# Patient Record
Sex: MALE | Race: WHITE | NOT HISPANIC OR LATINO | Employment: OTHER | URBAN - METROPOLITAN AREA
[De-identification: names, ages, dates, MRNs, and addresses within clinical notes are randomized per-mention and may not be internally consistent; named-entity substitution may affect disease eponyms.]

---

## 2017-01-27 DIAGNOSIS — E11.9 TYPE 2 DIABETES MELLITUS WITHOUT COMPLICATION: ICD-10-CM

## 2017-01-30 ENCOUNTER — TELEPHONE (OUTPATIENT)
Dept: FAMILY MEDICINE | Facility: CLINIC | Age: 72
End: 2017-01-30

## 2017-01-30 NOTE — TELEPHONE ENCOUNTER
----- Message from Vivakash Colunga sent at 1/25/2017 10:27 AM CST -----  Contact: Diabetic and Management Supplies  This is regarding about pre-authorization request for diabetic supply for the pt. Please contact Marisol at [phone] ext [phone]. Thanks!

## 2017-01-30 NOTE — TELEPHONE ENCOUNTER
Spoke with Marisol and this was intended for the patient, they have the information from the clinic. Sent message to the patient,

## 2017-02-08 ENCOUNTER — TELEPHONE (OUTPATIENT)
Dept: FAMILY MEDICINE | Facility: CLINIC | Age: 72
End: 2017-02-08

## 2017-02-08 NOTE — TELEPHONE ENCOUNTER
----- Message from Chen Herbert sent at 2/8/2017  3:19 PM CST -----  Contact: Diabetes Mgm & Supplies- Marisol or [facility]un[facility] , or Bartolo   Calling re diabetic supplies . Prior auth is needed for the insulin pump & supplies . [phone]   LL

## 2017-02-08 NOTE — TELEPHONE ENCOUNTER
----- Message from Berna Curry sent at 2/7/2017  2:41 PM CST -----  Contact: Marisol herrera Diabeties managment and supplies  [phone]  Marisol is requesting to speak to you regarding a authorization for diabetic supplies. Pls call Diabetic management supplies ( ask for [facility] Damon or Veena ) [phone]. Thanks.......Shima

## 2017-02-09 ENCOUNTER — TELEPHONE (OUTPATIENT)
Dept: FAMILY MEDICINE | Facility: CLINIC | Age: 72
End: 2017-02-09

## 2017-02-09 DIAGNOSIS — E11.22 DIABETES MELLITUS WITH STAGE 3 CHRONIC KIDNEY DISEASE: Chronic | ICD-10-CM

## 2017-02-09 DIAGNOSIS — N18.30 DIABETES MELLITUS WITH STAGE 3 CHRONIC KIDNEY DISEASE: Chronic | ICD-10-CM

## 2017-02-09 NOTE — TELEPHONE ENCOUNTER
Received blood work with improvement of hemoglobin A1c.  Excellent job.  Keep working on your diabetes.

## 2017-02-13 ENCOUNTER — OFFICE VISIT (OUTPATIENT)
Dept: FAMILY MEDICINE | Facility: CLINIC | Age: 72
End: 2017-02-13
Payer: MEDICARE

## 2017-02-13 ENCOUNTER — TELEPHONE (OUTPATIENT)
Dept: FAMILY MEDICINE | Facility: CLINIC | Age: 72
End: 2017-02-13

## 2017-02-13 ENCOUNTER — HOSPITAL ENCOUNTER (OUTPATIENT)
Dept: RADIOLOGY | Facility: HOSPITAL | Age: 72
Discharge: HOME | End: 2017-02-13
Attending: NURSE PRACTITIONER
Payer: MEDICARE

## 2017-02-13 VITALS
DIASTOLIC BLOOD PRESSURE: 82 MMHG | BODY MASS INDEX: 37.39 KG/M2 | SYSTOLIC BLOOD PRESSURE: 130 MMHG | HEART RATE: 85 BPM | HEIGHT: 68 IN | WEIGHT: 246.69 LBS | TEMPERATURE: 98 F | OXYGEN SATURATION: 96 %

## 2017-02-13 DIAGNOSIS — M79.672 FOOT ARCH PAIN, LEFT: ICD-10-CM

## 2017-02-13 DIAGNOSIS — M79.672 ACUTE FOOT PAIN, LEFT: ICD-10-CM

## 2017-02-13 DIAGNOSIS — M72.2 PLANTAR FASCIITIS, LEFT: Primary | ICD-10-CM

## 2017-02-13 DIAGNOSIS — G57.92 NEUROPATHY OF FOOT, LEFT: ICD-10-CM

## 2017-02-13 PROCEDURE — 73630 X-RAY EXAM OF FOOT: CPT | Mod: 26,LT,, | Performed by: RADIOLOGY

## 2017-02-13 PROCEDURE — 1160F RVW MEDS BY RX/DR IN RCRD: CPT | Mod: S$GLB,,, | Performed by: NURSE PRACTITIONER

## 2017-02-13 PROCEDURE — 3075F SYST BP GE 130 - 139MM HG: CPT | Mod: S$GLB,,, | Performed by: NURSE PRACTITIONER

## 2017-02-13 PROCEDURE — 99214 OFFICE O/P EST MOD 30 MIN: CPT | Mod: S$GLB,,, | Performed by: NURSE PRACTITIONER

## 2017-02-13 PROCEDURE — 1125F AMNT PAIN NOTED PAIN PRSNT: CPT | Mod: S$GLB,,, | Performed by: NURSE PRACTITIONER

## 2017-02-13 PROCEDURE — 3079F DIAST BP 80-89 MM HG: CPT | Mod: S$GLB,,, | Performed by: NURSE PRACTITIONER

## 2017-02-13 PROCEDURE — 1159F MED LIST DOCD IN RCRD: CPT | Mod: S$GLB,,, | Performed by: NURSE PRACTITIONER

## 2017-02-13 PROCEDURE — 1157F ADVNC CARE PLAN IN RCRD: CPT | Mod: S$GLB,,, | Performed by: NURSE PRACTITIONER

## 2017-02-13 PROCEDURE — 99499 UNLISTED E&M SERVICE: CPT | Mod: S$GLB,,, | Performed by: NURSE PRACTITIONER

## 2017-02-13 PROCEDURE — 73630 X-RAY EXAM OF FOOT: CPT | Mod: TC,PO,LT

## 2017-02-13 PROCEDURE — 99999 PR PBB SHADOW E&M-EST. PATIENT-LVL V: CPT | Mod: PBBFAC,,, | Performed by: NURSE PRACTITIONER

## 2017-02-13 RX ORDER — TRAMADOL HYDROCHLORIDE 50 MG/1
50 TABLET ORAL EVERY 6 HOURS PRN
Qty: 30 TABLET | Refills: 0 | Status: SHIPPED | OUTPATIENT
Start: 2017-02-13 | End: 2017-02-23

## 2017-02-13 RX ORDER — CELECOXIB 100 MG/1
100 CAPSULE ORAL 2 TIMES DAILY
Qty: 30 CAPSULE | Refills: 0 | Status: SHIPPED | OUTPATIENT
Start: 2017-02-13 | End: 2017-04-21 | Stop reason: HOSPADM

## 2017-02-13 RX ORDER — INSULIN LISPRO 100 [IU]/ML
INJECTION, SOLUTION INTRAVENOUS; SUBCUTANEOUS
Status: ON HOLD | COMMUNITY
End: 2017-04-28 | Stop reason: HOSPADM

## 2017-02-13 NOTE — TELEPHONE ENCOUNTER
----- Message from Samra Jones NP sent at 2/13/2017  2:13 PM CST -----  Please Notify patient that  His xray showed    Arthritic changes of the foot.   please continue with current recommendation

## 2017-02-13 NOTE — PATIENT INSTRUCTIONS
Understanding Heel Pain  Your heel is the back part of your foot. A band of tissue called the plantar fascia connects the heel bone to the bones in the ball of your foot. Nerves run from the heel up the inside of your ankle and into your leg. When you feel pain in the bottom of your heel, the plantar fascia may be inflamed. Overuse, Achilles tightness, or excess body weight can cause the tissue to tear or pull away from the bone. Sometimes the inflamed plantar fascia also irritates a nerve, causing more pain.    What causes heel pain?  Wearing shoes with poor cushioning can irritate the tissue in your heel (plantar fascia). Being overweight or standing for long periods can also irritate the tissue. Running, walking, tennis, and other sports that put stress on the heels can cause tiny tears in the tissue. If your lower leg muscles are tight, this is more likely to occur. A tight Achilles tendon will also contribute to heel pain.  Symptoms  You may feel pain on the bottom or on the inside edge of your heel. The pain may be sharp when you get out of bed or when you stand up after sitting for a while. You may feel a dull ache in your heel after youve been standing for a long time on a hard surface. Running can also cause a dull ache.  Preventing future problems  To prevent future heel pain, wear shoes with well-cushioned heels. And do exercises prescribed by your healthcare provider to stretch the plantar fascia and the muscles in the lower leg.   Date Last Reviewed: 9/10/2015  © 3479-6401 [employer]. [address] [place] [address], [place] [address]. All rights reserved. This information is not intended as a substitute for professional medical care. Always follow your healthcare professional's instructions.        Plantar Fasciitis  Plantar fasciitis is a painful swelling of the plantar fascia. The plantar fascia is a thick, fibrous layer of tissue. It covers the bones on the bottom of your foot. And it  supports the foot bones in an arched position.  This can happen gradually or suddenly. It usually affects one foot at a time. Heel pain can be sharp, like a knife sticking into the bottom of your foot. You may feel pain after exercising, long-distance jogging, stair climbing, long periods of standing, or after standing up.  Risk factors include: non-active lifestyle, arthritis, diabetes, obesity or recent weight gain, flat foot, high arch. Wearing high heels, loose shoes, or shoes with poor arch support for long periods of time adds to the risk. This problem is commonly found in runners and dancers. It also found in people who stand on hard surfaces for long periods of time.  Foot pain from this condition is usually worse in the morning. But it improves with walking. By the end of the day there may be a dull aching. Treatment requires short-term rest and controlling swelling. It may take up to 9 months before all symptoms go away. Rarely, a steroid injection into the foot, or surgery, may be needed.  Home care  · If you are overweight, lose weight to help healing.  · Choose supportive shoes with good arch support and shock absorbency. Replace athletic shoes when they become worn out. Dont walk or run barefoot.  · Premade or custom-fitted shoe inserts may be helpful. Inserts made of silicone seem to be the most effective. Custom-made inserts can be provided by a podiatrist or foot specialist, physical therapist, or orthopedist.  · Premade or custom-made night splints keep the heel stretched out while you sleep. They may prevent morning pain.  · Avoid activities that stress the feet: jogging, prolonged standing or walking, contact sports, etc.  · First thing in the morning and before sports, stretch the bottom of your feet. Gently flex your ankle so the toes move toward your knee.  · Icing may help control heel pain. Apply an ice pack to the heel for 10-20 minutes as a preventive. Or ice your heel after a severe  flare-up of symptoms. You may repeat this every 1-2 hours as needed.  · You may use over-the-counter pain medicine to control pain, unless another medicine was prescribed. Anti-inflammatory pain medicines, such as ibuprofen or naproxen, may work better than acetaminophen. If you have chronic liver or kidney disease or ever had a stomach ulcer or GI bleeding, talk with your healthcare provider before using these medicines.  Follow-up care  Follow up with your healthcare provider, physical therapist, or podiatrist or foot specialist as advised.  Call for an appointment if pain worsens or there is no relief after a few weeks of home treatment. Shoe inserts, a night splint, or a special boot may be required.  If X-rays were taken, you will be told of any new findings that may affect your care.  When to seek medical advice  Call your healthcare provider right away if any of these occur:  · Foot swelling  · Redness with increasing pain  Date Last Reviewed: 11/21/2015  © 1308-2020 [employer] [address] [place] [address], [place] [address]. All rights reserved. This information is not intended as a substitute for professional medical care. Always follow your healthcare professional's instructions.

## 2017-02-13 NOTE — PROGRESS NOTES
Subjective:       Patient ID: Jorden Shafer is a 71 y.o. male.    Chief Complaint: Foot Pain (left foot, burning and sharp pain X 1 mth )    Foot Pain   This is a new problem. The current episode started more than 1 month ago (2 months ). The problem occurs constantly. The problem has been gradually worsening (pain started 2 months ago but is gradually worsening over the last couple of days ). Associated symptoms include arthralgias (left foot). Pertinent negatives include no abdominal pain, chest pain, congestion, coughing, headaches, myalgias, nausea, rash, vomiting or weakness.     Review of Systems   HENT: Negative for congestion, postnasal drip, rhinorrhea, sinus pressure and sneezing.    Respiratory: Negative for cough, chest tightness and shortness of breath.    Cardiovascular: Negative for chest pain, palpitations and leg swelling.   Gastrointestinal: Negative for abdominal pain, diarrhea, nausea and vomiting.   Genitourinary: Negative for decreased urine volume and difficulty urinating.   Musculoskeletal: Positive for arthralgias (left foot) and gait problem (hurts to bear weight on left foot ). Negative for back pain and myalgias.   Skin: Negative for color change, rash and wound.   Neurological: Negative for dizziness, weakness, light-headedness and headaches.   Hematological: Negative for adenopathy. Does not bruise/bleed easily.       Objective:      Physical Exam   Constitutional: He is oriented to person, place, and time. Vital signs are normal. He appears well-developed and well-nourished.   Cardiovascular: Normal rate, regular rhythm and normal heart sounds.    Pulmonary/Chest: Effort normal and breath sounds normal.   Abdominal: Soft. Bowel sounds are normal.   Musculoskeletal:        Left foot: There is decreased range of motion, tenderness and bony tenderness. There is no swelling, normal capillary refill, no crepitus, no deformity and no laceration.        Feet:    Neurological: He is alert  and oriented to person, place, and time.   Skin: Skin is warm, dry and intact.   Psychiatric: He has a normal mood and affect.       Assessment:       1. Plantar fasciitis, left    2. Acute foot pain, left    3. Foot arch pain, left    4. Neuropathy of foot, left        Plan:       Jorden was seen today for foot pain.    Diagnoses and all orders for this visit:    Plantar fasciitis, left  -     celecoxib (CELEBREX) 100 MG capsule; Take 1 capsule (100 mg total) by mouth 2 (two) times daily.  -     tramadol (ULTRAM) 50 mg tablet; Take 1 tablet (50 mg total) by mouth every 6 (six) hours as needed.  -     Ambulatory Referral to Podiatry    Acute foot pain, left  -     X-Ray Foot Complete Left; Future  -     celecoxib (CELEBREX) 100 MG capsule; Take 1 capsule (100 mg total) by mouth 2 (two) times daily.  -     tramadol (ULTRAM) 50 mg tablet; Take 1 tablet (50 mg total) by mouth every 6 (six) hours as needed.  -     Ambulatory Referral to Podiatry    Foot arch pain, left  -     X-Ray Foot Complete Left; Future  -     celecoxib (CELEBREX) 100 MG capsule; Take 1 capsule (100 mg total) by mouth 2 (two) times daily.  -     tramadol (ULTRAM) 50 mg tablet; Take 1 tablet (50 mg total) by mouth every 6 (six) hours as needed.  -     Ambulatory Referral to Podiatry    Neuropathy of foot, left  -     celecoxib (CELEBREX) 100 MG capsule; Take 1 capsule (100 mg total) by mouth 2 (two) times daily.  -     tramadol (ULTRAM) 50 mg tablet; Take 1 tablet (50 mg total) by mouth every 6 (six) hours as needed.  -     Ambulatory Referral to Podiatry    Pt has been given instructions populated from [employer] database and has verbalized understanding of the after visit summary and information contained wherein.     Follow up with a primary care provider. May go to ER for acute shortness of breath, lightheadedness, fever, or any other emergent complaints or changes in condition.

## 2017-02-13 NOTE — MR AVS SNAPSHOT
[facility]  [address] [place] [address]  Phone: [phone]  Fax: [phone]                  Jorden Shafer   2017 11:15 AM   Office Visit    Description:  Male : 1945   Provider:  Samra Jones NP   Department:  [facility]           Reason for Visit     Foot Pain           Diagnoses this Visit        Comments    Acute foot pain, left    -  Primary     Foot arch pain, left         Neuropathy of foot, left         Plantar fasciitis, left                To Do List           Future Appointments        Provider Department Dept Phone    2017  1:30 PM [facility] XR1 [address] LIMIT [facility] [phone]    2017 8:00 AM Neha Plasencia MD [facility] [phone]      Goals (5 Years of Data)     None      Follow-Up and Disposition     Return if symptoms worsen or fail to improve.       These Medications        Disp Refills Start End    celecoxib (CELEBREX) 100 MG capsule 30 capsule 0 2017     Take 1 capsule (100 mg total) by mouth 2 (two) times daily. - Oral    Pharmacy: [facility]/pharmacy #[id number] - [place], [place] - [address] Ph #: [phone]       tramadol (ULTRAM) 50 mg tablet 30 tablet 0 2017    Take 1 tablet (50 mg total) by mouth every 6 (six) hours as needed. - Oral    Pharmacy: [facility]/pharmacy #[id number] - [place], [place] - [address] [facility] [place] [place] [employer] Ph #: [phone]         [facility]     [facility] -  Assistance  Registered nurses in the [facility] provide clinical advisement, health education, appointment booking, and other advisory services.  Call for this free service at [phone].             Medications           Message regarding Medications     Verify the changes and/or additions to your medication regime listed below are the same as discussed with your clinician today.  If any of these changes or additions are incorrect, please  notify your healthcare provider.        START taking these NEW medications        Refills    celecoxib (CELEBREX) 100 MG capsule 0    Sig: Take 1 capsule (100 mg total) by mouth 2 (two) times daily.    Class: Normal    Route: Oral    tramadol (ULTRAM) 50 mg tablet 0    Sig: Take 1 tablet (50 mg total) by mouth every 6 (six) hours as needed.    Class: Normal    Route: Oral      STOP taking these medications     insulin aspart (NOVOLOG) 100 unit/mL injection Use this insulin in your pump as directed.           Verify that the below list of medications is an accurate representation of the medications you are currently taking.  If none reported, the list may be blank. If incorrect, please contact your healthcare provider. Carry this list with you in case of emergency.           Current Medications     amlodipine (NORVASC) 10 MG tablet Take 1 tablet (10 mg total) by mouth once daily.    aspirin (ECOTRIN) 81 MG EC tablet Take 81 mg by mouth once daily.    atorvastatin (LIPITOR) 40 MG tablet Take 0.5 tablets (20 mg total) by mouth once daily.    blood sugar diagnostic Strp 1 strip by Misc.(Non-Drug; Combo Route) route 2 (two) times daily with meals. TRUE METRIX    blood-glucose meter kit Use as instructed          TRUE METRIX    carvedilol (COREG) 25 MG tablet Take 1 tablet (25 mg total) by mouth 2 (two) times daily with meals.    citalopram (CELEXA) 10 MG tablet Take 1 tablet (10 mg total) by mouth once daily.    clopidogrel (PLAVIX) 75 mg tablet Take 1 tablet (75 mg total) by mouth once daily.    furosemide (LASIX) 80 MG tablet Take 1 tablet (80 mg total) by mouth once daily.    glucose 4 GM chewable tablet Take 16 g by mouth as needed for Low blood sugar.    insulin lispro (HUMALOG) 100 unit/mL injection Inject into the skin 3 (three) times daily before meals.    isosorbide mononitrate (IMDUR) 60 MG 24 hr tablet Take 1 tablet (60 mg total) by mouth once daily.    lancets Misc 1 lancet by Misc.(Non-Drug; Combo Route)  "route 2 (two) times daily with meals. TRUE METRIX    lancing device Misc 1 Device by Misc.(Non-Drug; Combo Route) route 2 (two) times daily with meals. TRUE METRIX    lisinopril 10 MG tablet Take 1 tablet (10 mg total) by mouth once daily.    nitroGLYCERIN (NITROSTAT) 0.4 MG SL tablet Place 1 tablet (0.4 mg total) under the tongue every 5 (five) minutes as needed. Sublingual  ( under tongue) as needed    omega-3 acid ethyl esters (LOVAZA) 1 gram capsule Take 2 g by mouth 3 (three) times daily.      omeprazole (PRILOSEC) 40 MG capsule Take 1 capsule (40 mg total) by mouth 2 (two) times daily.    TANZEUM 30 mg/0.5 mL PnIj 30 MG SUBCUTANEOUS ONCE/WEEK    amitriptyline (ELAVIL) 50 MG tablet Take 1 tablet (50 mg total) by mouth every evening.    celecoxib (CELEBREX) 100 MG capsule Take 1 capsule (100 mg total) by mouth 2 (two) times daily.    tramadol (ULTRAM) 50 mg tablet Take 1 tablet (50 mg total) by mouth every 6 (six) hours as needed.           Clinical Reference Information           Your Vitals Were     BP Pulse Temp Height Weight SpO2    130/82 (BP Location: Left arm, Patient Position: Sitting, BP Method: Manual) 85 97.8 °F (36.6 °C) (Oral) 5' 8" (1.727 m) 111.9 kg (246 lb 11.1 oz) 96%    BMI                37.51 kg/m2          Blood Pressure          Most Recent Value    BP  130/82      Allergies as of 2/13/2017     No Known Allergies      Immunizations Administered on Date of Encounter - 2/13/2017     None      Orders Placed During Today's Visit      Normal Orders This Visit    Ambulatory Referral to Podiatry     Future Labs/Procedures Expected by Expires    X-Ray Foot Complete Left  2/13/2017 2/13/2018      Instructions      Understanding Heel Pain  Your heel is the back part of your foot. A band of tissue called the plantar fascia connects the heel bone to the bones in the ball of your foot. Nerves run from the heel up the inside of your ankle and into your leg. When you feel pain in the bottom of your heel, " the plantar fascia may be inflamed. Overuse, Achilles tightness, or excess body weight can cause the tissue to tear or pull away from the bone. Sometimes the inflamed plantar fascia also irritates a nerve, causing more pain.    What causes heel pain?  Wearing shoes with poor cushioning can irritate the tissue in your heel (plantar fascia). Being overweight or standing for long periods can also irritate the tissue. Running, walking, tennis, and other sports that put stress on the heels can cause tiny tears in the tissue. If your lower leg muscles are tight, this is more likely to occur. A tight Achilles tendon will also contribute to heel pain.  Symptoms  You may feel pain on the bottom or on the inside edge of your heel. The pain may be sharp when you get out of bed or when you stand up after sitting for a while. You may feel a dull ache in your heel after youve been standing for a long time on a hard surface. Running can also cause a dull ache.  Preventing future problems  To prevent future heel pain, wear shoes with well-cushioned heels. And do exercises prescribed by your healthcare provider to stretch the plantar fascia and the muscles in the lower leg.   Date Last Reviewed: 9/10/2015  © 5000-3898 [employer]. [address] [place] [address], [place] [address]. All rights reserved. This information is not intended as a substitute for professional medical care. Always follow your healthcare professional's instructions.        Plantar Fasciitis  Plantar fasciitis is a painful swelling of the plantar fascia. The plantar fascia is a thick, fibrous layer of tissue. It covers the bones on the bottom of your foot. And it supports the foot bones in an arched position.  This can happen gradually or suddenly. It usually affects one foot at a time. Heel pain can be sharp, like a knife sticking into the bottom of your foot. You may feel pain after exercising, long-distance jogging, stair climbing, long periods of  standing, or after standing up.  Risk factors include: non-active lifestyle, arthritis, diabetes, obesity or recent weight gain, flat foot, high arch. Wearing high heels, loose shoes, or shoes with poor arch support for long periods of time adds to the risk. This problem is commonly found in runners and dancers. It also found in people who stand on hard surfaces for long periods of time.  Foot pain from this condition is usually worse in the morning. But it improves with walking. By the end of the day there may be a dull aching. Treatment requires short-term rest and controlling swelling. It may take up to 9 months before all symptoms go away. Rarely, a steroid injection into the foot, or surgery, may be needed.  Home care  · If you are overweight, lose weight to help healing.  · Choose supportive shoes with good arch support and shock absorbency. Replace athletic shoes when they become worn out. Dont walk or run barefoot.  · Premade or custom-fitted shoe inserts may be helpful. Inserts made of silicone seem to be the most effective. Custom-made inserts can be provided by a podiatrist or foot specialist, physical therapist, or orthopedist.  · Premade or custom-made night splints keep the heel stretched out while you sleep. They may prevent morning pain.  · Avoid activities that stress the feet: jogging, prolonged standing or walking, contact sports, etc.  · First thing in the morning and before sports, stretch the bottom of your feet. Gently flex your ankle so the toes move toward your knee.  · Icing may help control heel pain. Apply an ice pack to the heel for 10-20 minutes as a preventive. Or ice your heel after a severe flare-up of symptoms. You may repeat this every 1-2 hours as needed.  · You may use over-the-counter pain medicine to control pain, unless another medicine was prescribed. Anti-inflammatory pain medicines, such as ibuprofen or naproxen, may work better than acetaminophen. If you have chronic  liver or kidney disease or ever had a stomach ulcer or GI bleeding, talk with your healthcare provider before using these medicines.  Follow-up care  Follow up with your healthcare provider, physical therapist, or podiatrist or foot specialist as advised.  Call for an appointment if pain worsens or there is no relief after a few weeks of home treatment. Shoe inserts, a night splint, or a special boot may be required.  If X-rays were taken, you will be told of any new findings that may affect your care.  When to seek medical advice  Call your healthcare provider right away if any of these occur:  · Foot swelling  · Redness with increasing pain  Date Last Reviewed: 11/21/2015  © 7719-3372 [employer]. [address] [place] [address], [place] [address]. All rights reserved. This information is not intended as a substitute for professional medical care. Always follow your healthcare professional's instructions.             Language Assistance Services     ATTENTION: Language assistance services are available, free of charge. Please call [phone].      ATENCIÓN: Si habla panfilo, tiene a lucio disposición servicios gratuitos de asistencia lingüística. Llame al [phone].     CHÚ Ý: N?u b?n nói Ti?ng Vi?t, có các d?ch v? h? tr? ngôn ng? mi?n phí dành cho b?n. G?i s? [phone].         [facility] complies with applicable Federal civil rights laws and does not discriminate on the basis of race, color, national origin, age, disability, or sex.

## 2017-02-15 ENCOUNTER — TELEPHONE (OUTPATIENT)
Dept: FAMILY MEDICINE | Facility: CLINIC | Age: 72
End: 2017-02-15

## 2017-02-15 NOTE — TELEPHONE ENCOUNTER
----- Message from Linda Balderrama sent at 2/13/2017  4:31 PM CST -----  Contact: [facility]/Diabetes Management/[phone] ext [phone]  [employer] is following up [place] for patient's diabetic supplies. Thank you

## 2017-02-21 ENCOUNTER — TELEPHONE (OUTPATIENT)
Dept: FAMILY MEDICINE | Facility: CLINIC | Age: 72
End: 2017-02-21

## 2017-02-21 ENCOUNTER — OFFICE VISIT (OUTPATIENT)
Dept: PODIATRY | Facility: CLINIC | Age: 72
End: 2017-02-21
Payer: MEDICARE

## 2017-02-21 ENCOUNTER — OFFICE VISIT (OUTPATIENT)
Dept: FAMILY MEDICINE | Facility: CLINIC | Age: 72
End: 2017-02-21
Payer: MEDICARE

## 2017-02-21 VITALS
HEART RATE: 86 BPM | WEIGHT: 250.25 LBS | DIASTOLIC BLOOD PRESSURE: 60 MMHG | SYSTOLIC BLOOD PRESSURE: 122 MMHG | TEMPERATURE: 98 F | HEIGHT: 68 IN | OXYGEN SATURATION: 97 % | BODY MASS INDEX: 37.93 KG/M2

## 2017-02-21 VITALS
SYSTOLIC BLOOD PRESSURE: 112 MMHG | WEIGHT: 250 LBS | HEIGHT: 68 IN | DIASTOLIC BLOOD PRESSURE: 60 MMHG | BODY MASS INDEX: 37.89 KG/M2

## 2017-02-21 DIAGNOSIS — Z79.4 TYPE 2 DIABETES MELLITUS WITH COMPLICATION, WITH LONG-TERM CURRENT USE OF INSULIN: Primary | ICD-10-CM

## 2017-02-21 DIAGNOSIS — G89.29 CHRONIC PAIN OF LEFT KNEE: ICD-10-CM

## 2017-02-21 DIAGNOSIS — G47.9 SLEEP DISTURBANCES: ICD-10-CM

## 2017-02-21 DIAGNOSIS — I11.9 HYPERTENSIVE HEART DISEASE WITHOUT CONGESTIVE HEART FAILURE: ICD-10-CM

## 2017-02-21 DIAGNOSIS — K21.9 GASTROESOPHAGEAL REFLUX DISEASE WITHOUT ESOPHAGITIS: Chronic | ICD-10-CM

## 2017-02-21 DIAGNOSIS — M76.61 ACHILLES TENDINITIS OF BOTH LOWER EXTREMITIES: ICD-10-CM

## 2017-02-21 DIAGNOSIS — F32.0 MAJOR DEPRESSIVE DISORDER, SINGLE EPISODE, MILD: ICD-10-CM

## 2017-02-21 DIAGNOSIS — E66.01 SEVERE OBESITY (BMI 35.0-39.9) WITH COMORBIDITY: ICD-10-CM

## 2017-02-21 DIAGNOSIS — I10 ESSENTIAL HYPERTENSION: Chronic | ICD-10-CM

## 2017-02-21 DIAGNOSIS — E11.22 DIABETES MELLITUS WITH STAGE 3 CHRONIC KIDNEY DISEASE: Primary | ICD-10-CM

## 2017-02-21 DIAGNOSIS — N18.30 DIABETES MELLITUS WITH STAGE 3 CHRONIC KIDNEY DISEASE: Chronic | ICD-10-CM

## 2017-02-21 DIAGNOSIS — M20.41 HAMMER TOES OF BOTH FEET: ICD-10-CM

## 2017-02-21 DIAGNOSIS — M21.6X2 ACQUIRED EQUINUS DEFORMITY OF BOTH FEET: ICD-10-CM

## 2017-02-21 DIAGNOSIS — K63.5 POLYP OF COLON, UNSPECIFIED PART OF COLON, UNSPECIFIED TYPE: ICD-10-CM

## 2017-02-21 DIAGNOSIS — M20.5X9 HALLUX LIMITUS, UNSPECIFIED LATERALITY: ICD-10-CM

## 2017-02-21 DIAGNOSIS — R07.89 OTHER CHEST PAIN: ICD-10-CM

## 2017-02-21 DIAGNOSIS — E11.49 TYPE II DIABETES MELLITUS WITH NEUROLOGICAL MANIFESTATIONS: ICD-10-CM

## 2017-02-21 DIAGNOSIS — Z23 NEED FOR PROPHYLACTIC VACCINATION AND INOCULATION AGAINST INFLUENZA: ICD-10-CM

## 2017-02-21 DIAGNOSIS — E11.22 DIABETES MELLITUS WITH STAGE 3 CHRONIC KIDNEY DISEASE: Chronic | ICD-10-CM

## 2017-02-21 DIAGNOSIS — I25.2 HISTORY OF NON-ST ELEVATION MYOCARDIAL INFARCTION (NSTEMI): ICD-10-CM

## 2017-02-21 DIAGNOSIS — M25.562 CHRONIC PAIN OF LEFT KNEE: ICD-10-CM

## 2017-02-21 DIAGNOSIS — N18.30 DIABETES MELLITUS WITH STAGE 3 CHRONIC KIDNEY DISEASE: Primary | ICD-10-CM

## 2017-02-21 DIAGNOSIS — M21.6X1 ACQUIRED EQUINUS DEFORMITY OF BOTH FEET: ICD-10-CM

## 2017-02-21 DIAGNOSIS — I77.1 TORTUOUS AORTA: ICD-10-CM

## 2017-02-21 DIAGNOSIS — M79.672 FOOT PAIN, BILATERAL: ICD-10-CM

## 2017-02-21 DIAGNOSIS — M72.2 PLANTAR FASCIITIS: ICD-10-CM

## 2017-02-21 DIAGNOSIS — M76.62 ACHILLES TENDINITIS OF BOTH LOWER EXTREMITIES: ICD-10-CM

## 2017-02-21 DIAGNOSIS — C61 PROSTATE CANCER: ICD-10-CM

## 2017-02-21 DIAGNOSIS — E11.8 TYPE 2 DIABETES MELLITUS WITH COMPLICATION, WITH LONG-TERM CURRENT USE OF INSULIN: Primary | ICD-10-CM

## 2017-02-21 DIAGNOSIS — M20.42 HAMMER TOES OF BOTH FEET: ICD-10-CM

## 2017-02-21 DIAGNOSIS — M79.671 FOOT PAIN, BILATERAL: ICD-10-CM

## 2017-02-21 PROCEDURE — 4010F ACE/ARB THERAPY RXD/TAKEN: CPT | Mod: S$GLB,,, | Performed by: FAMILY MEDICINE

## 2017-02-21 PROCEDURE — 99214 OFFICE O/P EST MOD 30 MIN: CPT | Mod: S$GLB,,, | Performed by: FAMILY MEDICINE

## 2017-02-21 PROCEDURE — 3074F SYST BP LT 130 MM HG: CPT | Mod: S$GLB,,, | Performed by: PODIATRIST

## 2017-02-21 PROCEDURE — 1157F ADVNC CARE PLAN IN RCRD: CPT | Mod: S$GLB,,, | Performed by: PODIATRIST

## 2017-02-21 PROCEDURE — 99999 PR PBB SHADOW E&M-EST. PATIENT-LVL IV: CPT | Mod: PBBFAC,,, | Performed by: FAMILY MEDICINE

## 2017-02-21 PROCEDURE — 1157F ADVNC CARE PLAN IN RCRD: CPT | Mod: S$GLB,,, | Performed by: FAMILY MEDICINE

## 2017-02-21 PROCEDURE — 3078F DIAST BP <80 MM HG: CPT | Mod: S$GLB,,, | Performed by: FAMILY MEDICINE

## 2017-02-21 PROCEDURE — 90662 IIV NO PRSV INCREASED AG IM: CPT | Mod: S$GLB,,, | Performed by: FAMILY MEDICINE

## 2017-02-21 PROCEDURE — 99499 UNLISTED E&M SERVICE: CPT | Mod: S$GLB,,, | Performed by: FAMILY MEDICINE

## 2017-02-21 PROCEDURE — 2022F DILAT RTA XM EVC RTNOPTHY: CPT | Mod: S$GLB,,, | Performed by: PODIATRIST

## 2017-02-21 PROCEDURE — 1160F RVW MEDS BY RX/DR IN RCRD: CPT | Mod: S$GLB,,, | Performed by: PODIATRIST

## 2017-02-21 PROCEDURE — 1159F MED LIST DOCD IN RCRD: CPT | Mod: S$GLB,,, | Performed by: PODIATRIST

## 2017-02-21 PROCEDURE — 3045F PR MOST RECENT HEMOGLOBIN A1C LEVEL 7.0-9.0%: CPT | Mod: S$GLB,,, | Performed by: PODIATRIST

## 2017-02-21 PROCEDURE — 1159F MED LIST DOCD IN RCRD: CPT | Mod: S$GLB,,, | Performed by: FAMILY MEDICINE

## 2017-02-21 PROCEDURE — 3045F PR MOST RECENT HEMOGLOBIN A1C LEVEL 7.0-9.0%: CPT | Mod: S$GLB,,, | Performed by: FAMILY MEDICINE

## 2017-02-21 PROCEDURE — 3078F DIAST BP <80 MM HG: CPT | Mod: S$GLB,,, | Performed by: PODIATRIST

## 2017-02-21 PROCEDURE — G0008 ADMIN INFLUENZA VIRUS VAC: HCPCS | Mod: S$GLB,,, | Performed by: FAMILY MEDICINE

## 2017-02-21 PROCEDURE — 99499 UNLISTED E&M SERVICE: CPT | Mod: S$GLB,,, | Performed by: PODIATRIST

## 2017-02-21 PROCEDURE — 3074F SYST BP LT 130 MM HG: CPT | Mod: S$GLB,,, | Performed by: FAMILY MEDICINE

## 2017-02-21 PROCEDURE — 4010F ACE/ARB THERAPY RXD/TAKEN: CPT | Mod: S$GLB,,, | Performed by: PODIATRIST

## 2017-02-21 PROCEDURE — 1160F RVW MEDS BY RX/DR IN RCRD: CPT | Mod: S$GLB,,, | Performed by: FAMILY MEDICINE

## 2017-02-21 PROCEDURE — 99204 OFFICE O/P NEW MOD 45 MIN: CPT | Mod: S$GLB,,, | Performed by: PODIATRIST

## 2017-02-21 PROCEDURE — 99999 PR PBB SHADOW E&M-EST. PATIENT-LVL III: CPT | Mod: PBBFAC,,, | Performed by: PODIATRIST

## 2017-02-21 PROCEDURE — 1125F AMNT PAIN NOTED PAIN PRSNT: CPT | Mod: S$GLB,,, | Performed by: PODIATRIST

## 2017-02-21 PROCEDURE — 2022F DILAT RTA XM EVC RTNOPTHY: CPT | Mod: S$GLB,,, | Performed by: FAMILY MEDICINE

## 2017-02-21 RX ORDER — LISINOPRIL 10 MG/1
10 TABLET ORAL DAILY
Qty: 90 TABLET | Refills: 3 | Status: ON HOLD | OUTPATIENT
Start: 2017-02-21 | End: 2017-07-06 | Stop reason: HOSPADM

## 2017-02-21 RX ORDER — AMLODIPINE BESYLATE 10 MG/1
10 TABLET ORAL DAILY
Qty: 90 TABLET | Refills: 3 | Status: SHIPPED | OUTPATIENT
Start: 2017-02-21 | End: 2017-04-21 | Stop reason: DRUGHIGH

## 2017-02-21 RX ORDER — CLOPIDOGREL BISULFATE 75 MG/1
75 TABLET ORAL DAILY
Qty: 90 TABLET | Refills: 3 | Status: ON HOLD | OUTPATIENT
Start: 2017-02-21 | End: 2017-10-13

## 2017-02-21 RX ORDER — ISOSORBIDE MONONITRATE 60 MG/1
60 TABLET, EXTENDED RELEASE ORAL DAILY
Qty: 90 TABLET | Refills: 3 | Status: ON HOLD | OUTPATIENT
Start: 2017-02-21 | End: 2017-07-06 | Stop reason: HOSPADM

## 2017-02-21 RX ORDER — CARVEDILOL 25 MG/1
25 TABLET ORAL 2 TIMES DAILY WITH MEALS
Qty: 90 TABLET | Refills: 3 | Status: SHIPPED | OUTPATIENT
Start: 2017-02-21 | End: 2017-04-21 | Stop reason: SDUPTHER

## 2017-02-21 RX ORDER — FUROSEMIDE 80 MG/1
80 TABLET ORAL DAILY
Qty: 90 TABLET | Refills: 3 | Status: ON HOLD | OUTPATIENT
Start: 2017-02-21 | End: 2017-04-28

## 2017-02-21 RX ORDER — NITROGLYCERIN 0.4 MG/1
0.4 TABLET SUBLINGUAL EVERY 5 MIN PRN
Qty: 30 TABLET | Status: SHIPPED | OUTPATIENT
Start: 2017-02-21

## 2017-02-21 RX ORDER — CITALOPRAM 10 MG/1
10 TABLET ORAL DAILY
Qty: 90 TABLET | Refills: 1 | Status: SHIPPED | OUTPATIENT
Start: 2017-02-21 | End: 2017-04-21 | Stop reason: SDUPTHER

## 2017-02-21 NOTE — LETTER
February 23, 2017      Samra Jones, MAURILIO  [address] [facility]  [place] [address]           [facility]  [address] [facility]  [place] [address]  Phone: [phone]          Patient: Jorden Shafer   MR Number: [id number]   YOB: 1945   Date of Visit: 2/21/2017       Dear Samra Jones:    Thank you for referring Jorden Shafer to me for evaluation. Attached you will find relevant portions of my assessment and plan of care.    If you have questions, please do not hesitate to call me. I look forward to following Jorden Shafer along with you.    Sincerely,    Brii Donnelly DPM    Enclosure  CC:  No Recipients    If you would like to receive this communication electronically, please contact [email or web address] or [phone] to request more information on [employer] Link access.    For providers and/or their staff who would like to refer a patient to [facility], please contact us through our one-stop-shop provider referral line, [facility] , at [phone].    If you feel you have received this communication in error or would no longer like to receive these types of communications, please e-mail [email or web address]

## 2017-02-21 NOTE — TELEPHONE ENCOUNTER
----- Message from Vivakash Colunga sent at 2/17/2017  4:15 PM CST -----  Contact: Diabetes Management Supplies  Called, request to speak to Nurse Escamilla, regarding about PA for the pt. Please contact [phone] ext [phone] [facility]. Thanks!

## 2017-02-21 NOTE — MR AVS SNAPSHOT
[facility]  [address] [facility]  [place] [address]  Phone: [phone]                  Jorden Shafer   2017 1:30 PM   Office Visit    Description:  Male : 1945   Provider:  Brii Donnelly DPM   Department:  [facility]           Reason for Visit     Diabetes Mellitus     Diabetic Foot Exam     Nail Care     Foot Pain           Diagnoses this Visit        Comments    Diabetes mellitus with stage 3 chronic kidney disease    -  Primary     Type II diabetes mellitus with neurological manifestations         Foot pain, bilateral         Plantar fasciitis         Achilles tendinitis of both lower extremities         Acquired equinus deformity of both feet         Hammer toes of both feet         Hallux limitus, unspecified laterality                To Do List           Future Appointments        Provider Department Dept Phone    2017 1:30 PM Brii Donnelly DPM [facility] [phone]      Goals (5 Years of Data)     None      [facility]     [facility] On Call Nurse Care Line -  Assistance  Registered nurses in the [facility] On Call Center provide clinical advisement, health education, appointment booking, and other advisory services.  Call for this free service at [phone].             Medications           Message regarding Medications     Verify the changes and/or additions to your medication regime listed below are the same as discussed with your clinician today.  If any of these changes or additions are incorrect, please notify your healthcare provider.             Verify that the below list of medications is an accurate representation of the medications you are currently taking.  If none reported, the list may be blank. If incorrect, please contact your healthcare provider. Carry this list with you in case of emergency.           Current Medications     amlodipine (NORVASC) 10 MG tablet Take 1 tablet (10 mg total) by mouth once daily.    aspirin (ECOTRIN) 81  MG EC tablet Take 81 mg by mouth once daily.    atorvastatin (LIPITOR) 40 MG tablet Take 0.5 tablets (20 mg total) by mouth once daily.    blood sugar diagnostic Strp 1 strip by Misc.(Non-Drug; Combo Route) route 2 (two) times daily with meals. TRUE METRIX    blood-glucose meter kit Use as instructed          TRUE METRIX    carvedilol (COREG) 25 MG tablet Take 1 tablet (25 mg total) by mouth 2 (two) times daily with meals.    celecoxib (CELEBREX) 100 MG capsule Take 1 capsule (100 mg total) by mouth 2 (two) times daily.    citalopram (CELEXA) 10 MG tablet Take 1 tablet (10 mg total) by mouth once daily.    clopidogrel (PLAVIX) 75 mg tablet Take 1 tablet (75 mg total) by mouth once daily.    furosemide (LASIX) 80 MG tablet Take 1 tablet (80 mg total) by mouth once daily.    glucose 4 GM chewable tablet Take 16 g by mouth as needed for Low blood sugar.    insulin lispro (HUMALOG) 100 unit/mL injection Inject into the skin 3 (three) times daily before meals.    isosorbide mononitrate (IMDUR) 60 MG 24 hr tablet Take 1 tablet (60 mg total) by mouth once daily.    lancets Misc 1 lancet by Misc.(Non-Drug; Combo Route) route 2 (two) times daily with meals. TRUE METRIX    lancing device Misc 1 Device by Misc.(Non-Drug; Combo Route) route 2 (two) times daily with meals. TRUE METRIX    lisinopril 10 MG tablet Take 1 tablet (10 mg total) by mouth once daily.    nitroGLYCERIN (NITROSTAT) 0.4 MG SL tablet Place 1 tablet (0.4 mg total) under the tongue every 5 (five) minutes as needed. Sublingual  ( under tongue) as needed    omega-3 acid ethyl esters (LOVAZA) 1 gram capsule Take 2 g by mouth 3 (three) times daily.      omeprazole (PRILOSEC) 40 MG capsule Take 1 capsule (40 mg total) by mouth 2 (two) times daily.    TANZEUM 30 mg/0.5 mL PnIj 30 MG SUBCUTANEOUS ONCE/WEEK    tramadol (ULTRAM) 50 mg tablet Take 1 tablet (50 mg total) by mouth every 6 (six) hours as needed.    amitriptyline (ELAVIL) 50 MG tablet Take 1 tablet (50 mg  "total) by mouth every evening.           Clinical Reference Information           Your Vitals Were     BP Height Weight BMI       112/60 5' 8" (1.727 m) 113.4 kg (250 lb) 38.01 kg/m2       Blood Pressure          Most Recent Value    BP  112/60      Allergies as of 2/21/2017     No Known Allergies      Immunizations Administered on Date of Encounter - 2/21/2017     Name Date Dose VIS Date Route    Influenza - High Dose 2/21/2017 0.5 mL 8/7/2015 Intramuscular      Orders Placed During Today's Visit      Normal Orders This Visit    DIABETIC SHOES FOR HOME USE       Instructions    Recommend lotions: eucerin, aquaphor, A&D ointment, gold bond for diabetics, sween    Shoe recommendations: (try 6pm.Mail'Inside, zappos.Mail'Inside , nordstromrack.Mail'Inside, or shoes.Mail'Inside for discounted prices) you can visit [employer] shoes in [place] as well    Asics (GT 1000 or gel foundations), new balance, saucony (stabil c3),  Whelan (transcend), vionic, propet (tennis shoe)    soft brand, clarks, crocs, aerosoles, naturalizers, SAS, ecco, ty, genaro nascimento, rockports (dress shoes)    Vionic, volitiles, burkenstocks, fitflops, propet (sandals)    Nike comfort thong sandals, crocs (house shoes)    Nail Home remedy:  Vicks Vapor rub OR Listerine and apple cider vinegar in a spray bottle to nails    Occasional soaks for 15-20 mins in luke warm water with 1 cup of listerine and 1 cup of apple cider vinegar are ok You may add several drops of oil of oregano or tea tree oil as well      Understanding Plantar Fasciitis    Plantar fasciitis is a condition that causes foot and heel pain. The plantar fascia is a tough band of tissue that runs across the bottom of the foot from the heel to the toes. This tissue pulls on the heel bone. It supports the arch of the foot as it pushes off the ground. If the tissue becomes irritated or red and swollen (inflamed), it is called plantar fasciitis.  How to say it  PLAN-tuhr fa-see-IY-tis   What causes plantar fasciitis?  Plantar " fasciitis most often occurs from overusing the plantar fascia. The tissue may become damaged from activities that put repeated stress on the heel and foot. Or it may wear down over time with age and ankle stiffness. You are more likely to have plantar fasciitis if you:  · Do activities that require a lot of running, jumping, or dancing  · Have a job that requires being on your feet for long periods  · Are overweight or obese  · Have certain foot problems, such as a tight Achilles tendon, flat feet, or high arches  · Often wear poorly fitting shoes  Symptoms of plantar fasciitis  The condition most often causes pain in the heel and the bottom of the foot. The pain may occur when you take your first steps in the morning. It may get better as you walk throughout the day. But as you continue to put weight on the foot, the pain often returns. Pain may also occur after standing or sitting for long periods.  Treating plantar fasciitis  Treatments for plantar fasciitis include:  · Resting the foot. This involves limiting movements that make your foot hurt. You may also need to avoid certain sports and types of work for a time.  · Using cold packs. Put an ice pack on the heel and foot to help reduce pain and swelling.  · Taking pain medicines. Prescription and over-the-counter pain medicines can help relieve pain and swelling.  · Using heel cups or foot inserts (orthotics). These are placed in the shoes to help support the heel or arch and cushion the heel. You may also be told to buy proper-fitting shoes with good arch support and cushioned soles.  · Taping the foot. This supports the arch and limits the movement of the plantar fascia to help relieve symptoms.  · Wearing a night splint. This stretches the plantar fascia and leg muscles while you sleep. This may help relieve pain.  · Doing exercises and physical therapy. These stretch and strengthen the plantar fascia and the muscles in the leg that support the heel and  foot.  · Getting shots of medicine into the foot. These may help relieve symptoms for a time.  · Having surgery. This may be needed if other treatments fail to relieve symptoms. During surgery, the surgeon may partially cut the plantar fascia to release tension.  Possible complications of plantar fasciitis  Without proper care and treatment, healing may take longer than normal. Also, symptoms may continue or get worse. Over time, the plantar fascia may be damaged. This can make it hard to walk or even stand without pain.  When to call your healthcare provider  Call your healthcare provider right away if you have any of these:  · Fever of 100.4°F (38°C) or higher, or as directed  · Symptoms that dont get better with treatment, or get worse  · New symptoms, such as numbness, tingling, or weakness in the foot   © 3611-3027 [employer]. [address] [place] [address]. All rights reserved. This information is not intended as a substitute for professional medical care. Always follow your healthcare professional's instructions.        Treating Plantar Fasciitis  First, your healthcare provider tries to determine the cause of your problem in order to suggest ways to relieve pain. If your pain is due to poor foot mechanics, custom-made shoe inserts (orthoses) may help.    Reduce symptoms  · To relieve mild symptoms, try aspirin, ibuprofen, or other medicines as directed. Rubbing ice on the affected area may also help.  · To reduce severe pain and swelling, your healthcare provider may prescribe pills or injections or a walking cast in some instances. Physical therapy, such as ultrasound or a daily stretching program, may also be recommended. Surgery is rarely required.  · To reduce symptoms caused by poor foot mechanics, your foot may be taped. This supports the arch and temporarily controls movement. Night splints may also help by stretching the fascia.  Control movement  If taping helps, your  healthcare provider may prescribe orthoses. Built from plaster casts of your feet, these inserts control the way your foot moves. As a result, your symptoms should go away.  Reduce overuse  Every time your foot strikes the ground, the plantar fascia is stretched. You can reduce the strain on the plantar fascia and the possibility of overuse by following these suggestions:  · Lose any excess weight.  · Avoid running on hard or uneven ground.  · Use orthoses at all times in your shoes and house slippers.  If surgery is needed  Your healthcare provider may consider surgery if other types of treatment don't control your pain. During surgery, the plantar fascia is partially cut to release tension. As you heal, fibrous tissue fills the space between the heel bone and the plantar fascia.   © 6031-6572 [employer]. [address] [place] [address], [place] [address]. All rights reserved. This information is not intended as a substitute for professional medical care. Always follow your healthcare professional's instructions.        Wearing Proper Shoes                    You walk on your feet every day, forcing them to support the weight of your body. Repeated stress on your feet can cause damage over time. The right shoes can help protect your feet. The wrong shoes can cause more foot problems. Read the information below to help you find a shoe that fits your foot needs.     A good shoe fit will cover your foot outline.       A shoe that doesnt cover the outline is a bad fit.      Whats Your Foot Shape?  To get a good fit, you need to know the shape of your foot. Do this simple test: While standing, place your foot on a piece of paper and trace around it. Is your foot straight or curved? Do you have a foot problem, such as a bunion, that causes your foot outline to show a bulge on the side of your big toe?  Finding Your Fit  Bring your foot outline to the [employer] store to help you find the right shoe. Place a shoe you  like on top of the outline to see if it matches the shape. The shoe should cover the outline. (If you have a bunion, the shoe may not cover the bulge on the outline. Look for soft leather shoes to stretch over the bunion.) Once youve found a pair of proper shoes, put them on. Walk around. Be sure the shoes dont rub or pinch. If the shoes feel good, youve found your fit!  The Right Shoe for You  A good shoe has features that provide comfort and support. It must also be the right size and shape for your feet. Look for a shoe made of breathable fabric and lining, such as leather or canvas. Be sure that shoes have enough tread to prevent slipping. Go to a good shoe store for help finding the right shoe.  Good Shoe Features  An ideal shoe has the following:  · Laces for support. If tying laces is a problem for you, try shoes with Velcro fasteners or heather.  · A front of the shoe (toe box) with ½ inch space in front of your longest toes.  · An arch shape that supports your foot.  · No more than 1½ inches of heel.  · A stiff, snug back of the shoe to keep your foot from sliding around.  · A smooth lining with no rough seams.  Shoe Shopping Tips  Below are some dos and donts for when you go to the shoe store.  Do:  · Select the shoes that feel right. Wear them around the house. Then bring them to your foot doctor to check for fit. If they dont fit well, return them.  · Shop late in the day, when your feet will be slightly bigger.  · Each time you buy shoes, have both your feet measured while you are standing. Foot size changes with time.  · Pick shoes to suit their purpose. High heels are okay for an occasional night on the town. But for everyday wear, choose a more sensible shoe.  · Try on shoes while wearing any inserts specially made for your feet (orthoses).  · Try on both the right and left shoes. If your feet are different sizes, pick a pair that fits the larger foot.  Dont:  · Dont buy shoes based on shoe  size alone. Always try on shoes, as sizes differ from brand to brand and within brands.  · Dont expect shoes to break in. If they dont fit at the store, dont buy them.  · Dont buy a shoe that doesnt match your foot shape.  What About Socks?  Always wear socks with shoes. Socks help absorb sweat and reduce friction and blistering. When shopping for shoes, choose soft, padded socks with seams that dont irritate your feet.  If You Have Foot Problems  Some foot problems cause deformities. This can make it hard to find a good fit. Look for shoes made of soft leather to stretch over the deformity. If you have bunions, buy shoes with a wider toe box. To fit hammertoes, look for shoes with a tall toe box. If you have arch problems, you may need inserts. In some cases, youll need to have custom footwear or orthoses made for your feet.  Suggested Footwear  Ask your healthcare provider what kind of footwear you need. He or she may recommend a certain brand or shoe store.  © 1408-6057 [employer]. [address] [place] [address]. All rights reserved. This information is not intended as a substitute for professional medical care. Always follow your healthcare professional's instructions.        Toe Extension (Flexibility)    These instructions are for your right foot. Switch sides for your left foot.  1. Sit in a chair. Rest your right ankle on your left knee.  2. Hold your toes with your right hand. Gently bend the toes backward. Feel a stretch in the undersides of the toes and ball of the foot. Hold for 30 to 60 seconds.  3. Then gently bend the toes in the other direction. Gently press on them until your foot is pointed. Hold for 30 to 60 seconds.  4. Repeat 5 times, or as instructed.  © 7350-7271 [employer]. [address] [place] [address]. All rights reserved. This information is not intended as a substitute for professional medical care. Always follow your  healthcare professional's instructions.      Diabetes: Inspecting Your Feet  Diabetes increases your chances of developing foot problems. So inspect your feet every day. This helps you find small skin irritations before they become serious infections. If you have trouble seeing the bottoms of your feet, use a mirror or ask a family member or friend to help.     Pressure spots on the bottom of the foot are common areas where problems develop.   How to check your feet  Below are tips to help you look for foot problems. Try to check your feet at the same time each day, such as when you get out of bed in the morning:  · Check the top of each foot. The tops of toes, back of the heel, and outer edge of the foot can get a lot of rubbing from poor-fitting shoes.  · Check the bottom of each foot. Daily wear and tear often leads to problems at pressure spots.  · Check the toes and nails. Fungal infections often occur between toes. Toenail problems can also be a sign of fungal infections or lead to breaks in the skin.  · Check your shoes, too. Loose objects inside a shoe can injure the foot. Use your hand to feel inside your shoes for things like randall, loose stitching, or rough areas that could irritate your skin.  Warning signs  Look for any color changes in the foot. Redness with streaks can signal a severe infection, which needs immediate medical attention. Tell your doctor right away if you have any of these problems:  · Swelling, sometimes with color changes, may be a sign of poor blood flow or infection. Symptoms include tenderness and an increase in the size of your foot.  · Warm or hot areas on your feet may be signs of infection. A foot that is cold may not be getting enough blood.  · Sensations such as burning, tingling, or pins and needles can be signs of a problem. Also check for areas that may be numb.  · Hot spots are caused by friction or pressure. Look for hot spots in areas that get a lot of rubbing. Hot  spots can turn into blisters, calluses, or sores.  · Cracks and sores are caused by dry or irritated skin. They are a sign that the skin is breaking down, which can lead to infection.  · Toenail problems to watch for include nails growing into the skin (ingrown toenail) and causing redness or pain. Thick, yellow, or discolored nails can signal a fungal infection.  · Drainage and odor can develop from untreated sores and ulcers. Call your doctor right away if you notice white or yellow drainage, bleeding, or unpleasant odor.   © 5407-1153 [employer]. [address] [place] [address]. All rights reserved. This information is not intended as a substitute for professional medical care. Always follow your healthcare professional's instructions.               Language Assistance Services     ATTENTION: Language assistance services are available, free of charge. Please call [phone].      ATENCIÓN: Si habla panfilo, tiene a lucio disposición servicios gratuitos de asistencia lingüística. Llame al [phone].     BELINDA Ý: N?u b?n nói Ti?ng Vi?t, có các d?ch v? h? tr? ngôn ng? mi?n phí dành cho b?n. G?i s? [phone].         [facility] complies with applicable Federal civil rights laws and does not discriminate on the basis of race, color, national origin, age, disability, or sex.

## 2017-02-21 NOTE — TELEPHONE ENCOUNTER
----- Message from Vivakash Colunga sent at 1/25/2017 10:27 AM CST -----  Contact: Diabetic [facility] Supplies  This is regarding about pre-authorization request for diabetic supply for the pt. Please contact Marisol at [phone] ext [phone]. Thanks!

## 2017-02-21 NOTE — PROGRESS NOTES
Subjective:      Patient ID: Jorden Shafer is a 71 y.o. male.    Chief Complaint: Diabetes Mellitus (pcp Dr. Plasencia 02/21/2017); Diabetic Foot Exam; Nail Care; and Foot Pain (both feet )    Jorden is a 71 y.o. male who presents to the clinic for evaluation and treatment of high risk feet. Jorden has a past medical history of Back pain; CAD (coronary artery disease) (1989); Chronic kidney disease; Colon polyp; Contact dermatitis (11/12/2013); CVA (cerebral vascular accident); Depression; Diabetes mellitus type II; DVT (deep venous thrombosis) (1980's); GERD (gastroesophageal reflux disease); Heart failure; Hyperlipidemia; Hypertension; Hypertensive heart disease without congestive heart failure; Myocardial infarction; Neuropathy of lower extremity; Obesity, morbid; Prostate cancer; Renal manifestation of secondary diabetes mellitus; Sleep apnea; and Vitamin D deficiency disease. The patient's chief complaint plantar medial and posterior heel pain, especially with the first step in the morning. Pain was initially exclusive to the left foot and now is in the right. The pain is described as Aching and Throbbing. The onset of the pain was gradual and has worsened over the past several months. Jorden Shafer rates the pain as 8/10. he denies a history of trauma. he relates pain began without inciting event. Prior treatments include seen by NP who prescribed celebrex and tramadol  Patient relates a hobby of flying remote airplanes    Shoe gear: worn rx shoes  Hours on Feet: 6  Exercise: moderately active    PCP: Neha Plasencia MD    Date Last Seen by PCP:   Chief Complaint   Patient presents with    Diabetes Mellitus     pcp Dr. Plasencia 02/21/2017    Diabetic Foot Exam    Nail Care    Foot Pain     both feet        Current shoe gear:  Worn rx shoes    Hemoglobin A1C   Date Value Ref Range Status   06/29/2016 8.5 (H) 4.5 - 6.2 % Final   04/15/2016 8.1 (H) 4.5 - 6.2 % Final   09/15/2015 7.4 (H) 4.5 - 6.2 %  Final   09/15/2015 7.4 (H) 4.5 - 6.2 % Final         Patient Active Problem List   Diagnosis    CAD (coronary artery disease), native coronary artery    Diabetes mellitus with stage 3 chronic kidney disease    Type 2 diabetes mellitus with complication, with long-term current use of insulin    Hyperlipidemia    Hypertensive heart disease without congestive heart failure    Vitamin D deficiency disease    Colon polyp    Prostate cancer    ED (erectile dysfunction)    GERD (gastroesophageal reflux disease)    Severe obesity (BMI 35.0-39.9) with comorbidity    Essential hypertension    Diastolic heart failure    KELVIN (obstructive sleep apnea)    Tortuous aorta    History of non-ST elevation myocardial infarction (NSTEMI)    Major depressive disorder, single episode, mild       Current Outpatient Prescriptions on File Prior to Visit   Medication Sig Dispense Refill    amlodipine (NORVASC) 10 MG tablet Take 1 tablet (10 mg total) by mouth once daily. 90 tablet 3    aspirin (ECOTRIN) 81 MG EC tablet Take 81 mg by mouth once daily.      atorvastatin (LIPITOR) 40 MG tablet Take 0.5 tablets (20 mg total) by mouth once daily. 45 tablet 5    blood sugar diagnostic Strp 1 strip by Misc.(Non-Drug; Combo Route) route 2 (two) times daily with meals. TRUE METRIX 100 strip 11    blood-glucose meter kit Use as instructed          TRUE METRIX 1 each 0    carvedilol (COREG) 25 MG tablet Take 1 tablet (25 mg total) by mouth 2 (two) times daily with meals. 90 tablet 3    celecoxib (CELEBREX) 100 MG capsule Take 1 capsule (100 mg total) by mouth 2 (two) times daily. 30 capsule 0    citalopram (CELEXA) 10 MG tablet Take 1 tablet (10 mg total) by mouth once daily. 90 tablet 1    clopidogrel (PLAVIX) 75 mg tablet Take 1 tablet (75 mg total) by mouth once daily. 90 tablet 3    furosemide (LASIX) 80 MG tablet Take 1 tablet (80 mg total) by mouth once daily. 90 tablet 3    glucose 4 GM chewable tablet Take 16 g by  mouth as needed for Low blood sugar.      insulin lispro (HUMALOG) 100 unit/mL injection Inject into the skin 3 (three) times daily before meals.      isosorbide mononitrate (IMDUR) 60 MG 24 hr tablet Take 1 tablet (60 mg total) by mouth once daily. 90 tablet 3    lancets Misc 1 lancet by Misc.(Non-Drug; Combo Route) route 2 (two) times daily with meals. TRUE METRIX 100 each 11    lancing device Misc 1 Device by Misc.(Non-Drug; Combo Route) route 2 (two) times daily with meals. TRUE METRIX 1 each 0    lisinopril 10 MG tablet Take 1 tablet (10 mg total) by mouth once daily. 90 tablet 3    nitroGLYCERIN (NITROSTAT) 0.4 MG SL tablet Place 1 tablet (0.4 mg total) under the tongue every 5 (five) minutes as needed. Sublingual  ( under tongue) as needed 30 tablet prn    omega-3 acid ethyl esters (LOVAZA) 1 gram capsule Take 2 g by mouth 3 (three) times daily.        omeprazole (PRILOSEC) 40 MG capsule Take 1 capsule (40 mg total) by mouth 2 (two) times daily. 180 capsule 3    TANZEUM 30 mg/0.5 mL PnIj 30 MG SUBCUTANEOUS ONCE/WEEK  4    tramadol (ULTRAM) 50 mg tablet Take 1 tablet (50 mg total) by mouth every 6 (six) hours as needed. 30 tablet 0    amitriptyline (ELAVIL) 50 MG tablet Take 1 tablet (50 mg total) by mouth every evening. 30 tablet 2     No current facility-administered medications on file prior to visit.        Review of patient's allergies indicates:  No Known Allergies    Past Surgical History   Procedure Laterality Date    Cholecystectomy      Coronary artery bypass graft       bypass times  5    Hernia repair       groin x2  and umbilical    Vasectomy      Defibulater  06/16/2016    Joint replacement       knee left    Cardiac defibrillator placement  06/16/2016    Eye surgery Bilateral     Thrombus Left      >2005       Family History   Problem Relation Age of Onset    Alzheimer's disease Mother      Living    Heart disease Mother     Dementia Mother     Asbestos Father       "    Cancer Father     Diabetes type II Maternal Aunt     Stroke Neg Hx        Social History     Social History    Marital status:      Spouse name: N/A    Number of children: 3    Years of education: GED     Occupational History    Retired [employer]      Social History Main Topics    Smoking status: Former Smoker     Packs/day: 4.00     Years: 25.00     Types: Cigarettes    Smokeless tobacco: Never Used    Alcohol use 0.0 oz/week     0 Standard drinks or equivalent per week      Comment: occasional    Drug use: No    Sexual activity: Yes     Partners: Female     Other Topics Concern    Not on file     Social History Narrative    SCREENING/HEALTH MAINTENANCE. Updated  14    COLONOSCOPY .  [facility].    TETANUS 2005.    PNEUMOVAX  13    NO PRIOR HISTORY OF ZOSTAVAX    PSA 14    FLU 10/8/13           Review of Systems   Constitution: Negative for chills, fever and weakness.   Cardiovascular: Positive for leg swelling (hx of bypass, LLE). Negative for claudication.   Respiratory: Negative for cough and shortness of breath.    Skin: Positive for dry skin and nail changes. Negative for itching and rash.   Musculoskeletal: Positive for arthritis, back pain, joint pain and myalgias. Negative for falls, joint swelling and muscle weakness.   Gastrointestinal: Negative for diarrhea, nausea and vomiting.   Neurological: Positive for numbness and paresthesias. Negative for tremors.   Psychiatric/Behavioral: Negative for altered mental status and hallucinations.           Objective:       Vitals:    17 1344   BP: 112/60   Weight: 113.4 kg (250 lb)   Height: 5' 8" (1.727 m)   PainSc:   8   PainLoc: Foot       Physical Exam   Constitutional:  Non-toxic appearance. He does not have a sickly appearance. No distress.   Pt. is well-developed, well-nourished, appears stated age, in no acute distress, alert and oriented x 3. No evidence of depression, anxiety, or " agitation. Calm, cooperative, and communicative. Appropriate interactions and affect.   Cardiovascular:   Pulses:       Dorsalis pedis pulses are 1+ on the right side, and 1+ on the left side.        Posterior tibial pulses are 1+ on the right side, and 1+ on the left side.   here is decreased digital hair.    Pulmonary/Chest: No respiratory distress.   Musculoskeletal:        Right ankle: He exhibits swelling. No tenderness. No lateral malleolus, no medial malleolus, no AITFL, no CF ligament and no posterior TFL tenderness found. Achilles tendon exhibits pain. Achilles tendon exhibits no defect and normal Huggins's test results.        Left ankle: He exhibits swelling. No tenderness. No lateral malleolus, no medial malleolus, no AITFL, no CF ligament and no posterior TFL tenderness found. Achilles tendon exhibits pain. Achilles tendon exhibits no defect and normal Huggins's test results.        Right foot: There is tenderness. There is no bony tenderness.        Left foot: There is tenderness. There is no bony tenderness.   Decreased stride, station of gait.  apropulsive toe off.  Increased angle and base of gait.     Patient has hammertoes of digits 2-5 bilateral partially reducible without symptom today.    Decreased first MPJ range of motion both weightbearing and nonweightbearing, no crepitus observed the first MP joint, + dorsal flag sign.Mild  bunion deformity is observed .    Biomechanical exam: Pain on palpation bilateral medial calcaneal tubercle at origin of plantar fascia. Pain with palpation of posterior 1/3 calcaneus at region of Achilles tendon insertion bilaterally. + pain with ankle joint ROM. There is equinus deformity bilateral with decreased dorsiflexion at the ankle joint bilateral. No tenderness with compression of heel. Negative tinels sign. Gait analysis reveals excessive pronation through midstance and propulsion with early heel off. Shoes reveals lateral heel counter wear bilateral       Lymphadenopathy:   No lymphatic streaking    Negative lymphadenopathy bilateral popliteal fossa and tarsal tunnel.     Neurological:    Newton-Christie 5.07 monofilamant testing is diminished Alexx feet. Decreased/absent vibratory sensation bilateral feet to 128Hz tuning fork.      Paresthesias, and hyperesthesia bilateral feet with no clearly identified trigger or source.           Skin: Skin is warm, dry and intact. No abrasion, no bruising, no ecchymosis, no lesion, no petechiae and no rash noted. He is not diaphoretic. No cyanosis or erythema. No pallor. Nails show no clubbing.   Psychiatric: His mood appears not anxious. His affect is not inappropriate. His speech is not slurred. He is not combative. He is communicative. He is attentive.   Nursing note reviewed.            Assessment:       Encounter Diagnoses   Name Primary?    Diabetes mellitus with stage 3 chronic kidney disease Yes    Type II diabetes mellitus with neurological manifestations     Foot pain, bilateral     Plantar fasciitis     Achilles tendinitis of both lower extremities     Acquired equinus deformity of both feet     Hammer toes of both feet     Hallux limitus, unspecified laterality          Plan:       Jorden was seen today for diabetes mellitus, diabetic foot exam, nail care and foot pain.    Diagnoses and all orders for this visit:    Diabetes mellitus with stage 3 chronic kidney disease  -     DIABETIC SHOES FOR HOME USE    Type II diabetes mellitus with neurological manifestations  -     DIABETIC SHOES FOR HOME USE    Foot pain, bilateral    Plantar fasciitis    Achilles tendinitis of both lower extremities  -     DIABETIC SHOES FOR HOME USE    Acquired equinus deformity of both feet  -     DIABETIC SHOES FOR HOME USE    Hammer toes of both feet  -     DIABETIC SHOES FOR HOME USE    Hallux limitus, unspecified laterality  -     DIABETIC SHOES FOR HOME USE      I counseled the patient on his conditions, their implications  and medical management.    Greater than 50% of this visit spent on counseling and coordination of care.    Greater than 20 minutes spent on education about the diabetic foot, neuropathy, and prevention of limb loss.    Shoe inspection. Diabetic Foot Education. Patient reminded of the importance of good nutrition and blood sugar control to help prevent podiatric complications of diabetes. Patient instructed on proper foot hygeine. We discussed wearing proper shoe gear, daily foot inspections, never walking without protective shoe gear, never putting sharp instruments to feet.      Rx diabetic shoes for protection and support    I did  the patient in detail regarding surgical and conservative treatment measures for hallux limitus. I informed the  patient that the majority of pain is secondary to an arthritic joint with decreased joint spaces. Informed patient that outside of surgical intervention the main goal of therapy is to decreased the  range of motion at the first MPJ joint. This can be done so by utilizing either and extremely hard soled nonflexible shoe or a rocker bottom shoe such as a sketchers shape up     Educated patient on products such as heel cups, arch supports, and orthotics. Encouraged patient to rest. Patient instructed on adequate icing techniques. Patient should ice the affected area at least once per day x 10 minutes for 10 days . I advised the  patient that extra icing would also be beneficial to ensure adequate anti inflammatory effect.     Instructions on elevation to reduce pain and swelling. When sleeping, place a pillow under the injured leg. When sitting, support the injured leg so it is level with your waist.     Stretching handout dispensed to patient. Instructions on adequate stretching reviewed in clinic     RTC in 6-9 weeks, sooner PRN

## 2017-02-21 NOTE — PROGRESS NOTES
Office Visit    Patient Name: Jorden Shafer    : 1945  MRN: [id number]    Subjective:  Jorden is a 71 y.o. male who presents today for:    Foot Pain (left foot) and Other Misc (referral to  (eye doctor),  referral to GI )      This patient has multiple medical diagnoses as noted below.  This patient is known to me and to this clinic. He reports left foot pain that is sharp in nature.  He is scheduled to see Dr. Donnelly today.  Blood glucose is well controlled.  Currently seeing endocrinology         Active Problem List  Patient Active Problem List   Diagnosis    CAD (coronary artery disease), native coronary artery    Diabetes mellitus with stage 3 chronic kidney disease    Type 2 diabetes mellitus with complication, with long-term current use of insulin    Hyperlipidemia    Hypertensive heart disease without congestive heart failure    Vitamin D deficiency disease    Colon polyp    Prostate cancer    ED (erectile dysfunction)    GERD (gastroesophageal reflux disease)    Severe obesity (BMI 35.0-39.9) with comorbidity    Essential hypertension    Diastolic heart failure    KELVIN (obstructive sleep apnea)    Tortuous aorta    History of non-ST elevation myocardial infarction (NSTEMI)    Major depressive disorder, single episode, mild       Past Surgical History  Past Surgical History:   Procedure Laterality Date    CARDIAC DEFIBRILLATOR PLACEMENT  2016    CHOLECYSTECTOMY      CORONARY ARTERY BYPASS GRAFT      bypass times  5    defibulater  2016    EYE SURGERY Bilateral     HERNIA REPAIR      groin x2  and umbilical    JOINT REPLACEMENT      knee left    thrombus Left     >2005    VASECTOMY         Family History  Family History   Problem Relation Age of Onset    Alzheimer's disease Mother      Living    Heart disease Mother     Dementia Mother     Asbestos Father          Cancer Father     Diabetes type II Maternal Aunt     Stroke Neg Hx         Social History  Social History     Social History    Marital status:      Spouse name: N/A    Number of children: 3    Years of education: GED     Occupational History    Retired [employer]      Social History Main Topics    Smoking status: Former Smoker     Packs/day: 4.00     Years: 25.00     Types: Cigarettes    Smokeless tobacco: Never Used    Alcohol use 0.0 oz/week     0 Standard drinks or equivalent per week      Comment: occasional    Drug use: No    Sexual activity: Yes     Partners: Female     Other Topics Concern    Not on file     Social History Narrative    SCREENING/HEALTH MAINTENANCE. Updated  6/4/14    COLONOSCOPY 2012.  [facility].    TETANUS 2005.    PNEUMOVAX  11/13/13    NO PRIOR HISTORY OF ZOSTAVAX    PSA 2/14/14    FLU 10/8/13       Current Medications  Medications reviewed and updated.     Allergies   Review of patient's allergies indicates:  No Known Allergies    Review of Systems (Pertinent positives)  Review of Systems   Constitutional: Negative for activity change, appetite change, fatigue, fever and unexpected weight change.   HENT: Negative for facial swelling.    Eyes: Negative for visual disturbance.   Respiratory: Negative for chest tightness, shortness of breath, wheezing and stridor.    Cardiovascular: Negative for chest pain, palpitations and leg swelling.   Gastrointestinal: Negative for abdominal distention, abdominal pain, blood in stool, constipation, diarrhea, nausea and vomiting.   Endocrine: Negative for cold intolerance, heat intolerance, polydipsia and polyuria.   Genitourinary: Negative.  Negative for testicular pain and urgency.   Musculoskeletal:        Foot pain.  To address with Dr. Donnelly    Skin: Negative.    Allergic/Immunologic: Negative.    Neurological: Negative for dizziness, weakness, light-headedness, numbness and headaches.   Psychiatric/Behavioral: Negative for agitation and decreased concentration.       /60  "(BP Location: Left arm, Patient Position: Sitting, BP Method: Manual)  Pulse 86  Temp 98.1 °F (36.7 °C) (Oral)   Ht 5' 8" (1.727 m)  Wt 113.5 kg (250 lb 3.6 oz)  SpO2 97%  BMI 38.05 kg/m2    Physical Exam    Health Maintenance  Health Maintenance Topics with due status: Not Due       Topic Last Completion Date    Colonoscopy 10/08/2012    Eye Exam 04/19/2016    PROSTATE-SPECIFIC ANTIGEN 10/24/2016    Hemoglobin A1c 01/06/2017    Foot Exam 02/21/2017       Assessment/Plan:  Jorden Shafer is a 71 y.o. male who presents today for :    Jorden was seen today for foot pain and other misc.    Diagnoses and all orders for this visit:    Type 2 diabetes mellitus with complication, with long-term current use of insulin  -     Ambulatory referral to Optometry    Diabetes mellitus with stage 3 chronic kidney disease  -     Ambulatory referral to Optometry    History of non-ST elevation myocardial infarction (NSTEMI)    Tortuous aorta    Gastroesophageal reflux disease without esophagitis  -     Ambulatory consult to Gastroenterology    Severe obesity (BMI 35.0-39.9) with comorbidity  -  The patient is asked to make an attempt to improve diet and exercise patterns to aid in medical management of this problem.    Hypertensive heart disease without congestive heart failure  -  All modifiable risk factors have been addressed in this office visit.  Patient was instructed to continue with current medication therapy in order to prevent complications related to this condition.    Polyp of colon, unspecified part of colon, unspecified type  -  Patient is stable.  Assess and addressed all modifiable risk factors.  Continue with appropriate management to prevent complications.    Need for prophylactic vaccination and inoculation against influenza  -     Influenza - High Dose (65+) (PF) (IM)    Prostate cancer  -  Patient is stable.  Assess and addressed all modifiable risk factors.  Continue with appropriate management to " prevent complications.    Chronic pain of left knee  -     Ambulatory Referral to Sports Medicine    Major depressive disorder, single episode, mild  -  Patient is stable.  Assess and addressed all modifiable risk factors.  Continue with appropriate management to prevent complications.  -  Continue with celexa     Sleep disturbances  -     citalopram (CELEXA) 10 MG tablet; Take 1 tablet (10 mg total) by mouth once daily.    Essential hypertension  -     isosorbide mononitrate (IMDUR) 60 MG 24 hr tablet; Take 1 tablet (60 mg total) by mouth once daily.  -     lisinopril 10 MG tablet; Take 1 tablet (10 mg total) by mouth once daily.  -     furosemide (LASIX) 80 MG tablet; Take 1 tablet (80 mg total) by mouth once daily.  -     carvedilol (COREG) 25 MG tablet; Take 1 tablet (25 mg total) by mouth 2 (two) times daily with meals.  -     amlodipine (NORVASC) 10 MG tablet; Take 1 tablet (10 mg total) by mouth once daily.    Other chest pain  -     nitroGLYCERIN (NITROSTAT) 0.4 MG SL tablet; Place 1 tablet (0.4 mg total) under the tongue every 5 (five) minutes as needed. Sublingual  ( under tongue) as needed    Other orders  -     clopidogrel (PLAVIX) 75 mg tablet; Take 1 tablet (75 mg total) by mouth once daily.      Problem List Items Addressed This Visit        Unprioritized    Colon polyp    Overview     h/o on colonoscopy         Diabetes mellitus with stage 3 chronic kidney disease (Chronic)    Overview     Currently sees Dr. Britton          Relevant Orders    Ambulatory referral to Optometry    Essential hypertension (Chronic)    Relevant Medications    isosorbide mononitrate (IMDUR) 60 MG 24 hr tablet    lisinopril 10 MG tablet    furosemide (LASIX) 80 MG tablet    carvedilol (COREG) 25 MG tablet    amlodipine (NORVASC) 10 MG tablet    GERD (gastroesophageal reflux disease) (Chronic)    Relevant Orders    Ambulatory consult to Gastroenterology    History of non-ST elevation myocardial infarction (NSTEMI)    Overview      [facility] with VENKATA to the ostium (85% stenosis) of SVG-diagonal/LAD.   on 6/30/16          Hypertensive heart disease without congestive heart failure    Overview     Currently sees Dr. Fitch for cardiology          Major depressive disorder, single episode, mild    Prostate cancer    Severe obesity (BMI 35.0-39.9) with comorbidity    Tortuous aorta    Overview     As seen on CXR from 11/9/2013         Type 2 diabetes mellitus with complication, with long-term current use of insulin - Primary    Relevant Orders    Ambulatory referral to Optometry      Other Visit Diagnoses     Need for prophylactic vaccination and inoculation against influenza        Relevant Orders    Influenza - High Dose (65+) (PF) (IM) (Completed)    Chronic pain of left knee        Relevant Orders    Ambulatory Referral to Sports Medicine    Sleep disturbances        Relevant Medications    citalopram (CELEXA) 10 MG tablet    Other chest pain        Relevant Medications    nitroGLYCERIN (NITROSTAT) 0.4 MG SL tablet          No Follow-up on file.

## 2017-02-21 NOTE — TELEPHONE ENCOUNTER
Approval granted for insulin pump.  Authorization number is [id number].  Diabetes management and supplies.  Authorization is good for 1 year, 3/8/17-3/7/18.  Pump  x 12 months.   and  do not require authorization.

## 2017-03-02 ENCOUNTER — HOSPITAL ENCOUNTER (OUTPATIENT)
Dept: RADIOLOGY | Facility: HOSPITAL | Age: 72
Discharge: HOME | End: 2017-03-02
Attending: ORTHOPAEDIC SURGERY
Payer: MEDICARE

## 2017-03-02 ENCOUNTER — OFFICE VISIT (OUTPATIENT)
Dept: ORTHOPEDICS | Facility: CLINIC | Age: 72
End: 2017-03-02
Payer: MEDICARE

## 2017-03-02 VITALS
BODY MASS INDEX: 37.99 KG/M2 | HEIGHT: 68 IN | DIASTOLIC BLOOD PRESSURE: 69 MMHG | SYSTOLIC BLOOD PRESSURE: 121 MMHG | HEART RATE: 90 BPM | RESPIRATION RATE: 20 BRPM | WEIGHT: 250.69 LBS

## 2017-03-02 DIAGNOSIS — Z96.652 STATUS POST TOTAL LEFT KNEE REPLACEMENT: Primary | ICD-10-CM

## 2017-03-02 DIAGNOSIS — Z96.652 STATUS POST TOTAL LEFT KNEE REPLACEMENT: ICD-10-CM

## 2017-03-02 PROCEDURE — 3074F SYST BP LT 130 MM HG: CPT | Mod: S$GLB,,, | Performed by: ORTHOPAEDIC SURGERY

## 2017-03-02 PROCEDURE — 73562 X-RAY EXAM OF KNEE 3: CPT | Mod: TC,50,LT

## 2017-03-02 PROCEDURE — 99999 PR PBB SHADOW E&M-EST. PATIENT-LVL III: CPT | Mod: PBBFAC,,, | Performed by: ORTHOPAEDIC SURGERY

## 2017-03-02 PROCEDURE — 3078F DIAST BP <80 MM HG: CPT | Mod: S$GLB,,, | Performed by: ORTHOPAEDIC SURGERY

## 2017-03-02 PROCEDURE — 1157F ADVNC CARE PLAN IN RCRD: CPT | Mod: S$GLB,,, | Performed by: ORTHOPAEDIC SURGERY

## 2017-03-02 PROCEDURE — 73562 X-RAY EXAM OF KNEE 3: CPT | Mod: 26,50,, | Performed by: RADIOLOGY

## 2017-03-02 PROCEDURE — 1159F MED LIST DOCD IN RCRD: CPT | Mod: S$GLB,,, | Performed by: ORTHOPAEDIC SURGERY

## 2017-03-02 PROCEDURE — 99213 OFFICE O/P EST LOW 20 MIN: CPT | Mod: S$GLB,,, | Performed by: ORTHOPAEDIC SURGERY

## 2017-03-02 PROCEDURE — 1125F AMNT PAIN NOTED PAIN PRSNT: CPT | Mod: S$GLB,,, | Performed by: ORTHOPAEDIC SURGERY

## 2017-03-02 PROCEDURE — 1160F RVW MEDS BY RX/DR IN RCRD: CPT | Mod: S$GLB,,, | Performed by: ORTHOPAEDIC SURGERY

## 2017-03-02 NOTE — PROGRESS NOTES
CHIEF COMPLAINT: Left  Knee pain.                                                          HISTORY OF PRESENT ILLNESS:  The patient is a 71 y.o. male  who presents  for evaluation of his left knee pain.     Pain Duration: 6 years  Pain Quality: sharp and burning  Pain Context:worsening  Pain Timing: constant  Pain Location:diffuse  Pain Severity: moderate to severe  Modifying Factors: worse with weight bearing activity and kneeling  Associated Signs and Symptoms:none    He had left TKA 6 years ago by Dr Maharaj and has had pain since.  He has never had complete resolution of his knee pain.  It is diffuse around the knee.  He endorses occasional swelling.  Denies warmth/erythema.    He  presents for further treatment recommendations.    He denies radicular pain or low back pain.  He denies distal paresthesias, lower extremity edema, or calf pain concerning for vascular claudication.  He has no history of discreet prior trauma.                                                                                                                       PAST MEDICAL HISTORY:    Past Medical History:   Diagnosis Date    Back pain     CAD (coronary artery disease) 1989    h/o cabg x 5    Chronic kidney disease     Colon polyp     h/o on colonoscopy    Contact dermatitis 11/12/2013    CVA (cerebral vascular accident)     March 2010    Depression     Diabetes mellitus type II     on  insulin pump    DVT (deep venous thrombosis) 1980's    left leg    GERD (gastroesophageal reflux disease)     Heart failure     [employer]     Hyperlipidemia     Hypertension     Hypertensive heart disease without congestive heart failure     Myocardial infarction     Neuropathy of lower extremity     Obesity, morbid     Prostate cancer     Renal manifestation of secondary diabetes mellitus     Sleep apnea     Vitamin D deficiency disease                                                                                                              PAST SURGICAL HISTORY:    Past Surgical History:   Procedure Laterality Date    CARDIAC DEFIBRILLATOR PLACEMENT  06/16/2016    CHOLECYSTECTOMY      CORONARY ARTERY BYPASS GRAFT      bypass times  5    defibulater  06/16/2016    EYE SURGERY Bilateral     HERNIA REPAIR      groin x2  and umbilical    JOINT REPLACEMENT      knee left    thrombus Left     >2005    VASECTOMY                                                                                                                 SOCIAL HISTORY:  Reviewed per EPIC history for tobacco or alcohol use and he   is an active  71 y.o.  male                                                                             FAMILY MEDICAL HISTORY:  family history includes Alzheimer's disease in his mother; Asbestos in his father; Cancer in his father; Dementia in his mother; Diabetes type II in his maternal aunt; Heart disease in his mother. There is no history of Stroke.                                                                                                                                                             PHYSICAL EXAMINATION:                                                        GENERAL:  A well-developed, well-nourished 71 y.o. male who is alert and       oriented in no acute distress.      Gait: He  walks with a normal gait.                   EXTREMITIES:  Examination of lower extremities reveals there is no visible mass or deformity.    Left knee:  ROM -5 to 120    Ligamentously stable to varus/valgus stress.    Anterior and posterior drawers negative.    No pain over pes bursa.    No warmth    No erythema    Effusion No    Right knee:  ROM -5 - 130    Ligamentously stable to varus/valgus stress.    Anterior and posterior drawers negative.    No pain over pes bursa.    No warmth    No erythema    Effusion No    The skin over both lower extremities is normal and unremarkable.  He has a  painless range of motion of the hips and ankles  bilaterally.   Sensation is intact in both lower extremities.    There are no motor deficits in the lower extremities bilaterally.   Pedal pulses are palpable distally bilaterally.    He has no calf tenderness to palpation nor edema.                                      He has imaging which was ordered and reviewed with the patient and shows left TKA in good alignment with no evidence of loosening or osteolysis                                                                               IMPRESSION: chronic left knee pain after TKA    Infection is unlikely as there would typically be evidence of loosening.  Will obtain ESR/CRP.  If ESR/CRP are negative, he will be schedule for geniculate nerve block.    I have personally interviewed and evaluated the patient.  I have reviewed the resident physician's note and I concur with the findings.

## 2017-03-02 NOTE — LETTER
March 2, 2017      Neha Plasencia MD  [address]  [place] [address]           [facility]  [address]  [place] [address]  Phone: [phone]          Patient: Jorden Shafer   MR Number: [id number]   YOB: 1945   Date of Visit: 3/2/2017       Dear Dr. Neha Plasencia:    Thank you for referring Jorden Shafer to me for evaluation. Attached you will find relevant portions of my assessment and plan of care.    If you have questions, please do not hesitate to call me. I look forward to following Jorden Shafer along with you.    Sincerely,    Marino Koch MD    Enclosure  CC:  No Recipients    If you would like to receive this communication electronically, please contact [email or web address] or [phone] to request more information on [employer] Link access.    For providers and/or their staff who would like to refer a patient to [facility], please contact us through our one-stop-shop provider referral line, [facility], at [phone].    If you feel you have received this communication in error or would no longer like to receive these types of communications, please e-mail [email or web address]

## 2017-03-03 ENCOUNTER — TELEPHONE (OUTPATIENT)
Dept: FAMILY MEDICINE | Facility: CLINIC | Age: 72
End: 2017-03-03

## 2017-03-03 NOTE — TELEPHONE ENCOUNTER
----- Message from Nathaly Mcneal sent at 3/3/2017  9:58 AM CST -----  Requesting clearance of Plavix for 7 days prior so patient may be scheduled for a Left Knee Geniculate Nerve Block.    Please advise.

## 2017-03-13 ENCOUNTER — HOSPITAL ENCOUNTER (OUTPATIENT)
Facility: OTHER | Age: 72
Discharge: HOME | End: 2017-03-13
Attending: ANESTHESIOLOGY | Admitting: ANESTHESIOLOGY
Payer: MEDICARE

## 2017-03-13 ENCOUNTER — SURGERY (OUTPATIENT)
Age: 72
End: 2017-03-13

## 2017-03-13 VITALS
HEART RATE: 88 BPM | DIASTOLIC BLOOD PRESSURE: 70 MMHG | OXYGEN SATURATION: 98 % | TEMPERATURE: 99 F | SYSTOLIC BLOOD PRESSURE: 140 MMHG | RESPIRATION RATE: 16 BRPM

## 2017-03-13 DIAGNOSIS — R52 PAIN: Primary | ICD-10-CM

## 2017-03-13 PROCEDURE — 25000003 PHARM REV CODE 250: Performed by: ANESTHESIOLOGY

## 2017-03-13 PROCEDURE — 64450 NJX AA&/STRD OTHER PN/BRANCH: CPT | Performed by: ANESTHESIOLOGY

## 2017-03-13 PROCEDURE — 77002 NEEDLE LOCALIZATION BY XRAY: CPT | Performed by: ANESTHESIOLOGY

## 2017-03-13 PROCEDURE — 64450 NJX AA&/STRD OTHER PN/BRANCH: CPT | Mod: LT,,, | Performed by: ANESTHESIOLOGY

## 2017-03-13 RX ORDER — LIDOCAINE HYDROCHLORIDE 10 MG/ML
INJECTION INFILTRATION; PERINEURAL
Status: DISCONTINUED | OUTPATIENT
Start: 2017-03-13 | End: 2017-03-13 | Stop reason: HOSPADM

## 2017-03-13 RX ORDER — BUPIVACAINE HYDROCHLORIDE 2.5 MG/ML
INJECTION, SOLUTION EPIDURAL; INFILTRATION; INTRACAUDAL
Status: DISCONTINUED | OUTPATIENT
Start: 2017-03-13 | End: 2017-03-13 | Stop reason: HOSPADM

## 2017-03-13 RX ADMIN — BUPIVACAINE HYDROCHLORIDE 10 ML: 2.5 INJECTION, SOLUTION EPIDURAL; INFILTRATION; INTRACAUDAL; PERINEURAL at 02:03

## 2017-03-13 RX ADMIN — LIDOCAINE HYDROCHLORIDE 10 ML: 10 INJECTION, SOLUTION INFILTRATION; PERINEURAL at 02:03

## 2017-03-13 RX ADMIN — SODIUM BICARBONATE 1 ML: 0.2 INJECTION, SOLUTION INTRAVENOUS at 02:03

## 2017-03-13 NOTE — OP NOTE
Knee GENICULAR BLOCK  Time-out taken to identify patient and procedure side prior to starting the procedure.      Date of Service: 03/13/2017    PCP: Neha Plasencia MD                 PROCEDURE:  left superior lateral and medial genicular nerve and inferior medial genicular block under fluoroscopy    REASON FOR PROCEDURE:  left Knee Pain/OA    PHYSICIAN: Faraz Boyd MD  ASSISTANTS: none      MEDICATIONS INJECTED:  Bupivacaine 0.25% 1.0 ml injected per level.    LOCAL ANESTHETIC USED: Xylocaine 1% 9ml with Sodium Bicarbonate 1ml.  3ml each site.  SEDATION MEDICATIONS: None    ESTIMATED BLOOD LOSS:  None.    COMPLICATIONS:  None.    TECHNIQUE:   Laying in a prone position, the patient was prepped and draped in the usual sterile fashion using ChloraPrep and fenestrated drape.  Three target sites including the superior lateral genicular nerve where the lateral femoral shaft meets the epicondyle, the superior medial  genicular nerve  Where the medial femoral shaft meets the epicondyle, and the inferior medial genicular nerve where the medial tibial shaft meets the epicondyle, were determined under fluoroscopic guidance via true AP view.  Local anesthetic was given by going down to the hub of the 27-gauge 1.25in needle and raising a wheel.  The 26-gauge, 3.5 cm needle was introduced to the anatomic local of the above target sites utilizing live fluoroscopy.  At each target, the needle was advanced using the tunnel technique until bony contact is made.    At each target, fine adjustments to confirm needle tip is 50% of the diaphysis on the lateral fluoroscopic view.  Medication was injected. The patient tolerated the procedure well.     PAIN BEFORE THE PROCEDURE:  7/10.    PAIN AFTER THE PROCEDURE: 0/10.    The patient was monitored after the procedure.  Patient was given post procedure and discharge instructions to follow at home.  We will see the patient back in two weeks or the patient may call to inform of  status. The patient was discharged in a stable condition

## 2017-03-13 NOTE — PLAN OF CARE
Pt tolerated procedure well. Pt reports0 /10 pain after procedure. Assisted pt up for first time. Steady on feet. All discharge instructions given.

## 2017-03-13 NOTE — DISCHARGE INSTRUCTIONS

## 2017-03-13 NOTE — IP AVS SNAPSHOT
[facility] Location ([place])  [address] [place], [place] [id number]  Phone: [phone]           Patient Discharge Instructions     Our goal is to set you up for success. This packet includes information on your condition, medications, and your home care. It will help you to care for yourself so you don't get sicker and need to go back to the hospital.     Please ask your nurse if you have any questions.        There are many details to remember when preparing to leave the hospital. Here is what you will need to do:    1. Take your medicine. If you are prescribed medications, review your Medication List in the following pages. You may have new medications to  at the pharmacy and others that you'll need to stop taking. Review the instructions for how and when to take your medications. Talk with your doctor or nurses if you are unsure of what to do.     2. Go to your follow-up appointments. Specific follow-up information is listed in the following pages. Your may be contacted by a transition nurse or clinical provider about future appointments. Be sure we have all of the phone numbers to reach you, if needed. Please contact your provider's office if you are unable to make an appointment.     3. Watch for warning signs. Your doctor or nurse will give you detailed warning signs to watch for and when to call for assistance. These instructions may also include educational information about your condition. If you experience any of warning signs to your health, call your doctor.               [facility]  Unless otherwise directed by your provider, please contact [facility], our nurse care line that is available for 24/7 assistance.     [phone] (toll-free)    Registered nurses in the [facility] provide clinical advisement, health education, appointment booking, and other advisory services.                    ** Verify the list of medication(s) below is accurate and up to  date. Carry this with you in case of emergency. If your medications have changed, please notify your healthcare provider.             Medication List      ASK your doctor about these medications        Additional Info                      amitriptyline 50 MG tablet   Commonly known as:  ELAVIL   Quantity:  30 tablet   Refills:  2   Dose:  50 mg    Instructions:  Take 1 tablet (50 mg total) by mouth every evening.     Begin Date    AM    Noon    PM    Bedtime       amlodipine 10 MG tablet   Commonly known as:  NORVASC   Quantity:  90 tablet   Refills:  3   Dose:  10 mg    Instructions:  Take 1 tablet (10 mg total) by mouth once daily.     Begin Date    AM    Noon    PM    Bedtime       aspirin 81 MG EC tablet   Commonly known as:  ECOTRIN   Refills:  0   Dose:  81 mg    Instructions:  Take 81 mg by mouth once daily.     Begin Date    AM    Noon    PM    Bedtime       atorvastatin 40 MG tablet   Commonly known as:  LIPITOR   Quantity:  45 tablet   Refills:  5   Dose:  20 mg   Comments:  **Patient requests 90 day supply**    Instructions:  Take 0.5 tablets (20 mg total) by mouth once daily.     Begin Date    AM    Noon    PM    Bedtime       blood sugar diagnostic Strp   Quantity:  100 strip   Refills:  11   Dose:  1 strip    Instructions:  1 strip by Misc.(Non-Drug; Combo Route) route 2 (two) times daily with meals. TRUE METRIX     Begin Date    AM    Noon    PM    Bedtime       blood-glucose meter kit   Quantity:  1 each   Refills:  0    Instructions:  Use as instructed          TRUE METRIX     Begin Date    AM    Noon    PM    Bedtime       carvedilol 25 MG tablet   Commonly known as:  COREG   Quantity:  90 tablet   Refills:  3   Dose:  25 mg    Instructions:  Take 1 tablet (25 mg total) by mouth 2 (two) times daily with meals.     Begin Date    AM    Noon    PM    Bedtime       celecoxib 100 MG capsule   Commonly known as:  CeleBREX   Quantity:  30 capsule   Refills:  0   Dose:  100 mg    Instructions:  Take 1  capsule (100 mg total) by mouth 2 (two) times daily.     Begin Date    AM    Noon    PM    Bedtime       citalopram 10 MG tablet   Commonly known as:  CELEXA   Quantity:  90 tablet   Refills:  1   Dose:  10 mg   Comments:  **Patient requests 90 days supply**    Instructions:  Take 1 tablet (10 mg total) by mouth once daily.     Begin Date    AM    Noon    PM    Bedtime       clopidogrel 75 mg tablet   Commonly known as:  PLAVIX   Quantity:  90 tablet   Refills:  3   Dose:  75 mg    Instructions:  Take 1 tablet (75 mg total) by mouth once daily.     Begin Date    AM    Noon    PM    Bedtime       furosemide 80 MG tablet   Commonly known as:  LASIX   Quantity:  90 tablet   Refills:  3   Dose:  80 mg   Comments:  **Patient requests 90 days supply**    Instructions:  Take 1 tablet (80 mg total) by mouth once daily.     Begin Date    AM    Noon    PM    Bedtime       glucose 4 GM chewable tablet   Refills:  0   Dose:  16 g    Instructions:  Take 16 g by mouth as needed for Low blood sugar.     Begin Date    AM    Noon    PM    Bedtime       HUMALOG 100 unit/mL injection   Refills:  0   Generic drug:  insulin lispro    Instructions:  Inject into the skin 3 (three) times daily before meals.     Begin Date    AM    Noon    PM    Bedtime       isosorbide mononitrate 60 MG 24 hr tablet   Commonly known as:  IMDUR   Quantity:  90 tablet   Refills:  3   Dose:  60 mg    Instructions:  Take 1 tablet (60 mg total) by mouth once daily.     Begin Date    AM    Noon    PM    Bedtime       lancets Misc   Quantity:  100 each   Refills:  11   Dose:  1 lancet    Instructions:  1 lancet by Misc.(Non-Drug; Combo Route) route 2 (two) times daily with meals. TRUE METRIX     Begin Date    AM    Noon    PM    Bedtime       lancing device Misc   Quantity:  1 each   Refills:  0   Dose:  1 Device    Instructions:  1 Device by Misc.(Non-Drug; Combo Route) route 2 (two) times daily with meals. TRUE METRIX     Begin Date    AM    Noon    PM     Bedtime       lisinopril 10 MG tablet   Quantity:  90 tablet   Refills:  3   Dose:  10 mg   Comments:  **Patient requests 90 day supply**    Instructions:  Take 1 tablet (10 mg total) by mouth once daily.     Begin Date    AM    Noon    PM    Bedtime       nitroGLYCERIN 0.4 MG SL tablet   Commonly known as:  NITROSTAT   Quantity:  30 tablet   Refills:  prn   Dose:  0.4 mg    Instructions:  Place 1 tablet (0.4 mg total) under the tongue every 5 (five) minutes as needed. Sublingual  ( under tongue) as needed     Begin Date    AM    Noon    PM    Bedtime       omega-3 acid ethyl esters 1 gram capsule   Commonly known as:  LOVAZA   Refills:  0   Dose:  2 g    Instructions:  Take 2 g by mouth 3 (three) times daily.     Begin Date    AM    Noon    PM    Bedtime       omeprazole 40 MG capsule   Commonly known as:  PRILOSEC   Quantity:  180 capsule   Refills:  3   Dose:  40 mg   Comments:  **Patient requests 90 day supply**    Instructions:  Take 1 capsule (40 mg total) by mouth 2 (two) times daily.     Begin Date    AM    Noon    PM    Bedtime       TANZEUM 30 mg/0.5 mL Pnij   Refills:  4   Generic drug:  albiglutide    Instructions:  30 MG SUBCUTANEOUS ONCE/WEEK     Begin Date    AM    Noon    PM    Bedtime                  Please bring to all follow up appointments:    1. A copy of your discharge instructions.  2. All medicines you are currently taking in their original bottles.  3. Identification and insurance card.    Please arrive 15 minutes ahead of scheduled appointment time.    Please call 24 hours in advance if you must reschedule your appointment and/or time.        Your Scheduled Appointments     Apr 18, 2017  1:30 PM CDT   Established Patient Visit with Brii Donnelly DPM   [facility] ([place])    [address] [place] [address]  [place] ARANGO [id number] [phone]                  Discharge Instructions       Home Care Instructions Pain Management:    1. DIET:   You may resume your normal diet today.   2.  BATHING:   You may shower with luke warm water.  3. DRESSING:   You may remove your bandage today.   4. ACTIVITY LEVEL:   You may resume your normal activities 24 hrs after your procedure.  5. MEDICATIONS:   You may resume your normal medications today.   6. SPECIAL INSTRUCTIONS:   No heat to the injection site for 24 hrs including, bath or shower, heating pad, moist heat, or hot tubs.    Use ice pack to injection site for any pain or discomfort.  Apply ice packs for 20 minute intervals as needed.   If you have received any sedatives by mouth today you may not drive for 12 hours.    If you have received any sedation through your IV, you may not drive for 24 hrs.     PLEASE CALL YOUR DOCTOR IF:  1. Redness or swelling around the injection site.  2. Fever of 101 degrees  3. Drainage (pus) from the injection site.  4. For any continuous bleeding (some dried blood over the incision is normal.)    FOR EMERGENCIES:   If any unusual problems or difficulties occur during clinic hours, call [phone] or [phone].         Admission Information     Date & Time Provider Department CSN    3/13/2017  2:05 PM Faraz Boyd MD [facility] [id number]      Care Providers     Provider Role Specialty Primary office phone    Faraz Boyd MD Attending Provider Pain Medicine [phone]    Faraz Boyd MD Surgeon  Pain Medicine [phone]      Your Vitals Were     BP Pulse Temp Resp SpO2       145/79 85 98.8 °F (37.1 °C) (Oral) 18 95%       Recent Lab Values        11/11/2013 9/15/2015 4/15/2016 6/29/2016                  5:49 AM  8:38 AM 11:04 AM  4:00 PM        A1C 7.3 (H) 7.4 (H) 8.1 (H) 8.5 (H)          7.4 (H)                   Allergies as of 3/13/2017     No Known Allergies      Advance Directives     An advance directive is a document which, in the event you are no longer able to make decisions for yourself, tells your healthcare team what kind of treatment you do or do not want to receive, or who you would like  to make those decisions for you.  If you do not currently have an advance directive, [facility] encourages you to create one.  For more information call:  [phone]), [phone] ([phone]),  or log on to [email or web address].        Language Assistance Services     ATTENTION: Language assistance services are available, free of charge. Please call [phone].      ATENCIÓN: Si kajalla panfilo, tiene a lucio disposición servicios gratuitos de asistencia lingüística. Llame al [phone].     [facility] Ý: N?u b?n nói Ti?ng Vi?t, có các d?ch v? h? tr? ngôn ng? mi?n phí dành cho b?n. G?i s? [phone].        Heart Failure Education       Heart Failure: Being Active  You have a condition called heart failure. Being active doesnt mean that you have to wear yourself out. Even a little movement each day helps to strengthen your heart. If you cant get out to exercise, you can do simple stretching and strengthening exercises at home. These are good ways to keep you well-conditioned and prevent you and your heart from becoming excessively weak.    Ideas to get you started  · Add a little movement to things you do now. Walk to mail letters. Park your car at the far end of the parking lot and walk to the store. Walk up a flight of stairs instead of taking the elevator.  · Choose activities you enjoy. You might walk, swim, or ride an exercise bike. Things like gardening and washing the car count, too. Other possibilities include: washing dishes, walking the dog, walking around the mall, and doing aerobic activities with friends.  · Join a group exercise program at a [facility], a senior center, or a community center. Or look into a hospital cardiac rehabilitation program. Ask your doctor if you qualify.  Tips to keep you going  · Get up and get dressed each day. Go to a coffee shop and read a newspaper or go somewhere that you'll be in the presence of other active people. Youll feel more like being  active.  · Make a plan. Choose one or more activities that you enjoy and that you can easily do. Then plan to do at least one each day. You might write your plan on a calendar.  · Go with a friend or a group if you like company. This can help you feel supported and stay motivated, too.  · Plan social events that you enjoy. This will keep you mentally engaged as well as physically motivated to do things you find pleasure in.  For your safety  · Talk with your healthcare provider before starting an exercise program.  · Exercise indoors when its too hot or too cold outside, or when the air quality is poor. Try walking at a shopping mall.  · Wear socks and sturdy shoes to maintain your balance and prevent falls.  · Start slowly. Do a few minutes several times a day at first. Increase your time and speed little by little.  · Stop and rest whenever you feel tired or get short of breath.  · Dont push yourself on days when you dont feel well.  Date Last Reviewed: 3/20/2016  © 7053-5033 [employer]. [address] [place] [address]. All rights reserved. This information is not intended as a substitute for professional medical care. Always follow your healthcare professional's instructions.              Heart Failure: Evaluating Your Heart  You have a condition called heart failure. To evaluate your condition, your doctor will examine you, ask questions, and do some tests. Along with looking for signs of heart failure, the doctor looks for any other health problems that may have led to heart failure. The results of your evaluation will help your doctor form a treatment plan.  Health history and physical exam  Your visit will start with a health history. Tell the doctor about any symptoms youve noticed and about all medicines you take. Then youll have a physical exam. This includes listening to your heartbeat and breathing. Youll also be checked for swelling (edema) in your legs and neck. When you  have fluid buildup or fluid in the lungs, it may be called congestive heart failure.  Diagnosing heart failure     During an echocardiogram, sound waves bounce off the heart. These are converted into a picture on the screen.   The following may be done to help your doctor form a diagnosis:  · X-rays show the size and shape of your heart. These pictures can also show fluid in your lungs.  · An electrocardiogram (ECG or EKG) shows the pattern of your heartbeat. Small pads (electrodes) are placed on your chest, arms, and legs. Wires connect the pads to the ECG machine, which records your hearts electrical signals. This can give the doctor information about heart function.  · An echocardiogram uses ultrasound waves to show the structure and movement of your heart muscle. This shows how well the heart pumps. It also shows the thickness of the heart walls, and if the heart is enlarged. It is one of the most useful, non-invasive tests as it provides information about the heart's general function. This helps your doctor make treatment decisions.  · Lab tests evaluate small amounts of blood or urine for signs of problems. A BNP lab test can help diagnose and evaluate heart failure. BNP stands for B-type natriuretic peptide. The ventricles secrete more BNP when heart failure worsens. Lab tests can also provide information about metabolic dysfunction or heart dysfunction.  Your treatment plan  Based on the results of your evaluation and tests, your doctor will develop a treatment plan. This plan is designed to relieve some of your heart failure symptoms and help make you more comfortable. Your treatment plan may include:  · Medicine to help your heart work better and improve your quality of life  · Changes in what you eat and drink to help prevent fluid from backing up in your body  · Daily monitoring of your weight and heart failure symptoms to see how well your treatment plan is working  · Exercise to help you stay  healthy  · Help with quitting smoking  · Emotional and psychological support to help adjust to the changes  · Referrals to other specialists to make sure you are being treated comprehensively  Date Last Reviewed: 3/21/2016  © 3275-9632 [employer]. [address] [place] [address], [place] [address]. All rights reserved. This information is not intended as a substitute for professional medical care. Always follow your healthcare professional's instructions.              Heart Failure: Making Changes to Your Diet  You have a condition called heart failure. When you have heart failure, excess fluid is more likely to build up in your body because your heart isn't working well. This makes the heart work harder to pump blood. Fluid buildup causes symptoms such as shortness of breath and swelling (edema). This is often referred to as congestive heart failure or CHF. Controlling the amount of salt (sodium) you eat may help stop fluid from building up. Your doctor may also tell you to reduce the amount of fluid you drink.  Reading food labels    Your healthcare provider will tell you how much sodium you can eat each day. Read food labels to keep track. Keep in mind that certain foods are high in salt. These include canned, frozen, and processed foods. Check the amount of sodium in each serving. Watch out for high-sodium ingredients. These include MSG (monosodium glutamate), baking soda, and sodium phosphate.   Eating less salt  Give yourself time to get used to eating less salt. It may take a little while. Here are some tips to help:  · Take the saltshaker off the table. Replace it with salt-free herb mixes and spices.  · Eat fresh or plain frozen vegetables. These have much less salt than canned vegetables.  · Choose low-sodium snacks like sodium-free pretzels, crackers, or air-popped popcorn.  · Dont add salt to your food when youre cooking. Instead, season your foods with pepper, lemon, garlic, or onion.  · When  you eat out, ask that your food be cooked without added salt.  · Avoid eating fried foods as these often have a great deal of salt.  If youre told to limit fluids  You may need to limit how much fluid you have to help prevent swelling. This includes anything that is liquid at room temperature, such as ice cream and soup. If your doctor tells you to limit fluid, try these tips:  · Measure drinks in a measuring cup before you drink them. This will help you meet daily goals.  · Chill drinks to make them more refreshing.  · Suck on frozen lemon wedges to quench thirst.  · Only drink when youre thirsty.  · Chew sugarless gum or suck on hard candy to keep your mouth moist.  · Weigh yourself daily to know if your body's fluid content is rising.  My sodium goal  Your healthcare provider may give you a sodium goal to meet each day. This includes sodium found in food as well as salt that you add. My goal is to eat no more than ___________ mg of sodium per day.     When to call your doctor  Call your doctor right away if you have any symptoms of worsening heart failure. These can include:  · Sudden weight gain  · Increased swelling of your legs or ankles  · Trouble breathing when youre resting or at night  · Increase in the number of pillows you have to sleep on  · Chest pain, pressure, discomfort, or pain in the jaw, neck, or back   Date Last Reviewed: 3/21/2016  © 3422-3262 [employer]. [address] [place] [address], [place] [address]. All rights reserved. This information is not intended as a substitute for professional medical care. Always follow your healthcare professional's instructions.              Heart Failure: Medicines to Help Your Heart    You have a condition called heart failure (also known as congestive heart failure, or CHF). Your doctor will likely prescribe medicines for heart failure and any underlying health problems you have. Most heart failure patients take one or more types of medicinen. Your  healthcare provider will work to find the combination of medicines that works best for you.  Heart failure medicines  Here are the most common heart failure medicines:  · ACE inhibitors lower blood pressure and decrease strain on the heart. This makes it easier for the heart to pump. Angiotensin receptor blockers have similar effects. These are prescribed for some patients instead of ACE inhibitors.  · Beta-blockers relieve stress on the heart. They also improve symptoms. They may also improve the heart's pumping action over time.  · Diuretics (also called water pills) help rid your body of excess water. This can help rid your body of swelling (edema). Having less fluid to pump means your heart doesnt have to work as hard. Some diuretics make your body lose a mineral called potassium. Your doctor will tell you if you need to take supplements or eat more foods high in potassium.  · Digoxin helps your heart pump with more strength. This helps your heart pump more blood with each beat. So, more oxygen-rich blood travels to the rest of the body.  · Aldosterone antagonists help alter hormones and decrease strain on the heart.  · Hydralazine and nitrates are two separate medicines used together to treat heart failure. They may come in one combination pill. They lower blood pressure and decrease how hard the heart has to pump.  Medicines for related conditions  Controlling other heart problems helps keep heart failure under control, too. Depending on other heart problems you have, medicines may be prescribed to:  · Lower blood pressure (antihypertensives).  · Lower cholesterol levels (statins).  · Prevent blood clots (anticoagulants or aspirin).  · Keep the heartbeat steady (antiarrhythmics).  Date Last Reviewed: 3/5/2016  © 8543-9956 [employer]. [address] [place] [address], [place] [address]. All rights reserved. This information is not intended as a substitute for professional medical care. Always follow  your healthcare professional's instructions.              Heart Failure: Procedures That May Help    The heart is a muscle that pumps oxygen-rich blood to all parts of the body. When you have heart failure, the heart is not able to pump as well as it should. Blood and fluid may back up into the lungs (congestive heart failure), and some parts of the body dont get enough oxygen-rich blood to work normally. These problems lead to the symptoms of heart failure.     Certain procedures may help the heart pump better in some cases of heart failure. Some procedures are done to treat health problems that may have caused the heart failure such as coronary artery disease or heart rhythm problems. For more serious heart failure, other options are available.  Treating artery and valve problems  If you have coronary artery disease or valve disease, procedures may be done to improve blood flow. This helps the heart pump better, which can improve heart failure symptoms. First, your doctor may do a cardiac catheterization to help detect clogged blood vessels or valve damage. During this procedure, a  thin tube (catheter) in inserted into a blood vessel and guided to the heart. There a dye is injected and a special type of X-ray (angiogram) is taken of the blood vessels. Procedures to open a blocked artery or fix damaged valves can also be done using catheterization.  · Angioplasty uses a balloon-tipped instrument at the end of the catheter. The balloon is inflated to widen the narrowed artery. In many cases, a stent is expanded to further support the narrowed artery. A stent is a metal mesh tube.  · Valve surgery repairs or replacement of faulty valves can also be done during catheterization so blood can flow properly through the chambers of the heart.  Bypass surgery is another option to help treat blocked arteries. It uses a healthy blood vessel from elsewhere in the body. The healthy blood vessel is attached above and below the  blocked area so that blood can flow around the blocked artery.  Treating heart rhythm problems  A device may be placed in the chest to help a weak heart maintain a healthy, heartbeat so the heart can pump more effectively:  · Pacemaker. A pacemaker is an implanted device that regulates your heartbeat electronically. It monitors your heart's rhythm and generates a painless electric impulse that helps the heart beat in a regular rhythm. A pacemaker is programmed to meet your specific heart rhythm needs.  · Biventricular pacing/cardiac resynchronization therapy. A type of pacemaker that paces both pumping chambers of the heart at the same time to coordinate contractions and to improve the heart's function. Some people with heart failure are candidates for this therapy.  · Implantable cardioverter defibrillator. A device similar to a pacemaker that senses when the heart is beating too fast and delivers an electrical shock to convert the fast rhythm to a normal rhythm. This can be a life saving device.  In severe cases  In more serious cases of heart failure when other treatments no longer work, other options may include:  · Ventricular assist devices (VADs). These are mechanical devices used to take over the pumping function for one or both of the heart's ventricles, or pumping chambers. A VAD may be necessary when heart failure progresses to the point that medicines and other treatments no longer help. In some cases, a VAD may be used as a bridge to transplant.  · Heart transplant. This is replacing the diseased heart with a healthy one from a donor. This is an option for a few people who are very sick. A heart transplant is very serious and not an option for all patients. Your doctor can tell you more.  Date Last Reviewed: 3/20/2016  © 8326-1541 [employer]. [address] [place] [address], [place] [address]. All rights reserved. This information is not intended as a substitute for professional medical care.  Always follow your healthcare professional's instructions.              Heart Failure: Tracking Your Weight  You have a condition called heart failure. When you have heart failure, a sudden weight gain or a steady rise in weight is a warning sign that your body is retaining too much water and salt. This could mean your heart failure is getting worse. If left untreated, it can cause problems for your lungs and result in shortness of breath. Weighing yourself each day is the best way to know if youre retaining water. If your weight goes up quickly, call your doctor. You will be given instructions on how to get rid of the excess water. You will likely need medicines and to avoid salt. This will help your heart work better.  Call your doctor if you gain more than 2 pounds in 1 day, more than 5 pounds in 1 week, or whatever weight gain you were told to report by your doctor. This is often a sign of worsening heart failure and needs to be evaluated and treated. Your doctor will tell you what to do next.   Tips for weighing yourself    · Weigh yourself at the same time each morning, wearing the same clothes. Weigh yourself after urinating and before eating.  · Use the same scale each day. Make sure the numbers are easy to read. Put the scale on a flat, hard surface -- not on a rug or carpet.  · Do not stop weighing yourself. If you forget one day, weigh again the next morning.  How to use your weight chart  · Keep your weight chart near the scale. Write your weight on the chart as soon as you get off the scale.  · Fill in the month and the start date on the chart. Then write down your weight each day. Your chart will look like this:    · If you miss a day, leave the space blank. Weigh yourself the next day and write your weight in the next space.  · Take your weight chart with you when you go to see your doctor.  Date Last Reviewed: 3/20/2016  © 7926-3454 [employer]. [address] [place] [address], [place]  [address]. All rights reserved. This information is not intended as a substitute for professional medical care. Always follow your healthcare professional's instructions.              Heart Failure: Warning Signs of a Flare-Up  You have a condition called heart failure. Once you have heart failure, flare-ups can happen. Below are signs that can mean your heart failure is getting worse. If you notice any of these warning signs, call your healthcare provider.  Swelling    · Your feet, ankles, or lower legs get puffier.  · You notice skin changes on your lower legs.  · Your shoes feel too tight.  · Your clothes are tighter in the waist.  · You have trouble getting rings on or off your fingers.  Shortness of breath  · You have to breathe harder even when youre doing your normal activities or when youre resting.  · You are short of breath walking up stairs or even short distances.  · You wake up at night short of breath or coughing.  · You need to use more pillows or sit up to sleep.  · You wake up tired or restless.  Other warning signs  · You feel weaker, dizzy, or more tired.  · You have chest pain or changes in your heartbeat.  · You have a cough that wont go away.  · You cant remember things or dont feel like eating.  Tracking your weight  Gaining weight is often the first warning sign that heart failure is getting worse. Gaining even a few pounds can be a sign that your body is retaining excess water and salt. Weighing yourself each day in the morning after you urinate and before you eat, is the best way to know if you're retaining water. Get a scale that is easy to read and make sure you wear the same clothes and use the same scale every time you weigh. Your healthcare provider will show you how to track your weight. Call your doctor if you gain more than 2 pounds in 1 day, 5 pounds in 1 week, or whatever weight gain you were told to report by your doctor. This is often a sign of worsening heart failure and needs  to be evaluated and treated before it compromises your breathing. Your doctor will tell you what to do next.    Date Last Reviewed: 3/15/2016  © 6592-7684 [employer]. [address] [place] [address], [place] [address]. All rights reserved. This information is not intended as a substitute for professional medical care. Always follow your healthcare professional's instructions.              Chronic Kindey Disease Education             Diabetes Discharge Instructions                                    [facility], [place], operated by [facility] complies with applicable Federal civil rights laws and does not discriminate on the basis of race, color, national origin, age, disability, or sex.

## 2017-03-14 LAB — POCT GLUCOSE: 150 MG/DL (ref 70–110)

## 2017-04-05 ENCOUNTER — OFFICE VISIT (OUTPATIENT)
Dept: FAMILY MEDICINE | Facility: CLINIC | Age: 72
End: 2017-04-05
Payer: MEDICARE

## 2017-04-05 VITALS
BODY MASS INDEX: 38.09 KG/M2 | HEIGHT: 68 IN | SYSTOLIC BLOOD PRESSURE: 136 MMHG | TEMPERATURE: 98 F | WEIGHT: 251.31 LBS | HEART RATE: 88 BPM | DIASTOLIC BLOOD PRESSURE: 70 MMHG | OXYGEN SATURATION: 96 %

## 2017-04-05 DIAGNOSIS — I25.119 CORONARY ARTERY DISEASE INVOLVING NATIVE CORONARY ARTERY OF NATIVE HEART WITH ANGINA PECTORIS: Chronic | ICD-10-CM

## 2017-04-05 DIAGNOSIS — C61 PROSTATE CANCER: ICD-10-CM

## 2017-04-05 DIAGNOSIS — N18.30 DIABETES MELLITUS WITH STAGE 3 CHRONIC KIDNEY DISEASE: Chronic | ICD-10-CM

## 2017-04-05 DIAGNOSIS — E55.9 VITAMIN D DEFICIENCY DISEASE: ICD-10-CM

## 2017-04-05 DIAGNOSIS — E11.8 TYPE 2 DIABETES MELLITUS WITH COMPLICATION, WITH LONG-TERM CURRENT USE OF INSULIN: ICD-10-CM

## 2017-04-05 DIAGNOSIS — I11.9 HYPERTENSIVE HEART DISEASE WITHOUT CONGESTIVE HEART FAILURE: ICD-10-CM

## 2017-04-05 DIAGNOSIS — E66.01 SEVERE OBESITY (BMI 35.0-39.9) WITH COMORBIDITY: ICD-10-CM

## 2017-04-05 DIAGNOSIS — K21.9 GASTROESOPHAGEAL REFLUX DISEASE WITHOUT ESOPHAGITIS: Chronic | ICD-10-CM

## 2017-04-05 DIAGNOSIS — I77.1 TORTUOUS AORTA: ICD-10-CM

## 2017-04-05 DIAGNOSIS — Z79.4 TYPE 2 DIABETES MELLITUS WITH COMPLICATION, WITH LONG-TERM CURRENT USE OF INSULIN: ICD-10-CM

## 2017-04-05 DIAGNOSIS — Z00.00 ENCOUNTER FOR PREVENTIVE HEALTH EXAMINATION: Primary | ICD-10-CM

## 2017-04-05 DIAGNOSIS — E11.22 DIABETES MELLITUS WITH STAGE 3 CHRONIC KIDNEY DISEASE: Chronic | ICD-10-CM

## 2017-04-05 DIAGNOSIS — E78.5 HYPERLIPIDEMIA, UNSPECIFIED HYPERLIPIDEMIA TYPE: Chronic | ICD-10-CM

## 2017-04-05 DIAGNOSIS — F32.0 MAJOR DEPRESSIVE DISORDER, SINGLE EPISODE, MILD: ICD-10-CM

## 2017-04-05 DIAGNOSIS — I50.30 DIASTOLIC HEART FAILURE, UNSPECIFIED HEART FAILURE CHRONICITY: Chronic | ICD-10-CM

## 2017-04-05 DIAGNOSIS — I10 ESSENTIAL HYPERTENSION: Chronic | ICD-10-CM

## 2017-04-05 PROCEDURE — G0439 PPPS, SUBSEQ VISIT: HCPCS | Mod: S$GLB,,, | Performed by: NURSE PRACTITIONER

## 2017-04-05 PROCEDURE — 3075F SYST BP GE 130 - 139MM HG: CPT | Mod: S$GLB,,, | Performed by: NURSE PRACTITIONER

## 2017-04-05 PROCEDURE — 3078F DIAST BP <80 MM HG: CPT | Mod: S$GLB,,, | Performed by: NURSE PRACTITIONER

## 2017-04-05 PROCEDURE — 99499 UNLISTED E&M SERVICE: CPT | Mod: S$GLB,,, | Performed by: NURSE PRACTITIONER

## 2017-04-05 PROCEDURE — 99999 PR PBB SHADOW E&M-EST. PATIENT-LVL V: CPT | Mod: PBBFAC,,, | Performed by: NURSE PRACTITIONER

## 2017-04-05 NOTE — PROGRESS NOTES
"Jorden Shafer presented for a  Medicare AWV and comprehensive Health Risk Assessment today. The following components were reviewed and updated:    · Medical history  · Family History  · Social history  · Allergies and Current Medications  · Health Risk Assessment  · Health Maintenance  · Care Team     ** See Completed Assessments for Annual Wellness Visit within the encounter summary.**       The following assessments were completed:  · Living Situation  · CAGE  · Depression Screening  · Timed Get Up and Go  · Whisper Test  · Cognitive Function Screening  · Nutrition Screening  · ADL Screening  · PAQ Screening    Vitals:    04/05/17 1309   BP: 136/70   BP Location: Left arm   Patient Position: Sitting   BP Method: Manual   Pulse: 88   Temp: 98.1 °F (36.7 °C)   TempSrc: Oral   SpO2: 96%   Weight: 114 kg (251 lb 5.2 oz)   Height: 5' 8" (1.727 m)     Body mass index is 38.21 kg/(m^2).  Physical Exam   Constitutional: He is oriented to person, place, and time.   Cardiovascular: Normal rate, regular rhythm and normal heart sounds.    Pulmonary/Chest: Effort normal and breath sounds normal.   Abdominal:   Insulin pump    Musculoskeletal: Normal range of motion. Edema: bilateral pretibial +1 edema    Neurological: He is alert and oriented to person, place, and time.   Skin: Skin is warm.   Psychiatric: He has a normal mood and affect. His behavior is normal. Thought content normal.   Vitals reviewed.        Diagnoses and health risks identified today and associated recommendations/orders:    1. Encounter for preventive health examination  Education provided about preventive health examinations and procedures; addressed and discussed patient's health concerns. Additionally, reviewed medical record for risk factors and documented the results during this encounter.    2. Diabetes mellitus with stage 3 chronic kidney disease  Education provided about diabetes, management of blood glucose with diet and activities, monitoring " for worsening effects of diabetes.  Reviewed most recent Ha1c (8.5 - January 2017 patient followed by endocrinology: Dr. Britton), complications associated with uncontrolled diabetes.     3. Type 2 diabetes mellitus with complication, with long-term current use of insulin  Education provided about diabetes, management of blood glucose with diet and activities, monitoring for worsening effects of diabetes.  Reviewed most recent Ha1c (8.5 - January 2017 patient followed by endocrinology: Dr. Britton), complications associated with uncontrolled diabetes.     4. Major depressive disorder, single episode, mild  Symptoms controlled. Continuing current management.    5. Severe obesity (BMI 35.0-39.9) with comorbidity  Discussed diet, activities.     6. Prostate cancer  Stable, followed by [facility] urology, denies worsening symptoms; continue as advised.     7. Tortuous aorta  Stable, asymptomatic on ASA, cholesterol managing STATIN, and antihypertensive medication; monitor    8. Hypertensive heart disease without congestive heart failure  Stable, followed by [facility], denies worsening symptoms; continue as advised.     9. Diastolic heart failure, unspecified heart failure chronicity  Stable, followed by [facility], denies worsening symptoms; continue as advised.     10. Coronary artery disease involving native coronary artery of native heart with angina pectoris  Stable, followed by [facility], denies worsening symptoms; continue as advised.     11. Hyperlipidemia, unspecified hyperlipidemia type  Lipids monitored by [facility] cardiologist.    12. Gastroesophageal reflux disease without esophagitis  Symptoms controlled. Continuing current management.    13. Essential hypertension  The current medical regimen is effective;  continue present plan and medications.    14. Vitamin D deficiency disease  Stable. Monitor.         Return in about 2 weeks (around 4/19/2017).    Jordi Pritchett Jr, NP

## 2017-04-05 NOTE — PATIENT INSTRUCTIONS
Counseling and Referral of Other Preventative  (Italic type indicates deductible and co-insurance are waived)    Patient Name: Jorden Shafer  Today's Date: 4/5/2017      SERVICE LIMITATIONS RECOMMENDATION    Vaccines    · Pneumococcal (once after 65)    · Influenza (annually)    · Hepatitis B (if medium/high risk)    · Prevnar 13      Hepatitis B medium/high risk factors:       - End-stage renal disease       - Hemophiliacs who received Factor VII or         IX concentrates       - Clients of institutions for the mentally             retarded       - Persons who live in the same house as          a HepB carrier       - Homosexual men       - Illicit injectable drug abusers     Pneumococcal: Done, no repeat necessary     Influenza: Done, repeat in one year     Hepatitis B: N/A     Prevnar 13: Done, no repeat necessary    Prostate cancer screening (annually to age 75)     Prostate specific antigen (PSA) Shared decision making with Provider. Sometimes a co-pay may be required if the patient decides to have this test. The USPSTF no longer recommends prostate cancer screening routinely in medicine: PSA 10/2016, repeat 1 year     Colorectal cancer screening (to age 75)  · Fecal occult blood test (annual)  · Flexible sigmoidoscopy (5y)  · Screening colonoscopy (10y)  · Barium enema   colonoscopy October 2012, every 10 years    Diabetes self-management training (no USPSTF recommendations)  Requires referral by treating physician for patient with diabetes or renal disease. 10 hours of initial DSMT sessions of no less than 30 minutes each in a continuous 12-month period. 2 hours of follow-up DSMT in subsequent years.  followed by endocrinology (Dr. Britton)     Glaucoma screening (no USPSTF recommendation)  Diabetes mellitus, family history   , age 50 or over    American, age 65 or over  followed by optometry ()     Medical nutrition therapy for diabetes or renal disease (no recommended  schedule)  Requires referral by treating physician for patient with diabetes or renal disease or kidney transplant within the past 3 years.  Can be provided in same year as diabetes self-management training (DSMT), and CMS recommends medical nutrition therapy take place after DSMT. Up to 3 hours for initial year and 2 hours in subsequent years.  followed by endocrinology (Dr. Britton)     Cardiovascular screening blood tests (every 5 years)  · Fasting lipid panel  Order as a panel if possible  followed by [facility] (Dr. Steve Fitch)     Diabetes screening tests (at least every 3 years, Medicare covers annually or at 6-month intervals for prediabetic patients)  · Fasting blood sugar (FBS) or glucose tolerance test (GTT)  Patient must be diagnosed with one of the following:       - Hypertension       - Dyslipidemia       - Obesity (BMI 30kg/m2)       - Previous elevated impaired FBS or GTT       ... or any two of the following:       - Overweight (BMI 25 but <30)       - Family history of diabetes       - Age 65 or older       - History of gestational diabetes or birth of baby weighing more than 9 pounds  followed by endocrinology (Dr. Britton)     Abdominal aortic aneurysm screening (once)  · Sonogram   Limited to patients who meet one of the following criteria:       - Men who are 65-75 years old and have smoked more than 100 cigarette in their lifetime       - Anyone with a family history of abdominal aortic aneurysm       - Anyone recommended for screening by the USPSTF  followed by cardiology (Dr. Steve Fitch)     HIV screening (annually for increased risk patients)  · HIV-1 and HIV-2 by EIA, or MARIPOSA, rapid antibody test or oral mucosa transudate  Patients must be at increased risk for HIV infection per USPSTF guidelines or pregnant. Tests covered annually for patient at increased risk or as requested by the patient. Pregnant patients may receive up to 3 tests during pregnancy. N/a no clinical risk factors      Smoking cessation counseling (up to 8 sessions per year)  Patients must be asymptomatic of tobacco-related conditions to receive as a preventative service.  Non-smoker    Subsequent annual wellness visit  At least 12 months since last AWV  Return in one year     The following information is provided to all patients.  This information is to help you find resources for any of the problems found today that may be affecting your health:                Living healthy guide: www.[facility].[place].[facility]      Understanding Diabetes: www.diabetes.org      Eating healthy: www.cdc.gov/healthyweight      [facility] home safety checklist: www.cdc.gov/steadi/patient.html      Agency on Aging: www.goea.[place].[facility]      Alcoholics anonymous (AA): www.aa.org      Physical Activity: www.eleno.nih.gov/cm8bpbi      Tobacco use: www.quitwithusla.org

## 2017-04-12 ENCOUNTER — OFFICE VISIT (OUTPATIENT)
Dept: FAMILY MEDICINE | Facility: CLINIC | Age: 72
DRG: 065 | End: 2017-04-12
Payer: MEDICARE

## 2017-04-12 ENCOUNTER — HOSPITAL ENCOUNTER (INPATIENT)
Facility: HOSPITAL | Age: 72
LOS: 7 days | Discharge: HOME HEALTH | DRG: 065 | End: 2017-04-19
Attending: EMERGENCY MEDICINE | Admitting: HOSPITALIST
Payer: MEDICARE

## 2017-04-12 VITALS
HEIGHT: 68 IN | HEART RATE: 74 BPM | SYSTOLIC BLOOD PRESSURE: 140 MMHG | DIASTOLIC BLOOD PRESSURE: 76 MMHG | BODY MASS INDEX: 38.09 KG/M2 | OXYGEN SATURATION: 96 % | RESPIRATION RATE: 16 BRPM | WEIGHT: 251.31 LBS | TEMPERATURE: 98 F

## 2017-04-12 DIAGNOSIS — I63.89 RIGHT TEMPORAL LOBE INFARCTION: Primary | ICD-10-CM

## 2017-04-12 DIAGNOSIS — E55.9 VITAMIN D DEFICIENCY DISEASE: ICD-10-CM

## 2017-04-12 DIAGNOSIS — R47.81 SLURRED SPEECH: Primary | ICD-10-CM

## 2017-04-12 DIAGNOSIS — F32.0 MAJOR DEPRESSIVE DISORDER, SINGLE EPISODE, MILD: ICD-10-CM

## 2017-04-12 DIAGNOSIS — Z96.41 INSULIN PUMP IN PLACE: Chronic | ICD-10-CM

## 2017-04-12 DIAGNOSIS — I10 ESSENTIAL HYPERTENSION: Chronic | ICD-10-CM

## 2017-04-12 DIAGNOSIS — I77.1 TORTUOUS AORTA: ICD-10-CM

## 2017-04-12 DIAGNOSIS — I25.119 CORONARY ARTERY DISEASE INVOLVING NATIVE CORONARY ARTERY OF NATIVE HEART WITH ANGINA PECTORIS: Chronic | ICD-10-CM

## 2017-04-12 DIAGNOSIS — R29.810 FACIAL DROOP: ICD-10-CM

## 2017-04-12 DIAGNOSIS — Z86.73 HISTORY OF CVA (CEREBROVASCULAR ACCIDENT): Chronic | ICD-10-CM

## 2017-04-12 DIAGNOSIS — I70.8 ATHEROSCLEROSIS OF OTHER SPECIFIED ARTERIES: ICD-10-CM

## 2017-04-12 DIAGNOSIS — I63.9 ACUTE CVA (CEREBROVASCULAR ACCIDENT): ICD-10-CM

## 2017-04-12 DIAGNOSIS — C61 PROSTATE CANCER: ICD-10-CM

## 2017-04-12 DIAGNOSIS — E44.1 MILD PROTEIN MALNUTRITION: Chronic | ICD-10-CM

## 2017-04-12 DIAGNOSIS — R47.81 SLURRED SPEECH: ICD-10-CM

## 2017-04-12 DIAGNOSIS — R53.1 WEAKNESS: ICD-10-CM

## 2017-04-12 DIAGNOSIS — I50.42 CHRONIC COMBINED SYSTOLIC AND DIASTOLIC HEART FAILURE: Chronic | ICD-10-CM

## 2017-04-12 DIAGNOSIS — G47.33 OSA ON CPAP: Chronic | ICD-10-CM

## 2017-04-12 DIAGNOSIS — E87.6 HYPOKALEMIA: ICD-10-CM

## 2017-04-12 LAB
ALBUMIN SERPL BCP-MCNC: 3.2 G/DL
ALP SERPL-CCNC: 69 U/L
ALT SERPL W/O P-5'-P-CCNC: 23 U/L
ANION GAP SERPL CALC-SCNC: 7 MMOL/L
APTT BLDCRRT: 28.9 SEC
AST SERPL-CCNC: 17 U/L
BACTERIA #/AREA URNS HPF: NORMAL /HPF
BASOPHILS # BLD AUTO: 0.04 K/UL
BASOPHILS NFR BLD: 0.4 %
BILIRUB SERPL-MCNC: 0.9 MG/DL
BILIRUB UR QL STRIP: NEGATIVE
BUN SERPL-MCNC: 8 MG/DL
CALCIUM SERPL-MCNC: 7.5 MG/DL
CHLORIDE SERPL-SCNC: 108 MMOL/L
CLARITY UR: CLEAR
CO2 SERPL-SCNC: 22 MMOL/L
COLOR UR: YELLOW
CREAT SERPL-MCNC: 0.9 MG/DL
DIFFERENTIAL METHOD: NORMAL
EOSINOPHIL # BLD AUTO: 0.1 K/UL
EOSINOPHIL NFR BLD: 0.6 %
ERYTHROCYTE [DISTWIDTH] IN BLOOD BY AUTOMATED COUNT: 14.3 %
EST. GFR  (AFRICAN AMERICAN): >60 ML/MIN/1.73 M^2
EST. GFR  (NON AFRICAN AMERICAN): >60 ML/MIN/1.73 M^2
GLUCOSE SERPL-MCNC: 172 MG/DL
GLUCOSE UR QL STRIP: ABNORMAL
HCT VFR BLD AUTO: 45.7 %
HGB BLD-MCNC: 15.8 G/DL
HGB UR QL STRIP: NEGATIVE
HYALINE CASTS #/AREA URNS LPF: 0 /LPF
INR PPP: 1.1
KETONES UR QL STRIP: NEGATIVE
LEUKOCYTE ESTERASE UR QL STRIP: NEGATIVE
LYMPHOCYTES # BLD AUTO: 2.6 K/UL
LYMPHOCYTES NFR BLD: 28.5 %
MAGNESIUM SERPL-MCNC: 1.4 MG/DL
MCH RBC QN AUTO: 29 PG
MCHC RBC AUTO-ENTMCNC: 34.6 %
MCV RBC AUTO: 84 FL
MICROSCOPIC COMMENT: NORMAL
MONOCYTES # BLD AUTO: 0.6 K/UL
MONOCYTES NFR BLD: 6.4 %
NEUTROPHILS # BLD AUTO: 5.8 K/UL
NEUTROPHILS NFR BLD: 64.1 %
NITRITE UR QL STRIP: NEGATIVE
PH UR STRIP: 5 [PH] (ref 5–8)
PLATELET # BLD AUTO: 155 K/UL
PMV BLD AUTO: 10.7 FL
POCT GLUCOSE: 211 MG/DL (ref 70–110)
POTASSIUM SERPL-SCNC: 3.3 MMOL/L
PROT SERPL-MCNC: 6.1 G/DL
PROT UR QL STRIP: ABNORMAL
PROTHROMBIN TIME: 11.3 SEC
RBC # BLD AUTO: 5.45 M/UL
RBC #/AREA URNS HPF: 1 /HPF (ref 0–4)
SODIUM SERPL-SCNC: 137 MMOL/L
SP GR UR STRIP: 1.01 (ref 1–1.03)
SQUAMOUS #/AREA URNS HPF: 1 /HPF
URN SPEC COLLECT METH UR: ABNORMAL
UROBILINOGEN UR STRIP-ACNC: ABNORMAL EU/DL
WBC # BLD AUTO: 8.96 K/UL
WBC #/AREA URNS HPF: 0 /HPF (ref 0–5)
YEAST URNS QL MICRO: NORMAL

## 2017-04-12 PROCEDURE — 80053 COMPREHEN METABOLIC PANEL: CPT

## 2017-04-12 PROCEDURE — 3078F DIAST BP <80 MM HG: CPT | Mod: S$GLB,,, | Performed by: NURSE PRACTITIONER

## 2017-04-12 PROCEDURE — 25000003 PHARM REV CODE 250: Performed by: EMERGENCY MEDICINE

## 2017-04-12 PROCEDURE — 85025 COMPLETE CBC W/AUTO DIFF WBC: CPT

## 2017-04-12 PROCEDURE — 3077F SYST BP >= 140 MM HG: CPT | Mod: S$GLB,,, | Performed by: NURSE PRACTITIONER

## 2017-04-12 PROCEDURE — 99499 UNLISTED E&M SERVICE: CPT | Mod: S$GLB,,, | Performed by: NURSE PRACTITIONER

## 2017-04-12 PROCEDURE — 1126F AMNT PAIN NOTED NONE PRSNT: CPT | Mod: S$GLB,,, | Performed by: NURSE PRACTITIONER

## 2017-04-12 PROCEDURE — 12000002 HC ACUTE/MED SURGE SEMI-PRIVATE ROOM

## 2017-04-12 PROCEDURE — 93005 ELECTROCARDIOGRAM TRACING: CPT

## 2017-04-12 PROCEDURE — 85730 THROMBOPLASTIN TIME PARTIAL: CPT

## 2017-04-12 PROCEDURE — 96374 THER/PROPH/DIAG INJ IV PUSH: CPT

## 2017-04-12 PROCEDURE — 99285 EMERGENCY DEPT VISIT HI MDM: CPT | Mod: 25

## 2017-04-12 PROCEDURE — 99999 PR PBB SHADOW E&M-EST. PATIENT-LVL III: CPT | Mod: PBBFAC,,, | Performed by: NURSE PRACTITIONER

## 2017-04-12 PROCEDURE — 1157F ADVNC CARE PLAN IN RCRD: CPT | Mod: 8P,S$GLB,, | Performed by: NURSE PRACTITIONER

## 2017-04-12 PROCEDURE — 63600175 PHARM REV CODE 636 W HCPCS: Performed by: EMERGENCY MEDICINE

## 2017-04-12 PROCEDURE — 99215 OFFICE O/P EST HI 40 MIN: CPT | Mod: S$GLB,,, | Performed by: NURSE PRACTITIONER

## 2017-04-12 PROCEDURE — 85610 PROTHROMBIN TIME: CPT

## 2017-04-12 PROCEDURE — 83735 ASSAY OF MAGNESIUM: CPT

## 2017-04-12 PROCEDURE — 81000 URINALYSIS NONAUTO W/SCOPE: CPT

## 2017-04-12 PROCEDURE — 1159F MED LIST DOCD IN RCRD: CPT | Mod: S$GLB,,, | Performed by: NURSE PRACTITIONER

## 2017-04-12 RX ORDER — ATORVASTATIN CALCIUM 10 MG/1
20 TABLET, FILM COATED ORAL DAILY
Status: DISCONTINUED | OUTPATIENT
Start: 2017-04-13 | End: 2017-04-19 | Stop reason: HOSPADM

## 2017-04-12 RX ORDER — AMLODIPINE BESYLATE 5 MG/1
10 TABLET ORAL DAILY
Status: DISCONTINUED | OUTPATIENT
Start: 2017-04-13 | End: 2017-04-13

## 2017-04-12 RX ORDER — HYDRALAZINE HYDROCHLORIDE 20 MG/ML
20 INJECTION INTRAMUSCULAR; INTRAVENOUS
Status: COMPLETED | OUTPATIENT
Start: 2017-04-12 | End: 2017-04-12

## 2017-04-12 RX ORDER — NITROGLYCERIN 0.4 MG/1
0.4 TABLET SUBLINGUAL EVERY 5 MIN PRN
Status: DISCONTINUED | OUTPATIENT
Start: 2017-04-12 | End: 2017-04-13

## 2017-04-12 RX ORDER — FUROSEMIDE 40 MG/1
80 TABLET ORAL DAILY
Status: DISCONTINUED | OUTPATIENT
Start: 2017-04-13 | End: 2017-04-13

## 2017-04-12 RX ORDER — PANTOPRAZOLE SODIUM 40 MG/1
40 TABLET, DELAYED RELEASE ORAL DAILY
Status: DISCONTINUED | OUTPATIENT
Start: 2017-04-13 | End: 2017-04-19 | Stop reason: HOSPADM

## 2017-04-12 RX ORDER — CARVEDILOL 6.25 MG/1
25 TABLET ORAL 2 TIMES DAILY WITH MEALS
Status: DISCONTINUED | OUTPATIENT
Start: 2017-04-13 | End: 2017-04-13

## 2017-04-12 RX ORDER — CELECOXIB 100 MG/1
100 CAPSULE ORAL 2 TIMES DAILY
Status: DISCONTINUED | OUTPATIENT
Start: 2017-04-12 | End: 2017-04-12

## 2017-04-12 RX ORDER — ASPIRIN 325 MG
325 TABLET ORAL DAILY
Status: COMPLETED | OUTPATIENT
Start: 2017-04-13 | End: 2017-04-19

## 2017-04-12 RX ORDER — ASPIRIN 325 MG
325 TABLET ORAL
Status: COMPLETED | OUTPATIENT
Start: 2017-04-12 | End: 2017-04-12

## 2017-04-12 RX ORDER — IBUPROFEN 200 MG
24 TABLET ORAL
Status: DISCONTINUED | OUTPATIENT
Start: 2017-04-12 | End: 2017-04-19 | Stop reason: HOSPADM

## 2017-04-12 RX ORDER — CLOPIDOGREL BISULFATE 75 MG/1
75 TABLET ORAL DAILY
Status: DISCONTINUED | OUTPATIENT
Start: 2017-04-13 | End: 2017-04-19 | Stop reason: HOSPADM

## 2017-04-12 RX ORDER — CITALOPRAM 10 MG/1
10 TABLET ORAL DAILY
Status: DISCONTINUED | OUTPATIENT
Start: 2017-04-13 | End: 2017-04-19 | Stop reason: HOSPADM

## 2017-04-12 RX ORDER — IBUPROFEN 200 MG
16 TABLET ORAL
Status: DISCONTINUED | OUTPATIENT
Start: 2017-04-12 | End: 2017-04-19 | Stop reason: HOSPADM

## 2017-04-12 RX ORDER — LISINOPRIL 5 MG/1
10 TABLET ORAL DAILY
Status: DISCONTINUED | OUTPATIENT
Start: 2017-04-13 | End: 2017-04-13

## 2017-04-12 RX ORDER — CLONIDINE HYDROCHLORIDE 0.1 MG/1
0.1 TABLET ORAL
Status: COMPLETED | OUTPATIENT
Start: 2017-04-12 | End: 2017-04-12

## 2017-04-12 RX ORDER — INSULIN ASPART 100 [IU]/ML
1-10 INJECTION, SOLUTION INTRAVENOUS; SUBCUTANEOUS
Status: DISCONTINUED | OUTPATIENT
Start: 2017-04-12 | End: 2017-04-13

## 2017-04-12 RX ORDER — ASPIRIN 325 MG
325 TABLET ORAL
Status: DISCONTINUED | OUTPATIENT
Start: 2017-04-12 | End: 2017-04-12 | Stop reason: HOSPADM

## 2017-04-12 RX ORDER — ENOXAPARIN SODIUM 100 MG/ML
40 INJECTION SUBCUTANEOUS EVERY 24 HOURS
Status: DISCONTINUED | OUTPATIENT
Start: 2017-04-12 | End: 2017-04-19 | Stop reason: HOSPADM

## 2017-04-12 RX ORDER — LABETALOL HYDROCHLORIDE 5 MG/ML
20 INJECTION, SOLUTION INTRAVENOUS
Status: DISCONTINUED | OUTPATIENT
Start: 2017-04-12 | End: 2017-04-19 | Stop reason: HOSPADM

## 2017-04-12 RX ORDER — ASPIRIN 81 MG/1
81 TABLET ORAL DAILY
Status: DISCONTINUED | OUTPATIENT
Start: 2017-04-13 | End: 2017-04-12

## 2017-04-12 RX ORDER — GLUCAGON 1 MG
1 KIT INJECTION
Status: DISCONTINUED | OUTPATIENT
Start: 2017-04-12 | End: 2017-04-19 | Stop reason: HOSPADM

## 2017-04-12 RX ORDER — ISOSORBIDE MONONITRATE 30 MG/1
60 TABLET, EXTENDED RELEASE ORAL DAILY
Status: DISCONTINUED | OUTPATIENT
Start: 2017-04-13 | End: 2017-04-13

## 2017-04-12 RX ORDER — AMITRIPTYLINE HYDROCHLORIDE 25 MG/1
50 TABLET, FILM COATED ORAL NIGHTLY
Status: DISCONTINUED | OUTPATIENT
Start: 2017-04-12 | End: 2017-04-19 | Stop reason: HOSPADM

## 2017-04-12 RX ORDER — HYDRALAZINE HYDROCHLORIDE 20 MG/ML
20 INJECTION INTRAMUSCULAR; INTRAVENOUS
Status: DISCONTINUED | OUTPATIENT
Start: 2017-04-12 | End: 2017-04-12

## 2017-04-12 RX ADMIN — HYDRALAZINE HYDROCHLORIDE 20 MG: 20 INJECTION INTRAMUSCULAR; INTRAVENOUS at 10:04

## 2017-04-12 RX ADMIN — AMITRIPTYLINE HYDROCHLORIDE 50 MG: 25 TABLET, FILM COATED ORAL at 10:04

## 2017-04-12 RX ADMIN — CLONIDINE HYDROCHLORIDE 0.1 MG: 0.1 TABLET ORAL at 04:04

## 2017-04-12 RX ADMIN — ENOXAPARIN SODIUM 40 MG: 100 INJECTION SUBCUTANEOUS at 10:04

## 2017-04-12 RX ADMIN — HYDRALAZINE HYDROCHLORIDE 20 MG: 20 INJECTION INTRAMUSCULAR; INTRAVENOUS at 04:04

## 2017-04-12 RX ADMIN — Medication 325 MG: at 02:04

## 2017-04-12 RX ADMIN — ASPIRIN 325 MG ORAL TABLET 325 MG: 325 PILL ORAL at 10:04

## 2017-04-12 RX ADMIN — INSULIN ASPART 2 UNITS: 100 INJECTION, SOLUTION INTRAVENOUS; SUBCUTANEOUS at 10:04

## 2017-04-12 RX ADMIN — CELECOXIB 100 MG: 100 CAPSULE ORAL at 10:04

## 2017-04-12 NOTE — ED PROVIDER NOTES
Encounter Date: 4/12/2017    SCRIBE #1 NOTE: I, Benjamin Avendaño, am scribing for, and in the presence of,  Yosef Cummings MD. I have scribed the following portions of the note - Other sections scribed: HPI and ROS.       History     Chief Complaint   Patient presents with    Extremity Weakness     family reports since last night slurred speech, left sided weakness and facial droop which has resolved     Review of patient's allergies indicates:  No Known Allergies  HPI Comments: CC: Extremity Weakness          HPI: This 71 y.o male pt with a medical hx of diabetes mellitus type 2, chronic kidney disease, hyperlipidemia, DVT, stroke, CAD, heart failure, back pain, and neuropathy of lower extremity presents to the ED with c/o sudden onset of left sided body weakness with associated slurred speech, facial droop, and headache that has since resolved. Per wife, pt began exhibiting slurred speech on yesterday that progressively worsened by lunch time on today. Pt also complains of a severe productive cough with sputum green in characteristics, that exacerbates his SOB. Additionally, he reports being out of his medications for x2 months due to financial issues. Pt's wife reports a myocardial infraction in the past, with preceding symptoms of facial droop and weakness localized to the bilateral upper extremities only. Wife attributes current symptoms to a possible MI. Pt  Further denies any symptoms. There are no alleviating factors. Symptoms are acute in onset, severe(10/10), and intermittent.    The history is provided by the patient. No  was used.     Past Medical History:   Diagnosis Date    Back pain     CAD (coronary artery disease) 1989    h/o cabg x 5    Chronic kidney disease     Colon polyp     h/o on colonoscopy    Contact dermatitis 11/12/2013    CVA (cerebral vascular accident)     March 2010    Depression     Diabetes mellitus type II     on  insulin pump    DVT (deep venous  thrombosis)     left leg    GERD (gastroesophageal reflux disease)     Heart failure     [employer]     Hyperlipidemia     Hypertension     Hypertensive heart disease without congestive heart failure     Myocardial infarction     Neuropathy of lower extremity     Obesity, morbid     Prostate cancer     Renal manifestation of secondary diabetes mellitus     Sleep apnea     Vitamin D deficiency disease      Past Surgical History:   Procedure Laterality Date    CARDIAC DEFIBRILLATOR PLACEMENT  2016    CHOLECYSTECTOMY      CORONARY ARTERY BYPASS GRAFT      bypass times  5    defibulater  2016    EYE SURGERY Bilateral     HERNIA REPAIR      groin x2  and umbilical    JOINT REPLACEMENT      knee left    thrombus Left     >2005    VASECTOMY       Family History   Problem Relation Age of Onset    Alzheimer's disease Mother      Living    Heart disease Mother     Dementia Mother     Asbestos Father          Cancer Father     Diabetes type II Maternal Aunt     Stroke Neg Hx      Social History   Substance Use Topics    Smoking status: Former Smoker     Packs/day: 4.00     Years: 25.00     Types: Cigarettes    Smokeless tobacco: Never Used    Alcohol use 0.0 oz/week     0 Standard drinks or equivalent per week      Comment: occasional     Review of Systems   Constitutional: Negative for fever.   HENT: Negative for sore throat.    Respiratory: Positive for cough and shortness of breath.    Cardiovascular: Negative for chest pain.   Gastrointestinal: Positive for nausea and vomiting. Negative for abdominal pain and diarrhea.   Genitourinary: Negative for dysuria.   Musculoskeletal: Negative for back pain.   Skin: Negative for rash.   Neurological: Positive for weakness (left sided) and headaches.   Hematological: Does not bruise/bleed easily.       Physical Exam   Initial Vitals   BP Pulse Resp Temp SpO2   17 1527 17 1527 17 1527 17 1527 17 1527    179/90 72 20 99 °F (37.2 °C) 95 %     Physical Exam  The patient was examined specifically for the following:   General:No significant distress, Good color, Warm and dry. Head and neck:Scalp atraumatic, Neck supple. Neurological:Appropriate conversation, Gross motor deficits. Eyes:Conjugate gaze, Clear corneas. ENT: No epistaxis. Cardiac: Regular rate and rhythm, Grossly normal heart tones. Pulmonary: Wheezing, Rales. Gastrointestinal: Abdominal tenderness, Abdominal distention. Musculoskeletal: Extremity deformity, Apparent pain with range of motion of the joints. Skin: Rash.   The findings on examination were normal except for the following: The patient has some slight slurred speech.  I see no facial asymmetry.  Cranial nerves motor and sensory examination are grossly unremarkable.  The lungs are clear and free wheezing rales of the rhonchi.  Heart tones are normal.  The patient is regular rate and rhythm.  The neck is supple.  ED Course   Critical Care  Date/Time: 4/12/2017 6:29 PM  Performed by: WALTER OVIEDO  Authorized by: WALTER OVIEDO   Direct patient critical care time: 25 minutes  Additional history critical care time: 11 minutes  Ordering / reviewing critical care time: 11 minutes  Documentation critical care time: 17 minutes  Consulting other physicians critical care time: 6 minutes  Consult with family critical care time: 3 minutes  Total critical care time (exclusive of procedural time) : 73 minutes  Critical care time was exclusive of separately billable procedures and treating other patients and teaching time.  Critical care was necessary to treat or prevent imminent or life-threatening deterioration of the following conditions: CNS failure or compromise.  Critical care was time spent personally by me on the following activities: development of treatment plan with patient or surrogate, examination of patient, ordering and review of laboratory studies, re-evaluation of patient's condition,  pulse oximetry, ordering and performing treatments and interventions, evaluation of patient's response to treatment, review of old charts, ordering and review of radiographic studies and obtaining history from patient or surrogate.        Labs Reviewed   COMPREHENSIVE METABOLIC PANEL - Abnormal; Notable for the following:        Result Value    Potassium 3.3 (*)     CO2 22 (*)     Glucose 172 (*)     Calcium 7.5 (*)     Albumin 3.2 (*)     Anion Gap 7 (*)     All other components within normal limits   URINALYSIS - Abnormal; Notable for the following:     Protein, UA 2+ (*)     Glucose, UA 3+ (*)     Urobilinogen, UA 4.0-6.0 (*)     All other components within normal limits   MAGNESIUM - Abnormal; Notable for the following:     Magnesium 1.4 (*)     All other components within normal limits   APTT   PROTIME-INR   CBC W/ AUTO DIFFERENTIAL   URINALYSIS MICROSCOPIC     EKG Readings: (Independently Interpreted)   This patient is in a sinus rhythm with a heart rate is 76.  The patient has an incomplete right bundle branch block.  There is poor R-wave progression across precordium.  There is no evidence of acute myocardial infarction or malignant arrhythmia.       X-Rays:   Independently Interpreted Readings:   Other Readings:  CT scan reveals a large right temporal lobe infarct.    Medical decision making: Given the above this patient has had waxing and waning left-sided face arm and leg weakness.  He has H deficit that waxes and wanes.  He is without other injuries or problems.  CT today reveals a large right temporal infarct.  I will admit him for observation and evaluation by neurology.  We will do a carotid Doppler ultrasound of the neck, echocardiogram.  We will treat the patient with aspirin.  I cannot do an MRI or an MRA because the patient has a defibrillator.             Scribe Attestation:   Scribe #1: I performed the above scribed service and the documentation accurately describes the services I performed. I  attest to the accuracy of the note.    Attending Attestation:           Physician Attestation for Scribe:  Physician Attestation Statement for Scribe #1: I, Yosef Cummings MD, reviewed documentation, as scribed by Benjamin Avendaño in my presence, and it is both accurate and complete.                 ED Course     Clinical Impression:   The primary encounter diagnosis was Right temporal lobe infarction. Diagnoses of Weakness, Slurred speech, Facial droop, Coronary artery disease involving native coronary artery of native heart with angina pectoris, Essential hypertension, Chronic combined systolic and diastolic heart failure, Acute CVA (cerebrovascular accident), Hypokalemia, Insulin pump in place, Mild protein malnutrition, KELVIN on CPAP, History of CVA (cerebrovascular accident), Vitamin D deficiency disease, Prostate cancer, Tortuous aorta, Major depressive disorder, single episode, mild, and Atherosclerosis of other specified arteries were also pertinent to this visit.          Yosef Cummings MD  04/13/17 4805

## 2017-04-12 NOTE — PROGRESS NOTES
"Subjective:       Patient ID: Jorden Shafer is a 71 y.o. male.    Chief Complaint: Cough (started yesterday); Headache (started yesterday/ no headache now); and "talking funny" (started yesterday)    HPI Comments:  Mr. Jorden Shafer is a 71 y.o. male with essential hypertension, hyperlipidemia, type 2 diabetes mellitus, CAD s/p CABG & PCI, chronic combined systolic and diastolic heart failure, insulin pump, obesity (BMI 37.7), mild protein malnutrition, KELVIN on CPAP, and history of CVA in place who presents to the clinic today with complaints of dysarthria last night. His family noted him to have some slurring of his speech yesterday that progressed to left facial drooping and a headache today.  He has been out of his medications for the past two months due to financial constraints.  He was also noted to have a productive cough of green sputum.     Headache    This is a new problem. The current episode started yesterday. The problem occurs constantly. The problem has been gradually worsening. The pain is located in the bilateral region. The pain does not radiate. The pain quality is similar to prior headaches. The quality of the pain is described as aching and sharp. Associated symptoms include coughing, a loss of balance and weakness. Pertinent negatives include no anorexia, back pain, blurred vision, dizziness, hearing loss, insomnia, neck pain, scalp tenderness, seizures, sinus pressure, sore throat, swollen glands or tinnitus.     Review of Systems   HENT: Positive for trouble swallowing. Negative for hearing loss, sinus pressure, sore throat and tinnitus.    Eyes: Negative for blurred vision.   Respiratory: Positive for cough. Negative for chest tightness.    Cardiovascular: Negative for chest pain, palpitations and leg swelling.   Gastrointestinal: Negative for anorexia.   Musculoskeletal: Negative for back pain, neck pain and neck stiffness.   Skin: Negative for color change and wound.   Neurological: " "Positive for facial asymmetry, speech difficulty (slurred speech), weakness, headaches and loss of balance. Negative for dizziness, tremors, seizures and syncope.   Hematological: Negative for adenopathy. Does not bruise/bleed easily.   Psychiatric/Behavioral: The patient does not have insomnia.        Objective:      Physical Exam   Constitutional: He is oriented to person, place, and time. Vital signs are normal. He appears well-developed and well-nourished.   HENT:   Head: Normocephalic and atraumatic.   Right Ear: External ear normal.   Left Ear: External ear normal.   Nose: Nose normal.   Mouth/Throat: Oropharynx is clear and moist. No oropharyngeal exudate.   Cardiovascular: Normal rate, regular rhythm and normal heart sounds.    Pulmonary/Chest: Effort normal and breath sounds normal.   Abdominal: Soft. Bowel sounds are normal.   Neurological: He is alert and oriented to person, place, and time. Coordination and gait abnormal.    He exhibits left facial drooping and some neglect of his left side.  He spontaneously moves his right sided extremities but minimally moves his left side.    Skin: Skin is warm, dry and intact.   Psychiatric: He has a normal mood and affect.       Assessment:       1. Slurred speech    2. Facial droop    3. History of CVA (cerebrovascular accident)        Plan:       Jorden was seen today for cough, headache and "talking funny".    Diagnoses and all orders for this visit:    Slurred speech  -     Refer to Emergency Dept.  -     aspirin tablet 325 mg; Take 1 tablet (325 mg total) by mouth one time.    Facial droop  -     Refer to Emergency Dept.  -     aspirin tablet 325 mg; Take 1 tablet (325 mg total) by mouth one time.    History of CVA (cerebrovascular accident)    Patient sent to ED for further evaluation to R/O  Acute cerebrovascular accident.   "

## 2017-04-12 NOTE — ED TRIAGE NOTES
"Pt arrived via [facility] from [facility] to be seen by PCP. Pt has a cough for a few days. Pt states " I had a coughing spell then got a throbbing stabbing headache after that wouldn't stop." This morning, wife states "he started talking funny, I knew something was wrong because he's had a stroke before." Pt reports 4/10 Headache pain. Denies slurred speech, AAOx4. Good Bilateral strength to all extremities.        "

## 2017-04-12 NOTE — IP AVS SNAPSHOT
[place] [address] [place] [place] [address]  Phone: [phone]           Patient Discharge Instructions   Our goal is to set you up for success. This packet includes information on your condition, medications, and your home care.  It will help you care for yourself to prevent having to return to the hospital.     Please ask your nurse if you have any questions.      There are many details to remember when preparing to leave the hospital. Here is what you will need to do:    1. Take your medicine. If you are prescribed medications, review your Medication List on the following pages. You may have new medications to  at the pharmacy and others that you'll need to stop taking. Review the instructions for how and when to take your medications. Talk with your doctor or nurses if you are unsure of what to do.     2. Go to your follow-up appointments. Specific follow-up information is listed in the following pages. Your may be contacted by a nurse or clinical provider about future appointments. Be sure we have all of the phone numbers to reach you. Please contact your provider's office if you are unable to make an appointment.     3. Watch for warning signs. Your doctor or nurse will give you detailed warning signs to watch for and when to call for assistance. These instructions may also include educational information about your condition. If you experience any of warning signs to your health, call your doctor.           [facility]  Unless otherwise directed by your provider, please   contact [facility], our nurse care line   that is available for 24/7 assistance.     [phone] (toll-free)     Registered nurses in the [facility]   provide: appointment scheduling, clinical advisement, health education, and other advisory services.                  ** Verify the list of medication(s) below is accurate and up to date. Carry this with you in case of emergency.  If your medications have changed, please notify your healthcare provider.             Medication List      CONTINUE taking these medications        Additional Info                      amitriptyline 50 MG tablet   Commonly known as:  ELAVIL   Quantity:  30 tablet   Refills:  2   Dose:  50 mg    Last time this was given:  50 mg on 4/18/2017  9:18 PM   Instructions:  Take 1 tablet (50 mg total) by mouth every evening.     Begin Date    AM    Noon    PM    Bedtime       amlodipine 10 MG tablet   Commonly known as:  NORVASC   Quantity:  90 tablet   Refills:  3   Dose:  10 mg    Instructions:  Take 1 tablet (10 mg total) by mouth once daily.     Begin Date    AM    Noon    PM    Bedtime       aspirin 81 MG EC tablet   Commonly known as:  ECOTRIN   Refills:  0   Dose:  81 mg    Instructions:  Take 81 mg by mouth once daily.     Begin Date    AM    Noon    PM    Bedtime       atorvastatin 40 MG tablet   Commonly known as:  LIPITOR   Quantity:  45 tablet   Refills:  5   Dose:  20 mg   Comments:  **Patient requests 90 day supply**    Last time this was given:  20 mg on 4/19/2017  8:59 AM   Instructions:  Take 0.5 tablets (20 mg total) by mouth once daily.     Begin Date    AM    Noon    PM    Bedtime       blood sugar diagnostic Strp   Quantity:  100 strip   Refills:  11   Dose:  1 strip    Instructions:  1 strip by Misc.(Non-Drug; Combo Route) route 2 (two) times daily with meals. TRUE METRIX     Begin Date    AM    Noon    PM    Bedtime       blood-glucose meter kit   Quantity:  1 each   Refills:  0    Instructions:  Use as instructed          TRUE METRIX     Begin Date    AM    Noon    PM    Bedtime       carvedilol 25 MG tablet   Commonly known as:  COREG   Quantity:  90 tablet   Refills:  3   Dose:  25 mg    Instructions:  Take 1 tablet (25 mg total) by mouth 2 (two) times daily with meals.     Begin Date    AM    Noon    PM    Bedtime       celecoxib 100 MG capsule   Commonly known as:  CeleBREX   Quantity:  30 capsule    Refills:  0   Dose:  100 mg    Last time this was given:  100 mg on 4/12/2017 10:20 PM   Instructions:  Take 1 capsule (100 mg total) by mouth 2 (two) times daily.     Begin Date    AM    Noon    PM    Bedtime       citalopram 10 MG tablet   Commonly known as:  CELEXA   Quantity:  90 tablet   Refills:  1   Dose:  10 mg   Comments:  **Patient requests 90 days supply**    Last time this was given:  10 mg on 4/19/2017  8:59 AM   Instructions:  Take 1 tablet (10 mg total) by mouth once daily.     Begin Date    AM    Noon    PM    Bedtime       clopidogrel 75 mg tablet   Commonly known as:  PLAVIX   Quantity:  90 tablet   Refills:  3   Dose:  75 mg    Last time this was given:  75 mg on 4/19/2017  8:59 AM   Instructions:  Take 1 tablet (75 mg total) by mouth once daily.     Begin Date    AM    Noon    PM    Bedtime       furosemide 80 MG tablet   Commonly known as:  LASIX   Quantity:  90 tablet   Refills:  3   Dose:  80 mg   Comments:  **Patient requests 90 days supply**    Instructions:  Take 1 tablet (80 mg total) by mouth once daily.     Begin Date    AM    Noon    PM    Bedtime       glucose 4 GM chewable tablet   Refills:  0   Dose:  16 g    Instructions:  Take 16 g by mouth as needed for Low blood sugar.     Begin Date    AM    Noon    PM    Bedtime       HUMALOG 100 unit/mL injection   Refills:  0   Generic drug:  insulin lispro    Instructions:  Inject into the skin 3 (three) times daily before meals.     Begin Date    AM    Noon    PM    Bedtime       isosorbide mononitrate 60 MG 24 hr tablet   Commonly known as:  IMDUR   Quantity:  90 tablet   Refills:  3   Dose:  60 mg    Instructions:  Take 1 tablet (60 mg total) by mouth once daily.     Begin Date    AM    Noon    PM    Bedtime       lancets Misc   Quantity:  100 each   Refills:  11   Dose:  1 lancet    Instructions:  1 lancet by Misc.(Non-Drug; Combo Route) route 2 (two) times daily with meals. TRUE METRIX     Begin Date    AM    Noon    PM    Bedtime        lisinopril 10 MG tablet   Quantity:  90 tablet   Refills:  3   Dose:  10 mg   Comments:  **Patient requests 90 day supply**    Instructions:  Take 1 tablet (10 mg total) by mouth once daily.     Begin Date    AM    Noon    PM    Bedtime       MINIMED 530G INSULIN PUMP MISC   Refills:  0    Instructions:  by Misc.(Non-Drug; Combo Route) route.     Begin Date    AM    Noon    PM    Bedtime       nitroGLYCERIN 0.4 MG SL tablet   Commonly known as:  NITROSTAT   Quantity:  30 tablet   Refills:  prn   Dose:  0.4 mg    Instructions:  Place 1 tablet (0.4 mg total) under the tongue every 5 (five) minutes as needed. Sublingual  ( under tongue) as needed     Begin Date    AM    Noon    PM    Bedtime       omega-3 acid ethyl esters 1 gram capsule   Commonly known as:  LOVAZA   Refills:  0   Dose:  2 g    Instructions:  Take 2 g by mouth 3 (three) times daily.     Begin Date    AM    Noon    PM    Bedtime       omeprazole 40 MG capsule   Commonly known as:  PRILOSEC   Quantity:  180 capsule   Refills:  3   Dose:  40 mg   Comments:  **Patient requests 90 day supply**    Instructions:  Take 1 capsule (40 mg total) by mouth 2 (two) times daily.     Begin Date    AM    Noon    PM    Bedtime                  Please bring to all follow up appointments:    1. A copy of your discharge instructions.  2. All medicines you are currently taking in their original bottles.  3. Identification and insurance card.    Please arrive 15 minutes ahead of scheduled appointment time.    Please call 24 hours in advance if you must reschedule your appointment and/or time.        Your Scheduled Appointments     Apr 21, 2017 10:00 AM T   Hospital Follow Up with [facility] ([facility]) [address] [facility] [address]  [facility] [address]   [phone]            Apr 27, 2017 10:00 AM CDT   Established Patient with Marino Koehler MD   [facility]  [facility] [address]   [phone]            May 03, 2017 10:30 AM CDT   Established Patient Visit with Brii Donnelly DPM   [facility] ([facility])    [address] [facility] [place] [address]   [phone]            May 18, 2017  9:20 AM CDT   New Patient with Lito Johnson MD   [facility] ([facility])    [address] [facility] [address]  [facility] [address]   [phone]              Follow-up Information     Follow up with Marino Koehler MD On 4/27/2017.    Specialty:  General Surgery    Why:  Outpatient Services, follow-up appointment. Patient should arrive by 10:00AM. Patient will see Dr. Koehler at [facility], [address],    Contact information:    [address] [facility]  [address]  [facility] [address]  [phone]          Follow up with Lito Johnson MD On 5/18/2017.    Specialty:  Neurology    Why:  Outpatient Services, PCP follow-up appointment. Patient should arrive by 9:20AM.     Contact information:    [address]  [address]  [place] [address]  [phone]          Follow up with Neha Plasencia MD On 4/21/2017.    Specialty:  Family Medicine    Why:  Outpatient Services, PCP follow-up appointment. Patient should arrive by 10:00AM.     Contact information:    [address] [facility] [place] [address]  [phone]          Follow up with [facility].    Specialty:  Home Health Services    Why:  Home Health    Contact information:    [address] [facility]  [address]  [facility] [address]  [phone]        Referrals     Future Orders    Ambulatory referral to Outpatient Case Management     Questions:    Does the patient have a chronic or uncontrolled disease process?:  Yes    Does the patient have a new diagnosis of a catastrophic or life altering illness/treatment?:  Yes    Does the patient have any psycho-social issues that may affect their ability to adhere to treatment plan?:      Does patient have any behaviors or circumstances  that may impede ability to adhere to treatment plan?:      Is patient at risk for admission/readmission?:          Discharge Instructions     Future Orders    Activity as tolerated     Diet general     Questions:    Total calories:  1800 Calorie    Fat restriction, if any:      Protein restriction, if any:      Na restriction, if any:  2gNa    Fluid restriction:      Additional restrictions:        Discharge References/Attachments     CAROTID ARTERY PROBLEMS: STROKE (ENGLISH)    CAROTID ENDARTERECTOMY, HAVING (ENGLISH)    CAROTID ENDARTERECTOMY, PREPARING FOR (ENGLISH)    CAROTID ANGIOPLASTY AND STENTING, HAVING  (ENGLISH)    CAROTID ANGIOPLASTY AND STENTING, UNDERSTANDING (ENGLISH)    CAROTID ARTERY PROBLEMS: BLOCKAGE (ENGLISH)    STROKE AND HEART DISEASE (ENGLISH)    STROKE AND HIGH BLOOD PRESSURE, UNDERSTANDING THE LINK BETWEEN (ENGLISH)    STROKE, DISCHARGE INSTRUCTIONS FOR (ENGLISH)    STROKE, EFFECTS ON THE BRAIN AND BODY (ENGLISH)    STROKE, INTIMACY AFTER (ENGLISH)    STROKE, MOOD SWINGS AND DEPRESSION AFTER (ENGLISH)    STROKE, PREPARING FOR HOME CARE AFTER: FOR CAREGIVERS (ENGLISH)    STROKE, PREVENTING RECURRENT: GETTING ACTIVE (ENGLISH)    STROKE, PREVENTING RECURRENT: EATING HEALTHY (ENGLISH)    STROKE, PREVENTING RECURRENT WITH A HEALTHIER LIFESTYLE (ENGLISH)    STROKE, PREVENTING ANOTHER: FOR CAREGIVERS (ENGLISH)    STROKE, REGAINING MOVEMENT AFTER: FOR CAREGIVERS (ENGLISH)    STROKE, RISK FACTORS FOR (ENGLISH)    STROKE, SELF-CARE AFTER: FOR CAREGIVERS (ENGLISH)    STROKE, SWALLOWING PROBLEMS AFTER: FOR CAREGIVERS (ENGLISH)    STROKE, SYMPTOMS (ENGLISH)    STROKE, THE FIRST FEW HOURS AFTER A (ENGLISH)    STROKE, WHAT IS HEMORRHAGIC  (ENGLISH)    STROKE, WHAT IS ISCHEMIC (ENGLISH)    STROKE: RESOURCES AND SUPPORT (ENGLISH)    STROKE: SELF-CARE (ENGLISH)        Primary Diagnosis     Your primary diagnosis was:  Stroke      Admission Information     Date & Time Provider Department [facility]    4/12/2017  3:56  "PM Soren Schulz MD [facility] [id number]      Care Providers     Provider Role Specialty Primary office phone    Soren Schulz MD Attending Provider Hospitalist [phone]    Josue Sylvester MD Consulting Physician  Neurology [phone]      Important Medicare Message          Most Recent Value    Important Message from Medicare Regarding Discharge Appeal Rights  Given to patient/caregiver, Explained to patient/caregiver, Signed/date by patient/caregiver yes 04/17/2017 1033      Your Vitals Were     BP Pulse Temp Resp Height Weight    136/74 (BP Location: Right arm, Patient Position: Lying, BP Method: Automatic) 74 97.6 °F (36.4 °C) (Oral) 20 5' 8" (1.727 m) 108 kg (238 lb 1.6 oz)    SpO2 BMI             92% 36.2 kg/m2         Recent Lab Values        11/11/2013 9/15/2015 4/15/2016 6/29/2016 4/13/2017               5:49 AM  8:38 AM 11:04 AM  4:00 PM  5:46 AM       A1C 7.3 (H) 7.4 (H) 8.1 (H) 8.5 (H) 9.3 (H)         7.4 (H)          Comment for A1C at  5:46 AM on 4/13/2017:  According to ADA guidelines, hemoglobin A1C <7.0% represents  optimal control in non-pregnant diabetic patients.  Different  metrics may apply to specific populations.   Standards of Medical Care in Diabetes - 2016.  For the purpose of screening for the presence of diabetes:  <5.7%     Consistent with the absence of diabetes  5.7-6.4%  Consistent with increasing risk for diabetes   (prediabetes)  >or=6.5%  Consistent with diabetes  Currently no consensus exists for use of hemoglobin A1C  for diagnosis of diabetes for children.        Allergies as of 4/19/2017     No Known Allergies      Advance Directives     An advance directive is a document which, in the event you are no longer able to make decisions for yourself, tells your healthcare team what kind of treatment you do or do not want to receive, or who you would like to make those decisions for you.  If you do not currently have an advance directive, [facility] " encourages you to create one.  For more information call:  [phone]), [phone] ([phone]),  or log on to [email or web address].        Language Assistance Services     ATTENTION: Language assistance services are available, free of charge. Please call [phone].      ATENCIÓN: Si habla español, tiene a lucio disposición servicios gratuitos de asistencia lingüística. Llame al [phone].     [facility] Ý: N?u b?n nói Ti?ng Vi?t, có các d?ch v? h? tr? ngôn ng? mi?n phí dành cho b?n. G?i s? [phone].        Stroke Education              Heart Failure Education       Heart Failure: Being Active  You have a condition called heart failure. Being active doesnt mean that you have to wear yourself out. Even a little movement each day helps to strengthen your heart. If you cant get out to exercise, you can do simple stretching and strengthening exercises at home. These are good ways to keep you well-conditioned and prevent you and your heart from becoming excessively weak.    Ideas to get you started  · Add a little movement to things you do now. Walk to mail letters. Park your car at the far end of the parking lot and walk to the store. Walk up a flight of stairs instead of taking the elevator.  · Choose activities you enjoy. You might walk, swim, or ride an exercise bike. Things like gardening and washing the car count, too. Other possibilities include: washing dishes, walking the dog, walking around the mall, and doing aerobic activities with friends.  · Join a group exercise program at a [facility], a senior center, or a community center. Or look into a hospital cardiac rehabilitation program. Ask your doctor if you qualify.  Tips to keep you going  · Get up and get dressed each day. Go to a coffee shop and read a newspaper or go somewhere that you'll be in the presence of other active people. Youll feel more like being active.  · Make a plan. Choose one or more activities that you  enjoy and that you can easily do. Then plan to do at least one each day. You might write your plan on a calendar.  · Go with a friend or a group if you like company. This can help you feel supported and stay motivated, too.  · Plan social events that you enjoy. This will keep you mentally engaged as well as physically motivated to do things you find pleasure in.  For your safety  · Talk with your healthcare provider before starting an exercise program.  · Exercise indoors when its too hot or too cold outside, or when the air quality is poor. Try walking at a shopping mall.  · Wear socks and sturdy shoes to maintain your balance and prevent falls.  · Start slowly. Do a few minutes several times a day at first. Increase your time and speed little by little.  · Stop and rest whenever you feel tired or get short of breath.  · Dont push yourself on days when you dont feel well.  Date Last Reviewed: 3/20/2016  © 3642-6681 [employer]. [address] [place] [address]. All rights reserved. This information is not intended as a substitute for professional medical care. Always follow your healthcare professional's instructions.              Heart Failure: Evaluating Your Heart  You have a condition called heart failure. To evaluate your condition, your doctor will examine you, ask questions, and do some tests. Along with looking for signs of heart failure, the doctor looks for any other health problems that may have led to heart failure. The results of your evaluation will help your doctor form a treatment plan.  Health history and physical exam  Your visit will start with a health history. Tell the doctor about any symptoms youve noticed and about all medicines you take. Then youll have a physical exam. This includes listening to your heartbeat and breathing. Youll also be checked for swelling (edema) in your legs and neck. When you have fluid buildup or fluid in the lungs, it may be called  congestive heart failure.  Diagnosing heart failure     During an echocardiogram, sound waves bounce off the heart. These are converted into a picture on the screen.   The following may be done to help your doctor form a diagnosis:  · X-rays show the size and shape of your heart. These pictures can also show fluid in your lungs.  · An electrocardiogram (ECG or EKG) shows the pattern of your heartbeat. Small pads (electrodes) are placed on your chest, arms, and legs. Wires connect the pads to the ECG machine, which records your hearts electrical signals. This can give the doctor information about heart function.  · An echocardiogram uses ultrasound waves to show the structure and movement of your heart muscle. This shows how well the heart pumps. It also shows the thickness of the heart walls, and if the heart is enlarged. It is one of the most useful, non-invasive tests as it provides information about the heart's general function. This helps your doctor make treatment decisions.  · Lab tests evaluate small amounts of blood or urine for signs of problems. A BNP lab test can help diagnose and evaluate heart failure. BNP stands for B-type natriuretic peptide. The ventricles secrete more BNP when heart failure worsens. Lab tests can also provide information about metabolic dysfunction or heart dysfunction.  Your treatment plan  Based on the results of your evaluation and tests, your doctor will develop a treatment plan. This plan is designed to relieve some of your heart failure symptoms and help make you more comfortable. Your treatment plan may include:  · Medicine to help your heart work better and improve your quality of life  · Changes in what you eat and drink to help prevent fluid from backing up in your body  · Daily monitoring of your weight and heart failure symptoms to see how well your treatment plan is working  · Exercise to help you stay healthy  · Help with quitting smoking  · Emotional and  psychological support to help adjust to the changes  · Referrals to other specialists to make sure you are being treated comprehensively  Date Last Reviewed: 3/21/2016  © 4299-0897 [employer]. [address] [place] [address], [place] [address]. All rights reserved. This information is not intended as a substitute for professional medical care. Always follow your healthcare professional's instructions.              Heart Failure: Making Changes to Your Diet  You have a condition called heart failure. When you have heart failure, excess fluid is more likely to build up in your body because your heart isn't working well. This makes the heart work harder to pump blood. Fluid buildup causes symptoms such as shortness of breath and swelling (edema). This is often referred to as congestive heart failure or CHF. Controlling the amount of salt (sodium) you eat may help stop fluid from building up. Your doctor may also tell you to reduce the amount of fluid you drink.  Reading food labels    Your healthcare provider will tell you how much sodium you can eat each day. Read food labels to keep track. Keep in mind that certain foods are high in salt. These include canned, frozen, and processed foods. Check the amount of sodium in each serving. Watch out for high-sodium ingredients. These include MSG (monosodium glutamate), baking soda, and sodium phosphate.   Eating less salt  Give yourself time to get used to eating less salt. It may take a little while. Here are some tips to help:  · Take the saltshaker off the table. Replace it with salt-free herb mixes and spices.  · Eat fresh or plain frozen vegetables. These have much less salt than canned vegetables.  · Choose low-sodium snacks like sodium-free pretzels, crackers, or air-popped popcorn.  · Dont add salt to your food when youre cooking. Instead, season your foods with pepper, lemon, garlic, or onion.  · When you eat out, ask that your food be cooked without added  salt.  · Avoid eating fried foods as these often have a great deal of salt.  If youre told to limit fluids  You may need to limit how much fluid you have to help prevent swelling. This includes anything that is liquid at room temperature, such as ice cream and soup. If your doctor tells you to limit fluid, try these tips:  · Measure drinks in a measuring cup before you drink them. This will help you meet daily goals.  · Chill drinks to make them more refreshing.  · Suck on frozen lemon wedges to quench thirst.  · Only drink when youre thirsty.  · Chew sugarless gum or suck on hard candy to keep your mouth moist.  · Weigh yourself daily to know if your body's fluid content is rising.  My sodium goal  Your healthcare provider may give you a sodium goal to meet each day. This includes sodium found in food as well as salt that you add. My goal is to eat no more than ___________ mg of sodium per day.     When to call your doctor  Call your doctor right away if you have any symptoms of worsening heart failure. These can include:  · Sudden weight gain  · Increased swelling of your legs or ankles  · Trouble breathing when youre resting or at night  · Increase in the number of pillows you have to sleep on  · Chest pain, pressure, discomfort, or pain in the jaw, neck, or back   Date Last Reviewed: 3/21/2016  © 1281-3967 [employer]. [address] [place] [address], [place] [address]. All rights reserved. This information is not intended as a substitute for professional medical care. Always follow your healthcare professional's instructions.              Heart Failure: Medicines to Help Your Heart    You have a condition called heart failure (also known as congestive heart failure, or CHF). Your doctor will likely prescribe medicines for heart failure and any underlying health problems you have. Most heart failure patients take one or more types of medicinen. Your healthcare provider will work to find the combination  of medicines that works best for you.  Heart failure medicines  Here are the most common heart failure medicines:  · ACE inhibitors lower blood pressure and decrease strain on the heart. This makes it easier for the heart to pump. Angiotensin receptor blockers have similar effects. These are prescribed for some patients instead of ACE inhibitors.  · Beta-blockers relieve stress on the heart. They also improve symptoms. They may also improve the heart's pumping action over time.  · Diuretics (also called water pills) help rid your body of excess water. This can help rid your body of swelling (edema). Having less fluid to pump means your heart doesnt have to work as hard. Some diuretics make your body lose a mineral called potassium. Your doctor will tell you if you need to take supplements or eat more foods high in potassium.  · Digoxin helps your heart pump with more strength. This helps your heart pump more blood with each beat. So, more oxygen-rich blood travels to the rest of the body.  · Aldosterone antagonists help alter hormones and decrease strain on the heart.  · Hydralazine and nitrates are two separate medicines used together to treat heart failure. They may come in one combination pill. They lower blood pressure and decrease how hard the heart has to pump.  Medicines for related conditions  Controlling other heart problems helps keep heart failure under control, too. Depending on other heart problems you have, medicines may be prescribed to:  · Lower blood pressure (antihypertensives).  · Lower cholesterol levels (statins).  · Prevent blood clots (anticoagulants or aspirin).  · Keep the heartbeat steady (antiarrhythmics).  Date Last Reviewed: 3/5/2016  © 3010-2970 [employer]. [address] [place] [address], [place] [address]. All rights reserved. This information is not intended as a substitute for professional medical care. Always follow your healthcare professional's instructions.               Heart Failure: Procedures That May Help    The heart is a muscle that pumps oxygen-rich blood to all parts of the body. When you have heart failure, the heart is not able to pump as well as it should. Blood and fluid may back up into the lungs (congestive heart failure), and some parts of the body dont get enough oxygen-rich blood to work normally. These problems lead to the symptoms of heart failure.     Certain procedures may help the heart pump better in some cases of heart failure. Some procedures are done to treat health problems that may have caused the heart failure such as coronary artery disease or heart rhythm problems. For more serious heart failure, other options are available.  Treating artery and valve problems  If you have coronary artery disease or valve disease, procedures may be done to improve blood flow. This helps the heart pump better, which can improve heart failure symptoms. First, your doctor may do a cardiac catheterization to help detect clogged blood vessels or valve damage. During this procedure, a  thin tube (catheter) in inserted into a blood vessel and guided to the heart. There a dye is injected and a special type of X-ray (angiogram) is taken of the blood vessels. Procedures to open a blocked artery or fix damaged valves can also be done using catheterization.  · Angioplasty uses a balloon-tipped instrument at the end of the catheter. The balloon is inflated to widen the narrowed artery. In many cases, a stent is expanded to further support the narrowed artery. A stent is a metal mesh tube.  · Valve surgery repairs or replacement of faulty valves can also be done during catheterization so blood can flow properly through the chambers of the heart.  Bypass surgery is another option to help treat blocked arteries. It uses a healthy blood vessel from elsewhere in the body. The healthy blood vessel is attached above and below the blocked area so that blood can flow around the blocked  artery.  Treating heart rhythm problems  A device may be placed in the chest to help a weak heart maintain a healthy, heartbeat so the heart can pump more effectively:  · Pacemaker. A pacemaker is an implanted device that regulates your heartbeat electronically. It monitors your heart's rhythm and generates a painless electric impulse that helps the heart beat in a regular rhythm. A pacemaker is programmed to meet your specific heart rhythm needs.  · Biventricular pacing/cardiac resynchronization therapy. A type of pacemaker that paces both pumping chambers of the heart at the same time to coordinate contractions and to improve the heart's function. Some people with heart failure are candidates for this therapy.  · Implantable cardioverter defibrillator. A device similar to a pacemaker that senses when the heart is beating too fast and delivers an electrical shock to convert the fast rhythm to a normal rhythm. This can be a life saving device.  In severe cases  In more serious cases of heart failure when other treatments no longer work, other options may include:  · Ventricular assist devices (VADs). These are mechanical devices used to take over the pumping function for one or both of the heart's ventricles, or pumping chambers. A VAD may be necessary when heart failure progresses to the point that medicines and other treatments no longer help. In some cases, a VAD may be used as a bridge to transplant.  · Heart transplant. This is replacing the diseased heart with a healthy one from a donor. This is an option for a few people who are very sick. A heart transplant is very serious and not an option for all patients. Your doctor can tell you more.  Date Last Reviewed: 3/20/2016  © 9731-7095 [employer]. [address] [place] [address], [place] [address]. All rights reserved. This information is not intended as a substitute for professional medical care. Always follow your healthcare professional's  instructions.              Heart Failure: Tracking Your Weight  You have a condition called heart failure. When you have heart failure, a sudden weight gain or a steady rise in weight is a warning sign that your body is retaining too much water and salt. This could mean your heart failure is getting worse. If left untreated, it can cause problems for your lungs and result in shortness of breath. Weighing yourself each day is the best way to know if youre retaining water. If your weight goes up quickly, call your doctor. You will be given instructions on how to get rid of the excess water. You will likely need medicines and to avoid salt. This will help your heart work better.  Call your doctor if you gain more than 2 pounds in 1 day, more than 5 pounds in 1 week, or whatever weight gain you were told to report by your doctor. This is often a sign of worsening heart failure and needs to be evaluated and treated. Your doctor will tell you what to do next.   Tips for weighing yourself    · Weigh yourself at the same time each morning, wearing the same clothes. Weigh yourself after urinating and before eating.  · Use the same scale each day. Make sure the numbers are easy to read. Put the scale on a flat, hard surface -- not on a rug or carpet.  · Do not stop weighing yourself. If you forget one day, weigh again the next morning.  How to use your weight chart  · Keep your weight chart near the scale. Write your weight on the chart as soon as you get off the scale.  · Fill in the month and the start date on the chart. Then write down your weight each day. Your chart will look like this:    · If you miss a day, leave the space blank. Weigh yourself the next day and write your weight in the next space.  · Take your weight chart with you when you go to see your doctor.  Date Last Reviewed: 3/20/2016  © 3840-9442 [employer]. [address] [place] [address], [place] [address]. All rights reserved. This information is  not intended as a substitute for professional medical care. Always follow your healthcare professional's instructions.              Heart Failure: Warning Signs of a Flare-Up  You have a condition called heart failure. Once you have heart failure, flare-ups can happen. Below are signs that can mean your heart failure is getting worse. If you notice any of these warning signs, call your healthcare provider.  Swelling    · Your feet, ankles, or lower legs get puffier.  · You notice skin changes on your lower legs.  · Your shoes feel too tight.  · Your clothes are tighter in the waist.  · You have trouble getting rings on or off your fingers.  Shortness of breath  · You have to breathe harder even when youre doing your normal activities or when youre resting.  · You are short of breath walking up stairs or even short distances.  · You wake up at night short of breath or coughing.  · You need to use more pillows or sit up to sleep.  · You wake up tired or restless.  Other warning signs  · You feel weaker, dizzy, or more tired.  · You have chest pain or changes in your heartbeat.  · You have a cough that wont go away.  · You cant remember things or dont feel like eating.  Tracking your weight  Gaining weight is often the first warning sign that heart failure is getting worse. Gaining even a few pounds can be a sign that your body is retaining excess water and salt. Weighing yourself each day in the morning after you urinate and before you eat, is the best way to know if you're retaining water. Get a scale that is easy to read and make sure you wear the same clothes and use the same scale every time you weigh. Your healthcare provider will show you how to track your weight. Call your doctor if you gain more than 2 pounds in 1 day, 5 pounds in 1 week, or whatever weight gain you were told to report by your doctor. This is often a sign of worsening heart failure and needs to be evaluated and treated before it  compromises your breathing. Your doctor will tell you what to do next.    Date Last Reviewed: 3/15/2016  © 4457-2951 [employer]. [address] [place] [address], [place] [address]. All rights reserved. This information is not intended as a substitute for professional medical care. Always follow your healthcare professional's instructions.              Diabetes Discharge Instructions                                    [facility] complies with applicable Federal civil rights laws and does not discriminate on the basis of race, color, national origin, age, disability, or sex.

## 2017-04-13 DIAGNOSIS — E11.9 TYPE 2 DIABETES MELLITUS WITHOUT COMPLICATION: ICD-10-CM

## 2017-04-13 DIAGNOSIS — E11.9 DIABETES MELLITUS WITHOUT COMPLICATION: ICD-10-CM

## 2017-04-13 PROBLEM — Z96.41 INSULIN PUMP IN PLACE: Chronic | Status: ACTIVE | Noted: 2017-04-13

## 2017-04-13 PROBLEM — Z86.73 HISTORY OF CVA (CEREBROVASCULAR ACCIDENT): Chronic | Status: ACTIVE | Noted: 2017-04-13

## 2017-04-13 PROBLEM — I63.9 ACUTE CVA (CEREBROVASCULAR ACCIDENT): Status: ACTIVE | Noted: 2017-04-12

## 2017-04-13 PROBLEM — E44.1 MILD PROTEIN MALNUTRITION: Chronic | Status: ACTIVE | Noted: 2017-04-13

## 2017-04-13 PROBLEM — E87.6 HYPOKALEMIA: Status: ACTIVE | Noted: 2017-04-13

## 2017-04-13 PROBLEM — G47.33 OSA ON CPAP: Chronic | Status: ACTIVE | Noted: 2017-04-13

## 2017-04-13 PROBLEM — R52 PAIN: Status: RESOLVED | Noted: 2017-03-13 | Resolved: 2017-04-13

## 2017-04-13 PROBLEM — K21.9 GERD (GASTROESOPHAGEAL REFLUX DISEASE): Chronic | Status: ACTIVE | Noted: 2017-04-13

## 2017-04-13 LAB
ALBUMIN SERPL BCP-MCNC: 3.8 G/DL
ALP SERPL-CCNC: 79 U/L
ALT SERPL W/O P-5'-P-CCNC: 27 U/L
ANION GAP SERPL CALC-SCNC: 9 MMOL/L
AST SERPL-CCNC: 20 U/L
BASOPHILS # BLD AUTO: 0.04 K/UL
BASOPHILS NFR BLD: 0.5 %
BILIRUB SERPL-MCNC: 1.4 MG/DL
BUN SERPL-MCNC: 12 MG/DL
CALCIUM SERPL-MCNC: 8.9 MG/DL
CHLORIDE SERPL-SCNC: 104 MMOL/L
CHOLEST/HDLC SERPL: 6.7 {RATIO}
CO2 SERPL-SCNC: 23 MMOL/L
CREAT SERPL-MCNC: 1.1 MG/DL
DIASTOLIC DYSFUNCTION: YES
DIFFERENTIAL METHOD: ABNORMAL
EOSINOPHIL # BLD AUTO: 0.1 K/UL
EOSINOPHIL NFR BLD: 1.1 %
ERYTHROCYTE [DISTWIDTH] IN BLOOD BY AUTOMATED COUNT: 15.2 %
EST. GFR  (AFRICAN AMERICAN): >60 ML/MIN/1.73 M^2
EST. GFR  (NON AFRICAN AMERICAN): >60 ML/MIN/1.73 M^2
ESTIMATED AVG GLUCOSE: 220 MG/DL
ESTIMATED PA SYSTOLIC PRESSURE: 30.09
GLOBAL PERICARDIAL EFFUSION: ABNORMAL
GLUCOSE SERPL-MCNC: 262 MG/DL
HBA1C MFR BLD HPLC: 9.3 %
HCT VFR BLD AUTO: 47.8 %
HDL/CHOLESTEROL RATIO: 15 %
HDLC SERPL-MCNC: 167 MG/DL
HDLC SERPL-MCNC: 25 MG/DL
HGB BLD-MCNC: 16.6 G/DL
LDLC SERPL CALC-MCNC: ABNORMAL MG/DL
LYMPHOCYTES # BLD AUTO: 2.4 K/UL
LYMPHOCYTES NFR BLD: 29.2 %
MAGNESIUM SERPL-MCNC: 1.8 MG/DL
MCH RBC QN AUTO: 28 PG
MCHC RBC AUTO-ENTMCNC: 34.7 %
MCV RBC AUTO: 81 FL
MITRAL VALVE MOBILITY: NORMAL
MONOCYTES # BLD AUTO: 0.6 K/UL
MONOCYTES NFR BLD: 7.7 %
NEUTROPHILS # BLD AUTO: 5.1 K/UL
NEUTROPHILS NFR BLD: 61.1 %
NONHDLC SERPL-MCNC: 142 MG/DL
PHOSPHATE SERPL-MCNC: 3.7 MG/DL
PLATELET # BLD AUTO: 188 K/UL
PMV BLD AUTO: 11.1 FL
POCT GLUCOSE: 250 MG/DL (ref 70–110)
POCT GLUCOSE: 251 MG/DL (ref 70–110)
POCT GLUCOSE: 259 MG/DL (ref 70–110)
POCT GLUCOSE: 283 MG/DL (ref 70–110)
POTASSIUM SERPL-SCNC: 3.7 MMOL/L
PROT SERPL-MCNC: 7.2 G/DL
RBC # BLD AUTO: 5.93 M/UL
RETIRED EF AND QEF - SEE NOTES: 45 (ref 55–65)
SODIUM SERPL-SCNC: 136 MMOL/L
T4 FREE SERPL-MCNC: 1.05 NG/DL
TRICUSPID VALVE REGURGITATION: ABNORMAL
TRIGL SERPL-MCNC: 409 MG/DL
TSH SERPL DL<=0.005 MIU/L-ACNC: 6.39 UIU/ML
WBC # BLD AUTO: 8.28 K/UL

## 2017-04-13 PROCEDURE — G8988 SELF CARE GOAL STATUS: HCPCS | Mod: CI

## 2017-04-13 PROCEDURE — 97535 SELF CARE MNGMENT TRAINING: CPT

## 2017-04-13 PROCEDURE — 83036 HEMOGLOBIN GLYCOSYLATED A1C: CPT

## 2017-04-13 PROCEDURE — 84100 ASSAY OF PHOSPHORUS: CPT

## 2017-04-13 PROCEDURE — 97165 OT EVAL LOW COMPLEX 30 MIN: CPT

## 2017-04-13 PROCEDURE — 80053 COMPREHEN METABOLIC PANEL: CPT

## 2017-04-13 PROCEDURE — 12000002 HC ACUTE/MED SURGE SEMI-PRIVATE ROOM

## 2017-04-13 PROCEDURE — 99222 1ST HOSP IP/OBS MODERATE 55: CPT | Mod: ,,, | Performed by: PSYCHIATRY & NEUROLOGY

## 2017-04-13 PROCEDURE — G8987 SELF CARE CURRENT STATUS: HCPCS | Mod: CJ

## 2017-04-13 PROCEDURE — 93306 TTE W/DOPPLER COMPLETE: CPT | Mod: 26,,, | Performed by: INTERNAL MEDICINE

## 2017-04-13 PROCEDURE — 25000003 PHARM REV CODE 250: Performed by: EMERGENCY MEDICINE

## 2017-04-13 PROCEDURE — G8996 SWALLOW CURRENT STATUS: HCPCS | Mod: CK

## 2017-04-13 PROCEDURE — 92523 SPEECH SOUND LANG COMPREHEN: CPT

## 2017-04-13 PROCEDURE — 80061 LIPID PANEL: CPT

## 2017-04-13 PROCEDURE — 25000003 PHARM REV CODE 250: Performed by: INTERNAL MEDICINE

## 2017-04-13 PROCEDURE — 85025 COMPLETE CBC W/AUTO DIFF WBC: CPT

## 2017-04-13 PROCEDURE — G8999 MOTOR SPEECH CURRENT STATUS: HCPCS | Mod: CK

## 2017-04-13 PROCEDURE — 93306 TTE W/DOPPLER COMPLETE: CPT

## 2017-04-13 PROCEDURE — G8979 MOBILITY GOAL STATUS: HCPCS | Mod: CH

## 2017-04-13 PROCEDURE — 25500020 PHARM REV CODE 255: Performed by: INTERNAL MEDICINE

## 2017-04-13 PROCEDURE — G8997 SWALLOW GOAL STATUS: HCPCS | Mod: CI

## 2017-04-13 PROCEDURE — 92610 EVALUATE SWALLOWING FUNCTION: CPT

## 2017-04-13 PROCEDURE — 63600175 PHARM REV CODE 636 W HCPCS: Performed by: EMERGENCY MEDICINE

## 2017-04-13 PROCEDURE — 84443 ASSAY THYROID STIM HORMONE: CPT

## 2017-04-13 PROCEDURE — 83735 ASSAY OF MAGNESIUM: CPT

## 2017-04-13 PROCEDURE — G9186 MOTOR SPEECH GOAL STATUS: HCPCS | Mod: CI

## 2017-04-13 PROCEDURE — G9162 LANG EXPRESS CURRENT STATUS: HCPCS | Mod: CJ

## 2017-04-13 PROCEDURE — G9163 LANG EXPRESS GOAL STATUS: HCPCS | Mod: CH

## 2017-04-13 PROCEDURE — 36415 COLL VENOUS BLD VENIPUNCTURE: CPT

## 2017-04-13 PROCEDURE — 84439 ASSAY OF FREE THYROXINE: CPT

## 2017-04-13 PROCEDURE — G8978 MOBILITY CURRENT STATUS: HCPCS | Mod: CJ

## 2017-04-13 PROCEDURE — 97162 PT EVAL MOD COMPLEX 30 MIN: CPT

## 2017-04-13 RX ORDER — ACETAMINOPHEN 500 MG
500 TABLET ORAL EVERY 6 HOURS PRN
Status: DISCONTINUED | OUTPATIENT
Start: 2017-04-13 | End: 2017-04-19 | Stop reason: HOSPADM

## 2017-04-13 RX ORDER — INSULIN ASPART 100 [IU]/ML
1-10 INJECTION, SOLUTION INTRAVENOUS; SUBCUTANEOUS
Status: DISCONTINUED | OUTPATIENT
Start: 2017-04-13 | End: 2017-04-19 | Stop reason: HOSPADM

## 2017-04-13 RX ORDER — POTASSIUM CHLORIDE 20 MEQ/1
40 TABLET, EXTENDED RELEASE ORAL ONCE
Status: COMPLETED | OUTPATIENT
Start: 2017-04-13 | End: 2017-04-13

## 2017-04-13 RX ADMIN — ENOXAPARIN SODIUM 40 MG: 100 INJECTION SUBCUTANEOUS at 06:04

## 2017-04-13 RX ADMIN — ASPIRIN 325 MG ORAL TABLET 325 MG: 325 PILL ORAL at 09:04

## 2017-04-13 RX ADMIN — INSULIN ASPART 4 UNITS: 100 INJECTION, SOLUTION INTRAVENOUS; SUBCUTANEOUS at 01:04

## 2017-04-13 RX ADMIN — ATORVASTATIN CALCIUM 10 MG: 10 TABLET, FILM COATED ORAL at 09:04

## 2017-04-13 RX ADMIN — INSULIN ASPART 6 UNITS: 100 INJECTION, SOLUTION INTRAVENOUS; SUBCUTANEOUS at 09:04

## 2017-04-13 RX ADMIN — AMITRIPTYLINE HYDROCHLORIDE 50 MG: 25 TABLET, FILM COATED ORAL at 08:04

## 2017-04-13 RX ADMIN — CLOPIDOGREL BISULFATE 75 MG: 75 TABLET ORAL at 09:04

## 2017-04-13 RX ADMIN — IOHEXOL 100 ML: 350 INJECTION, SOLUTION INTRAVENOUS at 10:04

## 2017-04-13 RX ADMIN — POTASSIUM CHLORIDE 40 MEQ: 1500 TABLET, EXTENDED RELEASE ORAL at 03:04

## 2017-04-13 RX ADMIN — INSULIN ASPART 3 UNITS: 100 INJECTION, SOLUTION INTRAVENOUS; SUBCUTANEOUS at 08:04

## 2017-04-13 RX ADMIN — CITALOPRAM HYDROBROMIDE 10 MG: 10 TABLET ORAL at 09:04

## 2017-04-13 RX ADMIN — PANTOPRAZOLE SODIUM 40 MG: 40 TABLET, DELAYED RELEASE ORAL at 10:04

## 2017-04-13 RX ADMIN — INSULIN ASPART 6 UNITS: 100 INJECTION, SOLUTION INTRAVENOUS; SUBCUTANEOUS at 06:04

## 2017-04-13 NOTE — ASSESSMENT & PLAN NOTE
Stable; will continue his home regimen of aspirin, atorvastatin, and clopidogrel.  Will hold his home regimen of carvedilol and lisinopril to allow for permissive hypertension.

## 2017-04-13 NOTE — PT/OT/SLP PROGRESS
Physical Therapy      Jorden Shafer  MRN: [id number]    Patient not seen at this time for PT eval secondary to Other (pt eating breakfast). Will follow-up as able.    Dafne Colunga, PT

## 2017-04-13 NOTE — ED NOTES
Report received from DIANA Zavala. Pt lying in bed. Family at bedside. Waiting for ultrasound. Ultrasound will request transport. Side rails up. Bed lowered. Will continue to monitor.

## 2017-04-13 NOTE — SUBJECTIVE & OBJECTIVE
Past Medical History:   Diagnosis Date    Back pain     CAD (coronary artery disease) 1989    h/o cabg x 5    Chronic kidney disease     Colon polyp     h/o on colonoscopy    Contact dermatitis 11/12/2013    CVA (cerebral vascular accident)     March 2010    Depression     Diabetes mellitus type II     on  insulin pump    DVT (deep venous thrombosis) 1980's    left leg    GERD (gastroesophageal reflux disease)     Heart failure     [facility]     Hyperlipidemia     Hypertension     Hypertensive heart disease without congestive heart failure     Myocardial infarction     Neuropathy of lower extremity     Obesity, morbid     Prostate cancer     Renal manifestation of secondary diabetes mellitus     Sleep apnea     Vitamin D deficiency disease        Past Surgical History:   Procedure Laterality Date    CARDIAC DEFIBRILLATOR PLACEMENT  06/16/2016    CHOLECYSTECTOMY      CORONARY ARTERY BYPASS GRAFT      bypass times  5    defibulater  06/16/2016    EYE SURGERY Bilateral     HERNIA REPAIR      groin x2  and umbilical    JOINT REPLACEMENT      knee left    thrombus Left     >2005    VASECTOMY         Review of patient's allergies indicates:  No Known Allergies    No current facility-administered medications on file prior to encounter.      Current Outpatient Prescriptions on File Prior to Encounter   Medication Sig    amitriptyline (ELAVIL) 50 MG tablet Take 1 tablet (50 mg total) by mouth every evening.    amlodipine (NORVASC) 10 MG tablet Take 1 tablet (10 mg total) by mouth once daily.    aspirin (ECOTRIN) 81 MG EC tablet Take 81 mg by mouth once daily.    atorvastatin (LIPITOR) 40 MG tablet Take 0.5 tablets (20 mg total) by mouth once daily.    blood sugar diagnostic Strp 1 strip by Misc.(Non-Drug; Combo Route) route 2 (two) times daily with meals. TRUE METRIX    blood-glucose meter kit Use as instructed          TRUE METRIX    carvedilol (COREG) 25 MG tablet Take 1 tablet (25 mg total)  by mouth 2 (two) times daily with meals.    celecoxib (CELEBREX) 100 MG capsule Take 1 capsule (100 mg total) by mouth 2 (two) times daily.    citalopram (CELEXA) 10 MG tablet Take 1 tablet (10 mg total) by mouth once daily.    clopidogrel (PLAVIX) 75 mg tablet Take 1 tablet (75 mg total) by mouth once daily.    furosemide (LASIX) 80 MG tablet Take 1 tablet (80 mg total) by mouth once daily.    glucose 4 GM chewable tablet Take 16 g by mouth as needed for Low blood sugar.    insulin lispro (HUMALOG) 100 unit/mL injection Inject into the skin 3 (three) times daily before meals.    isosorbide mononitrate (IMDUR) 60 MG 24 hr tablet Take 1 tablet (60 mg total) by mouth once daily.    lancets Misc 1 lancet by Misc.(Non-Drug; Combo Route) route 2 (two) times daily with meals. TRUE METRIX    lisinopril 10 MG tablet Take 1 tablet (10 mg total) by mouth once daily.    nitroGLYCERIN (NITROSTAT) 0.4 MG SL tablet Place 1 tablet (0.4 mg total) under the tongue every 5 (five) minutes as needed. Sublingual  ( under tongue) as needed    omega-3 acid ethyl esters (LOVAZA) 1 gram capsule Take 2 g by mouth 3 (three) times daily.      omeprazole (PRILOSEC) 40 MG capsule Take 1 capsule (40 mg total) by mouth 2 (two) times daily.    SUBCUTANEOUS INSULIN PUMP (MINIMED 530G INSULIN PUMP MISC) by Misc.(Non-Drug; Combo Route) route.     Family History     Problem Relation (Age of Onset)    Alzheimer's disease Mother    Asbestos Father    Cancer Father    Dementia Mother    Diabetes type II Maternal Aunt    Heart disease Mother        Social History Main Topics    Smoking status: Former Smoker     Packs/day: 4.00     Years: 25.00     Types: Cigarettes    Smokeless tobacco: Never Used    Alcohol use 0.0 oz/week     0 Standard drinks or equivalent per week      Comment: occasional    Drug use: No    Sexual activity: Yes     Partners: Female     Review of Systems   Unable to perform ROS: Other     Objective:     Vital Signs  (Most Recent):  Temp: 98.3 °F (36.8 °C) (04/12/17 2335)  Pulse: 82 (04/12/17 2335)  Resp: 20 (04/12/17 2335)  BP: (!) 153/71 (04/12/17 2335)  SpO2: 95 % (04/12/17 2335) Vital Signs (24h Range):  Temp:  [98.3 °F (36.8 °C)-99 °F (37.2 °C)] 98.3 °F (36.8 °C)  Pulse:  [72-94] 82  Resp:  [15-20] 20  SpO2:  [92 %-96 %] 95 %  BP: (140-224)/() 153/71     Weight: 110.9 kg (244 lb 7.8 oz)  Body mass index is 37.17 kg/(m^2).    Physical Exam   Constitutional: He appears well-developed and well-nourished. No distress.   obese   HENT:   Head: Normocephalic and atraumatic.   Right Ear: External ear normal.   Left Ear: External ear normal.   Eyes: Left eye exhibits no discharge.   Neck: Normal range of motion.   Cardiovascular: Normal rate, regular rhythm, normal heart sounds and intact distal pulses.  Exam reveals no gallop and no friction rub.    Pulmonary/Chest: Effort normal and breath sounds normal. No respiratory distress. He has no wheezes. He has no rales. He exhibits no tenderness.   Abdominal: Soft. Bowel sounds are normal. He exhibits no distension. There is no tenderness. There is no rebound and no guarding.   Musculoskeletal: Normal range of motion. He exhibits no edema.   Neurological:   Neurological exam was limited as he was very somnolent.  He exhibits left facial drooping and some neglect of his left side.  He spontaneously moves his right sided extremities but minimally moves his left side.   Skin: Skin is warm and dry. He is not diaphoretic.   Nursing note and vitals reviewed.       Significant Labs: All pertinent labs within the past 24 hours have been reviewed.    Significant Imaging: I have reviewed and interpreted all pertinent imaging results/findings within the past 24 hours.

## 2017-04-13 NOTE — PT/OT/SLP EVAL
Physical Therapy  Evaluation    Jorden Shafer   MRN: [id number]   Admitting Diagnosis: Acute CVA (cerebrovascular accident)    PT Received On: 04/13/17  PT Start Time: 1441     PT Stop Time: 1515    PT Total Time (min): 34 min       Billable Minutes:  Evaluation 20 min co-eval with OT    Diagnosis: Acute CVA (cerebrovascular accident)    Past Medical History:   Diagnosis Date    Back pain     CAD (coronary artery disease) 1989    h/o cabg x 5    Chronic kidney disease     Colon polyp     h/o on colonoscopy    Contact dermatitis 11/12/2013    CVA (cerebral vascular accident)     March 2010    Depression     Diabetes mellitus type II     on  insulin pump    DVT (deep venous thrombosis) 1980's    left leg    GERD (gastroesophageal reflux disease)     Heart failure     [facility]     Hyperlipidemia     Hypertension     Hypertensive heart disease without congestive heart failure     Myocardial infarction     Neuropathy of lower extremity     Obesity, morbid     Prostate cancer     Renal manifestation of secondary diabetes mellitus     Sleep apnea     Vitamin D deficiency disease       Past Surgical History:   Procedure Laterality Date    CARDIAC DEFIBRILLATOR PLACEMENT  06/16/2016    CHOLECYSTECTOMY      CORONARY ARTERY BYPASS GRAFT      bypass times  5    defibulater  06/16/2016    EYE SURGERY Bilateral     HERNIA REPAIR      groin x2  and umbilical    JOINT REPLACEMENT      knee left    thrombus Left     >2005    VASECTOMY         General Precautions: Standard, fall, diabetic, vision impaired (Pt wears eyeglasses.)    Patient History:  Lives With: spouse  Living Arrangements: house (3 steps R handrail at entry)  Transportation Available: car (Pt was able to drive PTA.)  Equipment Currently Used at Home: bedside commode, cane, straight, shower chair, walker, rolling, CPAP at night      Previous Level of Function:  Ambulation Skills: needs device (str cane PRN)  Work/Leisure Activity: independent  (Pt likes to fly remote airplanes.)    Subjective:  Communicated with nurse Ruth prior to session.    Pt stated that he needs to use the bathroom.  Chief Complaint: Pt complaining to spouse that hospital staffs were not allowing him to sit up EOB by himself.  Patient goals: Pt would like to take a shower at this time.    Pain Ratin/10                    Objective:   Patient found with: oxygen 2L, peripheral IV, telemetry (R lower abdomen insulin pump)      Follows Commands/attention: Follows two-step commands  Communication: dysarthria  Safety awareness/insight to disability: impaired    Physical Exam:  Postural examination/scapula alignment: Rounded shoulder and Head forward    Skin integrity: Visible skin intact  Edema: None noted BLE    Sensation:   Intact  light/touch BLE    Lower Extremity Range of Motion:  Right Lower Extremity: WNL  Left Lower Extremity: WFL except decreased hip flex    Lower Extremity Strength:  Right Lower Extremity: 5/5  Left Lower Extremity: 4-/5 hip flex, 5/5 knee ext/flex, 5/5 ankle DF/PF     Fine motor coordination:  Impaired L hand    Gross motor coordination: impaired    Functional Mobility:  Bed Mobility:  Scooting/Bridging: Contact Guard Assistance (to scoot EOB)  Supine to Sit: Contact Guard Assistance (HOB elevated; Pt required extra time to complete task.)    Transfers:  Sit <> Stand Assistance: Contact Guard Assistance  Sit <> Stand Assistive Device: Rolling Walker (Pt required assistance with L hand placement on RW.  Pt able to maintain L hand placement on RW with thumb .)    Gait:   Gait Distance: ~10 ft x 2 trials; Pt with decreased safety awareness.  Assistance 1: Contact Guard Assistance  Gait Assistive Device: Rolling walker  Gait Pattern: swing-through gait  Gait Deviation(s): decreased step length, decreased velocity of limb motion, decreased nayeli    Balance:   Static Sit: FAIR+: Able to take MINIMAL challenges from all directions  Dynamic Sit: FAIR+:  Maintains balance through MINIMAL excursions of active trunk motion  Static Stand: FAIR+: Takes MINIMAL challenges from all directions  Dynamic stand: FAIR: Needs CONTACT GUARD during gait      AM-PAC 6 CLICK MOBILITY  How much help from another person does this patient currently need?   1 = Unable, Total/Dependent Assistance  2 = A lot, Maximum/Moderate Assistance  3 = A little, Minimum/Contact Guard/Supervision  4 = None, Modified [place]/Independent    Turning over in bed (including adjusting bedclothes, sheets and blankets)?: 4  Sitting down on and standing up from a chair with arms (e.g., wheelchair, bedside commode, etc.): 4  Moving from lying on back to sitting on the side of the bed?: 4  Moving to and from a bed to a chair (including a wheelchair)?: 3  Need to walk in hospital room?: 3  Climbing 3-5 steps with a railing?: 2  Total Score: 20     AM-PAC Raw Score CMS G-Code Modifier Level of Impairment Assistance   6 % Total / Unable   7 - 9 CM 80 - 100% Maximal Assist   10 - 14 CL 60 - 80% Moderate Assist   15 - 19 CK 40 - 60% Moderate Assist   20 - 22 CJ 20 - 40% Minimal Assist   23 CI 1-20% SBA / CGA   24 CH 0% Independent/ Mod I     Patient left seated EOB with all lines intact, call button in reach and spouse present.    Assessment:   Pt admitted with new CVA.  Pt with L facial droop and L sided weakness.  Pt also with dysarthria.  Pt with decreased attention to the L side, however with VC's pt able to turn and attend to tasks on the L side.  Pt with decreased safety awareness.  Pt will benefit from skilled PT/OT/ST services and will be a good candidate for inpatient rehab.      Rehab identified problem list/impairments: Rehab identified problem list/impairments: weakness, impaired endurance, impaired self care skills, impaired functional mobilty, gait instability, impaired balance, visual deficits, decreased coordination, decreased upper extremity function, decreased lower extremity  function, decreased safety awareness, impaired cardiopulmonary response to activity    Rehab potential is good.    Activity tolerance: Fair    Discharge recommendations: Discharge Facility/Level Of Care Needs: rehabilitation facility     Barriers to discharge: Barriers to Discharge: Other (Comment) (decreased mobility and safety awareness)    Equipment recommendations: Equipment Needed After Discharge: none     GOALS:   Physical Therapy Goals        Problem: Physical Therapy Goal    Goal Priority Disciplines Outcome Goal Variances Interventions   Physical Therapy Goal     PT/OT, PT      Description:  Goals to be met by: 17     Patient will increase functional independence with mobility by performin. Supine to sit with Modified [place]  2. Rolling to Left and Right with Modified [place]  3. Sit to stand transfer with Modified [place] using Single-point Cane  4. Bed to chair transfer with Modified [place] using Single-point Cane   5. Gait  x 250 feet with Modified [place] using Single-point Cane   6. Ascend/descend 3 steps with right Handrail Modified [place]  7. Lower extremity exercise program x 30 reps per handout, with independence                PLAN:    Patient to be seen daily to address the above listed problems via gait training, therapeutic activities, therapeutic exercises, neuromuscular re-education  Plan of Care expires: 17  Plan of Care reviewed with: patient    Functional Assessment Tool Used: AM-PAC  Score: 20  Functional Limitation: Mobility: Walking and moving around  Mobility: Walking and Moving Around Current Status (): CJ  Mobility: Walking and Moving Around Goal Status (): KELLY Colunga, PT  2017

## 2017-04-13 NOTE — ED NOTES
Spoke to ultrasound; they will take patient for imaging and then request for transport to take to pt floor; room number was given.

## 2017-04-13 NOTE — NURSING
1231- eating meal passed dysphagia study he doesn't have top teeth only bottom dentures so he has to chew his good doesn't cough each time he swallows liquid just at times but he has not difficulty chewing or swallowing. Wife brought insulin pump refill but didn't know how to reload it . So i informed her patient is getting low dose correction scale of insulin based on his blood sugar levels. No changes on telemetry.

## 2017-04-13 NOTE — PLAN OF CARE
SW met with pt at bedside. SW explained her role in  and discharge planning. SW inquired about how pt manages his health at home. Pt stated that he takes medications at schedule times and goes to all his doctor appointments. Pt stated that he would like to change cardiologist to someone in the [facility] and leaves [facility] system. SW inquired if pt would like to go to [facility] or stay on the [facility]. Pt stated that he would like to have Dr. Guevara or Lori. SW voiced understanding. Pt stated that his spouse helps him at home but he knows takes care of himself. SW voiced understanding.       [id number] - [place], [place] - [address]  [address] [address]  Phone: [phone] Fax: [phone]       04/13/17 1354   Discharge Assessment   Assessment Type Discharge Planning Assessment   Confirmed/corrected address and phone number on facesheet? Yes   Assessment information obtained from? Patient   Prior to hospitilization cognitive status: Alert/Oriented   Prior to hospitalization functional status: Independent   Current cognitive status: Alert/Oriented   Current Functional Status: Independent   Arrived From home or self-care   Lives With spouse   Able to Return to Prior Arrangements yes   Is patient able to care for self after discharge? Yes   How many people do you have in your home that can help with your care after discharge? 1   Who are your caregiver(s) and their phone number(s)? Spouse Beth and Daughter if needed   Patient's perception of discharge disposition home or selfcare   Readmission Within The Last 30 Days no previous admission in last 30 days   Equipment Currently Used at Home CPAP   Do you have any problems affording any of your prescribed medications? No   Is the patient taking medications as prescribed? yes   Do you have any financial concerns preventing you from receiving the healthcare you need? No   Does the patient have transportation  to healthcare appointments? Yes   Transportation Available car   On Dialysis? No   Discharge Plan A Home with family   Patient/Family In Agreement With Plan yes

## 2017-04-13 NOTE — NURSING
1315- patient sitting  Up side of bed eating lunch he feeds himself  Without any problems with coordination no choking on food or liquid.nochange on telemetry.

## 2017-04-13 NOTE — PLAN OF CARE
Problem: Physical Therapy Goal  Goal: Physical Therapy Goal  Goals to be met by: 17     Patient will increase functional independence with mobility by performin. Supine to sit with Modified [place]  2. Rolling to Left and Right with Modified [place]  3. Sit to stand transfer with Modified [place] using Single-point Cane  4. Bed to chair transfer with Modified [place] using Single-point Cane   5. Gait x 250 feet with Modified [place] using Single-point Cane   6. Ascend/descend 3 steps with right Handrail Modified [place]  7. Lower extremity exercise program x 30 reps per handout, with independence  Please assist pt OOB>chair and with ambulation to the bathroom.

## 2017-04-13 NOTE — NURSING
Pt arrived on unit; radiology reported an occluded right CA; Dr Haynes notified will come to unit to assess the pt; will cont to monitor

## 2017-04-13 NOTE — NURSING
1055- patient transported back via bed and transport personnel to his assigned room . No acute changes or events in test area. No changes on telemetry. No decline in neurological status.

## 2017-04-13 NOTE — NURSING
0715-rounding report received from daniel mcclure rn on patients progress  Bedside report completed hand off report received. Patient awake  Denies pain . Continue to monitor.

## 2017-04-13 NOTE — ASSESSMENT & PLAN NOTE
As addressed above.  Will withhold antihypertensive medications at this time to allow for permissive hypertension.

## 2017-04-13 NOTE — PLAN OF CARE
Problem: Occupational Therapy Goal  Goal: Occupational Therapy Goal  Goals to be met by: 4/27/17    Patient will increase functional independence with ADLs by performing:    Feeding with Supervision.  UE Dressing with Contact Guard Assistance.  LE Dressing with Stand-by Assistance.  Grooming while standing at sink with Stand-by Assistance.  Toileting from toilet with Stand-by Assistance for hygiene and clothing management.   Supine to sit with Stand-by Assistance.  Stand pivot transfers with Stand-by Assistance.  Toilet transfer to toilet with Stand-by Assistance.  Upper extremity exercise program x10 reps per handout, with assistance as needed.  Outcome: Ongoing (interventions implemented as appropriate)  Patient will benefit from OT to address functional deficits.

## 2017-04-13 NOTE — CONSULTS
[facility]  Neurology  Consult Note    Patient Name: Jorden Shafer  MRN: [id number]  Admission Date: 4/12/2017  Hospital Length of Stay: 1 days  Code Status: Full Code   Attending Provider: Soren Schulz MD    Consulting Provider: Josue Rendon MD  Primary Care Physician: Neha Plasencia MD  Principal Problem:Acute CVA (cerebrovascular accident)    Inpatient consult to Neurology  Consult performed by: JOSUE RENDON  Consult ordered by: STUART JARAMILLO        Subjective:     Chief Complaint: left sided weakness    HPI: 72 y/o male with medical Hx as listed below brought to ED after being found to have slurred speech and left sided weakness. Pt has Hx of stroke but uncertain at this point what are the residual deficits (attempted several times to communicate with family to no avail). He is not able to provide information.    Past Medical History:   Diagnosis Date    Back pain     CAD (coronary artery disease) 1989    h/o cabg x 5    Chronic kidney disease     Colon polyp     h/o on colonoscopy    Contact dermatitis 11/12/2013    CVA (cerebral vascular accident)     March 2010    Depression     Diabetes mellitus type II     on  insulin pump    DVT (deep venous thrombosis) 1980's    left leg    GERD (gastroesophageal reflux disease)     Heart failure     [employer]     Hyperlipidemia     Hypertension     Hypertensive heart disease without congestive heart failure     Myocardial infarction     Neuropathy of lower extremity     Obesity, morbid     Prostate cancer     Renal manifestation of secondary diabetes mellitus     Sleep apnea     Vitamin D deficiency disease        Past Surgical History:   Procedure Laterality Date    CARDIAC DEFIBRILLATOR PLACEMENT  06/16/2016    CHOLECYSTECTOMY      CORONARY ARTERY BYPASS GRAFT      bypass times  5    defibulater  06/16/2016    EYE SURGERY Bilateral     HERNIA REPAIR      groin x2  and umbilical    JOINT REPLACEMENT      knee  left    thrombus Left     >2005    VASECTOMY         Review of patient's allergies indicates:  No Known Allergies    Current Neurological Medications:     No current facility-administered medications on file prior to encounter.      Current Outpatient Prescriptions on File Prior to Encounter   Medication Sig    amitriptyline (ELAVIL) 50 MG tablet Take 1 tablet (50 mg total) by mouth every evening.    amlodipine (NORVASC) 10 MG tablet Take 1 tablet (10 mg total) by mouth once daily.    aspirin (ECOTRIN) 81 MG EC tablet Take 81 mg by mouth once daily.    atorvastatin (LIPITOR) 40 MG tablet Take 0.5 tablets (20 mg total) by mouth once daily.    blood sugar diagnostic Strp 1 strip by Misc.(Non-Drug; Combo Route) route 2 (two) times daily with meals. TRUE METRIX    blood-glucose meter kit Use as instructed          TRUE METRIX    carvedilol (COREG) 25 MG tablet Take 1 tablet (25 mg total) by mouth 2 (two) times daily with meals.    celecoxib (CELEBREX) 100 MG capsule Take 1 capsule (100 mg total) by mouth 2 (two) times daily.    citalopram (CELEXA) 10 MG tablet Take 1 tablet (10 mg total) by mouth once daily.    clopidogrel (PLAVIX) 75 mg tablet Take 1 tablet (75 mg total) by mouth once daily.    furosemide (LASIX) 80 MG tablet Take 1 tablet (80 mg total) by mouth once daily.    glucose 4 GM chewable tablet Take 16 g by mouth as needed for Low blood sugar.    insulin lispro (HUMALOG) 100 unit/mL injection Inject into the skin 3 (three) times daily before meals.    isosorbide mononitrate (IMDUR) 60 MG 24 hr tablet Take 1 tablet (60 mg total) by mouth once daily.    lancets Misc 1 lancet by Misc.(Non-Drug; Combo Route) route 2 (two) times daily with meals. TRUE METRIX    lisinopril 10 MG tablet Take 1 tablet (10 mg total) by mouth once daily.    nitroGLYCERIN (NITROSTAT) 0.4 MG SL tablet Place 1 tablet (0.4 mg total) under the tongue every 5 (five) minutes as needed. Sublingual  ( under tongue) as needed     omega-3 acid ethyl esters (LOVAZA) 1 gram capsule Take 2 g by mouth 3 (three) times daily.      omeprazole (PRILOSEC) 40 MG capsule Take 1 capsule (40 mg total) by mouth 2 (two) times daily.    SUBCUTANEOUS INSULIN PUMP (MINIMED 530G INSULIN PUMP MISC) by Misc.(Non-Drug; Combo Route) route.      Family History     Problem Relation (Age of Onset)    Alzheimer's disease Mother    Asbestos Father    Cancer Father    Dementia Mother    Diabetes type II Maternal Aunt    Heart disease Mother        Social History Main Topics    Smoking status: Former Smoker     Packs/day: 4.00     Years: 25.00     Types: Cigarettes    Smokeless tobacco: Never Used    Alcohol use 0.0 oz/week     0 Standard drinks or equivalent per week      Comment: occasional    Drug use: No    Sexual activity: Yes     Partners: Female     Review of Systems   Constitutional: Negative for fever.   HENT: Negative for ear pain.    Eyes: Negative for photophobia and visual disturbance.   Respiratory: Negative for shortness of breath.    Cardiovascular: Negative for chest pain.   Gastrointestinal: Negative for abdominal pain.   Genitourinary: Negative for dysuria.   Neurological: Positive for facial asymmetry, speech difficulty and weakness.   Psychiatric/Behavioral: Negative for confusion.          Objective:     Vital Signs (Most Recent):  Temp: 98.2 °F (36.8 °C) (04/13/17 1159)  Pulse: 80 (04/13/17 1159)  Resp: 20 (04/13/17 1159)  BP: (!) 154/70 (04/13/17 1159)  SpO2: 96 % (04/13/17 1159) Vital Signs (24h Range):  Temp:  [98.2 °F (36.8 °C)-99 °F (37.2 °C)] 98.2 °F (36.8 °C)  Pulse:  [72-94] 80  Resp:  [15-20] 20  SpO2:  [92 %-96 %] 96 %  BP: (111-224)/() 154/70     Weight: 109.9 kg (242 lb 3.2 oz)  Body mass index is 36.83 kg/(m^2).    Physical Exam  Constitutional: no distress  HENT: no icteric sclerae  Head: Normocephalic.   Eyes: Conjunctivae and EOM are normal. Pupils are equal, round, and reactive to light.   Neck: Neck supple.    Cardiovascular: Normal rate.   Pulmonary/Chest: symmetrical expansion of chest.   Neurological: awake; oriented to self, place, time; follows commands  Pupil are round at 3 mm and reactive to light; no visual field deficits; no nystagmus; EOMI: decreased light touch on right mandibular area; right lower facial wekaness; tongue protrudes midline; palate elevates symmetrically  Strenght: 5/5 on left; 4/5 on right  Decreased light touch on right extremities  No neglect       Significant Labs:   CBC:   Recent Labs  Lab 04/12/17  1625 04/13/17  0546   WBC 8.96 8.28   HGB 15.8 16.6   HCT 45.7 47.8    188     CMP:   Recent Labs  Lab 04/12/17  1625 04/13/17  0546   * 262*    136   K 3.3* 3.7    104   CO2 22* 23   BUN 8 12   CREATININE 0.9 1.1   CALCIUM 7.5* 8.9   MG 1.4* 1.8   PROT 6.1 7.2   ALBUMIN 3.2* 3.8   BILITOT 0.9 1.4*   ALKPHOS 69 79   AST 17 20   ALT 23 27   ANIONGAP 7* 9   EGFRNONAA >60 >60       Significant Imaging:   CTA   CT Brain:    Moderate size area of recent infarction within the right MCA distribution, primarily involving a opercular right frontal lobe. Infarct territory slightly more confluent on today's study with increased involvement of the inferior frontal gyrus. Slight increase in mass effect with localized sulcal effacement. No hemorrhagic conversion. Mild chronic microvascular ischemic changes. Small remote right cerebellar infarct. No new territorial infarct.    No extra-axial blood or fluid collections. Ventricles normal in size without evidence of hydrocephalus.    Cranium is intact. Mastoid air cells and paranasal sinuses remain clear.    Mild emphysematous changes.    Prior median sternotomy and cardiac revascularization procedure.    CTA:    The aortic arch maintains a normal branching pattern with mild atherosclerotic calcification. Left common carotid artery exhibits mild calcification. Mild plaque with less than 50% narrowing at the left carotid bifurcation  and within the proximal internal carotid artery.    There is a severe stenosis at the right carotid bifurcation with calcified and noncalcified plaque. Stenosis greater than 90%. There is associated reactive narrowing of the distal ICA with diminished contrast enhancement. The vessel remains patent. More normal-appearing caliber and enhancement in the cavernous and supraclinoid segments, likely be [place] collaterals.  Proximal right vertebral artery is occluded. There is reconstitution of enhancement in the mid cervical segment with a normal caliber V3 segment. Moderate narrowing of the V4 segment    Proximal k arteries occluded. There is some reconstitution of enhancement in the distal cervical segment with intermittent enhancement of the distal V2 and V3 segments. There is apparent occlusion of the V4 segment. The vertebrobasilar system demonstrates multiple areas of moderate to severe narrowing.    Prominent left posterior communicating artery supplies distal aspect of the vertebrobasilar system as well as both posterior communicating arteries. Mild bilateral PCA narrowing without apparent occlusion.    Hypoplastic A1 segment of the right anterior cerebral artery, a normal anatomical variant, although this does limit the effectiveness of the [place] collaterals in patient with right ICA stenosis. Mild irregularity in narrowing of the distal M1 segment left MCA. Left MCA otherwise unremarkable. ACAs are otherwise within normal limits. M1 segment right MCA MCA remains patent to exhibits multifocal areas of irregularity and narrowing. Apparent occlusion of the sylvian branch just distal to the MCA bifurcation. Right middle cerebral artery with moderate delay narrowing.    No intracranial aneurysms identified.   Impression       Involving right MCA distribution infarct as above, without evidence of hemorrhagic conversion or significant mass effect at this time.    Mild chronic microvascular  ischemic changes and small remote right cerebellar infarct.    No evidence of new hemorrhage or major vascular distribution infarct.    CTA demonstrates extensive atherosclerotic disease present throughout the anterior and posterior circulation.     There is a critical stenosis (greater than 90%) of the right carotid bifurcation with associated adaptive narrowing and diminished enhancement of the distal right ICA.    Intermittent occlusion of both vertebral arteries with extensive multifocal narrowing throughout the vertebrobasilar system. Distal vertebrobasilar system thought likely primarily supplied via a prominent left posterior communicating artery.    Multifocal areas of narrowing in the intracranial vasculature, with apparent occlusion of a sylvian branch of the right MCA.    Epic notification system activated.       Electronically signed by: LESLEY LI MD  Date: 04/13/17  Time: 10:49        Assessment and Plan:     72 y/o male with stroke:    1. [place]oke: right MCA territory with left side deficits. Pt on maximum medical management for stroke prevention but not adherent to Rx.   CTA shows severe stenosis of right carotid   -Vascular surgery consultation. Pt has a symptomatic carotid.   -Will put back on ASA, clopidogrel, statin   -Permissive HTN. Do no treat unless > 220/110. Pt is probably dependent on collateral flow.   -PT/OT/ST.    Active Diagnoses:    Diagnosis Date Noted POA    PRINCIPAL PROBLEM:  Acute CVA (cerebrovascular accident) [I63.9] 04/12/2017 Yes    Hypokalemia [E87.6] 04/13/2017 Yes    Insulin pump in place [Z96.41] 04/13/2017 Not Applicable     Chronic    Mild protein malnutrition [E44.1] 04/13/2017 Yes     Chronic    KELVIN on CPAP [G47.33, Z99.89] 04/13/2017 Not Applicable     Chronic    History of CVA (cerebrovascular accident) [Z86.73] 04/13/2017 Not Applicable     Chronic    GERD (gastroesophageal reflux disease) [K21.9] 04/13/2017 Yes     Chronic    Chronic combined systolic and  diastolic heart failure [I50.42] 11/12/2013 Yes     Chronic    Essential hypertension [I10] 11/10/2013 Yes     Chronic    Hyperlipidemia [E78.5]  Yes     Chronic    Type 2 diabetes mellitus, uncontrolled [E11.65] 09/26/2012 Yes     Chronic    CAD s/p CABG & PCI [I25.10] 09/26/2012 Yes     Chronic      Problems Resolved During this Admission:    Diagnosis Date Noted Date Resolved POA       VTE Risk Mitigation         Ordered     enoxaparin injection 40 mg  Daily     Route:  Subcutaneous        04/12/17 2139     Medium Risk of VTE  Once      04/12/17 2139          Thank you for your consult. I will follow-up with patient. Please contact us if you have any additional questions.    Josue Sylvester MD  Neurology  [facility]

## 2017-04-13 NOTE — H&P
[facility] Medicine  History & Physical    Patient Name: Jorden Shafer  MRN: [id number]  Admission Date: 4/12/2017  Attending Physician: Rosamaria Rodriguez MD   Primary Care Provider: Neha Plasencia MD         Patient information was obtained from patient.     Subjective:     Principal Problem:Acute CVA (cerebrovascular accident)    Chief Complaint: Left-sided weakness since last night.    HPI: Mr. Jorden Shafer is a 71 y.o. male with essential hypertension, hyperlipidemia (LDL 77.8 Sept 2015), type 2 diabetes mellitus (HbA1c 8.5% Jun 2016), CAD s/p CABG & PCI, chronic combined systolic and diastolic heart failure (LVEF 30-35% Jun 2016), insulin pump, obesity (BMI 37.7), mild protein malnutrition, KELVIN on CPAP, and history of CVA in place who presents to [facility] ED with complaints of dysarthria last night. His family noted him to have some slurring of his speech yesterday that progressed to left facial drooping and a headache today.  He has been out of his medications for the past two months due to financial constraints.  He was also noted to have a productive cough of green sputum.  Further history is limited at this time.    Chart Review:  Previous Hospitalizations  Date Hospital Diagnosis   Jun 2016 [facility] NSTEMI - VENKATA PCI to ostium of SVG-diagonal/LAD   Nov 2013 [facility] Acute heart failure    Feb 2011 [facility] Left total knee replacement      Outpatient Follow-Up  Date of Visit Physician Service   Apr 2017 Samra Jones NP Urgent Care   Mar 2017 Marino Koch MD Orthopedic Surgery    Feb 2017 Brii Donnelly DPM Podiatry    Dec 2016 Anthony Apodaca MD Sleep Medicine   Oct 2016 Dianna Christensen NP Urology      Past Medical History:   Diagnosis Date    Back pain     CAD (coronary artery disease) 1989    h/o cabg x 5    Chronic kidney disease     Colon polyp     h/o on colonoscopy    Contact dermatitis 11/12/2013    CVA (cerebral vascular accident)     March 2010    Depression      Diabetes mellitus type II     on  insulin pump    DVT (deep venous thrombosis) 1980's    left leg    GERD (gastroesophageal reflux disease)     Heart failure     [facility]     Hyperlipidemia     Hypertension     Hypertensive heart disease without congestive heart failure     Myocardial infarction     Neuropathy of lower extremity     Obesity, morbid     Prostate cancer     Renal manifestation of secondary diabetes mellitus     Sleep apnea     Vitamin D deficiency disease        Past Surgical History:   Procedure Laterality Date    CARDIAC DEFIBRILLATOR PLACEMENT  06/16/2016    CHOLECYSTECTOMY      CORONARY ARTERY BYPASS GRAFT      bypass times  5    defibulater  06/16/2016    EYE SURGERY Bilateral     HERNIA REPAIR      groin x2  and umbilical    JOINT REPLACEMENT      knee left    thrombus Left     >2005    VASECTOMY         Review of patient's allergies indicates:  No Known Allergies    No current facility-administered medications on file prior to encounter.      Current Outpatient Prescriptions on File Prior to Encounter   Medication Sig    amitriptyline (ELAVIL) 50 MG tablet Take 1 tablet (50 mg total) by mouth every evening.    amlodipine (NORVASC) 10 MG tablet Take 1 tablet (10 mg total) by mouth once daily.    aspirin (ECOTRIN) 81 MG EC tablet Take 81 mg by mouth once daily.    atorvastatin (LIPITOR) 40 MG tablet Take 0.5 tablets (20 mg total) by mouth once daily.    blood sugar diagnostic Strp 1 strip by Misc.(Non-Drug; Combo Route) route 2 (two) times daily with meals. TRUE METRIX    blood-glucose meter kit Use as instructed          TRUE METRIX    carvedilol (COREG) 25 MG tablet Take 1 tablet (25 mg total) by mouth 2 (two) times daily with meals.    celecoxib (CELEBREX) 100 MG capsule Take 1 capsule (100 mg total) by mouth 2 (two) times daily.    citalopram (CELEXA) 10 MG tablet Take 1 tablet (10 mg total) by mouth once daily.    clopidogrel (PLAVIX) 75 mg tablet Take 1 tablet (75  mg total) by mouth once daily.    furosemide (LASIX) 80 MG tablet Take 1 tablet (80 mg total) by mouth once daily.    glucose 4 GM chewable tablet Take 16 g by mouth as needed for Low blood sugar.    insulin lispro (HUMALOG) 100 unit/mL injection Inject into the skin 3 (three) times daily before meals.    isosorbide mononitrate (IMDUR) 60 MG 24 hr tablet Take 1 tablet (60 mg total) by mouth once daily.    lancets Misc 1 lancet by Misc.(Non-Drug; Combo Route) route 2 (two) times daily with meals. TRUE METRIX    lisinopril 10 MG tablet Take 1 tablet (10 mg total) by mouth once daily.    nitroGLYCERIN (NITROSTAT) 0.4 MG SL tablet Place 1 tablet (0.4 mg total) under the tongue every 5 (five) minutes as needed. Sublingual  ( under tongue) as needed    omega-3 acid ethyl esters (LOVAZA) 1 gram capsule Take 2 g by mouth 3 (three) times daily.      omeprazole (PRILOSEC) 40 MG capsule Take 1 capsule (40 mg total) by mouth 2 (two) times daily.    SUBCUTANEOUS INSULIN PUMP (MINIMED 530G INSULIN PUMP MISC) by Misc.(Non-Drug; Combo Route) route.     Family History     Problem Relation (Age of Onset)    Alzheimer's disease Mother    Asbestos Father    Cancer Father    Dementia Mother    Diabetes type II Maternal Aunt    Heart disease Mother        Social History Main Topics    Smoking status: Former Smoker     Packs/day: 4.00     Years: 25.00     Types: Cigarettes    Smokeless tobacco: Never Used    Alcohol use 0.0 oz/week     0 Standard drinks or equivalent per week      Comment: occasional    Drug use: No    Sexual activity: Yes     Partners: Female     Review of Systems   Unable to perform ROS: Other     Objective:     Vital Signs (Most Recent):  Temp: 98.3 °F (36.8 °C) (04/12/17 2335)  Pulse: 82 (04/12/17 2335)  Resp: 20 (04/12/17 2335)  BP: (!) 153/71 (04/12/17 2335)  SpO2: 95 % (04/12/17 2335) Vital Signs (24h Range):  Temp:  [98.3 °F (36.8 °C)-99 °F (37.2 °C)] 98.3 °F (36.8 °C)  Pulse:  [72-94] 82  Resp:   "[15-20] 20  SpO2:  [92 %-96 %] 95 %  BP: (140-224)/() 153/71     Weight: 110.9 kg (244 lb 7.8 oz)  Body mass index is 37.17 kg/(m^2).    Physical Exam   Constitutional: He appears well-developed and well-nourished. No distress.   obese   HENT:   Head: Normocephalic and atraumatic.   Right Ear: External ear normal.   Left Ear: External ear normal.   Eyes: Left eye exhibits no discharge.   Neck: Normal range of motion.   Cardiovascular: Normal rate, regular rhythm, normal heart sounds and intact distal pulses.  Exam reveals no gallop and no friction rub.    Pulmonary/Chest: Effort normal and breath sounds normal. No respiratory distress. He has no wheezes. He has no rales. He exhibits no tenderness.   Abdominal: Soft. Bowel sounds are normal. He exhibits no distension. There is no tenderness. There is no rebound and no guarding.   Musculoskeletal: Normal range of motion. He exhibits no edema.   Neurological:   Neurological exam was limited as he was very somnolent.  He exhibits left facial drooping and some neglect of his left side.  He spontaneously moves his right sided extremities but minimally moves his left side.   Skin: Skin is warm and dry. He is not diaphoretic.   Nursing note and vitals reviewed.       Significant Labs: All pertinent labs within the past 24 hours have been reviewed.    Significant Imaging: I have reviewed and interpreted all pertinent imaging results/findings within the past 24 hours.    Assessment/Plan:     * Acute CVA (cerebrovascular accident)  Patient has sustained an acute CVA, and unfortunately he is far outside the therapeutic window for tPA.  CT-head was negative for acute intracranial hemorrhage but was significant for "...development of a large right temporal infarct. There is no evidence acute hemorrhage. There is evidence of a prior right cerebellar infarct."  I have reviewed the EKG and it reveals normal sinus rhythm with nonspecific T-wave changes.  A bilateral carotid " "ultrasound was significant for "[o]cclusion of the mid and proximal segments of the right ICA.  Retrograde flow within the right vertebral artery."  2D-echo is pending. Patient is not within the therapeutic window for tPA and he is already on aspirin.  Will obtain a lipid panel; allow for permissive hypertension in order not to extend the penumbra; consult PT/OT and Speech Therapy for evaluation; consult  for discharge planning; and consult Neurology for further recommendations and with regards to whether this patient can undergo an MRI-brain with his AICD.     CAD s/p CABG & PCI  Stable; will continue his home regimen of aspirin, atorvastatin, and clopidogrel.  Will hold his home regimen of carvedilol and lisinopril to allow for permissive hypertension.    Type 2 diabetes mellitus, uncontrolled  Poorly-controlled on a home regimen of an insulin pump and prandial insulin therapy; will provide the insulin pump, prandial insulin, and insulin along with insulin sliding scale.    Hyperlipidemia  Well-controlled; will continue patient's home regimen of atorvastatin.    Essential hypertension  As addressed above.  Will withhold antihypertensive medications at this time to allow for permissive hypertension.    Chronic combined systolic and diastolic heart failure  Stable without any evidence of acute heart failure; will hold his home regimen of carvedilol, furosemide, and lisinopril to allow for permissive hypertension.    Hypokalemia  Will replete orally and recheck his potassium level in the morning.    Insulin pump in place  Stable; there are no acute issues.    Mild protein malnutrition  Will provide protein supplementation with Boost Glucose one he is cleared for a diet.    KELVIN on CPAP  Will provide CPAP nightly.    History of CVA (cerebrovascular accident)  As addressed above.    GERD (gastroesophageal reflux disease)  Will substitute his home regimen of omeprazole for pantoprazole while he is " inpatient.    VTE Risk Mitigation         Ordered     enoxaparin injection 40 mg  Daily     Route:  Subcutaneous        04/12/17 2139     Medium Risk of VTE  Once      04/12/17 2139            Total time spent on case: 45 minutes.        Mallory Haynes M.D.  Staff Nocturnist  Department of Hospital Medicine  [facility]  Pager: [phone]

## 2017-04-13 NOTE — PT/OT/SLP EVAL
Speech Language Pathology Evaluation    Jorden Shafer   MRN: [id number]   Admitting Diagnosis: Acute CVA (cerebrovascular accident)    Diet recommendations: Solid Diet Level: Dental Soft  Liquid Diet Level: Honey Thick No straws, HOB to 90 degrees, Alternating bites/sips and Remain upright 30 minutes post meal    SLP Treatment Date: 04/13/17  Speech Start Time: 1710     Speech Stop Time: 1755     Speech Total (min): 45 min       TREATMENT BILLABLE MINUTES:  Eval 25  and Eval Swallow and Oral Function 20    Diagnosis: Acute CVA (cerebrovascular accident)  Left side weakness, Left facial droop.    Past Medical History:   Diagnosis Date    Back pain     CAD (coronary artery disease) 1989    h/o cabg x 5    Chronic kidney disease     Colon polyp     h/o on colonoscopy    Contact dermatitis 11/12/2013    CVA (cerebral vascular accident)     March 2010    Depression     Diabetes mellitus type II     on  insulin pump    DVT (deep venous thrombosis) 1980's    left leg    GERD (gastroesophageal reflux disease)     Heart failure     [facility]     Hyperlipidemia     Hypertension     Hypertensive heart disease without congestive heart failure     Myocardial infarction     Neuropathy of lower extremity     Obesity, morbid     Prostate cancer     Renal manifestation of secondary diabetes mellitus     Sleep apnea     Vitamin D deficiency disease      Past Surgical History:   Procedure Laterality Date    CARDIAC DEFIBRILLATOR PLACEMENT  06/16/2016    CHOLECYSTECTOMY      CORONARY ARTERY BYPASS GRAFT      bypass times  5    defibulater  06/16/2016    EYE SURGERY Bilateral     HERNIA REPAIR      groin x2  and umbilical    JOINT REPLACEMENT      knee left    thrombus Left     >2005    VASECTOMY         Has the patient been evaluated by SLP for swallowing? : Yes  Keep patient NPO?: No   General Precautions: Standard,            Social Hx: Patient lives with wife at home, was independent     Prior diet:  regular    Subjective:  Patient was seen sitting upright in bed with family present; patient was seen during dinner on regular diet/thin liquids.  Patient goals: to go home and to be able to eat.     Pain Ratin/10         Objective:    Patient was seen at bedside sitting upright at 90 degrees in the middle of eating dinner. Daughter and wife present. Upon initial evaluation of oral musculature patient exhibited moderate left facial droop with anterior loss of secretions. Lingual protrusion deviated due to left weakness/paresis. Labial seal impaired on left. Patient observed eating regular/thin with max s/s of aspiration observed on liquids. Patient coughed and choked for each sip of thin via teaspoon, cup sip, and straw. ST thickened liquids to nectar with improved tolerance, however after trials of regular administered with subsequent liquid wash, patient choked x 1. Pt able to clear pharynx after coughing for 30 seconds, with a final hard throat clear re-swallow. Pt tolerated honey thickened liquids no s/s of aspiration x 5. Due to significant left facial droop ST recommending dental soft. ST RECS: Dental soft/HONEY for diet consistencies.     Patient finished meal and then completed cognitive/linguistic/voice assessment. Patient comprehension WFL. Ox4. Recall WNL. Expressive language deficits observed. Patient named 8 items in a given category in 60 seconds with moderate cuing required. Patient answered complex y/n questions WNL. Patient exhibited anomia and expressive aphasia deficits at bedside. Verbal paraphasias observed in conversational speech tasks. Pt presents with moderate/severe dysarthria due to left side facial paresis. Patient was labile throughout majority of evaluation, having to stop to cry multiple times, stated he felt like he could not control his emotions. ST recommending therapy services at this time 4xweek.     Oral Musculature Evaluation  Oral Musculature: left weakness, facial  asymmetry present  Dentition: uses dentures to eat  Secretion Management: problems swallowing secretions, left corner drooling  Mucosal Quality: good  Mandibular Strength and Mobility: impaired, flaccid  Oral Labial Strength and Mobility: impaired retraction, impaired pursing, impaired seal, impaired coordination  Lingual Strength and Mobility: impaired strength, impaired left lateral movement, impaired protrusion  Velar Elevation: impaired  Buccal Strength and Mobility: impaired  Voice Prior to PO Intake: WNL     Cognitive Status:  Behavioral Observations: alert-  Memory and Orientation: WFL  Attention: WFL  Problem Solving: WFL    Executive Function: mental flexibility    Language: yes expressive language impaired.    Auditory Comprehension: able to identify objects, answers yes/no questions and able to follow commands WNL    Verbal Expression: naming, repetition, fluency and conversational speech impaired. Min/Moderate deficits.     Motor Speech: dyarthria    Voice: observations WNL      Visual-Spatial: Impaired    Bedside Swallow Eval:  Consistencies Assessed: Thin liquids aspiration x 5, Nectar thick liquids aspiration x 1 and Honey thick liquids WFL no s/s   Oral Phase: anterior loss, decreased closure around utensil, drooling, excess chewing, slow oral transit time and spitting out of food/liquid  Pharyngeal Phase: coughing/choking, decreased hyolaryngeal excursion, multiple spontaneous swallows and throat clearing    Additional Treatment:      FIM:              Comprehension: 5 Standby Prompting--The patient understands directions and conversation about basic daily needs more than 90% of the time.  The patient requires prompting (slowed speech rate, use of repetition, stressing particular words or phrases, pauses, visual or   Expression: 3 Moderate Prompting--The patient expresses basic daily needs and ideas 50 to 74% of the time.   Memory: 5 Supervision-The patient requires prompting (e.g., cueing,  repetition, reminders) only under stressful or unfamiliar conditions, but no more than 10% of the time.     Assessment:  Jorden Shafer is a 71 y.o. male with a SLP diagnosis of Aphasia, Dysphagia and Dysarthria. He present with moderate/severe dysarthria and moderate oropharyngeal dysphagia. ST RECS: dental soft/HONEY and speech therapy services to improve communication ability x 4 weekly.    Do you have any cultural, spiritual, Congregation conflicts, given your current situation?: unknown at this time     Discharge recommendations: Discharge Facility/Level Of Care Needs: rehabilitation facility     Goals:   SLP Goals        Problem: SLP Goal  SLP GOALS:      1. Pt will tolerate honey thick liquids/dental soft solids no s/s of aspiration.  2. Pt will complete oral motor exes x 20.  3. Pt will complete laryngeal elevation exes x 20.  4. Pt will complete convergent and divergent naming tasks 90% acc.  5. Pt will complete open ended phrases to elicit expressive language 9/10 opportunities.   6. Pt will repeat phrases/sentences min cuing 8/10 opportunities correctly.   7. Pt will complete articulation exercises/demonstrate artic compensatory strategies to improve speech intelligibility x 5.   8. Pt will recall safe swallow strategies x 5.       Emily MARIO/[facility]-SLP       Goal Priority Disciplines Outcome              Plan:   Patient to be seen Therapy Frequency: 4 x/week   Plan of Care expires: 04/22/17  Plan of Care reviewed with: patient, spouse, caregiver, daughter  SLP Follow-up?: Yes  SLP - Next Visit Date: 04/14/17           Emily Herbert CCC-SLP  04/13/2017

## 2017-04-13 NOTE — PLAN OF CARE
Problem: SLP Goal  Goal: SLP Goal  Outcome: Ongoing (interventions implemented as appropriate)  SLP GOALS:     1. Pt will tolerate honey thick liquids/dental soft solids no s/s of aspiration.  2. Pt will complete oral motor exes x 20.  3. Pt will complete laryngeal elevation exes x 20.  4. Pt will complete convergent and divergent naming tasks 90% acc.  5. Pt will complete open ended phrases to elicit expressive language 9/10 opportunities.   6. Pt will repeat phrases/sentences min cuing 8/10 opportunities correctly.   7. Pt will complete articulation exercises/demonstrate artic compensatory strategies to improve speech intelligibility x 5.   8. Pt will recall safe swallow strategies x 5.      Emily MARIO/CCC-SLP

## 2017-04-13 NOTE — ASSESSMENT & PLAN NOTE
Poorly-controlled on a home regimen of an insulin pump and prandial insulin therapy; will provide the insulin pump, prandial insulin, and insulin along with insulin sliding scale.

## 2017-04-13 NOTE — PROGRESS NOTES
Assisted pt to bathroom;one person assist required; no distress noted; left side weakness present; pt participated; tolerated well; no distress noted; will cont to monitor.

## 2017-04-13 NOTE — PT/OT/SLP EVAL
Occupational Therapy  Evaluation/Treatment    Jorden Shafer   MRN: [id number]   Admitting Diagnosis: Acute CVA (cerebrovascular accident)    OT Date of Treatment: 04/13/17   OT Start Time: 1528  OT Stop Time: 1559  OT Total Time (min): 31 min    Billable Minutes:  Evaluation 20   Self Care/Home Management 11  Total Time 31 (co-tx with PT)    Diagnosis: Acute CVA (cerebrovascular accident)     Past Medical History:   Diagnosis Date    Back pain     CAD (coronary artery disease) 1989    h/o cabg x 5    Chronic kidney disease     Colon polyp     h/o on colonoscopy    Contact dermatitis 11/12/2013    CVA (cerebral vascular accident)     March 2010    Depression     Diabetes mellitus type II     on  insulin pump    DVT (deep venous thrombosis) 1980's    left leg    GERD (gastroesophageal reflux disease)     Heart failure     [facility]     Hyperlipidemia     Hypertension     Hypertensive heart disease without congestive heart failure     Myocardial infarction     Neuropathy of lower extremity     Obesity, morbid     Prostate cancer     Renal manifestation of secondary diabetes mellitus     Sleep apnea     Vitamin D deficiency disease       Past Surgical History:   Procedure Laterality Date    CARDIAC DEFIBRILLATOR PLACEMENT  06/16/2016    CHOLECYSTECTOMY      CORONARY ARTERY BYPASS GRAFT      bypass times  5    defibulater  06/16/2016    EYE SURGERY Bilateral     HERNIA REPAIR      groin x2  and umbilical    JOINT REPLACEMENT      knee left    thrombus Left     >2005    VASECTOMY         Referring physician: [place]  Date referred to OT: 4/13/17    General Precautions: Standard, fall, diabetic, vision impaired (wears glasses)  Orthopedic Precautions: N/A  Braces: N/A    Do you have any cultural, spiritual, Oriental orthodox conflicts, given your current situation?: no     Patient History:  Living Environment  Lives With: spouse  Home Accessibility: stairs to enter home  Number of Stairs to Enter Home:  3  Stair Railings at Home: outside, present on right side    Prior level of function:   Bed Mobility/Transfers: needs device  Grooming: independent  Bathing: independent  Upper Body Dressing: independent  Lower Body Dressing: independent  Toileting: independent  Home Management Skills: independent  Driving License: Yes (Patient drives)  IADL Comments: Patient states he uses a cane to amb around the house. 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     Dominant hand: right    Subjective:  Communicated with nurseRuth prior to session.    Chief Complaint: needs to go to the bathroom  Patient/Family stated goals: wants to take a shower    Pain Ratin/10                   Objective:  Patient found with: oxygen, peripheral IV, telemetry (right lower abdomin insulin pump)    Cognitive Exam:  Oriented to: Person, Place and Situation  Follows Commands/attention: Follows two-step commands  Communication: dysarthria  Memory:  No Deficits noted  Safety awareness/insight to disability: impaired  Coping skills/emotional control: Appropriate to situation    Visual/perceptual:  Patient requires VC to visually scan to the left side      Physical Exam:  Postural examination/scapula alignment: No postural abnormalities identified  Skin integrity: Visible skin intact  Edema: None noted     Sensation:   Patient reports LUE is numb and heavy    Upper Extremity Range of Motion:  Right Upper Extremity: WFL  Left Upper Extremity: WFL    Upper Extremity Strength:  Right Upper Extremity: 5/5  Left Upper Extremity: 4/5   Strength: right hand: WNL, left hand: fair    Fine motor coordination:   RUE:WFL, left hand impaired    Gross motor coordination: impaired in the LUE    Functional Mobility:  Bed Mobility:  Scooting/Bridging: Contact Guard Assistance  Supine to Sit: Stand by Assistance (with HOB elevated)    Transfers:  Sit <> Stand Assistance: Contact Guard Assistance  Sit <> Stand Assistive Device: Rolling Walker  Toilet Transfer  "Assistance: Contact Guard Assistance (and VC for direction)  Toilet Transfer Assistive Device: Rolling Walker    Functional Ambulation: Patient amb using  A RW with CGA from bed to toilet to bed    Activities of Daily Living:  UE Dressing Level of Assistance: Minimum assistance  LE Dressing Level of Assistance: Activity did not occur  Grooming Position: EOB  Grooming Level of Assistance: Stand by assistance (to wipe his hands)  Toileting Where Assessed: Toilet  Toileting Level of Assistance: Contact guard (Patient had difficulty managing the toilet paper and dropped it.)       Balance:   Static Sit: FAIR+: Able to take MINIMAL challenges from all directions  Dynamic Sit: FAIR+: Maintains balance through MINIMAL excursions of active trunk motion  Static Stand: FAIR+: Takes MINIMAL challenges from all directions  Dynamic stand: FAIR: Needs CONTACT GUARD during gait    Therapeutic Activities and Exercises:  Patient and spouse were educated re: OT role and need for assist to amb or transfer to the chair.    AM-PAC 6 CLICK ADL  How much help from another person does this patient currently need?  1 = Unable, Total/Dependent Assistance  2 = A lot, Maximum/Moderate Assistance  3 = A little, Minimum/Contact Guard/Supervision  4 = None, Modified [place]/Independent    Putting on and taking off regular lower body clothing? : 3  Bathing (including washing, rinsing, drying)?: 3  Toileting, which includes using toilet, bedpan, or urinal? : 3  Putting on and taking off regular upper body clothing?: 3  Taking care of personal grooming such as brushing teeth?: 4  Eating meals?: 4  Total Score: 20    AM-PAC Raw Score CMS "G-Code Modifier Level of Impairment Assistance   6 % Total / Unable   7 - 9 CM 80 - 100% Maximal Assist   10 - 14 CL 60 - 80% Moderate Assist   15 - 19 CK 40 - 60% Moderate Assist   20 - 22 CJ 20 - 40% Minimal Assist   23 CI 1-20% SBA / CGA   24 CH 0% Independent/ Mod I       Patient left seated EOB with " all lines intact, call button in reach, nurse, Ruth notified and spouse present    Assessment:  Jorden Shafer is a 71 y.o. male with a medical diagnosis of Acute CVA (cerebrovascular accident) and presents with self care and functional mobility deficits.    Rehab identified problem list/impairments: Rehab identified problem list/impairments: weakness, impaired endurance, impaired sensation, impaired self care skills, visual deficits, impaired balance, gait instability, impaired functional mobilty, decreased upper extremity function, decreased lower extremity function, decreased safety awareness, impaired cardiopulmonary response to activity    Rehab potential is good.    Activity tolerance: Good    Discharge recommendations: Discharge Facility/Level Of Care Needs: rehabilitation facility     Barriers to discharge: Barriers to Discharge: Other (Comment) (patient requires assist for self care and functional mobility for safety)    Equipment recommendations: none     GOALS:   Occupational Therapy Goals        Problem: Occupational Therapy Goal    Goal Priority Disciplines Outcome Interventions   Occupational Therapy Goal     OT, PT/OT Ongoing (interventions implemented as appropriate)    Description:  Goals to be met by: 4/27/17    Patient will increase functional independence with ADLs by performing:    Feeding with Supervision.  UE Dressing with Contact Guard Assistance.  LE Dressing with Stand-by Assistance.  Grooming while standing at sink with Stand-by Assistance.  Toileting from toilet with Stand-by Assistance for hygiene and clothing management.   Supine to sit with Stand-by Assistance.  Stand pivot transfers with Stand-by Assistance.  Toilet transfer to toilet with Stand-by Assistance.  Upper extremity exercise program x10 reps per handout, with assistance as needed.                PLAN:  Patient to be seen 6 x/week to address the above listed problems via therapeutic exercises, community/work re-entry,  self-care/home management  Plan of Care expires: 04/27/17  Plan of Care reviewed with: patient    OT G-codes  Functional Assessment Tool Used: AM PAC  Score: 20  Functional Limitation: Self care  Self Care Current Status (): CJ  Self Care Goal Status (): ELLIE Mary OT  04/13/2017

## 2017-04-13 NOTE — CONSULTS
70 yo male with completed rt hemispheric stroke. Carotid u/s and ct reviewed. No further diagnostic testing necessary at this time. He will need carotid angios in near future and has been instructed to f/u with me in two weeks

## 2017-04-13 NOTE — ASSESSMENT & PLAN NOTE
Stable without any evidence of acute heart failure; will hold his home regimen of carvedilol, furosemide, and lisinopril to allow for permissive hypertension.

## 2017-04-13 NOTE — NURSING
0739- assessment completed.no acute changes  In neurological status. Reviewed plan  Of care with patient oriented x 4 . No acute changes on telemetry.informed of neuroloigcal consult with dr haskins.

## 2017-04-13 NOTE — ASSESSMENT & PLAN NOTE
"Patient has sustained an acute CVA, and unfortunately he is far outside the therapeutic window for tPA.  CT-head was negative for acute intracranial hemorrhage but was significant for "...development of a large right temporal infarct. There is no evidence acute hemorrhage. There is evidence of a prior right cerebellar infarct."  I have reviewed the EKG and it reveals normal sinus rhythm with nonspecific T-wave changes.  A bilateral carotid ultrasound was significant for "[o]cclusion of the mid and proximal segments of the right ICA.  Retrograde flow within the right vertebral artery."  2D-echo is pending. Patient is not within the therapeutic window for tPA and he is already on aspirin.  Will obtain a lipid panel; allow for permissive hypertension in order not to extend the penumbra; consult PT/OT and Speech Therapy for evaluation; consult  for discharge planning; and consult Neurology for further recommendations and with regards to whether this patient can undergo an MRI-brain with his AICD.   "

## 2017-04-13 NOTE — PROGRESS NOTES
Reviewed H and P by my colleague and agree with A and P. Case further discussed with Neuro. Patient presenting with acute CVA out of window for thrombolytics.  Found to have R temporal lobe CVA with R carotid blockage on CTA. Surgery consulted. PT/OT/speech consulted. Will follow surgery/neuro recs from here.

## 2017-04-14 LAB
POCT GLUCOSE: 259 MG/DL (ref 70–110)
POCT GLUCOSE: 263 MG/DL (ref 70–110)
POCT GLUCOSE: 297 MG/DL (ref 70–110)
POCT GLUCOSE: 323 MG/DL (ref 70–110)

## 2017-04-14 PROCEDURE — 92507 TX SP LANG VOICE COMM INDIV: CPT

## 2017-04-14 PROCEDURE — 63600175 PHARM REV CODE 636 W HCPCS: Performed by: EMERGENCY MEDICINE

## 2017-04-14 PROCEDURE — 25000003 PHARM REV CODE 250: Performed by: EMERGENCY MEDICINE

## 2017-04-14 PROCEDURE — 97110 THERAPEUTIC EXERCISES: CPT

## 2017-04-14 PROCEDURE — 97116 GAIT TRAINING THERAPY: CPT

## 2017-04-14 PROCEDURE — 99232 SBSQ HOSP IP/OBS MODERATE 35: CPT | Mod: ,,, | Performed by: PSYCHIATRY & NEUROLOGY

## 2017-04-14 PROCEDURE — 12000002 HC ACUTE/MED SURGE SEMI-PRIVATE ROOM

## 2017-04-14 RX ADMIN — CLOPIDOGREL BISULFATE 75 MG: 75 TABLET ORAL at 09:04

## 2017-04-14 RX ADMIN — AMITRIPTYLINE HYDROCHLORIDE 50 MG: 25 TABLET, FILM COATED ORAL at 10:04

## 2017-04-14 RX ADMIN — ENOXAPARIN SODIUM 40 MG: 100 INJECTION SUBCUTANEOUS at 05:04

## 2017-04-14 RX ADMIN — INSULIN ASPART 6 UNITS: 100 INJECTION, SOLUTION INTRAVENOUS; SUBCUTANEOUS at 12:04

## 2017-04-14 RX ADMIN — ATORVASTATIN CALCIUM 10 MG: 10 TABLET, FILM COATED ORAL at 09:04

## 2017-04-14 RX ADMIN — INSULIN ASPART 3 UNITS: 100 INJECTION, SOLUTION INTRAVENOUS; SUBCUTANEOUS at 10:04

## 2017-04-14 RX ADMIN — PANTOPRAZOLE SODIUM 40 MG: 40 TABLET, DELAYED RELEASE ORAL at 09:04

## 2017-04-14 RX ADMIN — CITALOPRAM HYDROBROMIDE 10 MG: 10 TABLET ORAL at 09:04

## 2017-04-14 RX ADMIN — ASPIRIN 325 MG ORAL TABLET 325 MG: 325 PILL ORAL at 09:04

## 2017-04-14 RX ADMIN — INSULIN ASPART 8 UNITS: 100 INJECTION, SOLUTION INTRAVENOUS; SUBCUTANEOUS at 05:04

## 2017-04-14 RX ADMIN — INSULIN ASPART 6 UNITS: 100 INJECTION, SOLUTION INTRAVENOUS; SUBCUTANEOUS at 09:04

## 2017-04-14 NOTE — NURSING
1530- patient sitting up in chair in room assisted to bathroom one person assistance tolerated well which he had a bowel movement. Encouraged po liquids urine light tea color. No acute distress or changes. Family in and out visiting tolerating visits well. Continue to monitor.        1755- covered with moderate scale coverage for bg . Patient has good appetite . He is still weak on the left side. No changes in orientation. Sitting up at bedside eating his meal daughter and wife at bedside. Instructed to call staff they need to leave patient unattended . Both agreed. No changes on telemetry. Continue to monitor.

## 2017-04-14 NOTE — NURSING
1920- rounding report given to gage brooks rn on patients progress and updated hand off report sheet given too. No acute events this shift. No changes on telemetry. Patients family brought his own cpap from home and patient is using at this time. Bed alarm is on.

## 2017-04-14 NOTE — ASSESSMENT & PLAN NOTE
"Patient has sustained an acute CVA, and unfortunately he is far outside the therapeutic window for tPA.  CT-head was negative for acute intracranial hemorrhage but was significant for "...development of a large right temporal infarct. There is no evidence acute hemorrhage. There is evidence of a prior right cerebellar infarct."    A bilateral carotid ultrasound was significant for "[o]cclusion of the mid and proximal segments of the right ICA.  Retrograde flow within the right vertebral artery."  . Patient is not within the therapeutic window for tPA and he is already on aspirin.  Will obtain a lipid panel; allow for permissive hypertension in order not to extend the penumbra; consult PT/OT and Speech Therapy for evaluation; consult  for discharge planning; and consult Neurology for further recommendations.  Recs from PT/OT are rehab. Consulted surgery for R carotid stenosis-follow up as out patient in 2 weeks as this was a completed stroke in the R MCA territory.   "

## 2017-04-14 NOTE — NURSING
1245- girlfriend came to see patient and assisted with meal tray patient has not wanted to get back into bed yet. So he ate his meal in the chair.his girl friend informed me she was leaving so patient was assisted to bed with bed alarm activated.covered with sliding scale insulin per orders prn .  CT department called asked for status report i told thaddeus patient could go and was ready but allow time for him to finish eating his meal. Continue to monitor.         1410- patient down in CT department at this time. Awaiting for his return. No change on telemetry continue to monitor.

## 2017-04-14 NOTE — NURSING
0710- bedside rounding report received from gage brooks on patients progress last night . No acute events overnight.

## 2017-04-14 NOTE — PLAN OF CARE
Problem: Physical Therapy Goal  Goal: Physical Therapy Goal  Goals to be met by: 17     Patient will increase functional independence with mobility by performin. Supine to sit with Modified [place]  2. Rolling to Left and Right with Modified [place]  3. Sit to stand transfer with Modified [place] using Single-point Cane  4. Bed to chair transfer with Modified [place] using Single-point Cane   5. Gait x 250 feet with Modified [place] using Single-point Cane   6. Ascend/descend 3 steps with right Handrail Modified [place]  7. Lower extremity exercise program x 30 reps per handout, with independence   Outcome: Ongoing (interventions implemented as appropriate)  Continue with POC.

## 2017-04-14 NOTE — SUBJECTIVE & OBJECTIVE
Interval History: No new issues. Some L sided neglect.     Review of Systems   Constitutional: Negative for activity change.   Respiratory: Negative for chest tightness and shortness of breath.    Cardiovascular: Negative for chest pain.   Gastrointestinal: Negative for abdominal pain.     Objective:     Vital Signs (Most Recent):  Temp: 98.3 °F (36.8 °C) (04/14/17 0755)  Pulse: 78 (04/14/17 0000)  Resp: 20 (04/14/17 0755)  BP: 132/63 (04/14/17 0755)  SpO2: (!) 90 % (04/14/17 0755) Vital Signs (24h Range):  Temp:  [97.8 °F (36.6 °C)-98.7 °F (37.1 °C)] 98.3 °F (36.8 °C)  Pulse:  [74-88] 78  Resp:  [20-22] 20  SpO2:  [90 %-96 %] 90 %  BP: (132-168)/(63-77) 132/63     Weight: 109.9 kg (242 lb 3.2 oz)  Body mass index is 36.83 kg/(m^2).    Intake/Output Summary (Last 24 hours) at 04/14/17 1043  Last data filed at 04/13/17 1800   Gross per 24 hour   Intake              960 ml   Output                0 ml   Net              960 ml      Physical Exam   Constitutional: He is oriented to person, place, and time. He appears well-developed and well-nourished.   Cardiovascular: Normal rate.    Pulmonary/Chest: Effort normal.   Abdominal: Soft.   Neurological: He is alert and oriented to person, place, and time.   3/5 weakness L upper ext.    Skin: Skin is warm and dry.   Vitals reviewed.      Significant Labs:   BMP:   Recent Labs  Lab 04/13/17  0546   *      K 3.7      CO2 23   BUN 12   CREATININE 1.1   CALCIUM 8.9   MG 1.8     CBC:   Recent Labs  Lab 04/12/17  1625 04/13/17  0546   WBC 8.96 8.28   HGB 15.8 16.6   HCT 45.7 47.8    188       Significant Imaging- All imaging reviewed.

## 2017-04-14 NOTE — PROGRESS NOTES
[facility]  Neurology  Progress Note    Patient Name: Jorden Shafer  MRN: [id number]  Admission Date: 4/12/2017  Hospital Length of Stay: 2 days  Code Status: Full Code   Attending Provider: Soren Schulz MD  Primary Care Physician: Neha Plasencia MD   Principal Problem:Acute CVA (cerebrovascular accident)    Subjective:     Interval History: 70 y/o male with medical Hx as listed below brought to ED after being found to have slurred speech and left sided weakness. Pt has Hx of stroke but uncertain at this point what are the residual deficits (attempted several times to communicate with family to no avail). He is not able to provide information.    -4/14/17: Pt complaining of headache today. He is verbal and states that he is moving his left arm better today.    Current Neurological Medications:     Current Facility-Administered Medications   Medication Dose Route Frequency Provider Last Rate Last Dose    acetaminophen tablet 500 mg  500 mg Oral Q6H PRN Mallory Haynes MD        amitriptyline tablet 50 mg  50 mg Oral QHS Yosef Cummings MD   50 mg at 04/13/17 2024    aspirin tablet 325 mg  325 mg Oral Daily Yosef Cummings MD   325 mg at 04/14/17 0902    atorvastatin tablet 20 mg  20 mg Oral Daily Yosef Cummings MD   10 mg at 04/14/17 0912    citalopram tablet 10 mg  10 mg Oral Daily Yosef Cummings MD   10 mg at 04/14/17 0902    clopidogrel tablet 75 mg  75 mg Oral Daily Yosef Cummings MD   75 mg at 04/14/17 0901    dextrose 50% injection 12.5 g  12.5 g Intravenous PRN Yosef Cummings MD        dextrose 50% injection 25 g  25 g Intravenous PRN Yosef Cummings MD        enoxaparin injection 40 mg  40 mg Subcutaneous Daily Yosef Cummings MD   40 mg at 04/13/17 1811    glucagon (human recombinant) injection 1 mg  1 mg Intramuscular PRN Yosef Cummings MD        glucose chewable tablet 16 g  16 g Oral PRN Yosef Cummings MD        glucose chewable tablet 24 g  24 g  Oral PRN Yosef Cummings MD        insulin aspart pen 1-10 Units  1-10 Units Subcutaneous PRN Mallory Haynes MD   6 Units at 04/14/17 1246    labetalol injection 20 mg  20 mg Intravenous Q15 Min PRN Yosef Cummings MD        pantoprazole EC tablet 40 mg  40 mg Oral Daily Yosef Cummings MD   40 mg at 04/14/17 0902    promethazine (PHENERGAN) 12.5 mg in dextrose 5 % 50 mL IVPB  12.5 mg Intravenous Q6H PRN Mallory Haynes MD           Review of Systems       Objective:     Vital Signs (Most Recent):  Temp: 99.2 °F (37.3 °C) (04/14/17 1133)  Pulse: 79 (04/14/17 1133)  Resp: 20 (04/14/17 1133)  BP: (!) 143/67 (04/14/17 1133)  SpO2: (!) 93 % (04/14/17 1133) Vital Signs (24h Range):  Temp:  [97.8 °F (36.6 °C)-99.2 °F (37.3 °C)] 99.2 °F (37.3 °C)  Pulse:  [74-88] 79  Resp:  [20-22] 20  SpO2:  [90 %-95 %] 93 %  BP: (132-168)/(63-77) 143/67     Weight: 109.9 kg (242 lb 3.2 oz)  Body mass index is 36.83 kg/(m^2).    Physical Exam  Constitutional: no distress  HENT: no icteric sclerae  Head: Normocephalic.   Eyes: Conjunctivae and EOM are normal. Pupils are equal, round, and reactive to light.   Neck: Neck supple.   Cardiovascular: Normal rate.   Pulmonary/Chest: symmetrical expansion of chest.   Neurological: awake; oriented to self, place, time; follows commands; partial expressive aphasia  Pupil are round at 3 mm and reactive to light; no visual field deficits; no nystagmus; limited left lateral gaze: decreased light touch on right mandibular area; right lower facial weakness; tongue protrudes midline; palate elevates symmetrically  Strenght: 5/5 on right; 4/5 on left  Decreased light touch on right extremities  No neglect       Significant Labs:   CBC:   Recent Labs  Lab 04/12/17  1625 04/13/17  0546   WBC 8.96 8.28   HGB 15.8 16.6   HCT 45.7 47.8    188     CMP:   Recent Labs  Lab 04/12/17  1625 04/13/17  0546   * 262*    136   K 3.3* 3.7    104   CO2 22* 23   BUN 8 12   CREATININE 0.9 1.1    CALCIUM 7.5* 8.9   MG 1.4* 1.8   PROT 6.1 7.2   ALBUMIN 3.2* 3.8   BILITOT 0.9 1.4*   ALKPHOS 69 79   AST 17 20   ALT 23 27   ANIONGAP 7* 9   EGFRNONAA >60 >60       Significant Imaging:   There is decreased attenuation in the expected distribution of the right MCA.  Findings are most consistent with infarction.  No evidence for hemorrhage.  No midline shift.  No significant mass effect upon the ventricle.  There appears be an old lacunar type infarct involving the right cerebellar hemisphere.  There is some decreased attenuation within the deep and periventricular white matter bilaterally most likely on the basis of small vessel ischemic changes.  Mild generalized cortical atrophy is noted.There is calcification associated with the bilateral carotid siphons.    The visualized paranasal sinuses and mastoid air cells are clear.     The calvarium is intact.   Impression        1. Subacute right MCA distribution infarct.  No hemorrhage.  No significant mass effect.    2.  Remaining findings as discussed above.      Electronically signed by: VIRGILIO COLUNAG D.O.  Date: 04/14/17  Time: 13:47          Assessment and Plan:     72 y/o male with stroke:     1. [place]oke: right MCA territory with left side deficits. Pt on maximum medical management for stroke prevention but not adherent to Rx.  CTA shows severe stenosis of right carotid  -Vascular surgery consultation: pt to be seen on two weeks.  -ASA, clopidogrel, statin.  -Permissive HTN. Do no treat unless > 220/110. Pt is probably dependent on collateral flow and sudden drops in BP may cause more symptoms.  -PT/OT/ST.    Active Diagnoses:    Diagnosis Date Noted POA    PRINCIPAL PROBLEM:  Acute CVA (cerebrovascular accident) [I63.9] 04/12/2017 Yes    Hypokalemia [E87.6] 04/13/2017 Yes    Insulin pump in place [Z96.41] 04/13/2017 Not Applicable     Chronic    Mild protein malnutrition [E44.1] 04/13/2017 Yes     Chronic    KELVIN on CPAP [G47.33, Z99.89] 04/13/2017 Not  Applicable     Chronic    History of CVA (cerebrovascular accident) [Z86.73] 04/13/2017 Not Applicable     Chronic    GERD (gastroesophageal reflux disease) [K21.9] 04/13/2017 Yes     Chronic    Chronic combined systolic and diastolic heart failure [I50.42] 11/12/2013 Yes     Chronic    Essential hypertension [I10] 11/10/2013 Yes     Chronic    Hyperlipidemia [E78.5]  Yes     Chronic    Type 2 diabetes mellitus, uncontrolled [E11.65] 09/26/2012 Yes     Chronic    CAD s/p CABG & PCI [I25.10] 09/26/2012 Yes     Chronic      Problems Resolved During this Admission:    Diagnosis Date Noted Date Resolved POA       VTE Risk Mitigation         Ordered     enoxaparin injection 40 mg  Daily     Route:  Subcutaneous        04/12/17 2139     Medium Risk of VTE  Once      04/12/17 2139          Josue Sylvester MD  Neurology  [facility]

## 2017-04-14 NOTE — PT/OT/SLP PROGRESS
Physical Therapy  Treatment    Jorden Shafer   MRN: [id number]   Admitting Diagnosis: Acute CVA (cerebrovascular accident)    PT Received On: 17  PT Start Time: 1436     PT Stop Time: 1500    PT Total Time (min): 24 min       Billable Minutes:  Gait [id number] and Therapeutic Exercise 10    Treatment Type: Treatment  PT/PTA: PTA     PTA Visit Number: 1       General Precautions: Standard, fall ,diabetic, vision impaired (Pt wears eyeglasses.)  Orthopedic Precautions: N/A   Braces: N/A         Subjective:  Communicated with nurse Miranda prior to session.  Pt agreeable to therapy.     Pain Ratin/10              Pain Rating Post-Intervention: 0/10    Objective:   Patient found with: telemetry    Functional Mobility:  Bed Mobility:    pt found seated on toilet seat.     Transfers: VC's for safety technique and SC management.   Sit <> Stand Assistance:  Minimum Assistance from toilet seat.  Sit <> Stand Assistive Device: Straight Cane      Gait:   Gait Distance: ~ 56 ft with SC and L HHA , pt with decreased safety awareness. Pt  occationaly will  the cane to carry it or transfer to L hand , pt reported he used the cane at home  and using  his R hand). Noted 1 LOB 2* L knee buckled , pt required min A to recover balance . Pt required V/T cues for safety technique and SC management.   Assistance 1: Contact Guard Assistance, Minimum assistance  Gait Assistive Device: Single point cane  Gait Pattern: reciprocal  Gait Deviation(s): decreased nayeli, decreased velocity of limb motion, decreased step length, decreased toe-to-floor clearance, decreased weight-shifting ability, decreased swing-to-stance ratio      Balance:   Static Sit: GOOD-: Takes MODERATE challenges from all directions but inconsistently  Dynamic Sit: FAIR+: Maintains balance through MINIMAL excursions of active trunk motion  Static Stand: FAIR: Maintains without assist but unable to take challenges  Dynamic stand: POOR: N/A     Therapeutic  Activities and Exercises:  Pt tolerated standing balance Using SC CGA to get clean up.   Pt performed seated BLE x 15 reps : AP, LAQ, HS, hip flexion and pillow squeezes. Pt required V/T cues for proper  and sequence.     AM-PAC 6 CLICK MOBILITY  How much help from another person does this patient currently need?   1 = Unable, Total/Dependent Assistance  2 = A lot, Maximum/Moderate Assistance  3 = A little, Minimum/Contact Guard/Supervision  4 = None, Modified [place]/Independent    Turning over in bed (including adjusting bedclothes, sheets and blankets)?: 4  Sitting down on and standing up from a chair with arms (e.g., wheelchair, bedside commode, etc.): 3  Moving from lying on back to sitting on the side of the bed?: 3  Moving to and from a bed to a chair (including a wheelchair)?: 3  Need to walk in hospital room?: 3  Climbing 3-5 steps with a railing?: 3  Total Score: 19    AM-PAC Raw Score CMS G-Code Modifier Level of Impairment Assistance   6 % Total / Unable   7 - 9 CM 80 - 100% Maximal Assist   10 - 14 CL 60 - 80% Moderate Assist   15 - 19 CK 40 - 60% Moderate Assist   20 - 22 CJ 20 - 40% Minimal Assist   23 CI 1-20% SBA / CGA   24 CH 0% Independent/ Mod I     Patient left up in chair with all lines intact, call button in reach and nurse notified.    Assessment:  Jorden Shafer is a 71 y.o. male with a medical diagnosis of Acute CVA (cerebrovascular accident) and presents with weakness and functional mobility. Pt tolerated treatment well today. Pt will benefit from further skilled therapy in order to get back to [facility] .  Rehab identified problem list/impairments: Rehab identified problem list/impairments: weakness, impaired endurance, gait instability, impaired balance, decreased lower extremity function, decreased upper extremity function, decreased safety awareness, decreased coordination    Rehab potential is good.    Activity tolerance: Good    Discharge recommendations: Discharge  Facility/Level Of Care Needs: rehabilitation facility     Barriers to discharge:   Other (Comment) (decreased mobility and safety awareness)    Equipment recommendations:   none     GOALS:   Physical Therapy Goals        Problem: Physical Therapy Goal    Goal Priority Disciplines Outcome Goal Variances Interventions   Physical Therapy Goal     PT/OT, PT      Description:  Goals to be met by: 17     Patient will increase functional independence with mobility by performin. Supine to sit with Modified [place]  2. Rolling to Left and Right with Modified [place]  3. Sit to stand transfer with Modified [place] using Single-point Cane  4. Bed to chair transfer with Modified [place] using Single-point Cane   5. Gait  x 250 feet with Modified [place] using Single-point Cane   6. Ascend/descend 3 steps with right Handrail Modified [place]  7. Lower extremity exercise program x 30 reps per handout, with independence                PLAN:    Patient to be seen daily  to address the above listed problems via gait training, therapeutic activities, therapeutic exercises, neuromuscular re-education  Plan of Care expires: 17  Plan of Care reviewed with: patient         Waleska George, PTA  2017

## 2017-04-14 NOTE — PLAN OF CARE
Problem: Stroke (Ischemic) (Adult)  Goal: Signs and Symptoms of Listed Potential Problems Will be Absent, Minimized or Managed (Stroke)  Signs and symptoms of listed potential problems will be absent, minimized or managed by discharge/transition of care (reference Stroke (Ischemic) (Adult) CPG).     04/14/17 0639   Stroke (Ischemic)   Problems Assessed (Stroke (Ischemic)/TIA) all   Problems Present (Stroke (Ischemic)/TIA) acute neurologic deterioration;cognitive impairment;communication impairment;eating/swallowing impairment;motor/sensory impairment;situational response         Comments:   Pt alert and orientated x4. Speech is slightly slurred and he has difficulty with thin liquids.

## 2017-04-14 NOTE — PT/OT/SLP PROGRESS
Occupational Therapy  Treatment    Jorden Shafer   MRN: [id number]   Admitting Diagnosis: Acute CVA (cerebrovascular accident)    OT Date of Treatment: 17   OT Start Time: 151  OT Stop Time: 152  OT Total Time (min): 17 min    Billable Minutes:  Therapeutic Exercise 17    General Precautions: Standard, fall, nectar thick, vision impaired  Orthopedic Precautions: N/A  Braces: N/A    Do you have any cultural, spiritual, Buddhist conflicts, given your current situation?: no    Subjective:  Communicated with patient prior to session.      Pain Ratin/10                   Objective:  Patient found with: telemetry     Functional Mobility:  Bed Mobility:  Scooting/Bridging: Stand by Assistance (to scoot/reposition in the chair)  Supine to Sit:  (Patient was found seated in the chair)          Therapeutic Activities and Exercises:  The patient was able to perform AROM to BUE shldr flex/ext x10 reps. The patient required CGA to achieve full ROM with the LUE. The patient was able to perform Dyamic reaching from seated in all planes with BUE. No LOB was noted.    AM-PAC 6 CLICK ADL   How much help from another person does this patient currently need?   1 = Unable, Total/Dependent Assistance  2 = A lot, Maximum/Moderate Assistance  3 = A little, Minimum/Contact Guard/Supervision  4 = None, Modified [place]/Independent    Putting on and taking off regular lower body clothing? : 3  Bathing (including washing, rinsing, drying)?: 3  Toileting, which includes using toilet, bedpan, or urinal? : 3  Putting on and taking off regular upper body clothing?: 3  Taking care of personal grooming such as brushing teeth?: 4  Eating meals?: 4  Total Score: 20     AM-PAC Raw Score CMS G-Code Modifier Level of Impairment Assistance   6 % Total / Unable   7 - 9 CM 80 - 100% Maximal Assist   10 - 14 CL 60 - 80% Moderate Assist   15 - 19 CK 40 - 60% Moderate Assist   20 - 22 CJ 20 - 40% Minimal Assist   23 CI 1-20% SBA / CGA    24 CH 0% Independent/ Mod I     Patient left up in chair with all lines intact, call button in reach and spouse present    ASSESSMENT:  The patient demo delayed response with use of the LUE for reaching activities. The patient has fair  strength but was noted to drop items with his LUE.    Rehab identified problem list/impairments: Rehab identified problem list/impairments: weakness, impaired endurance, impaired sensation, impaired self care skills, visual deficits, impaired balance, gait instability, impaired functional mobilty, decreased safety awareness, decreased lower extremity function, decreased upper extremity function, impaired fine motor, decreased coordination    Rehab potential is good.    Activity tolerance: Good    Discharge recommendations: Discharge Facility/Level Of Care Needs: rehabilitation facility     Barriers to discharge: Barriers to Discharge: Other (Comment) (requires assist for self care for safety)    Equipment recommendations:  (TBD)     GOALS:   Occupational Therapy Goals        Problem: Occupational Therapy Goal    Goal Priority Disciplines Outcome Interventions   Occupational Therapy Goal     OT, PT/OT Ongoing (interventions implemented as appropriate)    Description:  Goals to be met by: 4/27/17    Patient will increase functional independence with ADLs by performing:    Feeding with Supervision.  UE Dressing with Contact Guard Assistance.  LE Dressing with Stand-by Assistance.  Grooming while standing at sink with Stand-by Assistance.  Toileting from toilet with Stand-by Assistance for hygiene and clothing management.   Supine to sit with Stand-by Assistance.  Stand pivot transfers with Stand-by Assistance.  Toilet transfer to toilet with Stand-by Assistance.  Upper extremity exercise program x10 reps per handout, with assistance as needed.                Plan:  Patient to be seen 6 x/week to address the above listed problems via therapeutic exercises, therapeutic activities,  self-care/home management  Plan of Care expires: 04/27/17  Plan of Care reviewed with: patient, spouse         Karon Mary OT  04/14/2017

## 2017-04-14 NOTE — PLAN OF CARE
Problem: SLP Goal  Goal: SLP Goal  Outcome: Ongoing (interventions implemented as appropriate)  Patient seen in room, wife and daughter present.  Patient sitting in reclining chair.  Noted left neglect required verbal and visual cues to attend to left and utilize left hand.  Completed 7 omes at 15-20 reps each with verbal cues to decrease rate and compelte correctly.  Able to train wife and daughter.  Discussed thickener.  Patient to continue with ST until discharge  Recommended inpatient rehab.

## 2017-04-14 NOTE — PROGRESS NOTES
Ritefaxed patient information to [facility] and other in-patient rehab providers in area: [facility]; [facility]; [facility]; awaiting responses from facilities    1:25pm  Susan with [facility] called and indicated that she had received information on patient for in-patient rehab; Susan added to treatment team; she is reviewing patient for acceptance and services; will follow-up with care management on Monday

## 2017-04-14 NOTE — PT/OT/SLP PROGRESS
Speech Language Pathology  Treatment    Jorden Shafer   MRN: [id number]   Admitting Diagnosis: Acute CVA (cerebrovascular accident)    Diet recommendations: Solid Diet Level: Dental Soft  Liquid Diet Level: [place] Thick    SLP Treatment Date: 04/14/17  Speech Start Time: 1030     Speech Stop Time: 1056     Speech Total (min): 26 min       TREATMENT BILLABLE MINUTES:  Has the patient been evaluated by SLP for swallowing? : Yes  Keep patient NPO?: No   General Precautions: Standard, fall, nectar thick, vision impaired          Subjective:    Patient seen in room, wife and daughter present.  Patient sitting in reclining chair.  Noted left neglect required verbal and visual cues to attend to left and utilize left hand.  Completed 7 omes at 15-20 reps each with verbal cues to decrease rate and compelte correctly.  Able to train wife and daughter.  Discussed thickener.  Patient to continue with ST until discharge  Recommended inpatient rehab.        Objective:   Patient found with: oxygen, telemetry    FIM:          Assessment:  Jorden Shafer is a 71 y.o. male with a medical diagnosis of Acute CVA (cerebrovascular accident) and presents with left neglect and dysarthria    Discharge recommendations: Discharge Facility/Level Of Care Needs: rehabilitation facility     Goals:   SLP Goals        Problem: SLP Goal    Goal Priority Disciplines Outcome   SLP Goal     SLP Ongoing (interventions implemented as appropriate)              Plan:   Patient to be seen Therapy Frequency: 4 x/week   Plan of Care expires: 04/22/17  Plan of Care reviewed with: patient, spouse, daughter  SLP Follow-up?: Yes  SLP - Next Visit Date: 04/17/17           Nikole Godinez CCC-SLP  04/14/2017

## 2017-04-14 NOTE — PROGRESS NOTES
[facility] Hospital Medicine  Progress Note    Patient Name: Jorden Shafer  MRN: [id number]  Patient Class: IP- Inpatient   Admission Date: 4/12/2017  Length of Stay: 2 days  Attending Physician: Soren Schulz MD  Primary Care Provider: Neha Plasencia MD        Subjective:     Principal Problem:Acute CVA (cerebrovascular accident)    HPI:  Mr. Jorden Shafer is a 71 y.o. male with essential hypertension, hyperlipidemia (LDL 77.8 Sept 2015), type 2 diabetes mellitus (HbA1c 8.5% Jun 2016), CAD s/p CABG & PCI, chronic combined systolic and diastolic heart failure (LVEF 30-35% Jun 2016), insulin pump, obesity (BMI 37.7), mild protein malnutrition, KELVIN on CPAP, and history of CVA in place who presents to [facility] ED with complaints of dysarthria last night. His family noted him to have some slurring of his speech yesterday that progressed to left facial drooping and a headache today.  He has been out of his medications for the past two months due to financial constraints.  He was also noted to have a productive cough of green sputum.  Further history is limited at this time.    Hospital Course:  The patient was admitted to the hospital for evaluation/treatment of acute stroke.  Patient was found by imaging to have a R MCA distribution stroke (R frontal lobe) with carotid bifurcation stenosis.  Speech/PT/OT were consulted and recommended Rehab.  Surgery was consulted and recommended follow up in clinic in 2 weeks as this was a completed stroke.     Interval History: No new issues. Some L sided neglect.     Review of Systems   Constitutional: Negative for activity change.   Respiratory: Negative for chest tightness and shortness of breath.    Cardiovascular: Negative for chest pain.   Gastrointestinal: Negative for abdominal pain.     Objective:     Vital Signs (Most Recent):  Temp: 98.3 °F (36.8 °C) (04/14/17 0755)  Pulse: 78 (04/14/17 0000)  Resp: 20 (04/14/17 0755)  BP: 132/63 (04/14/17  "0755)  SpO2: (!) 90 % (04/14/17 0755) Vital Signs (24h Range):  Temp:  [97.8 °F (36.6 °C)-98.7 °F (37.1 °C)] 98.3 °F (36.8 °C)  Pulse:  [74-88] 78  Resp:  [20-22] 20  SpO2:  [90 %-96 %] 90 %  BP: (132-168)/(63-77) 132/63     Weight: 109.9 kg (242 lb 3.2 oz)  Body mass index is 36.83 kg/(m^2).    Intake/Output Summary (Last 24 hours) at 04/14/17 1043  Last data filed at 04/13/17 1800   Gross per 24 hour   Intake              960 ml   Output                0 ml   Net              960 ml      Physical Exam   Constitutional: He is oriented to person, place, and time. He appears well-developed and well-nourished.   Cardiovascular: Normal rate.    Pulmonary/Chest: Effort normal.   Abdominal: Soft.   Neurological: He is alert and oriented to person, place, and time.   3/5 weakness L upper ext.    Skin: Skin is warm and dry.   Vitals reviewed.      Significant Labs:   BMP:   Recent Labs  Lab 04/13/17  0546   *      K 3.7      CO2 23   BUN 12   CREATININE 1.1   CALCIUM 8.9   MG 1.8     CBC:   Recent Labs  Lab 04/12/17  1625 04/13/17  0546   WBC 8.96 8.28   HGB 15.8 16.6   HCT 45.7 47.8    188       Significant Imaging- All imaging reviewed.       Assessment/Plan:      * Acute CVA (cerebrovascular accident)  Patient has sustained an acute CVA, and unfortunately he is far outside the therapeutic window for tPA.  CT-head was negative for acute intracranial hemorrhage but was significant for "...development of a large right temporal infarct. There is no evidence acute hemorrhage. There is evidence of a prior right cerebellar infarct."    A bilateral carotid ultrasound was significant for "[o]cclusion of the mid and proximal segments of the right ICA.  Retrograde flow within the right vertebral artery."  . Patient is not within the therapeutic window for tPA and he is already on aspirin.  Will obtain a lipid panel; allow for permissive hypertension in order not to extend the penumbra; consult PT/OT and " Speech Therapy for evaluation; consult  for discharge planning; and consult Neurology for further recommendations.  Recs from PT/OT are rehab. Consulted surgery for R carotid stenosis-follow up as out patient in 2 weeks as this was a completed stroke in the R MCA territory.     CAD s/p CABG & PCI  Stable; will continue his home regimen of aspirin, atorvastatin, and clopidogrel.  Will hold his home regimen of carvedilol and lisinopril to allow for permissive hypertension.    Type 2 diabetes mellitus, uncontrolled  Poorly-controlled on a home regimen of an insulin pump and prandial insulin therapy; will provide the insulin pump, prandial insulin, and insulin along with insulin sliding scale.    Hyperlipidemia  Well-controlled; will continue patient's home regimen of atorvastatin.    Essential hypertension  As addressed above.  Will withhold antihypertensive medications at this time to allow for permissive hypertension.    Chronic combined systolic and diastolic heart failure  Stable without any evidence of acute heart failure; will hold his home regimen of carvedilol, furosemide, and lisinopril to allow for permissive hypertension.    Hypokalemia  Will replete orally and recheck his potassium level in the morning.    Insulin pump in place  Stable; there are no acute issues.    Mild protein malnutrition  Will provide protein supplementation with Boost Glucose one he is cleared for a diet.    KELVIN on CPAP  Will provide CPAP nightly.    History of CVA (cerebrovascular accident)  As addressed above.    GERD (gastroesophageal reflux disease)  Will substitute his home regimen of omeprazole for pantoprazole while he is inpatient.    VTE Risk Mitigation         Ordered     enoxaparin injection 40 mg  Daily     Route:  Subcutaneous        04/12/17 2139     Medium Risk of VTE  Once      04/12/17 2139        Continue speech/PT/OT.  To rehab next week. Will consult .     Soren Hylton,  MD  Department of Hospital Medicine   [facility]

## 2017-04-14 NOTE — NURSING
1427-patient returned from CT department tolerated well. Physical therapy working with patient at this time.

## 2017-04-15 LAB
POCT GLUCOSE: 236 MG/DL (ref 70–110)
POCT GLUCOSE: 253 MG/DL (ref 70–110)
POCT GLUCOSE: 269 MG/DL (ref 70–110)
POCT GLUCOSE: 293 MG/DL (ref 70–110)

## 2017-04-15 PROCEDURE — 63600175 PHARM REV CODE 636 W HCPCS: Performed by: EMERGENCY MEDICINE

## 2017-04-15 PROCEDURE — 12000002 HC ACUTE/MED SURGE SEMI-PRIVATE ROOM

## 2017-04-15 PROCEDURE — 97116 GAIT TRAINING THERAPY: CPT

## 2017-04-15 PROCEDURE — 97110 THERAPEUTIC EXERCISES: CPT

## 2017-04-15 PROCEDURE — 99232 SBSQ HOSP IP/OBS MODERATE 35: CPT | Mod: ,,, | Performed by: PSYCHIATRY & NEUROLOGY

## 2017-04-15 PROCEDURE — 25000003 PHARM REV CODE 250: Performed by: EMERGENCY MEDICINE

## 2017-04-15 RX ADMIN — CLOPIDOGREL BISULFATE 75 MG: 75 TABLET ORAL at 10:04

## 2017-04-15 RX ADMIN — CITALOPRAM HYDROBROMIDE 10 MG: 10 TABLET ORAL at 10:04

## 2017-04-15 RX ADMIN — ENOXAPARIN SODIUM 40 MG: 100 INJECTION SUBCUTANEOUS at 05:04

## 2017-04-15 RX ADMIN — PANTOPRAZOLE SODIUM 40 MG: 40 TABLET, DELAYED RELEASE ORAL at 10:04

## 2017-04-15 RX ADMIN — ASPIRIN 325 MG ORAL TABLET 325 MG: 325 PILL ORAL at 10:04

## 2017-04-15 RX ADMIN — INSULIN ASPART 4 UNITS: 100 INJECTION, SOLUTION INTRAVENOUS; SUBCUTANEOUS at 10:04

## 2017-04-15 RX ADMIN — INSULIN ASPART 6 UNITS: 100 INJECTION, SOLUTION INTRAVENOUS; SUBCUTANEOUS at 05:04

## 2017-04-15 RX ADMIN — AMITRIPTYLINE HYDROCHLORIDE 50 MG: 25 TABLET, FILM COATED ORAL at 08:04

## 2017-04-15 RX ADMIN — INSULIN ASPART 3 UNITS: 100 INJECTION, SOLUTION INTRAVENOUS; SUBCUTANEOUS at 09:04

## 2017-04-15 RX ADMIN — ATORVASTATIN CALCIUM 20 MG: 10 TABLET, FILM COATED ORAL at 10:04

## 2017-04-15 NOTE — PLAN OF CARE
Problem: Stroke (Ischemic) (Adult)  Goal: Signs and Symptoms of Listed Potential Problems Will be Absent, Minimized or Managed (Stroke)  Signs and symptoms of listed potential problems will be absent, minimized or managed by discharge/transition of care (reference Stroke (Ischemic) (Adult) CPG).   Outcome: Ongoing (interventions implemented as appropriate)    04/15/17 0537   Stroke (Ischemic)   Problems Assessed (Stroke (Ischemic)/TIA) all   Problems Present (Stroke (Ischemic)/TIA) communication impairment;eating/swallowing impairment;situational response         Comments:   Pt still with slightly slurred speech and generalized weakness to the left side of his body.

## 2017-04-15 NOTE — NURSING
1825- patient ate all of his supper this evening , ex wife at bedside patient swabbing his mouth out himself i provided swabs and mouth wash for him he uses his right side more so than the left its hypoactive. His speech is slurred but understandable covered with sliding scale for evening coverage. No acute distress or changes.       1910- bedside rounding report given to gage brooks rn on patients progress and updated hand off report sheet given to her. Patient is at this time laying in his bed he has his eyes closed but is aware we are in his room . No decline in neurological status . No change on telemetry.

## 2017-04-15 NOTE — PROGRESS NOTES
[facility]  Neurology  Progress Note    Patient Name: Jorden Shafer  MRN: [id number]  Admission Date: 4/12/2017  Hospital Length of Stay: 3 days  Code Status: Full Code   Attending Provider: Soren Schulz MD  Primary Care Physician: Neha Plasencia MD   Principal Problem:Acute CVA (cerebrovascular accident)    Subjective:     Interval History: 70 y/o male with medical Hx as listed below brought to ED after being found to have slurred speech and left sided weakness. Pt has Hx of stroke but uncertain at this point what are the residual deficits (attempted several times to communicate with family to no avail). He is not able to provide information.     -4/14/17: Pt complaining of headache today. He is verbal and states that he is moving his left arm better today.    -4/15/17: No new complaints today.    Current Neurological Medications:     Current Facility-Administered Medications   Medication Dose Route Frequency Provider Last Rate Last Dose    acetaminophen tablet 500 mg  500 mg Oral Q6H PRN Mallory Haynes MD        amitriptyline tablet 50 mg  50 mg Oral QHS Yosef Cummings MD   50 mg at 04/14/17 2200    aspirin tablet 325 mg  325 mg Oral Daily Yosef Cummings MD   325 mg at 04/15/17 1004    atorvastatin tablet 20 mg  20 mg Oral Daily Yosef Cummings MD   20 mg at 04/15/17 1004    citalopram tablet 10 mg  10 mg Oral Daily Yosef Cummings MD   10 mg at 04/15/17 1004    clopidogrel tablet 75 mg  75 mg Oral Daily Yosef Cummings MD   75 mg at 04/15/17 1005    dextrose 50% injection 12.5 g  12.5 g Intravenous PRN Yosef Cummings MD        dextrose 50% injection 25 g  25 g Intravenous PRN Yosef Cummings MD        enoxaparin injection 40 mg  40 mg Subcutaneous Daily Yosef Cummings MD   40 mg at 04/14/17 1738    glucagon (human recombinant) injection 1 mg  1 mg Intramuscular PRN Yosef Cummings MD        glucose chewable tablet 16 g  16 g Oral PRN Yosef Cummings MD         glucose chewable tablet 24 g  24 g Oral PRN Yosef Cummings MD        insulin aspart pen 1-10 Units  1-10 Units Subcutaneous PRN Mallory Haynes MD   4 Units at 04/15/17 1005    labetalol injection 20 mg  20 mg Intravenous Q15 Min PRN Yosef Cummings MD        pantoprazole EC tablet 40 mg  40 mg Oral Daily Yosef Cummings MD   40 mg at 04/15/17 1005    promethazine (PHENERGAN) 12.5 mg in dextrose 5 % 50 mL IVPB  12.5 mg Intravenous Q6H PRN Mallory Haynes MD           Review of Systems   Constitutional: Negative for fever.   HENT: Negative for ear pain.   Eyes: Negative for photophobia and visual disturbance.   Respiratory: Negative for shortness of breath.   Cardiovascular: Negative for chest pain.   Gastrointestinal: Negative for abdominal pain.   Genitourinary: Negative for dysuria.   Neurological: Positive for facial asymmetry, speech difficulty and weakness on left side arm>leg.  Psychiatric/Behavioral: Negative for confusion.     Objective:     Vital Signs (Most Recent):  Temp: 98.1 °F (36.7 °C) (04/15/17 1133)  Pulse: 76 (04/15/17 1133)  Resp: 20 (04/15/17 1133)  BP: (!) 153/75 (04/15/17 1133)  SpO2: (!) 93 % (04/15/17 1133) Vital Signs (24h Range):  Temp:  [97.6 °F (36.4 °C)-99.5 °F (37.5 °C)] 98.1 °F (36.7 °C)  Pulse:  [76-89] 76  Resp:  [17-20] 20  SpO2:  [93 %-98 %] 93 %  BP: (135-175)/(64-83) 153/75     Weight: 107.9 kg (237 lb 12.8 oz)  Body mass index is 36.16 kg/(m^2).    Physical Exam  Constitutional: no distress  HENT: no icteric sclerae  Head: Normocephalic.   Eyes: Conjunctivae and EOM are normal. Pupils are equal, round, and reactive to light.   Neck: Neck supple.   Cardiovascular: Normal rate.   Pulmonary/Chest: symmetrical expansion of chest.   Neurological: awake; oriented to self, place, time; follows commands; dysarthric  Pupil are round at 3 mm and reactive to light; no visual field deficits; no nystagmus; EOMI: decreased light touch on right mandibular area; right lower facial  wekaness; tongue protrudes midline; palate elevates symmetrically  Strenght: 5/5 on right; 4-/5 on left UE; 4/5 on LLE  Decreased light touch on right extremities  No neglect       Significant Labs: CBC: No results for input(s): WBC, HGB, HCT, PLT in the last 48 hours.  CMP: No results for input(s): GLU, NA, K, CL, CO2, BUN, CREATININE, CALCIUM, MG, PROT, ALBUMIN, BILITOT, ALKPHOS, AST, ALT, ANIONGAP, EGFRNONAA in the last 48 hours.    Significant Imaging:   Head CT  There is decreased attenuation in the expected distribution of the right MCA.  Findings are most consistent with infarction.  No evidence for hemorrhage.  No midline shift.  No significant mass effect upon the ventricle.  There appears be an old lacunar type infarct involving the right cerebellar hemisphere.  There is some decreased attenuation within the deep and periventricular white matter bilaterally most likely on the basis of small vessel ischemic changes.  Mild generalized cortical atrophy is noted.There is calcification associated with the bilateral carotid siphons.    The visualized paranasal sinuses and mastoid air cells are clear.     The calvarium is intact.   Impression        1. Subacute right MCA distribution infarct.  No hemorrhage.  No significant mass effect.    2.  Remaining findings as discussed above.      Electronically signed by: VIRGILIO COLUNGA D.O.  Date: 04/14/17  Time: 13:47          Assessment and Plan:     72 y/o male with stroke:      1. Stoke: right MCA territory with left side deficits. Pt on maximum medical management for stroke prevention but not adherent to Rx.  CTA shows severe stenosis of right carotid  -Vascular surgery consultation: pt to be seen in two weeks.  -ASA, clopidogrel, statin.  -SBP ok for 150-180's.   -PT/OT/ST: inpatient rehab recommended.    Active Diagnoses:    Diagnosis Date Noted POA    PRINCIPAL PROBLEM:  Acute CVA (cerebrovascular accident) [I63.9] 04/12/2017 Yes    Hypokalemia [E87.6]  04/13/2017 Yes    Insulin pump in place [Z96.41] 04/13/2017 Not Applicable     Chronic    Mild protein malnutrition [E44.1] 04/13/2017 Yes     Chronic    KELVIN on CPAP [G47.33, Z99.89] 04/13/2017 Not Applicable     Chronic    History of CVA (cerebrovascular accident) [Z86.73] 04/13/2017 Not Applicable     Chronic    GERD (gastroesophageal reflux disease) [K21.9] 04/13/2017 Yes     Chronic    Chronic combined systolic and diastolic heart failure [I50.42] 11/12/2013 Yes     Chronic    Essential hypertension [I10] 11/10/2013 Yes     Chronic    Hyperlipidemia [E78.5]  Yes     Chronic    Type 2 diabetes mellitus, uncontrolled [E11.65] 09/26/2012 Yes     Chronic    CAD s/p CABG & PCI [I25.10] 09/26/2012 Yes     Chronic      Problems Resolved During this Admission:    Diagnosis Date Noted Date Resolved POA       VTE Risk Mitigation         Ordered     enoxaparin injection 40 mg  Daily     Route:  Subcutaneous        04/12/17 2139     Medium Risk of VTE  Once      04/12/17 2139          Josue Sylvester MD  Neurology  [facility]

## 2017-04-15 NOTE — PLAN OF CARE
Problem: Occupational Therapy Goal  Goal: Occupational Therapy Goal  Goals to be met by: 4/27/17    Patient will increase functional independence with ADLs by performing:    Feeding with Supervision.  UE Dressing with Contact Guard Assistance.  LE Dressing with Stand-by Assistance.  Grooming while standing at sink with Stand-by Assistance.  Toileting from toilet with Stand-by Assistance for hygiene and clothing management.   Supine to sit with Stand-by Assistance.  Stand pivot transfers with Stand-by Assistance.  Toilet transfer to toilet with Stand-by Assistance.  Upper extremity exercise program x10 reps per handout, with assistance as needed.   Outcome: Ongoing (interventions implemented as appropriate)  Pt with good L UE AROM, but continues to have difficulty with functional use of hand for self care tasks.  Will continue to benefit from IP rehab.

## 2017-04-15 NOTE — SUBJECTIVE & OBJECTIVE
Interval History: No new issues.     Review of Systems   Constitutional: Negative for activity change.   Respiratory: Negative for chest tightness and shortness of breath.    Cardiovascular: Negative for chest pain.   Gastrointestinal: Negative for abdominal pain.     Objective:     Vital Signs (Most Recent):  Temp: 97.6 °F (36.4 °C) (04/15/17 0705)  Pulse: 86 (04/15/17 0400)  Resp: 20 (04/15/17 0705)  BP: (!) 149/83 (04/15/17 0705)  SpO2: 98 % (04/15/17 0705) Vital Signs (24h Range):  Temp:  [97.6 °F (36.4 °C)-99.5 °F (37.5 °C)] 97.6 °F (36.4 °C)  Pulse:  [79-89] 86  Resp:  [17-20] 20  SpO2:  [2 %-98 %] 98 %  BP: (135-175)/(64-83) 149/83     Weight: 107.9 kg (237 lb 12.8 oz)  Body mass index is 36.16 kg/(m^2).    Intake/Output Summary (Last 24 hours) at 04/15/17 0826  Last data filed at 04/14/17 1800   Gross per 24 hour   Intake              960 ml   Output                0 ml   Net              960 ml      Physical Exam  Alert and oriented x 3  Vitals reviewed.  3/5 L upper ext. Weakness  CV rrr  Lungs Clear   Abdomen + bs      Significant Labs: BMP: No results for input(s): GLU, NA, K, CL, CO2, BUN, CREATININE, CALCIUM, MG in the last 48 hours.  CBC: No results for input(s): WBC, HGB, HCT, PLT in the last 48 hours.    Significant Imaging:

## 2017-04-15 NOTE — PROGRESS NOTES
[facility] Hospital Medicine  Progress Note    Patient Name: Jorden Shafer  MRN: [id number]  Patient Class: IP- Inpatient   Admission Date: 4/12/2017  Length of Stay: 3 days  Attending Physician: Soren Schulz MD  Primary Care Provider: Neha Plasencia MD        Subjective:     Principal Problem:Acute CVA (cerebrovascular accident)    HPI:  Mr. Jorden Shafer is a 71 y.o. male with essential hypertension, hyperlipidemia (LDL 77.8 Sept 2015), type 2 diabetes mellitus (HbA1c 8.5% Jun 2016), CAD s/p CABG & PCI, chronic combined systolic and diastolic heart failure (LVEF 30-35% Jun 2016), insulin pump, obesity (BMI 37.7), mild protein malnutrition, KELVIN on CPAP, and history of CVA in place who presents to [facility] ED with complaints of dysarthria last night. His family noted him to have some slurring of his speech yesterday that progressed to left facial drooping and a headache today.  He has been out of his medications for the past two months due to financial constraints.  He was also noted to have a productive cough of green sputum.  Further history is limited at this time.    Hospital Course:  The patient was admitted to the hospital for evaluation/treatment of acute stroke.  Patient was found by imaging to have a R MCA distribution stroke (R frontal lobe) with carotid bifurcation stenosis.  Speech/PT/OT were consulted and recommended Rehab.  Surgery was consulted and recommended follow up in clinic in 2 weeks as this was a completed stroke.     Interval History: No new issues.     Review of Systems   Constitutional: Negative for activity change.   Respiratory: Negative for chest tightness and shortness of breath.    Cardiovascular: Negative for chest pain.   Gastrointestinal: Negative for abdominal pain.     Objective:     Vital Signs (Most Recent):  Temp: 97.6 °F (36.4 °C) (04/15/17 0705)  Pulse: 86 (04/15/17 0400)  Resp: 20 (04/15/17 0705)  BP: (!) 149/83 (04/15/17 0705)  SpO2: 98 %  "(04/15/17 0705) Vital Signs (24h Range):  Temp:  [97.6 °F (36.4 °C)-99.5 °F (37.5 °C)] 97.6 °F (36.4 °C)  Pulse:  [79-89] 86  Resp:  [17-20] 20  SpO2:  [2 %-98 %] 98 %  BP: (135-175)/(64-83) 149/83     Weight: 107.9 kg (237 lb 12.8 oz)  Body mass index is 36.16 kg/(m^2).    Intake/Output Summary (Last 24 hours) at 04/15/17 0826  Last data filed at 04/14/17 1800   Gross per 24 hour   Intake              960 ml   Output                0 ml   Net              960 ml      Physical Exam  Alert and oriented x 3  Vitals reviewed.  3/5 L upper ext. Weakness  CV rrr  Lungs Clear   Abdomen + bs      Significant Labs: BMP: No results for input(s): GLU, NA, K, CL, CO2, BUN, CREATININE, CALCIUM, MG in the last 48 hours.  CBC: No results for input(s): WBC, HGB, HCT, PLT in the last 48 hours.    Significant Imaging:     Assessment/Plan:      * Acute CVA (cerebrovascular accident)  Patient has sustained an acute CVA, and unfortunately he is far outside the therapeutic window for tPA.  CT-head was negative for acute intracranial hemorrhage but was significant for "...development of a large right temporal infarct. There is no evidence acute hemorrhage. There is evidence of a prior right cerebellar infarct."    A bilateral carotid ultrasound was significant for "[o]cclusion of the mid and proximal segments of the right ICA.  Retrograde flow within the right vertebral artery."  . Patient is not within the therapeutic window for tPA and he is already on aspirin.  Will obtain a lipid panel; allow for permissive hypertension in order not to extend the penumbra; consult PT/OT and Speech Therapy for evaluation; consult  for discharge planning; and consult Neurology for further recommendations.  Recs from PT/OT are rehab. Consulted surgery for R carotid stenosis-follow up as out patient in 2 weeks as this was a completed stroke in the R MCA territory.     CAD s/p CABG & PCI  Stable; will continue his home regimen of aspirin, " atorvastatin, and clopidogrel.  Will hold his home regimen of carvedilol and lisinopril to allow for permissive hypertension.    Type 2 diabetes mellitus, uncontrolled  Poorly-controlled on a home regimen of an insulin pump and prandial insulin therapy; will provide the insulin pump, prandial insulin, and insulin along with insulin sliding scale.    Hyperlipidemia  Well-controlled; will continue patient's home regimen of atorvastatin.    Essential hypertension  As addressed above.  Will withhold antihypertensive medications at this time to allow for permissive hypertension.    Chronic combined systolic and diastolic heart failure  Stable without any evidence of acute heart failure; will hold his home regimen of carvedilol, furosemide, and lisinopril to allow for permissive hypertension.    Hypokalemia  Will replete orally and recheck his potassium level in the morning.    Insulin pump in place  Stable; there are no acute issues.    Mild protein malnutrition  Will provide protein supplementation with Boost Glucose one he is cleared for a diet.    KELVIN on CPAP  Will provide CPAP nightly.    History of CVA (cerebrovascular accident)  As addressed above.    GERD (gastroesophageal reflux disease)  Will substitute his home regimen of omeprazole for pantoprazole while he is inpatient.    VTE Risk Mitigation         Ordered     enoxaparin injection 40 mg  Daily     Route:  Subcutaneous        04/12/17 2139     Medium Risk of VTE  Once      04/12/17 2139        To Rehab likely this week.     Soren Hylton MD  Department of Hospital Medicine   [facility]

## 2017-04-15 NOTE — PT/OT/SLP PROGRESS
"Occupational Therapy  Treatment    Jorden Shafer   MRN: [id number]   Admitting Diagnosis: Acute CVA (cerebrovascular accident)    OT Date of Treatment: 04/15/17   OT Start Time: 1347  OT Stop Time: 1356  OT Total Time (min): 9 min    Billable Minutes:  Therapeutic Activity 9    General Precautions: Standard, fall  Orthopedic Precautions: N/A  Braces: N/A    Do you have any cultural, spiritual, Zoroastrianism conflicts, given your current situation?: no    Subjective:  Communicated with RN (Lisette) prior to session.      "I can't hold anything in this left hand"    Pain Ratin/10                   Objective:  Patient found with: telemetry seated in bedside chair.       Balance:   Static Sit: GOOD-: Takes MODERATE challenges from all directions but inconsistently  Dynamic Sit: GOOD-: Maintains balance through MODERATE excursions of active trunk movement,         Therapeutic Activities and Exercises:  Pt performed L shoulder, elbow, wrist, and finger f/e, forearm sup/pro and finger/thumb opposition 10 reps x 1 set with increased time to perform opposition and difficulty with thumb to 5th digit opposition.  Pt participated in FMC task by attempting to unscrew and re-screw lid of small shampoo bottle with max difficulty perform functional opposition and shift of L digits to perform task. Pt given rolled washcloth to perform finger squeezing throughout day to increase finger strength for functional self care fine motor tasks.     AM-PAC 6 CLICK ADL   How much help from another person does this patient currently need?   1 = Unable, Total/Dependent Assistance  2 = A lot, Maximum/Moderate Assistance  3 = A little, Minimum/Contact Guard/Supervision  4 = None, Modified Evangeline/Independent    Putting on and taking off regular lower body clothing? : 3  Bathing (including washing, rinsing, drying)?: 3  Toileting, which includes using toilet, bedpan, or urinal? : 3  Putting on and taking off regular upper body clothing?: " 3  Taking care of personal grooming such as brushing teeth?: 3  Eating meals?: 3  Total Score: 18     AM-PAC Raw Score CMS G-Code Modifier Level of Impairment Assistance   6 % Total / Unable   7 - 9 CM 80 - 100% Maximal Assist   10 - 14 CL 60 - 80% Moderate Assist   15 - 19 CK 40 - 60% Moderate Assist   20 - 22 CJ 20 - 40% Minimal Assist   23 CI 1-20% SBA / CGA   24 CH 0% Independent/ Mod I     Patient left up in chair with all lines intact, call button in reach and RN (Lisette) notified    ASSESSMENT:  Jorden Shafer is a 71 y.o. male with a medical diagnosis of Acute CVA (cerebrovascular accident) and tolerated OT session well. While pt presents with good L UE AROM, but limited with functional use of L hand, especially in regards to FMC. Pt will benefit from IP rehab to increase I with ADLs.     Rehab identified problem list/impairments: Rehab identified problem list/impairments: weakness, impaired endurance, impaired self care skills, impaired functional mobilty, impaired balance, decreased coordination, decreased upper extremity function, decreased safety awareness, impaired coordination, impaired fine motor    Rehab potential is good.    Activity tolerance: Good    Discharge recommendations: Discharge Facility/Level Of Care Needs: rehabilitation facility     Barriers to discharge: Barriers to Discharge: Other (Comment) (requires assist with self care for safety)    Equipment recommendations:  (TBD)     GOALS:   Occupational Therapy Goals        Problem: Occupational Therapy Goal    Goal Priority Disciplines Outcome Interventions   Occupational Therapy Goal     OT, PT/OT Ongoing (interventions implemented as appropriate)    Description:  Goals to be met by: 4/27/17    Patient will increase functional independence with ADLs by performing:    Feeding with Supervision.  UE Dressing with Contact Guard Assistance.  LE Dressing with Stand-by Assistance.  Grooming while standing at sink with Stand-by  Assistance.  Toileting from toilet with Stand-by Assistance for hygiene and clothing management.   Supine to sit with Stand-by Assistance.  Stand pivot transfers with Stand-by Assistance.  Toilet transfer to toilet with Stand-by Assistance.  Upper extremity exercise program x10 reps per handout, with assistance as needed.                Plan:  Patient to be seen 6 x/week to address the above listed problems via self-care/home management, therapeutic activities, therapeutic exercises, neuromuscular re-education  Plan of Care expires: 04/27/17  Plan of Care reviewed with: patient         Janna Cabral, OT  04/15/2017

## 2017-04-15 NOTE — NURSING
1638- ex wife at bedside patient tolerating well. Bed alarm in use . Tolerating ex wifes support. She is very attentive to patient. He's been awake most of the afternoon . He is using the toilet without any problems with elimination no incontinence of bladder or bowels. No acute decline in neurological change or acute change on telemetry.

## 2017-04-15 NOTE — PROGRESS NOTES
Patient awake and alert without complaints. Skin clear gait unsteady  patient educated on the importance of safely.Verbalized understanding.

## 2017-04-16 LAB
POCT GLUCOSE: 272 MG/DL (ref 70–110)
POCT GLUCOSE: 292 MG/DL (ref 70–110)
POCT GLUCOSE: 332 MG/DL (ref 70–110)
POCT GLUCOSE: 356 MG/DL (ref 70–110)

## 2017-04-16 PROCEDURE — 97116 GAIT TRAINING THERAPY: CPT

## 2017-04-16 PROCEDURE — 63600175 PHARM REV CODE 636 W HCPCS: Performed by: EMERGENCY MEDICINE

## 2017-04-16 PROCEDURE — 94761 N-INVAS EAR/PLS OXIMETRY MLT: CPT

## 2017-04-16 PROCEDURE — 97110 THERAPEUTIC EXERCISES: CPT

## 2017-04-16 PROCEDURE — 99232 SBSQ HOSP IP/OBS MODERATE 35: CPT | Mod: ,,, | Performed by: PSYCHIATRY & NEUROLOGY

## 2017-04-16 PROCEDURE — 25000003 PHARM REV CODE 250: Performed by: EMERGENCY MEDICINE

## 2017-04-16 PROCEDURE — 12000002 HC ACUTE/MED SURGE SEMI-PRIVATE ROOM

## 2017-04-16 RX ADMIN — CLOPIDOGREL BISULFATE 75 MG: 75 TABLET ORAL at 09:04

## 2017-04-16 RX ADMIN — ENOXAPARIN SODIUM 40 MG: 100 INJECTION SUBCUTANEOUS at 05:04

## 2017-04-16 RX ADMIN — PANTOPRAZOLE SODIUM 40 MG: 40 TABLET, DELAYED RELEASE ORAL at 09:04

## 2017-04-16 RX ADMIN — INSULIN ASPART 10 UNITS: 100 INJECTION, SOLUTION INTRAVENOUS; SUBCUTANEOUS at 05:04

## 2017-04-16 RX ADMIN — INSULIN ASPART 6 UNITS: 100 INJECTION, SOLUTION INTRAVENOUS; SUBCUTANEOUS at 09:04

## 2017-04-16 RX ADMIN — CITALOPRAM HYDROBROMIDE 10 MG: 10 TABLET ORAL at 09:04

## 2017-04-16 RX ADMIN — AMITRIPTYLINE HYDROCHLORIDE 50 MG: 25 TABLET, FILM COATED ORAL at 08:04

## 2017-04-16 RX ADMIN — INSULIN ASPART 4 UNITS: 100 INJECTION, SOLUTION INTRAVENOUS; SUBCUTANEOUS at 08:04

## 2017-04-16 RX ADMIN — ATORVASTATIN CALCIUM 20 MG: 10 TABLET, FILM COATED ORAL at 09:04

## 2017-04-16 RX ADMIN — ASPIRIN 325 MG ORAL TABLET 325 MG: 325 PILL ORAL at 09:04

## 2017-04-16 NOTE — PLAN OF CARE
Problem: Physical Therapy Goal  Goal: Physical Therapy Goal  Goals to be met by: 17     Patient will increase functional independence with mobility by performin. Supine to sit with Modified [place]  2. Rolling to Left and Right with Modified [place]  3. Sit to stand transfer with Modified [place] using Single-point Cane  4. Bed to chair transfer with Modified [place] using Single-point Cane   5. Gait x 250 feet with Modified [place] using Single-point Cane   6. Ascend/descend 3 steps with right Handrail Modified [place]  7. Lower extremity exercise program x 30 reps per handout, with independence   Outcome: Ongoing (interventions implemented as appropriate)  Continue with POC.

## 2017-04-16 NOTE — PROGRESS NOTES
[facility]  Neurology  Progress Note    Patient Name: Jorden Shafer  MRN: [id number]  Admission Date: 4/12/2017  Hospital Length of Stay: 4 days  Code Status: Full Code   Attending Provider: Soren Schulz MD  Primary Care Physician: Neha Plasencia MD   Principal Problem:Acute CVA (cerebrovascular accident)    Subjective:     Interval History: 72 y/o male with medical Hx as listed below brought to ED after being found to have slurred speech and left sided weakness. Pt has Hx of stroke but uncertain at this point what are the residual deficits (attempted several times to communicate with family to no avail). He is not able to provide information.      -4/14/17: Pt complaining of headache today. He is verbal and states that he is moving his left arm better today.     -4/15/17: No new complaints today.    -4/16/17: Walking with PT this morning. Hemiparetic gait.    Current Neurological Medications:     Current Facility-Administered Medications   Medication Dose Route Frequency Provider Last Rate Last Dose    acetaminophen tablet 500 mg  500 mg Oral Q6H PRN Mallory Haynes MD        amitriptyline tablet 50 mg  50 mg Oral QHS Yosef Cummings MD   50 mg at 04/15/17 2031    aspirin tablet 325 mg  325 mg Oral Daily Yosef Cummings MD   325 mg at 04/16/17 0934    atorvastatin tablet 20 mg  20 mg Oral Daily Yosef Cummings MD   20 mg at 04/16/17 0934    citalopram tablet 10 mg  10 mg Oral Daily Yosef Cummings MD   10 mg at 04/16/17 0934    clopidogrel tablet 75 mg  75 mg Oral Daily Yosef Cummings MD   75 mg at 04/16/17 0934    dextrose 50% injection 12.5 g  12.5 g Intravenous PRN Yosef Cummings MD        dextrose 50% injection 25 g  25 g Intravenous PRN Yosef Cummings MD        enoxaparin injection 40 mg  40 mg Subcutaneous Daily Yosef Cummings MD   40 mg at 04/15/17 1715    glucagon (human recombinant) injection 1 mg  1 mg Intramuscular PRN Yosef Cummings MD         glucose chewable tablet 16 g  16 g Oral PRN Yosef Cummings MD        glucose chewable tablet 24 g  24 g Oral PRN Yosef Cummings MD        insulin aspart pen 1-10 Units  1-10 Units Subcutaneous PRN Mallory Haynes MD   6 Units at 04/16/17 0935    labetalol injection 20 mg  20 mg Intravenous Q15 Min PRN Yosef Cummings MD        pantoprazole EC tablet 40 mg  40 mg Oral Daily Yosef Cummings MD   40 mg at 04/16/17 0934    promethazine (PHENERGAN) 12.5 mg in dextrose 5 % 50 mL IVPB  12.5 mg Intravenous Q6H PRN Mallroy Haynes MD           Review of Systems   Constitutional: Negative for fever.   HENT: Negative for ear pain.   Eyes: Negative for photophobia and visual disturbance.   Respiratory: Negative for shortness of breath.   Cardiovascular: Negative for chest pain.   Gastrointestinal: Negative for abdominal pain.   Genitourinary: Negative for dysuria.   Neurological: Positive for facial asymmetry, speech difficulty and weakness on left side arm>leg.  Psychiatric/Behavioral: Negative for confusion.     Objective:     Vital Signs (Most Recent):  Temp: 97.6 °F (36.4 °C) (04/16/17 0752)  Pulse: 79 (04/16/17 0752)  Resp: 20 (04/16/17 0752)  BP: 134/79 (04/16/17 0752)  SpO2: (!) 92 % (04/16/17 1000) Vital Signs (24h Range):  Temp:  [97.6 °F (36.4 °C)-98.3 °F (36.8 °C)] 97.6 °F (36.4 °C)  Pulse:  [75-85] 79  Resp:  [20-21] 20  SpO2:  [92 %-95 %] 92 %  BP: (132-172)/(74-81) 134/79     Weight: 105.1 kg (231 lb 11.2 oz)  Body mass index is 35.23 kg/(m^2).    Physical Exam  Constitutional: no distress  HENT: no icteric sclerae  Head: Normocephalic.   Eyes: Conjunctivae and EOM are normal. Pupils are equal, round, and reactive to light.   Neck: Neck supple.   Cardiovascular: Normal rate.   Pulmonary/Chest: symmetrical expansion of chest.   Neurological: awake; oriented to self, place, time; follows commands; dysarthric.  Pupil are round at 3 mm and reactive to light; no visual field deficits; no nystagmus; EOMI:  decreased light touch on right mandibular area; right lower facial wekaness; tongue protrudes midline; palate elevates symmetrically  Strenght: 5/5 on right; 4-/5 on left UE; 4/5 on LLE  Decreased light touch on right extremities  No neglect       Significant Labs: CBC: No results for input(s): WBC, HGB, HCT, PLT in the last 48 hours.  CMP: No results for input(s): GLU, NA, K, CL, CO2, BUN, CREATININE, CALCIUM, MG, PROT, ALBUMIN, BILITOT, ALKPHOS, AST, ALT, ANIONGAP, EGFRNONAA in the last 48 hours.        Assessment and Plan:     72 y/o male with stroke:      1. Stoke: right MCA territory with left side deficits. Pt on maximum medical management for stroke prevention but not adherent to Rx.  CTA shows severe stenosis of right carotid  -Vascular surgery consultation: pt to be seen in two weeks.  -ASA, clopidogrel, statin.  -SBP ok for 150-180's for now.  -PT/OT/ST: inpatient rehab recommended.    Active Diagnoses:    Diagnosis Date Noted POA    PRINCIPAL PROBLEM:  Acute CVA (cerebrovascular accident) [I63.9] 04/12/2017 Yes    Hypokalemia [E87.6] 04/13/2017 Yes    Insulin pump in place [Z96.41] 04/13/2017 Not Applicable     Chronic    Mild protein malnutrition [E44.1] 04/13/2017 Yes     Chronic    KELVIN on CPAP [G47.33, Z99.89] 04/13/2017 Not Applicable     Chronic    History of CVA (cerebrovascular accident) [Z86.73] 04/13/2017 Not Applicable     Chronic    GERD (gastroesophageal reflux disease) [K21.9] 04/13/2017 Yes     Chronic    Chronic combined systolic and diastolic heart failure [I50.42] 11/12/2013 Yes     Chronic    Essential hypertension [I10] 11/10/2013 Yes     Chronic    Hyperlipidemia [E78.5]  Yes     Chronic    Type 2 diabetes mellitus, uncontrolled [E11.65] 09/26/2012 Yes     Chronic    CAD s/p CABG & PCI [I25.10] 09/26/2012 Yes     Chronic      Problems Resolved During this Admission:    Diagnosis Date Noted Date Resolved POA       VTE Risk Mitigation         Ordered     enoxaparin injection  40 mg  Daily     Route:  Subcutaneous        04/12/17 2139     Medium Risk of VTE  Once      04/12/17 2139          Josue Sylvester MD  Neurology  [facility]

## 2017-04-16 NOTE — PROGRESS NOTES
04/15/17 1313   General   PT Received On 04/15/17   PT Start Time 1300   PT Stop Time 1325   PT Total Time (min) 25 min   Patient found with telemetry   Precautions   General Precautions fall   Orthopedic Precautions  N/A   Braces N/A   Pain/Comfort   Pain Rating no pain   Bed Mobility   Rolling/Turning Right Contact guard assistance   Supine to Sit Contact Guard Assistance   Sit to Supine Contact Guard Assistance   Transfers   Sit <> Stand Assistance Contact Guard Assistance;Minimum Assistance  (Cues for technique and extra time for task )   Sit <> Stand Assistive Device Straight Cane   Toilet Transfer Technique Stand Pivot   Toilet Transfer Assistance Contact Guard Assistance   Toilet Transfer Assistive Device Straight Cane   Gait   Gait Distance 50 feet using single point cane and left HHA. Patient required continuous verbal cues for safety and sequencing with SPC. Required training on use of cane during ambulation    Assistance 1 Contact Guard Assistance;Minimum assistance  (Pt with two episodes of slight LOB but was able to recover with Anahi from therapist )   Gait Assistive Device Single point cane   Gait Pattern reciprocal   Gait Deviation(s) decreased nayeli;increased time in double stance;decreased velocity of limb motion;decreased step length   AM-PAC: How much help from another person does the patient currently need?   Turning over in bed (including adjusting bedclothes, sheets and blankets)? 4   Sitting down on and standing up from a chair with arms (e.g., wheelchair, bedside commode, etc.) 3   Moving from lying on back to sitting on the side of the bed? 3   Moving to and from a bed to a chair (including a wheelchair)? 3   Need to walk in hospital room? 3   Climbing 3-5 steps with a railing? 3   Total Score 19

## 2017-04-16 NOTE — PT/OT/SLP PROGRESS
Physical Therapy  Treatment    Jorden Shafer   MRN: [id number]   Admitting Diagnosis: Acute CVA (cerebrovascular accident)    PT Received On: 17  PT Start Time: 1037     PT Stop Time: 1101    PT Total Time (min): 24 min       Billable Minutes:  Gait [id number] and Therapeutic Exercise 12    Treatment Type: Treatment  PT/PTA: PTA     PTA Visit Number: 3       General Precautions: Standard, fall, diabetic, vision impaired (Pt wears eyeglasses.)  Orthopedic Precautions: N/A   Braces: N/A         Subjective:  Communicated with nurse  prior to session.  Pt agreeable to therapy     Pain Ratin/10              Pain Rating Post-Intervention: 0/10    Objective:   Patient found with: telemetry    Functional Mobility:  Bed Mobility:   Rolling/Turning Right: Contact guard assistance  Scooting/Bridging: Stand by Assistance  Supine to Sit: Stand by Assistance    Transfers: x 2 trials from bed. VC's for safety technique.   Sit <> Stand Assistance: Contact Guard Assistance  Sit <> Stand Assistive Device: Straight Cane    Gait:   Gait Distance: ~ 60 ft with 1 LOB 2* L knee buckled ,  pt required Min A to recorver balance. VC's for safety technique using SC . VC's for safety awareness during therapy activity.   Assistance 1: Minimum assistance  Gait Assistive Device: Single point cane, L HHA.   Gait Pattern: reciprocal  Gait Deviation(s): decreased nayeli, decreased velocity of limb motion, decreased step length, decreased toe-to-floor clearance, decreased weight-shifting ability, decreased swing-to-stance ratio      Balance:   Static Sit: GOOD-: Takes MODERATE challenges from all directions but inconsistently  Dynamic Sit: FAIR+: Maintains balance through MINIMAL excursions of active trunk motion  Static Stand: FAIR: Maintains without assist but unable to take challenges  Dynamic stand: POOR: N/A     Therapeutic Activities and Exercises:  Pt performed bed mobility, transfer and gait training as above.  Pt performed seated  BLE x 15 reps : heel/toes raises, LAQ, HS, pillow squeezes, hip flexion. V/T cues for proper and sequence. Pt tolerated well.      AM-PAC 6 CLICK MOBILITY  How much help from another person does this patient currently need?   1 = Unable, Total/Dependent Assistance  2 = A lot, Maximum/Moderate Assistance  3 = A little, Minimum/Contact Guard/Supervision  4 = None, Modified [place]/Independent    Turning over in bed (including adjusting bedclothes, sheets and blankets)?: 4  Sitting down on and standing up from a chair with arms (e.g., wheelchair, bedside commode, etc.): 3  Moving from lying on back to sitting on the side of the bed?: 4  Moving to and from a bed to a chair (including a wheelchair)?: 3  Need to walk in hospital room?: 3  Climbing 3-5 steps with a railing?: 3  Total Score: 20    AM-PAC Raw Score CMS G-Code Modifier Level of Impairment Assistance   6 % Total / Unable   7 - 9 CM 80 - 100% Maximal Assist   10 - 14 CL 60 - 80% Moderate Assist   15 - 19 CK 40 - 60% Moderate Assist   20 - 22 CJ 20 - 40% Minimal Assist   23 CI 1-20% SBA / CGA   24 CH 0% Independent/ Mod I     Patient left up in reclined chair with all lines intact, call button in reach and nurse notified.    Assessment:  Jorden Shafer is a 71 y.o. male with a medical diagnosis of Acute CVA (cerebrovascular accident) and presents with weakness, decreased endurance and functional mobility. Pt will benefit from further skilled therapy in order to get back to [facility].    Rehab identified problem list/impairments: Rehab identified problem list/impairments: weakness, impaired endurance, impaired sensation, gait instability, impaired balance, impaired functional mobilty, decreased lower extremity function, decreased upper extremity function, decreased safety awareness, decreased coordination    Rehab potential is good.    Activity tolerance: Good    Discharge recommendations: Discharge Facility/Level Of Care Needs: rehabilitation facility      Barriers to discharge:   Other (Comment) (decreased mobility and safety awareness)    Equipment recommendations:   none   GOALS:   Physical Therapy Goals        Problem: Physical Therapy Goal    Goal Priority Disciplines Outcome Goal Variances Interventions   Physical Therapy Goal     PT/OT, PT Ongoing (interventions implemented as appropriate)     Description:  Goals to be met by: 17     Patient will increase functional independence with mobility by performin. Supine to sit with Modified [place]  2. Rolling to Left and Right with Modified [place]  3. Sit to stand transfer with Modified [place] using Single-point Cane  4. Bed to chair transfer with Modified [place] using Single-point Cane   5. Gait  x 250 feet with Modified [place] using Single-point Cane   6. Ascend/descend 3 steps with right Handrail Modified [place]  7. Lower extremity exercise program x 30 reps per handout, with independence                PLAN:    Patient to be seen daily  to address the above listed problems via gait training, therapeutic activities, therapeutic exercises, neuromuscular re-education  Plan of Care expires: 17  Plan of Care reviewed with: patient         Waleska George, PTA  2017

## 2017-04-16 NOTE — PT/OT/SLP PROGRESS
Physical Therapy  Treatment    Jorden Shafer   MRN: [id number]   Admitting Diagnosis: Acute CVA (cerebrovascular accident)    PT Received On: 04/15/17  PT Start Time: 1300     PT Stop Time: 1325    PT Total Time (min): 25 min       Billable Minutes:  Gait [id number] and Therapeutic Exercise 10    Treatment Type: Treatment  PT/PTA: PTA     PTA Visit Number: 2       General Precautions: Standard, fall  Orthopedic Precautions: N/A   Braces:           Subjective:  Communicated with patient, wife and nurse Lisette  prior to session.       Objective:        Functional Mobility:  Bed Mobility:   Supine to sut CGA       Transfers:   CGA    Gait:    Patient gait trained ~50 feet using single point cane and left HHA. Patient required continuous verbal and tactile cues for safety and sequencing and use of cane.   Patient with tow episodes of slight loss of balance but was able to recover with Anahi from therapist       Balance:   Static Sit: GOOD: Takes MODERATE challenges from all directions  Dynamic Sit: GOOD: Maintains balance through MODERATE excursions of active trunk movement  Static Stand: FAIR+: Takes MINIMAL challenges from all directions  Dynamic stand: FAIR: Needs CONTACT GUARD during gait     Therapeutic Activities and Exercises:  Patient     AM-PAC 6 CLICK MOBILITY  How much help from another person does this patient currently need?   1 = Unable, Total/Dependent Assistance  2 = A lot, Maximum/Moderate Assistance  3 = A little, Minimum/Contact Guard/Supervision  4 = None, Modified [place]/Independent         AM-PAC Raw Score CMS G-Code Modifier Level of Impairment Assistance   6 % Total / Unable   7 - 9 CM 80 - 100% Maximal Assist   10 - 14 CL 60 - 80% Moderate Assist   15 - 19 CK 40 - 60% Moderate Assist   20 - 22 CJ 20 - 40% Minimal Assist   23 CI 1-20% SBA / CGA   24 CH 0% Independent/ Mod I     Patient left up in chair with all lines intact, call button in reach and wife  present.    Assessment:  Jorden Shafer is a 71 y.o. male with a medical diagnosis of Acute CVA (cerebrovascular accident) and presents with strength, balance and overall functional deficits and will benefit from continued skilled physical therapy to address and to aid in returning to prior level of function.    Rehab identified problem list/impairments: Rehab identified problem list/impairments: visual deficits    Rehab potential is good.    Activity tolerance: Good    Discharge recommendations: Discharge Facility/Level Of Care Needs: rehabilitation facility     Barriers to discharge: Barriers to Discharge: Inaccessible home environment, Decreased caregiver support    Equipment recommendations: Equipment Needed After Discharge:  (TBD)     GOALS:   Physical Therapy Goals        Problem: Physical Therapy Goal    Goal Priority Disciplines Outcome Goal Variances Interventions   Physical Therapy Goal     PT/OT, PT Ongoing (interventions implemented as appropriate)     Description:  Goals to be met by: 17     Patient will increase functional independence with mobility by performin. Supine to sit with Modified [place]  2. Rolling to Left and Right with Modified [place]  3. Sit to stand transfer with Modified [place] using Single-point Cane  4. Bed to chair transfer with Modified [place] using Single-point Cane   5. Gait  x 250 feet with Modified [place] using Single-point Cane   6. Ascend/descend 3 steps with right Handrail Modified [place]  7. Lower extremity exercise program x 30 reps per handout, with independence                PLAN:    Patient to be seen daily  to address the above listed problems via gait training, therapeutic activities, therapeutic exercises, neuromuscular re-education  Plan of Care expires: 17  Plan of Care reviewed with: patient         Maryam Zapata, PTA  2017

## 2017-04-16 NOTE — PLAN OF CARE
Problem: Physical Therapy Goal  Goal: Physical Therapy Goal  Goals to be met by: 17     Patient will increase functional independence with mobility by performin. Supine to sit with Modified [place]  2. Rolling to Left and Right with Modified [place]  3. Sit to stand transfer with Modified [place] using Single-point Cane  4. Bed to chair transfer with Modified [place] using Single-point Cane   5. Gait x 250 feet with Modified [place] using Single-point Cane   6. Ascend/descend 3 steps with right Handrail Modified [place]  7. Lower extremity exercise program x 30 reps per handout, with independence   Outcome: Ongoing (interventions implemented as appropriate)  Patient is progressing well towards goals as indicated by increase in gait distance

## 2017-04-16 NOTE — PROGRESS NOTES
[facility] Hospital Medicine  Progress Note    Patient Name: Jorden Shafer  MRN: [id number]  Patient Class: IP- Inpatient   Admission Date: 4/12/2017  Length of Stay: 4 days  Attending Physician: Soren Schulz MD  Primary Care Provider: Neha Plasencia MD        Subjective:     Principal Problem:Acute CVA (cerebrovascular accident)    HPI:  Mr. Jorden Shafer is a 71 y.o. male with essential hypertension, hyperlipidemia (LDL 77.8 Sept 2015), type 2 diabetes mellitus (HbA1c 8.5% Jun 2016), CAD s/p CABG & PCI, chronic combined systolic and diastolic heart failure (LVEF 30-35% Jun 2016), insulin pump, obesity (BMI 37.7), mild protein malnutrition, KELVIN on CPAP, and history of CVA in place who presents to [facility] ED with complaints of dysarthria last night. His family noted him to have some slurring of his speech yesterday that progressed to left facial drooping and a headache today.  He has been out of his medications for the past two months due to financial constraints.  He was also noted to have a productive cough of green sputum.  Further history is limited at this time.    Hospital Course:  The patient was admitted to the hospital for evaluation/treatment of acute stroke.  Patient was found by imaging to have a R MCA distribution stroke (R frontal lobe) with carotid bifurcation stenosis.  Speech/PT/OT were consulted and recommended Rehab.  Surgery was consulted and recommended follow up in clinic in 2 weeks as this was a completed stroke.     No new subjective & objective note has been filed under this hospital service since the last note was generated.      ROS  No CP  No SOB  No N/V/D  No back pain  No Headache    Physical  3/5 strength on L. Some L neglect  A and 0  CV RRR  Lungs- clear  Abdomen NT/ND    Assessment/Plan:      * Acute CVA (cerebrovascular accident)  Patient has sustained an acute CVA, and unfortunately he is far outside the therapeutic window for tPA.  CT-head was  "negative for acute intracranial hemorrhage but was significant for "...development of a large right temporal infarct. There is no evidence acute hemorrhage. There is evidence of a prior right cerebellar infarct."    A bilateral carotid ultrasound was significant for "[o]cclusion of the mid and proximal segments of the right ICA.  Retrograde flow within the right vertebral artery."  . Patient is not within the therapeutic window for tPA and he is already on aspirin.  Will obtain a lipid panel; allow for permissive hypertension in order not to extend the penumbra; consult PT/OT and Speech Therapy for evaluation; consult  for discharge planning; and consult Neurology for further recommendations.  Recs from PT/OT are rehab. Consulted surgery for R carotid stenosis-follow up as out patient in 2 weeks as this was a completed stroke in the R MCA territory.     CAD s/p CABG & PCI  Stable; will continue his home regimen of aspirin, atorvastatin, and clopidogrel.  Will hold his home regimen of carvedilol and lisinopril to allow for permissive hypertension.    Type 2 diabetes mellitus, uncontrolled  Poorly-controlled on a home regimen of an insulin pump and prandial insulin therapy; will provide the insulin pump, prandial insulin, and insulin along with insulin sliding scale.    Hyperlipidemia  Well-controlled; will continue patient's home regimen of atorvastatin.    Essential hypertension  As addressed above.  Will withhold antihypertensive medications at this time to allow for permissive hypertension.    Chronic combined systolic and diastolic heart failure  Stable without any evidence of acute heart failure; will hold his home regimen of carvedilol, furosemide, and lisinopril to allow for permissive hypertension.    Hypokalemia  Will replete orally and recheck his potassium level in the morning.    Insulin pump in place  Stable; there are no acute issues.    Mild protein malnutrition  Will provide protein " supplementation with Boost Glucose one he is cleared for a diet.    KELVIN on CPAP  Will provide CPAP nightly.    History of CVA (cerebrovascular accident)  As addressed above.    GERD (gastroesophageal reflux disease)  Will substitute his home regimen of omeprazole for pantoprazole while he is inpatient.    VTE Risk Mitigation         Ordered     enoxaparin injection 40 mg  Daily     Route:  Subcutaneous        04/12/17 2139     Medium Risk of VTE  Once      04/12/17 2139      No new issues.  To Rehab when arranged. Continue PT/OT/speech therapy for now.       Soren Hylton MD  Department of Hospital Medicine   [facility]

## 2017-04-17 ENCOUNTER — TELEPHONE (OUTPATIENT)
Dept: FAMILY MEDICINE | Facility: CLINIC | Age: 72
End: 2017-04-17

## 2017-04-17 LAB
POCT GLUCOSE: 226 MG/DL (ref 70–110)
POCT GLUCOSE: 279 MG/DL (ref 70–110)
POCT GLUCOSE: 361 MG/DL (ref 70–110)
POCT GLUCOSE: 368 MG/DL (ref 70–110)

## 2017-04-17 PROCEDURE — 92526 ORAL FUNCTION THERAPY: CPT

## 2017-04-17 PROCEDURE — 97535 SELF CARE MNGMENT TRAINING: CPT

## 2017-04-17 PROCEDURE — 25000003 PHARM REV CODE 250: Performed by: EMERGENCY MEDICINE

## 2017-04-17 PROCEDURE — 92507 TX SP LANG VOICE COMM INDIV: CPT

## 2017-04-17 PROCEDURE — 63600175 PHARM REV CODE 636 W HCPCS: Performed by: INTERNAL MEDICINE

## 2017-04-17 PROCEDURE — 97116 GAIT TRAINING THERAPY: CPT

## 2017-04-17 PROCEDURE — 12000002 HC ACUTE/MED SURGE SEMI-PRIVATE ROOM

## 2017-04-17 PROCEDURE — 63600175 PHARM REV CODE 636 W HCPCS: Performed by: EMERGENCY MEDICINE

## 2017-04-17 PROCEDURE — 97530 THERAPEUTIC ACTIVITIES: CPT

## 2017-04-17 RX ADMIN — INSULIN ASPART 10 UNITS: 100 INJECTION, SOLUTION INTRAVENOUS; SUBCUTANEOUS at 01:04

## 2017-04-17 RX ADMIN — INSULIN ASPART 6 UNITS: 100 INJECTION, SOLUTION INTRAVENOUS; SUBCUTANEOUS at 05:04

## 2017-04-17 RX ADMIN — PANTOPRAZOLE SODIUM 40 MG: 40 TABLET, DELAYED RELEASE ORAL at 09:04

## 2017-04-17 RX ADMIN — CITALOPRAM HYDROBROMIDE 10 MG: 10 TABLET ORAL at 09:04

## 2017-04-17 RX ADMIN — AMITRIPTYLINE HYDROCHLORIDE 50 MG: 25 TABLET, FILM COATED ORAL at 09:04

## 2017-04-17 RX ADMIN — ATORVASTATIN CALCIUM 20 MG: 10 TABLET, FILM COATED ORAL at 09:04

## 2017-04-17 RX ADMIN — INSULIN ASPART 4 UNITS: 100 INJECTION, SOLUTION INTRAVENOUS; SUBCUTANEOUS at 09:04

## 2017-04-17 RX ADMIN — CLOPIDOGREL BISULFATE 75 MG: 75 TABLET ORAL at 09:04

## 2017-04-17 RX ADMIN — INSULIN ASPART 5 UNITS: 100 INJECTION, SOLUTION INTRAVENOUS; SUBCUTANEOUS at 09:04

## 2017-04-17 RX ADMIN — ASPIRIN 325 MG ORAL TABLET 325 MG: 325 PILL ORAL at 09:04

## 2017-04-17 RX ADMIN — INSULIN DETEMIR 15 UNITS: 100 INJECTION, SOLUTION SUBCUTANEOUS at 02:04

## 2017-04-17 RX ADMIN — ENOXAPARIN SODIUM 40 MG: 100 INJECTION SUBCUTANEOUS at 05:04

## 2017-04-17 NOTE — PT/OT/SLP PROGRESS
Physical Therapy  Treatment    Jorden Shafer   MRN: [id number]   Admitting Diagnosis: Acute CVA (cerebrovascular accident)    PT Received On: 17  PT Start Time: 1448     PT Stop Time: 1504    PT Total Time (min): 16 min       Billable Minutes:  Gait Training 16 min    Treatment Type: Treatment  PT/PTA: PT     PTA Visit Number: 0       General Precautions: Standard, fall  Orthopedic Precautions: N/A     Subjective:    Pt reported no dizziness or SOB during ambulation.    Pain Ratin/10                   Objective:   Patient found with: telemetry    Functional Mobility:  Bed Mobility:   Scooting/Bridging: Stand by Assistance  Supine to Sit: Stand by Assistance (HOB elevated)  Sit to Supine: Stand by Assistance (HOB elevated)    Transfers:  Sit <> Stand Assistance: Contact Guard Assistance  Sit <> Stand Assistive Device: Straight Cane    Gait:   Gait Distance: 250 ft; Pt with decreased L foot clearance.  Pt with 1 LOB, min VC's to recover.  Pt with decreased nayeli.  Pt with decreased attention to L side.  Assistance 1: Contact Guard Assistance  Gait Assistive Device: Single point cane  Gait Pattern: 3-point gait  Gait Deviation(s): decreased toe-to-floor clearance, decreased velocity of limb motion, decreased nayeli, toe drag      Balance:   Static Sit: FAIR+: Able to take MINIMAL challenges from all directions  Dynamic Sit: FAIR+: Maintains balance through MINIMAL excursions of active trunk motion  Static Stand: FAIR+: Takes MINIMAL challenges from all directions  Dynamic stand: FAIR: Needs CONTACT GUARD during gait     Therapeutic Activities and Exercises:  BLE standing therex 10 reps with B HHA: heel raises, calf raises (Pt had difficulty with LLE.), marches, mini squats    Balance Training  Static Sitting/Standing:  Patient performed static standing on SLS (3 trials) on level surface  using B HHA with Minimal Assistance and minimal verbal cues.  At best, pt able to maintain SLS for ~10 sec.          AM-PAC 6 CLICK MOBILITY  How much help from another person does this patient currently need?   1 = Unable, Total/Dependent Assistance  2 = A lot, Maximum/Moderate Assistance  3 = A little, Minimum/Contact Guard/Supervision  4 = None, Modified [place]/Independent    Turning over in bed (including adjusting bedclothes, sheets and blankets)?: 4  Sitting down on and standing up from a chair with arms (e.g., wheelchair, bedside commode, etc.): 4  Moving from lying on back to sitting on the side of the bed?: 4  Moving to and from a bed to a chair (including a wheelchair)?: 3  Need to walk in hospital room?: 3  Climbing 3-5 steps with a railing?: 3  Total Score: 21    AM-PAC Raw Score CMS G-Code Modifier Level of Impairment Assistance   6 % Total / Unable   7 - 9 CM 80 - 100% Maximal Assist   10 - 14 CL 60 - 80% Moderate Assist   15 - 19 CK 40 - 60% Moderate Assist   20 - 22 CJ 20 - 40% Minimal Assist   23 CI 1-20% SBA / CGA   24 CH 0% Independent/ Mod I     Patient left HOB elevated with all lines intact, call button in reach, bed alarm on and nurse Fang notified.    Assessment:  Pt with decreased safety awareness, per nursing pt had a fall yesterday.  Pt also with decreased attention to the L side.  Pt with LOB during ambulation.  Pt will benefit from skilled PT services.      Rehab identified problem list/impairments: Rehab identified problem list/impairments: weakness, impaired endurance, impaired self care skills, impaired functional mobilty, gait instability, impaired balance, visual deficits, decreased coordination, decreased upper extremity function, decreased lower extremity function, decreased safety awareness, impaired cardiopulmonary response to activity    Rehab potential is good.    Activity tolerance: Fair    Discharge recommendations: Discharge Facility/Level Of Care Needs: rehabilitation facility     Barriers to discharge: Barriers to Discharge: Other (Comment) (decreased mobility and  safety awareness)    Equipment recommendations: Equipment Needed After Discharge: none     GOALS:   Physical Therapy Goals        Problem: Physical Therapy Goal    Goal Priority Disciplines Outcome Goal Variances Interventions   Physical Therapy Goal     PT/OT, PT Ongoing (interventions implemented as appropriate)     Description:  Goals to be met by: 17     Patient will increase functional independence with mobility by performin. Supine to sit with Modified [place]  2. Rolling to Left and Right with Modified [place]  3. Sit to stand transfer with Modified [place] using Single-point Cane  4. Bed to chair transfer with Modified [place] using Single-point Cane   5. Gait  x 250 feet with Modified [place] using Single-point Cane   6. Ascend/descend 3 steps with right Handrail Modified [place]  7. Lower extremity exercise program x 30 reps per handout, with independence                PLAN:    Patient to be seen 5 x/week (Mon-Fri)  to address the above listed problems via gait training, therapeutic activities, therapeutic exercises, neuromuscular re-education  Plan of Care expires: 17  Plan of Care reviewed with: patient         Dafne Colunga, PT  2017

## 2017-04-17 NOTE — PLAN OF CARE
04/17/17 1026   Discharge Reassessment   Assessment Type Discharge Planning Reassessment   Can the patient answer the patient profile reliably? Yes, cognitively intact   Discharge plan remains the same: Yes   Provided patient/caregiver education on the expected discharge date and the discharge plan Yes   Discharge Plan A Rehab   Discharge Plan B Rehab   Change in patient condition or support system Yes   Patient choice form signed by patient/caregiver Yes   Explained to the the patient/caregiver why the discharge planned changed: Yes   Involved the patient/caregiver in establishing a new discharge plan: Yes     IONA spoke with pt and pt daughter Jessika concerning pt discharge plans. According to pt his first choice for rehab is [facility]. IONA contacted Sonal to determine if pt information has been reviewed. According to Sonal pt has been medical accepted, and she will submit to insurance today. According to Sonal once she gets authorization from [facility] Medicare she will notify IONA. IONA informed pt and Jessika pt has been accepted by [facility] and they are awaiting insurance authorization.

## 2017-04-17 NOTE — PLAN OF CARE
04/17/17 1033   Medicare Message   Important Message from Medicare regarding Discharge Appeal Rights Given to patient/caregiver;Explained to patient/caregiver;Signed/date by patient/caregiver   Date IMM was signed 04/17/17   Time IMM was signed 1022

## 2017-04-17 NOTE — PT/OT/SLP PROGRESS
Speech Language Pathology  Treatment    Jorden Shafer   MRN: [id number]   Admitting Diagnosis: Acute CVA (cerebrovascular accident)    Diet recommendations: Solid Diet Level: Dental Soft  Liquid Diet Level: Nectar Thick No straws, HOB to 90 degrees, Small bites/sips and Alternating bites/sips    SLP Treatment Date: 17  Speech Start Time: 1740     Speech Stop Time: 1818     Speech Total (min): 38 min       TREATMENT BILLABLE MINUTES:  Speech Therapy Individual 18 and Treatment Swallowing Dysfunction 20    Has the patient been evaluated by SLP for swallowing? : Yes  Keep patient NPO?: No   General Precautions: Standard, fall, nectar thick, vision impaired          Subjective:  Patient seen in bed, alert, and awake. Family present.     Pain Ratin/10              Pain Rating Post-Intervention: 0/10    Objective:    Patient completed lingual lateralization, protrusion, and retraction exercises x 20 min cuing; Patient completed labial closure and puckering exercises x 30 min cuing; Patient educated on safe swallow strategies during meals with good verbal understanding and recall; Patient reported notable improvement in strength on left side, raised arm above head. Patient completed convergent and divergent naming tasks given mod cuing with 75% acc. ST educated family members on thickened liquids and demonstrated how to thicken drinks with good response and understanding. Continue with ST services as indicated.     FIM:                    Expression: 3 Moderate Prompting--The patient expresses basic daily needs and ideas 50 to 74% of the time.          Assessment:  Jorden Shafer is a 71 y.o. male with a medical diagnosis of Acute CVA (cerebrovascular accident) and presents with dysarthria, aphasia, and dysphagia deficits; see objective. Continue skilled therapy services.   Discharge recommendations: Discharge Facility/Level Of Care Needs: rehabilitation facility     Goals:   SLP Goals        Problem: SLP Goal     Goal Priority Disciplines Outcome   SLP Goal    SLP GOALS:      1. Patient will tolerate honey thick liquids/dental soft solids no s/s of aspiration.  2. Patient will complete hyolaryngeal exercises to improve swallow function x 20.   3. Patient will complete OME's x 20 each to improve speech intelligibility and swallow function.  4. Patient will complete convergent and divergent naming tasks with 100% independent of cuing.  5. Patient will complete speech intelligibility/articulation exercises to improve communication in ADL's min cuing.       Emily Herbert [facility]/CCC-SLP     SLP Ongoing (interventions implemented as appropriate)              Plan:   Patient to be seen Therapy Frequency: 3 x/week   Plan of Care expires: 04/21/17  Plan of Care reviewed with: patient, spouse, caregiver  SLP Follow-up?: Yes  SLP - Next Visit Date: 04/18/17           Emily Herbert CCC-SLP  04/17/2017

## 2017-04-17 NOTE — PT/OT/SLP PROGRESS
"Occupational Therapy  Treatment    Jorden Shafer   MRN: [id number]   Admitting Diagnosis: Acute CVA (cerebrovascular accident)    OT Date of Treatment: 17   OT Start Time: 1035  OT Stop Time: 1109  OT Total Time (min): 34 min    Billable Minutes:  Self Care/Home Management 20 and Therapeutic Activity 14    General Precautions: Standard, fall  Orthopedic Precautions: N/A  Braces: N/A    Do you have any cultural, spiritual, Quaker conflicts, given your current situation?: no    Subjective:  Communicated with nurse prior to session.  "I want to walk in the diana"    Pain Ratin/10      Pain Rating Post-Intervention: 0/10    Objective:  Patient found with: telemetry     Functional Mobility:  Bed Mobility:  Supine to Sit: Stand by Assistance    Transfers:   Sit <> Stand Assistance: Contact Guard Assistance  Sit <> Stand Assistive Device: Rolling Walker  Toilet Transfer Assistance: Contact Guard Assistance  Toilet Transfer Assistive Device: Rolling Walker, grab bar    Functional Ambulation: CGA with RW, bed>sink>toilet>sink into diana>min A for balance and safety with RW>BS chair.    Activities of Daily Living:     LE Dressing Level of Assistance: Maximum assistance  Grooming Position: Standing  Grooming Level of Assistance: Contact guard assistance  Toileting Where Assessed: Toilet  Toileting Level of Assistance: Minimum assistance      Balance:   Static Sit: GOOD: Takes MODERATE challenges from all directions  Dynamic Sit: GOOD-: Maintains balance through MODERATE excursions of active trunk movement,     Static Stand: FAIR: Maintains without assist but unable to take challenges  Dynamic stand: FAIR: Needs CONTACT GUARD during gait    Therapeutic Activities and Exercises:  Patient completed bed mobility to EOB, sit to stand with RW, ambulation to sink for stance grooming, ambulation to toilet for bowel management, ambulation>sink>diana>BS chair.      AM-PAC 6 CLICK ADL   How much help from another person does " this patient currently need?   1 = Unable, Total/Dependent Assistance  2 = A lot, Maximum/Moderate Assistance  3 = A little, Minimum/Contact Guard/Supervision  4 = None, Modified [place]/Independent    Putting on and taking off regular lower body clothing? : 2  Bathing (including washing, rinsing, drying)?: 2  Toileting, which includes using toilet, bedpan, or urinal? : 3  Putting on and taking off regular upper body clothing?: 3  Taking care of personal grooming such as brushing teeth?: 3  Eating meals?: 3  Total Score: 16     AM-PAC Raw Score CMS G-Code Modifier Level of Impairment Assistance   6 % Total / Unable   7 - 9 CM 80 - 100% Maximal Assist   10 - 14 CL 60 - 80% Moderate Assist   15 - 19 CK 40 - 60% Moderate Assist   20 - 22 CJ 20 - 40% Minimal Assist   23 CI 1-20% SBA / CGA   24 CH 0% Independent/ Mod I     Patient left up in chair with all lines intact and family present; nursing notified    ASSESSMENT:  Jorden Shafer is a 71 y.o. male with a medical diagnosis of Acute CVA (cerebrovascular accident) and presents with neglect, impaired LUE function impaired safety and sequencing with ADLs.  Patient left OOB with family present for supervision.  Patient is a high fall risk with new fall overnight and limited insight into impairments.    Rehab identified problem list/impairments: Rehab identified problem list/impairments: weakness, impaired endurance, impaired self care skills, impaired functional mobilty, decreased coordination, impaired balance, gait instability, decreased upper extremity function, decreased lower extremity function, decreased safety awareness, abnormal tone, decreased ROM, impaired coordination, impaired fine motor    Rehab potential is good.    Activity tolerance: Good    Discharge recommendations: Discharge Facility/Level Of Care Needs: rehabilitation facility     Barriers to discharge: Barriers to Discharge: Decreased caregiver support    Equipment recommendations:   (TBD at needed)     GOALS:   Occupational Therapy Goals        Problem: Occupational Therapy Goal    Goal Priority Disciplines Outcome Interventions   Occupational Therapy Goal     OT, PT/OT Ongoing (interventions implemented as appropriate)    Description:  Goals to be met by: 4/27/17    Patient will increase functional independence with ADLs by performing:    Feeding with Supervision.  UE Dressing with Contact Guard Assistance.  LE Dressing with Stand-by Assistance.  Grooming while standing at sink with Stand-by Assistance.  Toileting from toilet with Stand-by Assistance for hygiene and clothing management.   Supine to sit with Stand-by Assistance.  Stand pivot transfers with Stand-by Assistance.  Toilet transfer to toilet with Stand-by Assistance.  Upper extremity exercise program x10 reps per handout, with assistance as needed.                Plan:  Patient to be seen 6 x/week to address the above listed problems via self-care/home management, therapeutic activities, therapeutic exercises, neuromuscular re-education  Plan of Care expires: 04/27/17  Plan of Care reviewed with: patient         Jane Molina, OT  04/17/2017

## 2017-04-17 NOTE — TELEPHONE ENCOUNTER
----- Message from Krys Nieves sent at 4/13/2017 10:58 AM CDT -----  Contact: Tika  [facility] @ [phone].

## 2017-04-17 NOTE — NURSING
Patient  with lunch. MD notified. New orders received for long-acting insulin to start this afternoon. Insulin administered. Will continue to monitor.

## 2017-04-17 NOTE — PLAN OF CARE
Problem: Occupational Therapy Goal  Goal: Occupational Therapy Goal  Goals to be met by: 4/27/17    Patient will increase functional independence with ADLs by performing:    Feeding with Supervision.  UE Dressing with Contact Guard Assistance.  LE Dressing with Stand-by Assistance.  Grooming while standing at sink with Stand-by Assistance.  Toileting from toilet with Stand-by Assistance for hygiene and clothing management.   Supine to sit with Stand-by Assistance.  Stand pivot transfers with Stand-by Assistance.  Toilet transfer to toilet with Stand-by Assistance.  Upper extremity exercise program x10 reps per handout, with assistance as needed.   Outcome: Ongoing (interventions implemented as appropriate)  Patient making progress towards goals; limited L hand FMC/GMC and strength affects BUE manipulation of objects and ADLs independence this date.  Patient also displays L neglect, impulsivity and inattention to tasks affecting safety with axs this date.

## 2017-04-17 NOTE — PROGRESS NOTES
[facility] Hospital Medicine  Progress Note    Patient Name: Jorden Shafer  MRN: [id number]  Patient Class: IP- Inpatient   Admission Date: 4/12/2017  Length of Stay: 5 days  Attending Physician: Soren Schulz MD  Primary Care Provider: Neha Plasencia MD        Subjective:     Principal Problem:Acute CVA (cerebrovascular accident)    HPI:  Mr. Jorden Shafer is a 71 y.o. male with essential hypertension, hyperlipidemia (LDL 77.8 Sept 2015), type 2 diabetes mellitus (HbA1c 8.5% Jun 2016), CAD s/p CABG & PCI, chronic combined systolic and diastolic heart failure (LVEF 30-35% Jun 2016), insulin pump, obesity (BMI 37.7), mild protein malnutrition, KELVIN on CPAP, and history of CVA in place who presents to [facility] ED with complaints of dysarthria last night. His family noted him to have some slurring of his speech yesterday that progressed to left facial drooping and a headache today.  He has been out of his medications for the past two months due to financial constraints.  He was also noted to have a productive cough of green sputum.  Further history is limited at this time.    Hospital Course:  The patient was admitted to the hospital for evaluation/treatment of acute stroke.  Patient was found by imaging to have a R MCA distribution stroke (R frontal lobe) with carotid bifurcation stenosis.  Speech/PT/OT were consulted and recommended Rehab.  Surgery was consulted and recommended follow up in clinic in 2 weeks as this was a completed stroke.  was consulted for Rehab placement.     Interval History:  No acute issues    Review of Systems   Constitutional: Negative for activity change.   Respiratory: Negative for chest tightness and shortness of breath.    Cardiovascular: Negative for chest pain.   Gastrointestinal: Negative for abdominal pain.     Objective:     Vital Signs (Most Recent):  Temp: 98 °F (36.7 °C) (04/17/17 0920)  Pulse: 80 (04/17/17 0920)  Resp: 16 (04/17/17  "0920)  BP: (!) 177/81 (04/17/17 0920)  SpO2: (!) 94 % (04/17/17 0920) Vital Signs (24h Range):  Temp:  [97.7 °F (36.5 °C)-98.9 °F (37.2 °C)] 98 °F (36.7 °C)  Pulse:  [73-94] 80  Resp:  [16-20] 16  SpO2:  [92 %-96 %] 94 %  BP: (118-177)/(67-81) 177/81     Weight: 105.1 kg (231 lb 11.2 oz)  Body mass index is 35.23 kg/(m^2).  No intake or output data in the 24 hours ending 04/17/17 1043   Physical Exam  Alert and oriented.   Vitals reviewed.  2-3/5 L upper ext weakness  CV RRR  Clear lungs  + bs, nt, nd.   Significant Labs: BMP: No results for input(s): GLU, NA, K, CL, CO2, BUN, CREATININE, CALCIUM, MG in the last 48 hours.  CBC: No results for input(s): WBC, HGB, HCT, PLT in the last 48 hours.    Significant Imaging:    Assessment/Plan:      * Acute CVA (cerebrovascular accident)  Patient has sustained an acute CVA, and unfortunately he is far outside the therapeutic window for tPA.  CT-head was negative for acute intracranial hemorrhage but was significant for "...development of a large right temporal infarct. There is no evidence acute hemorrhage. There is evidence of a prior right cerebellar infarct."    A bilateral carotid ultrasound was significant for "[o]cclusion of the mid and proximal segments of the right ICA.  Retrograde flow within the right vertebral artery."  . Patient is not within the therapeutic window for tPA and he is already on aspirin.  Will obtain a lipid panel; allow for permissive hypertension in order not to extend the penumbra; consult PT/OT and Speech Therapy for evaluation; consult  for discharge planning; and consult Neurology for further recommendations.  Recs from PT/OT are rehab. Consulted surgery for R carotid stenosis-follow up as out patient in 2 weeks as this was a completed stroke in the R MCA territory.     CAD s/p CABG & PCI  Stable; will continue his home regimen of aspirin, atorvastatin, and clopidogrel.  Will hold his home regimen of carvedilol and lisinopril " to allow for permissive hypertension.    Type 2 diabetes mellitus, uncontrolled  Poorly-controlled on a home regimen of an insulin pump and prandial insulin therapy; will provide the insulin pump, prandial insulin, and insulin along with insulin sliding scale.    Hyperlipidemia  Well-controlled; will continue patient's home regimen of atorvastatin.    Essential hypertension  As addressed above.  Will withhold antihypertensive medications at this time to allow for permissive hypertension.    Chronic combined systolic and diastolic heart failure  Stable without any evidence of acute heart failure; will hold his home regimen of carvedilol, furosemide, and lisinopril to allow for permissive hypertension.    Hypokalemia  Will replete orally and recheck his potassium level in the morning.    Insulin pump in place  Stable; there are no acute issues.    Mild protein malnutrition  Will provide protein supplementation with Boost Glucose one he is cleared for a diet.    KELVIN on CPAP  Will provide CPAP nightly.    History of CVA (cerebrovascular accident)  As addressed above.    GERD (gastroesophageal reflux disease)  Will substitute his home regimen of omeprazole for pantoprazole while he is inpatient.    VTE Risk Mitigation         Ordered     enoxaparin injection 40 mg  Daily     Route:  Subcutaneous        04/12/17 2139     Medium Risk of VTE  Once      04/12/17 2139        To Rehab when arranged. Medically stable.     Soren Hylton MD  Department of Hospital Medicine   [facility]

## 2017-04-17 NOTE — PLAN OF CARE
Problem: Fall Risk (Adult)  Goal: Identify Related Risk Factors and Signs and Symptoms  Related risk factors and signs and symptoms are identified upon initiation of Human Response Clinical Practice Guideline (CPG)   Outcome: Ongoing (interventions implemented as appropriate)    04/16/17 0701 04/17/17 0301   Fall Risk   Related Risk Factors (Fall Risk) inadequate lighting;gait/mobility problems;environment unfamiliar --    Signs and Symptoms (Fall Risk) --  presence of risk factors       Goal: Absence of Falls  Patient will demonstrate the desired outcomes by discharge/transition of care.   Outcome: Ongoing (interventions implemented as appropriate)    04/17/17 0301   Fall Risk (Adult)   Absence of Falls making progress toward outcome         Problem: Patient Care Overview  Goal: Plan of Care Review  Outcome: Ongoing (interventions implemented as appropriate)    04/17/17 0301   Coping/Psychosocial   Plan Of Care Reviewed With patient         Problem: Stroke (Ischemic) (Adult)  Goal: Signs and Symptoms of Listed Potential Problems Will be Absent, Minimized or Managed (Stroke)  Signs and symptoms of listed potential problems will be absent, minimized or managed by discharge/transition of care (reference Stroke (Ischemic) (Adult) CPG).   Outcome: Ongoing (interventions implemented as appropriate)    04/15/17 0537 04/17/17 0301   Stroke (Ischemic)   Problems Assessed (Stroke (Ischemic)/TIA) --  all   Problems Present (Stroke (Ischemic)/TIA) communication impairment;eating/swallowing impairment;situational response --

## 2017-04-17 NOTE — PROGRESS NOTES
Patient found on floor by nursing staff. Patient states he was trying to reach for his bipap mask that was on the floor and he fell on his left knee. Patient assisted back into bed by nursing staff. Patient states he did not hit his head and fell on his left knee only. Patient with no complaints of pain to his knee; He rates his pain a 0 out of 10.V/S stable: 155/76, 94, 98.9, 20, oxygen saturation 94% on room air.  notified of the patient's fall. No new orders at this time. Bed alarm placed on patient's bed and instructed patient not to get out of bed without assistance. Patient verbalized understanding.

## 2017-04-17 NOTE — PLAN OF CARE
Problem: Physical Therapy Goal  Goal: Physical Therapy Goal  Goals to be met by: 17     Patient will increase functional independence with mobility by performin. Supine to sit with Modified Gary  2. Rolling to Left and Right with Modified Gary  3. Sit to stand transfer with Modified Gary using Single-point Cane  4. Bed to chair transfer with Modified Gary using Single-point Cane   5. Gait x 250 feet with Modified Gary using Single-point Cane   6. Ascend/descend 3 steps with right Handrail Modified Gary  7. Lower extremity exercise program x 30 reps per handout, with independence   Outcome: Ongoing (interventions implemented as appropriate)  Pt ambulated with a str cane with 1 LOB in the hallway today.

## 2017-04-17 NOTE — PLAN OF CARE
Problem: SLP Goal  Goal: SLP Goal  Outcome: Ongoing (interventions implemented as appropriate)  SLP GOALS:     1. Patient will tolerate honey thick liquids/dental soft solids no s/s of aspiration.  2. Patient will complete hyolaryngeal exercises to improve swallow function x 20.   3. Patient will complete OME's x 20 each to improve speech intelligibility and swallow function.  4. Patient will complete convergent and divergent naming tasks with 100% independent of cuing.  5. Patient will complete speech intelligibility/articulation exercises to improve communication in ADL's min cuing.      Emily MARIO/CCC-SLP

## 2017-04-17 NOTE — SUBJECTIVE & OBJECTIVE
Interval History:  No acute issues    Review of Systems   Constitutional: Negative for activity change.   Respiratory: Negative for chest tightness and shortness of breath.    Cardiovascular: Negative for chest pain.   Gastrointestinal: Negative for abdominal pain.     Objective:     Vital Signs (Most Recent):  Temp: 98 °F (36.7 °C) (04/17/17 0920)  Pulse: 80 (04/17/17 0920)  Resp: 16 (04/17/17 0920)  BP: (!) 177/81 (04/17/17 0920)  SpO2: (!) 94 % (04/17/17 0920) Vital Signs (24h Range):  Temp:  [97.7 °F (36.5 °C)-98.9 °F (37.2 °C)] 98 °F (36.7 °C)  Pulse:  [73-94] 80  Resp:  [16-20] 16  SpO2:  [92 %-96 %] 94 %  BP: (118-177)/(67-81) 177/81     Weight: 105.1 kg (231 lb 11.2 oz)  Body mass index is 35.23 kg/(m^2).  No intake or output data in the 24 hours ending 04/17/17 1043   Physical Exam  Alert and oriented.   Vitals reviewed.  2-3/5 L upper ext weakness  CV RRR  Clear lungs  + bs, nt, nd.   Significant Labs: BMP: No results for input(s): GLU, NA, K, CL, CO2, BUN, CREATININE, CALCIUM, MG in the last 48 hours.  CBC: No results for input(s): WBC, HGB, HCT, PLT in the last 48 hours.    Significant Imaging:

## 2017-04-18 LAB
POCT GLUCOSE: 306 MG/DL (ref 70–110)
POCT GLUCOSE: 343 MG/DL (ref 70–110)
POCT GLUCOSE: 350 MG/DL (ref 70–110)
POCT GLUCOSE: 353 MG/DL (ref 70–110)

## 2017-04-18 PROCEDURE — 97535 SELF CARE MNGMENT TRAINING: CPT

## 2017-04-18 PROCEDURE — 25000003 PHARM REV CODE 250: Performed by: EMERGENCY MEDICINE

## 2017-04-18 PROCEDURE — 12000002 HC ACUTE/MED SURGE SEMI-PRIVATE ROOM

## 2017-04-18 PROCEDURE — 99232 SBSQ HOSP IP/OBS MODERATE 35: CPT | Mod: ,,, | Performed by: PSYCHIATRY & NEUROLOGY

## 2017-04-18 PROCEDURE — 97530 THERAPEUTIC ACTIVITIES: CPT

## 2017-04-18 PROCEDURE — 63600175 PHARM REV CODE 636 W HCPCS: Performed by: EMERGENCY MEDICINE

## 2017-04-18 PROCEDURE — 97110 THERAPEUTIC EXERCISES: CPT

## 2017-04-18 PROCEDURE — 97116 GAIT TRAINING THERAPY: CPT

## 2017-04-18 RX ADMIN — CLOPIDOGREL BISULFATE 75 MG: 75 TABLET ORAL at 08:04

## 2017-04-18 RX ADMIN — INSULIN ASPART 8 UNITS: 100 INJECTION, SOLUTION INTRAVENOUS; SUBCUTANEOUS at 12:04

## 2017-04-18 RX ADMIN — INSULIN ASPART 8 UNITS: 100 INJECTION, SOLUTION INTRAVENOUS; SUBCUTANEOUS at 08:04

## 2017-04-18 RX ADMIN — INSULIN DETEMIR 15 UNITS: 100 INJECTION, SOLUTION SUBCUTANEOUS at 09:04

## 2017-04-18 RX ADMIN — ATORVASTATIN CALCIUM 20 MG: 10 TABLET, FILM COATED ORAL at 08:04

## 2017-04-18 RX ADMIN — INSULIN ASPART 8 UNITS: 100 INJECTION, SOLUTION INTRAVENOUS; SUBCUTANEOUS at 05:04

## 2017-04-18 RX ADMIN — AMITRIPTYLINE HYDROCHLORIDE 50 MG: 25 TABLET, FILM COATED ORAL at 09:04

## 2017-04-18 RX ADMIN — PANTOPRAZOLE SODIUM 40 MG: 40 TABLET, DELAYED RELEASE ORAL at 08:04

## 2017-04-18 RX ADMIN — INSULIN ASPART 4 UNITS: 100 INJECTION, SOLUTION INTRAVENOUS; SUBCUTANEOUS at 09:04

## 2017-04-18 RX ADMIN — ASPIRIN 325 MG ORAL TABLET 325 MG: 325 PILL ORAL at 08:04

## 2017-04-18 RX ADMIN — CITALOPRAM HYDROBROMIDE 10 MG: 10 TABLET ORAL at 08:04

## 2017-04-18 RX ADMIN — ENOXAPARIN SODIUM 40 MG: 100 INJECTION SUBCUTANEOUS at 06:04

## 2017-04-18 NOTE — PROGRESS NOTES
[facility] Hospital Medicine  Progress Note    Patient Name: Jorden Shafer  MRN: [id number]  Patient Class: IP- Inpatient   Admission Date: 4/12/2017  Length of Stay: 6 days  Attending Physician: Soren Schulz MD  Primary Care Provider: Neha Plasencia MD        Subjective:     Principal Problem:Acute CVA (cerebrovascular accident)    HPI:  Mr. Jorden Shafer is a 71 y.o. male with essential hypertension, hyperlipidemia (LDL 77.8 Sept 2015), type 2 diabetes mellitus (HbA1c 8.5% Jun 2016), CAD s/p CABG & PCI, chronic combined systolic and diastolic heart failure (LVEF 30-35% Jun 2016), insulin pump, obesity (BMI 37.7), mild protein malnutrition, KELVIN on CPAP, and history of CVA in place who presents to [facility] ED with complaints of dysarthria last night. His family noted him to have some slurring of his speech yesterday that progressed to left facial drooping and a headache today.  He has been out of his medications for the past two months due to financial constraints.  He was also noted to have a productive cough of green sputum.  Further history is limited at this time.    Hospital Course:  The patient was admitted to the hospital for evaluation/treatment of acute stroke.  Patient was found by imaging to have a R MCA distribution stroke (R frontal lobe) with carotid bifurcation stenosis.  Speech/PT/OT were consulted and recommended Rehab.  Surgery was consulted and recommended follow up in clinic in 2 weeks as this was a completed stroke.  was consulted for Rehab placement.     Interval History: No new issues.     Review of Systems   Constitutional: Negative for activity change.   Respiratory: Negative for chest tightness and shortness of breath.    Cardiovascular: Negative for chest pain.   Gastrointestinal: Negative for abdominal pain.     Objective:     Vital Signs (Most Recent):  Temp: 97.6 °F (36.4 °C) (04/18/17 0400)  Pulse: 72 (04/18/17 0400)  Resp: 20 (04/18/17  "0400)  BP: 123/77 (04/18/17 0400)  SpO2: 95 % (04/18/17 0400) Vital Signs (24h Range):  Temp:  [96.3 °F (35.7 °C)-98.6 °F (37 °C)] 97.6 °F (36.4 °C)  Pulse:  [] 72  Resp:  [16-22] 20  SpO2:  [93 %-98 %] 95 %  BP: (113-177)/(66-81) 123/77     Weight: 105.1 kg (231 lb 11.2 oz)  Body mass index is 35.23 kg/(m^2).    Intake/Output Summary (Last 24 hours) at 04/18/17 0900  Last data filed at 04/18/17 0600   Gross per 24 hour   Intake              960 ml   Output             1300 ml   Net             -340 ml      Physical Exam  Vitals reviewed  CV RRR  Lungs clear  Abdomen  NT ND  Neuro- 3/5 strength on L upper     Significant Labs: BMP: No results for input(s): GLU, NA, K, CL, CO2, BUN, CREATININE, CALCIUM, MG in the last 48 hours.  CBC: No results for input(s): WBC, HGB, HCT, PLT in the last 48 hours.    Significant Imaging:     Assessment/Plan:      * Acute CVA (cerebrovascular accident)  Patient has sustained an acute CVA, and unfortunately he is far outside the therapeutic window for tPA.  CT-head was negative for acute intracranial hemorrhage but was significant for "...development of a large right temporal infarct. There is no evidence acute hemorrhage. There is evidence of a prior right cerebellar infarct."    A bilateral carotid ultrasound was significant for "[o]cclusion of the mid and proximal segments of the right ICA.  Retrograde flow within the right vertebral artery."  . Patient is not within the therapeutic window for tPA and he is already on aspirin.  Will obtain a lipid panel; allow for permissive hypertension in order not to extend the penumbra; consult PT/OT and Speech Therapy for evaluation; consult  for discharge planning; and consult Neurology for further recommendations.  Recs from PT/OT are rehab. Consulted surgery for R carotid stenosis-follow up as out patient in 2 weeks as this was a completed stroke in the R MCA territory.     CAD s/p CABG & PCI  Stable; will continue his " home regimen of aspirin, atorvastatin, and clopidogrel.  Will hold his home regimen of carvedilol and lisinopril to allow for permissive hypertension.    Type 2 diabetes mellitus, uncontrolled  Poorly-controlled on a home regimen of an insulin pump and prandial insulin therapy; will provide the insulin pump, prandial insulin, and insulin along with insulin sliding scale.    Hyperlipidemia  Well-controlled; will continue patient's home regimen of atorvastatin.    Essential hypertension  As addressed above.  Will withhold antihypertensive medications at this time to allow for permissive hypertension.    Chronic combined systolic and diastolic heart failure  Stable without any evidence of acute heart failure; will hold his home regimen of carvedilol, furosemide, and lisinopril to allow for permissive hypertension.    Hypokalemia  Will replete orally and recheck his potassium level in the morning.    Insulin pump in place  Stable; there are no acute issues.    Mild protein malnutrition  Will provide protein supplementation with Boost Glucose one he is cleared for a diet.    KELVIN on CPAP  Will provide CPAP nightly.    History of CVA (cerebrovascular accident)  As addressed above.    GERD (gastroesophageal reflux disease)  Will substitute his home regimen of omeprazole for pantoprazole while he is inpatient.    VTE Risk Mitigation         Ordered     enoxaparin injection 40 mg  Daily     Route:  Subcutaneous        04/12/17 2139     Medium Risk of VTE  Once      04/12/17 2139      To Rehab once arranged. Will discuss with  this am.       Soren Hylton MD  Department of Hospital Medicine   [facility]

## 2017-04-18 NOTE — PLAN OF CARE
Problem: Physical Therapy Goal  Goal: Physical Therapy Goal  Goals to be met by: 17     Patient will increase functional independence with mobility by performin. Supine to sit with Modified Jamison  2. Rolling to Left and Right with Modified [place]  3. Sit to stand transfer with Modified [place] using Single-point Cane  4. Bed to chair transfer with Modified Jam[place] using Single-point Cane   5. Gait x 250 feet with Modified Jamison using Single-point Cane   6. Ascend/descend 3 steps with right Handrail Modified Jamison  7. Lower extremity exercise program x 30 reps per handout, with independence   Outcome: Ongoing (interventions implemented as appropriate)  Pt with poor balance during higher level dynamic standing balance activities such as braiding and tandem walking.

## 2017-04-18 NOTE — SUBJECTIVE & OBJECTIVE
Interval History: No new issues.     Review of Systems   Constitutional: Negative for activity change.   Respiratory: Negative for chest tightness and shortness of breath.    Cardiovascular: Negative for chest pain.   Gastrointestinal: Negative for abdominal pain.     Objective:     Vital Signs (Most Recent):  Temp: 97.6 °F (36.4 °C) (04/18/17 0400)  Pulse: 72 (04/18/17 0400)  Resp: 20 (04/18/17 0400)  BP: 123/77 (04/18/17 0400)  SpO2: 95 % (04/18/17 0400) Vital Signs (24h Range):  Temp:  [96.3 °F (35.7 °C)-98.6 °F (37 °C)] 97.6 °F (36.4 °C)  Pulse:  [] 72  Resp:  [16-22] 20  SpO2:  [93 %-98 %] 95 %  BP: (113-177)/(66-81) 123/77     Weight: 105.1 kg (231 lb 11.2 oz)  Body mass index is 35.23 kg/(m^2).    Intake/Output Summary (Last 24 hours) at 04/18/17 0900  Last data filed at 04/18/17 0600   Gross per 24 hour   Intake              960 ml   Output             1300 ml   Net             -340 ml      Physical Exam  Vitals reviewed  CV RRR  Lungs clear  Abdomen  NT ND  Neuro- 3/5 strength on L upper     Significant Labs: BMP: No results for input(s): GLU, NA, K, CL, CO2, BUN, CREATININE, CALCIUM, MG in the last 48 hours.  CBC: No results for input(s): WBC, HGB, HCT, PLT in the last 48 hours.    Significant Imaging:

## 2017-04-18 NOTE — PROGRESS NOTES
[facility]  Neurology  Progress Note    Patient Name: Jorden Shafer  MRN: [id number]  Admission Date: 4/12/2017  Hospital Length of Stay: 6 days  Code Status: Full Code   Attending Provider: Soren Schulz MD  Primary Care Physician: Neha Plasencia MD   Principal Problem:Acute CVA (cerebrovascular accident)    Subjective:     Interval History: 72 y/o male with medical Hx as listed below brought to ED after being found to have slurred speech and left sided weakness. Pt has Hx of stroke but uncertain at this point what are the residual deficits (attempted several times to communicate with family to no avail). He is not able to provide information.      -4/14/17: Pt complaining of headache today. He is verbal and states that he is moving his left arm better today.      -4/15/17: No new complaints today.     -4/16/17: Walking with PT this morning. Hemiparetic gait.    -4/18/17: Pt with no new complaints today. Dysarthria and left sided weakness remains.        Current Neurological Medications:     Current Facility-Administered Medications   Medication Dose Route Frequency Provider Last Rate Last Dose    acetaminophen tablet 500 mg  500 mg Oral Q6H PRN Mallory Haynes MD        amitriptyline tablet 50 mg  50 mg Oral QHS Yosef Cummings MD   50 mg at 04/17/17 2106    aspirin tablet 325 mg  325 mg Oral Daily Yosef Cummings MD   325 mg at 04/18/17 0858    atorvastatin tablet 20 mg  20 mg Oral Daily Yosef Cummings MD   20 mg at 04/18/17 0858    citalopram tablet 10 mg  10 mg Oral Daily Yosef Cummings MD   10 mg at 04/18/17 0858    clopidogrel tablet 75 mg  75 mg Oral Daily Yosef Cummings MD   75 mg at 04/18/17 0858    dextrose 50% injection 12.5 g  12.5 g Intravenous PRN Yosef Cummings MD        dextrose 50% injection 25 g  25 g Intravenous PRN Yosef Cummings MD        enoxaparin injection 40 mg  40 mg Subcutaneous Daily Yosef Cummings MD   40 mg at 04/17/17 3226     glucagon (human recombinant) injection 1 mg  1 mg Intramuscular PRN Yosef Cummings MD        glucose chewable tablet 16 g  16 g Oral PRN Yosef Cummings MD        glucose chewable tablet 24 g  24 g Oral PRN Yosef Cummings MD        insulin aspart pen 1-10 Units  1-10 Units Subcutaneous PRN Mallory Haynes MD   8 Units at 04/18/17 1225    insulin detemir pen 15 Units  15 Units Subcutaneous QHS Soren Schulz MD   15 Units at 04/17/17 1435    labetalol injection 20 mg  20 mg Intravenous Q15 Min PRN Yosef Cummings MD        pantoprazole EC tablet 40 mg  40 mg Oral Daily Yosef Cummings MD   40 mg at 04/18/17 0858    promethazine (PHENERGAN) 12.5 mg in dextrose 5 % 50 mL IVPB  12.5 mg Intravenous Q6H PRN Mallory Haynes MD           Review of Systems   Constitutional: Negative for fever.   HENT: Negative for ear pain.   Eyes: Negative for photophobia and visual disturbance.   Respiratory: Negative for shortness of breath.   Cardiovascular: Negative for chest pain.   Gastrointestinal: Negative for abdominal pain.   Genitourinary: Negative for dysuria.   Neurological: Positive for facial asymmetry, speech difficulty and weakness on left side arm>leg.  Psychiatric/Behavioral: Negative for confusion.     Objective:     Vital Signs (Most Recent):  Temp: 98.4 °F (36.9 °C) (04/18/17 1200)  Pulse: 84 (04/18/17 1200)  Resp: 20 (04/18/17 1200)  BP: (!) 148/75 (04/18/17 1200)  SpO2: 95 % (04/18/17 1200) Vital Signs (24h Range):  Temp:  [96.3 °F (35.7 °C)-98.6 °F (37 °C)] 98.4 °F (36.9 °C)  Pulse:  [72-84] 84  Resp:  [19-20] 20  SpO2:  [93 %-98 %] 95 %  BP: (123-149)/(66-77) 148/75     Weight: 105.1 kg (231 lb 11.2 oz)  Body mass index is 35.23 kg/(m^2).    Physical Exam  Constitutional: no distress  HENT: no icteric sclerae  Head: Normocephalic.   Eyes: Conjunctivae and EOM are normal. Pupils are equal, round, and reactive to light.   Neck: Neck supple.   Cardiovascular: Normal rate.   Pulmonary/Chest: symmetrical  expansion of chest.   Neurological: awake; oriented to self, place, time; follows commands; dysarthric.  Pupil are round at 3 mm and reactive to light; no visual field deficits; no nystagmus; EOMI; right lower facial wekaness; tongue protrudes midline; palate elevates symmetrically  Strenght: 5/5 on right; 4-/5 on left UE; 4/5 on LLE  Decreased light touch on right extremities  No neglect       Significant Labs: CBC: No results for input(s): WBC, HGB, HCT, PLT in the last 48 hours.  CMP: No results for input(s): GLU, NA, K, CL, CO2, BUN, CREATININE, CALCIUM, MG, PROT, ALBUMIN, BILITOT, ALKPHOS, AST, ALT, ANIONGAP, EGFRNONAA in the last 48 hours.      Assessment and Plan:     70 y/o male with stroke:      1. Stoke: right MCA territory with left side deficits. Pt on maximum medical management for stroke prevention but not adherent to Rx.   CTA shows severe stenosis of right carotid    -Vascular surgery consultation: pt to be seen in two weeks.  -ASA, clopidogrel, statin.  -OK for -150's   -PT/OT/ST: inpatient rehab recommended. Pending.    Active Diagnoses:    Diagnosis Date Noted POA    PRINCIPAL PROBLEM:  Acute CVA (cerebrovascular accident) [I63.9] 04/12/2017 Yes    Hypokalemia [E87.6] 04/13/2017 Yes    Insulin pump in place [Z96.41] 04/13/2017 Not Applicable     Chronic    Mild protein malnutrition [E44.1] 04/13/2017 Yes     Chronic    KELVIN on CPAP [G47.33, Z99.89] 04/13/2017 Not Applicable     Chronic    History of CVA (cerebrovascular accident) [Z86.73] 04/13/2017 Not Applicable     Chronic    GERD (gastroesophageal reflux disease) [K21.9] 04/13/2017 Yes     Chronic    Chronic combined systolic and diastolic heart failure [I50.42] 11/12/2013 Yes     Chronic    Essential hypertension [I10] 11/10/2013 Yes     Chronic    Hyperlipidemia [E78.5]  Yes     Chronic    Type 2 diabetes mellitus, uncontrolled [E11.65] 09/26/2012 Yes     Chronic    CAD s/p CABG & PCI [I25.10] 09/26/2012 Yes     Chronic       Problems Resolved During this Admission:    Diagnosis Date Noted Date Resolved POA       VTE Risk Mitigation         Ordered     enoxaparin injection 40 mg  Daily     Route:  Subcutaneous        04/12/17 2139     Medium Risk of VTE  Once      04/12/17 2139          Josue Sylvester MD  Neurology  [facility]

## 2017-04-18 NOTE — PLAN OF CARE
Problem: Occupational Therapy Goal  Goal: Occupational Therapy Goal  Goals to be met by: 4/27/17    Patient will increase functional independence with ADLs by performing:    Feeding with Supervision.  UE Dressing with Contact Guard Assistance.  LE Dressing with Stand-by Assistance.  Grooming while standing at sink with Stand-by Assistance.  Toileting from toilet with Stand-by Assistance for hygiene and clothing management.   Supine to sit with Stand-by Assistance. Goal marga 4/18/17  Stand pivot transfers with Stand-by Assistance.  Toilet transfer to toilet with Stand-by Assistance.  Upper extremity exercise program x10 reps per handout, with assistance as needed.   Outcome: Ongoing (interventions implemented as appropriate)  Patient will benefit from OT to address functional deficits.

## 2017-04-18 NOTE — PLAN OF CARE
Problem: Fall Risk (Adult)  Intervention: Reduce Risk/Promote Restraint Free Environment    17   Safety Interventions   Safety Precautions emergency equipment at bedside   Safety Interventions   Environmental Safety Modification assistive device/personal items within reach;clutter free environment maintained;lighting adjusted;room near unit station;room organization consistent   Prevent  Drop/Fall   Safety/Security Measures bed alarm set       Intervention: Review Medications/Identify Contributors to Fall Risk    17   Safety Interventions   Medication Review/Management medications reviewed       Intervention: Patient Rounds    17   Safety Interventions   Patient Rounds bed in low position;bed wheels locked;call light in reach;ID band on;clutter free environment maintained;placement of personal items at bedside;toileting offered;visualized patient       Intervention: Safety Promotion/Fall Prevention    17 183   Safety Interventions   Safety Promotion/Fall Prevention bed alarm set;commode/urinal/bedpan at bedside;Fall Risk reviewed with patient/family;lighting adjusted;medications reviewed;nonskid shoes/socks when out of bed;room near unit station       Intervention: Safety Precautions    17   Safety Interventions   Safety Precautions emergency equipment at bedside         Goal: Identify Related Risk Factors and Signs and Symptoms  Related risk factors and signs and symptoms are identified upon initiation of Human Response Clinical Practice Guideline (CPG)   Outcome: Ongoing (interventions implemented as appropriate)    17   Fall Risk   Related Risk Factors (Fall Risk) age-related changes;fatigue/slow reaction;history of falls;gait/mobility problems;neuro disease/injury;environment unfamiliar       Goal: Absence of Falls  Patient will demonstrate the desired outcomes by discharge/transition of care.   Outcome: Ongoing (interventions implemented as  appropriate)    04/17/17 1958   Fall Risk (Adult)   Absence of Falls making progress toward outcome

## 2017-04-18 NOTE — PROGRESS NOTES
Lying in bed with HOB up 30 NAD noted Denies any discomfort. Pt instructed to call for assistance. Call light and personal items within reach of pt on unaffected side. Bed in low position with side rails up x2. Bed alarm maintained.

## 2017-04-18 NOTE — NURSING
Pt lying in bed sleeping with home cpap on.  Pt awakens easily and is AOx3.  Delayed response noted with speech.  Pt noted ot have left side weakness and is utilizing a squeeze ball.  Pt encouraged to reposition self with assistance.  Advised pt to call for assistance.  Call light in reach, bed alarm on, pt close to the nursing station.

## 2017-04-18 NOTE — PT/OT/SLP PROGRESS
Occupational Therapy  Treatment    Jorden Shafer   MRN: [id number]   Admitting Diagnosis: Acute CVA (cerebrovascular accident)    OT Date of Treatment: 17   OT Start Time: 1152  OT Stop Time: 1222  OT Total Time (min): 30 min    Billable Minutes:  Self Care/Home Management 10, Therapeutic Exercise 20 and Total Time 30    General Precautions: Standard, fall, diabetic, aspiration ([employer] Safety Monitor present)  Orthopedic Precautions: N/A  Braces: N/A    Do you have any cultural, spiritual, Scientology conflicts, given your current situation?: none    Subjective:  Communicated with nurseMelanie prior to session.      Pain Ratin/10                   Objective:  Patient found with: telemetry, bed alarm ([employer] Safety Monitor present)     Functional Mobility:  Bed Mobility:  Scooting/Bridging: Stand by Assistance  Supine to Sit: Stand by Assistance    Transfers:   Sit <> Stand Assistance: Contact Guard Assistance  Sit <> Stand Assistive Device: No Assistive Device  Bed <> Chair Transfer Assistance: Contact Guard Assistance (to step from the bed to the chair)  Bed <> Chair Assistive Device: No Assistive Device    Activities of Daily Living:  Feeding Level of Assistance: Set-up Assistance (assist needed to thicken liquids and cut food)  Grooming Position: Seated  Grooming Level of Assistance: Stand by assistance (and VC to wipe hands)     Balance:   Static Sit: FAIR+: Able to take MINIMAL challenges from all directions  Dynamic Sit: FAIR+: Maintains balance through MINIMAL excursions of active trunk motion    Therapeutic Activities and Exercises:  Patient was able to perform AROM to RUE x10 reps all planes and AAROM to the LUE with CGA and VC to achieve full ROM. The patient was able to rub lotion on his right arm using his left hand with VC. The patient also performed ROM with RUE on the tabletop for left shldr horiz abs and flexion.    AM-PAC 6 CLICK ADL   How much help from another person does this patient  currently need?   1 = Unable, Total/Dependent Assistance  2 = A lot, Maximum/Moderate Assistance  3 = A little, Minimum/Contact Guard/Supervision  4 = None, Modified [place]/Independent    Putting on and taking off regular lower body clothing? : 2  Bathing (including washing, rinsing, drying)?: 2  Toileting, which includes using toilet, bedpan, or urinal? : 3  Putting on and taking off regular upper body clothing?: 3  Taking care of personal grooming such as brushing teeth?: 3  Eating meals?: 4  Total Score: 17     AM-PAC Raw Score CMS G-Code Modifier Level of Impairment Assistance   6 % Total / Unable   7 - 9 CM 80 - 100% Maximal Assist   10 - 14 CL 60 - 80% Moderate Assist   15 - 19 CK 40 - 60% Moderate Assist   20 - 22 CJ 20 - 40% Minimal Assist   23 CI 1-20% SBA / CGA   24 CH 0% Independent/ Mod I     Patient left up in chair with all lines intact, call button in reach and nurseMelanie notified    ASSESSMENT:  The patient tolerates UE therex well and is motivated to improve function in the LUE. Patient is a good rehab candidate.    Rehab identified problem list/impairments: Rehab identified problem list/impairments: weakness, impaired self care skills, impaired balance, impaired sensation, gait instability, impaired functional mobilty, decreased safety awareness, decreased upper extremity function, decreased lower extremity function, visual deficits    Rehab potential is good.    Activity tolerance: Good    Discharge recommendations: Discharge Facility/Level Of Care Needs: rehabilitation facility     Barriers to discharge: Barriers to Discharge: Decreased caregiver support    Equipment recommendations: none     GOALS:   Occupational Therapy Goals        Problem: Occupational Therapy Goal    Goal Priority Disciplines Outcome Interventions   Occupational Therapy Goal     OT, PT/OT Ongoing (interventions implemented as appropriate)    Description:  Goals to be met by: 4/27/17    Patient will increase  functional independence with ADLs by performing:    Feeding with Supervision.  UE Dressing with Contact Guard Assistance.  LE Dressing with Stand-by Assistance.  Grooming while standing at sink with Stand-by Assistance.  Toileting from toilet with Stand-by Assistance for hygiene and clothing management.   Supine to sit with Stand-by Assistance.  Goal marga 4/18/17  Stand pivot transfers with Stand-by Assistance.  Toilet transfer to toilet with Stand-by Assistance.  Upper extremity exercise program x10 reps per handout, with assistance as needed.                 Plan:  Patient to be seen 6 x/week to address the above listed problems via therapeutic activities, therapeutic exercises, self-care/home management  Plan of Care expires: 04/27/17  Plan of Care reviewed with: patient         Karon Mary OT  04/18/2017

## 2017-04-18 NOTE — PLAN OF CARE
Problem: Fall Risk (Adult)  Goal: Absence of Falls  Patient will demonstrate the desired outcomes by discharge/transition of care.   Outcome: Ongoing (interventions implemented as appropriate)    04/17/17 2250   Fall Risk (Adult)   Absence of Falls making progress toward outcome         Problem: Patient Care Overview  Goal: Plan of Care Review  Outcome: Ongoing (interventions implemented as appropriate)    04/17/17 2250   Coping/Psychosocial   Plan Of Care Reviewed With patient         Problem: Stroke (Ischemic) (Adult)  Goal: Signs and Symptoms of Listed Potential Problems Will be Absent, Minimized or Managed (Stroke)  Signs and symptoms of listed potential problems will be absent, minimized or managed by discharge/transition of care (reference Stroke (Ischemic) (Adult) CPG).   Outcome: Ongoing (interventions implemented as appropriate)    04/17/17 2250   Stroke (Ischemic)   Problems Assessed (Stroke (Ischemic)/TIA) all   Problems Present (Stroke (Ischemic)/TIA) none

## 2017-04-18 NOTE — PROGRESS NOTES
IONA informed pt has been denied rehab by insurance company. IONA contacted Jeyson @ 2:12PM @ [phone]. Jeyson did not answer the phone and IONA left a message.     2:32PM- Jeyson contacted IONA and obtained Dr. Schulz information to schedule a peer to peer. IONA asked Jeyson if she has a time frame of when [facility] doctor may reach out to Dr. Schulz and she did not.     Peer to peer has been completed by Dr. Schulz. IONA awaiting on decision from [facility]. IONA will speak to family and start SNF workup while awaiting rehab decision.

## 2017-04-18 NOTE — PT/OT/SLP PROGRESS
Physical Therapy  Treatment    Jorden Shafer   MRN: [id number]   Admitting Diagnosis: Acute CVA (cerebrovascular accident)    PT Received On: 17  PT Start Time: 1511     PT Stop Time: 1537    PT Total Time (min): 26 min       Billable Minutes:  Gait Training 13 min and Therapeutic Activity 13 min    Treatment Type: Treatment  PT/PTA: PT     PTA Visit Number: 0       General Precautions: Standard, fall  Orthopedic Precautions: N/A     Subjective:    Pt reported feeling fine.    Pain Ratin/10                   Objective:   Patient found with: peripheral IV, telemetry, bed alarm ([employer] monitor)    Functional Mobility:  Bed Mobility:   Scooting/Bridging: Stand by Assistance  Supine <> Sit: Stand by Assistance (HOB elevated; Pt required extra time to complete task.)    Transfers:  Sit <> Stand Assistance: Contact Guard Assistance; Pt required min VC's for proper L hand placement.  Sit <> Stand Assistive Device: No Assistive Device, Straight Cane    Gait:   Gait Distance: 500 ft; Pt with decreased L foot clearance.  Pt with decreased nayeli.  Pt with decreased attention to the L side.  Pt with unsteadiness when turning L around corners.  Pt had difficulty reading signs on the L side of the wall while ambulating.    Assistance 1: Contact Guard Assistance  Gait Assistive Device: Single point cane  Gait Pattern: 3-point gait  Gait Deviation(s): decreased toe-to-floor clearance, decreased velocity of limb motion, increased time in double stance, decreased nayeli, toe drag      Balance:   Static Sit: FAIR+: Able to take MINIMAL challenges from all directions  Dynamic Sit: FAIR+: Maintains balance through MINIMAL excursions of active trunk motion  Static Stand: FAIR+: Takes MINIMAL challenges from all directions  Dynamic stand: FAIR: Needs CONTACT GUARD during gait     Therapeutic Activities and Exercises: Pt easily fatigued during dynamic activities.   - Tandem walking 8 ft x 2 trials with line on level surfaces  as visual cue; Pt had difficulty completing task, required mod/min A.  - Braiding 8 ft x 2 trials on level surfaces; Pt had difficulty completing task, required mod/min A.  - SLS x 3 trials on each leg; Pt required LUE support on stable surface.      AM-PAC 6 CLICK MOBILITY  How much help from another person does this patient currently need?   1 = Unable, Total/Dependent Assistance  2 = A lot, Maximum/Moderate Assistance  3 = A little, Minimum/Contact Guard/Supervision  4 = None, Modified [place]/Independent    Turning over in bed (including adjusting bedclothes, sheets and blankets)?: 4  Sitting down on and standing up from a chair with arms (e.g., wheelchair, bedside commode, etc.): 4  Moving from lying on back to sitting on the side of the bed?: 4  Moving to and from a bed to a chair (including a wheelchair)?: 3  Need to walk in hospital room?: 3  Climbing 3-5 steps with a railing?: 3  Total Score: 21    AM-PAC Raw Score CMS G-Code Modifier Level of Impairment Assistance   6 % Total / Unable   7 - 9 CM 80 - 100% Maximal Assist   10 - 14 CL 60 - 80% Moderate Assist   15 - 19 CK 40 - 60% Moderate Assist   20 - 22 CJ 20 - 40% Minimal Assist   23 CI 1-20% SBA / CGA   24 CH 0% Independent/ Mod I     Patient left HOB elevated with all lines intact, call button in reach, bed alarm on and [employer] monitor present.    Assessment:  Rehab identified problem list/impairments: Rehab identified problem list/impairments: weakness, impaired endurance, impaired self care skills, impaired functional mobilty, gait instability, impaired balance, visual deficits, decreased coordination, decreased upper extremity function, decreased lower extremity function, decreased safety awareness, impaired cardiopulmonary response to activity    Rehab potential is good.    Activity tolerance: Fair    Discharge recommendations: Discharge Facility/Level Of Care Needs: rehabilitation facility     Barriers to discharge: Barriers to  Discharge: Other (Comment) (decreased mobility and safety awareness)    Equipment recommendations: Equipment Needed After Discharge: none     GOALS:   Physical Therapy Goals        Problem: Physical Therapy Goal    Goal Priority Disciplines Outcome Goal Variances Interventions   Physical Therapy Goal     PT/OT, PT Ongoing (interventions implemented as appropriate)     Description:  Goals to be met by: 17     Patient will increase functional independence with mobility by performin. Supine to sit with Modified [place]  2. Rolling to Left and Right with Modified [place]  3. Sit to stand transfer with Modified [place] using Single-point Cane  4. Bed to chair transfer with Modified [place] using Single-point Cane   5. Gait  x 250 feet with Modified [place] using Single-point Cane   6. Ascend/descend 3 steps with right Handrail Modified [place]  7. Lower extremity exercise program x 30 reps per handout, with independence                PLAN:    Patient to be seen 5 x/week (Mon-Fri)  to address the above listed problems via gait training, therapeutic activities, therapeutic exercises, neuromuscular re-education  Plan of Care expires: 17  Plan of Care reviewed with: patient         Dafne Colunga, PT  2017

## 2017-04-19 VITALS
TEMPERATURE: 98 F | WEIGHT: 238.13 LBS | HEART RATE: 99 BPM | BODY MASS INDEX: 36.09 KG/M2 | OXYGEN SATURATION: 94 % | DIASTOLIC BLOOD PRESSURE: 81 MMHG | HEIGHT: 68 IN | SYSTOLIC BLOOD PRESSURE: 164 MMHG | RESPIRATION RATE: 20 BRPM

## 2017-04-19 PROBLEM — I63.9 ACUTE CVA (CEREBROVASCULAR ACCIDENT): Status: RESOLVED | Noted: 2017-04-12 | Resolved: 2017-04-19

## 2017-04-19 LAB
POCT GLUCOSE: 263 MG/DL (ref 70–110)
POCT GLUCOSE: 281 MG/DL (ref 70–110)
POCT GLUCOSE: 297 MG/DL (ref 70–110)

## 2017-04-19 PROCEDURE — 63600175 PHARM REV CODE 636 W HCPCS: Performed by: EMERGENCY MEDICINE

## 2017-04-19 PROCEDURE — 25000003 PHARM REV CODE 250: Performed by: EMERGENCY MEDICINE

## 2017-04-19 PROCEDURE — G8989 SELF CARE D/C STATUS: HCPCS | Mod: CJ

## 2017-04-19 PROCEDURE — 97530 THERAPEUTIC ACTIVITIES: CPT

## 2017-04-19 PROCEDURE — G8988 SELF CARE GOAL STATUS: HCPCS | Mod: CI

## 2017-04-19 PROCEDURE — G8987 SELF CARE CURRENT STATUS: HCPCS | Mod: CJ

## 2017-04-19 PROCEDURE — 99232 SBSQ HOSP IP/OBS MODERATE 35: CPT | Mod: ,,, | Performed by: PSYCHIATRY & NEUROLOGY

## 2017-04-19 PROCEDURE — 97535 SELF CARE MNGMENT TRAINING: CPT

## 2017-04-19 PROCEDURE — 97110 THERAPEUTIC EXERCISES: CPT

## 2017-04-19 PROCEDURE — 97116 GAIT TRAINING THERAPY: CPT

## 2017-04-19 RX ADMIN — CLOPIDOGREL BISULFATE 75 MG: 75 TABLET ORAL at 08:04

## 2017-04-19 RX ADMIN — ATORVASTATIN CALCIUM 20 MG: 10 TABLET, FILM COATED ORAL at 08:04

## 2017-04-19 RX ADMIN — INSULIN ASPART 6 UNITS: 100 INJECTION, SOLUTION INTRAVENOUS; SUBCUTANEOUS at 01:04

## 2017-04-19 RX ADMIN — PANTOPRAZOLE SODIUM 40 MG: 40 TABLET, DELAYED RELEASE ORAL at 08:04

## 2017-04-19 RX ADMIN — CITALOPRAM HYDROBROMIDE 10 MG: 10 TABLET ORAL at 08:04

## 2017-04-19 RX ADMIN — INSULIN ASPART 6 UNITS: 100 INJECTION, SOLUTION INTRAVENOUS; SUBCUTANEOUS at 09:04

## 2017-04-19 RX ADMIN — ENOXAPARIN SODIUM 40 MG: 100 INJECTION SUBCUTANEOUS at 04:04

## 2017-04-19 RX ADMIN — ASPIRIN 325 MG ORAL TABLET 325 MG: 325 PILL ORAL at 09:04

## 2017-04-19 NOTE — PLAN OF CARE
Problem: Fall Risk (Adult)  Goal: Absence of Falls  Patient will demonstrate the desired outcomes by discharge/transition of care.   Outcome: Ongoing (interventions implemented as appropriate)    04/19/17 0006   Fall Risk (Adult)   Absence of Falls making progress toward outcome         Problem: Patient Care Overview  Goal: Plan of Care Review  Outcome: Ongoing (interventions implemented as appropriate)    04/19/17 0006   Coping/Psychosocial   Plan Of Care Reviewed With patient         Problem: Stroke (Ischemic) (Adult)  Goal: Signs and Symptoms of Listed Potential Problems Will be Absent, Minimized or Managed (Stroke)  Signs and symptoms of listed potential problems will be absent, minimized or managed by discharge/transition of care (reference Stroke (Ischemic) (Adult) CPG).   Outcome: Ongoing (interventions implemented as appropriate)    04/19/17 0006   Stroke (Ischemic)   Problems Assessed (Stroke (Ischemic)/TIA) all   Problems Present (Stroke (Ischemic)/TIA) muscle tone abnormal         Problem: Pressure Ulcer Risk (Anthony Scale) (Adult,Obstetrics,Pediatric)  Goal: Skin Integrity  Patient will demonstrate the desired outcomes by discharge/transition of care.   Outcome: Ongoing (interventions implemented as appropriate)    04/19/17 0006   Pressure Ulcer Risk (Anthony Scale) (Adult,Obstetrics,Pediatric)   Skin Integrity making progress toward outcome

## 2017-04-19 NOTE — NURSING
Pt sitting up in bed AAOx3 watching TV.  Assisted pt with ambulation to the bathroom.  Pt continues to have left arm weakness/heaviness.  Pt also noted to have delayed responses with slurred speech.  Pt denies pain/sob. Administered PM medications and applied CPAP.  Advised pt to call for assistance.  Call light in reach, bed alarm on [employer] system in place and pt close to the nursing station.

## 2017-04-19 NOTE — PLAN OF CARE
Problem: Occupational Therapy Goal  Goal: Occupational Therapy Goal  Goals to be met by: 4/27/17    Patient will increase functional independence with ADLs by performing:    Feeding with Supervision.  UE Dressing with Contact Guard Assistance.  LE Dressing with Stand-by Assistance.  Grooming while standing at sink with Stand-by Assistance.  Toileting from toilet with Stand-by Assistance for hygiene and clothing management.   Supine to sit with Stand-by Assistance. Goal marga 4/18/17  Stand pivot transfers with Stand-by Assistance.  Toilet transfer to toilet with Stand-by Assistance.  Upper extremity exercise program x10 reps per handout, with assistance as needed. Goal met 4/19/17   Outcome: Ongoing (interventions implemented as appropriate)  Patient is progressing in OT.  Patient will benefit from continued OT to address functional deficits.

## 2017-04-19 NOTE — PLAN OF CARE
"   04/19/17 1629   Final Note   Assessment Type Final Discharge Note   Discharge Disposition Home-Health   Discharge planning education complete? Yes   Right Care Referral Info   Post Acute Recommendation Home-care   Written Dc instruction presented to patient and wife.  Patient responsibilities to Help Manage His Care At Home discussed.  Teach back method used.  After reinforcement and 2 attempts pt able to repeat.      Stroke Self Management Plan discussed with patient, wife and daughter    Pt wife presented with education information on Stroke Self Management Plan.  Information placed in "My Health Packet".     Red zone:  Emergent signs and symptoms:  Patient has:  Weakness in right "good side", confusion, loss of consciousness, difficulty speaking, swallowing or understanding:    Time to call 911.   Teach back method used.  All questions answered.  Wife able to teach back but will put list on her refrigerator to help her remember.    "

## 2017-04-19 NOTE — PT/OT/SLP PROGRESS
Occupational Therapy  Treatment    Jorden Shafer   MRN: [id number]   Admitting Diagnosis: Acute CVA (cerebrovascular accident)    OT Date of Treatment: 17   OT Start Time: 152  OT Stop Time: 1559  OT Total Time (min): 33 min    Billable Minutes:  Self Care/Home Management 15, Therapeutic Exercise 18 and Total Time 33    General Precautions: Standard, fall  Orthopedic Precautions: N/A  Braces: N/A    Do you have any cultural, spiritual, Yarsani conflicts, given your current situation?: none    Subjective:  Communicated with nurseMelanie prior to session.      Pain Ratin/10                   Objective:  Patient found with: telemetry ([employer] Safety Monitor)     Functional Mobility:  Bed Mobility:  Supine to Sit: Stand by Assistance    Transfers:   Sit <> Stand Assistance: Contact Guard Assistance  Sit <> Stand Assistive Device: No Assistive Device    Functional Ambulation: The patient amb w/out AD from the bed to sink to chair ~8' x2.    Activities of Daily Living:   LE Dressing Level of Assistance: Moderate assistance, Stand by assistance (SBA to doff B socks while seated in the chair, mod assist to hermelindo B socks)  Grooming Position: Standing at sink  Grooming Level of Assistance: Contact guard assistance (CGA to to hold tyhe toothpaste in the left hand to squeeze paste on the brush. The patient was able to brush his teeth while weight bearing thru his left hand.)      Balance:   Static Sit: GOOD: Takes MODERATE challenges from all directions  Dynamic Sit: GOOD: Maintains balance through MODERATE excursions of active trunk movement  Static Stand: FAIR+: Takes MINIMAL challenges from all directions  Dynamic stand: FAIR: Needs CONTACT GUARD during gait    Therapeutic Activities and Exercises:  Patient was educated in use of yellow theraband for HEP.  Patient was able to perform elbow ext and shldr horiz abd x10 reps with CGA. The patient was able to perform AROM to BUE x10 reps in all planes with  VC.    AM-PAC 6 CLICK ADL   How much help from another person does this patient currently need?   1 = Unable, Total/Dependent Assistance  2 = A lot, Maximum/Moderate Assistance  3 = A little, Minimum/Contact Guard/Supervision  4 = None, Modified [place]/Independent    Putting on and taking off regular lower body clothing? : 3  Bathing (including washing, rinsing, drying)?: 3  Toileting, which includes using toilet, bedpan, or urinal? : 3  Putting on and taking off regular upper body clothing?: 3  Taking care of personal grooming such as brushing teeth?: 3  Eating meals?: 3  Total Score: 18     AM-PAC Raw Score CMS G-Code Modifier Level of Impairment Assistance   6 % Total / Unable   7 - 9 CM 80 - 100% Maximal Assist   10 - 14 CL 60 - 80% Moderate Assist   15 - 19 CK 40 - 60% Moderate Assist   20 - 22 CJ 20 - 40% Minimal Assist   23 CI 1-20% SBA / CGA   24 CH 0% Independent/ Mod I     Patient left up in chair with all lines intact, call button in reach, nurse, Melanie notified and [employer] Safety Monitor  present    ASSESSMENT:  The patient is progressing in OT and will benefit from continued OT. If D/C to home, the patient will benefit from 24* (S) for safety 2* fall risk.    Rehab identified problem list/impairments: Rehab identified problem list/impairments: weakness, impaired endurance, impaired self care skills, impaired functional mobilty, gait instability, impaired balance, visual deficits, decreased lower extremity function, decreased upper extremity function, decreased coordination, impaired fine motor, decreased safety awareness    Rehab potential is good.    Activity tolerance: Good    Discharge recommendations: Discharge Facility/Level Of Care Needs: home health OT (Patient reports he won't go to SNF and wants to go home. Pt will require 24* (S) at home.)     Barriers to discharge: Barriers to Discharge: Decreased caregiver support    Equipment recommendations: bath bench     GOALS:    Occupational Therapy Goals        Problem: Occupational Therapy Goal    Goal Priority Disciplines Outcome Interventions   Occupational Therapy Goal     OT, PT/OT Ongoing (interventions implemented as appropriate)    Description:  Goals to be met by: 4/27/17    Patient will increase functional independence with ADLs by performing:    Feeding with Supervision.  UE Dressing with Contact Guard Assistance.  LE Dressing with Stand-by Assistance.  Grooming while standing at sink with Stand-by Assistance.  Toileting from toilet with Stand-by Assistance for hygiene and clothing management.   Supine to sit with Stand-by Assistance.  Goal marga 4/18/17  Stand pivot transfers with Stand-by Assistance.  Toilet transfer to toilet with Stand-by Assistance.  Upper extremity exercise program x10 reps per handout, with assistance as needed.  Goal met 4/19/17                  Plan:  Patient to be seen 6 x/week to address the above listed problems via therapeutic activities, therapeutic exercises, self-care/home management  Plan of Care expires: 04/27/17  Plan of Care reviewed with: patient         Karon Usman, OT  04/19/2017

## 2017-04-19 NOTE — PROGRESS NOTES
"IONA contacted pt wife Beth @ [phone] to inform her pt has been denied rehab through the insurance company. IONA informed Beth IONA can start the process of applying for SNF, but Beth is not interested in SNF. Beth aske if therapy can be done in the home and IONA explained "yes" through HH. Per Beth she would rather pt discharge home with HH vs go to a NH with skilled nursing.   "

## 2017-04-19 NOTE — ASSESSMENT & PLAN NOTE
"Patient has sustained an acute CVA, and unfortunately he is far outside the therapeutic window for tPA.  CT-head was negative for acute intracranial hemorrhage but was significant for "...development of a large right temporal infarct. There is no evidence acute hemorrhage. There is evidence of a prior right cerebellar infarct."    A bilateral carotid ultrasound was significant for "[o]cclusion of the mid and proximal segments of the right ICA.  Retrograde flow within the right vertebral artery."  . Patient is not within the therapeutic window for tPA and he is already on aspirin.  Will obtain a lipid panel; allow for permissive hypertension in order not to extend the penumbra; consult PT/OT and Speech Therapy for evaluation; consult  for discharge planning; and consult Neurology for further recommendations.  Recs from PT/OT are rehab. Consulted surgery for R carotid stenosis-follow up as out patient in 2 weeks as this was a completed stroke in the R MCA territory. Not rehab candidate. May not even be SNF. May end up going home with H/H as he is improving daily. Will discuss with .     "

## 2017-04-19 NOTE — PLAN OF CARE
Problem: Physical Therapy Goal  Goal: Physical Therapy Goal  Goals to be met by: 17     Patient will increase functional independence with mobility by performin. Supine to sit with Modified [place]  2. Rolling to Left and Right with Modified [place]  3. Sit to stand transfer with Modified [place] using Single-point Cane  4. Bed to chair transfer with Modified [place] using Single-point Cane   5. Gait x 250 feet with Modified [place] using Single-point Cane   6. Ascend/descend 3 steps with right Handrail Modified [place]  7. Lower extremity exercise program x 30 reps per handout, with independence   Outcome: Ongoing (interventions implemented as appropriate)  Continue  with POC.

## 2017-04-19 NOTE — PLAN OF CARE
[facility]    HOME HEALTH ORDERS  FACE TO FACE ENCOUNTER    Patient Name: Jorden Shafer  YOB: 1945    PCP: Neha Plasencia MD   PCP Address: [address] [place] / [place] [address]  PCP Phone Number: [phone]  PCP Fax: [phone]    Encounter Date: 04/19/2017    Admit to Home Health    Diagnoses: stroke   Active Hospital Problems    Diagnosis  POA    Hypokalemia [E87.6]  Yes    Insulin pump in place [Z96.41]  Not Applicable     Chronic    Mild protein malnutrition [E44.1]  Yes     Chronic    KELVIN on CPAP [G47.33, Z99.89]  Not Applicable     Chronic    History of CVA (cerebrovascular accident) [Z86.73]  Not Applicable     Chronic    GERD (gastroesophageal reflux disease) [K21.9]  Yes     Chronic    Chronic combined systolic and diastolic heart failure [I50.42]  Yes     Chronic    Essential hypertension [I10]  Yes     Chronic    Hyperlipidemia [E78.5]  Yes     Chronic    Type 2 diabetes mellitus, uncontrolled [E11.65]  Yes     Chronic     Currently sees Dr. Britton       CAD s/p CABG & PCI [I25.10]  Yes     Chronic      Resolved Hospital Problems    Diagnosis Date Resolved POA    *Acute CVA (cerebrovascular accident) [I63.9] 04/19/2017 Yes     Priority: 1 - High       Future Appointments  Date Time Provider Department Center   5/3/2017 10:30 AM Brii Donnelly DPM [facility] [place]     Follow-up Information     Follow up with Marino Koehler MD In 2 weeks.    Specialty:  General Surgery    Contact information:    [address]  [address]  [place] [id number] [phone]          Follow up with Lito Johnson MD In 1 week.    Specialty:  Neurology    Contact information:    [address]  [address]  [place] [id number] [phone]          Follow up with Neha Plasencia MD In 1 week.    Specialty:  Family Medicine    Contact information:    [address] [place] [address] [phone]              I have seen and examined this patient face to face  today. My clinical findings that support the need for the home health skilled services and home bound status are the following:  Weakness/numbness causing balance and gait disturbance due to Stroke making it taxing to leave home.    Allergies:Review of patient's allergies indicates:  No Known Allergies    Diet: diabetic diet: 1800 calorie and 2 gram sodium diet    Activities: activity as tolerated    Nursing:   SN to complete comprehensive assessment including routine vital signs. Instruct on disease process and s/s of complications to report to MD. Review/verify medication list sent home with the patient at time of discharge  and instruct patient/caregiver as needed. Frequency may be adjusted depending on start of care date.    Notify MD if SBP > 160 or < 90; DBP > 90 or < 50; HR > 120 or < 50; Temp > 101; Other:        CONSULTS:    Physical Therapy to evaluate and treat. Evaluate for home safety and equipment needs; Establish/upgrade home exercise program. Perform / instruct on therapeutic exercises, gait training, transfer training, and Range of Motion.  Occupational Therapy to evaluate and treat. Evaluate home environment for safety and equipment needs. Perform/Instruct on transfers, ADL training, ROM, and therapeutic exercises.  Speech Therapy  to evaluate and treat for  Language and Swallowing.  Aide to provide assistance with personal care, ADLs, and vital signs.      Medications: Review discharge medications with patient and family and provide education.      Current Discharge Medication List      CONTINUE these medications which have NOT CHANGED    Details   amitriptyline (ELAVIL) 50 MG tablet Take 1 tablet (50 mg total) by mouth every evening.  Qty: 30 tablet, Refills: 2    Associated Diagnoses: Right-sided low back pain with right-sided sciatica      amlodipine (NORVASC) 10 MG tablet Take 1 tablet (10 mg total) by mouth once daily.  Qty: 90 tablet, Refills: 3    Associated Diagnoses: Essential  hypertension      aspirin (ECOTRIN) 81 MG EC tablet Take 81 mg by mouth once daily.      atorvastatin (LIPITOR) 40 MG tablet Take 0.5 tablets (20 mg total) by mouth once daily.  Qty: 45 tablet, Refills: 5    Comments: **Patient requests 90 day supply**  Associated Diagnoses: CAD (coronary artery disease), native coronary artery      blood sugar diagnostic Strp 1 strip by Misc.(Non-Drug; Combo Route) route 2 (two) times daily with meals. TRUE METRIX  Qty: 100 strip, Refills: 11    Associated Diagnoses: Diabetes mellitus without complication      blood-glucose meter kit Use as instructed          TRUE METRIX  Qty: 1 each, Refills: 0    Associated Diagnoses: Diabetes mellitus without complication      carvedilol (COREG) 25 MG tablet Take 1 tablet (25 mg total) by mouth 2 (two) times daily with meals.  Qty: 90 tablet, Refills: 3    Associated Diagnoses: Essential hypertension      celecoxib (CELEBREX) 100 MG capsule Take 1 capsule (100 mg total) by mouth 2 (two) times daily.  Qty: 30 capsule, Refills: 0    Associated Diagnoses: Acute foot pain, left; Foot arch pain, left; Neuropathy of foot, left; Plantar fasciitis, left      citalopram (CELEXA) 10 MG tablet Take 1 tablet (10 mg total) by mouth once daily.  Qty: 90 tablet, Refills: 1    Comments: **Patient requests 90 days supply**  Associated Diagnoses: Sleep disturbances      clopidogrel (PLAVIX) 75 mg tablet Take 1 tablet (75 mg total) by mouth once daily.  Qty: 90 tablet, Refills: 3      furosemide (LASIX) 80 MG tablet Take 1 tablet (80 mg total) by mouth once daily.  Qty: 90 tablet, Refills: 3    Comments: **Patient requests 90 days supply**  Associated Diagnoses: Essential hypertension      glucose 4 GM chewable tablet Take 16 g by mouth as needed for Low blood sugar.      insulin lispro (HUMALOG) 100 unit/mL injection Inject into the skin 3 (three) times daily before meals.      isosorbide mononitrate (IMDUR) 60 MG 24 hr tablet Take 1 tablet (60 mg total) by  mouth once daily.  Qty: 90 tablet, Refills: 3    Associated Diagnoses: Essential hypertension      lancets Misc 1 lancet by Misc.(Non-Drug; Combo Route) route 2 (two) times daily with meals. TRUE METRIX  Qty: 100 each, Refills: 11    Associated Diagnoses: Diabetes mellitus without complication      lisinopril 10 MG tablet Take 1 tablet (10 mg total) by mouth once daily.  Qty: 90 tablet, Refills: 3    Comments: **Patient requests 90 day supply**  Associated Diagnoses: Essential hypertension      nitroGLYCERIN (NITROSTAT) 0.4 MG SL tablet Place 1 tablet (0.4 mg total) under the tongue every 5 (five) minutes as needed. Sublingual  ( under tongue) as needed  Qty: 30 tablet, Refills: prn    Associated Diagnoses: Other chest pain      omega-3 acid ethyl esters (LOVAZA) 1 gram capsule Take 2 g by mouth 3 (three) times daily.        omeprazole (PRILOSEC) 40 MG capsule Take 1 capsule (40 mg total) by mouth 2 (two) times daily.  Qty: 180 capsule, Refills: 3    Comments: **Patient requests 90 day supply**  Associated Diagnoses: GERD (gastroesophageal reflux disease)      SUBCUTANEOUS INSULIN PUMP (MINIMED 530G INSULIN PUMP MISC) by Misc.(Non-Drug; Combo Route) route.             I certify that this patient is confined to his home and needs intermittent skilled nursing care, physical therapy, speech therapy and occupational therapy.

## 2017-04-19 NOTE — PROGRESS NOTES
[facility]  Neurology  Progress Note    Patient Name: Jorden Shafer  MRN: [id number]  Admission Date: 4/12/2017  Hospital Length of Stay: 7 days  Code Status: Full Code   Attending Provider: Soren Schulz MD  Primary Care Physician: Neha Plasencia MD   Principal Problem:Acute CVA (cerebrovascular accident)    Subjective:     Interval History: 70 y/o male with medical Hx as listed below brought to ED after being found to have slurred speech and left sided weakness. Pt has Hx of stroke but uncertain at this point what are the residual deficits (attempted several times to communicate with family to no avail). He is not able to provide information.      -4/14/17: Pt complaining of headache today. He is verbal and states that he is moving his left arm better today.      -4/15/17: No new complaints today.      -4/16/17: Walking with PT this morning. Hemiparetic gait.     -4/18/17: Pt with no new complaints today. Dysarthria and left sided weakness remains.     -4/19/17: Pt not accepted to rehab.    Current Neurological Medications:     Current Facility-Administered Medications   Medication Dose Route Frequency Provider Last Rate Last Dose    acetaminophen tablet 500 mg  500 mg Oral Q6H PRN Mallory Haynes MD        amitriptyline tablet 50 mg  50 mg Oral QHS Yosef Cummings MD   50 mg at 04/18/17 2118    atorvastatin tablet 20 mg  20 mg Oral Daily Yosef Cummings MD   20 mg at 04/19/17 0859    citalopram tablet 10 mg  10 mg Oral Daily Yosef Cummings MD   10 mg at 04/19/17 0859    clopidogrel tablet 75 mg  75 mg Oral Daily Yosef Cummings MD   75 mg at 04/19/17 0859    dextrose 50% injection 12.5 g  12.5 g Intravenous PRN Yosef Cummings MD        dextrose 50% injection 25 g  25 g Intravenous PRN Yosef Cummings MD        enoxaparin injection 40 mg  40 mg Subcutaneous Daily Yosef Cummings MD   40 mg at 04/18/17 1817    glucagon (human recombinant) injection 1 mg  1 mg  Intramuscular PRN Yosef Cummings MD        glucose chewable tablet 16 g  16 g Oral PRN Yosef Cummings MD        glucose chewable tablet 24 g  24 g Oral PRN Yosef Cummings MD        insulin aspart pen 1-10 Units  1-10 Units Subcutaneous PRN Mallory Haynes MD   6 Units at 04/19/17 1320    insulin detemir pen 15 Units  15 Units Subcutaneous QHS Soren Schulz MD   15 Units at 04/18/17 2118    labetalol injection 20 mg  20 mg Intravenous Q15 Min PRN Yosef Cummings MD        pantoprazole EC tablet 40 mg  40 mg Oral Daily Yosef Cummings MD   40 mg at 04/19/17 0859    promethazine (PHENERGAN) 12.5 mg in dextrose 5 % 50 mL IVPB  12.5 mg Intravenous Q6H PRN Mallory Haynes MD           Review of Systems   Constitutional: Negative for fever.   HENT: Negative for ear pain.   Eyes: Negative for photophobia and visual disturbance.   Respiratory: Negative for shortness of breath.   Cardiovascular: Negative for chest pain.   Gastrointestinal: Negative for abdominal pain.   Genitourinary: Negative for dysuria.   Neurological: Positive for facial asymmetry, speech difficulty and weakness on left side arm>leg.  Psychiatric/Behavioral: Negative for confusion.     Objective:     Vital Signs (Most Recent):  Temp: 97.6 °F (36.4 °C) (04/19/17 1218)  Pulse: 74 (04/19/17 1218)  Resp: 20 (04/19/17 1218)  BP: 136/74 (04/19/17 1218)  SpO2: (!) 92 % (04/19/17 1218) Vital Signs (24h Range):  Temp:  [97.6 °F (36.4 °C)-98.7 °F (37.1 °C)] 97.6 °F (36.4 °C)  Pulse:  [71-94] 74  Resp:  [18-20] 20  SpO2:  [92 %-94 %] 92 %  BP: (131-156)/(70-82) 136/74     Weight: 108 kg (238 lb 1.6 oz)  Body mass index is 36.2 kg/(m^2).    Physical Exam  Constitutional: no distress  HENT: no icteric sclerae  Head: Normocephalic.   Eyes: Conjunctivae and EOM are normal. Pupils are equal, round, and reactive to light.   Neck: Neck supple.   Cardiovascular: Normal rate.   Pulmonary/Chest: symmetrical expansion of chest.   Neurological: awake; oriented  to self, place, time; follows commands; dysarthric.  Pupil are round at 3 mm and reactive to light; no visual field deficits; no nystagmus; EOMI; right lower facial wekaness; tongue protrudes midline; palate elevates symmetrically  Strenght: 5/5 on right; 4/5 on left  Decreased light touch on right extremities  No neglect       Significant Labs: CBC: No results for input(s): WBC, HGB, HCT, PLT in the last 48 hours.  CMP: No results for input(s): GLU, NA, K, CL, CO2, BUN, CREATININE, CALCIUM, MG, PROT, ALBUMIN, BILITOT, ALKPHOS, AST, ALT, ANIONGAP, EGFRNONAA in the last 48 hours.        Assessment and Plan:     70 y/o male with stroke:      1. Stoke: right MCA territory with left side deficits. Pt on maximum medical management for stroke prevention but not adherent to Rx.  CTA shows severe stenosis of right carotid  -Vascular surgery consultation: pt to be seen in two weeks.  -ASA, clopidogrel, statin.  -OK for BP control.  -PT/OT/ST; denies by insurance. Outpatient PT    Active Diagnoses:    Diagnosis Date Noted POA    Hypokalemia [E87.6] 04/13/2017 Yes    Insulin pump in place [Z96.41] 04/13/2017 Not Applicable     Chronic    Mild protein malnutrition [E44.1] 04/13/2017 Yes     Chronic    KELVIN on CPAP [G47.33, Z99.89] 04/13/2017 Not Applicable     Chronic    History of CVA (cerebrovascular accident) [Z86.73] 04/13/2017 Not Applicable     Chronic    GERD (gastroesophageal reflux disease) [K21.9] 04/13/2017 Yes     Chronic    Chronic combined systolic and diastolic heart failure [I50.42] 11/12/2013 Yes     Chronic    Essential hypertension [I10] 11/10/2013 Yes     Chronic    Hyperlipidemia [E78.5]  Yes     Chronic    Type 2 diabetes mellitus, uncontrolled [E11.65] 09/26/2012 Yes     Chronic    CAD s/p CABG & PCI [I25.10] 09/26/2012 Yes     Chronic      Problems Resolved During this Admission:    Diagnosis Date Noted Date Resolved POA    PRINCIPAL PROBLEM:  Acute CVA (cerebrovascular accident) [I63.9]  04/12/2017 04/19/2017 Yes       VTE Risk Mitigation         Ordered     enoxaparin injection 40 mg  Daily     Route:  Subcutaneous        04/12/17 2139     Medium Risk of VTE  Once      04/12/17 2139          Josue Sylvester MD  Neurology  [facility]

## 2017-04-19 NOTE — PT/OT/SLP PROGRESS
Physical Therapy  Treatment    Jorden Shafer   MRN: [id number]   Admitting Diagnosis: Acute CVA (cerebrovascular accident)    PT Received On: 17  PT Start Time: 1430     PT Stop Time: 1454    PT Total Time (min): 24 min       Billable Minutes:  Gait [id number] and Therapeutic Activity 8    Treatment Type: Treatment  PT/PTA: PTA     PTA Visit Number: 1       General Precautions: Standard, fall  Orthopedic Precautions: N/A   Braces: N/A         Subjective:  Communicated with nurse Navarro prior to session.  Pt agreeable to therapy.     Pain Ratin/10              Pain Rating Post-Intervention: 0/10    Objective:   Patient found with: telemetry, bed alarm ,  [employer] monitor present.     Functional Mobility:  Bed Mobility:   Rolling/Turning Right: Stand by assistance  Scooting/Bridging: Stand by Assistance  Supine to Sit: Stand by Assistance  Sit to Supine: Stand by Assistance    Transfers: x 2 trials from bed . VC's for safety technique.   Sit <> Stand Assistance: Stand By Assistance  Sit <> Stand Assistive Device: Straight Cane    Gait:   Gait Distance: ~ 400 ft with L HHA and SC. Pt required VC's for safety technique and SC management. VC's to increase L side attention.    Assistance 1: Contact Guard Assistance  Gait Assistive Device: Single point cane  Gait Pattern: reciprocal  Gait Deviation(s): decreased nayeli, decreased velocity of limb motion, decreased toe-to-floor clearance, decreased swing-to-stance ratio, decreased weight-shifting ability      Balance:   Static Sit: GOOD-: Takes MODERATE challenges from all directions but inconsistently  Dynamic Sit: FAIR+: Maintains balance through MINIMAL excursions of active trunk motion  Static Stand: FAIR: Maintains without assist but unable to take challenges  Dynamic stand: FAIR: Needs CONTACT GUARD during gait     Therapeutic Activities and Exercises:  Pt performed bed mobility, transfer and gait training as above.   Pt performed SLS with BUE support x 3  trials/each LE . Performed side steps with BUE support.    Pt performed standing BLE heel/toes raises with BUE support x 10 reps x 2 .   Educated pt on safety awareness with all OOB mobility, transfer and gait training. Pt verbalize understand.  AM-PAC 6 CLICK MOBILITY  How much help from another person does this patient currently need?   1 = Unable, Total/Dependent Assistance  2 = A lot, Maximum/Moderate Assistance  3 = A little, Minimum/Contact Guard/Supervision  4 = None, Modified [place]/Independent    Turning over in bed (including adjusting bedclothes, sheets and blankets)?: 4  Sitting down on and standing up from a chair with arms (e.g., wheelchair, bedside commode, etc.): 3  Moving from lying on back to sitting on the side of the bed?: 4  Moving to and from a bed to a chair (including a wheelchair)?: 3  Need to walk in hospital room?: 3  Climbing 3-5 steps with a railing?: 3  Total Score: 20    AM-PAC Raw Score CMS G-Code Modifier Level of Impairment Assistance   6 % Total / Unable   7 - 9 CM 80 - 100% Maximal Assist   10 - 14 CL 60 - 80% Moderate Assist   15 - 19 CK 40 - 60% Moderate Assist   20 - 22 CJ 20 - 40% Minimal Assist   23 CI 1-20% SBA / CGA   24 CH 0% Independent/ Mod I     Patient left HOB elevated with all lines intact, call button in reach, bed alarm on and nurse notified.    Assessment:  Jorden Shafer is a 71 y.o. male with a medical diagnosis of Acute CVA (cerebrovascular accident) and presents with weakness, decreased endurance and functional mobility. Pt tolerated treatment well today. Pt will benefit from further skilled therapy in order to get back to PLOF.    Rehab identified problem list/impairments: Rehab identified problem list/impairments: weakness, impaired endurance, gait instability, decreased lower extremity function, decreased upper extremity function, decreased safety awareness, decreased coordination    Rehab potential is good.    Activity tolerance:  Good    Discharge recommendations: Discharge Facility/Level Of Care Needs: rehabilitation facility     Barriers to discharge:   Other (Comment) (decreased mobility and safety awareness)    Equipment recommendations:   none     GOALS:   Physical Therapy Goals        Problem: Physical Therapy Goal    Goal Priority Disciplines Outcome Goal Variances Interventions   Physical Therapy Goal     PT/OT, PT Ongoing (interventions implemented as appropriate)     Description:  Goals to be met by: 17     Patient will increase functional independence with mobility by performin. Supine to sit with Modified [place]  2. Rolling to Left and Right with Modified [place]  3. Sit to stand transfer with Modified [place] using Single-point Cane  4. Bed to chair transfer with Modified [place] using Single-point Cane   5. Gait  x 250 feet with Modified [place] using Single-point Cane   6. Ascend/descend 3 steps with right Handrail Modified [place]  7. Lower extremity exercise program x 30 reps per handout, with independence                PLAN:    Patient to be seen 5 x/week (Mon-Fri)  to address the above listed problems via gait training, therapeutic activities, therapeutic exercises, neuromuscular re-education  Plan of Care expires: 17  Plan of Care reviewed with: patient         Waleska George, PTA  2017

## 2017-04-19 NOTE — DISCHARGE SUMMARY
[facility] Hospital Medicine  Discharge Summary      Patient Name: Jorden Shafer  MRN: [id number]  Admission Date: 4/12/2017  Hospital Length of Stay: 7 days  Discharge Date and Time:  04/19/2017 12:43 PM  Attending Physician: Soren Schulz MD   Discharging Provider: Soren Schulz MD  Primary Care Provider: Neha Plasencia MD      HPI:   Mr. Jorden Shafer is a 71 y.o. male with essential hypertension, hyperlipidemia (LDL 77.8 Sept 2015), type 2 diabetes mellitus (HbA1c 8.5% Jun 2016), CAD s/p CABG & PCI, chronic combined systolic and diastolic heart failure (LVEF 30-35% Jun 2016), insulin pump, obesity (BMI 37.7), mild protein malnutrition, KELVIN on CPAP, and history of CVA in place who presents to [facility] ED with complaints of dysarthria last night. His family noted him to have some slurring of his speech yesterday that progressed to left facial drooping and a headache today.  He has been out of his medications for the past two months due to financial constraints.  He was also noted to have a productive cough of green sputum.  Further history is limited at this time.    * No surgery found *      Indwelling Lines/Drains at time of discharge:   Lines/Drains/Airways          No matching active lines, drains, or airways        Hospital Course:   The patient was admitted to the hospital for evaluation/treatment of acute stroke.  Patient was found by imaging to have a R MCA distribution stroke (R frontal lobe) with carotid bifurcation stenosis.  Speech/PT/OT were consulted and recommended Rehab.  Surgery was consulted and recommended follow up in clinic in 2 weeks as this was a completed stroke.  was consulted for Rehab placement. The patient clinically improved over the course of several days and was too high function for rehab.  The patient continued to improve to the point that H/H PT/OT were appropriate. The patient will be discharged to home today. Activity as  tolerated. Diet- low NA, ADA 1800 apryl diet. H/H will be arranged. Follow up with PCP in one week, Dr. Johnson, neuro, in one week. Dr. Shea in 2 weeks.        Consults:   Consults         Status Ordering Provider     Inpatient consult to General Surgery  Once     Provider:  Marino Koehler MD    Acknowledged MILADYS GILLIAM     Inpatient consult to Neurology  Once     Provider:  Josue Sylvester MD    Completed STUART JARAMILLO     Inpatient consult to Social Work  Once     Provider:  (Not yet assigned)    Completed MILADYS GILLIAM     IP consult to case management/social wor  Once     Provider:  (Not yet assigned)    Completed STUART JARAMILLO          Significant Diagnostic Studies: Labs:   CTA head:   The aortic arch maintains a normal branching pattern with mild atherosclerotic calcification. Left common carotid artery exhibits mild calcification. Mild plaque with less than 50% narrowing at the left carotid bifurcation and within the proximal internal carotid artery.    There is a severe stenosis at the right carotid bifurcation with calcified and noncalcified plaque. Stenosis greater than 90%. There is associated reactive narrowing of the distal ICA with diminished contrast enhancement. The vessel remains patent. More normal-appearing caliber and enhancement in the cavernous and supraclinoid segments, likely be [place] collaterals.  Proximal right vertebral artery is occluded. There is reconstitution of enhancement in the mid cervical segment with a normal caliber V3 segment. Moderate narrowing of the V4 segment    Proximal k arteries occluded. There is some reconstitution of enhancement in the distal cervical segment with intermittent enhancement of the distal V2 and V3 segments. There is apparent occlusion of the V4 segment. The vertebrobasilar system demonstrates multiple areas of moderate to severe narrowing.    Prominent left posterior communicating artery supplies distal aspect of the  vertebrobasilar system as well as both posterior communicating arteries. Mild bilateral PCA narrowing without apparent occlusion.    Hypoplastic A1 segment of the right anterior cerebral artery, a normal anatomical variant, although this does limit the effectiveness of the [place] collaterals in patient with right ICA stenosis. Mild irregularity in narrowing of the distal M1 segment left MCA. Left MCA otherwise unremarkable. ACAs are otherwise within normal limits. M1 segment right MCA MCA remains patent to exhibits multifocal areas of irregularity and narrowing. Apparent occlusion of the sylvian branch just distal to the MCA bifurcation. Right middle cerebral artery with moderate delay narrowing.    Pending Diagnostic Studies:     None        Final Active Diagnoses:    Diagnosis Date Noted POA    Hypokalemia [E87.6] 04/13/2017 Yes    Insulin pump in place [Z96.41] 04/13/2017 Not Applicable     Chronic    Mild protein malnutrition [E44.1] 04/13/2017 Yes     Chronic    KELVIN on CPAP [G47.33, Z99.89] 04/13/2017 Not Applicable     Chronic    History of CVA (cerebrovascular accident) [Z86.73] 04/13/2017 Not Applicable     Chronic    GERD (gastroesophageal reflux disease) [K21.9] 04/13/2017 Yes     Chronic    Chronic combined systolic and diastolic heart failure [I50.42] 11/12/2013 Yes     Chronic    Essential hypertension [I10] 11/10/2013 Yes     Chronic    Hyperlipidemia [E78.5]  Yes     Chronic    Type 2 diabetes mellitus, uncontrolled [E11.65] 09/26/2012 Yes     Chronic    CAD s/p CABG & PCI [I25.10] 09/26/2012 Yes     Chronic      Problems Resolved During this Admission:    Diagnosis Date Noted Date Resolved POA    PRINCIPAL PROBLEM:  Acute CVA (cerebrovascular accident) [I63.9] 04/12/2017 04/19/2017 Yes      No new Assessment & Plan notes have been filed under this hospital service since the last note was generated.  Service: Hospital Medicine      Discharged Condition: good    Disposition: Home  or Self Care with H/H     Follow Up:  Follow-up Information     Follow up with Marino Koehler MD In 2 weeks.    Specialty:  General Surgery    Contact information:    [address]  [address]  [facility] [id number] [phone]          Follow up with Lito Johnson MD In 1 week.    Specialty:  Neurology    Contact information:    [address]  [address]  [place] [id number] [phone]          Follow up with Neha Plasencia MD In 1 week.    Specialty:  Family Medicine    Contact information:    [address]  [place] [address]  [phone]          Patient Instructions:     Ambulatory referral to Outpatient Case Management   Referral Priority: Routine Referral Type: Consultation   Referral Reason: Specialty Services Required    Number of Visits Requested: 1      Diet general   Order Specific Question Answer Comments   Total calories: 1800 Calorie    Na restriction, if any: 2gNa      Activity as tolerated       Medications:  Reconciled Home Medications:   Current Discharge Medication List      CONTINUE these medications which have NOT CHANGED    Details   amitriptyline (ELAVIL) 50 MG tablet Take 1 tablet (50 mg total) by mouth every evening.  Qty: 30 tablet, Refills: 2    Associated Diagnoses: Right-sided low back pain with right-sided sciatica      amlodipine (NORVASC) 10 MG tablet Take 1 tablet (10 mg total) by mouth once daily.  Qty: 90 tablet, Refills: 3    Associated Diagnoses: Essential hypertension      aspirin (ECOTRIN) 81 MG EC tablet Take 81 mg by mouth once daily.      atorvastatin (LIPITOR) 40 MG tablet Take 0.5 tablets (20 mg total) by mouth once daily.  Qty: 45 tablet, Refills: 5    Comments: **Patient requests 90 day supply**  Associated Diagnoses: CAD (coronary artery disease), native coronary artery      blood sugar diagnostic Strp 1 strip by Misc.(Non-Drug; Combo Route) route 2 (two) times daily with meals. TRUE METRIX  Qty: 100 strip, Refills: 11    Associated Diagnoses: Diabetes  mellitus without complication      blood-glucose meter kit Use as instructed          TRUE METRIX  Qty: 1 each, Refills: 0    Associated Diagnoses: Diabetes mellitus without complication      carvedilol (COREG) 25 MG tablet Take 1 tablet (25 mg total) by mouth 2 (two) times daily with meals.  Qty: 90 tablet, Refills: 3    Associated Diagnoses: Essential hypertension      celecoxib (CELEBREX) 100 MG capsule Take 1 capsule (100 mg total) by mouth 2 (two) times daily.  Qty: 30 capsule, Refills: 0    Associated Diagnoses: Acute foot pain, left; Foot arch pain, left; Neuropathy of foot, left; Plantar fasciitis, left      citalopram (CELEXA) 10 MG tablet Take 1 tablet (10 mg total) by mouth once daily.  Qty: 90 tablet, Refills: 1    Comments: **Patient requests 90 days supply**  Associated Diagnoses: Sleep disturbances      clopidogrel (PLAVIX) 75 mg tablet Take 1 tablet (75 mg total) by mouth once daily.  Qty: 90 tablet, Refills: 3      furosemide (LASIX) 80 MG tablet Take 1 tablet (80 mg total) by mouth once daily.  Qty: 90 tablet, Refills: 3    Comments: **Patient requests 90 days supply**  Associated Diagnoses: Essential hypertension      glucose 4 GM chewable tablet Take 16 g by mouth as needed for Low blood sugar.      insulin lispro (HUMALOG) 100 unit/mL injection Inject into the skin 3 (three) times daily before meals.      isosorbide mononitrate (IMDUR) 60 MG 24 hr tablet Take 1 tablet (60 mg total) by mouth once daily.  Qty: 90 tablet, Refills: 3    Associated Diagnoses: Essential hypertension      lancets Misc 1 lancet by Misc.(Non-Drug; Combo Route) route 2 (two) times daily with meals. TRUE METRIX  Qty: 100 each, Refills: 11    Associated Diagnoses: Diabetes mellitus without complication      lisinopril 10 MG tablet Take 1 tablet (10 mg total) by mouth once daily.  Qty: 90 tablet, Refills: 3    Comments: **Patient requests 90 day supply**  Associated Diagnoses: Essential hypertension      nitroGLYCERIN  (NITROSTAT) 0.4 MG SL tablet Place 1 tablet (0.4 mg total) under the tongue every 5 (five) minutes as needed. Sublingual  ( under tongue) as needed  Qty: 30 tablet, Refills: prn    Associated Diagnoses: Other chest pain      omega-3 acid ethyl esters (LOVAZA) 1 gram capsule Take 2 g by mouth 3 (three) times daily.        omeprazole (PRILOSEC) 40 MG capsule Take 1 capsule (40 mg total) by mouth 2 (two) times daily.  Qty: 180 capsule, Refills: 3    Comments: **Patient requests 90 day supply**  Associated Diagnoses: GERD (gastroesophageal reflux disease)      SUBCUTANEOUS INSULIN PUMP (MINIMED 530G INSULIN PUMP MISC) by Misc.(Non-Drug; Combo Route) route.           Time spent on the discharge of patient: > 30 minutes    Soren Hylton MD  Department of Hospital Medicine  [facility]

## 2017-04-19 NOTE — PROGRESS NOTES
[facility] Hospital Medicine  Progress Note    Patient Name: Jorden Shafer  MRN: [id number]  Patient Class: IP- Inpatient   Admission Date: 4/12/2017  Length of Stay: 7 days  Attending Physician: Soren Schulz MD  Primary Care Provider: Neha Plasencia MD        Subjective:     Principal Problem:Acute CVA (cerebrovascular accident)    HPI:  Mr. Jorden Shafer is a 71 y.o. male with essential hypertension, hyperlipidemia (LDL 77.8 Sept 2015), type 2 diabetes mellitus (HbA1c 8.5% Jun 2016), CAD s/p CABG & PCI, chronic combined systolic and diastolic heart failure (LVEF 30-35% Jun 2016), insulin pump, obesity (BMI 37.7), mild protein malnutrition, KELVIN on CPAP, and history of CVA in place who presents to [facility] ED with complaints of dysarthria last night. His family noted him to have some slurring of his speech yesterday that progressed to left facial drooping and a headache today.  He has been out of his medications for the past two months due to financial constraints.  He was also noted to have a productive cough of green sputum.  Further history is limited at this time.    Hospital Course:  The patient was admitted to the hospital for evaluation/treatment of acute stroke.  Patient was found by imaging to have a R MCA distribution stroke (R frontal lobe) with carotid bifurcation stenosis.  Speech/PT/OT were consulted and recommended Rehab.  Surgery was consulted and recommended follow up in clinic in 2 weeks as this was a completed stroke.  was consulted for Rehab placement. The patient clinically improved over the course of several days and was too high function for rehab.      Interval History: No new issues.     Review of Systems   Constitutional: Negative for activity change.   Respiratory: Negative for chest tightness and shortness of breath.    Cardiovascular: Negative for chest pain.   Gastrointestinal: Negative for abdominal pain.     Objective:     Vital Signs  "(Most Recent):  Temp: 98 °F (36.7 °C) (04/19/17 0818)  Pulse: 71 (04/19/17 0818)  Resp: 20 (04/19/17 0818)  BP: (!) 156/78 (04/19/17 0818)  SpO2: (!) 93 % (04/19/17 0818) Vital Signs (24h Range):  Temp:  [97.8 °F (36.6 °C)-98.7 °F (37.1 °C)] 98 °F (36.7 °C)  Pulse:  [71-94] 71  Resp:  [18-20] 20  SpO2:  [93 %-95 %] 93 %  BP: (131-156)/(70-82) 156/78     Weight: 108 kg (238 lb 1.6 oz)  Body mass index is 36.2 kg/(m^2).    Intake/Output Summary (Last 24 hours) at 04/19/17 1055  Last data filed at 04/19/17 0600   Gross per 24 hour   Intake              300 ml   Output              300 ml   Net                0 ml      Physical Exam   Constitutional: He is oriented to person, place, and time. He appears well-developed and well-nourished.   HENT:   Head: Normocephalic and atraumatic.   Neurological: He is alert and oriented to person, place, and time.   4/5 upper and lower strength    Skin: Skin is warm and dry.   Vitals reviewed.      Significant Labs: BMP: No results for input(s): GLU, NA, K, CL, CO2, BUN, CREATININE, CALCIUM, MG in the last 48 hours.  CBC: No results for input(s): WBC, HGB, HCT, PLT in the last 48 hours.    Significant Imaging:     Assessment/Plan:      * Acute CVA (cerebrovascular accident)  Patient has sustained an acute CVA, and unfortunately he is far outside the therapeutic window for tPA.  CT-head was negative for acute intracranial hemorrhage but was significant for "...development of a large right temporal infarct. There is no evidence acute hemorrhage. There is evidence of a prior right cerebellar infarct."    A bilateral carotid ultrasound was significant for "[o]cclusion of the mid and proximal segments of the right ICA.  Retrograde flow within the right vertebral artery."  . Patient is not within the therapeutic window for tPA and he is already on aspirin.  Will obtain a lipid panel; allow for permissive hypertension in order not to extend the penumbra; consult PT/OT and Speech Therapy for " evaluation; consult  for discharge planning; and consult Neurology for further recommendations.  Recs from PT/OT are rehab. Consulted surgery for R carotid stenosis-follow up as out patient in 2 weeks as this was a completed stroke in the R MCA territory. Not rehab candidate. May not even be SNF. May end up going home with H/H as he is improving daily. Will discuss with .       CAD s/p CABG & PCI  Stable; will continue his home regimen of aspirin, atorvastatin, and clopidogrel.  Will hold his home regimen of carvedilol and lisinopril to allow for permissive hypertension.    Type 2 diabetes mellitus, uncontrolled  Poorly-controlled on a home regimen of an insulin pump and prandial insulin therapy; will provide the insulin pump, prandial insulin, and insulin along with insulin sliding scale.    Hyperlipidemia  Well-controlled; will continue patient's home regimen of atorvastatin.    Essential hypertension  As addressed above.  Will withhold antihypertensive medications at this time to allow for permissive hypertension.    Chronic combined systolic and diastolic heart failure  Stable without any evidence of acute heart failure; will hold his home regimen of carvedilol, furosemide, and lisinopril to allow for permissive hypertension.    Hypokalemia  Will replete orally and recheck his potassium level in the morning.    Insulin pump in place  Stable; there are no acute issues.    Mild protein malnutrition  Will provide protein supplementation with Boost Glucose one he is cleared for a diet.    KELVIN on CPAP  Will provide CPAP nightly.    History of CVA (cerebrovascular accident)  As addressed above.    GERD (gastroesophageal reflux disease)  Will substitute his home regimen of omeprazole for pantoprazole while he is inpatient.    VTE Risk Mitigation         Ordered     enoxaparin injection 40 mg  Daily     Route:  Subcutaneous        04/12/17 4686     Medium Risk of VTE  Once      04/12/17  2139      To SNF or home with H/H in next 1-2 days. Will discuss with .     Addendum 12:39pm  Family would like to take patient home with H/H. Will discharge.     Soren Hylton MD  Department of Hospital Medicine   [facility]

## 2017-04-19 NOTE — PLAN OF CARE
04/19/17 1540   Final Note   Assessment Type Final Discharge Note   Discharge Disposition Home-Health   Discharge planning education complete? Yes   What phone number can be called within the next 1-3 days to see how you are doing after discharge? [phone]   Hospital Follow Up  Appt(s) scheduled? Yes   Discharge plans and expectations educations in teach back method with documentation complete? Yes   Offered [facility] -- Bedside Delivery? n/a   Discharge/Hospital Encounter Summary to ([facility]) PCP n/a   Referral to Outpatient Case Management complete? n/a   Referral to / orders for Home Health Complete? Yes   30 day supply of medicines given at discharge, if documented non-compliance / non-adherence? n/a   Any social issues identified prior to discharge? n/a   Did you assess the readiness or willingness of the family or caregiver to support self management of care? Yes     According to pt wife Beth she has requested all pt medical follow-up appointments be scheduled on Thursdays and Fridays in the mornings. IONA scheduled pt PCP follow-up with the [facility] @ [phone]; IONA spoke with Darrell, provided lace score of an 81, and received an appointment for 4/21. IONA scheduled follow-up with Dr. Jeffers (neurologist); IONA contacted office @ [phone]; IONA spoke with Sri Novoa scheduled appointment for 5/18. IONA contacted Dr. Shea's office @ [phone] to arrange appointment. IONA spoke Chrissy Lowe scheduled appointment for 4/26 at the [facility].    Orders received for . IONA spoke with pt ana m Campos via phone who chose [facility]. IONA faxed pt face sheet, home health orders, and discharge summary to [facility] via [facility].     CHF signs and symptoms completed by JULIEN Atkins.

## 2017-04-19 NOTE — SUBJECTIVE & OBJECTIVE
Interval History: No new issues.     Review of Systems   Constitutional: Negative for activity change.   Respiratory: Negative for chest tightness and shortness of breath.    Cardiovascular: Negative for chest pain.   Gastrointestinal: Negative for abdominal pain.     Objective:     Vital Signs (Most Recent):  Temp: 98 °F (36.7 °C) (04/19/17 0818)  Pulse: 71 (04/19/17 0818)  Resp: 20 (04/19/17 0818)  BP: (!) 156/78 (04/19/17 0818)  SpO2: (!) 93 % (04/19/17 0818) Vital Signs (24h Range):  Temp:  [97.8 °F (36.6 °C)-98.7 °F (37.1 °C)] 98 °F (36.7 °C)  Pulse:  [71-94] 71  Resp:  [18-20] 20  SpO2:  [93 %-95 %] 93 %  BP: (131-156)/(70-82) 156/78     Weight: 108 kg (238 lb 1.6 oz)  Body mass index is 36.2 kg/(m^2).    Intake/Output Summary (Last 24 hours) at 04/19/17 1055  Last data filed at 04/19/17 0600   Gross per 24 hour   Intake              300 ml   Output              300 ml   Net                0 ml      Physical Exam   Constitutional: He is oriented to person, place, and time. He appears well-developed and well-nourished.   HENT:   Head: Normocephalic and atraumatic.   Neurological: He is alert and oriented to person, place, and time.   4/5 upper and lower strength    Skin: Skin is warm and dry.   Vitals reviewed.      Significant Labs: BMP: No results for input(s): GLU, NA, K, CL, CO2, BUN, CREATININE, CALCIUM, MG in the last 48 hours.  CBC: No results for input(s): WBC, HGB, HCT, PLT in the last 48 hours.    Significant Imaging:

## 2017-04-20 ENCOUNTER — PATIENT OUTREACH (OUTPATIENT)
Dept: ADMINISTRATIVE | Facility: CLINIC | Age: 72
End: 2017-04-20
Payer: MEDICARE

## 2017-04-20 ENCOUNTER — TELEPHONE (OUTPATIENT)
Dept: FAMILY MEDICINE | Facility: CLINIC | Age: 72
End: 2017-04-20

## 2017-04-20 ENCOUNTER — OUTPATIENT CASE MANAGEMENT (OUTPATIENT)
Dept: ADMINISTRATIVE | Facility: OTHER | Age: 72
End: 2017-04-20

## 2017-04-20 ENCOUNTER — TELEPHONE (OUTPATIENT)
Dept: PRIMARY CARE CLINIC | Facility: CLINIC | Age: 72
End: 2017-04-20

## 2017-04-20 DIAGNOSIS — I63.9 CEREBROVASCULAR ACCIDENT (CVA), UNSPECIFIED MECHANISM: Primary | ICD-10-CM

## 2017-04-20 NOTE — TELEPHONE ENCOUNTER
Patient was discharged and his daughter wanted to know what were the options for the patient. I advised that she go to [facility] and speak with Margo.

## 2017-04-20 NOTE — PROGRESS NOTES
For your Information:    Please note the following patient has been assigned to Elvia Monteiro RN with Outpatient Complex Care Mgmt for screening.    Reason: High Risk  Weakness  Slurred speech  Right temporal lobe infarction  Facial droop  Coronary artery disease involving native coronary artery of native heart with angina pectoris  Essential hypertension  Chronic combined systolic and diastolic heart failure  Acute CVA (cerebrovascular accident)  Hypokalemia  Insulin pump in place  Mild protein malnutrition  KELVIN on CPAP  History of CVA (cerebrovascular accident)  Vitamin D deficiency disease  Prostate cancer  Tortuous aorta  Major depressive disorder, single episode, mild    Please contact [facility] at ext [phone] with questions.    Thank you,  Esme Guillen, SSC

## 2017-04-20 NOTE — PROGRESS NOTES
Chart reviewed. Assessment completed with Beth, pt's wife. Also, spoke with pt's daughter, Jessika. She states she called [facility] to have pt placed for therapy. [facility]. Case reviewed with DIANA Allen. Beth reports she works 3 days/week and pt requires assistance at all times. Pt is ambulating with a walker. Will send request for wheelchair order. Discussed fall and aspiration precautions. Beth is thickening all liquids. She states pt does have difficulty swallowing and coughs at times. Instructed her to keep pt upright when eating or drinking. Pt is a retired . Pt was in the [employer]. Beth states pt is having difficulty paying medication copays. Jessika applied for [employer].Will refer pt to [facility] assistance program.

## 2017-04-20 NOTE — Clinical Note
Pt unable to afford Lovaza and Celebrex as only picked up important medications. Please reach out to patient.  Thanking you in advance.  Leandra Billings RN Care Coordination C3

## 2017-04-20 NOTE — TELEPHONE ENCOUNTER
----- Message from Elvia Monteiro RN sent at 4/20/2017  3:32 PM CDT -----  Dr. Plasencia,     Pt only has a standard walker at him. He would benefit from a wheelchair to get to appointments. Please send order to [facility] if you agree.     Thanks,     Elvia Monteiro RN, [facility]  Outpatient Case Management  [facility]

## 2017-04-20 NOTE — TELEPHONE ENCOUNTER
Erika call with [facility] to report that she was at the patients home and reports that  patients blood pressure is 80/50..patient was just discharge from the hospital due to having a stroke. Spoke with Dr Plasencia and she advise the patient  to go to the ER.

## 2017-04-20 NOTE — TELEPHONE ENCOUNTER
----- Message from Berna Curry sent at 4/19/2017  1:18 PM CDT -----  Contact: daughter  Jessika  [phone]  Pt had a stoke and will be discharged from , daughter needs to know what rehab places are available, Insurance would not cover rehab by . Would like to know if pt can go to another rehab, get Home Health and a . Pls call daughter Jessika [phone]. Thanks......raman

## 2017-04-20 NOTE — TELEPHONE ENCOUNTER
I received a call from Elvia Monteiro, outpatient  stating the patient's wife is very overwhelmed with the patient since he has been home. She will looking into [facility] for intensive rehab.   Patient needs a TB test. I called [facility] and made appt for TB test to be performed tomorrow at 9:30 AM. [facility].   I called the wife back and informed her of above.

## 2017-04-20 NOTE — PATIENT INSTRUCTIONS
Stroke (Completed)  You have had a mild stroke, also called CVA, or cerebrovascular accident. This is caused by a loss of blood flow to part of your brain. This can occur when a blood clot forms inside the carotid artery (main artery from the heart to the brain) or inside the heart (as with atrial fibrillation, an irregular heart rhythm). When the clot travels to the brain, it can lodge in a blood vessel and block blood flow. The other common cause of stroke is a gradual narrowing of the arteries in the brain due to atherosclerosis (hardening of the arteries).  Once you have had a stroke, you are at risk of having another. Therefore, be sure to follow up with your doctor for further evaluation and treatment. This may include an ultrasound of the arteries in your neck and an evaluation of your heart. If problems are found, your doctor will recommend treatment with medications and/or procedures.  Medications to reduce your chance of having another stroke include those that prevent blood clots, such as antiplatelet medicines (aspirin and Plavix) and anticoagulant medicines (warfarin).  Home Care:  Rest at home and avoid exertion for the next few days.  If your doctor has prescribed antiplatelet medication, take it as directed.  Follow Up:  Call your doctor for an appointment in the next few days for a repeat exam. Additional tests may be needed. If you had an x-ray, CT scan, MRI scan, or ECG (electrocardiogram), it will be reviewed by a specialist. You will be notified of any new findings that will affect your care.  Call 911 Or Emergency Services  if any of the following occur:  Any of your stroke symptoms worsen  New problems with speech, vision, walking, or weakness or numbness on one side of your body  Severe headache, fainting spell, dizziness or seizure  Chest pain or shortness of breath  © 8533-4611 [facility], [address] [place] [address], [place] [address]. All rights reserved. This information is not intended  as a substitute for professional medical care. Always follow your healthcare professional's instructions.

## 2017-04-20 NOTE — TELEPHONE ENCOUNTER
Spoke with daughter and gave information about rehab at [facility] and also put in a referral to outpatient case management. Also the daughter stated that the EMT's came out and the patient's bp is rising to 98/60's and that he feels fine. He did not want to go to the hospital.

## 2017-04-20 NOTE — Clinical Note
Please forward this important TCC information to your provider in order to maximize the post discharge care delivery of this patient.  C3 nurse spoke with Jorden Shafer   for a TCC post hospital discharge follow up call. The patient has a scheduled HOSFU appointment with Neha Plasencia MD on 4/21 @ 6430.   Pt will go to [facility] after pcp appt.  Please reach out to patient to confirm Dr. Plasencia's appt as I do not see in Epic.  Thanking you in advance  Respectfully, Leandra Billings RN  [facility]   [email or web address]     Please do not reply to this message, as this inbox is not routinely monitored.

## 2017-04-21 ENCOUNTER — OUTPATIENT CASE MANAGEMENT (OUTPATIENT)
Dept: ADMINISTRATIVE | Facility: OTHER | Age: 72
End: 2017-04-21

## 2017-04-21 ENCOUNTER — OFFICE VISIT (OUTPATIENT)
Dept: PRIMARY CARE CLINIC | Facility: CLINIC | Age: 72
End: 2017-04-21
Payer: MEDICARE

## 2017-04-21 ENCOUNTER — CLINICAL SUPPORT (OUTPATIENT)
Dept: FAMILY MEDICINE | Facility: CLINIC | Age: 72
End: 2017-04-21
Payer: MEDICARE

## 2017-04-21 ENCOUNTER — TELEPHONE (OUTPATIENT)
Dept: PHARMACY | Facility: CLINIC | Age: 72
End: 2017-04-21

## 2017-04-21 VITALS
HEART RATE: 51 BPM | SYSTOLIC BLOOD PRESSURE: 92 MMHG | WEIGHT: 241.19 LBS | OXYGEN SATURATION: 97 % | BODY MASS INDEX: 36.67 KG/M2 | TEMPERATURE: 98 F | DIASTOLIC BLOOD PRESSURE: 50 MMHG

## 2017-04-21 DIAGNOSIS — I25.119 CORONARY ARTERY DISEASE INVOLVING NATIVE CORONARY ARTERY OF NATIVE HEART WITH ANGINA PECTORIS: Primary | Chronic | ICD-10-CM

## 2017-04-21 DIAGNOSIS — Z79.4 UNCONTROLLED TYPE 2 DIABETES MELLITUS WITH HYPERGLYCEMIA, WITH LONG-TERM CURRENT USE OF INSULIN: Chronic | ICD-10-CM

## 2017-04-21 DIAGNOSIS — I63.9 CEREBROVASCULAR ACCIDENT (CVA), UNSPECIFIED MECHANISM: ICD-10-CM

## 2017-04-21 DIAGNOSIS — Z23 NEED FOR VACCINATION: Primary | ICD-10-CM

## 2017-04-21 DIAGNOSIS — E11.65 UNCONTROLLED TYPE 2 DIABETES MELLITUS WITH HYPERGLYCEMIA, WITH LONG-TERM CURRENT USE OF INSULIN: Chronic | ICD-10-CM

## 2017-04-21 DIAGNOSIS — G47.9 SLEEP DISTURBANCES: ICD-10-CM

## 2017-04-21 DIAGNOSIS — I50.42 CHRONIC COMBINED SYSTOLIC AND DIASTOLIC HEART FAILURE: Chronic | ICD-10-CM

## 2017-04-21 DIAGNOSIS — I65.21 STENOSIS OF RIGHT CAROTID ARTERY: Chronic | ICD-10-CM

## 2017-04-21 DIAGNOSIS — I10 ESSENTIAL HYPERTENSION: Chronic | ICD-10-CM

## 2017-04-21 PROCEDURE — 3078F DIAST BP <80 MM HG: CPT | Mod: S$GLB,,, | Performed by: NURSE PRACTITIONER

## 2017-04-21 PROCEDURE — 4010F ACE/ARB THERAPY RXD/TAKEN: CPT | Mod: S$GLB,,, | Performed by: NURSE PRACTITIONER

## 2017-04-21 PROCEDURE — 1160F RVW MEDS BY RX/DR IN RCRD: CPT | Mod: S$GLB,,, | Performed by: NURSE PRACTITIONER

## 2017-04-21 PROCEDURE — 1157F ADVNC CARE PLAN IN RCRD: CPT | Mod: 8P,S$GLB,, | Performed by: NURSE PRACTITIONER

## 2017-04-21 PROCEDURE — 99214 OFFICE O/P EST MOD 30 MIN: CPT | Mod: S$GLB,,, | Performed by: NURSE PRACTITIONER

## 2017-04-21 PROCEDURE — 1159F MED LIST DOCD IN RCRD: CPT | Mod: S$GLB,,, | Performed by: NURSE PRACTITIONER

## 2017-04-21 PROCEDURE — 3046F HEMOGLOBIN A1C LEVEL >9.0%: CPT | Mod: S$GLB,,, | Performed by: NURSE PRACTITIONER

## 2017-04-21 PROCEDURE — 99499 UNLISTED E&M SERVICE: CPT | Mod: S$GLB,,, | Performed by: NURSE PRACTITIONER

## 2017-04-21 PROCEDURE — 1126F AMNT PAIN NOTED NONE PRSNT: CPT | Mod: S$GLB,,, | Performed by: NURSE PRACTITIONER

## 2017-04-21 PROCEDURE — 99999 PR PBB SHADOW E&M-EST. PATIENT-LVL IV: CPT | Mod: PBBFAC,,,

## 2017-04-21 PROCEDURE — 3074F SYST BP LT 130 MM HG: CPT | Mod: S$GLB,,, | Performed by: NURSE PRACTITIONER

## 2017-04-21 RX ORDER — CARVEDILOL 25 MG/1
25 TABLET ORAL 2 TIMES DAILY WITH MEALS
Qty: 90 TABLET | Refills: 3 | Status: ON HOLD | OUTPATIENT
Start: 2017-04-21 | End: 2017-04-28

## 2017-04-21 RX ORDER — CITALOPRAM 10 MG/1
10 TABLET ORAL DAILY
Qty: 90 TABLET | Refills: 1 | Status: SHIPPED | OUTPATIENT
Start: 2017-04-21 | End: 2018-03-15 | Stop reason: SDUPTHER

## 2017-04-21 RX ORDER — TRAMADOL HYDROCHLORIDE 50 MG/1
50 TABLET ORAL EVERY 6 HOURS PRN
COMMUNITY
End: 2017-04-21

## 2017-04-21 NOTE — PROGRESS NOTES
Reviewed stroke information with the patient and the patient's family. They state they are very overwhelmed with the patient at home and will be going to [facility] to check for placement.   I made appt for patient to be seen by Endocrinologist Dr. Tomi Britton today at 2:30 PM. Spoke with Monica.  Instructed patient and family member on importance of keeping appointment with Endocrinologist as this will aid in his recuperation and attainment of optimal functional status. Patient and family verbalized understanding.   Reviewed location, date, and time of appointment and wife and daughter state understanding.

## 2017-04-21 NOTE — PROGRESS NOTES
Please note that a welcome packet was printed and mailed to the patient on 4/21/2017.    Please contact [facility] at [address] [phone] with questions.    Thank you,   Patria Sung, SSC

## 2017-04-21 NOTE — PROGRESS NOTES
[facility]  New Visit Progress Note   Recent Hospital Discharge     PRESENTING HISTORY     Chief Complaint/Reason for Visit:  Follow up Hospital Discharge   No chief complaint on file.    PCP: Neha Plasencia MD    Recent Hospitalization   Admission Date: 4/12/2017  Hospital Length of Stay: 7 days  Discharge Date and Time:  04/19/2017 12:43 PM  History of Present Illness:  Mr. Jorden Shafer is a 71 y.o. male who was recently admitted to the hospital.Mr. Jorden Shafer is a 71 y.o. male with essential hypertension, hyperlipidemia (LDL 77.8 Sept 2015), type 2 diabetes mellitus (HbA1c 8.5% Jun 2016), CAD s/p CABG & PCI, chronic combined systolic and diastolic heart failure (LVEF 30-35% Jun 2016), insulin pump, obesity (BMI 37.7), mild protein malnutrition, KELVIN on CPAP, and history of CVA in place who presents to [facility] ED with complaints of dysarthria last night. His family noted him to have some slurring of his speech yesterday that progressed to left facial drooping and a headache today. He has been out of his medications for the past two months due to financial constraints. He was also noted to have a productive cough of green sputum. Further history is limited at this time.  Hospital Course:   The patient was admitted to the hospital for evaluation/treatment of acute stroke. Patient was found by imaging to have a R MCA distribution stroke (R frontal lobe) with carotid bifurcation stenosis. Speech/PT/OT were consulted and recommended Rehab. Surgery was consulted and recommended follow up in clinic in 2 weeks as this was a completed stroke.  was consulted for Rehab placement. The patient clinically improved over the course of several days and was too high function for rehab. The patient continued to improve to the point that H/H PT/OT were appropriate. The patient will be discharged to home today. Activity as tolerated. Diet- low NA, ADA 1800 apryl diet. H/H will be arranged. Follow up  with PCP in one week, Dr. Johnson, neuro, in one week. Dr. Shea in 2 weeks.      ___________________________________________________________________    Today: Patient arrived to [facility] with Daughter and wife.Wife unable to care for patient at home. Dysarthria much improved. Same left facial droop. Left extremities weakness -Upper>Lower - improving with home exercise. HH PT/OT have not yet evaluated. Family requesting SNF [facility]. Family will go visit facility today. SBP 90's at home. Here at [facility] 92/50. Denies chest pain, dizziness or shortness of breath. -300's . Patient missed appt with Endocrinologist Dr. Montiel on Monday as was hospitalized. Patient is not sure if insulin pump dosage and is not giving self correction scale insulin. [facility] nurse arrange for patient to see Dr. Montiel today at 2 pm.       Review of Systems:  Review of Systems   Constitutional: Positive for malaise/fatigue.   HENT: Negative.    Eyes: Negative.    Respiratory: Negative.    Cardiovascular: Positive for leg swelling (chronic).   Gastrointestinal: Negative.    Genitourinary: Negative.    Musculoskeletal: Negative.    Skin: Negative.    Neurological: Positive for speech change, focal weakness and weakness.   Psychiatric/Behavioral: Positive for memory loss.   *    PAST HISTORY:     Past Medical History:   Diagnosis Date    Back pain     CAD (coronary artery disease) 1989    h/o cabg x 5    Chronic kidney disease     Colon polyp     h/o on colonoscopy    Contact dermatitis 11/12/2013    CVA (cerebral vascular accident)     March 2010    Depression     Diabetes mellitus type II     on  insulin pump    DVT (deep venous thrombosis) 1980's    left leg    GERD (gastroesophageal reflux disease)     Heart failure     [employer]     Hyperlipidemia     Hypertension     Hypertensive heart disease without congestive heart failure     Myocardial infarction     Neuropathy of lower extremity     Obesity, morbid     Prostate cancer      Renal manifestation of secondary diabetes mellitus     Sleep apnea     Vitamin D deficiency disease        Past Surgical History:   Procedure Laterality Date    CARDIAC DEFIBRILLATOR PLACEMENT  2016    CHOLECYSTECTOMY      CORONARY ARTERY BYPASS GRAFT      bypass times  5    defibulater  2016    EYE SURGERY Bilateral     HERNIA REPAIR      groin x2  and umbilical    JOINT REPLACEMENT      knee left    thrombus Left     >2005    VASECTOMY         Family History   Problem Relation Age of Onset    Alzheimer's disease Mother      Living    Heart disease Mother     Dementia Mother     Asbestos Father          Cancer Father     Diabetes type II Maternal Aunt     Stroke Neg Hx        Social History     Social History    Marital status:      Spouse name: N/A    Number of children: 3    Years of education: GED     Occupational History    Retired [employer]      Social History Main Topics    Smoking status: Former Smoker     Packs/day: 4.00     Years: 25.00     Types: Cigarettes    Smokeless tobacco: Never Used    Alcohol use 0.0 oz/week     0 Standard drinks or equivalent per week      Comment: occasional    Drug use: No    Sexual activity: Yes     Partners: Female     Other Topics Concern    Not on file     Social History Narrative    SCREENING/HEALTH MAINTENANCE. Updated  13    COLONOSCOPY .  [facility].    TETANUS 2005.    PNEUMOVAX  12    NO PRIOR HISTORY OF ZOSTAVAX    PSA 13    FLU 10/8/13       MEDICATIONS & ALLERGIES:     Current Outpatient Prescriptions on File Prior to Visit   Medication Sig Dispense Refill    amitriptyline (ELAVIL) 50 MG tablet Take 1 tablet (50 mg total) by mouth every evening. 30 tablet 2    aspirin (ECOTRIN) 81 MG EC tablet Take 81 mg by mouth once daily.      atorvastatin (LIPITOR) 40 MG tablet Take 0.5 tablets (20 mg total) by mouth once daily. 45 tablet 5    blood sugar diagnostic Strp 1 strip by  Misc.(Non-Drug; Combo Route) route 2 (two) times daily with meals. TRUE METRIX 100 strip 11    blood-glucose meter kit Use as instructed          TRUE METRIX 1 each 0    clopidogrel (PLAVIX) 75 mg tablet Take 1 tablet (75 mg total) by mouth once daily. 90 tablet 3    furosemide (LASIX) 80 MG tablet Take 1 tablet (80 mg total) by mouth once daily. 90 tablet 3    glucose 4 GM chewable tablet Take 16 g by mouth as needed for Low blood sugar.      insulin lispro (HUMALOG) 100 unit/mL injection Inject into the skin 3 (three) times daily before meals.      isosorbide mononitrate (IMDUR) 60 MG 24 hr tablet Take 1 tablet (60 mg total) by mouth once daily. 90 tablet 3    lancets Misc 1 lancet by Misc.(Non-Drug; Combo Route) route 2 (two) times daily with meals. TRUE METRIX 100 each 11    lisinopril 10 MG tablet Take 1 tablet (10 mg total) by mouth once daily. 90 tablet 3    nitroGLYCERIN (NITROSTAT) 0.4 MG SL tablet Place 1 tablet (0.4 mg total) under the tongue every 5 (five) minutes as needed. Sublingual  ( under tongue) as needed 30 tablet prn    omega-3 acid ethyl esters (LOVAZA) 1 gram capsule Take 2 g by mouth 3 (three) times daily.        omeprazole (PRILOSEC) 40 MG capsule Take 1 capsule (40 mg total) by mouth 2 (two) times daily. (Patient taking differently: Take 40 mg by mouth 2 (two) times daily as needed. ) 180 capsule 3    SUBCUTANEOUS INSULIN PUMP (MINIMED 530G INSULIN PUMP MISC) by Misc.(Non-Drug; Combo Route) route.      [DISCONTINUED] amlodipine (NORVASC) 10 MG tablet Take 1 tablet (10 mg total) by mouth once daily. 90 tablet 3    [DISCONTINUED] carvedilol (COREG) 25 MG tablet Take 1 tablet (25 mg total) by mouth 2 (two) times daily with meals. 90 tablet 3    [DISCONTINUED] celecoxib (CELEBREX) 100 MG capsule Take 1 capsule (100 mg total) by mouth 2 (two) times daily. 30 capsule 0    [DISCONTINUED] citalopram (CELEXA) 10 MG tablet Take 1 tablet (10 mg total) by mouth once daily. 90 tablet 1      No current facility-administered medications on file prior to visit.         Review of patient's allergies indicates:  No Known Allergies    OBJECTIVE:     Vital Signs:  Vitals:    04/21/17 1018   BP: (!) 92/50   Pulse: (!) 51   Temp: 98 °F (36.7 °C)     Wt Readings from Last 1 Encounters:   04/21/17 1018 109.4 kg (241 lb 2.9 oz)     Body mass index is 36.67 kg/(m^2).     Physical Exam:  Physical Exam   Constitutional: He is well-developed, well-nourished, and in no distress. No distress.   HENT:   Head: Atraumatic.   Eyes: Conjunctivae and EOM are normal.   Neck: Normal range of motion. Neck supple.   Cardiovascular: Normal rate and regular rhythm.    Pulmonary/Chest: Effort normal and breath sounds normal.   Abdominal: Soft. Bowel sounds are normal.   Musculoskeletal: Normal range of motion. He exhibits no deformity. Edema: trace BLE edema L>R.   Neurological: He is alert.   Left sided hemiparesis   Skin: Skin is warm and dry.   Psychiatric: Affect normal.       Laboratory  Lab Results   Component Value Date    WBC 8.28 04/13/2017    HGB 16.6 04/13/2017    HCT 47.8 04/13/2017    MCV 81 (L) 04/13/2017     04/13/2017     BMP  Lab Results   Component Value Date     04/13/2017    K 3.7 04/13/2017     04/13/2017    CO2 23 04/13/2017    BUN 12 04/13/2017    CREATININE 1.1 04/13/2017    CALCIUM 8.9 04/13/2017    ANIONGAP 9 04/13/2017    ESTGFRAFRICA >60 04/13/2017    EGFRNONAA >60 04/13/2017     Lab Results   Component Value Date    ALT 27 04/13/2017    AST 20 04/13/2017    ALKPHOS 79 04/13/2017    BILITOT 1.4 (H) 04/13/2017     Lab Results   Component Value Date    INR 1.1 04/12/2017    INR 1.0 06/29/2016    INR 1.1 11/12/2013     Lab Results   Component Value Date    HGBA1C 9.3 (H) 04/13/2017     Recent Labs      04/18/17   1627  04/18/17   2105  04/19/17   0814  04/19/17   1220  04/19/17   1714   POCTGLUCOSE  306*  343*  281*  297*  263*       Diagnostic Results:      TRANSITION OF CARE:      [facility] On Call Contact Note: 4/19/17    Family and/or Caretaker present at visit?  Yes.  Diagnostic tests reviewed/disposition: No diagnosic tests pending after this hospitalization.  Disease/illness education: acute CVA  Home health/community services discussion/referrals: Patient has home health established at [facility].   Establishment or re-establishment of referral orders for community resources: No other necessary community resources.   Discussion with other health care providers: No discussion with other health care providers necessary.     Medications Reconciliation:   I have reconciled the patient's home medications and discharge medications with the patient/family. I have updated all changes.  Refer to After-Visit Medication List.    ASSESSMENT & PLAN:   Cerebrovascular accident (CVA)  Stenosis of right carotid artery  Memory lost   -Dysarthria and left sided weakness improving. Still with same left facial droop. Family interested in [facility] and will go visit facility today.  PT/OT for evaluation/treatment soon until acceptance to [facility].    -Continue ASA/Plavix/statin.   -Follow up Vascular Surgery 4/27 and Neurology 5/18/17    Essential hypertension  -Now with hypotension. SBP 90's -asymptomatic.   -Hold amlodipine. Continue lisinopril and coreg with parameters. Keep logs and bring to next PCC visit on Monday.   -     carvedilol (COREG) 25 MG tablet; Take 1 tablet (25 mg total) by mouth 2 (two) times daily with meals. Hold is SBP <120.  Dispense: 90 tablet; Refill: 3    Coronary artery disease involving native coronary artery of native heart with angina pectoris  SP CABG/MI/stents  -Continue ASA/Plavix/statin.     Chronic combined systolic and diastolic heart failure  -Low salt diet. Continue home lasix, BB, ACEI.     Uncontrolled type 2 diabetes mellitus with hyperglycemia, with long-term current use of insulin  -HgbA1c 9.3 4/13/17. -300. Patient unclear setting of insulin pump and not  giving self additional when high blood sugars. Patient have Endocrinology Dr. Dinesh rhoades at 2 pm today.     Instructions for the patient:      Scheduled Follow-up :  Future Appointments  Date Time Provider Department Center   4/24/2017 10:00 AM Garima Elizalde NP [facility] TANIA CAR [facility]   4/27/2017 10:00 AM Marino Koehler MD [facility]   5/3/2017 10:30 AM Brii Donnelly DPM [facility] POD [place]   5/18/2017 9:20 AM Lito Johnson MD [facility] NEURO [facility]       After Visit Medication List :     Medication List          This list is accurate as of: 4/21/17  1:36 PM.  Always use your most recent med list.                     amitriptyline 50 MG tablet   Commonly known as:  ELAVIL   Take 1 tablet (50 mg total) by mouth every evening.       aspirin 81 MG EC tablet   Commonly known as:  ECOTRIN       atorvastatin 40 MG tablet   Commonly known as:  LIPITOR   Take 0.5 tablets (20 mg total) by mouth once daily.       blood sugar diagnostic Strp   1 strip by Misc.(Non-Drug; Combo Route) route 2 (two) times daily with meals. TRUE METRIX       blood-glucose meter kit   Use as instructed          TRUE METRIX       carvedilol 25 MG tablet   Commonly known as:  COREG   Take 1 tablet (25 mg total) by mouth 2 (two) times daily with meals. Hold is SBP <120.       citalopram 10 MG tablet   Commonly known as:  CELEXA   Take 1 tablet (10 mg total) by mouth once daily.       clopidogrel 75 mg tablet   Commonly known as:  PLAVIX   Take 1 tablet (75 mg total) by mouth once daily.       furosemide 80 MG tablet   Commonly known as:  LASIX   Take 1 tablet (80 mg total) by mouth once daily.       glucose 4 GM chewable tablet       HUMALOG 100 unit/mL injection   Generic drug:  insulin lispro       isosorbide mononitrate 60 MG 24 hr tablet   Commonly known as:  IMDUR   Take 1 tablet (60 mg total) by mouth once daily.       lancets Misc   1 lancet by Misc.(Non-Drug; Combo Route) route 2 (two) times daily with meals. TRUE METRIX        lisinopril 10 MG tablet   Take 1 tablet (10 mg total) by mouth once daily.       MINIMED 530G INSULIN PUMP MISC       nitroGLYCERIN 0.4 MG SL tablet   Commonly known as:  NITROSTAT   Place 1 tablet (0.4 mg total) under the tongue every 5 (five) minutes as needed. Sublingual  ( under tongue) as needed       omega-3 acid ethyl esters 1 gram capsule   Commonly known as:  LOVAZA       omeprazole 40 MG capsule   Commonly known as:  PRILOSEC   Take 1 capsule (40 mg total) by mouth 2 (two) times daily.            Where to Get Your Medications      You can get these medications from any pharmacy     Bring a paper prescription for each of these medications     carvedilol 25 MG tablet    citalopram 10 MG tablet               Signing Physician:  Garima Elizalde NP

## 2017-04-21 NOTE — LETTER
April 21, 2017    Jorden Shafer  [facility] [address]             Outpatient Case Management  [facility] [address] Dear Jordne Shafer:    We understand that receiving many services from different doctors and healthcare providers is overwhelming. There are appointments to make, transportation to arrange, dietary instructions to understand, and new medications to obtain.    This is where [facility] can help.     You are eligible to receive Outpatient Case Management services when you have healthcare needs that require the coordination of many providers, treatments, and services. You also qualify if you need assistance with a new treatment plan.     There is no charge for this support. You may have been referred to this program from your doctor(s), hospital staff member(s), or insurance company but you always have a choice to participate or not participate. To participate, you must give us your permission to be enrolled.     When you are enrolled in the [facility] program, the  who is assigned to you is    Elvia Monteiro RN  [phone]    Depending on your needs and wishes, your  may speak with you by phone, visit you at your place of living (for example your home, skilled nursing facility, or rehabilitation facility), or meet you at your doctors office.     Your  will tell you why you have been selected to participate in the program and will complete an assessment of your needs. Then a personalized plan of care will be developed with you and or your caregiver.             Here are examples of the services your [facility]  provides.     Coordinate communication among multiple providers.   Arrange for transportation, doctors visits, durable medical equipment, home care services, and special clinics.    Provide coaching on how to manage your health condition.    Answer  questions about your health condition.   Help you understand your doctors treatment plan.    Provide additional instruction about your health condition, treatments, and medications.    Help you obtain information about your insurance coverage.    Advocate for your individual needs.    Request a Licensed Clinical  (LCSW) to visit you if you need their services. LCSWs help with long term planning (discussing placement options, advanced planning directives), financial planning, and assistance (for example rent, utilities, medication funding).     Your  will coordinate their activities with other outpatient services you are receiving. All services provided by [facility]  are coordinated with and communicated to your primary care physician.    Our goal is to help you manage your health condition(s) safely within your living environment, whether that is your home or a medical facility. We want to help you function at the healthiest and highest level possible.     Sincerely,      Trace Deras MD  Medical Director    Enclosures:    Frequently Asked Questions  Patient Rights and Responsibilities   Reporting a Grievance or Complaint  Consent/Release of Information  Stamped Addressed Envelope                  Frequently Asked Questions about [facility]    What is [facility]?  Outpatient Case management is not Home healthcare services. [facility]  do not provide hands-on care. [facility]  will work with your doctor to arrange for home health services, if needed. Home health services have a limited duration and there are some restrictions as to who can get these services. There is no prescribed limit to the amount of time you receive [facility] services. [facility]  are not agents of your insurance company. However, [facility] Case  Managers can help you obtain information from your insurance company.     Who are the [facility] ?  [facility]  are Registered Nurses and Social Workers. It is important to remember that you and your  are a team that works together with your primary care physician to create your individualized plan of care. The ultimate goal of your care plan is to help you implement your doctors treatment plan and to help you function at the highest level of health possible.     What are my rights as a patient?  It is important for you to know and understand your rights and responsibilities while receiving services from the [facility] program. We have enclosed a complete description of your rights and responsibilities. You can help to make your care more effective when you understand your right and responsibilities.     What is needed to be enrolled in the program?  You are only enrolled in the [facility] Program when you give us your consent to participate. You will find enclosed a consent form. You are receiving this letter because you or your caregivers have given us a verbal consent to enroll you in [facility] Management Program. We ask that you sign and return the enclosed written consent in the stamped self-addressed envelope.                           Patient Rights and Responsibilities    We consider you a partner in your care. When you are well informed, participate in treatment decisions and communicate openly with your doctor and other healthcare professionals, you help make your care more effective.     While you are in the Outpatient Case Management Program, your rights include the following:     You have a right to be provided services in a non-discriminatory manner in accordance with the provisions of Title VI of the Civil Rights Act of 1964, Section 504 of the Rehabilitation Act of 1973, the Age  Discrimination Act of 1975, the Americans with Disabilities Act as well as any other applicable Federal and State laws and regulations.     You have the right to a reasonable, timely response to your request or need for care, as well as the right to considerate and respectful care including an environment that preserves dignity and contributes to a positive self-image. You are responsible for being considerate and respectful of our staff.     You have a right to information regarding patient rights, advocacy services and complaint mechanisms, and the right to prompt resolution of any complaint. You or a designee has the right to participate in the resolution of ethical issues surrounding your care. You have a right to file a complaint if you feel that your rights have been infringed, without fear or penalty from [facility] or the federal government. You may file a complaint with the Director of Outpatient Case Management by calling [phone]. At any time, you may lodge a grievance with the [facility] and [facility] by calling [phone], or the Joint Commission on Accreditation of Healthcare Organizations at [phone].     You, or someone acting on your behalf, have the right to understandable information on your health status, treatment and progress in order to make decisions. You have the right to know the nature, risks and alternatives to treatment. You have the right to be informed, when appropriate, regarding the outcome of the care that has been provided. You have the right to refuse treatment to the extent permitted by law, and the right to be informed of the alternatives and consequences of refusing treatment.     You, in collaboration with your physician, have the right to make decisions regarding care and the right to participate in the development and implementation of the plan of care and effective pain management. You have the right to know the name and professional status of  those responsible for the delivery of your care and treatment.       You have a right, within legal guidelines, to have a guardian, next-of-kin or legal designee exercises your patient rights when you are unable to do so. You have the right for your wishes regarding end-of-life decisions to be addressed by the healthcare team through advance directives. You have the right to personal privacy and confidentiality and to expect confidentiality of all records and communications pertaining to your care.      You have the right to receive communications about your health information confidentially. You have the right to request restrictions on the uses and disclosures of your health information. You have the right to inspect, copy, request amendments and receive an accounting of to whom we have disclosed your health information.     You have the right to be provided with interpretation services if you do not speak English; to alternative communication techniques if you are hearing or vision impaired; and to have any other resources needed on your behalf to ensure effective communication. These services are provided free of charge.     You have a right to personal safety (free from mental, physical, sexual and verbal abuse, neglect and exploitation). You have the right to access protective and advocacy services.     Advance Directives  A Patient Advocate is available to meet with patients to answer questions regarding advance directives.    Living Will  A document that outlines what medical treatment the patient does or does not want in the event the patient becomes unable to make those decisions at the appropriate time.    Durable Medical Power of   A document by which the patient designates an individual to be responsible for making medical decisions in the event the patient becomes unable to do so.    HIPAA Notice of Privacy Practices  Your medical information is governed by federal privacy laws. HIPAA  protects private medical information and how that information is disclosed. If you have a question regarding the HIPAA Notice of Privacy Practices, or if you believe your privacy rights have been violated, you may call our designated hotline at [phone].            Quality Improvement  Because we consistently strive to improve the care and service provided to our patients, we welcome your feedback. Your comments are an important part of our quality improvement process, as we like to know what we are doing right and which areas are in need of improvement. Our policy is to listen, be responsive and provide you with an appropriate and timely follow-up to your questions or concerns. Our goal is active patient and family involvement in all aspects of the care process.                                                                                  Reporting a Grievance or Complaint    During your time with the [facility] team you may have a grievance or complaint with our services. Your Patients Bill of Rights gives patients, families, and caregivers the right to express concerns and grievances and the right to expect a reasonable and timely response.     Your presentation of your concerns is not viewed negatively. It is an opportunity for us to improve the quality of our care and services we provide to you.     You may report your concerns directly to your , or you can phone in a complaint to:     Director of Outpatient Case Management  [phone]    You may also send a complaint letter to:    Director of Outpatient Case Management Services  [address] [place] [id number]    Tell us the details of your complaint and provide us with a contact phone number so we can contact you to obtain additional information. We will return a call to you within two business days of our receipt of your complaint, and to request additional information as needed. If you choose to  mail a letter, your complaint may take a few days longer to reach us.     All grievances will be addressed as quickly as possible. A grievance or complaint that involves situations or practices which place patients in immediate danger will be addressed as an urgent matter. We will work to resolve all other complaints within seven days of receipt. By that time, you will receive a phone call with either the resolution of your complaint, or a plan for corrective action. A formal written response will be sent to you within 30 days of receipt of your grievance.     If a resolution cannot be completed within 30 days, a letter will be sent to you or your family member with an estimated time for the final response.    Additionally, all patients have the right to file complaints with external agencies, without exception. Complaints/grievances can be addressed to the following agencies:            Patient Safety or Quality of Care Concerns  Office of Quality Monitoring   The [facility] [place] [address]  [phone] Toll Free    HIPPA Privacy/Security Concerns  Office for Civil Rights Region IV  [place] Department of Health & Human Services  [address] [place] [address], [address]  [place] [address] [phone] Phone  [phone] TDD  [phone] Toll Free    Medicare/Medicaid Billing Concerns  [place] for Medicare & Medicaid Services  Region [address]  [address] [place] [address], [address]  [place] [address] [phone] Phone  [phone] Toll Free    General Concerns  [place] Department [facility] and [facility] ([facility])  ([phone] Toll Free Complaint Hotline                                                              Consent Form/Release of Information    By signing--     (1) I agree I have read the Outpatient Case Management information provided to me;     (2) I agree to voluntarily participate in the Outpatient Case Management program;     (3) I understand I must consent to  participation in the Outpatient Case Management program during my first interview with my ;    (4) I consent to the discussion and release of my personal health information to my healthcare team (including my personal physician, my medical home care team, any specialty physician(s), and my [facility] team);     (5) I agree my consent is valid for the length of time I am receiving Outpatient Case Management;    (6) I agree to referrals to community resources which my Case Management team recommends for me. I agree to the release of my personal information and personal health information as necessary to referral sources.    ___________________________________________________________________  Patients Printed Name     ___________________________________________________________________  Patients Signature       Date    If patient is in being cared for, please complete this section:     ___________________________________________________________________  Printed Name of Person Caring For Patient   Relationship To Patient    ___________________________________________________________________   Signature of Person Caring For Patient     Date    PLEASE SIGN AND RETURN IN THE ENCLOSED PRE-ADDRESSED ENVELOPE.

## 2017-04-21 NOTE — MR AVS SNAPSHOT
[facility]  [address]  [place] [address]  Phone: [phone]  Fax: [phone]                  Jorden Shafer   2017 10:00 AM   Office Visit    Description:  Male : 1945   Provider:  [facility]   Department:  [facility]           Diagnoses this Visit        Comments    Coronary artery disease involving native coronary artery of native heart with angina pectoris    -  Primary     Essential hypertension         Chronic combined systolic and diastolic heart failure         Cerebrovascular accident (CVA), unspecified mechanism         Stenosis of right carotid artery         Sleep disturbances                To Do List           Future Appointments        Provider Department Dept Phone    2017 10:00 AM Garima Elizalde NP [facility] [phone]    5/3/2017 10:30 AM Brii Donnelly DPM [place] [facility] Podiatry [phone]    2017 9:20 AM Lito Johnson MD [facility] Neurology [phone]      Goals (5 Years of Data)     Patient/caregiver will have an action plan in place to manage and prevent complications of  CVA prior to discharge from [facility].     Notes - Note edited  2017  3:28 PM by Elvia Monteiro RN    Overall Time to Completion  2 months from 2017    Short Term Goals  Patient/caregiver will discuss health care needs with Physician and care team during visits or using Patient Portal.  Interventions   · Empower patient/caregiver to discuss treatment plan with Physician/care team.   Status  · Met    Patient/caregiver will maintain follow-up appointment with Physician within 24 hrs  Interventions   · Encourage compliance with Physician follow-ups.   Status  · Partially met    Patient/caregiver will obtain wheelchair to support disease process within 2 weeks.  Interventions   · Facilitate to needed DME.   Status  · Partially met    Patient/caregiver will put in place 3 keeping room free of clutter, making sure  rooms are well lit and removing throw rugs measures to decrease the risk of patient falls within 2 weeks.  Interventions   · Recognize and provide educational material (ESTEPHANIE).   Status  · Partially met    Patient/caregiver will verbalize 3 red flags of Cerebral Vascular Accident and know when to contact Physician within 1 week.  Interventions   · Recognize and provide educational material (ESTEPHANIE).   · Educate on fall and aspiration precautions.    Status  · Partially met             These Medications        Disp Refills Start End    carvedilol (COREG) 25 MG tablet 90 tablet 3 4/21/2017     Take 1 tablet (25 mg total) by mouth 2 (two) times daily with meals. Hold is SBP <120. - Oral    Pharmacy: [facility]/pharmacy #[address] [place] [address] [facility] #: [phone]       citalopram (CELEXA) 10 MG tablet 90 tablet 1 4/21/2017     Take 1 tablet (10 mg total) by mouth once daily. - Oral    Pharmacy: [facility]/pharmacy #[address] [place] [address] [facility] Ph #: [phone]       Notes to Pharmacy: **Patient requests 90 days supply**      [facility]     [facility] - 24/7 Assistance  Unless otherwise directed by your provider, please contact [facility], our nurse care line that is available for 24/7 assistance.     Registered nurses in the [facility] provide: appointment scheduling, clinical advisement, health education, and other advisory services.  Call: [phone] (toll free)               Medications           Message regarding Medications     Verify the changes and/or additions to your medication regime listed below are the same as discussed with your clinician today.  If any of these changes or additions are incorrect, please notify your healthcare provider.        CHANGE how you are taking these medications     Start Taking Instead of    carvedilol (COREG) 25 MG tablet carvedilol (COREG) 25 MG tablet    Dosage:  Take 1 tablet (25 mg total) by  mouth 2 (two) times daily with meals. Hold is SBP <120. Dosage:  Take 1 tablet (25 mg total) by mouth 2 (two) times daily with meals.    Reason for Change:  Reorder       STOP taking these medications     amlodipine (NORVASC) 10 MG tablet Take 1 tablet (10 mg total) by mouth once daily.    celecoxib (CELEBREX) 100 MG capsule Take 1 capsule (100 mg total) by mouth 2 (two) times daily.    tramadol (ULTRAM) 50 mg tablet Take 50 mg by mouth every 6 (six) hours as needed for Pain.           Verify that the below list of medications is an accurate representation of the medications you are currently taking.  If none reported, the list may be blank. If incorrect, please contact your healthcare provider. Carry this list with you in case of emergency.           Current Medications     amitriptyline (ELAVIL) 50 MG tablet Take 1 tablet (50 mg total) by mouth every evening.    aspirin (ECOTRIN) 81 MG EC tablet Take 81 mg by mouth once daily.    atorvastatin (LIPITOR) 40 MG tablet Take 0.5 tablets (20 mg total) by mouth once daily.    blood sugar diagnostic Strp 1 strip by Misc.(Non-Drug; Combo Route) route 2 (two) times daily with meals. TRUE METRIX    blood-glucose meter kit Use as instructed          TRUE METRIX    carvedilol (COREG) 25 MG tablet Take 1 tablet (25 mg total) by mouth 2 (two) times daily with meals. Hold is SBP <120.    citalopram (CELEXA) 10 MG tablet Take 1 tablet (10 mg total) by mouth once daily.    clopidogrel (PLAVIX) 75 mg tablet Take 1 tablet (75 mg total) by mouth once daily.    furosemide (LASIX) 80 MG tablet Take 1 tablet (80 mg total) by mouth once daily.    glucose 4 GM chewable tablet Take 16 g by mouth as needed for Low blood sugar.    insulin lispro (HUMALOG) 100 unit/mL injection Inject into the skin 3 (three) times daily before meals.    isosorbide mononitrate (IMDUR) 60 MG 24 hr tablet Take 1 tablet (60 mg total) by mouth once daily.    lancets Misc 1 lancet by Misc.(Non-Drug; Combo Route)  route 2 (two) times daily with meals. TRUE METRIX    lisinopril 10 MG tablet Take 1 tablet (10 mg total) by mouth once daily.    nitroGLYCERIN (NITROSTAT) 0.4 MG SL tablet Place 1 tablet (0.4 mg total) under the tongue every 5 (five) minutes as needed. Sublingual  ( under tongue) as needed    omega-3 acid ethyl esters (LOVAZA) 1 gram capsule Take 2 g by mouth 3 (three) times daily.      omeprazole (PRILOSEC) 40 MG capsule Take 1 capsule (40 mg total) by mouth 2 (two) times daily.    SUBCUTANEOUS INSULIN PUMP (MINIMED 530G INSULIN PUMP MISC) by Misc.(Non-Drug; Combo Route) route.           Clinical Reference Information           Your Vitals Were     BP Pulse Temp Weight SpO2 BMI    92/50 (BP Location: Right arm, Patient Position: Sitting, BP Method: Manual) 51 98 °F (36.7 °C) (Oral) 109.4 kg (241 lb 2.9 oz) 97% 36.67 kg/m2      Blood Pressure          Most Recent Value    BP  (!)  92/50      Allergies as of 4/21/2017     No Known Allergies      Immunizations Administered on Date of Encounter - 4/21/2017     Name Date Dose VIS Date Route    PPD Test 4/21/2017 5 Units N/A Intradermal      Instructions    You have an appointment with Dr. Tomi Montiel on  Friday April 21 at 2:30 PM  Address: [address] [place] [address]. [address]. [address]  Phone #: [phone]         Language Assistance Services     ATTENTION: Language assistance services are available, free of charge. Please call [phone].      ATENCIÓN: Si habla español, tiene a lucio disposición servicios gratuitos de asistencia lingüística. Llame al [phone].     CHÚ Ý: N?u b?n nói Ti?ng Vi?t, có các d?ch v? h? tr? ngôn ng? mi?n phí dành cho b?n. G?i s? [phone].         [facility] complies with applicable Federal civil rights laws and does not discriminate on the basis of race, color, national origin, age, disability, or sex.

## 2017-04-21 NOTE — PATIENT INSTRUCTIONS
You have an appointment with Dr. Tomi Montiel on  Friday April 21 at 2:30 PM  Address: [address] [place] [address]. [address]  [place] [address]  Phone #: [phone]

## 2017-04-24 ENCOUNTER — TELEPHONE (OUTPATIENT)
Dept: PRIMARY CARE CLINIC | Facility: CLINIC | Age: 72
End: 2017-04-24

## 2017-04-24 ENCOUNTER — HOSPITAL ENCOUNTER (INPATIENT)
Facility: HOSPITAL | Age: 72
LOS: 5 days | Discharge: NURSING, REHAB OR LONG-TERM CARE | DRG: 281 | End: 2017-05-01
Attending: EMERGENCY MEDICINE | Admitting: INTERNAL MEDICINE
Payer: MEDICARE

## 2017-04-24 DIAGNOSIS — Z86.73 HISTORY OF CVA (CEREBROVASCULAR ACCIDENT): Chronic | ICD-10-CM

## 2017-04-24 DIAGNOSIS — I10 ESSENTIAL HYPERTENSION: Chronic | ICD-10-CM

## 2017-04-24 DIAGNOSIS — E11.42 TYPE 2 DIABETES MELLITUS WITH DIABETIC POLYNEUROPATHY, WITH LONG-TERM CURRENT USE OF INSULIN: Chronic | ICD-10-CM

## 2017-04-24 DIAGNOSIS — E66.01 SEVERE OBESITY WITH BODY MASS INDEX (BMI) OF 35.0 TO 39.9 WITH COMORBIDITY: ICD-10-CM

## 2017-04-24 DIAGNOSIS — I65.21 STENOSIS OF RIGHT CAROTID ARTERY: Chronic | ICD-10-CM

## 2017-04-24 DIAGNOSIS — R07.9 CHEST PAIN, UNSPECIFIED TYPE: ICD-10-CM

## 2017-04-24 DIAGNOSIS — I25.110 CORONARY ARTERY DISEASE INVOLVING NATIVE CORONARY ARTERY OF NATIVE HEART WITH UNSTABLE ANGINA PECTORIS: Chronic | ICD-10-CM

## 2017-04-24 DIAGNOSIS — I21.4 NSTEMI (NON-ST ELEVATED MYOCARDIAL INFARCTION): ICD-10-CM

## 2017-04-24 DIAGNOSIS — E78.2 MIXED HYPERLIPIDEMIA: Chronic | ICD-10-CM

## 2017-04-24 DIAGNOSIS — I50.42 CHRONIC COMBINED SYSTOLIC AND DIASTOLIC HEART FAILURE: Chronic | ICD-10-CM

## 2017-04-24 DIAGNOSIS — I20.0 UNSTABLE ANGINA: Primary | ICD-10-CM

## 2017-04-24 DIAGNOSIS — Z79.4 TYPE 2 DIABETES MELLITUS WITH DIABETIC POLYNEUROPATHY, WITH LONG-TERM CURRENT USE OF INSULIN: Chronic | ICD-10-CM

## 2017-04-24 DIAGNOSIS — Z79.4 TYPE 2 DIABETES MELLITUS WITH HYPERGLYCEMIA, WITH LONG-TERM CURRENT USE OF INSULIN: Chronic | ICD-10-CM

## 2017-04-24 DIAGNOSIS — E11.65 TYPE 2 DIABETES MELLITUS WITH HYPERGLYCEMIA, WITH LONG-TERM CURRENT USE OF INSULIN: Chronic | ICD-10-CM

## 2017-04-24 DIAGNOSIS — Z96.41 INSULIN PUMP STATUS: Chronic | ICD-10-CM

## 2017-04-24 DIAGNOSIS — E66.9 OBESITY (BMI 30-39.9): Chronic | ICD-10-CM

## 2017-04-24 PROCEDURE — 80053 COMPREHEN METABOLIC PANEL: CPT

## 2017-04-24 PROCEDURE — 83880 ASSAY OF NATRIURETIC PEPTIDE: CPT

## 2017-04-24 PROCEDURE — 96372 THER/PROPH/DIAG INJ SC/IM: CPT

## 2017-04-24 PROCEDURE — 99285 EMERGENCY DEPT VISIT HI MDM: CPT | Mod: 25

## 2017-04-24 PROCEDURE — 85025 COMPLETE CBC W/AUTO DIFF WBC: CPT

## 2017-04-24 PROCEDURE — 84484 ASSAY OF TROPONIN QUANT: CPT

## 2017-04-24 PROCEDURE — 93010 ELECTROCARDIOGRAM REPORT: CPT | Mod: ,,, | Performed by: INTERNAL MEDICINE

## 2017-04-24 PROCEDURE — 94761 N-INVAS EAR/PLS OXIMETRY MLT: CPT

## 2017-04-24 PROCEDURE — 85610 PROTHROMBIN TIME: CPT

## 2017-04-24 PROCEDURE — 99285 EMERGENCY DEPT VISIT HI MDM: CPT | Mod: ,,, | Performed by: EMERGENCY MEDICINE

## 2017-04-24 RX ORDER — ASPIRIN 325 MG
325 TABLET ORAL
Status: DISPENSED | OUTPATIENT
Start: 2017-04-24 | End: 2017-04-25

## 2017-04-24 NOTE — TELEPHONE ENCOUNTER
"I spoke to the patient's wife and asked her why they had cancelled today's appointment. She stated they had gone to [facility] on Friday and that the patient had "loved the place, including the gym; we were told at that time to contact my 's PCP and she would then fill out paperwork for the nursing home."  She also stated her daughter, Jessika, had contacted Francine, at Dr. Plasencia's office, and had asked her to start all the paperwork.   Please note that I received an IM from Lise Lemus MA asking ( on behalf of nurse Francine) if I was going to do the 90 I form. I replied to her stating I was NOT; however I did have a signed PASSR form, and to please let me know if she needed me to fax it to her. At time of this note I have not heard back from her; signed PASSR note placed in the fax folder of the [facility] clinic.   "

## 2017-04-24 NOTE — IP AVS SNAPSHOT
[facility]  [address]  [facility] [address]  Phone: [phone]           Patient Discharge Instructions   Our goal is to set you up for success. This packet includes information on your condition, medications, and your home care.  It will help you care for yourself to prevent having to return to the hospital.     Please ask your nurse if you have any questions.      There are many details to remember when preparing to leave the hospital. Here is what you will need to do:    1. Take your medicine. If you are prescribed medications, review your Medication List on the following pages. You may have new medications to  at the pharmacy and others that you'll need to stop taking. Review the instructions for how and when to take your medications. Talk with your doctor or nurses if you are unsure of what to do.     2. Go to your follow-up appointments. Specific follow-up information is listed in the following pages. Your may be contacted by a nurse or clinical provider about future appointments. Be sure we have all of the phone numbers to reach you. Please contact your provider's office if you are unable to make an appointment.     3. Watch for warning signs. Your doctor or nurse will give you detailed warning signs to watch for and when to call for assistance. These instructions may also include educational information about your condition. If you experience any of warning signs to your health, call your doctor.           [facility]  Unless otherwise directed by your provider, please   contact [facility], our nurse care line   that is available for 24/7 assistance.     [phone] (toll-free)     Registered nurses in the [facility]   provide: appointment scheduling, clinical advisement, health education, and other advisory services.                  ** Verify the list of medication(s) below is accurate and up to date. Carry this with you in case of  emergency. If your medications have changed, please notify your healthcare provider.             Medication List      START taking these medications        Additional Info                      insulin aspart 100 unit/mL Inpn pen   Commonly known as:  NovoLOG   Refills:  0   Dose:  16 Units    Last time this was given:  20 Units on 5/1/2017 12:22 PM   Instructions:  Inject 16 Units into the skin 3 (three) times daily.     Begin Date    AM    Noon    PM    Bedtime       insulin detemir 100 unit/mL (3 mL) Inpn pen   Commonly known as:  LEVEMIR FLEXTOUCH   Refills:  0   Dose:  42 Units    Last time this was given:  42 Units on 5/1/2017  8:55 AM   Instructions:  Inject 42 Units into the skin 2 (two) times daily.     Begin Date    AM    Noon    PM    Bedtime       rosuvastatin 40 MG Tab   Commonly known as:  CRESTOR   Refills:  0   Dose:  40 mg    Last time this was given:  40 mg on 5/1/2017  8:54 AM   Instructions:  Take 1 tablet (40 mg total) by mouth once daily.     Begin Date    AM    Noon    PM    Bedtime         CHANGE how you take these medications        Additional Info                      carvedilol 25 MG tablet   Commonly known as:  COREG   Quantity:  90 tablet   Refills:  3   Dose:  12.5 mg   What changed:  how much to take    Last time this was given:  25 mg on 5/1/2017  8:54 AM   Instructions:  Take 0.5 tablets (12.5 mg total) by mouth 2 (two) times daily with meals. Hold is SBP <120.     Begin Date    AM    Noon    PM    Bedtime       furosemide 20 MG tablet   Commonly known as:  LASIX   Refills:  0   Dose:  20 mg   What changed:    - medication strength  - how much to take   Comments:  **Patient requests 90 days supply**    Last time this was given:  20 mg on 5/1/2017  8:54 AM   Instructions:  Take 1 tablet (20 mg total) by mouth once daily.     Begin Date    AM    Noon    PM    Bedtime       omeprazole 40 MG capsule   Commonly known as:  PRILOSEC   Quantity:  180 capsule   Refills:  3   Dose:  40 mg    What changed:    - when to take this  - reasons to take this   Comments:  **Patient requests 90 day supply**    Instructions:  Take 1 capsule (40 mg total) by mouth 2 (two) times daily.     Begin Date    AM    Noon    PM    Bedtime         CONTINUE taking these medications        Additional Info                      amitriptyline 50 MG tablet   Commonly known as:  ELAVIL   Quantity:  30 tablet   Refills:  2   Dose:  50 mg    Instructions:  Take 1 tablet (50 mg total) by mouth every evening.     Begin Date    AM    Noon    PM    Bedtime       aspirin 81 MG EC tablet   Commonly known as:  ECOTRIN   Refills:  0   Dose:  81 mg    Instructions:  Take 81 mg by mouth once daily.     Begin Date    AM    Noon    PM    Bedtime       blood sugar diagnostic Strp   Quantity:  100 strip   Refills:  11   Dose:  1 strip    Instructions:  1 strip by Misc.(Non-Drug; Combo Route) route 2 (two) times daily with meals. TRUE METRIX     Begin Date    AM    Noon    PM    Bedtime       blood-glucose meter kit   Quantity:  1 each   Refills:  0    Instructions:  Use as instructed          TRUE METRIX     Begin Date    AM    Noon    PM    Bedtime       citalopram 10 MG tablet   Commonly known as:  CELEXA   Quantity:  90 tablet   Refills:  1   Dose:  10 mg   Comments:  **Patient requests 90 days supply**    Last time this was given:  10 mg on 5/1/2017  8:54 AM   Instructions:  Take 1 tablet (10 mg total) by mouth once daily.     Begin Date    AM    Noon    PM    Bedtime       clopidogrel 75 mg tablet   Commonly known as:  PLAVIX   Quantity:  90 tablet   Refills:  3   Dose:  75 mg    Last time this was given:  75 mg on 5/1/2017  8:54 AM   Instructions:  Take 1 tablet (75 mg total) by mouth once daily.     Begin Date    AM    Noon    PM    Bedtime       glucose 4 GM chewable tablet   Refills:  0   Dose:  16 g    Instructions:  Take 16 g by mouth as needed for Low blood sugar.     Begin Date    AM    Noon    PM    Bedtime       isosorbide  mononitrate 60 MG 24 hr tablet   Commonly known as:  IMDUR   Quantity:  90 tablet   Refills:  3   Dose:  60 mg    Last time this was given:  60 mg on 5/1/2017  8:54 AM   Instructions:  Take 1 tablet (60 mg total) by mouth once daily.     Begin Date    AM    Noon    PM    Bedtime       lancets Misc   Quantity:  100 each   Refills:  11   Dose:  1 lancet    Instructions:  1 lancet by Misc.(Non-Drug; Combo Route) route 2 (two) times daily with meals. TRUE METRIX     Begin Date    AM    Noon    PM    Bedtime       lisinopril 10 MG tablet   Quantity:  90 tablet   Refills:  3   Dose:  10 mg   Comments:  **Patient requests 90 day supply**    Last time this was given:  10 mg on 5/1/2017  8:54 AM   Instructions:  Take 1 tablet (10 mg total) by mouth once daily.     Begin Date    AM    Noon    PM    Bedtime       nitroGLYCERIN 0.4 MG SL tablet   Commonly known as:  NITROSTAT   Quantity:  30 tablet   Refills:  prn   Dose:  0.4 mg    Instructions:  Place 1 tablet (0.4 mg total) under the tongue every 5 (five) minutes as needed. Sublingual  ( under tongue) as needed     Begin Date    AM    Noon    PM    Bedtime       omega-3 acid ethyl esters 1 gram capsule   Commonly known as:  LOVAZA   Refills:  0   Dose:  2 g    Last time this was given:  2 g on 5/1/2017  6:34 AM   Instructions:  Take 2 g by mouth 3 (three) times daily.     Begin Date    AM    Noon    PM    Bedtime         STOP taking these medications     atorvastatin 40 MG tablet   Commonly known as:  LIPITOR       HUMALOG 100 unit/mL injection   Generic drug:  insulin lispro       MINIMED 530G INSULIN PUMP MISC            Where to Get Your Medications      You can get these medications from any pharmacy     Bring a paper prescription for each of these medications     carvedilol 25 MG tablet         Information about where to get these medications is not yet available     ! Ask your nurse or doctor about these medications     furosemide 20 MG tablet    insulin aspart 100  "unit/mL Inpn pen    insulin detemir 100 unit/mL (3 mL) Inpn pen    rosuvastatin 40 MG Tab                  Please bring to all follow up appointments:    1. A copy of your discharge instructions.  2. All medicines you are currently taking in their original bottles.  3. Identification and insurance card.    Please arrive 15 minutes ahead of scheduled appointment time.    Please call 24 hours in advance if you must reschedule your appointment and/or time.        Your Scheduled Appointments     May 03, 2017 10:30 AM CDT   Established Patient Visit with Brii Donnelly DPM   [facility] ([facility])    [address] [facility] [address] [phone]            May 18, 2017  9:20 AM CDT   New Patient with Lito Johnson MD   [facility] ([facility])    [address] [facility] [address]  [facility] [address] [phone]                  Primary Diagnosis     Your primary diagnosis was:  Chest Pain      Admission Information     Date & Time Provider Department CSN    4/24/2017 11:15 PM Gera Rios MD [facility] [id number]      Care Providers     Provider Role Specialty Primary office phone    Gera Rios MD Attending Provider Hospitalist [phone]    Gera Rios MD Team Attending  Hospitalist [phone]    Steff Orlando MD Team Attending  Hospitalist [phone]    Marcel Bentley II, MD Consulting Physician  Endocrinology [phone]    Susie Corbett MD Consulting Physician  Endocrinology [phone]    Ness Cintron MD Consulting Physician  Endocrinology [phone]    Milagro Momin MD Consulting Physician  Endocrinology [phone]    Nitin De La Cruz MD Consulting Physician  Endocrinology [phone]      Your Vitals Were     BP Pulse Temp Resp Height Weight    100/58 (BP Location: Left arm, Patient Position: Sitting, BP Method: Automatic) 59 96.2 °F (35.7 °C) (Oral) 18 5' 8" (1.727 m) 113.9 kg (251 lb)    SpO2 BMI    "          93% 38.16 kg/m2         Recent Lab Values        11/11/2013 9/15/2015 4/15/2016 6/29/2016 4/13/2017               5:49 AM  8:38 AM 11:04 AM  4:00 PM  5:46 AM       A1C 7.3 (H) 7.4 (H) 8.1 (H) 8.5 (H) 9.3 (H)         7.4 (H)          Comment for A1C at  5:46 AM on 4/13/2017:  According to ADA guidelines, hemoglobin A1C <7.0% represents  optimal control in non-pregnant diabetic patients.  Different  metrics may apply to specific populations.   Standards of Medical Care in Diabetes - 2016.  For the purpose of screening for the presence of diabetes:  <5.7%     Consistent with the absence of diabetes  5.7-6.4%  Consistent with increasing risk for diabetes   (prediabetes)  >or=6.5%  Consistent with diabetes  Currently no consensus exists for use of hemoglobin A1C  for diagnosis of diabetes for children.        Allergies as of 5/1/2017     No Known Allergies      Advance Directives     An advance directive is a document which, in the event you are no longer able to make decisions for yourself, tells your healthcare team what kind of treatment you do or do not want to receive, or who you would like to make those decisions for you.  If you do not currently have an advance directive, [facility] encourages you to create one.  For more information call:  [phone]), [phone] ([phone]),  or log on to [email or web address].        Smoking Cessation     If you would like to quit smoking:   You may be eligible for free services if you are a [place] resident and started smoking cigarettes before September 1, 1988.  Call the Smoking Cessation Trust (SCT) toll free at (611) 424-7728 or (175) 375-9391.   Call 1-800-QUIT-NOW if you do not meet the above criteria.   Contact us via email: [email or web address]   View our website for more information: [email or web address]        Language Assistance Services     ATTENTION: Language assistance services are available, free of charge. Please  call [phone].      ATENCIÓN: Si habla panfilo, tiene a lucio disposición servicios gratuitos de asistencia lingüística. Lljoão al [phone].     [facility] Ý: N?u b?n nói Ti?ng Vi?t, có các d?ch v? h? tr? ngôn ng? mi?n phí dành cho b?n. G?i s? [phone].        Stroke Education              Heart Failure Education       Heart Failure: Being Active  You have a condition called heart failure. Being active doesnt mean that you have to wear yourself out. Even a little movement each day helps to strengthen your heart. If you cant get out to exercise, you can do simple stretching and strengthening exercises at home. These are good ways to keep you well-conditioned and prevent you and your heart from becoming excessively weak.    Ideas to get you started  · Add a little movement to things you do now. Walk to mail letters. Park your car at the far end of the parking lot and walk to the store. Walk up a flight of stairs instead of taking the elevator.  · Choose activities you enjoy. You might walk, swim, or ride an exercise bike. Things like gardening and washing the car count, too. Other possibilities include: washing dishes, walking the dog, walking around the mall, and doing aerobic activities with friends.  · Join a group exercise program at a [facility], a senior center, or a community center. Or look into a hospital cardiac rehabilitation program. Ask your doctor if you qualify.  Tips to keep you going  · Get up and get dressed each day. Go to a coffee shop and read a newspaper or go somewhere that you'll be in the presence of other active people. Youll feel more like being active.  · Make a plan. Choose one or more activities that you enjoy and that you can easily do. Then plan to do at least one each day. You might write your plan on a calendar.  · Go with a friend or a group if you like company. This can help you feel supported and stay motivated, too.  · Plan social events that you enjoy. This will keep  you mentally engaged as well as physically motivated to do things you find pleasure in.  For your safety  · Talk with your healthcare provider before starting an exercise program.  · Exercise indoors when its too hot or too cold outside, or when the air quality is poor. Try walking at a shopping mall.  · Wear socks and sturdy shoes to maintain your balance and prevent falls.  · Start slowly. Do a few minutes several times a day at first. Increase your time and speed little by little.  · Stop and rest whenever you feel tired or get short of breath.  · Dont push yourself on days when you dont feel well.  Date Last Reviewed: 3/20/2016  © 4781-2370 [employer]. [address] [place] [address], [place] [address]. All rights reserved. This information is not intended as a substitute for professional medical care. Always follow your healthcare professional's instructions.              Heart Failure: Evaluating Your Heart  You have a condition called heart failure. To evaluate your condition, your doctor will examine you, ask questions, and do some tests. Along with looking for signs of heart failure, the doctor looks for any other health problems that may have led to heart failure. The results of your evaluation will help your doctor form a treatment plan.  Health history and physical exam  Your visit will start with a health history. Tell the doctor about any symptoms youve noticed and about all medicines you take. Then youll have a physical exam. This includes listening to your heartbeat and breathing. Youll also be checked for swelling (edema) in your legs and neck. When you have fluid buildup or fluid in the lungs, it may be called congestive heart failure.  Diagnosing heart failure     During an echocardiogram, sound waves bounce off the heart. These are converted into a picture on the screen.   The following may be done to help your doctor form a diagnosis:  · X-rays show the size and shape of your heart.  These pictures can also show fluid in your lungs.  · An electrocardiogram (ECG or EKG) shows the pattern of your heartbeat. Small pads (electrodes) are placed on your chest, arms, and legs. Wires connect the pads to the ECG machine, which records your hearts electrical signals. This can give the doctor information about heart function.  · An echocardiogram uses ultrasound waves to show the structure and movement of your heart muscle. This shows how well the heart pumps. It also shows the thickness of the heart walls, and if the heart is enlarged. It is one of the most useful, non-invasive tests as it provides information about the heart's general function. This helps your doctor make treatment decisions.  · Lab tests evaluate small amounts of blood or urine for signs of problems. A BNP lab test can help diagnose and evaluate heart failure. BNP stands for B-type natriuretic peptide. The ventricles secrete more BNP when heart failure worsens. Lab tests can also provide information about metabolic dysfunction or heart dysfunction.  Your treatment plan  Based on the results of your evaluation and tests, your doctor will develop a treatment plan. This plan is designed to relieve some of your heart failure symptoms and help make you more comfortable. Your treatment plan may include:  · Medicine to help your heart work better and improve your quality of life  · Changes in what you eat and drink to help prevent fluid from backing up in your body  · Daily monitoring of your weight and heart failure symptoms to see how well your treatment plan is working  · Exercise to help you stay healthy  · Help with quitting smoking  · Emotional and psychological support to help adjust to the changes  · Referrals to other specialists to make sure you are being treated comprehensively  Date Last Reviewed: 3/21/2016  © 1088-1210 [employer]. [address] [place] [address], [place] [address]. All rights reserved. This information is  not intended as a substitute for professional medical care. Always follow your healthcare professional's instructions.              Heart Failure: Making Changes to Your Diet  You have a condition called heart failure. When you have heart failure, excess fluid is more likely to build up in your body because your heart isn't working well. This makes the heart work harder to pump blood. Fluid buildup causes symptoms such as shortness of breath and swelling (edema). This is often referred to as congestive heart failure or CHF. Controlling the amount of salt (sodium) you eat may help stop fluid from building up. Your doctor may also tell you to reduce the amount of fluid you drink.  Reading food labels    Your healthcare provider will tell you how much sodium you can eat each day. Read food labels to keep track. Keep in mind that certain foods are high in salt. These include canned, frozen, and processed foods. Check the amount of sodium in each serving. Watch out for high-sodium ingredients. These include MSG (monosodium glutamate), baking soda, and sodium phosphate.   Eating less salt  Give yourself time to get used to eating less salt. It may take a little while. Here are some tips to help:  · Take the saltshaker off the table. Replace it with salt-free herb mixes and spices.  · Eat fresh or plain frozen vegetables. These have much less salt than canned vegetables.  · Choose low-sodium snacks like sodium-free pretzels, crackers, or air-popped popcorn.  · Dont add salt to your food when youre cooking. Instead, season your foods with pepper, lemon, garlic, or onion.  · When you eat out, ask that your food be cooked without added salt.  · Avoid eating fried foods as these often have a great deal of salt.  If youre told to limit fluids  You may need to limit how much fluid you have to help prevent swelling. This includes anything that is liquid at room temperature, such as ice cream and soup. If your doctor tells you  to limit fluid, try these tips:  · Measure drinks in a measuring cup before you drink them. This will help you meet daily goals.  · Chill drinks to make them more refreshing.  · Suck on frozen lemon wedges to quench thirst.  · Only drink when youre thirsty.  · Chew sugarless gum or suck on hard candy to keep your mouth moist.  · Weigh yourself daily to know if your body's fluid content is rising.  My sodium goal  Your healthcare provider may give you a sodium goal to meet each day. This includes sodium found in food as well as salt that you add. My goal is to eat no more than ___________ mg of sodium per day.     When to call your doctor  Call your doctor right away if you have any symptoms of worsening heart failure. These can include:  · Sudden weight gain  · Increased swelling of your legs or ankles  · Trouble breathing when youre resting or at night  · Increase in the number of pillows you have to sleep on  · Chest pain, pressure, discomfort, or pain in the jaw, neck, or back   Date Last Reviewed: 3/21/2016  © 5268-7551 [employer]. [address] [place] [address]. All rights reserved. This information is not intended as a substitute for professional medical care. Always follow your healthcare professional's instructions.              Heart Failure: Medicines to Help Your Heart    You have a condition called heart failure (also known as congestive heart failure, or CHF). Your doctor will likely prescribe medicines for heart failure and any underlying health problems you have. Most heart failure patients take one or more types of medicinen. Your healthcare provider will work to find the combination of medicines that works best for you.  Heart failure medicines  Here are the most common heart failure medicines:  · ACE inhibitors lower blood pressure and decrease strain on the heart. This makes it easier for the heart to pump. Angiotensin receptor blockers have similar effects. These are  prescribed for some patients instead of ACE inhibitors.  · Beta-blockers relieve stress on the heart. They also improve symptoms. They may also improve the heart's pumping action over time.  · Diuretics (also called water pills) help rid your body of excess water. This can help rid your body of swelling (edema). Having less fluid to pump means your heart doesnt have to work as hard. Some diuretics make your body lose a mineral called potassium. Your doctor will tell you if you need to take supplements or eat more foods high in potassium.  · Digoxin helps your heart pump with more strength. This helps your heart pump more blood with each beat. So, more oxygen-rich blood travels to the rest of the body.  · Aldosterone antagonists help alter hormones and decrease strain on the heart.  · Hydralazine and nitrates are two separate medicines used together to treat heart failure. They may come in one combination pill. They lower blood pressure and decrease how hard the heart has to pump.  Medicines for related conditions  Controlling other heart problems helps keep heart failure under control, too. Depending on other heart problems you have, medicines may be prescribed to:  · Lower blood pressure (antihypertensives).  · Lower cholesterol levels (statins).  · Prevent blood clots (anticoagulants or aspirin).  · Keep the heartbeat steady (antiarrhythmics).  Date Last Reviewed: 3/5/2016  © 8875-0881 [employer]. [address] [place] [address], [place] [address]. All rights reserved. This information is not intended as a substitute for professional medical care. Always follow your healthcare professional's instructions.              Heart Failure: Procedures That May Help    The heart is a muscle that pumps oxygen-rich blood to all parts of the body. When you have heart failure, the heart is not able to pump as well as it should. Blood and fluid may back up into the lungs (congestive heart failure), and some parts of  the body dont get enough oxygen-rich blood to work normally. These problems lead to the symptoms of heart failure.     Certain procedures may help the heart pump better in some cases of heart failure. Some procedures are done to treat health problems that may have caused the heart failure such as coronary artery disease or heart rhythm problems. For more serious heart failure, other options are available.  Treating artery and valve problems  If you have coronary artery disease or valve disease, procedures may be done to improve blood flow. This helps the heart pump better, which can improve heart failure symptoms. First, your doctor may do a cardiac catheterization to help detect clogged blood vessels or valve damage. During this procedure, a  thin tube (catheter) in inserted into a blood vessel and guided to the heart. There a dye is injected and a special type of X-ray (angiogram) is taken of the blood vessels. Procedures to open a blocked artery or fix damaged valves can also be done using catheterization.  · Angioplasty uses a balloon-tipped instrument at the end of the catheter. The balloon is inflated to widen the narrowed artery. In many cases, a stent is expanded to further support the narrowed artery. A stent is a metal mesh tube.  · Valve surgery repairs or replacement of faulty valves can also be done during catheterization so blood can flow properly through the chambers of the heart.  Bypass surgery is another option to help treat blocked arteries. It uses a healthy blood vessel from elsewhere in the body. The healthy blood vessel is attached above and below the blocked area so that blood can flow around the blocked artery.  Treating heart rhythm problems  A device may be placed in the chest to help a weak heart maintain a healthy, heartbeat so the heart can pump more effectively:  · Pacemaker. A pacemaker is an implanted device that regulates your heartbeat electronically. It monitors your heart's  rhythm and generates a painless electric impulse that helps the heart beat in a regular rhythm. A pacemaker is programmed to meet your specific heart rhythm needs.  · Biventricular pacing/cardiac resynchronization therapy. A type of pacemaker that paces both pumping chambers of the heart at the same time to coordinate contractions and to improve the heart's function. Some people with heart failure are candidates for this therapy.  · Implantable cardioverter defibrillator. A device similar to a pacemaker that senses when the heart is beating too fast and delivers an electrical shock to convert the fast rhythm to a normal rhythm. This can be a life saving device.  In severe cases  In more serious cases of heart failure when other treatments no longer work, other options may include:  · Ventricular assist devices (VADs). These are mechanical devices used to take over the pumping function for one or both of the heart's ventricles, or pumping chambers. A VAD may be necessary when heart failure progresses to the point that medicines and other treatments no longer help. In some cases, a VAD may be used as a bridge to transplant.  · Heart transplant. This is replacing the diseased heart with a healthy one from a donor. This is an option for a few people who are very sick. A heart transplant is very serious and not an option for all patients. Your doctor can tell you more.  Date Last Reviewed: 3/20/2016  © 2374-4509 [employer]. [address] [place] [address], [place] [address]. All rights reserved. This information is not intended as a substitute for professional medical care. Always follow your healthcare professional's instructions.              Heart Failure: Tracking Your Weight  You have a condition called heart failure. When you have heart failure, a sudden weight gain or a steady rise in weight is a warning sign that your body is retaining too much water and salt. This could mean your heart failure is getting  worse. If left untreated, it can cause problems for your lungs and result in shortness of breath. Weighing yourself each day is the best way to know if youre retaining water. If your weight goes up quickly, call your doctor. You will be given instructions on how to get rid of the excess water. You will likely need medicines and to avoid salt. This will help your heart work better.  Call your doctor if you gain more than 2 pounds in 1 day, more than 5 pounds in 1 week, or whatever weight gain you were told to report by your doctor. This is often a sign of worsening heart failure and needs to be evaluated and treated. Your doctor will tell you what to do next.   Tips for weighing yourself    · Weigh yourself at the same time each morning, wearing the same clothes. Weigh yourself after urinating and before eating.  · Use the same scale each day. Make sure the numbers are easy to read. Put the scale on a flat, hard surface -- not on a rug or carpet.  · Do not stop weighing yourself. If you forget one day, weigh again the next morning.  How to use your weight chart  · Keep your weight chart near the scale. Write your weight on the chart as soon as you get off the scale.  · Fill in the month and the start date on the chart. Then write down your weight each day. Your chart will look like this:    · If you miss a day, leave the space blank. Weigh yourself the next day and write your weight in the next space.  · Take your weight chart with you when you go to see your doctor.  Date Last Reviewed: 3/20/2016  © 4272-7068 [employer]. [address] [place] [address]. All rights reserved. This information is not intended as a substitute for professional medical care. Always follow your healthcare professional's instructions.              Heart Failure: Warning Signs of a Flare-Up  You have a condition called heart failure. Once you have heart failure, flare-ups can happen. Below are signs that can mean  your heart failure is getting worse. If you notice any of these warning signs, call your healthcare provider.  Swelling    · Your feet, ankles, or lower legs get puffier.  · You notice skin changes on your lower legs.  · Your shoes feel too tight.  · Your clothes are tighter in the waist.  · You have trouble getting rings on or off your fingers.  Shortness of breath  · You have to breathe harder even when youre doing your normal activities or when youre resting.  · You are short of breath walking up stairs or even short distances.  · You wake up at night short of breath or coughing.  · You need to use more pillows or sit up to sleep.  · You wake up tired or restless.  Other warning signs  · You feel weaker, dizzy, or more tired.  · You have chest pain or changes in your heartbeat.  · You have a cough that wont go away.  · You cant remember things or dont feel like eating.  Tracking your weight  Gaining weight is often the first warning sign that heart failure is getting worse. Gaining even a few pounds can be a sign that your body is retaining excess water and salt. Weighing yourself each day in the morning after you urinate and before you eat, is the best way to know if you're retaining water. Get a scale that is easy to read and make sure you wear the same clothes and use the same scale every time you weigh. Your healthcare provider will show you how to track your weight. Call your doctor if you gain more than 2 pounds in 1 day, 5 pounds in 1 week, or whatever weight gain you were told to report by your doctor. This is often a sign of worsening heart failure and needs to be evaluated and treated before it compromises your breathing. Your doctor will tell you what to do next.    Date Last Reviewed: 3/15/2016  © 0489-6931 [employer]. [address] [place] [address], [place] [address]. All rights reserved. This information is not intended as a substitute for professional medical care. Always follow your  healthcare professional's instructions.              Diabetes Discharge Instructions                                    [facility] complies with applicable Federal civil rights laws and does not discriminate on the basis of race, color, national origin, age, disability, or sex.

## 2017-04-25 ENCOUNTER — TELEPHONE (OUTPATIENT)
Dept: FAMILY MEDICINE | Facility: CLINIC | Age: 72
End: 2017-04-25

## 2017-04-25 PROBLEM — E66.9 OBESITY (BMI 30-39.9): Chronic | Status: ACTIVE | Noted: 2017-04-25

## 2017-04-25 PROBLEM — Z96.41 INSULIN PUMP STATUS: Chronic | Status: ACTIVE | Noted: 2017-04-25

## 2017-04-25 PROBLEM — E11.42 TYPE 2 DIABETES MELLITUS WITH DIABETIC POLYNEUROPATHY, WITH LONG-TERM CURRENT USE OF INSULIN: Chronic | Status: ACTIVE | Noted: 2017-04-25

## 2017-04-25 PROBLEM — R07.9 CHEST PAIN: Status: ACTIVE | Noted: 2017-04-25

## 2017-04-25 PROBLEM — Z79.4 TYPE 2 DIABETES MELLITUS WITH DIABETIC POLYNEUROPATHY, WITH LONG-TERM CURRENT USE OF INSULIN: Chronic | Status: ACTIVE | Noted: 2017-04-25

## 2017-04-25 PROBLEM — I63.9 STROKE: Status: RESOLVED | Noted: 2017-04-21 | Resolved: 2017-04-25

## 2017-04-25 LAB
ALBUMIN SERPL BCP-MCNC: 4 G/DL
ALP SERPL-CCNC: 98 U/L
ALT SERPL W/O P-5'-P-CCNC: 27 U/L
ANION GAP SERPL CALC-SCNC: 15 MMOL/L
AST SERPL-CCNC: 22 U/L
BASOPHILS # BLD AUTO: 0.05 K/UL
BASOPHILS NFR BLD: 0.5 %
BILIRUB SERPL-MCNC: 1.3 MG/DL
BNP SERPL-MCNC: 78 PG/ML
BUN SERPL-MCNC: 26 MG/DL
CALCIUM SERPL-MCNC: 8.8 MG/DL
CHLORIDE SERPL-SCNC: 94 MMOL/L
CO2 SERPL-SCNC: 22 MMOL/L
CREAT SERPL-MCNC: 1.4 MG/DL
DIFFERENTIAL METHOD: NORMAL
EOSINOPHIL # BLD AUTO: 0.2 K/UL
EOSINOPHIL NFR BLD: 1.5 %
ERYTHROCYTE [DISTWIDTH] IN BLOOD BY AUTOMATED COUNT: 13.9 %
EST. GFR  (AFRICAN AMERICAN): 57.6 ML/MIN/1.73 M^2
EST. GFR  (NON AFRICAN AMERICAN): 49.8 ML/MIN/1.73 M^2
GLUCOSE SERPL-MCNC: 399 MG/DL
HCT VFR BLD AUTO: 45 %
HGB BLD-MCNC: 16 G/DL
INR PPP: 1.1
LYMPHOCYTES # BLD AUTO: 2.3 K/UL
LYMPHOCYTES NFR BLD: 22.4 %
MAGNESIUM SERPL-MCNC: 1.6 MG/DL
MCH RBC QN AUTO: 29.1 PG
MCHC RBC AUTO-ENTMCNC: 35.6 %
MCV RBC AUTO: 82 FL
MONOCYTES # BLD AUTO: 0.6 K/UL
MONOCYTES NFR BLD: 5.4 %
NEUTROPHILS # BLD AUTO: 7.2 K/UL
NEUTROPHILS NFR BLD: 69.8 %
PLATELET # BLD AUTO: 197 K/UL
PLATELET RESPONSE PLAVIX: 88 PRU
PMV BLD AUTO: 11.1 FL
POCT GLUCOSE: 226 MG/DL (ref 70–110)
POCT GLUCOSE: 260 MG/DL (ref 70–110)
POCT GLUCOSE: 301 MG/DL (ref 70–110)
POCT GLUCOSE: 317 MG/DL (ref 70–110)
POCT GLUCOSE: 380 MG/DL (ref 70–110)
POTASSIUM SERPL-SCNC: 3.7 MMOL/L
PROT SERPL-MCNC: 7.4 G/DL
PROTHROMBIN TIME: 11.3 SEC
RBC # BLD AUTO: 5.5 M/UL
SODIUM SERPL-SCNC: 131 MMOL/L
TROPONIN I SERPL DL<=0.01 NG/ML-MCNC: 0.03 NG/ML
WBC # BLD AUTO: 10.24 K/UL

## 2017-04-25 PROCEDURE — G0378 HOSPITAL OBSERVATION PER HR: HCPCS

## 2017-04-25 PROCEDURE — 36415 COLL VENOUS BLD VENIPUNCTURE: CPT

## 2017-04-25 PROCEDURE — 84484 ASSAY OF TROPONIN QUANT: CPT

## 2017-04-25 PROCEDURE — 25000003 PHARM REV CODE 250: Performed by: NURSE PRACTITIONER

## 2017-04-25 PROCEDURE — 25000003 PHARM REV CODE 250: Performed by: INTERNAL MEDICINE

## 2017-04-25 PROCEDURE — 63600175 PHARM REV CODE 636 W HCPCS: Performed by: EMERGENCY MEDICINE

## 2017-04-25 PROCEDURE — 25000003 PHARM REV CODE 250: Performed by: ANESTHESIOLOGY

## 2017-04-25 PROCEDURE — 63600175 PHARM REV CODE 636 W HCPCS: Performed by: NURSE PRACTITIONER

## 2017-04-25 PROCEDURE — 85576 BLOOD PLATELET AGGREGATION: CPT

## 2017-04-25 PROCEDURE — 83735 ASSAY OF MAGNESIUM: CPT

## 2017-04-25 PROCEDURE — 86580 TB INTRADERMAL TEST: CPT | Performed by: INTERNAL MEDICINE

## 2017-04-25 PROCEDURE — 99223 1ST HOSP IP/OBS HIGH 75: CPT | Mod: ,,, | Performed by: NURSE PRACTITIONER

## 2017-04-25 PROCEDURE — 84484 ASSAY OF TROPONIN QUANT: CPT | Mod: 91

## 2017-04-25 PROCEDURE — 99222 1ST HOSP IP/OBS MODERATE 55: CPT | Mod: GC,,, | Performed by: INTERNAL MEDICINE

## 2017-04-25 PROCEDURE — 63600175 PHARM REV CODE 636 W HCPCS: Performed by: INTERNAL MEDICINE

## 2017-04-25 RX ORDER — ISOSORBIDE MONONITRATE 60 MG/1
60 TABLET, EXTENDED RELEASE ORAL DAILY
Status: CANCELLED | OUTPATIENT
Start: 2017-04-25

## 2017-04-25 RX ORDER — PANTOPRAZOLE SODIUM 40 MG/1
40 TABLET, DELAYED RELEASE ORAL DAILY
Status: CANCELLED | OUTPATIENT
Start: 2017-04-25

## 2017-04-25 RX ORDER — INSULIN ASPART 100 [IU]/ML
0-10 INJECTION, SOLUTION INTRAVENOUS; SUBCUTANEOUS
Status: DISCONTINUED | OUTPATIENT
Start: 2017-04-25 | End: 2017-04-28

## 2017-04-25 RX ORDER — HYDROCODONE BITARTRATE AND ACETAMINOPHEN 10; 325 MG/1; MG/1
1 TABLET ORAL EVERY 4 HOURS PRN
Status: CANCELLED | OUTPATIENT
Start: 2017-04-25

## 2017-04-25 RX ORDER — SODIUM CHLORIDE 0.9 % (FLUSH) 0.9 %
3 SYRINGE (ML) INJECTION EVERY 8 HOURS
Status: DISCONTINUED | OUTPATIENT
Start: 2017-04-25 | End: 2017-05-01 | Stop reason: HOSPADM

## 2017-04-25 RX ORDER — IBUPROFEN 200 MG
16 TABLET ORAL
Status: CANCELLED | OUTPATIENT
Start: 2017-04-25

## 2017-04-25 RX ORDER — IBUPROFEN 200 MG
16 TABLET ORAL
Status: DISCONTINUED | OUTPATIENT
Start: 2017-04-25 | End: 2017-04-28

## 2017-04-25 RX ORDER — ISOSORBIDE MONONITRATE 30 MG/1
60 TABLET, EXTENDED RELEASE ORAL DAILY
Status: DISCONTINUED | OUTPATIENT
Start: 2017-04-25 | End: 2017-05-01 | Stop reason: HOSPADM

## 2017-04-25 RX ORDER — ACETAMINOPHEN 325 MG/1
650 TABLET ORAL EVERY 6 HOURS PRN
Status: CANCELLED | OUTPATIENT
Start: 2017-04-25

## 2017-04-25 RX ORDER — ROSUVASTATIN CALCIUM 20 MG/1
40 TABLET, COATED ORAL DAILY
Status: DISCONTINUED | OUTPATIENT
Start: 2017-04-25 | End: 2017-05-01 | Stop reason: HOSPADM

## 2017-04-25 RX ORDER — GLUCAGON 1 MG
1 KIT INJECTION
Status: DISCONTINUED | OUTPATIENT
Start: 2017-04-25 | End: 2017-04-28

## 2017-04-25 RX ORDER — INSULIN ASPART 100 [IU]/ML
4 INJECTION, SOLUTION INTRAVENOUS; SUBCUTANEOUS
Status: DISCONTINUED | OUTPATIENT
Start: 2017-04-25 | End: 2017-04-27

## 2017-04-25 RX ORDER — HYDROCODONE BITARTRATE AND ACETAMINOPHEN 5; 325 MG/1; MG/1
1 TABLET ORAL EVERY 4 HOURS PRN
Status: CANCELLED | OUTPATIENT
Start: 2017-04-25

## 2017-04-25 RX ORDER — CITALOPRAM 10 MG/1
10 TABLET ORAL DAILY
Status: DISCONTINUED | OUTPATIENT
Start: 2017-04-25 | End: 2017-05-01 | Stop reason: HOSPADM

## 2017-04-25 RX ORDER — GLUCAGON 1 MG
1 KIT INJECTION
Status: CANCELLED | OUTPATIENT
Start: 2017-04-25

## 2017-04-25 RX ORDER — NITROGLYCERIN 0.4 MG/1
0.4 TABLET SUBLINGUAL EVERY 5 MIN PRN
Status: CANCELLED | OUTPATIENT
Start: 2017-04-25

## 2017-04-25 RX ORDER — FUROSEMIDE 40 MG/1
80 TABLET ORAL DAILY
Status: CANCELLED | OUTPATIENT
Start: 2017-04-25

## 2017-04-25 RX ORDER — INSULIN ASPART 100 [IU]/ML
1-10 INJECTION, SOLUTION INTRAVENOUS; SUBCUTANEOUS EVERY 6 HOURS PRN
Status: DISCONTINUED | OUTPATIENT
Start: 2017-04-25 | End: 2017-04-25

## 2017-04-25 RX ORDER — AMOXICILLIN 250 MG
1 CAPSULE ORAL 2 TIMES DAILY
Status: CANCELLED | OUTPATIENT
Start: 2017-04-25

## 2017-04-25 RX ORDER — ACETAMINOPHEN 325 MG/1
650 TABLET ORAL EVERY 4 HOURS PRN
Status: DISCONTINUED | OUTPATIENT
Start: 2017-04-25 | End: 2017-05-01 | Stop reason: HOSPADM

## 2017-04-25 RX ORDER — IBUPROFEN 200 MG
24 TABLET ORAL
Status: CANCELLED | OUTPATIENT
Start: 2017-04-25

## 2017-04-25 RX ORDER — CLOPIDOGREL BISULFATE 75 MG/1
75 TABLET ORAL DAILY
Status: DISCONTINUED | OUTPATIENT
Start: 2017-04-25 | End: 2017-05-01 | Stop reason: HOSPADM

## 2017-04-25 RX ORDER — CARVEDILOL 12.5 MG/1
12.5 TABLET ORAL 2 TIMES DAILY
Status: DISCONTINUED | OUTPATIENT
Start: 2017-04-25 | End: 2017-04-29

## 2017-04-25 RX ORDER — OMEGA-3-ACID ETHYL ESTERS 1 G/1
2 CAPSULE, LIQUID FILLED ORAL 3 TIMES DAILY
Status: DISCONTINUED | OUTPATIENT
Start: 2017-04-25 | End: 2017-05-01 | Stop reason: HOSPADM

## 2017-04-25 RX ORDER — INSULIN ASPART 100 [IU]/ML
5 INJECTION, SOLUTION INTRAVENOUS; SUBCUTANEOUS
Status: DISCONTINUED | OUTPATIENT
Start: 2017-04-25 | End: 2017-04-25

## 2017-04-25 RX ORDER — IBUPROFEN 200 MG
24 TABLET ORAL
Status: DISCONTINUED | OUTPATIENT
Start: 2017-04-25 | End: 2017-04-28

## 2017-04-25 RX ORDER — FUROSEMIDE 20 MG/1
20 TABLET ORAL DAILY
Status: DISCONTINUED | OUTPATIENT
Start: 2017-04-25 | End: 2017-05-01 | Stop reason: HOSPADM

## 2017-04-25 RX ORDER — CLOPIDOGREL BISULFATE 75 MG/1
75 TABLET ORAL DAILY
Status: CANCELLED | OUTPATIENT
Start: 2017-04-25

## 2017-04-25 RX ORDER — IBUPROFEN 200 MG
16 TABLET ORAL
Status: DISCONTINUED | OUTPATIENT
Start: 2017-04-25 | End: 2017-04-25

## 2017-04-25 RX ORDER — PANTOPRAZOLE SODIUM 40 MG/1
40 TABLET, DELAYED RELEASE ORAL DAILY
Status: DISCONTINUED | OUTPATIENT
Start: 2017-04-25 | End: 2017-05-01 | Stop reason: HOSPADM

## 2017-04-25 RX ORDER — AMITRIPTYLINE HYDROCHLORIDE 50 MG/1
50 TABLET, FILM COATED ORAL NIGHTLY
Status: DISCONTINUED | OUTPATIENT
Start: 2017-04-25 | End: 2017-04-25

## 2017-04-25 RX ORDER — ATORVASTATIN CALCIUM 20 MG/1
20 TABLET, FILM COATED ORAL DAILY
Status: CANCELLED | OUTPATIENT
Start: 2017-04-25

## 2017-04-25 RX ORDER — LISINOPRIL 10 MG/1
10 TABLET ORAL DAILY
Status: CANCELLED | OUTPATIENT
Start: 2017-04-25

## 2017-04-25 RX ORDER — ASPIRIN 81 MG/1
81 TABLET ORAL DAILY
Status: CANCELLED | OUTPATIENT
Start: 2017-04-25

## 2017-04-25 RX ORDER — ONDANSETRON 4 MG/1
4 TABLET, ORALLY DISINTEGRATING ORAL EVERY 8 HOURS PRN
Status: CANCELLED | OUTPATIENT
Start: 2017-04-25

## 2017-04-25 RX ORDER — INSULIN ASPART 100 [IU]/ML
0-5 INJECTION, SOLUTION INTRAVENOUS; SUBCUTANEOUS
Status: DISCONTINUED | OUTPATIENT
Start: 2017-04-25 | End: 2017-04-25

## 2017-04-25 RX ORDER — CARVEDILOL 12.5 MG/1
25 TABLET ORAL 2 TIMES DAILY WITH MEALS
Status: CANCELLED | OUTPATIENT
Start: 2017-04-25

## 2017-04-25 RX ORDER — CITALOPRAM 10 MG/1
10 TABLET ORAL DAILY
Status: CANCELLED | OUTPATIENT
Start: 2017-04-25

## 2017-04-25 RX ADMIN — OMEGA-3-ACID ETHYL ESTERS 2 G: 1 CAPSULE, LIQUID FILLED ORAL at 01:04

## 2017-04-25 RX ADMIN — FUROSEMIDE 20 MG: 20 TABLET ORAL at 09:04

## 2017-04-25 RX ADMIN — CARVEDILOL 12.5 MG: 12.5 TABLET, FILM COATED ORAL at 11:04

## 2017-04-25 RX ADMIN — INSULIN ASPART 3 UNITS: 100 INJECTION, SOLUTION INTRAVENOUS; SUBCUTANEOUS at 03:04

## 2017-04-25 RX ADMIN — CLOPIDOGREL 75 MG: 75 TABLET, FILM COATED ORAL at 09:04

## 2017-04-25 RX ADMIN — CARVEDILOL 12.5 MG: 12.5 TABLET, FILM COATED ORAL at 06:04

## 2017-04-25 RX ADMIN — CITALOPRAM HYDROBROMIDE 10 MG: 10 TABLET ORAL at 09:04

## 2017-04-25 RX ADMIN — OMEGA-3-ACID ETHYL ESTERS 2 G: 1 CAPSULE, LIQUID FILLED ORAL at 11:04

## 2017-04-25 RX ADMIN — ISOSORBIDE MONONITRATE 60 MG: 30 TABLET, EXTENDED RELEASE ORAL at 09:04

## 2017-04-25 RX ADMIN — INSULIN ASPART 2 UNITS: 100 INJECTION, SOLUTION INTRAVENOUS; SUBCUTANEOUS at 11:04

## 2017-04-25 RX ADMIN — SODIUM CHLORIDE 2 UNITS/HR: 9 INJECTION, SOLUTION INTRAVENOUS at 12:04

## 2017-04-25 RX ADMIN — INSULIN ASPART 4 UNITS: 100 INJECTION, SOLUTION INTRAVENOUS; SUBCUTANEOUS at 04:04

## 2017-04-25 RX ADMIN — ACETAMINOPHEN 650 MG: 325 TABLET ORAL at 09:04

## 2017-04-25 RX ADMIN — PANTOPRAZOLE SODIUM 40 MG: 40 TABLET, DELAYED RELEASE ORAL at 09:04

## 2017-04-25 RX ADMIN — SODIUM CHLORIDE 500 ML: 0.9 INJECTION, SOLUTION INTRAVENOUS at 03:04

## 2017-04-25 RX ADMIN — INSULIN ASPART 4 UNITS: 100 INJECTION, SOLUTION INTRAVENOUS; SUBCUTANEOUS at 03:04

## 2017-04-25 RX ADMIN — INSULIN ASPART 8 UNITS: 100 INJECTION, SOLUTION INTRAVENOUS; SUBCUTANEOUS at 09:04

## 2017-04-25 RX ADMIN — Medication 3 ML: at 01:04

## 2017-04-25 RX ADMIN — ROSUVASTATIN CALCIUM 40 MG: 20 TABLET, FILM COATED ORAL at 09:04

## 2017-04-25 RX ADMIN — OMEGA-3-ACID ETHYL ESTERS 2 G: 1 CAPSULE, LIQUID FILLED ORAL at 06:04

## 2017-04-25 RX ADMIN — Medication 5 UNITS: at 11:04

## 2017-04-25 RX ADMIN — Medication 3 ML: at 06:04

## 2017-04-25 NOTE — ASSESSMENT & PLAN NOTE
- No more chest pain  - Will do a follow up on Troponin if positive ACS protocol if negative order a stress teST.   - Continue PRN FAITH ENCARNACIONL, BB, for now

## 2017-04-25 NOTE — NURSING
Received pt in room as a transfer from the ED. Report received from [facility]. Pt AA&O x4. Appears tired; spouse at bedside. Pt denies pain, SOB or numbness and tingling in extremities.VSS. Pt oriented to room.

## 2017-04-25 NOTE — SUBJECTIVE & OBJECTIVE
Past Medical History:   Diagnosis Date    Back pain     CAD (coronary artery disease) 1989    h/o cabg x 5    Chronic kidney disease     Colon polyp     h/o on colonoscopy    Contact dermatitis 11/12/2013    CVA (cerebral vascular accident)     March 2010    Depression     Diabetes mellitus type II     on  insulin pump    DVT (deep venous thrombosis) 1980's    left leg    GERD (gastroesophageal reflux disease)     Heart failure     [facility]     Hyperlipidemia     Hypertension     Hypertensive heart disease without congestive heart failure     Myocardial infarction     Neuropathy of lower extremity     Obesity, morbid     Prostate cancer     Renal manifestation of secondary diabetes mellitus     Sleep apnea     Vitamin D deficiency disease        Past Surgical History:   Procedure Laterality Date    CARDIAC DEFIBRILLATOR PLACEMENT  06/16/2016    CHOLECYSTECTOMY      CORONARY ARTERY BYPASS GRAFT      bypass times  5    defibulater  06/16/2016    EYE SURGERY Bilateral     HERNIA REPAIR      groin x2  and umbilical    JOINT REPLACEMENT      knee left    thrombus Left     >2005    VASECTOMY         Review of patient's allergies indicates:  No Known Allergies    No current facility-administered medications on file prior to encounter.      Current Outpatient Prescriptions on File Prior to Encounter   Medication Sig    blood sugar diagnostic Strp 1 strip by Misc.(Non-Drug; Combo Route) route 2 (two) times daily with meals. TRUE METRIX    carvedilol (COREG) 25 MG tablet Take 1 tablet (25 mg total) by mouth 2 (two) times daily with meals. Hold is SBP <120.    citalopram (CELEXA) 10 MG tablet Take 1 tablet (10 mg total) by mouth once daily.    clopidogrel (PLAVIX) 75 mg tablet Take 1 tablet (75 mg total) by mouth once daily.    furosemide (LASIX) 80 MG tablet Take 1 tablet (80 mg total) by mouth once daily.    isosorbide mononitrate (IMDUR) 60 MG 24 hr tablet Take 1 tablet (60 mg total) by mouth  once daily.    lisinopril 10 MG tablet Take 1 tablet (10 mg total) by mouth once daily.    omeprazole (PRILOSEC) 40 MG capsule Take 1 capsule (40 mg total) by mouth 2 (two) times daily. (Patient taking differently: Take 40 mg by mouth 2 (two) times daily as needed. )    amitriptyline (ELAVIL) 50 MG tablet Take 1 tablet (50 mg total) by mouth every evening.    aspirin (ECOTRIN) 81 MG EC tablet Take 81 mg by mouth once daily.    atorvastatin (LIPITOR) 40 MG tablet Take 0.5 tablets (20 mg total) by mouth once daily.    blood-glucose meter kit Use as instructed          TRUE METRIX    glucose 4 GM chewable tablet Take 16 g by mouth as needed for Low blood sugar.    insulin lispro (HUMALOG) 100 unit/mL injection Inject into the skin 3 (three) times daily before meals.    lancets Misc 1 lancet by Misc.(Non-Drug; Combo Route) route 2 (two) times daily with meals. TRUE METRIX    nitroGLYCERIN (NITROSTAT) 0.4 MG SL tablet Place 1 tablet (0.4 mg total) under the tongue every 5 (five) minutes as needed. Sublingual  ( under tongue) as needed    omega-3 acid ethyl esters (LOVAZA) 1 gram capsule Take 2 g by mouth 3 (three) times daily.      SUBCUTANEOUS INSULIN PUMP (MINIMED 530G INSULIN PUMP MISC) by Misc.(Non-Drug; Combo Route) route.     Family History     Problem Relation (Age of Onset)    Alzheimer's disease Mother    Asbestos Father    Cancer Father    Dementia Mother    Diabetes type II Maternal Aunt    Heart disease Mother        Social History Main Topics    Smoking status: Former Smoker     Packs/day: 4.00     Years: 25.00     Types: Cigarettes    Smokeless tobacco: Never Used    Alcohol use 0.0 oz/week     0 Standard drinks or equivalent per week      Comment: occasional    Drug use: No    Sexual activity: Yes     Partners: Female     Review of Systems   Constitution: Negative.   HENT: Negative.    Cardiovascular: Positive for chest pain. Negative for claudication, cyanosis, dyspnea on exertion,  irregular heartbeat, leg swelling, near-syncope, orthopnea, palpitations, paroxysmal nocturnal dyspnea and syncope.   Respiratory: Negative.    Endocrine: Negative.    Hematologic/Lymphatic: Negative.      Objective:     Vital Signs (Most Recent):  Temp: 96.6 °F (35.9 °C) (04/25/17 0353)  Pulse: 67 (04/25/17 0353)  Resp: 20 (04/25/17 0353)  BP: (!) 151/70 (04/25/17 0353)  SpO2: (!) 91 % (04/25/17 0353) Vital Signs (24h Range):  Temp:  [96.6 °F (35.9 °C)-98.2 °F (36.8 °C)] 96.6 °F (35.9 °C)  Pulse:  [63-74] 67  Resp:  [18-26] 20  SpO2:  [91 %-98 %] 91 %  BP: (103-151)/(51-74) 151/70        There is no height or weight on file to calculate BMI.    SpO2: (!) 91 %  O2 Device (Oxygen Therapy): room air    No intake or output data in the 24 hours ending 04/25/17 0356    Lines/Drains/Airways     Peripheral Intravenous Line                 Peripheral IV - Single Lumen 04/24/17 Right Antecubital 1 day                Physical Exam   Constitutional: He is oriented to person, place, and time. He appears well-developed and well-nourished.   HENT:   Head: Normocephalic and atraumatic.   Left facial doope - subtle   Eyes: Conjunctivae and EOM are normal. Pupils are equal, round, and reactive to light.   Neck: Normal range of motion. Neck supple.   Cardiovascular: Normal rate, regular rhythm, normal heart sounds and intact distal pulses.    Pulmonary/Chest: Effort normal and breath sounds normal.   Abdominal: Soft.   Musculoskeletal: Normal range of motion. He exhibits edema (lower extremity 1_+ ). He exhibits no tenderness or deformity.   Neurological: He is alert and oriented to person, place, and time. He has normal reflexes.   Psychiatric: He has a normal mood and affect.   Nursing note and vitals reviewed.      Significant Labs:   CMP   Recent Labs  Lab 04/24/17  2340   *   K 3.7   CL 94*   CO2 22*   *   BUN 26*   CREATININE 1.4   CALCIUM 8.8   PROT 7.4   ALBUMIN 4.0   BILITOT 1.3*   ALKPHOS 98   AST 22   ALT 27    ANIONGAP 15   ESTGFRAFRICA 57.6*   EGFRNONAA 49.8*   , CBC   Recent Labs  Lab 04/24/17  2340   WBC 10.24   HGB 16.0   HCT 45.0       and INR   Recent Labs  Lab 04/24/17  2340   INR 1.1       Significant Imaging: X-Ray: CXR: X-Ray Chest 1 View (CXR): No results found for this visit on 04/24/17.

## 2017-04-25 NOTE — PROGRESS NOTES
Pt's spouse stated that she would be taking home the insulin pump and would prefer nursing to manage insulin.

## 2017-04-25 NOTE — ASSESSMENT & PLAN NOTE
BG goal 140-180.     Increase transition infusion to 2.4 u/hr  BG monitoring ac/hs/0200  Moderate dose correction scale  NPO this am.     DISCHARGE PLANNING:  Tentative plan for d/c to SNF when medically stable   1. Recommend to discharge on TBD (will need to assess pts ability to use pump before discharge with pump post stroke) Needs rx? No, reports he has all pump supplies, insulin at home - just needs to bring to the hospital   2. Needs glucometer and supplies? No  3. Follow up with PCP or endocrine?  Keely Montiel      4. Needs insulin pen training or BG meter training?  TBD, may need MDI refresher if cannot use pump at home

## 2017-04-25 NOTE — SUBJECTIVE & OBJECTIVE
Interval HPI:   Overnight events: BG markedly elevated. Home insulin pump disconnected upon admission, only on correction scale. Tentatively scheduled for stress test today.   Eating:   NPO  Nausea: No  Hypoglycemia and intervention: No  Fever: No  TPN and/or TF: No  If yes, type of TF/TPN and rate:     PMH, PSH, FH, SH updated and reviewed       Review of Systems     REVIEW OF SYSTEMS  Constitutional: Negative for weight changes.  Eyes: Negative for visual disturbance.  Respiratory: Negative for cough.   Cardiovascular: Negative for chest pain.  Gastrointestinal: Negative for nausea.  Endocrine: Negative for polyuria, polydipsia.  Musculoskeletal: Negative for back pain.  Skin: Negative for rash.  Neurological: Negative for syncope.  Psychiatric/Behavioral: Negative for depression.    Current Medications and/or Treatments Impacting Glycemic Control  Immunotherapy:    Immunosuppressants     None        Steroids:   Hormones     None        Pressors:    Autonomic Drugs     None        Hyperglycemia/Diabetes Medications:   Antihyperglycemics     Start     Stop Route Frequency Ordered    04/25/17 1118  insulin regular (Humulin R) 100 Units in sodium chloride 0.9% 100 mL infusion      -- IV Continuous 04/25/17 1121    04/25/17 1220  insulin aspart pen 0-10 Units      -- SubQ As needed (PRN) 04/25/17 1121             PHYSICAL EXAMINATION:  Vitals:    04/25/17 0803   BP: (!) 145/72   Pulse: (!) 59   Resp: 20   Temp: 97.7 °F (36.5 °C)     Body mass index is 38.16 kg/(m^2).    Physical Exam   PHYSICAL EXAMINATION  Constitutional:  Well developed, well nourished, NAD.  ENT: External ears no masses with nose patent; normal hearing.   Neck:  Supple; trachea midline; no thyromegaly.   Cardiovascular: Normal heart sounds, no LE edema.     Lungs:  Normal effort; lungs anterior bilaterally clear to auscultation.  Abdomen:  Soft, no masses,  no hernias.  MS: No clubbing or cyanosis of nails noted; unable to assess gait.  Skin: No  rashes, lesions, or ulcers; no nodules.  Psychiatric: Good judgement and insight; normal mood and affect.  Neurological: Cranial nerves are grossly intact. Decreased  vibration sense in the bilateral lower extremities.

## 2017-04-25 NOTE — ASSESSMENT & PLAN NOTE
BG goal 140-180. Start transition insulin infusion @ 2 units/hr. BG q4h with moderate dose correction while NPO    4/25  Discussed plan of care with daughter, instructed her to bring insulin pump to bedside today or tomorrow to review settings obtained via phone. Also discussed will need to assess pts ability to use pump as outpt after stroke. Pt is only person in family who is familiar with pump     DISCHARGE PLANNING:  Tentative plan for d/c to SNF when medically stable   1. Recommend to discharge on TBD (will need to assess pts ability to use pump before discharge with pump post stroke) Needs rx? No, reports he has all pump supplies, insulin at home - just needs to bring to the hospital   2. Needs glucometer and supplies? No  3. Follow up with PCP or endocrine?  Endo Dr. Montiel      4. Needs insulin pen training or BG meter training?  TBD, may need MDI refresher if cannot use pump at home

## 2017-04-25 NOTE — CONSULTS
CONSULT NOTE  Cardiology     Primary Team: [facility]OSP MED G    PRESENTING HISTORY     Chief Complaint/Reason for Consult:  Chest pain     History of Present Illness:  Mr. Jorden Shafer is a 71 y.o. male with PMHx significant for CAD (CABG x 5 1989; last cath 06/2016; diffusely occluded native arteries, bypass vessels partially occluded, VENKATA to SVG diagonal/LAD), diastolic HF with preserved EF (last echo 04/17/2017; EF45%, DD), MI with defibrillator placement 06/2016, DM II, HLD, HTN and R MCA stroke with admission to [facility] 04/12-4/19 who presented to the [facility] ED 4/25 complaining of mid sternal 6/10 chest pain described as tightness that occurred at rest, lasted 5 minutes, and  relieved by 1 NTG, ASA and plavix. He denies dizziness, SOB and light headedness at that time. He denies pain migration, nausea and vomiting. Denies diaphoresis.     On admission, trop noted to be 0.027-->0.026-->0.027, though these labs were spaced 1-3 hrs apart.     EKG unremarkable.     Of note, patient had similar presentation in 06/2016 prompting above mentioned cath lab procedure. Last stress echo 04/2016     Impression: ABNORMAL MYOCARDIAL PERFUSION  1. The perfusion scan is free of evidence for myocardial ischemia.   2. There is a moderate fixed defect of severe intensity in the  anteroapical wall of the left ventricle and moderate fixed defect of severe intensity in the  inferoapical wall of the left ventricle  3. There is abnormal wall motion at rest showing akinesis of the anteroapical wall of the left ventricle.   4. There is resting LV dysfunction with a reduced ejection fraction of 36 %.   5. The ventricular volumes are normal at rest and stress.   6. The extracardiac distribution of radioactivity is normal.   7. When compared to the previous study from 10/07/2014, similar perfusion findings noted.  LV function further reduced.    Review of Systems:  Constitutional: Denies weight loss, chills; + weakness post stroke   Eyes:  denies vision changes, loss or blurry vision.   ENT: Denies dysphagia, nasal discharge, ear pain or discharge.  Cardiovascular: Per HPI  Respiratory: Denies cough, hemoptysis and wheezing.  GI: Denies nausea/vomiting, hematochezia, melena, abd pain, or changes in appetite.  : Denies dysuria, incontinence, or hematuria.  Musculoskeletal: Denies joint pain or myalgias.  Skin/breast: Denies rashes, lumps, lesions, or discharge.  Neurologic: Denies headache, dizziness, vertigo, or paresthesias.  Psychiatric: denies suicidal ideation and changes in mood. Denies depression   Endocrine: Denies polyuria, polydipsia, heat/cold intolerance.  Hematologic/Lymph: Denies lymphadenopathy, easy bruising or easy bleeding.  Extremities: denies claudication; denies edema   Allergic/Immunologic: Denies rash, rhinitis.      PAST HISTORY:     Past Medical History:   Diagnosis Date    Back pain     CAD (coronary artery disease) 1989    h/o cabg x 5    Chronic kidney disease     Colon polyp     h/o on colonoscopy    Contact dermatitis 11/12/2013    CVA (cerebral vascular accident)     March 2010    Depression     Diabetes mellitus type II     on  insulin pump    DVT (deep venous thrombosis) 1980's    left leg    GERD (gastroesophageal reflux disease)     Heart failure     [employer]     Hyperlipidemia     Hypertension     Hypertensive heart disease without congestive heart failure     Insulin pump status 4/25/2017    Myocardial infarction     Neuropathy of lower extremity     Obesity (BMI 30-39.9) 4/25/2017    Prostate cancer     Renal manifestation of secondary diabetes mellitus     Sleep apnea     Stroke 4/21/2017    Type 2 diabetes mellitus with diabetic polyneuropathy, with long-term current use of insulin 4/25/2017    Type 2 diabetes mellitus with hyperglycemia, with long-term current use of insulin 9/26/2012    Currently sees Dr. Britton     Vitamin D deficiency disease        Past Surgical History:   Procedure  Laterality Date    CARDIAC DEFIBRILLATOR PLACEMENT  2016    CHOLECYSTECTOMY      CORONARY ARTERY BYPASS GRAFT      bypass times  5    defibulater  2016    EYE SURGERY Bilateral     HERNIA REPAIR      groin x2  and umbilical    JOINT REPLACEMENT      knee left    thrombus Left     >2005    VASECTOMY         Family History   Problem Relation Age of Onset    Alzheimer's disease Mother      Living    Heart disease Mother     Dementia Mother     Asbestos Father          Cancer Father     Diabetes type II Maternal Aunt     Stroke Neg Hx        Social History     Social History    Marital status:      Spouse name: N/A    Number of children: 3    Years of education: GED     Occupational History    Retired [employer]      Social History Main Topics    Smoking status: Former Smoker     Packs/day: 4.00     Years: 25.00     Types: Cigarettes    Smokeless tobacco: Never Used    Alcohol use 0.0 oz/week     0 Standard drinks or equivalent per week      Comment: occasional    Drug use: No    Sexual activity: Yes     Partners: Female     Other Topics Concern    None     Social History Narrative    SCREENING/HEALTH MAINTENANCE. Updated  14    COLONOSCOPY .  [facility].    TETANUS .    PNEUMOVAX  13    NO PRIOR HISTORY OF ZOSTAVAX    PSA 14    FLU 10/8/13       MEDICATIONS & ALLERGIES:     No current facility-administered medications on file prior to encounter.      Current Outpatient Prescriptions on File Prior to Encounter   Medication Sig Dispense Refill    aspirin (ECOTRIN) 81 MG EC tablet Take 81 mg by mouth once daily.      atorvastatin (LIPITOR) 40 MG tablet Take 0.5 tablets (20 mg total) by mouth once daily. 45 tablet 5    blood sugar diagnostic Strp 1 strip by Misc.(Non-Drug; Combo Route) route 2 (two) times daily with meals. TRUE METRIX 100 strip 11    carvedilol (COREG) 25 MG tablet Take 1 tablet (25 mg total) by mouth 2 (two)  times daily with meals. Hold is SBP <120. 90 tablet 3    citalopram (CELEXA) 10 MG tablet Take 1 tablet (10 mg total) by mouth once daily. 90 tablet 1    clopidogrel (PLAVIX) 75 mg tablet Take 1 tablet (75 mg total) by mouth once daily. 90 tablet 3    furosemide (LASIX) 80 MG tablet Take 1 tablet (80 mg total) by mouth once daily. 90 tablet 3    insulin lispro (HUMALOG) 100 unit/mL injection Inject into the skin 3 (three) times daily before meals.      isosorbide mononitrate (IMDUR) 60 MG 24 hr tablet Take 1 tablet (60 mg total) by mouth once daily. 90 tablet 3    lisinopril 10 MG tablet Take 1 tablet (10 mg total) by mouth once daily. 90 tablet 3    nitroGLYCERIN (NITROSTAT) 0.4 MG SL tablet Place 1 tablet (0.4 mg total) under the tongue every 5 (five) minutes as needed. Sublingual  ( under tongue) as needed 30 tablet prn    omega-3 acid ethyl esters (LOVAZA) 1 gram capsule Take 2 g by mouth 3 (three) times daily.        omeprazole (PRILOSEC) 40 MG capsule Take 1 capsule (40 mg total) by mouth 2 (two) times daily. (Patient taking differently: Take 40 mg by mouth 2 (two) times daily as needed. ) 180 capsule 3    amitriptyline (ELAVIL) 50 MG tablet Take 1 tablet (50 mg total) by mouth every evening. 30 tablet 2    blood-glucose meter kit Use as instructed          TRUE METRIX 1 each 0    glucose 4 GM chewable tablet Take 16 g by mouth as needed for Low blood sugar.      lancets Misc 1 lancet by Misc.(Non-Drug; Combo Route) route 2 (two) times daily with meals. TRUE METRIX 100 each 11    SUBCUTANEOUS INSULIN PUMP (MINIMED 530G INSULIN PUMP MISC) by Misc.(Non-Drug; Combo Route) route.          Review of patient's allergies indicates:  No Known Allergies    OBJECTIVE:     Vital Signs:  Temp:  [96.6 °F (35.9 °C)-98.5 °F (36.9 °C)] 98.5 °F (36.9 °C)  Pulse:  [58-74] 61  Resp:  [18-26] 18  SpO2:  [91 %-98 %] 96 %  BP: (103-151)/(51-74) 109/64  Body mass index is 38.16 kg/(m^2).       Physical Exam:  General:  alert, oriented and appears stated age  HENT: NC/AT.Conjunctivae/corneas clear. PERRL, EOMI. Nares normal. Septum midline. Mucosa normal. No drainage or sinus tenderness.  Neck: no adenopathy, no carotid bruit, no JVD, supple, symmetrical, trachea midline and thyroid not enlarged, symmetric, no tenderness/mass/nodules  Back: symmetric, no curvature. ROM normal. No CVA tenderness.  Lungs: clear to auscultation bilaterally, respiratory effort normal  CV: RRR, S1 and S2 Normal. No chest wall tenderness  Abdomen: S, NT, ND. Bowel sounds normal   Extremities: WWP, intact distal pulses. no cyanosis or edema  Skin: Skin color, texture, turgor normal. No rashes or lesions  Lymph nodes: Cervical, supraclavicular, and axillary nodes normal.  Neurologic: Grossly normal      Laboratory  Lab Results   Component Value Date    WBC 10.24 04/24/2017    HGB 16.0 04/24/2017    HCT 45.0 04/24/2017    MCV 82 04/24/2017     04/24/2017       Recent Labs  Lab 04/24/17  2340   *   *   K 3.7   CL 94*   CO2 22*   BUN 26*   CREATININE 1.4   CALCIUM 8.8     Lab Results   Component Value Date    INR 1.1 04/24/2017    INR 1.1 04/12/2017    INR 1.0 06/29/2016     Lab Results   Component Value Date    HGBA1C 9.3 (H) 04/13/2017     Recent Labs      04/25/17   0247  04/25/17   0800  04/25/17   1136   POCTGLUCOSE  380*  317*  301*       Diagnostic Results:  Cardiac Cath 06/2016  1.   Totally occluded native arteries, except for the proximal portion of the LAD/diagonals and septals.   2.   -SVG-to -RCA is occluded.   3.   -SVG-to-OM is patent.   4.   -LIMA-to-LAD is patent but is attached to the proximal LAD before the occlusion, and only feeds this proximal portion.   5.   -Jump graft to diagonal 3+LAD has heavy athrerosclerosis, ostial 85% stenosis, and occlusion of the jump portion that supplies the distal LAD.   6.   We treated the ostium of this SVG-diagonal/LAD using one drug-eluting stent.    Stress Echo  04/2016    Impression: ABNORMAL MYOCARDIAL PERFUSION  1. The perfusion scan is free of evidence for myocardial ischemia.   2. There is a moderate fixed defect of severe intensity in the  anteroapical wall of the left ventricle and moderate fixed defect of severe intensity in the  inferoapical wall of the left ventricle  3. There is abnormal wall motion at rest showing akinesis of the anteroapical wall of the left ventricle.   4. There is resting LV dysfunction with a reduced ejection fraction of 36 %.   5. The ventricular volumes are normal at rest and stress.   6. The extracardiac distribution of radioactivity is normal.   7. When compared to the previous study from 10/07/2014, similar perfusion findings noted.  LV function further reduced.  ASSESSMENT & PLAN:     Current Problems List:  Active Hospital Problems    Diagnosis  POA    *Chest pain [R07.9]  Yes    Insulin pump status [Z96.41]  Not Applicable     Chronic    Obesity (BMI 30-39.9) [E66.9]  Yes     Chronic    Type 2 diabetes mellitus with diabetic polyneuropathy, with long-term current use of insulin [E11.42, Z79.4]  Not Applicable     Chronic    Stenosis of right carotid artery [I65.21]  Yes     Chronic    History of CVA (cerebrovascular accident) [Z86.73]  Not Applicable     Chronic    Chronic combined systolic and diastolic heart failure [I50.42]  Yes     Chronic    Essential hypertension [I10]  Yes     Chronic    Hyperlipidemia [E78.5]  Yes     Chronic    CAD s/p CABG & PCI [I25.10]  Yes     Chronic    Type 2 diabetes mellitus with hyperglycemia, with long-term current use of insulin [E11.65, Z79.4]  Not Applicable     Chronic     Currently sees Dr. Britton         Resolved Hospital Problems    Diagnosis Date Resolved POA    Stroke [I63.9] 04/25/2017 Yes    Hypertensive heart disease without congestive heart failure [I11.9] 04/13/2017 Yes     Currently sees Dr. Fitch for cardiology        Unstable Angina  - chest pain at rest relieved with  1 nitro with extensive cardiac hx  - already on medical therapy, including asa + plavix, coreg, ACEi and statin  - discussed with stroke team re: pt's ability to receive heparin products; imaging reviewed with Dr. Marin who feels pt safe for heparin    - cardiac cath tomorrow after fluid resuscitation given Cr 1.4     - recheck bmp tomorrow am   - npo after mn   - interventional cards consult placed       Will continue to follow with you    Leatha Jarrett MD  PGY1, Cards Consults

## 2017-04-25 NOTE — CONSULTS
"[facility]  Endocrinology  Diabetes Consult Note    Consult Requested by: Gera Rios MD   Reason for admit: Chest pain    HISTORY OF PRESENT ILLNESS:  Reason for Consult: Management of T2DM, Hyperglycemia , insulin pump management     Surgical Procedure and Date:     Diabetes diagnosis year: ~2002    Home Diabetes Medications:  Medtronic minimed  (settings read via phone per patient's daughter)  Basal: 5 units/hr  ISF 17  I:C 1:4.5  Active insulin time : 4  Target 100-120  Bolus Wizard OFF      How often checking glucose at home? >4 x day   BG readings on regimen: 300-400's for "years"  Hypoglycemia on the regimen?  No  Missed doses on regimen?  Yes - reports does not always count all carbs with meals, does NOT bolus with snacks. Reviewed insulin pump settings on pump via phone and pts daughter reports bolus WIZARD OFF on pump, so concerns if patient is bolusing at all with meals.       Diabetes Complications include:     Hyperglycemia and Diabetic peripheral neuropathy     Complicating diabetes co morbidities:   History of CVA and Residual deficits from CVA       HPI:   Patient is a 71 y.o. male with a diagnosis of T2DM since ~2002. A1c 9.3% upon admission. Followed by Dr. Montiel. On insulin pump therapy for past 5 years, recently changed to Medtronic pump. Per patient report, he does not always bolus with meals, does NOT bolus with snacks. However, post stroke, the patient does have some concerns about managing insulin pump at home.   Chronic medical conditions include CAD, DVT, T2DM with neuropathy, MI, defibrillator placement, CABGx5 in 1989 . He presented to the ED with a complaint of midsternal CP x 5 minutes (tight, squeezing, 6/10), relieved with NTG x1 PTA.     Of note ,pt was discharged from  5 days ago after admission for stroke. + residual left facial droop and left sided weakness.   Endocrine consulted for BG management.       Interval HPI:   Overnight events: BG markedly " elevated. Home insulin pump disconnected upon admission, only on correction scale. Tentatively scheduled for stress test today.   Eating:   NPO  Nausea: No  Hypoglycemia and intervention: No  Fever: No  TPN and/or TF: No  If yes, type of TF/TPN and rate:     PMH, PSH, FH, SH updated and reviewed       Review of Systems     REVIEW OF SYSTEMS  Constitutional: Negative for weight changes.  Eyes: Negative for visual disturbance.  Respiratory: Negative for cough.   Cardiovascular: Negative for chest pain.  Gastrointestinal: Negative for nausea.  Endocrine: Negative for polyuria, polydipsia.  Musculoskeletal: Negative for back pain.  Skin: Negative for rash.  Neurological: Negative for syncope.  Psychiatric/Behavioral: Negative for depression.    Current Medications and/or Treatments Impacting Glycemic Control  Immunotherapy:    Immunosuppressants     None        Steroids:   Hormones     None        Pressors:    Autonomic Drugs     None        Hyperglycemia/Diabetes Medications:   Antihyperglycemics     Start     Stop Route Frequency Ordered    04/25/17 1118  insulin regular (Humulin R) 100 Units in sodium chloride 0.9% 100 mL infusion      -- IV Continuous 04/25/17 1121    04/25/17 1220  insulin aspart pen 0-10 Units      -- SubQ As needed (PRN) 04/25/17 1121             PHYSICAL EXAMINATION:  Vitals:    04/25/17 0803   BP: (!) 145/72   Pulse: (!) 59   Resp: 20   Temp: 97.7 °F (36.5 °C)     Body mass index is 38.16 kg/(m^2).    Physical Exam   PHYSICAL EXAMINATION  Constitutional:  Well developed, well nourished, NAD.  ENT: External ears no masses with nose patent; normal hearing.   Neck:  Supple; trachea midline; no thyromegaly.   Cardiovascular: Normal heart sounds, no LE edema.     Lungs:  Normal effort; lungs anterior bilaterally clear to auscultation.  Abdomen:  Soft, no masses,  no hernias.  MS: No clubbing or cyanosis of nails noted; unable to assess gait.  Skin: No rashes, lesions, or ulcers; no  nodules.  Psychiatric: Good judgement and insight; normal mood and affect.  Neurological: Cranial nerves are grossly intact. Decreased  vibration sense in the bilateral lower extremities.      Labs Reviewed and Include     Recent Labs  Lab 04/24/17  2340   *   CALCIUM 8.8   ALBUMIN 4.0   PROT 7.4   *   K 3.7   CO2 22*   CL 94*   BUN 26*   CREATININE 1.4   ALKPHOS 98   ALT 27   AST 22   BILITOT 1.3*     Lab Results   Component Value Date    WBC 10.24 04/24/2017    HGB 16.0 04/24/2017    HCT 45.0 04/24/2017    MCV 82 04/24/2017     04/24/2017     No results for input(s): TSH, FREET4 in the last 168 hours.  Lab Results   Component Value Date    HGBA1C 9.3 (H) 04/13/2017       Nutritional status:   Body mass index is 38.16 kg/(m^2).  Lab Results   Component Value Date    ALBUMIN 4.0 04/24/2017    ALBUMIN 3.8 04/13/2017    ALBUMIN 3.2 (L) 04/12/2017     No results found for: PREALBUMIN    Estimated Creatinine Clearance: 58.4 mL/min (based on Cr of 1.4).    Accu-Checks  Recent Labs      04/25/17   0247  04/25/17   0800   POCTGLUCOSE  380*  317*        ASSESSMENT and PLAN    Type 2 diabetes mellitus with hyperglycemia, with long-term current use of insulin  BG goal 140-180. Start transition insulin infusion @ 2 units/hr. BG q4h with moderate dose correction while NPO    4/25  Discussed plan of care with daughter, instructed her to bring insulin pump to bedside today or tomorrow to review settings obtained via phone. Also discussed will need to assess pts ability to use pump as outpt after stroke. Pt is only person in family who is familiar with pump     DISCHARGE PLANNING:  Tentative plan for d/c to SNF when medically stable   1. Recommend to discharge on TBD (will need to assess pts ability to use pump before discharge with pump post stroke) Needs rx? No, reports he has all pump supplies, insulin at home - just needs to bring to the hospital   2. Needs glucometer and supplies? No  3. Follow up with PCP  or endocrine?  Endo Dr. Montiel      4. Needs insulin pen training or BG meter training?  TBD, may need MDI refresher if cannot use pump at home         Type 2 diabetes mellitus with diabetic polyneuropathy, with long-term current use of insulin  See above      * Chest pain  Per primary      History of CVA (cerebrovascular accident)  CVA, discharged [facility] 4/2017  Residual deficit may limit full insulin pump usage, will need to assess when pump at bedside      Insulin pump status  Home insulin pump currently off, family instructed to bring to bedside, will need to assess pts ability to use pump before discharge    Obesity (BMI 30-39.9)  Body mass index is 38.16 kg/(m^2). may increase insulin resistance        Plan discussed with patient, family, and RN at bedside.     Anne Gonzalez NP  Endocrinology  [facility]      ADDENDUM 1600: Diet resumed, start Novolog 4 units AC, continue transition insulin infusion as above. Change BG to ac/hs/200 with moderate dose correction scale.

## 2017-04-25 NOTE — ED PROVIDER NOTES
Encounter Date: 4/24/2017    SCRIBE #1 NOTE: I, Alcira Payton, am scribing for, and in the presence of, Dr. Sauceda.       History     Chief Complaint   Patient presents with    Chest Pain     Chest pain relieved with 1 nitro     Review of patient's allergies indicates:  No Known Allergies  HPI Comments: Time patient was seen by the provider: 11:29 PM      The patient is a 71 y.o. male with hx of: CAD, DVT, DM II, MI, defibrillator placementthat presents to the ED with a complaint of midsternal CP x 5 minutes (tight, squeezing, 6/10), relieved with NTG x1 PTA. He had some SOB, dizziness, lightheadedness at that time. He denies N,V, palpitations, diaphoresis. He reports distant hx of similar and CABGx5 in 1989. No cardiac evaluations in the past 10 years. Pt was discharged from  5 days ago after admission for stroke. Wife reports residual left facial droop and left sided weakness. Previous MIs presented with neck pain but he denies neck pain. Pt took aspirin and plavix doses tonight.    Past Medical History:   Diagnosis Date    Back pain     CAD (coronary artery disease) 1989    h/o cabg x 5    Chronic kidney disease     Colon polyp     h/o on colonoscopy    Contact dermatitis 11/12/2013    CVA (cerebral vascular accident)     March 2010    Depression     Diabetes mellitus type II     on  insulin pump    DVT (deep venous thrombosis) 1980's    left leg    GERD (gastroesophageal reflux disease)     Heart failure     [facility]     Hyperlipidemia     Hypertension     Hypertensive heart disease without congestive heart failure     Insulin pump status 4/25/2017    Myocardial infarction     Neuropathy of lower extremity     Obesity (BMI 30-39.9) 4/25/2017    Prostate cancer     Renal manifestation of secondary diabetes mellitus     Sleep apnea     Stroke 4/21/2017    Type 2 diabetes mellitus with diabetic polyneuropathy, with long-term current use of insulin 4/25/2017    Type 2 diabetes mellitus with  hyperglycemia, with long-term current use of insulin 2012    Currently sees Dr. Britton     Unstable angina     Vitamin D deficiency disease      Past Surgical History:   Procedure Laterality Date    CARDIAC DEFIBRILLATOR PLACEMENT  2016    CHOLECYSTECTOMY      CORONARY ARTERY BYPASS GRAFT      bypass times  5    defibulater  2016    EYE SURGERY Bilateral     HERNIA REPAIR      groin x2  and umbilical    JOINT REPLACEMENT      knee left    thrombus Left     >2005    VASECTOMY       Family History   Problem Relation Age of Onset    Alzheimer's disease Mother      Living    Heart disease Mother     Dementia Mother     Asbestos Father          Cancer Father     Diabetes type II Maternal Aunt     Stroke Neg Hx      Social History   Substance Use Topics    Smoking status: Former Smoker     Packs/day: 4.00     Years: 25.00     Types: Cigarettes    Smokeless tobacco: Never Used    Alcohol use 0.0 oz/week     0 Standard drinks or equivalent per week      Comment: occasional     Review of Systems   Constitutional: Negative for diaphoresis and fever.   HENT: Negative for nosebleeds.    Eyes: Negative for visual disturbance.   Respiratory: Positive for shortness of breath.    Cardiovascular: Positive for chest pain. Negative for palpitations.   Gastrointestinal: Negative for nausea and vomiting.   Genitourinary: Negative for hematuria.   Musculoskeletal: Negative for neck stiffness.   Skin: Negative for rash.   Neurological: Positive for dizziness, facial asymmetry (residual), weakness (residual) and light-headedness.       Physical Exam   Initial Vitals   BP Pulse Resp Temp SpO2   17 2311 17 2311 17 2311 17 2311 17 2311   136/74 74 18 98.2 °F (36.8 °C) 98 %     Physical Exam    Nursing note and vitals reviewed.  Constitutional: He appears well-developed and well-nourished. He is not diaphoretic. No distress.   HENT:   Head: Normocephalic and  atraumatic.   Eyes: EOM are normal. Pupils are equal, round, and reactive to light.   Neck: Neck supple.   Cardiovascular: Normal rate, regular rhythm, normal heart sounds and intact distal pulses.   Saphenous vein scar LLE   Pulmonary/Chest: Breath sounds normal. No respiratory distress. He has no wheezes. He has no rales.   Abdominal: Soft. There is no tenderness.   Musculoskeletal: He exhibits no edema.   Neurological: He is alert.   3/5 strength LUE and 5/5 strength RUE. 4/5 strength LLE 5/5 strength RLE. Dysarthria, left facial droop.   Skin: Skin is warm and dry.         ED Course   Procedures  Labs Reviewed   COMPREHENSIVE METABOLIC PANEL - Abnormal; Notable for the following:        Result Value    Sodium 131 (*)     Chloride 94 (*)     CO2 22 (*)     Glucose 399 (*)     BUN, Bld 26 (*)     Total Bilirubin 1.3 (*)     eGFR if  57.6 (*)     eGFR if non  49.8 (*)     All other components within normal limits    Narrative:     PLEASE REVIEW ORDER START TIME BEFORE MARKING SPECIMEN  COLLECTED.   TROPONIN I - Abnormal; Notable for the following:     Troponin I 0.027 (*)     All other components within normal limits    Narrative:     PLEASE REVIEW ORDER START TIME BEFORE MARKING SPECIMEN  COLLECTED.   CBC W/ AUTO DIFFERENTIAL    Narrative:     PLEASE REVIEW ORDER START TIME BEFORE MARKING SPECIMEN  COLLECTED.   PROTIME-INR    Narrative:     PLEASE REVIEW ORDER START TIME BEFORE MARKING SPECIMEN  COLLECTED.   B-TYPE NATRIURETIC PEPTIDE    Narrative:     PLEASE REVIEW ORDER START TIME BEFORE MARKING SPECIMEN  COLLECTED.     EKG Readings: (Independently Interpreted)   EKG: NSR at 69 bpm, nl axis, nl intervals, no hypertrophy, no ST-T changes as read by me.           Medical Decision Making:   History:   Old Medical Records: I decided to obtain old medical records.  Initial Assessment:   This is an emergent evaluation of a 71 y.o. male patient with presentation of chest pain. After my  evaluation and workup, I believe this patient has significant chest pain, based upon history, physical exam and workup with risk stratification. Patient's initial troponin was 0.027, EKG shows NSR at 69 bpm, nl axis, nl intervals, no hypertrophy, no ST-T changes as read by me.. I do not believe the patient has a STEMI at this time.  Aspirin 325mg PO given in the ED.  At this time I believe the patient should be placed in observation for further evaluation and management.  Patient understands and agrees with plan.    Case discussed with Dr. Tong, hospitalist, who agrees and will place the patient under Dr. Armando Roberts's service.    Clinical Tests:   Lab Tests: Reviewed and Ordered  Radiological Study: Reviewed and Ordered  Medical Tests: Reviewed and Ordered  Other:   I have discussed this case with another health care provider.            Scribe Attestation:   Scribe #1: I performed the above scribed service and the documentation accurately describes the services I performed. I attest to the accuracy of the note.    Attending Attestation:           Physician Attestation for Scribe:  Physician Attestation Statement for Scribe #1: I, Dr. Sauceda, reviewed documentation, as scribed by Alcira Payton in my presence, and it is both accurate and complete.                 ED Course     Clinical Impression:   The primary encounter diagnosis was Unstable angina. Diagnoses of Chest pain, unspecified type, Type 2 diabetes mellitus with diabetic polyneuropathy, with long-term current use of insulin, Type 2 diabetes mellitus with hyperglycemia, with long-term current use of insulin, History of CVA (cerebrovascular accident), Insulin pump status, Obesity (BMI 30-39.9), Coronary artery disease involving native coronary artery of native heart with unstable angina pectoris, Essential hypertension, Mixed hyperlipidemia, Stenosis of right carotid artery, and NSTEMI (non-ST elevated myocardial infarction) were also pertinent to this  visit.    Disposition:   Disposition: Placed in Observation       Pérez Sauceda MD  04/27/17 1016

## 2017-04-25 NOTE — ED TRIAGE NOTES
71 year old male pt presents to the ed with complaints of chest pain x 2 hours ago 7/10 with relief with 1 nitro. Pt denies any sob nausea or sweating with chest pain. Pt is awake alert and oriented x3. Pt states he has a heart hx. Pt presents to the ed with family.

## 2017-04-25 NOTE — PLAN OF CARE
Spoke to the pt, his wife, and their dgt at the bedside. Per the family the pt was d/c'd home from [facility] on 4/19 after 7 days there for a CVA. They were sent home wit [facility], after [employer] denied IP rehab admit, and they refused SNF placement. Pt's wife stated she can not care for the pt at home and would like to place him in SNF for therapies. Advised would bring her a [employer] list to get choices and start the referral process.     0915- Pt discussed in IDTR, per Dr. Rios pt will get a stress test today, and he will be consulting Endocrine for recs on insulin pump. Advised Dr. Rios pt's wife would like SNF-please place consults to PT/OT/ST for recs.     0945- Left a [employer] SNF list in pt's room for his wife. Per pt she has gone home to check on their dog and she will be back for his stress test. CM/SW will f/u with wife later today to get her top 3 choices for referral.     1445- Recv'd a call from pt's dgt she stated they would like the pt placed at [facility], if they don;t accept then they would like referrals to [facility] in [place], and [facility]. E-mail sent to [facility] Martha to send referrals through [employer].      04/25/17 0740   Discharge Assessment   Assessment Type Discharge Planning Assessment   Confirmed/corrected address and phone number on facesheet? Yes   Assessment information obtained from? Patient;Caregiver  (Beth (wife) [phone])   Prior to hospitilization cognitive status: Alert/Oriented   Prior to hospitalization functional status: Assistive Equipment;Needs Assistance;Partially Dependent   Current cognitive status: Alert/Oriented   Current Functional Status: Assistive Equipment;Needs Assistance;Partially Dependent   Arrived From home or self-care   Lives With spouse   Able to Return to Prior Arrangements no   Is patient able to care for self after discharge? No   Patient's perception of discharge disposition skilled nursing facility   Readmission Within The Last 30 Days other (see  comments)  (Ad[facility] to [facility] from 4/12-4/19 for CVA)   Patient currently being followed by outpatient case management? No   Patient currently receives home health services? Yes   Patient previously received home health services and would like to resume services if necessary? Yes   If yes, name of home health provider: ([facility])   Does the patient currently use HME? Yes   Patient currently receives private duty nursing? No   Patient currently receives any other outside agency services? No   Equipment Currently Used at Home walker, rolling;commode;cane, straight   Do you have any problems affording any of your prescribed medications? No   Is the patient taking medications as prescribed? yes   Do you have any financial concerns preventing you from receiving the healthcare you need? No   Does the patient have transportation to healthcare appointments? Yes   Transportation Available family or friend will provide   On Dialysis? No   Does the patient receive services at the [facility]? No   Are there any open cases? No   Discharge Plan A Skilled Nursing Facility   Discharge Plan B Skilled Nursing Facility   Patient/Family In Agreement With Plan yes

## 2017-04-25 NOTE — TELEPHONE ENCOUNTER
Patient is back in hospital at [facility], chest pain, and will also notify Sandra that he was admitted over night.

## 2017-04-25 NOTE — ASSESSMENT & PLAN NOTE
S/P ICD in 2016 since then there is improved EF from 30 to 43.       No e/o volume overload.     Continue Lasix 40 IV BID

## 2017-04-25 NOTE — ASSESSMENT & PLAN NOTE
Home insulin pump currently off, family instructed to bring to bedside, will need to assess pts ability to use pump before discharge

## 2017-04-25 NOTE — ED NOTES
Pain: Pt currently denies any chest pain.    Psychosocial: Patient is calm and cooperative. Patients insight and judgement are appropriate to situation. Appears clean, well maintained, with clothing appropriate to environment. No evidence of delusions, hallucinations, or psychosis.    Neuro: Eyes open spontaneously. Awake, alert, oriented x 4. Speech clear and appropriate. Tolerating saliva secretions well. Able to follow commands, demonstrating ability to actively and appropriately communicate within context of current conversation. Symmetrical facial muscles. Moving all extremities well with no noted weakness. Adequate muscle tone present. Movement is purposeful. No evidence of impaired sensation. Responds to external stimuli with appropriate reflexes.     Airway: Bilateral chest rise and fall. RR regular and non-labored. Air entry patent and clear x 5 lobes of the lungs. No crepitus or subcutaneous emphysema noted on palpation.     Circulatory: Skin warm, dry, and pink. Apical and radial pulses strong and regular. Capillary refill/skin blanching less than 3 seconds to distal of 4 extremities. Placed on CM in NSR without ectopy.    Abdomen: Abdomen obese, soft and non-distended. Positive normo-active bowel sounds x 4 quadrants.     Urinary: Patient reports routine urination without pain, frequency, or urgency. Voids independently. Reports urine appears kerwin/yellow in color.    Extremities: No redness, heat, swelling, deformity, or pain.     Skin: Intact with no bruising/discolorations noted.

## 2017-04-25 NOTE — ASSESSMENT & PLAN NOTE
- s/p Left sided weakness from the stroke he delveloped last week.   - PT/OT In the AM   - Continue ASA plvix for seconday prevention

## 2017-04-25 NOTE — PROGRESS NOTES
Pain free this AM. Per cards will give 500 cc IVF and plan for cath tomorrow.  Endocrine following for insulin pump issues and hyperglycemia.  NPO at midnight.     Gera Rios

## 2017-04-25 NOTE — ASSESSMENT & PLAN NOTE
CVA, discharged from [facility] 4/2017  Residual deficit may limit full insulin pump usage, will need to assess when pump at bedside

## 2017-04-25 NOTE — H&P
[facility]  Cardiology  History and Physical     Patient Name: Jorden Shafer  MRN: [id number]  Admission Date: 4/24/2017  Code Status: Prior   Attending Provider: Elisabeth Roberts MD   Primary Care Physician: Neha Plasencia MD  Principal Problem:Chest pain    Patient information was obtained from patient and ER records.     Subjective:     Chief Complaint:  CHest pain        HPI:  Mr. Shafer is a 71 YOCM with PMH CAD, DVT, DM II, MI, defibrillator placementthat presents to the ED with a complaint of midsternal CP x 5 minutes (tight, squeezing, 6/10), relieved with NTG x1 PTA. He had some SOB, dizziness, lightheadedness at that time. He denies N,V, palpitations, diaphoresis. He reports distant hx of similar and CABGx5 in 1989. No cardiac evaluations in the past 10 years. Pt was discharged from  5 days ago after admission for stroke. Wife reports residual left facial droop and left sided weakness. Previous MIs presented with neck pain but he denies neck pain. Pt took aspirin and plavix doses tonight.    Pt has not have any recurrnet anginal type pain since admission to ER.     VItals stable, EKG without new injury and trop NEGATIVE,       Past Medical History:   Diagnosis Date    Back pain     CAD (coronary artery disease) 1989    h/o cabg x 5    Chronic kidney disease     Colon polyp     h/o on colonoscopy    Contact dermatitis 11/12/2013    CVA (cerebral vascular accident)     March 2010    Depression     Diabetes mellitus type II     on  insulin pump    DVT (deep venous thrombosis) 1980's    left leg    GERD (gastroesophageal reflux disease)     Heart failure     [facility]     Hyperlipidemia     Hypertension     Hypertensive heart disease without congestive heart failure     Myocardial infarction     Neuropathy of lower extremity     Obesity, morbid     Prostate cancer     Renal manifestation of secondary diabetes mellitus     Sleep apnea     Vitamin D deficiency  disease        Past Surgical History:   Procedure Laterality Date    CARDIAC DEFIBRILLATOR PLACEMENT  06/16/2016    CHOLECYSTECTOMY      CORONARY ARTERY BYPASS GRAFT      bypass times  5    defibulater  06/16/2016    EYE SURGERY Bilateral     HERNIA REPAIR      groin x2  and umbilical    JOINT REPLACEMENT      knee left    thrombus Left     >2005    VASECTOMY         Review of patient's allergies indicates:  No Known Allergies    No current facility-administered medications on file prior to encounter.      Current Outpatient Prescriptions on File Prior to Encounter   Medication Sig    blood sugar diagnostic Strp 1 strip by Misc.(Non-Drug; Combo Route) route 2 (two) times daily with meals. TRUE METRIX    carvedilol (COREG) 25 MG tablet Take 1 tablet (25 mg total) by mouth 2 (two) times daily with meals. Hold is SBP <120.    citalopram (CELEXA) 10 MG tablet Take 1 tablet (10 mg total) by mouth once daily.    clopidogrel (PLAVIX) 75 mg tablet Take 1 tablet (75 mg total) by mouth once daily.    furosemide (LASIX) 80 MG tablet Take 1 tablet (80 mg total) by mouth once daily.    isosorbide mononitrate (IMDUR) 60 MG 24 hr tablet Take 1 tablet (60 mg total) by mouth once daily.    lisinopril 10 MG tablet Take 1 tablet (10 mg total) by mouth once daily.    omeprazole (PRILOSEC) 40 MG capsule Take 1 capsule (40 mg total) by mouth 2 (two) times daily. (Patient taking differently: Take 40 mg by mouth 2 (two) times daily as needed. )    amitriptyline (ELAVIL) 50 MG tablet Take 1 tablet (50 mg total) by mouth every evening.    aspirin (ECOTRIN) 81 MG EC tablet Take 81 mg by mouth once daily.    atorvastatin (LIPITOR) 40 MG tablet Take 0.5 tablets (20 mg total) by mouth once daily.    blood-glucose meter kit Use as instructed          TRUE METRIX    glucose 4 GM chewable tablet Take 16 g by mouth as needed for Low blood sugar.    insulin lispro (HUMALOG) 100 unit/mL injection Inject into the skin 3 (three)  times daily before meals.    lancets Misc 1 lancet by Misc.(Non-Drug; Combo Route) route 2 (two) times daily with meals. TRUE METRIX    nitroGLYCERIN (NITROSTAT) 0.4 MG SL tablet Place 1 tablet (0.4 mg total) under the tongue every 5 (five) minutes as needed. Sublingual  ( under tongue) as needed    omega-3 acid ethyl esters (LOVAZA) 1 gram capsule Take 2 g by mouth 3 (three) times daily.      SUBCUTANEOUS INSULIN PUMP (MINIMED 530G INSULIN PUMP MISC) by Misc.(Non-Drug; Combo Route) route.     Family History     Problem Relation (Age of Onset)    Alzheimer's disease Mother    Asbestos Father    Cancer Father    Dementia Mother    Diabetes type II Maternal Aunt    Heart disease Mother        Social History Main Topics    Smoking status: Former Smoker     Packs/day: 4.00     Years: 25.00     Types: Cigarettes    Smokeless tobacco: Never Used    Alcohol use 0.0 oz/week     0 Standard drinks or equivalent per week      Comment: occasional    Drug use: No    Sexual activity: Yes     Partners: Female     Review of Systems   Constitution: Negative.   HENT: Negative.    Cardiovascular: Positive for chest pain. Negative for claudication, cyanosis, dyspnea on exertion, irregular heartbeat, leg swelling, near-syncope, orthopnea, palpitations, paroxysmal nocturnal dyspnea and syncope.   Respiratory: Negative.    Endocrine: Negative.    Hematologic/Lymphatic: Negative.      Objective:     Vital Signs (Most Recent):  Temp: 96.6 °F (35.9 °C) (04/25/17 0353)  Pulse: 67 (04/25/17 0353)  Resp: 20 (04/25/17 0353)  BP: (!) 151/70 (04/25/17 0353)  SpO2: (!) 91 % (04/25/17 0353) Vital Signs (24h Range):  Temp:  [96.6 °F (35.9 °C)-98.2 °F (36.8 °C)] 96.6 °F (35.9 °C)  Pulse:  [63-74] 67  Resp:  [18-26] 20  SpO2:  [91 %-98 %] 91 %  BP: (103-151)/(51-74) 151/70        There is no height or weight on file to calculate BMI.    SpO2: (!) 91 %  O2 Device (Oxygen Therapy): room air    No intake or output data in the 24 hours ending  04/25/17 0356    Lines/Drains/Airways     Peripheral Intravenous Line                 Peripheral IV - Single Lumen 04/24/17 Right Antecubital 1 day                Physical Exam   Constitutional: He is oriented to person, place, and time. He appears well-developed and well-nourished.   HENT:   Head: Normocephalic and atraumatic.   Left facial doope - subtle   Eyes: Conjunctivae and EOM are normal. Pupils are equal, round, and reactive to light.   Neck: Normal range of motion. Neck supple.   Cardiovascular: Normal rate, regular rhythm, normal heart sounds and intact distal pulses.    Pulmonary/Chest: Effort normal and breath sounds normal.   Abdominal: Soft.   Musculoskeletal: Normal range of motion. He exhibits edema (lower extremity 1_+ ). He exhibits no tenderness or deformity.   Neurological: He is alert and oriented to person, place, and time. He has normal reflexes.   Psychiatric: He has a normal mood and affect.   Nursing note and vitals reviewed.      Significant Labs:   CMP   Recent Labs  Lab 04/24/17  2340   *   K 3.7   CL 94*   CO2 22*   *   BUN 26*   CREATININE 1.4   CALCIUM 8.8   PROT 7.4   ALBUMIN 4.0   BILITOT 1.3*   ALKPHOS 98   AST 22   ALT 27   ANIONGAP 15   ESTGFRAFRICA 57.6*   EGFRNONAA 49.8*   , CBC   Recent Labs  Lab 04/24/17  2340   WBC 10.24   HGB 16.0   HCT 45.0       and INR   Recent Labs  Lab 04/24/17  2340   INR 1.1       Significant Imaging: X-Ray: CXR: X-Ray Chest 1 View (CXR): No results found for this visit on 04/24/17.    Assessment and Plan:     * Chest pain  - No more chest pain  - Will do a follow up on Troponin if positive ACS protocol if negative order a stress teST.   - Continue PRN SL NGL, BB, for now      CAD s/p CABG & PCI  - No active issue  - Darian ASA, Plavix, BB, and GFDMT.       Type 2 diabetes mellitus, uncontrolled  - Continue Sliding insulin and premeal once dose adjesterd    Hyperlipidemia  - Continue Statin,.     Essential hypertension  Stable  now,   Continue COREG, IMDUR for now      Chronic combined systolic and diastolic heart failure  S/P ICD in 2016 since then there is improved EF from 30 to 43.       No e/o volume overload.     Continue Lasix 40 IV BID    History of CVA (cerebrovascular accident)  - s/p Left sided weakness from the stroke he delveloped last week.   - PT/OT In the AM   - Continue ASA plvix for seconday prevention       VTE Risk Mitigation         Ordered     Medium Risk of VTE  Once      04/25/17 0352     Place sequential compression device  Until discontinued      04/25/17 0352          Ayaan Eaton MD  Cardiology   [facility]

## 2017-04-26 DIAGNOSIS — I25.110 CORONARY ARTERY DISEASE INVOLVING NATIVE CORONARY ARTERY OF NATIVE HEART WITH UNSTABLE ANGINA PECTORIS: Primary | ICD-10-CM

## 2017-04-26 PROBLEM — Z78.9 PLAVIX RESISTANCE: Status: ACTIVE | Noted: 2017-04-26

## 2017-04-26 PROBLEM — I20.0 UNSTABLE ANGINA: Status: ACTIVE | Noted: 2017-04-26

## 2017-04-26 LAB
ANION GAP SERPL CALC-SCNC: 12 MMOL/L
BUN SERPL-MCNC: 27 MG/DL
CALCIUM SERPL-MCNC: 8.9 MG/DL
CHLORIDE SERPL-SCNC: 103 MMOL/L
CO2 SERPL-SCNC: 22 MMOL/L
CREAT SERPL-MCNC: 1.1 MG/DL
EST. GFR  (AFRICAN AMERICAN): >60 ML/MIN/1.73 M^2
EST. GFR  (NON AFRICAN AMERICAN): >60 ML/MIN/1.73 M^2
GLUCOSE SERPL-MCNC: 266 MG/DL
POCT GLUCOSE: 228 MG/DL (ref 70–110)
POCT GLUCOSE: 238 MG/DL (ref 70–110)
POCT GLUCOSE: 239 MG/DL (ref 70–110)
POCT GLUCOSE: 261 MG/DL (ref 70–110)
POTASSIUM SERPL-SCNC: 3.6 MMOL/L
SODIUM SERPL-SCNC: 137 MMOL/L
TROPONIN I SERPL DL<=0.01 NG/ML-MCNC: 0.01 NG/ML

## 2017-04-26 PROCEDURE — 84484 ASSAY OF TROPONIN QUANT: CPT

## 2017-04-26 PROCEDURE — 11000001 HC ACUTE MED/SURG PRIVATE ROOM

## 2017-04-26 PROCEDURE — 99233 SBSQ HOSP IP/OBS HIGH 50: CPT | Mod: ,,, | Performed by: INTERNAL MEDICINE

## 2017-04-26 PROCEDURE — 25000003 PHARM REV CODE 250: Performed by: INTERNAL MEDICINE

## 2017-04-26 PROCEDURE — 80048 BASIC METABOLIC PNL TOTAL CA: CPT

## 2017-04-26 PROCEDURE — 99232 SBSQ HOSP IP/OBS MODERATE 35: CPT | Mod: ,,, | Performed by: INTERNAL MEDICINE

## 2017-04-26 PROCEDURE — G8978 MOBILITY CURRENT STATUS: HCPCS | Mod: CK

## 2017-04-26 PROCEDURE — G8979 MOBILITY GOAL STATUS: HCPCS | Mod: CI

## 2017-04-26 PROCEDURE — 92610 EVALUATE SWALLOWING FUNCTION: CPT

## 2017-04-26 PROCEDURE — 36415 COLL VENOUS BLD VENIPUNCTURE: CPT

## 2017-04-26 PROCEDURE — 97161 PT EVAL LOW COMPLEX 20 MIN: CPT

## 2017-04-26 PROCEDURE — 97165 OT EVAL LOW COMPLEX 30 MIN: CPT

## 2017-04-26 PROCEDURE — 99232 SBSQ HOSP IP/OBS MODERATE 35: CPT | Mod: ,,, | Performed by: NURSE PRACTITIONER

## 2017-04-26 PROCEDURE — 99232 SBSQ HOSP IP/OBS MODERATE 35: CPT | Mod: GC,,, | Performed by: INTERNAL MEDICINE

## 2017-04-26 RX ORDER — SODIUM CHLORIDE 9 MG/ML
3 INJECTION, SOLUTION INTRAVENOUS CONTINUOUS
Status: CANCELLED | OUTPATIENT
Start: 2017-04-26 | End: 2017-04-26

## 2017-04-26 RX ORDER — DIPHENHYDRAMINE HCL 50 MG
50 CAPSULE ORAL ONCE
Status: CANCELLED | OUTPATIENT
Start: 2017-04-26 | End: 2017-04-26

## 2017-04-26 RX ORDER — HEPARIN SODIUM 5000 [USP'U]/ML
5000 INJECTION, SOLUTION INTRAVENOUS; SUBCUTANEOUS EVERY 8 HOURS
Status: DISCONTINUED | OUTPATIENT
Start: 2017-04-26 | End: 2017-05-01 | Stop reason: HOSPADM

## 2017-04-26 RX ADMIN — OMEGA-3-ACID ETHYL ESTERS 2 G: 1 CAPSULE, LIQUID FILLED ORAL at 02:04

## 2017-04-26 RX ADMIN — CARVEDILOL 12.5 MG: 12.5 TABLET, FILM COATED ORAL at 09:04

## 2017-04-26 RX ADMIN — INSULIN ASPART 2 UNITS: 100 INJECTION, SOLUTION INTRAVENOUS; SUBCUTANEOUS at 10:04

## 2017-04-26 RX ADMIN — PANTOPRAZOLE SODIUM 40 MG: 40 TABLET, DELAYED RELEASE ORAL at 09:04

## 2017-04-26 RX ADMIN — FUROSEMIDE 20 MG: 20 TABLET ORAL at 09:04

## 2017-04-26 RX ADMIN — HEPARIN SODIUM 5000 UNITS: 5000 INJECTION, SOLUTION INTRAVENOUS; SUBCUTANEOUS at 10:04

## 2017-04-26 RX ADMIN — CARVEDILOL 12.5 MG: 12.5 TABLET, FILM COATED ORAL at 10:04

## 2017-04-26 RX ADMIN — ROSUVASTATIN CALCIUM 40 MG: 20 TABLET, FILM COATED ORAL at 09:04

## 2017-04-26 RX ADMIN — INSULIN ASPART 4 UNITS: 100 INJECTION, SOLUTION INTRAVENOUS; SUBCUTANEOUS at 08:04

## 2017-04-26 RX ADMIN — INSULIN ASPART 4 UNITS: 100 INJECTION, SOLUTION INTRAVENOUS; SUBCUTANEOUS at 05:04

## 2017-04-26 RX ADMIN — INSULIN ASPART 2 UNITS: 100 INJECTION, SOLUTION INTRAVENOUS; SUBCUTANEOUS at 05:04

## 2017-04-26 RX ADMIN — Medication 3 ML: at 02:04

## 2017-04-26 RX ADMIN — INSULIN ASPART 4 UNITS: 100 INJECTION, SOLUTION INTRAVENOUS; SUBCUTANEOUS at 12:04

## 2017-04-26 RX ADMIN — CLOPIDOGREL 75 MG: 75 TABLET, FILM COATED ORAL at 09:04

## 2017-04-26 RX ADMIN — ISOSORBIDE MONONITRATE 60 MG: 30 TABLET, EXTENDED RELEASE ORAL at 09:04

## 2017-04-26 RX ADMIN — OMEGA-3-ACID ETHYL ESTERS 2 G: 1 CAPSULE, LIQUID FILLED ORAL at 10:04

## 2017-04-26 RX ADMIN — CITALOPRAM HYDROBROMIDE 10 MG: 10 TABLET ORAL at 09:04

## 2017-04-26 NOTE — PT/OT/SLP EVAL
Occupational Therapy  Evaluation    Jorden Shafer   MRN: [id number]   Admitting Diagnosis: Chest pain    OT Date of Treatment: 04/26/17   OT Start Time: 1039  OT Stop Time: 1100  OT Total Time (min): 21 min    Billable Minutes:  Evaluation 21    Diagnosis: Chest pain   Recent CVA    Past Medical History:   Diagnosis Date    Back pain     CAD (coronary artery disease) 1989    h/o cabg x 5    Chronic kidney disease     Colon polyp     h/o on colonoscopy    Contact dermatitis 11/12/2013    CVA (cerebral vascular accident)     March 2010    Depression     Diabetes mellitus type II     on  insulin pump    DVT (deep venous thrombosis) 1980's    left leg    GERD (gastroesophageal reflux disease)     Heart failure     [facility]     Hyperlipidemia     Hypertension     Hypertensive heart disease without congestive heart failure     Insulin pump status 4/25/2017    Myocardial infarction     Neuropathy of lower extremity     Obesity (BMI 30-39.9) 4/25/2017    Prostate cancer     Renal manifestation of secondary diabetes mellitus     Sleep apnea     Stroke 4/21/2017    Type 2 diabetes mellitus with diabetic polyneuropathy, with long-term current use of insulin 4/25/2017    Type 2 diabetes mellitus with hyperglycemia, with long-term current use of insulin 9/26/2012    Currently sees Dr. Britton     Unstable angina     Vitamin D deficiency disease       Past Surgical History:   Procedure Laterality Date    CARDIAC DEFIBRILLATOR PLACEMENT  06/16/2016    CHOLECYSTECTOMY      CORONARY ARTERY BYPASS GRAFT      bypass times  5    defibulater  06/16/2016    EYE SURGERY Bilateral     HERNIA REPAIR      groin x2  and umbilical    JOINT REPLACEMENT      knee left    thrombus Left     >2005    VASECTOMY         Referring physician: félix  Date referred to OT: 4/25/17    General Precautions: Standard, fall, aspiration, nectar thick  Orthopedic Precautions:    Braces:      Do you have any cultural, spiritual,  Congregational conflicts, given your current situation?: no     Patient History:  Living Environment  Lives With: spouse  Living Arrangements: house  Home Accessibility: stairs to enter home  Home Layout: Able to live on 1st floor  Number of Stairs to Enter Home: 3  Stair Railings at Home: outside, present on right side  Living Environment Comment: Lives with wife in [facility] with 3 steps to enter. Was (I) prior to having a CVA less than a month ago and since then has required some assistance with LBD, bathing and walks with a cane.  Equipment Currently Used at Home: bedside commode, walker, rolling, cane, straight, bath bench    Prior level of function:   Bed Mobility/Transfers: independent  Grooming: independent  Bathing: needs assist  Upper Body Dressing: independent  Lower Body Dressing: needs assist  Toileting: independent        Dominant hand: right    Subjective:  Communicated with RN prior to session.  Chief Complaint: Left sided weakness  Patient/Family stated goals: To go to rehab.    Pain Ratin/10     Objective:  Patient found with: peripheral IV. Up in chair upon arrival.    Cognitive Exam:  Oriented to: Person, Place, Time and Situation  Follows Commands/attention: Follows multistep  commands  Communication: clear/fluent  Memory:  No Deficits noted  Safety awareness/insight to disability: intact  Coping skills/emotional control: Appropriate to situation    Visual/perceptual:  Intact    Physical Exam:  Postural examination/scapula alignment: No postural abnormalities identified  Skin integrity: Visible skin intact  Edema: None noted     Sensation:   Intact    Upper Extremity Range of Motion:  Right Upper Extremity: WNL  Left Upper Extremity: WNL except required increased time to move shld to full ROM.    Upper Extremity Strength:  Right Upper Extremity: WNL  Left Upper Extremity: Deficits: 4/5 throughout   Strength: WNL    Fine motor coordination:   Impaired  Left hand, finger to nose impaired 2/2  "dysmetria and Left hand thumb/finger opposition skills impaired with delayed movement and dysmetria    Gross motor coordination: WFL    Functional Mobility:  Bed Mobility:  Rolling/Turning to Left:  (Pt sitting up in chair.)    Transfers:  Sit <> Stand Assistance: Contact Guard Assistance  Sit <> Stand Assistive Device: Straight Cane    Functional Ambulation: Ambulated CGA ~ 50 ft using a straight cane. No LOB demonstrated.    Activities of Daily Living:     LE Dressing Level of Assistance: Stand by assistance (With increased time to don shorts/sock due to decreased L fine motor coordination.)    Balance:   Static Sit: GOOD+: Takes MAXIMAL challenges from all directions.    Dynamic Sit: GOOD+: Maintains balance through MAXIMAL excursions of active trunk motion  Static Stand: GOOD: Takes MODERATE challenges from all directions  Dynamic stand: GOOD: Needs SUPERVISION only during gait and able to self right with moderate     Therapeutic Activities and Exercises:  UE ROM/MMT  LUE FM coordination assessment  Functional mobility  Educated on need to get assistance when going to bathroom    AM-PAC 6 CLICK ADL  How much help from another person does this patient currently need?  1 = Unable, Total/Dependent Assistance  2 = A lot, Maximum/Moderate Assistance  3 = A little, Minimum/Contact Guard/Supervision  4 = None, Modified [place]/Independent    Putting on and taking off regular lower body clothing? : 3  Bathing (including washing, rinsing, drying)?: 3  Toileting, which includes using toilet, bedpan, or urinal? : 3  Putting on and taking off regular upper body clothing?: 4  Taking care of personal grooming such as brushing teeth?: 4  Eating meals?: 4  Total Score: 21    AM-PAC Raw Score CMS "G-Code Modifier Level of Impairment Assistance   6 % Total / Unable   7 - 9 CM 80 - 100% Maximal Assist   10 - 14 CL 60 - 80% Moderate Assist   15 - 19 CK 40 - 60% Moderate Assist   20 - 22 CJ 20 - 40% Minimal Assist   23 " CI 1-20% SBA / CGA   24 CH 0% Independent/ Mod I       Patient left up in chair with call button in reach    Assessment:  Jorden Shafer is a 71 y.o. male with a medical diagnosis of Chest pain and presents with decreased left UE functional ability due to impaired fine motor skillsn and dysmetria. Pt would benefit from further OT services to address deficits and facilitate improving (I) with ADLs.    Rehab identified problem list/impairments: Rehab identified problem list/impairments: weakness, impaired functional mobilty, impaired endurance, gait instability, impaired balance, decreased upper extremity function, decreased lower extremity function, impaired self care skills, impaired fine motor, impaired coordination    Rehab potential is excellent.    Activity tolerance: Good    Discharge recommendations:       Barriers to discharge: Barriers to Discharge: None    Equipment recommendations:       GOALS:   Occupational Therapy Goals        Problem: Occupational Therapy Goal    Goal Priority Disciplines Outcome Interventions   Occupational Therapy Goal     OT, PT/OT Ongoing (interventions implemented as appropriate)    Description:  Goals to be met by: 5/3/17    Patient will increase functional independence with ADLs by performing:    UE Dressing with Set-up Assistance  (including managing buttons as able).  LE Dressing with Set-up Assistance (including tying shoes as able)  Grooming while standing at sink with Modified [place].  Toileting from toilet with Modified [place] for hygiene and clothing management.   Supine to sit with [place].  Toilet transfer to toilet with Modified [place].  Home exercise program to improve fine motor skills - x10 reps per handout, with independence.                PLAN:  Patient to be seen 4 x/week to address the above listed problems via self-care/home management, neuromuscular re-education, therapeutic activities, therapeutic exercises  Plan of Care expires:  05/26/17  Plan of Care reviewed with:           QUITA Horne  04/26/2017

## 2017-04-26 NOTE — PLAN OF CARE
Problem: Occupational Therapy Goal  Goal: Occupational Therapy Goal  Goals to be met by: 5/3/17    Patient will increase functional independence with ADLs by performing:    UE Dressing with Set-up Assistance (including managing buttons as able).  LE Dressing with Set-up Assistance (including tying shoes as able)  Grooming while standing at sink with Modified [place].  Toileting from toilet with Modified [place] for hygiene and clothing management.   Supine to sit with [place].  Toilet transfer to toilet with Modified [place].  Home exercise program to improve fine motor skills - x10 reps per handout, with independence.  Outcome: Ongoing (interventions implemented as appropriate)  Evaluation completed and POC established.     QUITA Cervantes

## 2017-04-26 NOTE — PLAN OF CARE
Sandra currently reviewing patient, sent updated therapy notes, will follow.  1050 Patient has not been seen by therapy yet, scheduled to be seen by therapy today, will send notes once obtained, will follow.    1505 Sent therapy notes to Sandra, will follow.

## 2017-04-26 NOTE — PT/OT/SLP EVAL
SLP Clinical Swallow Evaluation:      Jorden Shafer   MRN: [id number]   Admitting Diagnosis: Chest pain    Diet recommendations: Solid Diet Level: NPO  Liquid Diet Level: NPO Meds crushed in puree   Modified Barium Swallow Study to r/o aspiration & determine if safe for PO/least restrictive consistencies  Team paged, awaiting call back    SLP Treatment Date: 04/26/17  Speech Start Time: 1310     Speech Stop Time: 1323     Speech Total (min): 13 min       TREATMENT BILLABLE MINUTES:  Eval Swallow and Oral Function 13    Diagnosis: Chest pain    Past Medical History:   Diagnosis Date    Back pain     CAD (coronary artery disease) 1989    h/o cabg x 5    Chronic kidney disease     Colon polyp     h/o on colonoscopy    Contact dermatitis 11/12/2013    CVA (cerebral vascular accident)     March 2010    Depression     Diabetes mellitus type II     on  insulin pump    DVT (deep venous thrombosis) 1980's    left leg    GERD (gastroesophageal reflux disease)     Heart failure     [facility]     Hyperlipidemia     Hypertension     Hypertensive heart disease without congestive heart failure     Insulin pump status 4/25/2017    Myocardial infarction     Neuropathy of lower extremity     Obesity (BMI 30-39.9) 4/25/2017    Prostate cancer     Renal manifestation of secondary diabetes mellitus     Sleep apnea     Stroke 4/21/2017    Type 2 diabetes mellitus with diabetic polyneuropathy, with long-term current use of insulin 4/25/2017    Type 2 diabetes mellitus with hyperglycemia, with long-term current use of insulin 9/26/2012    Currently sees Dr. Britton     Unstable angina     Vitamin D deficiency disease      Past Surgical History:   Procedure Laterality Date    CARDIAC DEFIBRILLATOR PLACEMENT  06/16/2016    CHOLECYSTECTOMY      CORONARY ARTERY BYPASS GRAFT      bypass times  5    defibulater  06/16/2016    EYE SURGERY Bilateral     HERNIA REPAIR      groin x2  and umbilical    JOINT REPLACEMENT       knee left    thrombus Left     >2005    VASECTOMY         Has the patient been evaluated by SLP for swallowing? : Yes  Keep patient NPO?: Yes   General Precautions: Standard, fall, aspiration, NPO          Prior diet: regular/thin    Subjective:  Awake & alert. Wife at bedside.     Pain Ratin/10              Pain Rating Post-Intervention: 0/10    Objective:        Oral Musculature Evaluation  Oral Musculature: left weakness  Mucosal Quality: adequate  Oral Labial Strength and Mobility: impaired retraction, impaired pursing, impaired seal  Lingual Strength and Mobility: impaired strength, impaired left lateral movement, impaired protrusion (lingual deviation to right)  Volitional Cough: weak  Volitional Swallow: adequate  Voice Prior to PO Intake: clear     Bedside Swallow Eval:  Consistencies Assessed: Thin liquids ice chip x1, via spoon x1, via cup sip x1, Nectar thick liquids via cup sip x1 and Puree bites x2  Oral Phase: WFL  Pharyngeal Phase: coughing following cup sips of thin & nectar and multiple spontaneous swallows following all trials all consistencies    Additional Treatment:  recs reviewed with pt & spouse      Assessment:  Jorden Shafer is a 71 y.o. male with a medical diagnosis of Chest pain and presents with dysphagia, s/s aspiration & s/s pharyngeal dysphagia    Do you have any cultural, spiritual, Mu-ism conflicts, given your current situation?: none     Discharge recommendations: Discharge Facility/Level Of Care Needs: nursing facility, skilled (Simultaneous filing. User may not have seen previous data.)     Goals:   SLP Goals     Not on file           Plan:   Patient to be seen Therapy Frequency: 5 x/week   Plan of Care expires: 17  Plan of Care reviewed with: patient, spouse (Simultaneous filing. User may not have seen previous data.)  SLP Follow-up?: Yes              Margaret De La Cruz CCC-SLP  2017   [phone]

## 2017-04-26 NOTE — SUBJECTIVE & OBJECTIVE
Interval History:     Chest Pain free.    Review of Systems   All other systems reviewed and are negative.    Objective:     Vital Signs (Most Recent):  Temp: 98.6 °F (37 °C) (04/26/17 1600)  Pulse: 61 (04/26/17 1600)  Resp: 18 (04/26/17 1647)  BP: 127/67 (04/26/17 1600)  SpO2: 96 % (04/26/17 1647) Vital Signs (24h Range):  Temp:  [97.8 °F (36.6 °C)-98.6 °F (37 °C)] 98.6 °F (37 °C)  Pulse:  [58-72] 61  Resp:  [18-20] 18  SpO2:  [86 %-97 %] 96 %  BP: (113-138)/(64-67) 127/67     Weight: 113.9 kg (251 lb)  Body mass index is 38.16 kg/(m^2).    Intake/Output Summary (Last 24 hours) at 04/26/17 1831  Last data filed at 04/26/17 1807   Gross per 24 hour   Intake              440 ml   Output                0 ml   Net              440 ml      Physical Exam   Constitutional: He appears well-nourished.   Cardiovascular: Normal rate.    Pulmonary/Chest: Effort normal.   Abdominal: Soft.   Neurological: He is alert.   Skin: Skin is warm.   Psychiatric: He has a normal mood and affect.   Nursing note and vitals reviewed.      Significant Labs:   BMP:   Recent Labs  Lab 04/25/17  1508 04/26/17  0624   GLU  --  266*   NA  --  137   K  --  3.6   CL  --  103   CO2  --  22*   BUN  --  27*   CREATININE  --  1.1   CALCIUM  --  8.9   MG 1.6  --      CBC:   Recent Labs  Lab 04/24/17  2340   WBC 10.24   HGB 16.0   HCT 45.0          Significant Imaging: None

## 2017-04-26 NOTE — SUBJECTIVE & OBJECTIVE
"Interval HPI:   No hypoglycemia.  BG globally elevated.  Afebrile.     /67 (BP Location: Left arm, Patient Position: Lying, BP Method: Automatic)  Pulse (!) 58  Temp 97.8 °F (36.6 °C) (Oral)   Resp 18  Ht 5' 8" (1.727 m)  Wt 113.9 kg (251 lb)  SpO2 97%  BMI 38.16 kg/m2    Labs Reviewed and Include      Recent Labs  Lab 04/26/17  0624   *   CALCIUM 8.9      K 3.6   CO2 22*      BUN 27*   CREATININE 1.1     Lab Results   Component Value Date    WBC 10.24 04/24/2017    HGB 16.0 04/24/2017    HCT 45.0 04/24/2017    MCV 82 04/24/2017     04/24/2017     No results for input(s): TSH, FREET4 in the last 168 hours.  Lab Results   Component Value Date    HGBA1C 9.3 (H) 04/13/2017       Nutritional status:   Body mass index is 38.16 kg/(m^2).  Lab Results   Component Value Date    ALBUMIN 4.0 04/24/2017    ALBUMIN 3.8 04/13/2017    ALBUMIN 3.2 (L) 04/12/2017     No results found for: PREALBUMIN    Estimated Creatinine Clearance: 74.4 mL/min (based on Cr of 1.1).    Accu-Checks  Recent Labs      04/25/17   0247  04/25/17   0800  04/25/17   1136  04/25/17   1550  04/25/17   2337  04/26/17   0240  04/26/17   0635   POCTGLUCOSE  380*  317*  301*  260*  226*  238*  261*       Current Medications and/or Treatments Impacting Glycemic Control  Immunotherapy:  Immunosuppressants     None        Steroids:   Hormones     None        Pressors:    Autonomic Drugs     None          Transition insulin gtt at 2 u/hr  Moderate correction scale  novolog 4 units ac    Hyperglycemia/Diabetes Medications: Antihyperglycemics     Start     Stop Route Frequency Ordered    04/25/17 1118  insulin regular (Humulin R) 100 Units in sodium chloride 0.9% 100 mL infusion      -- IV Continuous 04/25/17 1121    04/25/17 1220  insulin aspart pen 0-10 Units      -- SubQ As needed (PRN) 04/25/17 1121    04/25/17 1715  insulin aspart pen 4 Units      -- SubQ 3 times daily with meals 04/25/17 9863        "

## 2017-04-26 NOTE — PROGRESS NOTES
"[facility]  Endocrinology  Progress Note    Admit Date: 4/24/2017     Reason for Consult: Management of T2DM, Hyperglycemia , insulin pump management     Surgical Procedure and Date:     Diabetes diagnosis year: ~2002    Home Diabetes Medications:  Medtronic minimed  (settings read via phone per patient's daughter)  Basal: 5 units/hr  ISF 17  I:C 1:4.5  Active insulin time : 4  Target 100-120  Bolus Wizard OFF      How often checking glucose at home? >4 x day   BG readings on regimen: 300-400's for "years"  Hypoglycemia on the regimen?  No  Missed doses on regimen?  Yes - reports does not always count all carbs with meals, does NOT bolus with snacks. Reviewed insulin pump settings on pump via phone and pts daughter reports bolus WIZARD OFF on pump, so concerns if patient is bolusing at all with meals.       Diabetes Complications include:     Hyperglycemia and Diabetic peripheral neuropathy     Complicating diabetes co morbidities:   History of CVA and Residual deficits from CVA       HPI:   Patient is a 71 y.o. male with a diagnosis of T2DM since ~2002. A1c 9.3% upon admission. Followed by Dr. Montiel. On insulin pump therapy for past 5 years, recently changed to Medtronic pump. Per patient report, he does not always bolus with meals, does NOT bolus with snacks. However, post stroke, the patient does have some concerns about managing insulin pump at home.   Chronic medical conditions include CAD, DVT, T2DM with neuropathy, MI, defibrillator placement, CABGx5 in 1989 . He presented to the ED with a complaint of midsternal CP x 5 minutes (tight, squeezing, 6/10), relieved with NTG x1 PTA.     Of note ,pt was discharged from  5 days ago after admission for stroke. + residual left facial droop and left sided weakness.   Endocrine consulted for BG management.       Interval HPI:   No hypoglycemia.  BG globally elevated.  Afebrile.     /67 (BP Location: Left arm, Patient Position: Lying, BP " "Method: Automatic)  Pulse (!) 58  Temp 97.8 °F (36.6 °C) (Oral)   Resp 18  Ht 5' 8" (1.727 m)  Wt 113.9 kg (251 lb)  SpO2 97%  BMI 38.16 kg/m2    Labs Reviewed and Include      Recent Labs  Lab 04/26/17  0624   *   CALCIUM 8.9      K 3.6   CO2 22*      BUN 27*   CREATININE 1.1     Lab Results   Component Value Date    WBC 10.24 04/24/2017    HGB 16.0 04/24/2017    HCT 45.0 04/24/2017    MCV 82 04/24/2017     04/24/2017     No results for input(s): TSH, FREET4 in the last 168 hours.  Lab Results   Component Value Date    HGBA1C 9.3 (H) 04/13/2017       Nutritional status:   Body mass index is 38.16 kg/(m^2).  Lab Results   Component Value Date    ALBUMIN 4.0 04/24/2017    ALBUMIN 3.8 04/13/2017    ALBUMIN 3.2 (L) 04/12/2017     No results found for: PREALBUMIN    Estimated Creatinine Clearance: 74.4 mL/min (based on Cr of 1.1).    Accu-Checks  Recent Labs      04/25/17   0247  04/25/17   0800  04/25/17   1136  04/25/17   1550  04/25/17   2337  04/26/17   0240  04/26/17   0635   POCTGLUCOSE  380*  317*  301*  260*  226*  238*  261*       Current Medications and/or Treatments Impacting Glycemic Control  Immunotherapy:  Immunosuppressants     None        Steroids:   Hormones     None        Pressors:    Autonomic Drugs     None          Transition insulin gtt at 2 u/hr  Moderate correction scale  novolog 4 units ac    Hyperglycemia/Diabetes Medications: Antihyperglycemics     Start     Stop Route Frequency Ordered    04/25/17 1118  insulin regular (Humulin R) 100 Units in sodium chloride 0.9% 100 mL infusion      -- IV Continuous 04/25/17 1121    04/25/17 1220  insulin aspart pen 0-10 Units      -- SubQ As needed (PRN) 04/25/17 1121    04/25/17 1715  insulin aspart pen 4 Units      -- SubQ 3 times daily with meals 04/25/17 1612          ASSESSMENT and PLAN    Type 2 diabetes mellitus with hyperglycemia, with long-term current use of insulin  BG goal 140-180.     Increase transition " infusion to 2.4 u/hr (by 20%)  BG monitoring ac/hs/0200  Moderate dose correction scale  NPO this am for angiogram.     DISCHARGE PLANNING:  Tentative plan for d/c to SNF when medically stable   1. Recommend to discharge on TBD (will need to assess pts ability to use pump before discharge with pump post stroke) Needs rx? No, reports he has all pump supplies, insulin at home - just needs to bring to the hospital   2. Needs glucometer and supplies? No  3. Follow up with PCP or endocrine?  Endo Dr. Montiel      4. Needs insulin pen training or BG meter training?  TBD, may need MDI refresher if cannot use pump at home         * Chest pain  Per primary      History of CVA (cerebrovascular accident)  CVA, [facility] 4[facility]2017  Residual deficit may limit full insulin pump usage, will need to assess when pump at bedside      Insulin pump status  Home insulin pump currently off, family instructed to bring to bedside, will need to assess pts ability to use pump before discharge    Obesity (BMI 30-39.9)  Body mass index is 38.16 kg/(m^2). may increase insulin resistance      Type 2 diabetes mellitus with diabetic polyneuropathy, with long-term current use of insulin  See above        QING Garza, FNP  Endocrinology  [facility]

## 2017-04-26 NOTE — PLAN OF CARE
Problem: Physical Therapy Goal  Goal: Physical Therapy Goal  Goals to be met by: 2017     Patient will increase functional independence with mobility by performin. Sit to stand transfer with Modified [place]  2. Gait x 200 feet with Modified [place] using Single-point Cane .   3. Ascend/descend 3 stair with right Handrails Modified [place] using Single-point Cane .   4. Stand for 15 minutes with Modified [place] using Single-point Cane   Outcome: Ongoing (interventions implemented as appropriate)  Goals established this day

## 2017-04-26 NOTE — PROGRESS NOTES
[facility] Hospital Medicine  Progress Note    Patient Name: Jorden Shafer  MRN: [id number]  Patient Class: IP- Inpatient   Admission Date: 4/24/2017  Length of Stay: 0 days  Attending Physician: Gera Rios MD  Primary Care Provider: Neha Plasencia MD    [facility] Medicine Team: [facility] G Gera Rios MD    Subjective:     Principal Problem:Chest pain    HPI:       Hospital Course:       Interval History:     Chest Pain free.    Review of Systems   All other systems reviewed and are negative.    Objective:     Vital Signs (Most Recent):  Temp: 98.6 °F (37 °C) (04/26/17 1600)  Pulse: 61 (04/26/17 1600)  Resp: 18 (04/26/17 1647)  BP: 127/67 (04/26/17 1600)  SpO2: 96 % (04/26/17 1647) Vital Signs (24h Range):  Temp:  [97.8 °F (36.6 °C)-98.6 °F (37 °C)] 98.6 °F (37 °C)  Pulse:  [58-72] 61  Resp:  [18-20] 18  SpO2:  [86 %-97 %] 96 %  BP: (113-138)/(64-67) 127/67     Weight: 113.9 kg (251 lb)  Body mass index is 38.16 kg/(m^2).    Intake/Output Summary (Last 24 hours) at 04/26/17 1831  Last data filed at 04/26/17 1807   Gross per 24 hour   Intake              440 ml   Output                0 ml   Net              440 ml      Physical Exam   Constitutional: He appears well-nourished.   Cardiovascular: Normal rate.    Pulmonary/Chest: Effort normal.   Abdominal: Soft.   Neurological: He is alert.   Skin: Skin is warm.   Psychiatric: He has a normal mood and affect.   Nursing note and vitals reviewed.      Significant Labs:   BMP:   Recent Labs  Lab 04/25/17  1508 04/26/17  0624   GLU  --  266*   NA  --  137   K  --  3.6   CL  --  103   CO2  --  22*   BUN  --  27*   CREATININE  --  1.1   CALCIUM  --  8.9   MG 1.6  --      CBC:   Recent Labs  Lab 04/24/17  2340   WBC 10.24   HGB 16.0   HCT 45.0          Significant Imaging: None    Assessment/Plan:      * Chest pain  Continue DAPT  Pending Stress Test  Cards following  NPO at midnight      Type 2 diabetes mellitus with  hyperglycemia, with long-term current use of insulin  Continue Insulin infusion and SSi per endocrinology      VTE Risk Mitigation         Ordered     Medium Risk of VTE  Once      04/25/17 0352     Place sequential compression device  Until discontinued      04/25/17 0352          Gera Rios MD  Department of Hospital Medicine   [facility]

## 2017-04-26 NOTE — ASSESSMENT & PLAN NOTE
CVA, discharged from [facility] 4/2017  Residual deficit may limit full insulin pump usage, will need to assess when pump at bedside

## 2017-04-26 NOTE — PLAN OF CARE
Patient has been accepted by [facility] and they have put in for [employer] auth. Daughter, Jessika, notified.

## 2017-04-26 NOTE — PT/OT/SLP EVAL
Physical Therapy  Evaluation    Jorden Shafer   MRN: [id number]   Admitting Diagnosis: Chest pain    PT Received On: 04/26/17  PT Start Time: 1040     PT Stop Time: 1059    PT Total Time (min): 19 min       Billable Minutes:  Evaluation 19    Diagnosis: Chest pain  Impaired mobility    Past Medical History:   Diagnosis Date    Back pain     CAD (coronary artery disease) 1989    h/o cabg x 5    Chronic kidney disease     Colon polyp     h/o on colonoscopy    Contact dermatitis 11/12/2013    CVA (cerebral vascular accident)     March 2010    Depression     Diabetes mellitus type II     on  insulin pump    DVT (deep venous thrombosis) 1980's    left leg    GERD (gastroesophageal reflux disease)     Heart failure     [facility]     Hyperlipidemia     Hypertension     Hypertensive heart disease without congestive heart failure     Insulin pump status 4/25/2017    Myocardial infarction     Neuropathy of lower extremity     Obesity (BMI 30-39.9) 4/25/2017    Prostate cancer     Renal manifestation of secondary diabetes mellitus     Sleep apnea     Stroke 4/21/2017    Type 2 diabetes mellitus with diabetic polyneuropathy, with long-term current use of insulin 4/25/2017    Type 2 diabetes mellitus with hyperglycemia, with long-term current use of insulin 9/26/2012    Currently sees Dr. Britton     Unstable angina     Vitamin D deficiency disease       Past Surgical History:   Procedure Laterality Date    CARDIAC DEFIBRILLATOR PLACEMENT  06/16/2016    CHOLECYSTECTOMY      CORONARY ARTERY BYPASS GRAFT      bypass times  5    defibulater  06/16/2016    EYE SURGERY Bilateral     HERNIA REPAIR      groin x2  and umbilical    JOINT REPLACEMENT      knee left    thrombus Left     >2005    VASECTOMY         Referring physician: Gera Rios MD  Date referred to PT: 4/26/2017    General Precautions: Standard, fall, aspiration, nectar thick  Orthopedic Precautions: N/A   Braces: N/A       Do you have any  "cultural, spiritual, Restorationist conflicts, given your current situation?: none    Patient History:  Lives With: spouse  Living Arrangements: house  Home Accessibility: stairs to enter home  Home Layout: Able to live on 1st floor  Number of Stairs to Enter Home: 3  Stair Railings at Home: outside, present on right side  Transportation Available: family or friend will provide  Living Environment Comment: Lives in single level home with 3 steps to  enter with wife. Pt had been independent with all mobility until stroke less than 1 month ago.  Equipment Currently Used at Home: bedside commode, bath bench, walker, rolling, cane, straight  DME owned (not currently used): rolling walker    Previous Level of Function:  Ambulation Skills: independent (Recent CVA < 1month, Prior to CVA pt Independent with all mobility)  Transfer Skills: independent  ADL Skills: independent  Work/Leisure Activity: independent    Subjective:  Communicated with nursing, Ma prior to session.  "I'm glad y'all are here"  Chief Complaint: residual weakness and impaired fine motor in left UE and LE.  Patient goals: PLOF, Rehab then home    Pain Ratin/10                    Objective:   Patient found with: peripheral IV     Cognitive Exam:  Oriented to: Person, Place, Time and Situation    Follows Commands/attention: Follows multistep  commands  Communication: clear/fluent  Safety awareness/insight to disability: impaired    Physical Exam:  Postural examination/scapula alignment: Rounded shoulder and Head forward    Skin integrity: Thin and Dry  Edema: Mild MINI LE    Sensation:   Polyneuropathy in MINI feet and R>L hand    Upper Extremity Range of Motion:  Right Upper Extremity: WNL  Left Upper Extremity: WFL increased time to gain full ROM      Lower Extremity Range of Motion:  Right Lower Extremity: WFL  Left Lower Extremity: WFL    Lower Extremity Strength: RLE 5/5, LLE 4+/5  Right Lower Extremity: WFL  Left Lower Extremity: WFL     Fine motor " coordination:  Impaired  Left hand, finger to nose mild dysmetria and Left hand thumb/finger opposition skills slow with mild dysmetria      Functional Mobility:  Bed Mobility:  Rolling/Turning to Left: Stand by assistance  Rolling/Turning Right: Stand by assistance  Scooting/Bridging: Stand by Assistance  Supine to Sit: Stand by Assistance  Sit to Supine: Stand by Assistance    Transfers:  Sit <> Stand Assistance: Contact Guard Assistance  Sit <> Stand Assistive Device: Straight Cane    Gait:   Gait Distance: 50' with SPC CGA with decreased weight shift  Assistance 1: Contact Guard Assistance  Gait Assistive Device: Single point cane  Gait Pattern: 3-point gait  Gait Deviation(s): decreased nayeli, decreased velocity of limb motion, increased time in double stance, decreased step length, decreased toe-to-floor clearance, decreased swing-to-stance ratio, decreased weight-shifting ability    Balance:   Static Sit: GOOD-: Takes MODERATE challenges from all directions but inconsistently  Dynamic Sit: GOOD-: Maintains balance through MODERATE excursions of active trunk movement,     Static Stand: FAIR+: Takes MINIMAL challenges from all directions  Dynamic stand: FAIR: Needs CONTACT GUARD during gait    Therapeutic Activities and Exercises:  Evaluation, education on LOC, role of PT, gait and transfer training,education on safety in facility, call for assist with  All mobility    AM-PAC 6 CLICK MOBILITY  How much help from another person does this patient currently need?   1 = Unable, Total/Dependent Assistance  2 = A lot, Maximum/Moderate Assistance  3 = A little, Minimum/Contact Guard/Supervision  4 = None, Modified Harmon/Independent    Turning over in bed (including adjusting bedclothes, sheets and blankets)?: 4  Sitting down on and standing up from a chair with arms (e.g., wheelchair, bedside commode, etc.): 3  Moving from lying on back to sitting on the side of the bed?: 3  Moving to and from a bed to a  chair (including a wheelchair)?: 3  Need to walk in hospital room?: 3  Climbing 3-5 steps with a railing?: 2  Total Score: 18     AM-PAC Raw Score CMS G-Code Modifier Level of Impairment Assistance   6 % Total / Unable   7 - 9 CM 80 - 100% Maximal Assist   10 - 14 CL 60 - 80% Moderate Assist   15 - 19 CK 40 - 60% Moderate Assist   20 - 22 CJ 20 - 40% Minimal Assist   23 CI 1-20% SBA / CGA   24 CH 0% Independent/ Mod I     Patient left up in chair with all lines intact, call button in reach and nursing notified.    Assessment:   Jorden Shafer is a 71 y.o. male with a medical diagnosis of Chest pain and presents with impaired mobility, decreased safety and independence with all functional mobility.    Rehab identified problem list/impairments: Rehab identified problem list/impairments: weakness, impaired endurance, impaired sensation, impaired self care skills, impaired functional mobilty, gait instability, impaired balance, decreased coordination, decreased upper extremity function, decreased lower extremity function, decreased safety awareness, impaired fine motor, impaired cardiopulmonary response to activity    Rehab potential is excellent.    Activity tolerance: Good    Discharge recommendations: Discharge Facility/Level Of Care Needs: nursing facility, skilled     Barriers to discharge: Barriers to Discharge: None    Equipment recommendations:       GOALS:   Physical Therapy Goals        Problem: Physical Therapy Goal    Goal Priority Disciplines Outcome Goal Variances Interventions   Physical Therapy Goal     PT/OT, PT Ongoing (interventions implemented as appropriate)     Description:  Goals to be met by: 2017     Patient will increase functional independence with mobility by performin. Sit to stand transfer with Modified [place]  2. Gait  x 200 feet with Modified [place] using Single-point Cane .   3. Ascend/descend 3 stair with right Handrails Modified [place] using  Single-point Cane .   4. Stand for 15 minutes with Modified [place] using Single-point Cane                 PLAN:    Patient to be seen 4 x/week to address the above listed problems via gait training, therapeutic activities, therapeutic exercises, neuromuscular re-education  Plan of Care expires: 05/26/17  Plan of Care reviewed with: patient          Monica Adairon, PT  04/26/2017

## 2017-04-26 NOTE — CONSULTS
Interventional Cardiology Consult           Referring Physician:  Gera Rios MD  Indication:Unstable angina    HPI:   This is a 70 yo WM with PMH CAD s/p CABG x 5 in 1989, last cath 06/2016; VENKATA to SVG diagonal/LAD, DVT, ICM in 2016 s/p ICD EF now improved to 45% from 30%, DM II, HTN, recent R MCA stroke with admission to [facility] 04/12-4/19 and was treated medically w DAPT (presented with dysarthria, L-facial droop, now has some residual L-sided weakness). A CTA was performed at the time showing a critical stenosis (greater than 90%) of the right carotid bifurcation with associated adaptive narrowing and diminished enhancement of the distal right ICA. Now the patient presents as a transfer from [facility] for chest pain which started Monday night. His chest pain was typical for angina, similar to previous episodes of angina, substernal, pressure-like, lasting 5 mins, relieved with 1 SL NTG, no associated symptoms, and no recurrence. The patient was taken by his wife to the ED and transferred here for [facility]. Currently the patient has no active complaints and has been chest pain free. Troponins are flat but 1st set was positive and all troponins were drawn over a 5 hr period rather than 8 hours apart. No new EKG changes.      Past Medical History:   Diagnosis Date    Back pain     CAD (coronary artery disease) 1989    h/o cabg x 5    Chronic kidney disease     Colon polyp     h/o on colonoscopy    Contact dermatitis 11/12/2013    CVA (cerebral vascular accident)     March 2010    Depression     Diabetes mellitus type II     on  insulin pump    DVT (deep venous thrombosis) 1980's    left leg    GERD (gastroesophageal reflux disease)     Heart failure     [facility]     Hyperlipidemia     Hypertension     Hypertensive heart disease without congestive heart failure     Insulin pump status 4/25/2017    Myocardial infarction     Neuropathy of lower extremity     Obesity (BMI 30-39.9) 4/25/2017    Prostate cancer      Renal manifestation of secondary diabetes mellitus     Sleep apnea     Stroke 2017    Type 2 diabetes mellitus with diabetic polyneuropathy, with long-term current use of insulin 2017    Type 2 diabetes mellitus with hyperglycemia, with long-term current use of insulin 2012    Currently sees Dr. Britton     Vitamin D deficiency disease      Past Surgical History:   Procedure Laterality Date    CARDIAC DEFIBRILLATOR PLACEMENT  2016    CHOLECYSTECTOMY      CORONARY ARTERY BYPASS GRAFT      bypass times  5    defibulater  2016    EYE SURGERY Bilateral     HERNIA REPAIR      groin x2  and umbilical    JOINT REPLACEMENT      knee left    thrombus Left     >2005    VASECTOMY       Family History   Problem Relation Age of Onset    Alzheimer's disease Mother      Living    Heart disease Mother     Dementia Mother     Asbestos Father          Cancer Father     Diabetes type II Maternal Aunt     Stroke Neg Hx      Social History   Substance Use Topics    Smoking status: Former Smoker     Packs/day: 4.00     Years: 25.00     Types: Cigarettes    Smokeless tobacco: Never Used    Alcohol use 0.0 oz/week     0 Standard drinks or equivalent per week      Comment: occasional     Review of patient's allergies indicates:  No Known Allergies   carvedilol  12.5 mg Oral BID    citalopram  10 mg Oral Daily    clopidogrel  75 mg Oral Daily    furosemide  20 mg Oral Daily    insulin aspart  4 Units Subcutaneous TIDWM    isosorbide mononitrate  60 mg Oral Daily    omega-3 acid ethyl esters  2 g Oral TID    pantoprazole  40 mg Oral Daily    rosuvastatin  40 mg Oral Daily    sodium chloride 0.9%  3 mL Intravenous Q8H       Review of Systems - History obtained from chart review and the patient  General ROS: negative  Respiratory ROS: no cough, shortness of breath, or wheezing  Cardiovascular ROS: positive for - chest pain  negative for - dyspnea on exertion, edema, murmur,  orthopnea or palpitations  Gastrointestinal ROS: no abdominal pain, change in bowel habits, or black or bloody stools    OBJECTIVE:     Vitals:    04/25/17 1900 04/25/17 2029 04/25/17 2300 04/26/17 0300   BP:  136/64     Pulse: 65 63 64 (!) 59   Resp:  20     Temp:  98 °F (36.7 °C)     TempSrc:  Oral     SpO2:  96%     Weight:       Height:           General appearance: alert, appears stated age and cooperative  Head: Normocephalic, without obvious abnormality, atraumatic  Neck: no adenopathy, no carotid bruit, no JVD, supple, symmetrical, trachea midline and thyroid not enlarged, symmetric, no tenderness/mass/nodules  Lungs:  clear to auscultation bilaterally  Heart: regular rate and rhythm, S1, S2 normal, no murmur, click, rub or gallop  Abdomen: soft, non-tender; bowel sounds normal; no masses,  no organomegaly  Extremities: extremities normal, atraumatic, no cyanosis or edema  Neurologic: Cranial nerves: VII: lower facial muscle function reduced on the left  Motor:grade 3 L-shoulder  grade 4 L-hip    Vascular:   LEFT RIGHT   RADIAL +2 +2   ULNAR +2 +2   BRACHIAL +2 +2   FEMORAL +2 +2   DP +2 +2   TP +2 +2   MO'S TEST WNL WNL     LABS:     CBC with Diff:     Recent Labs  Lab 04/24/17 2340   WBC 10.24   HGB 16.0   HCT 45.0      LYMPH 22.4  2.3   MONO 5.4  0.6   EOSINOPHIL 1.5       COAG:    Recent Labs  Lab 04/24/17 2340   INR 1.1       CMP:   Recent Labs  Lab 04/24/17 2340 04/25/17  1508   *  --    CALCIUM 8.8  --    ALBUMIN 4.0  --    PROT 7.4  --    *  --    K 3.7  --    CO2 22*  --    CL 94*  --    BUN 26*  --    CREATININE 1.4  --    ALKPHOS 98  --    ALT 27  --    AST 22  --    BILITOT 1.3*  --    MG  --  1.6     Estimated Creatinine Clearance: 58.4 mL/min (based on Cr of 1.4).    .  Recent Labs  Lab 04/24/17 2340 04/25/17  0304 04/25/17  0416   TROPONINI 0.027* 0.026 0.027*   BNP 78  --   --        IMAGING:   Telemetry: NSR    EKGs: NSR IRBBB, anterior infarct old    CXR: WNL  ICD present    TTE: CONCLUSIONS     1 - Mildly depressed left ventricular systolic function (EF 45-50%).  Cannot exclude distal anteroapical hypokinesis.     2 - Mild left ventricular enlargement.     3 - Eccentric hypertrophy.     4 - Impaired LV relaxation, elevated LAP (grade 2 diastolic dysfunction).     5 - Trivial tricuspid regurgitation.     6 - The estimated PA systolic pressure is 30 mmHg.       Prior Ischemic Evaluation: Diagnostic: 06/30/16          Patient has a right dominant coronary artery.        - Left Main Coronary Artery:             The LM has a 30% stenosis. There is ROBY 3 flow. 30% ostial stenosis     - Left Anterior Descending Artery:             The LAD has chronic total occlusion. There is ROBY 0 flow. occluded in the mid-vessel, after giving 2 septals and 2 medium size diagonals D1 and D2     - Left Circumflex Artery:             The LCX has chronic total occlusion. There is ROBY 0 flow. ostial flush occlusion     - Right Coronary Artery:             The RCA has chronic total occlusion. There is ROBY 0 flow. occluded in the early mid vessel, after giving conus branch     - LIMA To LAD:             The LIMA to LAD is normal. There is ROBY 3 flow. LIMA to LAD is attached to the LAD proximally, before the occlusion. It feeds the proximal portion of the LAD and competes with the native flow, which is not significantly diseased     - SVG To RCA:             The SVG to RCA has chronic total occlusion. There is ROBY 0 flow.     - SVG To LAD:             The ostial SVG to LAD has a 90% stenosis. There is ROBY 3 flow.                     Lesion Details:   This is a Type C lesion.  This is a jump graft , attached to a large 3rd diagonal D3 and to the LAD. The ostium has   85% stenosis. The LAD portion of the jump is totally occluded (occludes past its attachement with D3)     - SVG To OM2:             The SVG to OM2 has luminal irregularities. There is ROBY 3 flow. It supplies OM2, which is  diffusely diseased (medium territory)     IMPRESSION / PLAN:     // NSTEMI in a 70 yo WM with Hx of  CAD s/p CABG x 5 in 1989, last cath 06/2016; VENKATA to SVG diagonal/LAD, DVT, ICM in 2016 s/p ICD EF now improved to 45% from 30%, DM II, HTN, recent R MCA stroke, severe R-internal carotid stenosis    - would recommend PET stress at this time  - patient is high risk for repeat stroke during LHC in the setting of recent stroke, would recommend risk stratification with stress test  - consents for LHC, consents signed and in chart  - on ASA / Plavix  - as per neurology it is ok to use heparin products post recent stroke  - has severe symptomatic carotid stenosis, is a candidate for R-carotid PTAS    Faizan Sheehan MD  Cardiovascular Disease Fellow  PGY - VI  Pager: [phone]

## 2017-04-26 NOTE — PROGRESS NOTES
Cardiology Consults  Progress Note                                                              Team: [facility]OSP MED G     Patient Name: Jorden Shafer   YOB: 1945  Location: [address] [place] [address] A    Admit Date: 4/24/2017                     LOS: 0    SUBJECTIVE     INTERVAL HISTORY: YAMEL YANAS, denies chest pain, states he has a little SOB but satting well.      HPI:   Mr. Jorden Shafer is a 71 y.o. male with PMHx significant for CAD (CABG x 5 1989; last cath 06/2016; diffusely occluded native arteries, bypass vessels partially occluded, VENKATA to SVG diagonal/LAD), diastolic HF with preserved EF (last echo 04/17/2017; EF45%, DD), MI with defibrillator placement 06/2016, DM II, HLD, HTN and R MCA stroke with admission to [facility]- 04/12-4/19 who presented to the [facility] ED 4/25 complaining of mid sternal 6/10 chest pain described as tightness that occurred at rest, lasted 5 minutes, and  relieved by 1 NTG, ASA and plavix. He denies dizziness, SOB and light headedness at that time. He denies pain migration, nausea and vomiting. Denies diaphoresis.      On admission, trop noted to be 0.027-->0.026-->0.027, though these labs were spaced 1-3 hrs apart.      EKG unremarkable.      Of note, patient had similar presentation in 06/2016 prompting above mentioned cath lab procedure. Last stress echo 04/2016      Impression: ABNORMAL MYOCARDIAL PERFUSION  1. The perfusion scan is free of evidence for myocardial ischemia.   2. There is a moderate fixed defect of severe intensity in the  anteroapical wall of the left ventricle and moderate fixed defect of severe intensity in the  inferoapical wall of the left ventricle  3. There is abnormal wall motion at rest showing akinesis of the anteroapical wall of the left ventricle.   4. There is resting LV dysfunction with a reduced ejection fraction of 36 %.   5. The ventricular volumes are normal at rest and stress.   6. The extracardiac distribution of radioactivity is normal.   7.  "When compared to the previous study from 10/07/2014, similar perfusion findings noted.  LV function further reduced.    REVIEW OF SYSTEMS:  General: No syncope, fatigue, fevers, chills, nausea, or vomiting  HEENT: No HAs; No change in vision or pallor  Cardiac: No angina, palpitations, orthopnea, or PND  Pulmonary: No dyspnea, cough, hemoptysis, or wheezing  GI: No abdominal distention, pain, constipation, or diarrhea  : No dysuria, urgency, frequency, hematuria  Hematologic/Lymphatic: No night sweats, easy bruising, or LAD  Extremities: No claudication, arthralgias, or myalgias  Derm: No cyanosis or rash  Neuro: No focal weakness or numbness      MEDICATIONS:  Scheduled:   carvedilol  12.5 mg Oral BID    citalopram  10 mg Oral Daily    clopidogrel  75 mg Oral Daily    furosemide  20 mg Oral Daily    insulin aspart  4 Units Subcutaneous TIDWM    isosorbide mononitrate  60 mg Oral Daily    omega-3 acid ethyl esters  2 g Oral TID    pantoprazole  40 mg Oral Daily    rosuvastatin  40 mg Oral Daily    sodium chloride 0.9%  3 mL Intravenous Q8H     Continuous:   insulin (HUMAN R) infusion (adults) 2 Units/hr (04/25/17 1236)     PRN:  acetaminophen, dextrose 50%, dextrose 50%, glucagon (human recombinant), glucose, glucose, insulin aspart      OBJECTIVE:     VITALS  Most Recent Range (Last 24H)   /64  Pulse 68  Temp 98.4 °F (36.9 °C) (Oral)   Resp 20  Ht 5' 8" (1.727 m)  Wt 113.9 kg (251 lb)  SpO2 96%  BMI 38.16 kg/m2 Temp:  [97.6 °F (36.4 °C)-98.5 °F (36.9 °C)]   Pulse:  [58-68]   Resp:  [18-20]   BP: (109-145)/(64-72)   SpO2:  [95 %-97 %]      Intake and Output  BMI     Intake/Output Summary (Last 24 hours) at 04/26/17 0725  Last data filed at 04/25/17 1800   Gross per 24 hour   Intake              560 ml   Output                0 ml   Net              560 ml       Net I/O since admission: Body mass index is 38.16 kg/(m^2).        PHYSICAL EXAM  General: AAOx3, NAD;  HEENT: NCAT; No conj. " injection, pallor, or icterus; No xanthelasma   Neck: Supple; Trachea midline; No JVD; No bruits;   Cardiac: RRR, +S1 & S2; No M/R/G; PMI non-displaced; No thrills or heave   Pulmonary: CTAB; No W/R/R;  Abdominal: Soft, NT/ND +Normoactive BS; No organomegaly; No bruits or pulsatile masses  /Rectal: deferred   Extremities: No clubbing, cyanosis, or edema  Skin: No bruising, rash, ulceration, xanthomata, or splinter hemorrhages present  Neurological: No focal motor or sensory defects; PERRLA, EOMI      LABS  CBC/Anemia Labs Coag Labs     Recent Labs  Lab 04/24/17  2340   WBC 10.24   HGB 16.0   HCT 45.0   MCV 82       Lab Results   Component Value Date    INR 1.1 04/24/2017    INR 1.1 04/12/2017    INR 1.0 06/29/2016        BMP     Recent Labs  Lab 04/24/17  2340   *   *   K 3.7   CL 94*   CO2 22*   BUN 26*   CREATININE 1.4   CALCIUM 8.8      Mag & Phos LFTs     Recent Labs  Lab 04/25/17  1508   MG 1.6      Recent Labs  Lab 04/24/17  2340   PROT 7.4   BILITOT 1.3*   ALKPHOS 98   AST 22   ALT 27             Cardiac Enzymes Ejection Fractions/BNP     Recent Labs  Lab 04/24/17  2340 04/25/17  0304 04/25/17  0416   TROPONINI 0.027* 0.026 0.027*    EF   Date Value Ref Range Status   04/13/2017 45 55 - 65    06/30/2016 30 (A) 55 - 65    10/07/2014 55 55 - 65        Recent Labs  Lab 04/24/17  2340   BNP 78        Lab Results   Component Value Date    HGBA1C 9.3 (H) 04/13/2017         Microbiology Results (last 7 days)     ** No results found for the last 168 hours. **          INVESTIGATION RESULTS    Imaging Results         X-Ray Chest PA And Lateral (Final result) Result time:  04/25/17 00:05:49    Final result by Lidia Mcclelland MD (04/25/17 00:05:49)    Impression:         Findings suggesting congestive change and bilateral pulmonary edema, slightly more prominent than on previous examination, correlation recommended.  No large focal consolidation..          Electronically signed by: LIDIA  DOROTHY PRAKASH  Date:     04/25/17  Time:    00:05     Narrative:    Chest PA and lateral    Indication:Chest pain    Comparison:4/12/2017    Findings: The examination is limited by body habitus.  Left chest wall pacer stable. The cardiomediastinal silhouette is prominent, similar to previous exam noting postsurgical changes and calcification of the aortic arch.  There is no pleural effusion.  The trachea is midline.  The lungs are symmetrically expanded bilaterally with patchy increased interstitial and parenchymal attenuation bilaterally, noting a bilateral lower lung zone predominance. No large focal consolidation seen.  There is no pneumothorax.  The osseous structures are remarkable for degenerative changes of the spine and shoulders.            Diagnostic Results:  Cardiac Cath 06/2016  1.   Totally occluded native arteries, except for the proximal portion of the LAD/diagonals and septals.   2.   -SVG-to -RCA is occluded.   3.   -SVG-to-OM is patent.   4.   -LIMA-to-LAD is patent but is attached to the proximal LAD before the occlusion, and only feeds this proximal portion.   5.   -Jump graft to diagonal 3+LAD has heavy athrerosclerosis, ostial 85% stenosis, and occlusion of the jump portion that supplies the distal LAD.   6.   We treated the ostium of this SVG-diagonal/LAD using one drug-eluting stent.     Stress Echo 04/2016     Impression: ABNORMAL MYOCARDIAL PERFUSION  1. The perfusion scan is free of evidence for myocardial ischemia.   2. There is a moderate fixed defect of severe intensity in the  anteroapical wall of the left ventricle and moderate fixed defect of severe intensity in the  inferoapical wall of the left ventricle  3. There is abnormal wall motion at rest showing akinesis of the anteroapical wall of the left ventricle.   4. There is resting LV dysfunction with a reduced ejection fraction of 36 %.   5. The ventricular volumes are normal at rest and stress.   6. The extracardiac distribution of  radioactivity is normal.   7. When compared to the previous study from 10/07/2014, similar perfusion findings noted.  LV function further reduced.    Bilateral Carotid US 4/12/2017  Occlusion of the mid and proximal segments of the right ICA.  Retrograde flow within the right vertebral artery.    Elevated velocities in the left ICA with peak systolic velocity of 279 cm/s in the left distal ICA with associated ICA to CCA ratio of 2.8, corresponding to greater than 50-69% luminal narrowing.    Abnormal retrograde flow within the right vertebral artery and bidirectional flow within the left vertebral artery.    Heterogeneous plaque throughout the carotid systems, more so on the left.    ASSESSMENT/PLAN:     Current Problems List:  Active Hospital Problems    Diagnosis  POA    *Chest pain [R07.9]  Yes    Insulin pump status [Z96.41]  Not Applicable     Chronic    Obesity (BMI 30-39.9) [E66.9]  Yes     Chronic    Type 2 diabetes mellitus with diabetic polyneuropathy, with long-term current use of insulin [E11.42, Z79.4]  Not Applicable     Chronic    Stenosis of right carotid artery [I65.21]  Yes     Chronic    History of CVA (cerebrovascular accident) [Z86.73]  Not Applicable     Chronic    Chronic combined systolic and diastolic heart failure [I50.42]  Yes     Chronic    Essential hypertension [I10]  Yes     Chronic    Hyperlipidemia [E78.5]  Yes     Chronic    CAD s/p CABG & PCI [I25.10]  Yes     Chronic    Type 2 diabetes mellitus with hyperglycemia, with long-term current use of insulin [E11.65, Z79.4]  Not Applicable     Chronic     Currently sees Dr. Britton         Resolved Hospital Problems    Diagnosis Date Resolved POA    Stroke [I63.9] 04/25/2017 Yes    Hypertensive heart disease without congestive heart failure [I11.9] 04/13/2017 Yes     Currently sees Dr. Fitch for cardiology           ASSESSMENT:   71 y.o. year old male with PMHx significant for CAD (CABG x 5 1989; last cath 06/2016; diffusely  occluded native arteries, bypass vessels partially occluded, VENKATA to SVG diagonal/LAD), diastolic HF with preserved EF (last echo 04/17/2017; EF45%, DD), MI with defibrillator placement 06/2016, DM II, HLD, HTN and R MCA stroke with admission to [facility] 04/12-4/19 who presented to the [facility] ED 4/25 complaining of mid sternal 6/10 chest pain described as tightness that occurred at rest, lasted 5 minutes, and  relieved by 1 NTG, ASA and plavix.  Troponin and EKG negative.      PLAN:    Unstable Angina  - chest pain at rest relieved with 1 nitro with extensive cardiac hx  - already on medical therapy, including asa + plavix, coreg, ACEi and statin  - Platelet response to plavix low at 88, which means that patient adequately responds to plavix.  - discussed with stroke team re: pt's ability to receive heparin products; imaging reviewed with Dr. Marin who feels pt safe for heparin    - interventional cards consult placed, plan is to do PET stress instead of cath because of possibility of repeat stroke with [facility] given recent stroke at [facility] 4/19.   - given Cr 1.4 on 4/25, pt received 500cc NS bolus, Cr improved to 1.1 on 4/26    Severe Right Internal Carotid Stenosis  -severe symptomatic carotid stenosis, is a candidate for R-carotid PTAS  -defer management/interventions to interventional cardiology    Thank you for allowing us to participate in the care of this patient.  Will continue to follow with you.    José Luis Cruz MD  PGY-1  Pager: [phone]

## 2017-04-27 ENCOUNTER — OUTPATIENT CASE MANAGEMENT (OUTPATIENT)
Dept: ADMINISTRATIVE | Facility: OTHER | Age: 72
End: 2017-04-27

## 2017-04-27 ENCOUNTER — CLINICAL SUPPORT (OUTPATIENT)
Dept: CARDIOLOGY | Facility: CLINIC | Age: 72
End: 2017-04-27
Payer: MEDICARE

## 2017-04-27 DIAGNOSIS — I25.110 CORONARY ARTERY DISEASE INVOLVING NATIVE CORONARY ARTERY OF NATIVE HEART WITH UNSTABLE ANGINA PECTORIS: ICD-10-CM

## 2017-04-27 PROBLEM — I21.4 NSTEMI (NON-ST ELEVATED MYOCARDIAL INFARCTION): Status: ACTIVE | Noted: 2017-04-27

## 2017-04-27 LAB
ANION GAP SERPL CALC-SCNC: 13 MMOL/L
BASOPHILS # BLD AUTO: 0.06 K/UL
BASOPHILS NFR BLD: 0.7 %
BUN SERPL-MCNC: 21 MG/DL
CALCIUM SERPL-MCNC: 9.4 MG/DL
CHLORIDE SERPL-SCNC: 105 MMOL/L
CO2 SERPL-SCNC: 23 MMOL/L
CREAT SERPL-MCNC: 1.1 MG/DL
DIASTOLIC DYSFUNCTION: NO
DIFFERENTIAL METHOD: NORMAL
EOSINOPHIL # BLD AUTO: 0.1 K/UL
EOSINOPHIL NFR BLD: 1.1 %
ERYTHROCYTE [DISTWIDTH] IN BLOOD BY AUTOMATED COUNT: 13.9 %
EST. GFR  (AFRICAN AMERICAN): >60 ML/MIN/1.73 M^2
EST. GFR  (NON AFRICAN AMERICAN): >60 ML/MIN/1.73 M^2
GLUCOSE SERPL-MCNC: 256 MG/DL
HCT VFR BLD AUTO: 47.7 %
HGB BLD-MCNC: 16.6 G/DL
LYMPHOCYTES # BLD AUTO: 1.9 K/UL
LYMPHOCYTES NFR BLD: 20.8 %
MCH RBC QN AUTO: 28.9 PG
MCHC RBC AUTO-ENTMCNC: 34.8 %
MCV RBC AUTO: 83 FL
MONOCYTES # BLD AUTO: 0.4 K/UL
MONOCYTES NFR BLD: 4.7 %
NEUTROPHILS # BLD AUTO: 6.5 K/UL
NEUTROPHILS NFR BLD: 72.5 %
PLATELET # BLD AUTO: 160 K/UL
PMV BLD AUTO: 11.1 FL
POCT GLUCOSE: 202 MG/DL (ref 70–110)
POCT GLUCOSE: 209 MG/DL (ref 70–110)
POCT GLUCOSE: 213 MG/DL (ref 70–110)
POCT GLUCOSE: 216 MG/DL (ref 70–110)
POCT GLUCOSE: 223 MG/DL (ref 70–110)
POCT GLUCOSE: 250 MG/DL (ref 70–110)
POCT GLUCOSE: 259 MG/DL (ref 70–110)
POCT GLUCOSE: 270 MG/DL (ref 70–110)
POTASSIUM SERPL-SCNC: 4 MMOL/L
RBC # BLD AUTO: 5.74 M/UL
SODIUM SERPL-SCNC: 141 MMOL/L
TB INDURATION 48 - 72 HR READ: 0 MM
WBC # BLD AUTO: 8.95 K/UL

## 2017-04-27 PROCEDURE — 92611 MOTION FLUOROSCOPY/SWALLOW: CPT

## 2017-04-27 PROCEDURE — 99232 SBSQ HOSP IP/OBS MODERATE 35: CPT | Mod: ,,, | Performed by: INTERNAL MEDICINE

## 2017-04-27 PROCEDURE — 99233 SBSQ HOSP IP/OBS HIGH 50: CPT | Mod: ,,, | Performed by: INTERNAL MEDICINE

## 2017-04-27 PROCEDURE — A9502 TC99M TETROFOSMIN: HCPCS | Mod: S$GLB,,, | Performed by: INTERNAL MEDICINE

## 2017-04-27 PROCEDURE — 78452 HT MUSCLE IMAGE SPECT MULT: CPT | Mod: S$GLB,,, | Performed by: INTERNAL MEDICINE

## 2017-04-27 PROCEDURE — 36415 COLL VENOUS BLD VENIPUNCTURE: CPT

## 2017-04-27 PROCEDURE — 99232 SBSQ HOSP IP/OBS MODERATE 35: CPT | Mod: GC,,, | Performed by: INTERNAL MEDICINE

## 2017-04-27 PROCEDURE — 25000003 PHARM REV CODE 250: Performed by: NURSE PRACTITIONER

## 2017-04-27 PROCEDURE — 80048 BASIC METABOLIC PNL TOTAL CA: CPT

## 2017-04-27 PROCEDURE — 99232 SBSQ HOSP IP/OBS MODERATE 35: CPT | Mod: ,,, | Performed by: NURSE PRACTITIONER

## 2017-04-27 PROCEDURE — 63600175 PHARM REV CODE 636 W HCPCS: Performed by: NURSE PRACTITIONER

## 2017-04-27 PROCEDURE — 25000003 PHARM REV CODE 250: Performed by: INTERNAL MEDICINE

## 2017-04-27 PROCEDURE — 99223 1ST HOSP IP/OBS HIGH 75: CPT | Mod: GC,,, | Performed by: PSYCHIATRY & NEUROLOGY

## 2017-04-27 PROCEDURE — 85025 COMPLETE CBC W/AUTO DIFF WBC: CPT

## 2017-04-27 PROCEDURE — 11000001 HC ACUTE MED/SURG PRIVATE ROOM

## 2017-04-27 PROCEDURE — 93015 CV STRESS TEST SUPVJ I&R: CPT | Mod: S$GLB,,, | Performed by: INTERNAL MEDICINE

## 2017-04-27 RX ORDER — NAPROXEN SODIUM 220 MG/1
81 TABLET, FILM COATED ORAL DAILY
Status: DISCONTINUED | OUTPATIENT
Start: 2017-04-27 | End: 2017-05-01 | Stop reason: HOSPADM

## 2017-04-27 RX ORDER — INSULIN ASPART 100 [IU]/ML
5-8 INJECTION, SOLUTION INTRAVENOUS; SUBCUTANEOUS
Status: DISCONTINUED | OUTPATIENT
Start: 2017-04-27 | End: 2017-04-28

## 2017-04-27 RX ORDER — LISINOPRIL 10 MG/1
10 TABLET ORAL DAILY
Status: DISCONTINUED | OUTPATIENT
Start: 2017-04-27 | End: 2017-05-01 | Stop reason: HOSPADM

## 2017-04-27 RX ADMIN — ISOSORBIDE MONONITRATE 60 MG: 30 TABLET, EXTENDED RELEASE ORAL at 12:04

## 2017-04-27 RX ADMIN — HEPARIN SODIUM 5000 UNITS: 5000 INJECTION, SOLUTION INTRAVENOUS; SUBCUTANEOUS at 01:04

## 2017-04-27 RX ADMIN — SODIUM CHLORIDE 2.4 UNITS/HR: 9 INJECTION, SOLUTION INTRAVENOUS at 01:04

## 2017-04-27 RX ADMIN — CITALOPRAM HYDROBROMIDE 10 MG: 10 TABLET ORAL at 12:04

## 2017-04-27 RX ADMIN — HEPARIN SODIUM 5000 UNITS: 5000 INJECTION, SOLUTION INTRAVENOUS; SUBCUTANEOUS at 06:04

## 2017-04-27 RX ADMIN — INSULIN ASPART 4 UNITS: 100 INJECTION, SOLUTION INTRAVENOUS; SUBCUTANEOUS at 01:04

## 2017-04-27 RX ADMIN — ROSUVASTATIN CALCIUM 40 MG: 20 TABLET, FILM COATED ORAL at 12:04

## 2017-04-27 RX ADMIN — HEPARIN SODIUM 5000 UNITS: 5000 INJECTION, SOLUTION INTRAVENOUS; SUBCUTANEOUS at 09:04

## 2017-04-27 RX ADMIN — FUROSEMIDE 20 MG: 20 TABLET ORAL at 12:04

## 2017-04-27 RX ADMIN — INSULIN ASPART 8 UNITS: 100 INJECTION, SOLUTION INTRAVENOUS; SUBCUTANEOUS at 06:04

## 2017-04-27 RX ADMIN — LISINOPRIL 10 MG: 10 TABLET ORAL at 01:04

## 2017-04-27 RX ADMIN — CARVEDILOL 12.5 MG: 12.5 TABLET, FILM COATED ORAL at 12:04

## 2017-04-27 RX ADMIN — Medication 10 ML: at 09:04

## 2017-04-27 RX ADMIN — PANTOPRAZOLE SODIUM 40 MG: 40 TABLET, DELAYED RELEASE ORAL at 12:04

## 2017-04-27 RX ADMIN — ASPIRIN 81 MG CHEWABLE TABLET 81 MG: 81 TABLET CHEWABLE at 01:04

## 2017-04-27 RX ADMIN — Medication 3 ML: at 01:04

## 2017-04-27 RX ADMIN — INSULIN ASPART 8 UNITS: 100 INJECTION, SOLUTION INTRAVENOUS; SUBCUTANEOUS at 02:04

## 2017-04-27 RX ADMIN — INSULIN ASPART 4 UNITS: 100 INJECTION, SOLUTION INTRAVENOUS; SUBCUTANEOUS at 06:04

## 2017-04-27 RX ADMIN — CARVEDILOL 12.5 MG: 12.5 TABLET, FILM COATED ORAL at 09:04

## 2017-04-27 RX ADMIN — INSULIN ASPART 2 UNITS: 100 INJECTION, SOLUTION INTRAVENOUS; SUBCUTANEOUS at 08:04

## 2017-04-27 RX ADMIN — CLOPIDOGREL 75 MG: 75 TABLET, FILM COATED ORAL at 12:04

## 2017-04-27 RX ADMIN — OMEGA-3-ACID ETHYL ESTERS 2 G: 1 CAPSULE, LIQUID FILLED ORAL at 09:04

## 2017-04-27 RX ADMIN — OMEGA-3-ACID ETHYL ESTERS 2 G: 1 CAPSULE, LIQUID FILLED ORAL at 01:04

## 2017-04-27 RX ADMIN — INSULIN ASPART 2 UNITS: 100 INJECTION, SOLUTION INTRAVENOUS; SUBCUTANEOUS at 09:04

## 2017-04-27 NOTE — PLAN OF CARE
Problem: Fall Risk (Adult)  Intervention: Safety Precautions  Pt. Placed in low bed position, call bell in reach, personal items within reach, NPO per order, light adjusted, toileting offered.

## 2017-04-27 NOTE — PROGRESS NOTES
LOS: 1 day     Subjective:  Mr. Shafer angina since admission.  He reports that his dyspnea when lying down is no more than it was a month prior to admission and is relieved when he uses CPAP.  Denies any new symptoms.    Scheduled Meds:   aspirin  81 mg Oral Daily    carvedilol  12.5 mg Oral BID    citalopram  10 mg Oral Daily    clopidogrel  75 mg Oral Daily    furosemide  20 mg Oral Daily    heparin (porcine)  5,000 Units Subcutaneous Q8H    insulin aspart  5-8 Units Subcutaneous TIDWM    isosorbide mononitrate  60 mg Oral Daily    lisinopril  10 mg Oral Daily    omega-3 acid ethyl esters  2 g Oral TID    pantoprazole  40 mg Oral Daily    rosuvastatin  40 mg Oral Daily    sodium chloride 0.9%  3 mL Intravenous Q8H     Continuous Infusions:   insulin (HUMAN R) infusion (adults) 2.8 Units/hr (04/27/17 1229)     PRN Meds:acetaminophen, dextrose 50%, dextrose 50%, glucagon (human recombinant), glucose, glucose, insulin aspart    Objective:   Vital signs in last 24 hours:  Temp:  [97.3 °F (36.3 °C)-98.6 °F (37 °C)] 98.4 °F (36.9 °C)  Pulse:  [56-83] 66  Resp:  [18-20] 18  SpO2:  [92 %-95 %] 93 %  BP: (115-165)/(62-80) 115/62      Physical Exam:  General appearance: alert, Ox3; conversant  HEENT: right carotid bruit, no JVD; O/p clr  Lungs: clear to auscultation bilaterally; normal resp effort  Heart: regular rate and rhythm, S1, S2 +s4 no m/r/h  Abdomen: soft, non-tender; bowel sounds normal; no masses, no organomegaly  Extremities: trace edema bilaterally at ankles  Neurologic: Cranial nerves: VII: lower facial muscle function reduced on the left  Motor:3/5 L-shoulder and 4/5 L-elbow; 4/5 L-hip, L-knee, L-left foot     4/27/17 Nuclear stress test reviewed    Assessment/Plan:    Active Hospital Problems    Diagnosis  POA    *Chest pain [R07.9]  Yes    NSTEMI (non-ST elevated myocardial infarction) [I21.4]  Yes    Unstable angina [I20.0]  Yes    Insulin pump status [Z96.41]  Not Applicable      Chronic    Obesity (BMI 30-39.9) [E66.9]  Yes     Chronic    Type 2 diabetes mellitus with diabetic polyneuropathy, with long-term current use of insulin [E11.42, Z79.4]  Not Applicable     Chronic    Stenosis of right carotid artery [I65.21]  Yes     Chronic    History of CVA (cerebrovascular accident) [Z86.73]  Not Applicable     Chronic    Chronic combined systolic and diastolic heart failure [I50.42]  Yes     Chronic    Essential hypertension [I10]  Yes     Chronic    Hyperlipidemia [E78.5]  Yes     Chronic    Coronary artery disease involving native coronary artery of native heart with unstable angina pectoris [I25.110]  Yes     Chronic    Type 2 diabetes mellitus with hyperglycemia, with long-term current use of insulin [E11.65, Z79.4]  Not Applicable     Chronic     Currently sees Dr. Britton           Imp/Rec:  72 y/o gentleman with mutliple CAD risk factors and right MCA territory CVA 2 weeks ago who presented with a NSTEMI on 4/24/17    1) NSTEMI. The patient denies recurrent angina since admission; he has no heart failure symptoms and appears euvolemic on exam except for trace ankle edema  -agree that cardiac catheterization at this time is high risk in light of the patient's recent stroke  -nuclear stress test today demontrated large zoen of RCA territory ischemia  -agree GDMT for NSTEMI including EC ASA 81mg po qday, Plavix 75mg po qday, Coreg, and Crestor 40mg poq day  -please order TTE to assess LVEF as SPECT today suggested LVEF in 35% range (prior to current admission LVEF=45%); if LVEF <35% then rec LifeVest on discharge     2) ICM. As above  -agree with lisinopril 10mg po qday     3) H/o CVA. patient had recent right MCA stroke and has high grade right carotid stenosis;   -Neurology consult appreciated; case discussed with Dr. HAIR Arenas  -patient is therefore at risk for recurrent CVA and may benefit from right carotid revascularization; with presenting NSTEMI and large zone of myocardail  ischemia on today's stress test, he is at high risk for CEA, but may be a reasonable candidate for carotid artery stenting  -as above agree with Plavix 75mg poq day and Crestor 40mg po day  -will follow-up with patient in clinic in 2-3 weeks to further discuss potential carotid artery stenting    4) DM2. 4/12/17 HgbA1c = 9.3; rec tight glycemic control  -Endocrinology consult appreciated     All of the patient's and his wife's questions were answered.

## 2017-04-27 NOTE — PROGRESS NOTES
[facility] Hospital Medicine  Progress Note    Patient Name: Jorden Shafer  MRN: [id number]  Patient Class: IP- Inpatient   Admission Date: 4/24/2017  Length of Stay: 1 days  Attending Physician: Gera Rios MD  Primary Care Provider: Neha Plasencia MD    [facility] Medicine Team: [facility] HOSP MED G Gera Rios MD    Subjective:     Principal Problem:Chest pain    HPI:       Hospital Course:       Interval History:     Chest pain Free.  Wants to resume diet.    Review of Systems   All other systems reviewed and are negative.    Objective:     Vital Signs (Most Recent):  Temp: 98.4 °F (36.9 °C) (04/27/17 1600)  Pulse: 77 (04/27/17 1600)  Resp: 18 (04/27/17 1600)  BP: 115/62 (04/27/17 1600)  SpO2: (!) 93 % (04/27/17 1600) Vital Signs (24h Range):  Temp:  [97.3 °F (36.3 °C)-98.6 °F (37 °C)] 98.4 °F (36.9 °C)  Pulse:  [56-83] 77  Resp:  [18-20] 18  SpO2:  [92 %-95 %] 93 %  BP: (115-165)/(62-80) 115/62     Weight: 113.9 kg (251 lb)  Body mass index is 38.16 kg/(m^2).    Intake/Output Summary (Last 24 hours) at 04/27/17 1701  Last data filed at 04/27/17 1400   Gross per 24 hour   Intake              560 ml   Output                0 ml   Net              560 ml      Physical Exam   Constitutional: He appears well-developed and well-nourished.   HENT:   Head: Atraumatic.   Cardiovascular: Normal rate.    Pulmonary/Chest: Effort normal.   Abdominal: Soft.   Neurological: He is alert.   Skin: Skin is warm.   Psychiatric: He has a normal mood and affect.   Nursing note and vitals reviewed.      Significant Labs:   BMP:   Recent Labs  Lab 04/27/17  1212   *      K 4.0      CO2 23   BUN 21   CREATININE 1.1   CALCIUM 9.4     CBC:   Recent Labs  Lab 04/27/17  1213   WBC 8.95   HGB 16.6   HCT 47.7          Significant Imaging: None    Assessment/Plan:      * Chest pain  Continue DAPT  Pending Stress Test Read  Cards following  NPO at midnight for possible intervention      Type  2 diabetes mellitus with hyperglycemia, with long-term current use of insulin  Continue Insulin infusion and SSi per endocrinology      Stenosis of right carotid artery  NV consulted regarding possible stenting tomorrow with Interventional      VTE Risk Mitigation         Ordered     heparin (porcine) injection 5,000 Units  Every 8 hours     Route:  Subcutaneous        04/26/17 1833     Medium Risk of VTE  Once      04/25/17 0352     Place sequential compression device  Until discontinued      04/25/17 0352          Gera Rios MD  Department of Hospital Medicine   [facility]

## 2017-04-27 NOTE — PROGRESS NOTES
Pt left the floor for stress test , on stretcher with transporter . Pt is awake ,alert and oriented x 4 . Stable V/S . Pt is NPO , pt on insulin drip .

## 2017-04-27 NOTE — SUBJECTIVE & OBJECTIVE
"Interval HPI:   BG above goal.  No hypoglycemia, afebrile.  On transition insulin gtt overnight.     BP (!) 153/80 (BP Location: Left arm, Patient Position: Lying, BP Method: Automatic)  Pulse 70  Temp 97.3 °F (36.3 °C) (Oral)   Resp 18  Ht 5' 8" (1.727 m)  Wt 113.9 kg (251 lb)  SpO2 95%  BMI 38.16 kg/m2    Labs Reviewed and Include      Recent Labs  Lab 04/27/17  1212   *   CALCIUM 9.4      K 4.0   CO2 23      BUN 21   CREATININE 1.1     Lab Results   Component Value Date    WBC 8.95 04/27/2017    HGB 16.6 04/27/2017    HCT 47.7 04/27/2017    MCV 83 04/27/2017     04/27/2017     No results for input(s): TSH, FREET4 in the last 168 hours.  Lab Results   Component Value Date    HGBA1C 9.3 (H) 04/13/2017       Nutritional status:   Body mass index is 38.16 kg/(m^2).  Lab Results   Component Value Date    ALBUMIN 4.0 04/24/2017    ALBUMIN 3.8 04/13/2017    ALBUMIN 3.2 (L) 04/12/2017     No results found for: PREALBUMIN    Estimated Creatinine Clearance: 74.4 mL/min (based on Cr of 1.1).    Accu-Checks  Recent Labs      04/26/17   0240  04/26/17   0635  04/26/17   1156  04/26/17   1642  04/26/17   2159  04/27/17   0122  04/27/17   0605  04/27/17   0818  04/27/17   1214  04/27/17   1359   POCTGLUCOSE  238*  261*  239*  228*  202*  213*  209*  223*  259*  270*       Current Medications and/or Treatments Impacting Glycemic Control  Immunotherapy:  Immunosuppressants     None        Steroids:   Hormones     None        Pressors:    Autonomic Drugs     None        Transition insulin gtt at 2.4 u/hr, novolog 4 units ac w/ mod dose.  Hyperglycemia/Diabetes Medications: Antihyperglycemics     Start     Stop Route Frequency Ordered    04/25/17 1118  insulin regular (Humulin R) 100 Units in sodium chloride 0.9% 100 mL infusion      -- IV Continuous 04/25/17 1121    04/25/17 1220  insulin aspart pen 0-10 Units      -- SubQ As needed (PRN) 04/25/17 1121    04/27/17 1130  insulin aspart pen 5-8 Units "      -- SubQ 3 times daily with meals 04/27/17 0958

## 2017-04-27 NOTE — PLAN OF CARE
Problem: Diabetes, Type 2 (Adult)  Goal: Signs and Symptoms of Listed Potential Problems Will be Absent, Minimized or Managed (Diabetes, Type 2)  Signs and symptoms of listed potential problems will be absent, minimized or managed by discharge/transition of care (reference Diabetes, Type 2 (Adult) CPG).   Outcome: Ongoing (interventions implemented as appropriate)  Pt's BS monitored before meals during the day , pt still on continuous insulin drip @ 2.8 cc/hr , PRN insulin on sliding scale and with meals given as needed . Pt is free of S/S of hyper/hypoglycemia during the shift .     Problem: Fall Risk (Adult)  Goal: Absence of Falls  Patient will demonstrate the desired outcomes by discharge/transition of care.   Outcome: Ongoing (interventions implemented as appropriate)  Pt is free of falls and injuries per shift , fall precautions maintained and family at bedside . Pt is able to ambulate with cane and one person assistant to bathroom .     Problem: Patient Care Overview  Goal: Plan of Care Review  Outcome: Ongoing (interventions implemented as appropriate)  Pt is awake ,alert and oriented x 4 . Stable V/S no C/O pain during the shift . Stress test and SLP done today . Pt on insulin drip @ 2.8 cc/hr and BS monitored during the day .  Pt will be NPO after midnight per MD order .

## 2017-04-27 NOTE — PLAN OF CARE
04/27/17 0937   Readmission Questionnaire   At the time of your discharge, did someone talk to you about what your health problems were? Yes   At the time of discharge, did someone talk to you about what to watch out for regarding worsening of your health problem? Yes   At the time of discharge, did someone talk to you about what to do if you experienced worsening of your health problem? Yes   At the time of discharge, did someone talk to you about which medication to take when you left the hospital and which ones to stop taking? Yes   At the time of discharge, did someone talk to you about when and where to follow up with a doctor after you left the hospital? Yes   What do you believe caused you to be sick enough to be re-admitted? needs therapy   How often do you need to have someone help you when you read instructions, pamphlets, or other written material from your doctor or pharmacy? Sometimes   Do you have problems taking your medications as prescribed? No   Do you have any problems affording any of  your prescribed medications? No   Do you have problems obtaining/receiving your medications? No   Does the patient have transportation to healthcare appointments? Yes   Lives With spouse

## 2017-04-27 NOTE — PLAN OF CARE
Problem: SLP Goal  Goal: SLP Goal  Speech Language Pathology Goals  Goals expected to be met by 5/4  1. Pt will tolerate soft solids & honey thick liquid with no s/s aspiration  2. Pt will tolerate less restrictive trials to determine when safe for diet upgrades  3. Pt will participate in dysphagia exercises given min A  Outcome: Ongoing (interventions implemented as appropriate)  Modified Barium Swallow Study completed. See note for details.      Margaret De La Cruz MS, CCC-SLP  Speech Language Pathologist  Pager: [phone]  4/27/2017

## 2017-04-27 NOTE — ASSESSMENT & PLAN NOTE
Continue DAPT  Pending Stress Test Read  Cards following  NPO at midnight for possible intervention

## 2017-04-27 NOTE — PROGRESS NOTES
Cardiology Consults  Progress Note                                                              [facility] [facility]     Patient Name: Jorden Shafer   YOB: 1945  Location: [address] [place] [address] A    Admit Date: 4/24/2017                     LOS: 1    SUBJECTIVE     INTERVAL HISTORY: YAMEL, MARTINEZ, NPO for stress test today.  Denies SOB, satting well on RA.  States he had chest pain last night rated 2/10 that felt similar in character to his previous chest pains and it occurred while he was sleeping and resolved without him telling anyone.    HPI:   Mr. Jorden Shafer is a 71 y.o. male with PMHx significant for CAD (CABG x 5 1989; last cath 06/2016; diffusely occluded native arteries, bypass vessels partially occluded, VENKATA to SVG diagonal/LAD), diastolic HF with preserved EF (last echo 04/17/2017; EF45%, DD), MI with defibrillator placement 06/2016, DM II, HLD, HTN and R MCA stroke with [place] [facility] [place] who [place] [facility] [place] complaining of mid sternal 6/10 chest pain described as tightness that occurred at rest, lasted 5 minutes, and relieved by 1 NTG, ASA and plavix. He denies dizziness, SOB and light headedness at that time. He denies pain migration, nausea and vomiting. Denies diaphoresis.       On admission, trop noted to be 0.027-->0.026-->0.027, though these labs were spaced 1-3 hrs apart.       EKG unremarkable.       Of note, patient had similar presentation in 06/2016 prompting above mentioned cath lab procedure. Last stress echo 04/2016       Impression: ABNORMAL MYOCARDIAL PERFUSION  1. The perfusion scan is free of evidence for myocardial ischemia.   2. There is a moderate fixed defect of severe intensity in the  anteroapical wall of the left ventricle and moderate fixed defect of severe intensity in the  inferoapical wall of the left ventricle  3. There is abnormal wall motion at rest showing akinesis of the anteroapical wall of the left ventricle.   4. There is resting LV  "dysfunction with a reduced ejection fraction of 36 %.   5. The ventricular volumes are normal at rest and stress.   6. The extracardiac distribution of radioactivity is normal.   7. When compared to the previous study from 10/07/2014, similar perfusion findings noted.  LV function further reduced.    REVIEW OF SYSTEMS:  General: No syncope, fatigue, fevers, chills, nausea, or vomiting  HEENT: No HAs; No change in vision or pallor  Cardiac: No angina, palpitations, orthopnea, or PND  Pulmonary: No dyspnea, cough, hemoptysis, or wheezing  GI: No abdominal distention, pain, constipation, or diarrhea  : No dysuria, urgency, frequency, hematuria  Hematologic/Lymphatic: No night sweats, easy bruising, or LAD  Extremities: No claudication, arthralgias, or myalgias  Derm: No cyanosis or rash  Neuro: No focal weakness or numbness      MEDICATIONS:  Scheduled:   carvedilol  12.5 mg Oral BID    citalopram  10 mg Oral Daily    clopidogrel  75 mg Oral Daily    furosemide  20 mg Oral Daily    heparin (porcine)  5,000 Units Subcutaneous Q8H    insulin aspart  4 Units Subcutaneous TIDWM    isosorbide mononitrate  60 mg Oral Daily    omega-3 acid ethyl esters  2 g Oral TID    pantoprazole  40 mg Oral Daily    rosuvastatin  40 mg Oral Daily    sodium chloride 0.9%  3 mL Intravenous Q8H     Continuous:   insulin (HUMAN R) infusion (adults) 2.4 Units/hr (04/27/17 0113)     PRN:  acetaminophen, dextrose 50%, dextrose 50%, glucagon (human recombinant), glucose, glucose, insulin aspart      OBJECTIVE:     VITALS  Most Recent Range (Last 24H)   /66  Pulse 70  Temp 98.3 °F (36.8 °C)  Resp 20  Ht 5' 8" (1.727 m)  Wt 113.9 kg (251 lb)  SpO2 (!) 94%  BMI 38.16 kg/m2 Temp:  [97.8 °F (36.6 °C)-98.6 °F (37 °C)]   Pulse:  [56-83]   Resp:  [18-20]   BP: (113-165)/(65-79)   SpO2:  [86 %-97 %]      Intake and Output  BMI     Intake/Output Summary (Last 24 hours) at 04/27/17 0908  Last data filed at 04/26/17 3819   Gross per " 24 hour   Intake              440 ml   Output                0 ml   Net              440 ml       Net I/O since admission: Body mass index is 38.16 kg/(m^2).        PHYSICAL EXAM  General: AAOx3, NAD;  HEENT: NCAT; No conj. injection, pallor, or icterus; No xanthelasma   Neck: Supple; Trachea midline; No JVD; No bruits;   Cardiac: RRR, +S1 & S2; No M/R/G; PMI non-displaced; No thrills or heave   Pulmonary: CTAB; No W/R/R;  Abdominal: Soft, NT/ND +Normoactive BS; No organomegaly; No bruits or pulsatile masses  /Rectal: deferred   Extremities: No clubbing, cyanosis, or edema  Skin: No bruising, rash, ulceration, xanthomata, or splinter hemorrhages present  Neurological: No focal motor or sensory defects; PERRLA, EOMI      LABS  CBC/Anemia Labs Coag Labs     Recent Labs  Lab 04/24/17  2340   WBC 10.24   HGB 16.0   HCT 45.0   MCV 82       Lab Results   Component Value Date    INR 1.1 04/24/2017    INR 1.1 04/12/2017    INR 1.0 06/29/2016        BMP     Recent Labs  Lab 04/24/17  2340 04/26/17  0624   * 266*   * 137   K 3.7 3.6   CL 94* 103   CO2 22* 22*   BUN 26* 27*   CREATININE 1.4 1.1   CALCIUM 8.8 8.9      Mag & Phos LFTs     Recent Labs  Lab 04/25/17  1508   MG 1.6      Recent Labs  Lab 04/24/17  2340   PROT 7.4   BILITOT 1.3*   ALKPHOS 98   AST 22   ALT 27             Cardiac Enzymes Ejection Fractions/BNP     Recent Labs  Lab 04/25/17  0304 04/25/17  0416 04/26/17  0851   TROPONINI 0.026 0.027* 0.015    EF   Date Value Ref Range Status   04/13/2017 45 55 - 65    06/30/2016 30 (A) 55 - 65    10/07/2014 55 55 - 65        Recent Labs  Lab 04/24/17  2340   BNP 78        Lab Results   Component Value Date    HGBA1C 9.3 (H) 04/13/2017         Microbiology Results (last 7 days)     ** No results found for the last 168 hours. **          INVESTIGATION RESULTS    Imaging Results         X-Ray Chest PA And Lateral (Final result) Result time:  04/25/17 00:05:49    Final result by Kelvin Mcclelland MD  (04/25/17 00:05:49)    Impression:         Findings suggesting congestive change and bilateral pulmonary edema, slightly more prominent than on previous examination, correlation recommended.  No large focal consolidation..          Electronically signed by: LIDIA DE LA CRUZ MD  Date:     04/25/17  Time:    00:05     Narrative:    Chest PA and lateral    Indication:Chest pain    Comparison:4/12/2017    Findings: The examination is limited by body habitus.  Left chest wall pacer stable. The cardiomediastinal silhouette is prominent, similar to previous exam noting postsurgical changes and calcification of the aortic arch.  There is no pleural effusion.  The trachea is midline.  The lungs are symmetrically expanded bilaterally with patchy increased interstitial and parenchymal attenuation bilaterally, noting a bilateral lower lung zone predominance. No large focal consolidation seen.  There is no pneumothorax.  The osseous structures are remarkable for degenerative changes of the spine and shoulders.            Diagnostic Results:  Cardiac Cath 06/2016  1.   Totally occluded native arteries, except for the proximal portion of the LAD/diagonals and septals.   2.   -SVG-to -RCA is occluded.   3.   -SVG-to-OM is patent.   4.   -LIMA-to-LAD is patent but is attached to the proximal LAD before the occlusion, and only feeds this proximal portion.   5.   -Jump graft to diagonal 3+LAD has heavy athrerosclerosis, ostial 85% stenosis, and occlusion of the jump portion that supplies the distal LAD.   6.   We treated the ostium of this SVG-diagonal/LAD using one drug-eluting stent.      Stress Echo 04/2016      Impression: ABNORMAL MYOCARDIAL PERFUSION  1. The perfusion scan is free of evidence for myocardial ischemia.   2. There is a moderate fixed defect of severe intensity in the  anteroapical wall of the left ventricle and moderate fixed defect of severe intensity in the  inferoapical wall of the left ventricle  3. There is  abnormal wall motion at rest showing akinesis of the anteroapical wall of the left ventricle.   4. There is resting LV dysfunction with a reduced ejection fraction of 36 %.   5. The ventricular volumes are normal at rest and stress.   6. The extracardiac distribution of radioactivity is normal.   7. When compared to the previous study from 10/07/2014, similar perfusion findings noted.  LV function further reduced.     Bilateral Carotid US 4/12/2017  Occlusion of the mid and proximal segments of the right ICA.  Retrograde flow within the right vertebral artery.    Elevated velocities in the left ICA with peak systolic velocity of 279 cm/s in the left distal ICA with associated ICA to CCA ratio of 2.8, corresponding to greater than 50-69% luminal narrowing.    Abnormal retrograde flow within the right vertebral artery and bidirectional flow within the left vertebral artery.    Heterogeneous plaque throughout the carotid systems, more so on the left.    CTA Head/Neck 4/13:  Involving right MCA distribution infarct as above, without evidence of hemorrhagic conversion or significant mass effect at this time.    Mild chronic microvascular ischemic changes and small remote right cerebellar infarct.    No evidence of new hemorrhage or major vascular distribution infarct.    CTA demonstrates extensive atherosclerotic disease present throughout the anterior and posterior circulation.     There is a critical stenosis (greater than 90%) of the right carotid bifurcation with associated adaptive narrowing and diminished enhancement of the distal right ICA.    Intermittent occlusion of both vertebral arteries with extensive multifocal narrowing throughout the vertebrobasilar system. Distal vertebrobasilar system thought likely primarily supplied via a prominent left posterior communicating artery.    Multifocal areas of narrowing in the intracranial vasculature, with apparent occlusion of a sylvian branch of the right  CHUY.    ASSESSMENT/PLAN:     Current Problems List:  Active Hospital Problems    Diagnosis  POA    *Chest pain [R07.9]  Yes    Unstable angina [I20.0]  Yes    NSTEMI (non-ST elevated myocardial infarction) [I21.4]  Unknown    Insulin pump status [Z96.41]  Not Applicable     Chronic    Obesity (BMI 30-39.9) [E66.9]  Yes     Chronic    Type 2 diabetes mellitus with diabetic polyneuropathy, with long-term current use of insulin [E11.42, Z79.4]  Not Applicable     Chronic    Stenosis of right carotid artery [I65.21]  Yes     Chronic    History of CVA (cerebrovascular accident) [Z86.73]  Not Applicable     Chronic    Chronic combined systolic and diastolic heart failure [I50.42]  Yes     Chronic    Essential hypertension [I10]  Yes     Chronic    Hyperlipidemia [E78.5]  Yes     Chronic    Coronary artery disease involving native coronary artery of native heart with unstable angina pectoris [I25.110]  Yes     Chronic    Type 2 diabetes mellitus with hyperglycemia, with long-term current use of insulin [E11.65, Z79.4]  Not Applicable     Chronic     Currently sees Dr. Britton         Resolved Hospital Problems    Diagnosis Date Resolved POA    Stroke [I63.9] 04/25/2017 Yes    Hypertensive heart disease without congestive heart failure [I11.9] 04/13/2017 Yes     Currently sees Dr. Fitch for cardiology           ASSESSMENT:   71 y.o. year old male with PMHx significant for CAD (CABG x 5 1989; last cath 06/2016; diffusely occluded native arteries, bypass vessels partially occluded, VENKATA to SVG diagonal/LAD), diastolic HF with preserved EF (last echo 04/17/2017; EF45%, DD), MI with defibrillator placement 06/2016, DM II, HLD, HTN and R MCA stroke [place] [facility] [place] [facility] [place] complaining of mid sternal 6/10 chest pain described as tightness that occurred at rest, lasted 5 minutes, and relieved by 1 NTG, ASA and plavix. Troponin and EKG negative.        PLAN:     Unstable  Angina  - chest pain at rest relieved with 1 nitro with extensive cardiac hx  - already on medical therapy, including asa + plavix, coreg, ACEi and statin  - Platelet response to plavix low at 88, which means that patient adequately responds to plavix.  - discussed with stroke team re: pt's ability to receive heparin products; imaging reviewed with Dr. Marin who feels pt safe for heparin   - interventional cards consult placed, plan is to do PET stress instead of cath because of possibility of repeat stroke with [facility] given recent stroke at [facility] [place] [place] 4/[place].  - given Cr 1.4 on 4/25, pt received 500cc NS bolus, Cr improved to 1.1 on 4/26  -now that renal function has improved, recommend resuming aspirin 81 and lisinopril 10 if no other contraindications     Severe Right Internal Carotid Stenosis  -severe symptomatic carotid stenosis, is a candidate for R-carotid PTAS  -interventional cardiology would like to PTAS tomorrow, would like neurology consulted    Thank you for allowing us to participate in the care of this patient.  Will continue to follow with you.    José Luis Crzu MD  PGY-1  Pager: [phone]

## 2017-04-27 NOTE — PROCEDURES
Modifed Barium Swallow Study  Speech Start Time: 1130  Speech Stop Time: 1200  Speech Total (min): 30 min    SLP Treatment Date: 04/27/17     Recommendations  Dental soft & honey thickened liquids  -second swallow with each bite/sip  -fully upright  -small bites/sips  -alternate bites/sips  Recs d/w Dr. Rios    Reason for Referral  Patient was referred for a Modified Barium Swallow Study to assess the efficiency of his/her swallow function, rule out aspiration and make recommendations regarding safe dietary consistencies, effective compensatory strategies, and safe eating environment.     Diagnosis   Chest pain    Past Medical History:   Diagnosis Date    Back pain     CAD (coronary artery disease) 1989    h/o cabg x 5    Chronic kidney disease     Colon polyp     h/o on colonoscopy    Contact dermatitis 11/12/2013    CVA (cerebral vascular accident)     March 2010    Depression     Diabetes mellitus type II     on  insulin pump    DVT (deep venous thrombosis) 1980's    left leg    GERD (gastroesophageal reflux disease)     Heart failure     [employer]     Hyperlipidemia     Hypertension     Hypertensive heart disease without congestive heart failure     Insulin pump status 4/25/2017    Myocardial infarction     Neuropathy of lower extremity     Obesity (BMI 30-39.9) 4/25/2017    Prostate cancer     Renal manifestation of secondary diabetes mellitus     Sleep apnea     Stroke 4/21/2017    Type 2 diabetes mellitus with diabetic polyneuropathy, with long-term current use of insulin 4/25/2017    Type 2 diabetes mellitus with hyperglycemia, with long-term current use of insulin 9/26/2012    Currently sees Dr. Britton     Unstable angina     Vitamin D deficiency disease      Past Surgical History:   Procedure Laterality Date    CARDIAC DEFIBRILLATOR PLACEMENT  06/16/2016    CHOLECYSTECTOMY      CORONARY ARTERY BYPASS GRAFT      bypass times  5    defibulater  06/16/2016    EYE SURGERY Bilateral      HERNIA REPAIR      groin x2  and umbilical    JOINT REPLACEMENT      knee left    thrombus Left     >2005    VASECTOMY          General Precautions: fall, aspiration, NPO         Oral Peripheral Examination  Oral Musculature Evaluation  Oral Musculature: left weakness  Mucosal Quality: adequate  Oral Labial Strength and Mobility: impaired retraction, impaired pursing, impaired seal  Lingual Strength and Mobility: impaired strength, impaired left lateral movement, impaired protrusion (lingual deviation to right)  Volitional Cough: weak  Volitional Swallow: adequate  Voice Prior to PO Intake: clear    Consistencies Assessed  Thin liquids 1 teaspoon via spoon x2, via cup sips x2, Nectar thick liquids 1 teaspoon via spoon, via cup sips x4, Honey thick liquids via teaspoon x1, via cup sips x2, Puree via teaspoon x2 and Soft solids 1/2 saltine cracker    Oral Preparation / Oral Phase  Delayed A-P transit, decreased base of tongue retraction, premature spillage    Pharyngeal Phase  Pharyngeal phase c/b premature spillage, delayed initiation of swallow, decreased epiglottic inversion, decreased pharyngeal constriction  pooling to valleculae & pyriform with thin  Mod vallecular stasis with thin & nectar, mild vallecular stasis with solid, required cueing to generate second swallow to clear  Thin: deep penetration during swallow & after swallow on vallecular residue with & without chin tuck strategy, spontaneous cough response following each swallow  Nectar: deep penetration to the level of the cords via cup sips x2 during swallow & after swallow on vallecular residue (with & without chin tuck strategy), spontaneous cough response with each; pt aspirated during swallow while utilizing head turn strategy, spontaneous cough response   Honey, puree & solid: no penetration/aspiration      Cervical Esophageal Phase  Appears WFL    Impressions  Moderate to severe oropharyngeal  dysphagia    Prognosis/Plan/Education  Good/continue ST service/results & recs reviewed with pt      Care Plan   SLP Goals        Problem: SLP Goal    Goal Priority Disciplines Outcome   SLP Goal     SLP Ongoing (interventions implemented as appropriate)   Description:  Speech Language Pathology Goals  Goals expected to be met by 5/4  1. Pt will tolerate soft solids & honey thick liquid with no s/s aspiration  2. Pt will tolerate less restrictive trials to determine when safe for diet upgrades  3. Pt will participate in dysphagia exercises given milvia De La Cruz MS, CCC-SLP  Speech Language Pathologist  Pager: [phone]  4/27/2017

## 2017-04-27 NOTE — PROGRESS NOTES
Chart reviewed. Noted pt admitted to hospital. Spoke to pt's daughter, Astrid. She states pt is scheduled for a stress test and swallowing study. She states pt may have surgery tomorrow for blockages. She states pt has been accepted to [facility] and they will go to complete the paperwork. She expects he will transfer sometime next week. Will follow up in 1 week.

## 2017-04-27 NOTE — PLAN OF CARE
Problem: Patient Care Overview  Goal: Plan of Care Review  Pt. Placed in low bed position, call bell in reach, personal items within reach, NPO per order, light adjusted, toileting offered. SCD's removed and replaced. Monitored glucose levels per order.

## 2017-04-27 NOTE — PLAN OF CARE
Patient approved to transfer to [facility] pending [employer] auth, [employer] called in, pending 142, will follow.

## 2017-04-27 NOTE — ASSESSMENT & PLAN NOTE
BG goal 140-180.     Increase transition infusion to 2.8 u/hr  novolog 5-8 units pc  BG monitoring ac/hs/0200  Moderate dose correction scale      DISCHARGE PLANNING:  Tentative plan for d/c to SNF when medically stable   1. Recommend to discharge on TBD (will need to assess pts ability to use pump before discharge with pump post stroke) Needs rx? No, reports he has all pump supplies, insulin at home - just needs to bring to the hospital   2. Needs glucometer and supplies? No  3. Follow up with PCP or endocrine?  Endo Dr. Montiel      4. Needs insulin pen training or BG meter training?  TBD, may need MDI refresher if cannot use pump at home

## 2017-04-27 NOTE — CONSULTS
Consult Note  Vascular Neurology     Consult Requested by:  Dr. Rios  Reason for Consult:  Carotid Disease Management  SUBJECTIVE:     History of Present Illness:  Patient is a 71 y.o. male with pmhx of CAD s/p CABG 28 years ago, HFpEF, HTN, DM type II, KELVIN on CPAP, and HDL who is admitted for chest pain and workup. He was previously discharged after presenting to [facility] on 4/12/2017 with acute onset of left sided weakness and dysarthria. He was found to have right MCA distribution acute stroke with bifurcation stenosis on CTH and CTA, respectively. No tPA was given as patient had exceeded the window. He did not require admission to rehab given his outstanding recovery and was discharged home on 4/19/2017. He was arranged to follow with vascular surgery for considering right CEA. Vascular Neurology is consulted for carotid artery disease management.     Review of patient's allergies indicates:  No Known Allergies    Past Medical History:   Diagnosis Date    Back pain     CAD (coronary artery disease) 1989    h/o cabg x 5    Chronic kidney disease     Colon polyp     h/o on colonoscopy    Contact dermatitis 11/12/2013    CVA (cerebral vascular accident)     March 2010    Depression     Diabetes mellitus type II     on  insulin pump    DVT (deep venous thrombosis) 1980's    left leg    GERD (gastroesophageal reflux disease)     Heart failure     [employer]     Hyperlipidemia     Hypertension     Hypertensive heart disease without congestive heart failure     Insulin pump status 4/25/2017    Myocardial infarction     Neuropathy of lower extremity     Obesity (BMI 30-39.9) 4/25/2017    Prostate cancer     Renal manifestation of secondary diabetes mellitus     Sleep apnea     Stroke 4/21/2017    Type 2 diabetes mellitus with diabetic polyneuropathy, with long-term current use of insulin 4/25/2017    Type 2 diabetes mellitus with hyperglycemia, with long-term current use of insulin 9/26/2012     Currently sees Dr. Britton     Unstable angina     Vitamin D deficiency disease      Past Surgical History:   Procedure Laterality Date    CARDIAC DEFIBRILLATOR PLACEMENT  2016    CHOLECYSTECTOMY      CORONARY ARTERY BYPASS GRAFT      bypass times  5    defibulater  2016    EYE SURGERY Bilateral     HERNIA REPAIR      groin x2  and umbilical    JOINT REPLACEMENT      knee left    thrombus Left     >2005    VASECTOMY       Family History   Problem Relation Age of Onset    Alzheimer's disease Mother      Living    Heart disease Mother     Dementia Mother     Asbestos Father          Cancer Father     Diabetes type II Maternal Aunt     Stroke Neg Hx      Social History   Substance Use Topics    Smoking status: Former Smoker     Packs/day: 4.00     Years: 25.00     Types: Cigarettes    Smokeless tobacco: Never Used    Alcohol use 0.0 oz/week     0 Standard drinks or equivalent per week      Comment: occasional       Review of Systems:  Constitutional: no fever or chills  ENT: no nasal congestion or sore throat  Respiratory: no active cough or shortness of breath  Cardiovascular: + chest pain no palpitations  Gastrointestinal: no nausea or vomiting, no abdominal pain or abdominal distention   Genitourinary: no hematuria or dysuria  Integument/Breast: no rash or pruritis  Hematologic/Lymphatic: no easy bruising or lymphadenopathy  Musculoskeletal: no arthralgias or myalgias  Neurological: no seizures or tremors, + for left sided weakness  Endocrine: no heat or cold intolerance      OBJECTIVE:     Vital Signs:  Temp:  [97.3 °F (36.3 °C)-98.3 °F (36.8 °C)]   Pulse:  [56-72]   Resp:  [18-20]   BP: (136-153)/(66-80)   SpO2:  [93 %-95 %]     Physical Exam:  General: obese male who is in NAD  Eyes: conjunctivae/corneas clear. PERRL. EOMI  Cardiovascular: Heart: regular rate and rhythm, S1, S2 normal,   Lungs: mild crackles over the bases that cleared with repetitive inspiration, normal  respiratory effort  Chest Wall: no tenderness, vertical scar indicating site of previous CABG   Abdomen/Rectal: Abdomen: Non distended. + BS. No masses. No TTP. No rebound or guarding. Negative Iqbal's sign. Rectal: not examined  Extremities: no edema, redness or tenderness in the calves or thighs. Pulses: 2+ and symmetric  Skin: Skin color, texture, turgor normal. No rashes or lesions  Musculoskeletal: full range of motion of joints  Lymph Nodes: No cervical or supraclavicular adenopathy  Psych/Behavioral: Normal. Alert and oriented, appropriate affect.    Neurologic Exam:     Mental status:  -- Facial Expression: left mild facial droop  -- Affect: full   -- Orientation to time & place: Oriented to time, place, person and situation   -- Attention & concentration: Normal attention span and concentration   -- Memory: Recent and remote memory intact  -- Language: Spontaneous, fluent; able to repeat and name objects  -- Fund of knowledge: Aware of current events   -- Speech: mild dysarthria    Cranial nerves:  -- II: pupils equal, round, reactive to light and accommodation, Visual fields are full to confrontation.    -- III,IV,VI: extraocular muscles EOMI   -- V: facial light touch sensation normal   -- VII: upper facial muscle function normal bilaterally, VII: lower facial muscle function reduced on the left   -- V & VII: Corneal reflex present bilaterally   -- VIII: Response to voice.  Hearing is normal bilaterally.    -- IX: soft palate elevation normal bilaterally  -- X: Gag reflex present   -- XI: trapezius strength normal bilaterally, XI: sternocleidomastoid strength normal bilaterally  -- XII: tongue normal, midline     Motor examination:   -- Muscle tone: Normal in all 4 extremitie   -- Muscle Bulk: Normal in all 4 extremities normal   -- Muscle strength: 5/5 in the upper and lower extremities bilaterally except for LUE with 4/5 elbow flexion and hand squeeze  -- Pronator drift: mild in the LUE  --  Fasciculations: No   -- Tremors: No     Sensory examination:  -- Intact to pinprick, light touch, temperature, vibration and proprioception in the upper and lower extremities bilaterally.   -- Romberg is negative.     Deep tendon reflexes:  -- 2+ bilateral biceps, triceps, patella and ankles   -- Toes are down going bilaterally.     Cerebellar and Coordination:   Gait: Normal heel, toe, tandem, and casual gait   Finger-nose: No dysmetria  Rapid Alternating Movements (pronation/supination): R normal; L normal        NIH stroke scale: 3      Laboratory:  CBC:   Recent Labs  Lab 04/27/17  1213   WBC 8.95   RBC 5.74   HGB 16.6   HCT 47.7      MCV 83   MCH 28.9   MCHC 34.8     CMP:   Recent Labs  Lab 04/24/17  2340  04/27/17  1212   *  < > 256*   CALCIUM 8.8  < > 9.4   ALBUMIN 4.0  --   --    PROT 7.4  --   --    *  < > 141   K 3.7  < > 4.0   CO2 22*  < > 23   CL 94*  < > 105   BUN 26*  < > 21   CREATININE 1.4  < > 1.1   ALKPHOS 98  --   --    ALT 27  --   --    AST 22  --   --    BILITOT 1.3*  --   --    < > = values in this interval not displayed.  Coagulation:   Recent Labs  Lab 04/24/17  2340   INR 1.1     Cardiac markers:   Recent Labs  Lab 04/26/17  0851   TROPONINI 0.015       Diagnostic Results:  CT head and CTA from 4/12/2017 and 4/13/2017:   Since prior exam is been interval development of a large right temporal infarct.     Involving right MCA distribution infarct as above, without evidence of hemorrhagic conversion or significant mass effect at this time.    Mild chronic microvascular ischemic changes and small remote right cerebellar infarct.    No evidence of new hemorrhage or major vascular distribution infarct.    CTA demonstrates extensive atherosclerotic disease present throughout the anterior and posterior circulation.     There is a critical stenosis (greater than 90%) of the right carotid bifurcation with associated adaptive narrowing and diminished enhancement of the distal right  ICA.    Intermittent occlusion of both vertebral arteries with extensive multifocal narrowing throughout the vertebrobasilar system. Distal vertebrobasilar system thought likely primarily supplied via a prominent left posterior communicating artery.    Multifocal areas of narrowing in the intracranial vasculature, with apparent occlusion of a sylvian branch of the right MCA.      ASSESSMENT/PLAN:       Thrombotic right MCA stroke:  Moderate size right MCA distribution stroke. Cause of stroke likely linked to thrombotic etiology given right ICA/MCA disease.     Recommend the following:  -- aggressive medical risk factor reduction of HLD, HTN, DM type II. Agree with ASA and Plavix. Consider increasing ASA dosing to 325mg daily.  Agree with high dose Crestor.   -- outpatient follow-up with vascular surgery for carotid endarterectomy 2-3 weeks from now. Consider contacting vascular surgery as inpatient for arranging for outpatient follow-up.  -- recommend adding Niacin   -- we recommend holding off on intervening through PTAS of the carotid artery given recent stroke until two weeks from now and preferably to be done on outpatient basis.       Thanks for the consult. Case and plan d/w Dr. Arenas. Attestation to follow.       LEV Perera  PGY-2  [phone]

## 2017-04-27 NOTE — PROGRESS NOTES
"[facility]  Endocrinology  Progress Note    Admit Date: 4/24/2017     Reason for Consult: Management of T2DM, Hyperglycemia , insulin pump management     Surgical Procedure and Date:     Diabetes diagnosis year: ~2002    Home Diabetes Medications:  Medtronic minimed  (settings read via phone per patient's daughter)  Basal: 5 units/hr  ISF 17  I:C 1:4.5  Active insulin time : 4  Target 100-120  Bolus Wizard OFF      How often checking glucose at home? >4 x day   BG readings on regimen: 300-400's for "years"  Hypoglycemia on the regimen?  No  Missed doses on regimen?  Yes - reports does not always count all carbs with meals, does NOT bolus with snacks. Reviewed insulin pump settings on pump via phone and pts daughter reports bolus WIZARD OFF on pump, so concerns if patient is bolusing at all with meals.       Diabetes Complications include:     Hyperglycemia and Diabetic peripheral neuropathy     Complicating diabetes co morbidities:   History of CVA and Residual deficits from CVA       HPI:   Patient is a 71 y.o. male with a diagnosis of T2DM since ~2002. A1c 9.3% upon admission. Followed by Dr. Montiel. On insulin pump therapy for past 5 years, recently changed to Medtronic pump. Per patient report, he does not always bolus with meals, does NOT bolus with snacks. However, post stroke, the patient does have some concerns about managing insulin pump at home.   Chronic medical conditions include CAD, DVT, T2DM with neuropathy, MI, defibrillator placement, CABGx5 in 1989 . He presented to the ED with a complaint of midsternal CP x 5 minutes (tight, squeezing, 6/10), relieved with NTG x1 PTA.     Of note ,pt was discharged from  5 days ago after admission for stroke. + residual left facial droop and left sided weakness.   Endocrine consulted for BG management.       Interval HPI:   BG above goal.  No hypoglycemia, afebrile.  On transition insulin gtt overnight.     BP (!) 153/80 (BP Location: Left " "arm, Patient Position: Lying, BP Method: Automatic)  Pulse 70  Temp 97.3 °F (36.3 °C) (Oral)   Resp 18  Ht 5' 8" (1.727 m)  Wt 113.9 kg (251 lb)  SpO2 95%  BMI 38.16 kg/m2    Labs Reviewed and Include      Recent Labs  Lab 04/27/17  1212   *   CALCIUM 9.4      K 4.0   CO2 23      BUN 21   CREATININE 1.1     Lab Results   Component Value Date    WBC 8.95 04/27/2017    HGB 16.6 04/27/2017    HCT 47.7 04/27/2017    MCV 83 04/27/2017     04/27/2017     No results for input(s): TSH, FREET4 in the last 168 hours.  Lab Results   Component Value Date    HGBA1C 9.3 (H) 04/13/2017       Nutritional status:   Body mass index is 38.16 kg/(m^2).  Lab Results   Component Value Date    ALBUMIN 4.0 04/24/2017    ALBUMIN 3.8 04/13/2017    ALBUMIN 3.2 (L) 04/12/2017     No results found for: PREALBUMIN    Estimated Creatinine Clearance: 74.4 mL/min (based on Cr of 1.1).    Accu-Checks  Recent Labs      04/26/17   0240  04/26/17   0635  04/26/17   1156  04/26/17   1642  04/26/17   2159  04/27/17   0122  04/27/17   0605  04/27/17   0818  04/27/17   1214  04/27/17   1359   POCTGLUCOSE  238*  261*  239*  228*  202*  213*  209*  223*  259*  270*       Current Medications and/or Treatments Impacting Glycemic Control  Immunotherapy:  Immunosuppressants     None        Steroids:   Hormones     None        Pressors:    Autonomic Drugs     None        Transition insulin gtt at 2.4 u/hr, novolog 4 units ac w/ mod dose.  Hyperglycemia/Diabetes Medications: Antihyperglycemics     Start     Stop Route Frequency Ordered    04/25/17 1118  insulin regular (Humulin R) 100 Units in sodium chloride 0.9% 100 mL infusion      -- IV Continuous 04/25/17 1121    04/25/17 1220  insulin aspart pen 0-10 Units      -- SubQ As needed (PRN) 04/25/17 1121    04/27/17 1130  insulin aspart pen 5-8 Units      -- SubQ 3 times daily with meals 04/27/17 0929          ASSESSMENT and PLAN    Type 2 diabetes mellitus with hyperglycemia, with " long-term current use of insulin  BG goal 140-180.     Increase transition infusion to 2.8 u/hr  novolog 5-8 units pc  BG monitoring ac/hs/0200  Moderate dose correction scale      DISCHARGE PLANNING:  Tentative plan for d/c to SNF when medically stable   1. Recommend to discharge on TBD (will need to assess pts ability to use pump before discharge with pump post stroke) Needs rx? No, reports he has all pump supplies, insulin at home - just needs to bring to the hospital   2. Needs glucometer and supplies? No  3. Follow up with PCP or endocrine?  Endo Dr. Montiel      4. Needs insulin pen training or BG meter training?  TBD, may need MDI refresher if cannot use pump at home         * Chest pain  Per primary      History of CVA (cerebrovascular accident)  CVA, discharged from [facility] 4/2017  Residual deficit may limit full insulin pump usage, will need to assess when pump at bedside      Insulin pump status  Home insulin pump currently off, family instructed to bring to bedside, will need to assess pts ability to use pump before discharge    Obesity (BMI 30-39.9)  Body mass index is 38.16 kg/(m^2). may increase insulin resistance      Type 2 diabetes mellitus with diabetic polyneuropathy, with long-term current use of insulin  See above        QING Garza, FNP  Endocrinology  [facility]

## 2017-04-27 NOTE — PROGRESS NOTES
Discussed with Neurology.  They recommended against stenting carotid artery in the setting of recent stroke.  They say to wait at least two weeks after CVA.  Cardiology paged to discuss.  Neurology will addend their note to include this.    Gera Rios

## 2017-04-27 NOTE — SUBJECTIVE & OBJECTIVE
Interval History:     Chest pain Free.  Wants to resume diet.    Review of Systems   All other systems reviewed and are negative.    Objective:     Vital Signs (Most Recent):  Temp: 98.4 °F (36.9 °C) (04/27/17 1600)  Pulse: 77 (04/27/17 1600)  Resp: 18 (04/27/17 1600)  BP: 115/62 (04/27/17 1600)  SpO2: (!) 93 % (04/27/17 1600) Vital Signs (24h Range):  Temp:  [97.3 °F (36.3 °C)-98.6 °F (37 °C)] 98.4 °F (36.9 °C)  Pulse:  [56-83] 77  Resp:  [18-20] 18  SpO2:  [92 %-95 %] 93 %  BP: (115-165)/(62-80) 115/62     Weight: 113.9 kg (251 lb)  Body mass index is 38.16 kg/(m^2).    Intake/Output Summary (Last 24 hours) at 04/27/17 1701  Last data filed at 04/27/17 1400   Gross per 24 hour   Intake              560 ml   Output                0 ml   Net              560 ml      Physical Exam   Constitutional: He appears well-developed and well-nourished.   HENT:   Head: Atraumatic.   Cardiovascular: Normal rate.    Pulmonary/Chest: Effort normal.   Abdominal: Soft.   Neurological: He is alert.   Skin: Skin is warm.   Psychiatric: He has a normal mood and affect.   Nursing note and vitals reviewed.      Significant Labs:   BMP:   Recent Labs  Lab 04/27/17  1212   *      K 4.0      CO2 23   BUN 21   CREATININE 1.1   CALCIUM 9.4     CBC:   Recent Labs  Lab 04/27/17  1213   WBC 8.95   HGB 16.6   HCT 47.7          Significant Imaging: None

## 2017-04-27 NOTE — ASSESSMENT & PLAN NOTE
CVA, discharged from [facility] 4/2017  Residual deficit may limit full insulin pump usage, will need to assess when pump at bedside

## 2017-04-27 NOTE — PROGRESS NOTES
Pt is back to the floor from stress test and SLP test . Pt is awake ,alert and oriented x. Stable v/S . Spoke with Md and he order to keep pt NPO for now .

## 2017-04-28 LAB
ANION GAP SERPL CALC-SCNC: 10 MMOL/L
BASOPHILS # BLD AUTO: 0.06 K/UL
BASOPHILS NFR BLD: 0.7 %
BUN SERPL-MCNC: 22 MG/DL
CALCIUM SERPL-MCNC: 9 MG/DL
CHLORIDE SERPL-SCNC: 105 MMOL/L
CO2 SERPL-SCNC: 23 MMOL/L
CREAT SERPL-MCNC: 1.2 MG/DL
DIASTOLIC DYSFUNCTION: YES
DIFFERENTIAL METHOD: ABNORMAL
EOSINOPHIL # BLD AUTO: 0.1 K/UL
EOSINOPHIL NFR BLD: 1.6 %
ERYTHROCYTE [DISTWIDTH] IN BLOOD BY AUTOMATED COUNT: 14 %
EST. GFR  (AFRICAN AMERICAN): >60 ML/MIN/1.73 M^2
EST. GFR  (NON AFRICAN AMERICAN): >60 ML/MIN/1.73 M^2
ESTIMATED PA SYSTOLIC PRESSURE: 19.97
GLUCOSE SERPL-MCNC: 196 MG/DL
HCT VFR BLD AUTO: 44 %
HGB BLD-MCNC: 15.7 G/DL
LYMPHOCYTES # BLD AUTO: 2 K/UL
LYMPHOCYTES NFR BLD: 23.2 %
MCH RBC QN AUTO: 28.9 PG
MCHC RBC AUTO-ENTMCNC: 35.7 %
MCV RBC AUTO: 81 FL
MONOCYTES # BLD AUTO: 0.5 K/UL
MONOCYTES NFR BLD: 5.4 %
NEUTROPHILS # BLD AUTO: 5.9 K/UL
NEUTROPHILS NFR BLD: 68.7 %
PLATELET # BLD AUTO: 159 K/UL
PMV BLD AUTO: 11.3 FL
POCT GLUCOSE: 193 MG/DL (ref 70–110)
POCT GLUCOSE: 197 MG/DL (ref 70–110)
POCT GLUCOSE: 258 MG/DL (ref 70–110)
POCT GLUCOSE: 278 MG/DL (ref 70–110)
POCT GLUCOSE: 286 MG/DL (ref 70–110)
POCT GLUCOSE: 325 MG/DL (ref 70–110)
POCT GLUCOSE: 370 MG/DL (ref 70–110)
POTASSIUM SERPL-SCNC: 3.5 MMOL/L
RBC # BLD AUTO: 5.43 M/UL
RETIRED EF AND QEF - SEE NOTES: 35 (ref 55–65)
SODIUM SERPL-SCNC: 138 MMOL/L
WBC # BLD AUTO: 8.52 K/UL

## 2017-04-28 PROCEDURE — 85025 COMPLETE CBC W/AUTO DIFF WBC: CPT

## 2017-04-28 PROCEDURE — 63600175 PHARM REV CODE 636 W HCPCS: Performed by: NURSE PRACTITIONER

## 2017-04-28 PROCEDURE — 11000001 HC ACUTE MED/SURG PRIVATE ROOM

## 2017-04-28 PROCEDURE — 92526 ORAL FUNCTION THERAPY: CPT

## 2017-04-28 PROCEDURE — 25000003 PHARM REV CODE 250: Performed by: NURSE PRACTITIONER

## 2017-04-28 PROCEDURE — 80048 BASIC METABOLIC PNL TOTAL CA: CPT

## 2017-04-28 PROCEDURE — 99233 SBSQ HOSP IP/OBS HIGH 50: CPT | Mod: ,,, | Performed by: INTERNAL MEDICINE

## 2017-04-28 PROCEDURE — 97116 GAIT TRAINING THERAPY: CPT

## 2017-04-28 PROCEDURE — 25000003 PHARM REV CODE 250: Performed by: INTERNAL MEDICINE

## 2017-04-28 PROCEDURE — 99232 SBSQ HOSP IP/OBS MODERATE 35: CPT | Mod: ,,, | Performed by: NURSE PRACTITIONER

## 2017-04-28 PROCEDURE — 99232 SBSQ HOSP IP/OBS MODERATE 35: CPT | Mod: ,,, | Performed by: INTERNAL MEDICINE

## 2017-04-28 PROCEDURE — 36415 COLL VENOUS BLD VENIPUNCTURE: CPT

## 2017-04-28 PROCEDURE — 93306 TTE W/DOPPLER COMPLETE: CPT

## 2017-04-28 PROCEDURE — 93306 TTE W/DOPPLER COMPLETE: CPT | Mod: 26,,, | Performed by: INTERNAL MEDICINE

## 2017-04-28 PROCEDURE — 97110 THERAPEUTIC EXERCISES: CPT

## 2017-04-28 RX ORDER — IBUPROFEN 200 MG
24 TABLET ORAL
Status: DISCONTINUED | OUTPATIENT
Start: 2017-04-28 | End: 2017-05-01 | Stop reason: HOSPADM

## 2017-04-28 RX ORDER — INSULIN ASPART 100 [IU]/ML
6-10 INJECTION, SOLUTION INTRAVENOUS; SUBCUTANEOUS
Status: DISCONTINUED | OUTPATIENT
Start: 2017-04-28 | End: 2017-04-28

## 2017-04-28 RX ORDER — CARVEDILOL 25 MG/1
12.5 TABLET ORAL 2 TIMES DAILY WITH MEALS
Qty: 90 TABLET | Refills: 3 | Status: ON HOLD | OUTPATIENT
Start: 2017-04-28 | End: 2017-07-06

## 2017-04-28 RX ORDER — GLUCAGON 1 MG
1 KIT INJECTION
Status: DISCONTINUED | OUTPATIENT
Start: 2017-04-28 | End: 2017-05-01 | Stop reason: HOSPADM

## 2017-04-28 RX ORDER — ROSUVASTATIN CALCIUM 40 MG/1
40 TABLET, COATED ORAL DAILY
Start: 2017-04-28 | End: 2017-06-08 | Stop reason: SDUPTHER

## 2017-04-28 RX ORDER — INSULIN ASPART 100 [IU]/ML
10 INJECTION, SOLUTION INTRAVENOUS; SUBCUTANEOUS 3 TIMES DAILY
Refills: 0
Start: 2017-04-28 | End: 2017-05-01

## 2017-04-28 RX ORDER — INSULIN ASPART 100 [IU]/ML
8-12 INJECTION, SOLUTION INTRAVENOUS; SUBCUTANEOUS
Status: DISCONTINUED | OUTPATIENT
Start: 2017-04-28 | End: 2017-04-30

## 2017-04-28 RX ORDER — IBUPROFEN 200 MG
16 TABLET ORAL
Status: DISCONTINUED | OUTPATIENT
Start: 2017-04-28 | End: 2017-05-01 | Stop reason: HOSPADM

## 2017-04-28 RX ORDER — INSULIN ASPART 100 [IU]/ML
1-10 INJECTION, SOLUTION INTRAVENOUS; SUBCUTANEOUS
Status: DISCONTINUED | OUTPATIENT
Start: 2017-04-28 | End: 2017-05-01 | Stop reason: HOSPADM

## 2017-04-28 RX ORDER — FUROSEMIDE 20 MG/1
20 TABLET ORAL DAILY
Start: 2017-04-28

## 2017-04-28 RX ORDER — INSULIN ASPART 100 [IU]/ML
10 INJECTION, SOLUTION INTRAVENOUS; SUBCUTANEOUS ONCE
Status: COMPLETED | OUTPATIENT
Start: 2017-04-28 | End: 2017-04-28

## 2017-04-28 RX ADMIN — CLOPIDOGREL 75 MG: 75 TABLET, FILM COATED ORAL at 09:04

## 2017-04-28 RX ADMIN — INSULIN ASPART 4 UNITS: 100 INJECTION, SOLUTION INTRAVENOUS; SUBCUTANEOUS at 03:04

## 2017-04-28 RX ADMIN — INSULIN ASPART 2 UNITS: 100 INJECTION, SOLUTION INTRAVENOUS; SUBCUTANEOUS at 09:04

## 2017-04-28 RX ADMIN — OMEGA-3-ACID ETHYL ESTERS 2 G: 1 CAPSULE, LIQUID FILLED ORAL at 06:04

## 2017-04-28 RX ADMIN — INSULIN DETEMIR 38 UNITS: 100 INJECTION, SOLUTION SUBCUTANEOUS at 10:04

## 2017-04-28 RX ADMIN — INSULIN ASPART 6 UNITS: 100 INJECTION, SOLUTION INTRAVENOUS; SUBCUTANEOUS at 05:04

## 2017-04-28 RX ADMIN — CARVEDILOL 12.5 MG: 12.5 TABLET, FILM COATED ORAL at 10:04

## 2017-04-28 RX ADMIN — HEPARIN SODIUM 5000 UNITS: 5000 INJECTION, SOLUTION INTRAVENOUS; SUBCUTANEOUS at 09:04

## 2017-04-28 RX ADMIN — SODIUM CHLORIDE 2.8 UNITS/HR: 9 INJECTION, SOLUTION INTRAVENOUS at 07:04

## 2017-04-28 RX ADMIN — PANTOPRAZOLE SODIUM 40 MG: 40 TABLET, DELAYED RELEASE ORAL at 09:04

## 2017-04-28 RX ADMIN — ASPIRIN 81 MG CHEWABLE TABLET 81 MG: 81 TABLET CHEWABLE at 09:04

## 2017-04-28 RX ADMIN — INSULIN ASPART 1 UNITS: 100 INJECTION, SOLUTION INTRAVENOUS; SUBCUTANEOUS at 10:04

## 2017-04-28 RX ADMIN — FUROSEMIDE 20 MG: 20 TABLET ORAL at 09:04

## 2017-04-28 RX ADMIN — ROSUVASTATIN CALCIUM 40 MG: 20 TABLET, FILM COATED ORAL at 09:04

## 2017-04-28 RX ADMIN — INSULIN ASPART 8 UNITS: 100 INJECTION, SOLUTION INTRAVENOUS; SUBCUTANEOUS at 01:04

## 2017-04-28 RX ADMIN — OMEGA-3-ACID ETHYL ESTERS 2 G: 1 CAPSULE, LIQUID FILLED ORAL at 09:04

## 2017-04-28 RX ADMIN — HEPARIN SODIUM 5000 UNITS: 5000 INJECTION, SOLUTION INTRAVENOUS; SUBCUTANEOUS at 02:04

## 2017-04-28 RX ADMIN — Medication 10 ML: at 06:04

## 2017-04-28 RX ADMIN — CARVEDILOL 12.5 MG: 12.5 TABLET, FILM COATED ORAL at 09:04

## 2017-04-28 RX ADMIN — Medication 3 ML: at 02:04

## 2017-04-28 RX ADMIN — INSULIN ASPART 10 UNITS: 100 INJECTION, SOLUTION INTRAVENOUS; SUBCUTANEOUS at 04:04

## 2017-04-28 RX ADMIN — LISINOPRIL 10 MG: 10 TABLET ORAL at 09:04

## 2017-04-28 RX ADMIN — Medication 3 ML: at 10:04

## 2017-04-28 RX ADMIN — INSULIN DETEMIR 38 UNITS: 100 INJECTION, SOLUTION SUBCUTANEOUS at 01:04

## 2017-04-28 RX ADMIN — HEPARIN SODIUM 5000 UNITS: 5000 INJECTION, SOLUTION INTRAVENOUS; SUBCUTANEOUS at 06:04

## 2017-04-28 RX ADMIN — CITALOPRAM HYDROBROMIDE 10 MG: 10 TABLET ORAL at 09:04

## 2017-04-28 RX ADMIN — ISOSORBIDE MONONITRATE 60 MG: 30 TABLET, EXTENDED RELEASE ORAL at 09:04

## 2017-04-28 RX ADMIN — INSULIN ASPART 12 UNITS: 100 INJECTION, SOLUTION INTRAVENOUS; SUBCUTANEOUS at 05:04

## 2017-04-28 RX ADMIN — OMEGA-3-ACID ETHYL ESTERS 2 G: 1 CAPSULE, LIQUID FILLED ORAL at 02:04

## 2017-04-28 NOTE — PLAN OF CARE
Faxed PASSR/142 to Sandra, notified CM that [place] and [employer] needs updated therapy notes and orders, will follow.

## 2017-04-28 NOTE — PLAN OF CARE
Problem: Physical Therapy Goal  Goal: Physical Therapy Goal  Goals to be met by: 2017     Patient will increase functional independence with mobility by performin. Sit to stand transfer with Modified [place]  2. Gait x 200 feet with Modified [place] using Single-point Cane .   3. Ascend/descend 3 stair with right Handrails Modified [place] using Single-point Cane .   4. Stand for 15 minutes with Modified [place] using Single-point Cane    Outcome: Ongoing (interventions implemented as appropriate)  Goals remain appropriate

## 2017-04-28 NOTE — PROGRESS NOTES
[facility] Hospital Medicine  Progress Note    Patient Name: Jorden Shafer  MRN: [id number]  Patient Class: IP- Inpatient   Admission Date: 4/24/2017  Length of Stay: 2 days  Attending Physician: Gera Rios MD  Primary Care Provider: Neha Plasencia MD    [facility] Medicine Team: [facility] Gera Rios MD    Subjective:     Principal Problem:Chest pain    HPI:       Hospital Course:       Interval History:     No issues.  Tolerated Echo well.    Review of Systems   All other systems reviewed and are negative.    Objective:     Vital Signs (Most Recent):  Temp: 98.8 °F (37.1 °C) (04/28/17 1200)  Pulse: 64 (04/28/17 1200)  Resp: (!) 22 (04/28/17 1200)  BP: 131/63 (04/28/17 1200)  SpO2: 97 % (04/28/17 1338) Vital Signs (24h Range):  Temp:  [97.8 °F (36.6 °C)-98.8 °F (37.1 °C)] 98.8 °F (37.1 °C)  Pulse:  [56-79] 64  Resp:  [17-22] 22  SpO2:  [93 %-100 %] 97 %  BP: (119-132)/(56-65) 131/63     Weight: 113.9 kg (251 lb)  Body mass index is 38.16 kg/(m^2).    Intake/Output Summary (Last 24 hours) at 04/28/17 1616  Last data filed at 04/28/17 1330   Gross per 24 hour   Intake           803.15 ml   Output              300 ml   Net           503.15 ml      Physical Exam   Constitutional: He appears well-nourished.   Cardiovascular: Normal rate.    Pulmonary/Chest: Effort normal.   Abdominal: Soft.   Neurological: He is alert.   Skin: Skin is warm.   Psychiatric: He has a normal mood and affect.   Nursing note and vitals reviewed.      Significant Labs:   BMP:   Recent Labs  Lab 04/28/17  0616   *      K 3.5      CO2 23   BUN 22   CREATININE 1.2   CALCIUM 9.0     CBC:   Recent Labs  Lab 04/27/17  1213 04/28/17  0616   WBC 8.95 8.52   HGB 16.6 15.7   HCT 47.7 44.0    159       Significant Imaging: None    Assessment/Plan:      * Chest pain  Continue DAPT  Continue asa and bb  Will need follow up with interventional cards      Type 2 diabetes mellitus with  hyperglycemia, with long-term current use of insulin  Basal Bolus Per endocrine, appreciate endocrine's assitance      Stenosis of right carotid artery  Follow up with interventional cards    VTE Risk Mitigation         Ordered     heparin (porcine) injection 5,000 Units  Every 8 hours     Route:  Subcutaneous        04/26/17 1833     Medium Risk of VTE  Once      04/25/17 0352     Place sequential compression device  Until discontinued      04/25/17 0352          Gera Rios MD  Department of Hospital Medicine   [facility]

## 2017-04-28 NOTE — SUBJECTIVE & OBJECTIVE
"Interval HPI:   BG above goal, no hypoglycemia.  On transition insulin gtt at 2.8 u/hr.  Eating well.  Afebrile.    /65 (BP Location: Left arm, Patient Position: Lying, BP Method: Automatic)  Pulse (!) 56  Temp 97.8 °F (36.6 °C) (Oral)   Resp 17  Ht 5' 8" (1.727 m)  Wt 113.9 kg (251 lb)  SpO2 (!) 94%  BMI 38.16 kg/m2    Labs Reviewed and Include      Recent Labs  Lab 04/28/17  0616   *   CALCIUM 9.0      K 3.5   CO2 23      BUN 22   CREATININE 1.2     Lab Results   Component Value Date    WBC 8.52 04/28/2017    HGB 15.7 04/28/2017    HCT 44.0 04/28/2017    MCV 81 (L) 04/28/2017     04/28/2017     No results for input(s): TSH, FREET4 in the last 168 hours.  Lab Results   Component Value Date    HGBA1C 9.3 (H) 04/13/2017       Nutritional status:   Body mass index is 38.16 kg/(m^2).  Lab Results   Component Value Date    ALBUMIN 4.0 04/24/2017    ALBUMIN 3.8 04/13/2017    ALBUMIN 3.2 (L) 04/12/2017     No results found for: PREALBUMIN    Estimated Creatinine Clearance: 68.2 mL/min (based on Cr of 1.2).    Accu-Checks  Recent Labs      04/26/17   1642  04/26/17   2159  04/27/17   0122  04/27/17   0605  04/27/17   0818  04/27/17   1214  04/27/17   1359  04/27/17   1808  04/27/17   2132  04/28/17   0310   POCTGLUCOSE  228*  202*  213*  209*  223*  259*  270*  250*  216*  258*       Current Medications and/or Treatments Impacting Glycemic Control  Immunotherapy:  Immunosuppressants     None        Steroids:   Hormones     None        Pressors:    Autonomic Drugs     None          Transition insulin gtt at 2.8 u/hr  novolog 5-8 units pc   Mod dose correction scale.   Hyperglycemia/Diabetes Medications: Antihyperglycemics     Start     Stop Route Frequency Ordered    04/25/17 1118  insulin regular (Humulin R) 100 Units in sodium chloride 0.9% 100 mL infusion      -- IV Continuous 04/25/17 1121    04/25/17 1220  insulin aspart pen 0-10 Units      -- SubQ As needed (PRN) 04/25/17 1121    " 04/27/17 1130  insulin aspart pen 5-8 Units      -- SubQ 3 times daily with meals 04/27/17 0941

## 2017-04-28 NOTE — ASSESSMENT & PLAN NOTE
BG goal 140-180.     Increase transition infusion to 3.2 u/hr  novolog 6-10 units pc   Moderate dose correction scale  bg monitoring ac/hs/0200.     DISCHARGE PLANNING:  Tentative plan for d/c to SNF when medically stable   1. Recommend to discharge on TBD (will need to assess pts ability to use pump before discharge with pump post stroke) Needs rx? No, reports he has all pump supplies, insulin at home - just needs to bring to the hospital   2. Needs glucometer and supplies? No  3. Follow up with PCP or endocrine?  Endo Dr. Montiel      4. Needs insulin pen training or BG meter training?  TBD, may need MDI refresher if cannot use pump at home

## 2017-04-28 NOTE — PT/OT/SLP PROGRESS
"Speech Language Pathology  Dysphagia Treatment    Jorden Shafer   MRN: [id number]   [id number]/[id number] A    Admitting Diagnosis: Chest pain    Diet recommendations: Solid Diet Level: Dental Soft  Liquid Diet Level: Honey Thick   -second swallow with each bite/sip  -fully upright  -small bites/sips  -alternate bites/sips    SLP Treatment Date: 17  Speech Start Time: 0846     Speech Stop Time: 914     Speech Total (min): 28 min       TREATMENT BILLABLE MINUTES:  Treatment Swallowing Dysfunction 28    Has the patient been evaluated by SLP for swallowing? : Yes  Keep patient NPO?: No   General Precautions: Standard, aspiration, honey thick, fall          Subjective:  Pt awake and HOB elevated. RN present in room. Pt's wife present in room reported pt likes thickened liquids.     Pain Ratin/10  Pain Rating Post-Intervention: 0/10    Objective:   Pt seen to assess diet tolerance and complete dysphagia exercises.   Noted honey thickened liquids at bedside.   Pt's wife reported, "I heard his swallowing test went well and nothing went the wrong way." Pt independently recalled results of swallowing test reporting, "Liquids go into my lungs and I have to swallow twice with everything else." Pt also independently recalled 4 safe swallowing precautions.   SLP provided family education on MBSS results and SLP recommendations. Family verbalized understanding.   Pt assessed with multiple self regulated sips of honey thick water and bites of apple sauce with noted 2-3 swallows per sip/bite with no cues from SLP. Pt still waiting on breakfast tray.   Handout listing swallowing exercises provided.  Pt completed:   · Effortful  swallows x15  · Hard /k/ and /g/ words x30 each  · Falsetto exercise utilizing gliding pitch to increase accuracy of performance x10  · Lingual coordination and strength ing exercises against resistance x15  · Milagro exercise x5; noted difficulty understanding exercise  · Mendelsohn maneuver by vocalizing "kick" " x10; required mod cues to increase accuracy of performance    Assessment:  Jorden Shafer is a 71 y.o. male with a medical diagnosis of Chest pain and presents with oropharyngeal dysphagia. Pt progressing towards goals. ST will continue to follow.     Discharge recommendations: Discharge Facility/Level Of Care Needs: nursing facility, skilled     Goals:   SLP Goals        Problem: SLP Goal    Goal Priority Disciplines Outcome   SLP Goal     SLP Ongoing (interventions implemented as appropriate)   Description:  Speech Language Pathology Goals  Goals expected to be met by 5/4  1. Pt will tolerate soft solids & honey thick liquid with no s/s aspiration  2. Pt will tolerate less restrictive trials to determine when safe for diet upgrades  3. Pt will participate in dysphagia exercises given min A               Plan:   Patient to be seen Therapy Frequency: 5 x/week   Plan of Care expires: 05/25/17  Plan of Care reviewed with: patient, spouse  SLP Follow-up?: Yes              SHELBI White, CCC-SLP  04/28/2017

## 2017-04-28 NOTE — PLAN OF CARE
[facility]     Department of Hospital Medicine     [address] [place] [address]     [phone] after hours  [phone] fax       NURSING HOME ORDERS    04/28/2017    Admit to Nursing Home:  Skilled Bed                                                 Diagnoses:  Active Hospital Problems    Diagnosis  POA    *Chest pain [R07.9]  Yes    NSTEMI (non-ST elevated myocardial infarction) [I21.4]  Yes    Mixed hyperlipidemia [E78.2]  Unknown     Chronic    Unstable angina [I20.0]  Yes    Insulin pump status [Z96.41]  Not Applicable     Chronic    Obesity (BMI 30-39.9) [E66.9]  Yes     Chronic    Type 2 diabetes mellitus with diabetic polyneuropathy, with long-term current use of insulin [E11.42, Z79.4]  Not Applicable     Chronic    Stenosis of right carotid artery [I65.21]  Yes     Chronic    History of CVA (cerebrovascular accident) [Z86.73]  Not Applicable     Chronic    Chronic combined systolic and diastolic heart failure [I50.42]  Yes     Chronic    Essential hypertension [I10]  Yes     Chronic    Hyperlipidemia [E78.5]  Yes     Chronic    Coronary artery disease involving native coronary artery of native heart with unstable angina pectoris [I25.110]  Yes     Chronic    Type 2 diabetes mellitus with hyperglycemia, with long-term current use of insulin [E11.65, Z79.4]  Not Applicable     Chronic     Currently sees Dr. Britton         Resolved Hospital Problems    Diagnosis Date Resolved POA    Stroke [I63.9] 04/25/2017 Yes    Hypertensive heart disease without congestive heart failure [I11.9] 04/13/2017 Yes     Currently sees Dr. Fitch for cardiology          Patient is homebound due to:  Chest pain    Allergies:Review of patient's allergies indicates:  No Known Allergies    Vitals:       Every shift (Skilled Nursing patients)    Diet: Diabetic Diet, Nectar Thick Liquids    Acitivities:     - as tolerated    LABS:  Per facility  protocol      CONSULTS:    Physical Therapy to evaluate and treat     Occupational Therapy to evaluate and treat      DIABETES CARE:        Check blood sugar:     Fingerstick blood sugar a.m and p.m.    Fingerstick blood sugar AC and HS   Fingerstick blood sugar every 6 hours if unable to eat      Report CBG < 60 or > 400 to physician.                                          Insulin Sliding Scale          Glucose  Novolog Insulin Subcutaneous        0 - 60   Orange juice or glucose tablet, hold insulin      No insulin   201-250  2 units   251-300  4 units   301-350  6 units   351-400  8 units   >400   10 units then call physician      Medications: Discontinue all previous medication orders, if any. See new list below.     Jorden Shafer   Home Medication Instructions TATIANA:[id number]    Printed on:04/28/17 1416   Medication Information                      amitriptyline (ELAVIL) 50 MG tablet  Take 1 tablet (50 mg total) by mouth every evening.             aspirin (ECOTRIN) 81 MG EC tablet  Take 81 mg by mouth once daily.             blood sugar diagnostic Strp  1 strip by Misc.(Non-Drug; Combo Route) route 2 (two) times daily with meals. TRUE METRIX             blood-glucose meter kit  Use as instructed          TRUE METRIX             carvedilol (COREG) 12.5 MG tablet  Take 1 tablet (12.5 mg total) by mouth 2 (two) times daily with meals.              citalopram (CELEXA) 10 MG tablet  Take 1 tablet (10 mg total) by mouth once daily.             clopidogrel (PLAVIX) 75 mg tablet  Take 1 tablet (75 mg total) by mouth once daily.             furosemide (LASIX) 20 MG tablet  Take 1 tablet (20 mg total) by mouth once daily.             glucose 4 GM chewable tablet  Take 16 g by mouth as needed for Low blood sugar.             insulin aspart (NOVOLOG) 100 unit/mL InPn pen  Inject 18 Units into the skin 3 (three) times daily.             insulin detemir (LEVEMIR FLEXTOUCH) 100 unit/mL (3 mL) SubQ InPn pen  Inject  42 Units into the skin 2 (two) times daily.             isosorbide mononitrate (IMDUR) 60 MG 24 hr tablet  Take 1 tablet (60 mg total) by mouth once daily.             lancets Misc  1 lancet by Misc.(Non-Drug; Combo Route) route 2 (two) times daily with meals. TRUE METRIX             lisinopril 10 MG tablet  Take 1 tablet (10 mg total) by mouth once daily.             nitroGLYCERIN (NITROSTAT) 0.4 MG SL tablet  Place 1 tablet (0.4 mg total) under the tongue every 5 (five) minutes as needed. Sublingual  ( under tongue) as needed             omega-3 acid ethyl esters (LOVAZA) 1 gram capsule  Take 2 g by mouth 3 (three) times daily.               omeprazole (PRILOSEC) 40 MG capsule  Take 1 capsule (40 mg total) by mouth 2 (two) times daily.             rosuvastatin (CRESTOR) 40 MG Tab  Take 1 tablet (40 mg total) by mouth once daily.                       _________________________________  Gera Rios MD  04/28/2017

## 2017-04-28 NOTE — PLAN OF CARE
Problem: Diabetes, Type 2 (Adult)  Goal: Signs and Symptoms of Listed Potential Problems Will be Absent, Minimized or Managed (Diabetes, Type 2)  Signs and symptoms of listed potential problems will be absent, minimized or managed by discharge/transition of care (reference Diabetes, Type 2 (Adult) CPG).   Outcome: Ongoing (interventions implemented as appropriate)  Pt's BS monitored before meals , insulin drip D/C today , pt has a high BS during the day 325,370,286 . Insulin given as needed on sliding scale and as scheduled with meal , pt received one time dose of 10 unit insulin when his BS was 370 , BS comes down to 286 . Pt is free of S/S of ypo/hyperglycemia during the shift .     Problem: Fall Risk (Adult)  Goal: Absence of Falls  Patient will demonstrate the desired outcomes by discharge/transition of care.   Outcome: Ongoing (interventions implemented as appropriate)  Pt is free of falls and injuries per shift , fall precautions maintained and bed alarm on .

## 2017-04-28 NOTE — PROGRESS NOTES
LOS: 2 days     Subjective:  Mr. Shafer  Reports that he awoke from sleep last night with chest heviness that lasted 5-10 minutes before spontaneously resolving.  He states he did not inform his nurse.  He denies recurrence of chest pain today. He denies orthopnea or PND.    Scheduled Meds:   aspirin  81 mg Oral Daily    carvedilol  12.5 mg Oral BID    citalopram  10 mg Oral Daily    clopidogrel  75 mg Oral Daily    furosemide  20 mg Oral Daily    heparin (porcine)  5,000 Units Subcutaneous Q8H    insulin aspart  8-12 Units Subcutaneous TIDWM    insulin detemir  38 Units Subcutaneous BID    isosorbide mononitrate  60 mg Oral Daily    lisinopril  10 mg Oral Daily    omega-3 acid ethyl esters  2 g Oral TID    pantoprazole  40 mg Oral Daily    rosuvastatin  40 mg Oral Daily    sodium chloride 0.9%  3 mL Intravenous Q8H     Continuous Infusions:   PRN Meds:acetaminophen, dextrose 50%, dextrose 50%, glucagon (human recombinant), glucose, glucose, insulin aspart    Objective:   Vital signs in last 24 hours:  Temp:  [97.8 °F (36.6 °C)-98.8 °F (37.1 °C)] 98.2 °F (36.8 °C)  Pulse:  [56-79] 64  Resp:  [17-22] 20  SpO2:  [93 %-100 %] 100 %  BP: (119-151)/(56-70) 151/70    Intake/Output last 3 shifts:  I/O last 3 completed shifts:  In: 646 [P.O.:620; I.V.:26]  Out: 300 [Urine:300]    Physical Exam:  General appearance: alert, Ox3; conversant  HEENT: right carotid bruit, no JVD; O/p clr  Lungs: clear to auscultation bilaterally; normal resp effort  Heart: regular rate and rhythm, S1, S2 +s4 no m/r/h  Abdomen: soft, non-tender; bowel sounds normal; no masses, no organomegaly  Extremities: trace edema bilaterally at ankles  Neurologic: Cranial nerves: VII: lower facial muscle function reduced on the left  Motor:4/5 L-shoulder and 4/5 L-elbow; 4/5 L-hip, L-knee, L-left foot      Assessment/Plan:      Active Hospital Problems    Diagnosis  POA    *Chest pain [R07.9]  Yes    NSTEMI (non-ST elevated myocardial  infarction) [I21.4]  Yes    Mixed hyperlipidemia [E78.2]  Yes     Chronic    Unstable angina [I20.0]  Yes    Insulin pump status [Z96.41]  Not Applicable     Chronic    Obesity (BMI 30-39.9) [E66.9]  Yes     Chronic    Type 2 diabetes mellitus with diabetic polyneuropathy, with long-term current use of insulin [E11.42, Z79.4]  Not Applicable     Chronic    Stenosis of right carotid artery [I65.21]  Yes     Chronic    History of CVA (cerebrovascular accident) [Z86.73]  Not Applicable     Chronic    Chronic combined systolic and diastolic heart failure [I50.42]  Yes     Chronic    Essential hypertension [I10]  Yes     Chronic    Hyperlipidemia [E78.5]  Yes     Chronic    Coronary artery disease involving native coronary artery of native heart with unstable angina pectoris [I25.110]  Yes     Chronic    Type 2 diabetes mellitus with hyperglycemia, with long-term current use of insulin [E11.65, Z79.4]  Not Applicable     Chronic     Currently sees Dr. Britton             Imp/Rec:  72 y/o gentleman with mutliple CAD risk factors and right MCA territory CVA 2 weeks ago who presented with a NSTEMI on 4/24/17     1) NSTEMI. The patient experienced recurrent angina last night; he has no heart failure symptoms and appears euvolemic on exam except for trace ankle edema  -agree that cardiac catheterization at this time is high risk in light of the patient's recent stroke  -given angina last, recommend titrating up Coreg from 12.5 to 25mg po BID  -please get EKG today  -continue EC ASA 81mg po qday  -continue Plavix 75mg po qday  -continue Crestor 40mg poq day  -TTE reviewed; LVEF=40%       2) ICM. As above  -agree with lisinopril 10mg po qday      3) H/o CVA. patient had recent right MCA stroke and has high grade right carotid stenosis;   -Neurology consult appreciated; case discussed with Dr. HAIR Arenas  -patient is therefore at risk for recurrent CVA and may benefit from right carotid revascularization; with  presenting NSTEMI and large zone of myocardail ischemia on today's stress test, he is at high risk for CEA, but may be a reasonable candidate for carotid artery stenting  -as above agree with Plavix 75mg poq day and Crestor 40mg po day  -will follow-up with patient in clinic in 2-3 weeks to further discuss potential carotid artery stenting     4) HTN. Inadequately controlled at rpesent; as above, rec titrating up Coreg    5) DM2. 4/12/17 HgbA1c = 9.3; rec tight glycemic control  -Endocrinology consult appreciated      All of the patient's questions were answered.

## 2017-04-28 NOTE — PT/OT/SLP PROGRESS
"Physical Therapy  Treatment    Jorden Shafer   MRN: [id number]   Admitting Diagnosis: Chest pain    PT Received On: 17  PT Start Time: 1506     PT Stop Time: 1530    PT Total Time (min): 24 min       Billable Minutes:24    Gait Training9, Therapeutic Activity 5 and Therapeutic Exercise 10    Treatment Type: Treatment  PT/PTA: PTA     PTA Visit Number: 1       General Precautions: Standard, aspiration, honey thick, fall  Orthopedic Precautions: N/A   Braces: N/A    Do you have any cultural, spiritual, Hindu conflicts, given your current situation?: none    Subjective:  Communicated with nsg prior to session.cleared for therapy  "doing fine"    Pain Ratin/10              Pain Rating Post-Intervention: 0/10    Objective:   Patient found with: telemetry    Functional Mobility:  Bed Mobility:   Scooting/Bridging: Stand by Assistance  Supine to Sit: Stand by Assistance (HOB elev)    Transfers:  Sit <> Stand Assistance: Contact Guard Assistance  Sit <> Stand Assistive Device: Straight Cane  Bed <> Chair Technique: Stand Pivot  Bed <> Chair Assistance: Contact Guard Assistance  Bed <> Chair Assistive Device: Straight Cane    Gait:   Gait Distance: ~ 100 ft no LOB HHA/CGA, slow nayeli  Assistance 1: Contact Guard Assistance  Gait Assistive Device: Single point cane  Gait Pattern: 3-point gait  Gait Deviation(s): decreased nayeli, decreased velocity of limb motion, decreased step length, decreased stride length, decreased swing-to-stance ratio, decreased toe-to-floor clearance, decreased weight-shifting ability    Balance:   Static Sit: EOB SBA  Static Stand:CGA with SC  Dynamic stand: CGA while pt places telemetry in pants pocket prior to gait    Therapeutic Activities and Exercises:  x10 reps AP,heel raises,GS,LAQ,hip flex,abd/add    AM-PAC 6 CLICK MOBILITY  How much help from another person does this patient currently need?   1 = Unable, Total/Dependent Assistance  2 = A lot, Maximum/Moderate Assistance  3 " = A little, Minimum/Contact Guard/Supervision  4 = None, Modified [place]/Independent    Turning over in bed (including adjusting bedclothes, sheets and blankets)?: 4  Sitting down on and standing up from a chair with arms (e.g., wheelchair, bedside commode, etc.): 3  Moving from lying on back to sitting on the side of the bed?: 3  Moving to and from a bed to a chair (including a wheelchair)?: 3  Need to walk in hospital room?: 3  Climbing 3-5 steps with a railing?: 2  Total Score: 18    AM-PAC Raw Score CMS G-Code Modifier Level of Impairment Assistance   6 % Total / Unable   7 - 9 CM 80 - 100% Maximal Assist   10 - 14 CL 60 - 80% Moderate Assist   15 - 19 CK 40 - 60% Moderate Assist   20 - 22 CJ 20 - 40% Minimal Assist   23 CI 1-20% SBA / CGA   24 CH 0% Independent/ Mod I     Patient left up in chair with all lines intact, call button in reach, nsg notified and belongings in reach.    Assessment:  Jorden Shafer is a 71 y.o. male with a medical diagnosis of Chest pain Pt tolerated well, able to inc gait distance/endurance today, CGA with SC no LOB, no repts of pain, pt would continue to benefit from skilled PT services to improve overall functional mobility, strength and endurance.      Rehab identified problem list/impairments: Rehab identified problem list/impairments: weakness, impaired endurance, impaired self care skills, impaired functional mobilty, gait instability, impaired balance, decreased upper extremity function, decreased lower extremity function, impaired coordination    Rehab potential is good.    Activity tolerance: Fair    Discharge recommendations: Discharge Facility/Level Of Care Needs: nursing facility, skilled     Barriers to discharge: Barriers to Discharge: None    Equipment recommendations: Equipment Needed After Discharge:  (TBD)     GOALS:   Physical Therapy Goals        Problem: Physical Therapy Goal    Goal Priority Disciplines Outcome Goal Variances Interventions    Physical Therapy Goal     PT/OT, PT Ongoing (interventions implemented as appropriate)     Description:  Goals to be met by: 2017     Patient will increase functional independence with mobility by performin. Sit to stand transfer with Modified [place]  2. Gait  x 200 feet with Modified [place] using Single-point Cane .   3. Ascend/descend 3 stair with right Handrails Modified [place] using Single-point Cane .   4. Stand for 15 minutes with Modified [place] using Single-point Cane                 PLAN:    Patient to be seen 4 x/week  to address the above listed problems via gait training, therapeutic activities, therapeutic exercises, neuromuscular re-education  Plan of Care expires: 17  Plan of Care reviewed with: patient         Barbie Baker, PTA  2017

## 2017-04-28 NOTE — PROGRESS NOTES
"[facility]  Endocrinology  Progress Note    Admit Date: 4/24/2017     Reason for Consult: Management of T2DM, Hyperglycemia , insulin pump management     Surgical Procedure and Date:     Diabetes diagnosis year: ~2002    Home Diabetes Medications:  Medtronic minimed  (settings read via phone per patient's daughter)  Basal: 5 units/hr  ISF 17  I:C 1:4.5  Active insulin time : 4  Target 100-120  Bolus Wizard OFF      How often checking glucose at home? >4 x day   BG readings on regimen: 300-400's for "years"  Hypoglycemia on the regimen?  No  Missed doses on regimen?  Yes - reports does not always count all carbs with meals, does NOT bolus with snacks. Reviewed insulin pump settings on pump via phone and pts daughter reports bolus WIZARD OFF on pump, so concerns if patient is bolusing at all with meals.       Diabetes Complications include:     Hyperglycemia and Diabetic peripheral neuropathy     Complicating diabetes co morbidities:   History of CVA and Residual deficits from CVA       HPI:   Patient is a 71 y.o. male with a diagnosis of T2DM since ~2002. A1c 9.3% upon admission. Followed by Dr. Montiel. On insulin pump therapy for past 5 years, recently changed to Medtronic pump. Per patient report, he does not always bolus with meals, does NOT bolus with snacks. However, post stroke, the patient does have some concerns about managing insulin pump at home.   Chronic medical conditions include CAD, DVT, T2DM with neuropathy, MI, defibrillator placement, CABGx5 in 1989 . He presented to the ED with a complaint of midsternal CP x 5 minutes (tight, squeezing, 6/10), relieved with NTG x1 PTA.     Of note ,pt was discharged from  5 days ago after admission for stroke. + residual left facial droop and left sided weakness.   Endocrine consulted for BG management.       Interval HPI:   BG above goal, no hypoglycemia.  On transition insulin gtt at 2.8 u/hr.  Eating well.  Afebrile.  Discharge to SNF, may " "not be able to use insulin pump there.  Gap of time for getting breakfast this am.     /65 (BP Location: Left arm, Patient Position: Lying, BP Method: Automatic)  Pulse (!) 56  Temp 97.8 °F (36.6 °C) (Oral)   Resp 17  Ht 5' 8" (1.727 m)  Wt 113.9 kg (251 lb)  SpO2 (!) 94%  BMI 38.16 kg/m2    Labs Reviewed and Include      Recent Labs  Lab 04/28/17  0616   *   CALCIUM 9.0      K 3.5   CO2 23      BUN 22   CREATININE 1.2     Lab Results   Component Value Date    WBC 8.52 04/28/2017    HGB 15.7 04/28/2017    HCT 44.0 04/28/2017    MCV 81 (L) 04/28/2017     04/28/2017     No results for input(s): TSH, FREET4 in the last 168 hours.  Lab Results   Component Value Date    HGBA1C 9.3 (H) 04/13/2017       Nutritional status:   Body mass index is 38.16 kg/(m^2).  Lab Results   Component Value Date    ALBUMIN 4.0 04/24/2017    ALBUMIN 3.8 04/13/2017    ALBUMIN 3.2 (L) 04/12/2017     No results found for: PREALBUMIN    Estimated Creatinine Clearance: 68.2 mL/min (based on Cr of 1.2).    Accu-Checks  Recent Labs      04/26/17   1642  04/26/17   2159  04/27/17   0122  04/27/17   0605  04/27/17   0818  04/27/17   1214  04/27/17   1359  04/27/17   1808  04/27/17   2132  04/28/17   0310   POCTGLUCOSE  228*  202*  213*  209*  223*  259*  270*  250*  216*  258*       Current Medications and/or Treatments Impacting Glycemic Control  Immunotherapy:  Immunosuppressants     None        Steroids:   Hormones     None        Pressors:    Autonomic Drugs     None          Transition insulin gtt at 2.8 u/hr  novolog 5-8 units pc   Mod dose correction scale.   Hyperglycemia/Diabetes Medications: Antihyperglycemics     Start     Stop Route Frequency Ordered    04/25/17 1118  insulin regular (Humulin R) 100 Units in sodium chloride 0.9% 100 mL infusion      -- IV Continuous 04/25/17 1121    04/25/17 1220  insulin aspart pen 0-10 Units      -- SubQ As needed (PRN) 04/25/17 1121    04/27/17 1130  insulin " aspart pen 5-8 Units      -- SubQ 3 times daily with meals 04/27/17 0991          ASSESSMENT and PLAN    Type 2 diabetes mellitus with hyperglycemia, with long-term current use of insulin  BG goal 140-180.     Increase transition infusion to 3.2 u/hr--change to levemir 38 units bid  Change novolog to 8-12 units pc   Moderate dose correction scale  bg monitoring ac/hs/0200.     Addendum:  novolog 10 units x 1 now, last bg 370 mg/dl    DISCHARGE PLANNING:  Tentative plan for d/c to SNF when medically stable----recs if not able to use insulin pump,  As of 4/28---- levemir 38 units bid, novolog 14 units w/ scale 150-200 +2, etc.   If on pump, change isf to 20, basal to 4.2 u/hr, icr 1:4.5, target 120, iob 3.5 hours.    1. Recommend to discharge on TBD (will need to assess pts ability to use pump before discharge with pump post stroke) Needs rx? No, reports he has all pump supplies, insulin at home - just needs to bring to the hospital   2. Needs glucometer and supplies? No  3. Follow up with PCP or endocrine?  Endo Dr. Montiel      4. Needs insulin pen training or BG meter training?  TBD, may need MDI refresher if cannot use pump at home     * Chest pain  Per primary    History of CVA (cerebrovascular accident)  CVA, discharged from [facility] 4/2017  Residual deficit may limit full insulin pump usage, will need to assess when pump at bedside    Insulin pump status  Home insulin pump currently off, family instructed to bring to bedside, will need to assess pts ability to use pump before discharge    Obesity (BMI 30-39.9)  Body mass index is 38.16 kg/(m^2). may increase insulin resistance      Type 2 diabetes mellitus with diabetic polyneuropathy, with long-term current use of insulin  See above      QING Garza, FNP  Endocrinology  [facility]

## 2017-04-28 NOTE — PROGRESS NOTES
Pt left the floor for ECHO , on wheelchair  with transporter . Pt is awake ,alert and oriented x 4 . Stable V/S . Pt on continuous insulin drip @ 3.2 cc/hr .

## 2017-04-28 NOTE — PROGRESS NOTES
Pt's BS is 370 , pt ate 1 hr ago and his BS  was 325 and received 8 unit of insulin on sliding scale only because he finish his food before checking the BS . Dr. Rios notified and he asked me to speak with Endocrinology . Spoke with MAURILIO Ramires and she said she will put an order of one time 10 unit of insulin to give it now . Will follow orders .

## 2017-04-28 NOTE — PLAN OF CARE
Problem: SLP Goal  Goal: SLP Goal  Speech Language Pathology Goals  Goals expected to be met by 5/4  1. Pt will tolerate soft solids & honey thick liquid with no s/s aspiration  2. Pt will tolerate less restrictive trials to determine when safe for diet upgrades  3. Pt will participate in dysphagia exercises given min A   Outcome: Ongoing (interventions implemented as appropriate)  Pt seen to assess diet tolerance s/p MBSS and to completed dysphagia therapy. Pt with excellent participation with swallowing exercises and progression towards goals. ST will continue to follow.   JEROD White., CCC-SLP  04/28/2017

## 2017-04-28 NOTE — PROGRESS NOTES
Cardiology Consults  Progress Note                                                              Team: [facility]     Patient Name: Jorden Shafer   YOB: 1945  Location: [address] [place] [address] A    Admit Date: 4/24/2017                     LOS: 2    SUBJECTIVE     INTERVAL HISTORY: MARTINEZ MONTESINOS, patient tolerated stress test well.  Denies SOB.  Continues to have mild angina.    HPI:   Mr. Jorden Shafer is a 71 y.o. male with PMHx significant for CAD (CABG x 5 1989; last cath 06/2016; diffusely occluded native arteries, bypass vessels partially occluded, VENKATA to SVG diagonal/LAD), diastolic HF with preserved EF (last echo 04/13/2017; EF45%, DD), MI with defibrillator placement 06/2016, DM II, HLD, HTN and R MCA stroke with [place] [facility] who presented to the [facility] 4[place] complaining of mid sternal 6/10 chest pain described as tightness that occurred at rest, lasted 5 minutes, and relieved by 1 NTG, ASA and plavix. He denies dizziness, SOB and light headedness at that time. He denies pain migration, nausea and vomiting. Denies diaphoresis.       On admission, trop noted to be 0.027-->0.026-->0.027, though these labs were spaced 1-3 hrs apart.       EKG unremarkable.       Of note, patient had similar presentation in 06/2016 prompting above mentioned cath lab procedure. Last stress echo 04/2016       Impression: ABNORMAL MYOCARDIAL PERFUSION  1. The perfusion scan is free of evidence for myocardial ischemia.   2. There is a moderate fixed defect of severe intensity in the  anteroapical wall of the left ventricle and moderate fixed defect of severe intensity in the  inferoapical wall of the left ventricle  3. There is abnormal wall motion at rest showing akinesis of the anteroapical wall of the left ventricle.   4. There is resting LV dysfunction with a reduced ejection fraction of 36 %.   5. The ventricular volumes are normal at rest and stress.   6. The extracardiac distribution of radioactivity  "is normal.   7. When compared to the previous study from 10/07/2014, similar perfusion findings noted.  LV function further reduced.    REVIEW OF SYSTEMS:  General: No syncope, fatigue, fevers, chills, nausea, or vomiting  HEENT: No HAs; No change in vision or pallor  Cardiac: No angina, palpitations, orthopnea, or PND  Pulmonary: No dyspnea, cough, hemoptysis, or wheezing  GI: No abdominal distention, pain, constipation, or diarrhea  : No dysuria, urgency, frequency, hematuria  Hematologic/Lymphatic: No night sweats, easy bruising, or LAD  Extremities: No claudication, arthralgias, or myalgias  Derm: No cyanosis or rash  Neuro: No focal weakness or numbness      MEDICATIONS:  Scheduled:   aspirin  81 mg Oral Daily    carvedilol  12.5 mg Oral BID    citalopram  10 mg Oral Daily    clopidogrel  75 mg Oral Daily    furosemide  20 mg Oral Daily    heparin (porcine)  5,000 Units Subcutaneous Q8H    insulin aspart  5-8 Units Subcutaneous TIDWM    isosorbide mononitrate  60 mg Oral Daily    lisinopril  10 mg Oral Daily    omega-3 acid ethyl esters  2 g Oral TID    pantoprazole  40 mg Oral Daily    rosuvastatin  40 mg Oral Daily    sodium chloride 0.9%  3 mL Intravenous Q8H     Continuous:   insulin (HUMAN R) infusion (adults) 2.8 Units/hr (04/28/17 0716)     PRN:  acetaminophen, dextrose 50%, dextrose 50%, glucagon (human recombinant), glucose, glucose, insulin aspart      OBJECTIVE:     VITALS  Most Recent Range (Last 24H)   /65 (BP Location: Left arm, Patient Position: Lying, BP Method: Automatic)  Pulse (!) 56  Temp 97.8 °F (36.6 °C) (Oral)   Resp 17  Ht 5' 8" (1.727 m)  Wt 113.9 kg (251 lb)  SpO2 (!) 94%  BMI 38.16 kg/m2 Temp:  [97.3 °F (36.3 °C)-98.8 °F (37.1 °C)]   Pulse:  [56-79]   Resp:  [17-18]   BP: (115-153)/(56-80)   SpO2:  [93 %-96 %]      Intake and Output  BMI     Intake/Output Summary (Last 24 hours) at 04/28/17 0747  Last data filed at 04/27/17 2130   Gross per 24 hour "   Intake              646 ml   Output              300 ml   Net              346 ml       Net I/O since admission: Body mass index is 38.16 kg/(m^2).        PHYSICAL EXAM  General: AAOx3, NAD;  HEENT: NCAT; No conj. injection, pallor, or icterus; No xanthelasma   Neck: Supple; Trachea midline; No JVD; No bruits;   Cardiac: RRR, +S1 & S2; No M/R/G; PMI non-displaced; No thrills or heave   Pulmonary: CTAB; No W/R/R;  Abdominal: Soft, NT/ND +Normoactive BS; No organomegaly; No bruits or pulsatile masses  /Rectal: deferred   Extremities: No clubbing, cyanosis, or edema  Skin: No bruising, rash, ulceration, xanthomata, or splinter hemorrhages present  Neurological: No focal motor or sensory defects; PERRLA, EOMI      LABS  CBC/Anemia Labs Coag Labs     Recent Labs  Lab 04/24/17  2340 04/27/17  1213   WBC 10.24 8.95   HGB 16.0 16.6   HCT 45.0 47.7   MCV 82 83    160    Lab Results   Component Value Date    INR 1.1 04/24/2017    INR 1.1 04/12/2017    INR 1.0 06/29/2016        BMP     Recent Labs  Lab 04/26/17  0624 04/27/17  1212 04/28/17  0616   * 256* 196*    141 138   K 3.6 4.0 3.5    105 105   CO2 22* 23 23   BUN 27* 21 22   CREATININE 1.1 1.1 1.2   CALCIUM 8.9 9.4 9.0      Mag & Phos LFTs     Recent Labs  Lab 04/25/17  1508   MG 1.6      Recent Labs  Lab 04/24/17  2340   PROT 7.4   BILITOT 1.3*   ALKPHOS 98   AST 22   ALT 27             Cardiac Enzymes Ejection Fractions/BNP     Recent Labs  Lab 04/25/17  0304 04/25/17  0416 04/26/17  0851   TROPONINI 0.026 0.027* 0.015    EF   Date Value Ref Range Status   04/13/2017 45 55 - 65    06/30/2016 30 (A) 55 - 65    10/07/2014 55 55 - 65        Recent Labs  Lab 04/24/17  2340   BNP 78        Lab Results   Component Value Date    HGBA1C 9.3 (H) 04/13/2017         Microbiology Results (last 7 days)     ** No results found for the last 168 hours. **          INVESTIGATION RESULTS    Imaging Results         Fl Modified Barium Swallow Speech (Final  result) Result time:  04/27/17 15:40:16    Final result by LEV Reis (04/27/17 15:40:16)    Impression:     Penetration of thin liquid. Penetration and aspiration of nectar thick liquid with a head turn. No penetration or aspiration with honey thick liquid, puree, or solid substances. Please see speech pathology report for further details.  ______________________________________     Electronically signed by resident: MAYE LACEY MD  Date:     04/27/17  Time:    14:47            As the supervising and teaching physician, I personally reviewed the images and resident's interpretation and I agree with the findings.          Electronically signed by: Maryam Donohue  Date:     04/27/17  Time:    15:40     Narrative:    EXAMINATION: Modified Barium Swallow     COMPARISON: None    REASON FOR EXAM: Dysphagia    TECHNIQUE: Video fluoroscopic swallowing examination was performed in conjunction with the Speech Pathology Department.  Both thin and variably thickened liquid barium products as well as puree and solid food substances were used to assess swallowing.     FINDINGS:    Oral Phase: Adequate. Normal oral transit time.    Pharyngeal Phase: Adequate.  Both thin and variably thickened liquid barium products as well as puree and solid food substances were swallowed without difficulty.          - Penetration: Thin and nectar thick liquids.          - Aspiration: Nectar thick liquid while swallowing with a head turn.          - Residual/static material: None          - Strategies trialed: None     Esophageal Phase (if visualized): N/A    Anatomic Observations: None    Fluoroscopy time = 3.7 minute            X-Ray Chest PA And Lateral (Final result) Result time:  04/25/17 00:05:49    Final result by Kelvin Mcclelland MD (04/25/17 00:05:49)    Impression:         Findings suggesting congestive change and bilateral pulmonary edema, slightly more prominent than on previous examination, correlation  recommended.  No large focal consolidation..          Electronically signed by: LIDIA DE LA CRUZ MD  Date:     04/25/17  Time:    00:05     Narrative:    Chest PA and lateral    Indication:Chest pain    Comparison:4/12/2017    Findings: The examination is limited by body habitus.  Left chest wall pacer stable. The cardiomediastinal silhouette is prominent, similar to previous exam noting postsurgical changes and calcification of the aortic arch.  There is no pleural effusion.  The trachea is midline.  The lungs are symmetrically expanded bilaterally with patchy increased interstitial and parenchymal attenuation bilaterally, noting a bilateral lower lung zone predominance. No large focal consolidation seen.  There is no pneumothorax.  The osseous structures are remarkable for degenerative changes of the spine and shoulders.                ASSESSMENT/PLAN:     Current Problems List:  Active Hospital Problems    Diagnosis  POA    *Chest pain [R07.9]  Yes    NSTEMI (non-ST elevated myocardial infarction) [I21.4]  Yes    Mixed hyperlipidemia [E78.2]  Unknown     Chronic    Unstable angina [I20.0]  Yes    Insulin pump status [Z96.41]  Not Applicable     Chronic    Obesity (BMI 30-39.9) [E66.9]  Yes     Chronic    Type 2 diabetes mellitus with diabetic polyneuropathy, with long-term current use of insulin [E11.42, Z79.4]  Not Applicable     Chronic    Stenosis of right carotid artery [I65.21]  Yes     Chronic    History of CVA (cerebrovascular accident) [Z86.73]  Not Applicable     Chronic    Chronic combined systolic and diastolic heart failure [I50.42]  Yes     Chronic    Essential hypertension [I10]  Yes     Chronic    Hyperlipidemia [E78.5]  Yes     Chronic    Coronary artery disease involving native coronary artery of native heart with unstable angina pectoris [I25.110]  Yes     Chronic    Type 2 diabetes mellitus with hyperglycemia, with long-term current use of insulin [E11.65, Z79.4]  Not Applicable      Chronic     Currently sees Dr. Britton         Resolved Hospital Problems    Diagnosis Date Resolved POA    Stroke [I63.9] 04/25/2017 Yes    Hypertensive heart disease without congestive heart failure [I11.9] 04/13/2017 Yes     Currently sees Dr. Fitch for cardiology         Diagnostic Results:  Cardiac Cath 06/2016  1.   Totally occluded native arteries, except for the proximal portion of the LAD/diagonals and septals.   2.   -SVG-to -RCA is occluded.   3.   -SVG-to-OM is patent.   4.   -LIMA-to-LAD is patent but is attached to the proximal LAD before the occlusion, and only feeds this proximal portion.   5.   -Jump graft to diagonal 3+LAD has heavy athrerosclerosis, ostial 85% stenosis, and occlusion of the jump portion that supplies the distal LAD.   6.   We treated the ostium of this SVG-diagonal/LAD using one drug-eluting stent.      Stress Echo 04/2016      Impression: ABNORMAL MYOCARDIAL PERFUSION  1. The perfusion scan is free of evidence for myocardial ischemia.   2. There is a moderate fixed defect of severe intensity in the  anteroapical wall of the left ventricle and moderate fixed defect of severe intensity in the  inferoapical wall of the left ventricle  3. There is abnormal wall motion at rest showing akinesis of the anteroapical wall of the left ventricle.   4. There is resting LV dysfunction with a reduced ejection fraction of 36 %.   5. The ventricular volumes are normal at rest and stress.   6. The extracardiac distribution of radioactivity is normal.   7. When compared to the previous study from 10/07/2014, similar perfusion findings noted.  LV function further reduced.      Bilateral Carotid US 4/12/2017  Occlusion of the mid and proximal segments of the right ICA.  Retrograde flow within the right vertebral artery.    Elevated velocities in the left ICA with peak systolic velocity of 279 cm/s in the left distal ICA with associated ICA to CCA ratio of 2.8, corresponding to greater than 50-69%  luminal narrowing.    Abnormal retrograde flow within the right vertebral artery and bidirectional flow within the left vertebral artery.    Heterogeneous plaque throughout the carotid systems, more so on the left.     CTA Head/Neck 4/13:  Involving right MCA distribution infarct as above, without evidence of hemorrhagic conversion or significant mass effect at this time.    Mild chronic microvascular ischemic changes and small remote right cerebellar infarct.    No evidence of new hemorrhage or major vascular distribution infarct.    CTA demonstrates extensive atherosclerotic disease present throughout the anterior and posterior circulation.     There is a critical stenosis (greater than 90%) of the right carotid bifurcation with associated adaptive narrowing and diminished enhancement of the distal right ICA.    Intermittent occlusion of both vertebral arteries with extensive multifocal narrowing throughout the vertebrobasilar system. Distal vertebrobasilar system thought likely primarily supplied via a prominent left posterior communicating artery.    Multifocal areas of narrowing in the intracranial vasculature, with apparent occlusion of a sylvian branch of the right MCA.    Adenosine Nuclear Stress Test 4/27/2017  EKG Conclusions:    1. The EKG portion of this study is negative for ischemia at a peak heart rate of 68 bpm (46% of predicted).   2. Blood pressure remained stable throughout the protocol  (Presenting BP: 134/63 Peak BP: 190/61).   3. The following arrhythmias were present: rare PACs & PVCs.   4. The patient reported dyspnea during the protocol which resolved in recovery.     Impression: ABNORMAL MYOCARDIAL PERFUSION  1. There is evidence for a large sized area of moderate to severe myocardial ischemia that extends from the base to the distal inferior wall and the distal inferoseptal wall.  There is underlying injury present in the apex and inferorapical walls.   2. There is abnormal wall motion at  rest showing hypokinesis of the apical and inferior walls of the left ventricle.   3. Visually estimated LV function is low normal.   4. The ventricular volumes are normal at rest and stress.   5. The extracardiac distribution of radioactivity is normal.   6. When compared to the previous study from 04/20/2016, there is improvement of the anterior wall defect (not reported) and to a small extent the apex.  There is still infarct seen in the apex and inferoapical wall which appears unchanged.  There is   ischemia in the base to mid-distal inferior wall which also appears unchanged.          ASSESSMENT:   71 y.o. male with PMHx significant for CAD (CABG x 5 1989; last cath 06/2016; diffusely occluded native arteries, bypass vessels partially occluded, VENKATA to SVG diagonal/LAD), diastolic HF with preserved EF (last echo 04/17/2017; EF45%, DD), MI with defibrillator placement 06/2016, DM II, HLD, HTN and R MCA stroke with admission to [facility]- 04/12-4/19 who presented to the [facility] ED 4/25 complaining of mid sternal 6/10 chest pain described as tightness that occurred at rest, lasted 5 minutes, and relieved by 1 NTG, ASA and plavix. Troponin and EKG negative.      PLAN:    Unstable Angina  NSTEMI  - medically managed with aspirin, plavix, coreg, crestor  - Platelet response to plavix low at 88, which means that patient adequately responds to plavix.  - appreciate interventional cards consult, currently cardiac cath is high risk, nuclear stress test shows RCA territory ischemia, continue medical management, check LVEF on TTE, if LVEF <35%, patient should receive LifeVest on discharge.     Severe Right Internal Carotid Stenosis  -severe symptomatic carotid stenosis, is a candidate for R-carotid PTAS  -appreciate interventional cards and stroke consult, high risk for CEA but reasonable candidate for carotid artery stenting, follow up in clinic with interventional cardiology in 2-3 weeks.    ICM  -continue lasix 20 PO daily,  imdur 60 daily, lisinopril 10 daily    Thank you for allowing us to participate in the care of this patient.  Will sign off.  Please do not hesitate to re-consult us as necessary.    José Luis Cruz MD  PGY-1  Pager: [phone]

## 2017-04-28 NOTE — ASSESSMENT & PLAN NOTE
CVA, discharged from [facility] 4/2017  Residual deficit may limit full insulin pump usage, will need to assess when pump at bedside

## 2017-04-28 NOTE — SUBJECTIVE & OBJECTIVE
Interval History:     No issues.  Tolerated Echo well.    Review of Systems   All other systems reviewed and are negative.    Objective:     Vital Signs (Most Recent):  Temp: 98.8 °F (37.1 °C) (04/28/17 1200)  Pulse: 64 (04/28/17 1200)  Resp: (!) 22 (04/28/17 1200)  BP: 131/63 (04/28/17 1200)  SpO2: 97 % (04/28/17 1338) Vital Signs (24h Range):  Temp:  [97.8 °F (36.6 °C)-98.8 °F (37.1 °C)] 98.8 °F (37.1 °C)  Pulse:  [56-79] 64  Resp:  [17-22] 22  SpO2:  [93 %-100 %] 97 %  BP: (119-132)/(56-65) 131/63     Weight: 113.9 kg (251 lb)  Body mass index is 38.16 kg/(m^2).    Intake/Output Summary (Last 24 hours) at 04/28/17 1616  Last data filed at 04/28/17 1330   Gross per 24 hour   Intake           803.15 ml   Output              300 ml   Net           503.15 ml      Physical Exam   Constitutional: He appears well-nourished.   Cardiovascular: Normal rate.    Pulmonary/Chest: Effort normal.   Abdominal: Soft.   Neurological: He is alert.   Skin: Skin is warm.   Psychiatric: He has a normal mood and affect.   Nursing note and vitals reviewed.      Significant Labs:   BMP:   Recent Labs  Lab 04/28/17  0616   *      K 3.5      CO2 23   BUN 22   CREATININE 1.2   CALCIUM 9.0     CBC:   Recent Labs  Lab 04/27/17  1213 04/28/17  0616   WBC 8.95 8.52   HGB 16.6 15.7   HCT 47.7 44.0    159       Significant Imaging: None

## 2017-04-29 LAB
ANION GAP SERPL CALC-SCNC: 11 MMOL/L
BASOPHILS # BLD AUTO: 0.05 K/UL
BASOPHILS NFR BLD: 0.5 %
BUN SERPL-MCNC: 18 MG/DL
CALCIUM SERPL-MCNC: 8.9 MG/DL
CHLORIDE SERPL-SCNC: 107 MMOL/L
CO2 SERPL-SCNC: 22 MMOL/L
CREAT SERPL-MCNC: 1.1 MG/DL
DIFFERENTIAL METHOD: NORMAL
EOSINOPHIL # BLD AUTO: 0.2 K/UL
EOSINOPHIL NFR BLD: 2.1 %
ERYTHROCYTE [DISTWIDTH] IN BLOOD BY AUTOMATED COUNT: 14.1 %
EST. GFR  (AFRICAN AMERICAN): >60 ML/MIN/1.73 M^2
EST. GFR  (NON AFRICAN AMERICAN): >60 ML/MIN/1.73 M^2
GLUCOSE SERPL-MCNC: 180 MG/DL
HCT VFR BLD AUTO: 45.1 %
HGB BLD-MCNC: 15.6 G/DL
LYMPHOCYTES # BLD AUTO: 2.5 K/UL
LYMPHOCYTES NFR BLD: 25.7 %
MCH RBC QN AUTO: 28.9 PG
MCHC RBC AUTO-ENTMCNC: 34.6 %
MCV RBC AUTO: 84 FL
MONOCYTES # BLD AUTO: 0.5 K/UL
MONOCYTES NFR BLD: 4.8 %
NEUTROPHILS # BLD AUTO: 6.4 K/UL
NEUTROPHILS NFR BLD: 66.9 %
PLATELET # BLD AUTO: 163 K/UL
PMV BLD AUTO: 11.2 FL
POCT GLUCOSE: 179 MG/DL (ref 70–110)
POCT GLUCOSE: 182 MG/DL (ref 70–110)
POCT GLUCOSE: 202 MG/DL (ref 70–110)
POCT GLUCOSE: 223 MG/DL (ref 70–110)
POCT GLUCOSE: 244 MG/DL (ref 70–110)
POCT GLUCOSE: 302 MG/DL (ref 70–110)
POTASSIUM SERPL-SCNC: 3.7 MMOL/L
RBC # BLD AUTO: 5.4 M/UL
SODIUM SERPL-SCNC: 140 MMOL/L
WBC # BLD AUTO: 9.61 K/UL

## 2017-04-29 PROCEDURE — 25000003 PHARM REV CODE 250: Performed by: INTERNAL MEDICINE

## 2017-04-29 PROCEDURE — 11000001 HC ACUTE MED/SURG PRIVATE ROOM

## 2017-04-29 PROCEDURE — 99233 SBSQ HOSP IP/OBS HIGH 50: CPT | Mod: ,,, | Performed by: NURSE PRACTITIONER

## 2017-04-29 PROCEDURE — 85025 COMPLETE CBC W/AUTO DIFF WBC: CPT

## 2017-04-29 PROCEDURE — 36415 COLL VENOUS BLD VENIPUNCTURE: CPT

## 2017-04-29 PROCEDURE — 99231 SBSQ HOSP IP/OBS SF/LOW 25: CPT | Mod: ,,, | Performed by: INTERNAL MEDICINE

## 2017-04-29 PROCEDURE — 80048 BASIC METABOLIC PNL TOTAL CA: CPT

## 2017-04-29 RX ORDER — CARVEDILOL 25 MG/1
25 TABLET ORAL 2 TIMES DAILY WITH MEALS
Status: DISCONTINUED | OUTPATIENT
Start: 2017-04-29 | End: 2017-05-01

## 2017-04-29 RX ADMIN — HEPARIN SODIUM 5000 UNITS: 5000 INJECTION, SOLUTION INTRAVENOUS; SUBCUTANEOUS at 01:04

## 2017-04-29 RX ADMIN — INSULIN ASPART 12 UNITS: 100 INJECTION, SOLUTION INTRAVENOUS; SUBCUTANEOUS at 08:04

## 2017-04-29 RX ADMIN — INSULIN ASPART 6 UNITS: 100 INJECTION, SOLUTION INTRAVENOUS; SUBCUTANEOUS at 01:04

## 2017-04-29 RX ADMIN — CLOPIDOGREL 75 MG: 75 TABLET, FILM COATED ORAL at 08:04

## 2017-04-29 RX ADMIN — LISINOPRIL 10 MG: 10 TABLET ORAL at 08:04

## 2017-04-29 RX ADMIN — INSULIN ASPART 2 UNITS: 100 INJECTION, SOLUTION INTRAVENOUS; SUBCUTANEOUS at 09:04

## 2017-04-29 RX ADMIN — INSULIN DETEMIR 38 UNITS: 100 INJECTION, SOLUTION SUBCUTANEOUS at 08:04

## 2017-04-29 RX ADMIN — FUROSEMIDE 20 MG: 20 TABLET ORAL at 08:04

## 2017-04-29 RX ADMIN — ISOSORBIDE MONONITRATE 60 MG: 30 TABLET, EXTENDED RELEASE ORAL at 08:04

## 2017-04-29 RX ADMIN — PANTOPRAZOLE SODIUM 40 MG: 40 TABLET, DELAYED RELEASE ORAL at 08:04

## 2017-04-29 RX ADMIN — HEPARIN SODIUM 5000 UNITS: 5000 INJECTION, SOLUTION INTRAVENOUS; SUBCUTANEOUS at 05:04

## 2017-04-29 RX ADMIN — INSULIN ASPART 12 UNITS: 100 INJECTION, SOLUTION INTRAVENOUS; SUBCUTANEOUS at 06:04

## 2017-04-29 RX ADMIN — OMEGA-3-ACID ETHYL ESTERS 2 G: 1 CAPSULE, LIQUID FILLED ORAL at 01:04

## 2017-04-29 RX ADMIN — INSULIN DETEMIR 38 UNITS: 100 INJECTION, SOLUTION SUBCUTANEOUS at 09:04

## 2017-04-29 RX ADMIN — INSULIN ASPART 12 UNITS: 100 INJECTION, SOLUTION INTRAVENOUS; SUBCUTANEOUS at 01:04

## 2017-04-29 RX ADMIN — Medication 3 ML: at 05:04

## 2017-04-29 RX ADMIN — INSULIN ASPART 4 UNITS: 100 INJECTION, SOLUTION INTRAVENOUS; SUBCUTANEOUS at 06:04

## 2017-04-29 RX ADMIN — HEPARIN SODIUM 5000 UNITS: 5000 INJECTION, SOLUTION INTRAVENOUS; SUBCUTANEOUS at 09:04

## 2017-04-29 RX ADMIN — ASPIRIN 81 MG CHEWABLE TABLET 81 MG: 81 TABLET CHEWABLE at 08:04

## 2017-04-29 RX ADMIN — OMEGA-3-ACID ETHYL ESTERS 2 G: 1 CAPSULE, LIQUID FILLED ORAL at 09:04

## 2017-04-29 RX ADMIN — ROSUVASTATIN CALCIUM 40 MG: 20 TABLET, FILM COATED ORAL at 08:04

## 2017-04-29 RX ADMIN — Medication 3 ML: at 02:04

## 2017-04-29 RX ADMIN — CARVEDILOL 12.5 MG: 12.5 TABLET, FILM COATED ORAL at 08:04

## 2017-04-29 RX ADMIN — CITALOPRAM HYDROBROMIDE 10 MG: 10 TABLET ORAL at 08:04

## 2017-04-29 RX ADMIN — CARVEDILOL 25 MG: 25 TABLET, FILM COATED ORAL at 06:04

## 2017-04-29 RX ADMIN — OMEGA-3-ACID ETHYL ESTERS 2 G: 1 CAPSULE, LIQUID FILLED ORAL at 05:04

## 2017-04-29 NOTE — SUBJECTIVE & OBJECTIVE
"Interval HPI:   Chest pain yesterday evening. He was evaluated by Interventional Cardiology and it was deemed that cardiac cath is high risk given recent CVA. Medication changes were made.     BG are trending near goal. Switched from IV to subQ therapy yesterday. No hypoglycemia, fever, or nausea. On Dental soft, ADA diet. Eating well and has plans to have a piece of lemon pie today with lunch. SNF soon.     BP (!) 166/79 (BP Location: Left arm, Patient Position: Lying, BP Method: Automatic)  Pulse (!) 59  Temp 97.8 °F (36.6 °C) (Oral)   Resp 18  Ht 5' 8" (1.727 m)  Wt 113.9 kg (251 lb)  SpO2 (!) 94%  BMI 38.16 kg/m2    Labs Reviewed and Include      Recent Labs  Lab 04/29/17  0429   *   CALCIUM 8.9      K 3.7   CO2 22*      BUN 18   CREATININE 1.1     Lab Results   Component Value Date    WBC 9.61 04/29/2017    HGB 15.6 04/29/2017    HCT 45.1 04/29/2017    MCV 84 04/29/2017     04/29/2017     No results for input(s): TSH, FREET4 in the last 168 hours.  Lab Results   Component Value Date    HGBA1C 9.3 (H) 04/13/2017       Nutritional status:   Body mass index is 38.16 kg/(m^2).  Lab Results   Component Value Date    ALBUMIN 4.0 04/24/2017    ALBUMIN 3.8 04/13/2017    ALBUMIN 3.2 (L) 04/12/2017     No results found for: PREALBUMIN    Estimated Creatinine Clearance: 74.4 mL/min (based on Cr of 1.1).    Accu-Checks  Recent Labs      04/28/17   0310  04/28/17   0853  04/28/17   1205  04/28/17   1323  04/28/17   1433  04/28/17   1753  04/28/17   2207  04/29/17   0133  04/29/17   0343  04/29/17   0835   POCTGLUCOSE  258*  193*  278*  325*  370*  286*  197*  182*  179*  202*       Current Medications and/or Treatments Impacting Glycemic Control  Immunotherapy:  Immunosuppressants     None        Steroids:   Hormones     None        Pressors:    Autonomic Drugs     None        Hyperglycemia/Diabetes Medications: Antihyperglycemics     Start     Stop Route Frequency Ordered    04/28/17 1130  " insulin aspart pen 8-12 Units      -- SubQ 3 times daily with meals 04/28/17 1107    04/28/17 1130  insulin detemir pen 38 Units      -- SubQ 2 times daily 04/28/17 1124    04/28/17 1224  insulin aspart pen 1-10 Units      -- SubQ Before meals & nightly PRN 04/28/17 1124

## 2017-04-29 NOTE — PROGRESS NOTES
[facility] Hospital Medicine  Progress Note    Patient Name: Jorden Shafer  MRN: [id number]  Patient Class: IP- Inpatient   Admission Date: 4/24/2017  Length of Stay: 3 days  Attending Physician: Gera Rios MD  Primary Care Provider: Neha Plasencia MD    [facility] Medicine Team: [facility]OSP MED G Gera Rios MD    Subjective:     Principal Problem:Chest pain    HPI:       Hospital Course:       Interval History:     Sitting comfortably talking on his phone.  No issues.  Says he is eager to discharge.      Review of Systems   All other systems reviewed and are negative.    Objective:     Vital Signs (Most Recent):  Temp: 97.5 °F (36.4 °C) (04/29/17 1530)  Pulse: 61 (04/29/17 1530)  Resp: 18 (04/29/17 1530)  BP: 135/65 (04/29/17 1530)  SpO2: 95 % (04/29/17 1530) Vital Signs (24h Range):  Temp:  [97.1 °F (36.2 °C)-98.2 °F (36.8 °C)] 97.5 °F (36.4 °C)  Pulse:  [53-68] 61  Resp:  [16-20] 18  SpO2:  [94 %-100 %] 95 %  BP: (119-166)/(58-92) 135/65     Weight: 113.9 kg (251 lb)  Body mass index is 38.16 kg/(m^2).    Intake/Output Summary (Last 24 hours) at 04/29/17 1626  Last data filed at 04/28/17 1800   Gross per 24 hour   Intake              240 ml   Output                0 ml   Net              240 ml      Physical Exam   Constitutional: He is oriented to person, place, and time. He appears well-developed and well-nourished.   HENT:   Head: Atraumatic.   Cardiovascular: Normal rate.    Pulmonary/Chest: Effort normal.   Abdominal: Soft.   Neurological: He is alert and oriented to person, place, and time.   Skin: Skin is warm.   Psychiatric: He has a normal mood and affect.   Nursing note and vitals reviewed.      Significant Labs:   BMP:   Recent Labs  Lab 04/29/17  0429   *      K 3.7      CO2 22*   BUN 18   CREATININE 1.1   CALCIUM 8.9     CBC:   Recent Labs  Lab 04/28/17  0616 04/29/17  0429   WBC 8.52 9.61   HGB 15.7 15.6   HCT 44.0 45.1    163        Significant Imaging: None    Assessment/Plan:      * Chest pain  Continue DAPT  Continue asa and bb  Will need follow up with interventional cards      Type 2 diabetes mellitus with hyperglycemia, with long-term current use of insulin  Basal Bolus Per endocrine, appreciate endocrine's assitance      Stenosis of right carotid artery  Follow up with interventional cards    VTE Risk Mitigation         Ordered     heparin (porcine) injection 5,000 Units  Every 8 hours     Route:  Subcutaneous        04/26/17 1833     Medium Risk of VTE  Once      04/25/17 0352     Place sequential compression device  Until discontinued      04/25/17 0352          Gera Rios MD  Department of Hospital Medicine   [facility]

## 2017-04-29 NOTE — ASSESSMENT & PLAN NOTE
BG goal 140-180. Continue Levemir 38 units bid, Novolog 8-12 units with meals and correction scale 150/25. Monitor AC/HS.     DISCHARGE PLANNING:    Tentative plan for d/c to SNF when medically stable   As of 4/29/17: Levemir 38 units bid, Novolog 12 units w/ scale 150-200 +2, etc.   If on pump, change isf to 20, basal to 4.2 u/hr, icr 1:4.5, target 120, iob 3.5 hours.  1.  Follow up with PCP or endocrine?  Endo Dr. Montiel   Needs insulin pen training or BG meter training?  MDI refresher.

## 2017-04-29 NOTE — PROGRESS NOTES
"[facility]  Endocrinology  Progress Note    Admit Date: 4/24/2017     Reason for Consult: Management of T2DM, Hyperglycemia , insulin pump management     Surgical Procedure and Date:     Diabetes diagnosis year: ~2002    Home Diabetes Medications:  Medtronic minimed  (settings read via phone per patient's daughter)  Basal: 5 units/hr  ISF 17  I:C 1:4.5  Active insulin time : 4  Target 100-120  Bolus Wizard OFF      How often checking glucose at home? >4 x day   BG readings on regimen: 300-400's for "years"  Hypoglycemia on the regimen?  No  Missed doses on regimen?  Yes - reports does not always count all carbs with meals, does NOT bolus with snacks. Reviewed insulin pump settings on pump via phone and pts daughter reports bolus WIZARD OFF on pump, so concerns if patient is bolusing at all with meals.       Diabetes Complications include:     Hyperglycemia and Diabetic peripheral neuropathy     Complicating diabetes co morbidities:   History of CVA and Residual deficits from CVA       HPI:   Patient is a 71 y.o. male with a diagnosis of T2DM since ~2002. A1c 9.3% upon admission. Followed by Dr. Montiel. On insulin pump therapy for past 5 years, recently changed to Medtronic pump. Per patient report, he does not always bolus with meals, does NOT bolus with snacks. However, post stroke, the patient does have some concerns about managing insulin pump at home.   Chronic medical conditions include CAD, DVT, T2DM with neuropathy, MI, defibrillator placement, CABGx5 in 1989 . He presented to the ED with a complaint of midsternal CP x 5 minutes (tight, squeezing, 6/10), relieved with NTG x1 PTA.     Of note ,pt was discharged from  5 days ago after admission for stroke. + residual left facial droop and left sided weakness.   Endocrine consulted for BG management.       Interval HPI:   Chest pain yesterday evening. He was evaluated by Interventional Cardiology and it was deemed that cardiac cath is high " "risk given recent CVA. Medication changes were made.     BG are trending near goal. Switched from IV to subQ therapy yesterday. No hypoglycemia, fever, or nausea. On Dental soft, ADA diet. Eating well and has plans to have a piece of lemon pie today with lunch. SNF soon.     BP (!) 166/79 (BP Location: Left arm, Patient Position: Lying, BP Method: Automatic)  Pulse (!) 59  Temp 97.8 °F (36.6 °C) (Oral)   Resp 18  Ht 5' 8" (1.727 m)  Wt 113.9 kg (251 lb)  SpO2 (!) 94%  BMI 38.16 kg/m2    Labs Reviewed and Include      Recent Labs  Lab 04/29/17  0429   *   CALCIUM 8.9      K 3.7   CO2 22*      BUN 18   CREATININE 1.1     Lab Results   Component Value Date    WBC 9.61 04/29/2017    HGB 15.6 04/29/2017    HCT 45.1 04/29/2017    MCV 84 04/29/2017     04/29/2017     No results for input(s): TSH, FREET4 in the last 168 hours.  Lab Results   Component Value Date    HGBA1C 9.3 (H) 04/13/2017       Nutritional status:   Body mass index is 38.16 kg/(m^2).  Lab Results   Component Value Date    ALBUMIN 4.0 04/24/2017    ALBUMIN 3.8 04/13/2017    ALBUMIN 3.2 (L) 04/12/2017     No results found for: PREALBUMIN    Estimated Creatinine Clearance: 74.4 mL/min (based on Cr of 1.1).    Accu-Checks  Recent Labs      04/28/17   0310  04/28/17   0853  04/28/17   1205  04/28/17   1323  04/28/17   1433  04/28/17   1753  04/28/17   2207  04/29/17   0133  04/29/17   0343  04/29/17   0835   POCTGLUCOSE  258*  193*  278*  325*  370*  286*  197*  182*  179*  202*       Current Medications and/or Treatments Impacting Glycemic Control  Immunotherapy:  Immunosuppressants     None        Steroids:   Hormones     None        Pressors:    Autonomic Drugs     None        Hyperglycemia/Diabetes Medications: Antihyperglycemics     Start     Stop Route Frequency Ordered    04/28/17 1130  insulin aspart pen 8-12 Units      -- SubQ 3 times daily with meals 04/28/17 1107    04/28/17 1130  insulin detemir pen 38 Units      -- " SubQ 2 times daily 04/28/17 1124    04/28/17 1224  insulin aspart pen 1-10 Units      -- SubQ Before meals & nightly PRN 04/28/17 1124          ASSESSMENT and PLAN    Type 2 diabetes mellitus with hyperglycemia, with long-term current use of insulin  BG goal 140-180. Continue Levemir 38 units bid, Novolog 8-12 units with meals and correction scale 150/25. Monitor AC/HS.     DISCHARGE PLANNING:    Tentative plan for d/c to SNF when medically stable   As of 4/29/17: Levemir 38 units bid, Novolog 12 units w/ scale 150-200 +2, etc.   If on pump, change isf to 20, basal to 4.2 u/hr, icr 1:4.5, target 120, iob 3.5 hours.  1.  Follow up with PCP or endocrine?  Endo Dr. Montiel   Needs insulin pen training or BG meter training?  MDI refresher.       Type 2 diabetes mellitus with diabetic polyneuropathy, with long-term current use of insulin  See above      * Chest pain  Per primary      History of CVA (cerebrovascular accident)  CVA, discharged from [facility] 4/2017  Residual deficit may limit full insulin pump usage      Obesity (BMI 30-39.9)  Body mass index is 38.16 kg/(m^2). may increase insulin resistance      Insulin pump status  Home insulin pump currently off. Highly recommend pt d/c on MDI versus pump.       QING Mendosa,ANP-C  Endocrinology  [facility]

## 2017-04-29 NOTE — PLAN OF CARE
CM updated patient on status of discharge; still waiting on insurance auth. Hopefully with get auth on Monday.

## 2017-04-29 NOTE — SUBJECTIVE & OBJECTIVE
Interval History:     Sitting comfortably talking on his phone.  No issues.  Says he is eager to discharge.      Review of Systems   All other systems reviewed and are negative.    Objective:     Vital Signs (Most Recent):  Temp: 97.5 °F (36.4 °C) (04/29/17 1530)  Pulse: 61 (04/29/17 1530)  Resp: 18 (04/29/17 1530)  BP: 135/65 (04/29/17 1530)  SpO2: 95 % (04/29/17 1530) Vital Signs (24h Range):  Temp:  [97.1 °F (36.2 °C)-98.2 °F (36.8 °C)] 97.5 °F (36.4 °C)  Pulse:  [53-68] 61  Resp:  [16-20] 18  SpO2:  [94 %-100 %] 95 %  BP: (119-166)/(58-92) 135/65     Weight: 113.9 kg (251 lb)  Body mass index is 38.16 kg/(m^2).    Intake/Output Summary (Last 24 hours) at 04/29/17 1626  Last data filed at 04/28/17 1800   Gross per 24 hour   Intake              240 ml   Output                0 ml   Net              240 ml      Physical Exam   Constitutional: He is oriented to person, place, and time. He appears well-developed and well-nourished.   HENT:   Head: Atraumatic.   Cardiovascular: Normal rate.    Pulmonary/Chest: Effort normal.   Abdominal: Soft.   Neurological: He is alert and oriented to person, place, and time.   Skin: Skin is warm.   Psychiatric: He has a normal mood and affect.   Nursing note and vitals reviewed.      Significant Labs:   BMP:   Recent Labs  Lab 04/29/17  0429   *      K 3.7      CO2 22*   BUN 18   CREATININE 1.1   CALCIUM 8.9     CBC:   Recent Labs  Lab 04/28/17  0616 04/29/17  0429   WBC 8.52 9.61   HGB 15.7 15.6   HCT 44.0 45.1    163       Significant Imaging: None

## 2017-04-29 NOTE — PLAN OF CARE
Problem: Diabetes, Type 2 (Adult)  Goal: Signs and Symptoms of Listed Potential Problems Will be Absent, Minimized or Managed (Diabetes, Type 2)  Signs and symptoms of listed potential problems will be absent, minimized or managed by discharge/transition of care (reference Diabetes, Type 2 (Adult) CPG).   Outcome: Ongoing (interventions implemented as appropriate)  HO=446 @ 2200, GV=584 @ 0200, AU=889 @ 0400. No coverage needed throughout shift. Will continue to monitor and reassess.     Problem: Fall Risk (Adult)  Goal: Identify Related Risk Factors and Signs and Symptoms  Related risk factors and signs and symptoms are identified upon initiation of Human Response Clinical Practice Guideline (CPG)   Outcome: Ongoing (interventions implemented as appropriate)  No falls/trauma/injury throughout shift. Walked with pt to bathroom with assistance of his cane. Pt gait was steady. Stand-by assistance is needed. Bed in lowest position, non-skid socks on, call bell in reach an bed alarm on.     Problem: Patient Care Overview  Goal: Plan of Care Review  Outcome: Ongoing (interventions implemented as appropriate)  AAOx4. VSS. No acute events throughout shift. Pt expresses interest in being discharged. Will continue to monitor and reassess.

## 2017-04-30 PROBLEM — E66.01 SEVERE OBESITY WITH BODY MASS INDEX (BMI) OF 35.0 TO 39.9 WITH COMORBIDITY: Status: ACTIVE | Noted: 2017-04-25

## 2017-04-30 LAB
ANION GAP SERPL CALC-SCNC: 14 MMOL/L
BASOPHILS # BLD AUTO: 0.05 K/UL
BASOPHILS NFR BLD: 0.6 %
BUN SERPL-MCNC: 17 MG/DL
CALCIUM SERPL-MCNC: 8.7 MG/DL
CHLORIDE SERPL-SCNC: 106 MMOL/L
CO2 SERPL-SCNC: 20 MMOL/L
CREAT SERPL-MCNC: 1 MG/DL
DIFFERENTIAL METHOD: ABNORMAL
EOSINOPHIL # BLD AUTO: 0.1 K/UL
EOSINOPHIL NFR BLD: 1.6 %
ERYTHROCYTE [DISTWIDTH] IN BLOOD BY AUTOMATED COUNT: 13.8 %
EST. GFR  (AFRICAN AMERICAN): >60 ML/MIN/1.73 M^2
EST. GFR  (NON AFRICAN AMERICAN): >60 ML/MIN/1.73 M^2
GLUCOSE SERPL-MCNC: 215 MG/DL
HCT VFR BLD AUTO: 42.9 %
HGB BLD-MCNC: 14.8 G/DL
LYMPHOCYTES # BLD AUTO: 2.2 K/UL
LYMPHOCYTES NFR BLD: 28.8 %
MCH RBC QN AUTO: 28.4 PG
MCHC RBC AUTO-ENTMCNC: 34.5 %
MCV RBC AUTO: 82 FL
MONOCYTES # BLD AUTO: 0.5 K/UL
MONOCYTES NFR BLD: 6.3 %
NEUTROPHILS # BLD AUTO: 4.8 K/UL
NEUTROPHILS NFR BLD: 62.4 %
PLATELET # BLD AUTO: 146 K/UL
PMV BLD AUTO: 11.4 FL
POCT GLUCOSE: 245 MG/DL (ref 70–110)
POCT GLUCOSE: 302 MG/DL (ref 70–110)
POCT GLUCOSE: 311 MG/DL (ref 70–110)
POCT GLUCOSE: 329 MG/DL (ref 70–110)
POTASSIUM SERPL-SCNC: 3.6 MMOL/L
RBC # BLD AUTO: 5.22 M/UL
SODIUM SERPL-SCNC: 140 MMOL/L
WBC # BLD AUTO: 7.73 K/UL

## 2017-04-30 PROCEDURE — 36415 COLL VENOUS BLD VENIPUNCTURE: CPT

## 2017-04-30 PROCEDURE — 97116 GAIT TRAINING THERAPY: CPT

## 2017-04-30 PROCEDURE — 99232 SBSQ HOSP IP/OBS MODERATE 35: CPT | Mod: ,,, | Performed by: NURSE PRACTITIONER

## 2017-04-30 PROCEDURE — 85025 COMPLETE CBC W/AUTO DIFF WBC: CPT

## 2017-04-30 PROCEDURE — 25000003 PHARM REV CODE 250: Performed by: INTERNAL MEDICINE

## 2017-04-30 PROCEDURE — 80048 BASIC METABOLIC PNL TOTAL CA: CPT

## 2017-04-30 PROCEDURE — 11000001 HC ACUTE MED/SURG PRIVATE ROOM

## 2017-04-30 PROCEDURE — 99232 SBSQ HOSP IP/OBS MODERATE 35: CPT | Mod: ,,, | Performed by: INTERNAL MEDICINE

## 2017-04-30 RX ORDER — INSULIN ASPART 100 [IU]/ML
12-15 INJECTION, SOLUTION INTRAVENOUS; SUBCUTANEOUS
Status: DISCONTINUED | OUTPATIENT
Start: 2017-04-30 | End: 2017-05-01

## 2017-04-30 RX ADMIN — PANTOPRAZOLE SODIUM 40 MG: 40 TABLET, DELAYED RELEASE ORAL at 09:04

## 2017-04-30 RX ADMIN — OMEGA-3-ACID ETHYL ESTERS 2 G: 1 CAPSULE, LIQUID FILLED ORAL at 10:04

## 2017-04-30 RX ADMIN — OMEGA-3-ACID ETHYL ESTERS 2 G: 1 CAPSULE, LIQUID FILLED ORAL at 05:04

## 2017-04-30 RX ADMIN — INSULIN ASPART 8 UNITS: 100 INJECTION, SOLUTION INTRAVENOUS; SUBCUTANEOUS at 06:04

## 2017-04-30 RX ADMIN — HEPARIN SODIUM 5000 UNITS: 5000 INJECTION, SOLUTION INTRAVENOUS; SUBCUTANEOUS at 10:04

## 2017-04-30 RX ADMIN — Medication 3 ML: at 05:04

## 2017-04-30 RX ADMIN — CITALOPRAM HYDROBROMIDE 10 MG: 10 TABLET ORAL at 09:04

## 2017-04-30 RX ADMIN — HEPARIN SODIUM 5000 UNITS: 5000 INJECTION, SOLUTION INTRAVENOUS; SUBCUTANEOUS at 05:04

## 2017-04-30 RX ADMIN — INSULIN ASPART 15 UNITS: 100 INJECTION, SOLUTION INTRAVENOUS; SUBCUTANEOUS at 06:04

## 2017-04-30 RX ADMIN — INSULIN ASPART 4 UNITS: 100 INJECTION, SOLUTION INTRAVENOUS; SUBCUTANEOUS at 10:04

## 2017-04-30 RX ADMIN — HEPARIN SODIUM 5000 UNITS: 5000 INJECTION, SOLUTION INTRAVENOUS; SUBCUTANEOUS at 02:04

## 2017-04-30 RX ADMIN — Medication 3 ML: at 10:04

## 2017-04-30 RX ADMIN — Medication 3 ML: at 02:04

## 2017-04-30 RX ADMIN — OMEGA-3-ACID ETHYL ESTERS 2 G: 1 CAPSULE, LIQUID FILLED ORAL at 02:04

## 2017-04-30 RX ADMIN — CLOPIDOGREL 75 MG: 75 TABLET, FILM COATED ORAL at 09:04

## 2017-04-30 RX ADMIN — FUROSEMIDE 20 MG: 20 TABLET ORAL at 09:04

## 2017-04-30 RX ADMIN — CARVEDILOL 25 MG: 25 TABLET, FILM COATED ORAL at 09:04

## 2017-04-30 RX ADMIN — LISINOPRIL 10 MG: 10 TABLET ORAL at 09:04

## 2017-04-30 RX ADMIN — ROSUVASTATIN CALCIUM 40 MG: 20 TABLET, FILM COATED ORAL at 09:04

## 2017-04-30 RX ADMIN — ISOSORBIDE MONONITRATE 60 MG: 30 TABLET, EXTENDED RELEASE ORAL at 09:04

## 2017-04-30 RX ADMIN — INSULIN ASPART 15 UNITS: 100 INJECTION, SOLUTION INTRAVENOUS; SUBCUTANEOUS at 12:04

## 2017-04-30 RX ADMIN — ASPIRIN 81 MG CHEWABLE TABLET 81 MG: 81 TABLET CHEWABLE at 09:04

## 2017-04-30 RX ADMIN — INSULIN ASPART 12 UNITS: 100 INJECTION, SOLUTION INTRAVENOUS; SUBCUTANEOUS at 09:04

## 2017-04-30 RX ADMIN — CARVEDILOL 25 MG: 25 TABLET, FILM COATED ORAL at 06:04

## 2017-04-30 RX ADMIN — INSULIN DETEMIR 38 UNITS: 100 INJECTION, SOLUTION SUBCUTANEOUS at 09:04

## 2017-04-30 NOTE — PT/OT/SLP PROGRESS
Physical Therapy  Treatment    Jorden Shafer   MRN: [id number]   Admitting Diagnosis: Chest pain    PT Received On: 17  PT Start Time: 1045     PT Stop Time: 1054    PT Total Time (min): 9 min       Billable Minutes:  Gait Training9    Treatment Type: Treatment  PT/PTA: PT     PTA Visit Number: 0       General Precautions: Standard, aspiration, fall  Orthopedic Precautions: N/A   Braces: N/A    Do you have any cultural, spiritual, Islam conflicts, given your current situation?: none    Subjective:  Communicated with RN prior to session.  Pt agreeable to therapy session.     Pain Ratin/10              Pain Rating Post-Intervention: 0/10    Objective:   Patient found with: telemetry    Functional Mobility:  Bed Mobility:   Supine to Sit:  (no performed; pt seated in bedside chair)    Transfers:  Sit <> Stand Assistance: Supervision  Sit <> Stand Assistive Device: Straight Cane    Gait:   Gait Distance: ~150ft decreased nayeli; no LOB and mild SOB  Assistance 1: Stand by Assistance  Gait Assistive Device: Single point cane  Gait Pattern: reciprocal  Gait Deviation(s): decreased nayeli, decreased step length, decreased stride length    Stairs:  Pt ascended/descend 3 stair(s) with No Assistive Device with right and handrails with Contact Guard Assistance.     Balance:   Static Sit: GOOD: Takes MODERATE challenges from all directions  Dynamic Sit: GOOD: Maintains balance through MODERATE excursions of active trunk movement  Static Stand: GOOD-: Takes MODERATE challenges from all directions inconsistently  Dynamic stand: FAIR+: Needs CLOSE SUPERVISION during gait and is able to right self with minor LOB     Therapeutic Activities and Exercises:  Pt educated on safety with transfers and amb.  Pt educated on benefits of OP PT; CM notified.   Pt safe to amb in hallway with RN staff or family with SC.      AM-PAC 6 CLICK MOBILITY  How much help from another person does this patient currently need?   1 = Unable,  Total/Dependent Assistance  2 = A lot, Maximum/Moderate Assistance  3 = A little, Minimum/Contact Guard/Supervision  4 = None, Modified [place]/Independent    Turning over in bed (including adjusting bedclothes, sheets and blankets)?: 4  Sitting down on and standing up from a chair with arms (e.g., wheelchair, bedside commode, etc.): 3  Moving from lying on back to sitting on the side of the bed?: 3  Moving to and from a bed to a chair (including a wheelchair)?: 3  Need to walk in hospital room?: 3  Climbing 3-5 steps with a railing?: 3  Total Score: 19    AM-PAC Raw Score CMS G-Code Modifier Level of Impairment Assistance   6 % Total / Unable   7 - 9 CM 80 - 100% Maximal Assist   10 - 14 CL 60 - 80% Moderate Assist   15 - 19 CK 40 - 60% Moderate Assist   20 - 22 CJ 20 - 40% Minimal Assist   23 CI 1-20% SBA / CGA   24 CH 0% Independent/ Mod I     Patient left up in chair with all lines intact, call button in reach and RN notified.    Assessment:  Jorden Shafer is a 71 y.o. male with a medical diagnosis of Chest pain and presents with decreased balance, endurance and overall functional mobility. Pt performed sit<>stand transfers S with SC and amb ~150ft SBA with SC, decreased nayeli; no LOB and mild SOB. Pt ascended/descended 3 steps with R handrail with CGA. Pt with significant progress and educated on benefits of OP PT. Pt will continue to benefit from skilled PT to improve deficits and increase overall functional mobility.     Rehab identified problem list/impairments: Rehab identified problem list/impairments: impaired balance, impaired endurance, impaired functional mobilty, gait instability    Rehab potential is good.    Activity tolerance: Good    Discharge recommendations: Discharge Facility/Level Of Care Needs: outpatient PT (family assist PRN)     Barriers to discharge: Barriers to Discharge: None    Equipment recommendations: Equipment Needed After Discharge: none     GOALS:   Physical  Therapy Goals        Problem: Physical Therapy Goal    Goal Priority Disciplines Outcome Goal Variances Interventions   Physical Therapy Goal     PT/OT, PT Ongoing (interventions implemented as appropriate)     Description:  Goals to be met by: 2017     Patient will increase functional independence with mobility by performin. Sit to stand transfer with Modified [place]  2. Gait  x 200 feet with Modified [place] using Single-point Cane .   3. Ascend/descend 3 stair with right Handrails Modified [place] using Single-point Cane .   4. Stand for 15 minutes with Modified [place] using Single-point Cane                 PLAN:    Patient to be seen 4 x/week  to address the above listed problems via gait training, therapeutic activities, therapeutic exercises, neuromuscular re-education  Plan of Care expires: 17  Plan of Care reviewed with: patient         ERLINDA RIA, PT  2017

## 2017-04-30 NOTE — ASSESSMENT & PLAN NOTE
BG goal 140-180. Increase Levemir to 42 units bid, Novolog 12-15 units with meals and correction scale 150/25. Monitor AC/HS.     DISCHARGE PLANNING:    Tentative plan for d/c to SNF when medically stable, most likely tomorrow.    As of 4/30/17: Levemir 42 units bid, Novolog 15 units w/ scale 150-200 +2, etc.   If on pump, change isf to 20, basal to 4.2 u/hr, icr 1:4.5, target 120, iob 3.5 hours.  1.  Follow up with PCP or endocrine?  Endo Dr. Montiel   Needs insulin pen training or BG meter training?  Upon d/c from SNF, may resume pump and f/u with Dr. Montiel.

## 2017-04-30 NOTE — PLAN OF CARE
Problem: Diabetes, Type 2 (Adult)  Goal: Signs and Symptoms of Listed Potential Problems Will be Absent, Minimized or Managed (Diabetes, Type 2)  Signs and symptoms of listed potential problems will be absent, minimized or managed by discharge/transition of care (reference Diabetes, Type 2 (Adult) CPG).   Outcome: Ongoing (interventions implemented as appropriate)  Pt had no signs/symptoms of DM complications. Evening BG = 223.  2 units aspart insulin given for coverage.    Problem: Fall Risk (Adult)  Goal: Identify Related Risk Factors and Signs and Symptoms  Related risk factors and signs and symptoms are identified upon initiation of Human Response Clinical Practice Guideline (CPG)   Outcome: Ongoing (interventions implemented as appropriate)  Pt ambulates with cane.  Bed alarm set, call bell at bedside, personal items within reach. Pt received assistance ambulating to toilet x 2.  Goal: Absence of Falls  Patient will demonstrate the desired outcomes by discharge/transition of care.   Outcome: Ongoing (interventions implemented as appropriate)  No pt falls this shift.    Problem: Patient Care Overview  Goal: Plan of Care Review  Outcome: Ongoing (interventions implemented as appropriate)  VS stable. No complaints of pain this shift. Cardiac monitoring 56-60s normal sinus rhythm/sinus bradycardia. Pt A & O x 4. No complaints of chest pain. Will continue to monitor pt.

## 2017-04-30 NOTE — PROGRESS NOTES
"[facility]  Endocrinology  Progress Note    Admit Date: 4/24/2017     Reason for Consult: Management of T2DM, Hyperglycemia , insulin pump management     Surgical Procedure and Date:     Diabetes diagnosis year: ~2002    Home Diabetes Medications:  Medtronic minimed  (settings read via phone per patient's daughter)  Basal: 5 units/hr  ISF 17  I:C 1:4.5  Active insulin time : 4  Target 100-120  Bolus Wizard OFF      How often checking glucose at home? >4 x day   BG readings on regimen: 300-400's for "years"  Hypoglycemia on the regimen?  No  Missed doses on regimen?  Yes - reports does not always count all carbs with meals, does NOT bolus with snacks. Reviewed insulin pump settings on pump via phone and pts daughter reports bolus WIZARD OFF on pump, so concerns if patient is bolusing at all with meals.       Diabetes Complications include:     Hyperglycemia and Diabetic peripheral neuropathy     Complicating diabetes co morbidities:   History of CVA and Residual deficits from CVA       HPI:   Patient is a 71 y.o. male with a diagnosis of T2DM since ~2002. A1c 9.3% upon admission. Followed by Dr. Montiel. On insulin pump therapy for past 5 years, recently changed to Medtronic pump. Per patient report, he does not always bolus with meals, does NOT bolus with snacks. However, post stroke, the patient does have some concerns about managing insulin pump at home.   Chronic medical conditions include CAD, DVT, T2DM with neuropathy, MI, defibrillator placement, CABGx5 in 1989 . He presented to the ED with a complaint of midsternal CP x 5 minutes (tight, squeezing, 6/10), relieved with NTG x1 PTA.     Of note ,pt was discharged from  5 days ago after admission for stroke. + residual left facial droop and left sided weakness.   Endocrine consulted for BG management.       Interval HPI:   No acute events overnight. BG uncontrolled. No hypoglycemia or c/o nausea. Afebrile. On a dental soft ADA diet, eating " "well. Denies between meal snacking. Pending d/c to SNF tomorrow.     BP (!) 175/83 (BP Location: Left arm, Patient Position: Lying, BP Method: Automatic)  Pulse (!) 57  Temp 97.6 °F (36.4 °C) (Oral)   Resp 18  Ht 5' 8" (1.727 m)  Wt 113.9 kg (251 lb)  SpO2 95%  BMI 38.16 kg/m2    Labs Reviewed and Include      Recent Labs  Lab 04/30/17  0459   *   CALCIUM 8.7      K 3.6   CO2 20*      BUN 17   CREATININE 1.0     Lab Results   Component Value Date    WBC 7.73 04/30/2017    HGB 14.8 04/30/2017    HCT 42.9 04/30/2017    MCV 82 04/30/2017     (L) 04/30/2017     No results for input(s): TSH, FREET4 in the last 168 hours.  Lab Results   Component Value Date    HGBA1C 9.3 (H) 04/13/2017       Nutritional status:   Body mass index is 38.16 kg/(m^2).  Lab Results   Component Value Date    ALBUMIN 4.0 04/24/2017    ALBUMIN 3.8 04/13/2017    ALBUMIN 3.2 (L) 04/12/2017     No results found for: PREALBUMIN    Estimated Creatinine Clearance: 81.8 mL/min (based on Cr of 1).    Accu-Checks  Recent Labs      04/28/17   1323  04/28/17   1433  04/28/17   1753  04/28/17   2207  04/29/17   0133  04/29/17   0343  04/29/17   0835  04/29/17   1140  04/29/17   1804  04/29/17   2148   POCTGLUCOSE  325*  370*  286*  197*  182*  179*  202*  302*  244*  223*       Current Medications and/or Treatments Impacting Glycemic Control  Immunotherapy:  Immunosuppressants     None        Steroids:   Hormones     None        Pressors:    Autonomic Drugs     None        Hyperglycemia/Diabetes Medications: Antihyperglycemics     Start     Stop Route Frequency Ordered    04/28/17 1130  insulin aspart pen 8-12 Units      -- SubQ 3 times daily with meals 04/28/17 1107    04/28/17 1130  insulin detemir pen 38 Units      -- SubQ 2 times daily 04/28/17 1124    04/28/17 1224  insulin aspart pen 1-10 Units      -- SubQ Before meals & nightly PRN 04/28/17 1124          ASSESSMENT and PLAN    Type 2 diabetes mellitus with " hyperglycemia, with long-term current use of insulin  BG goal 140-180. Increase Levemir to 42 units bid, Novolog 12-15 units with meals and correction scale 150/25. Monitor AC/HS.     DISCHARGE PLANNING:    Tentative plan for d/c to SNF when medically stable, most likely tomorrow.    As of 4/30/17: Levemir 42 units bid, Novolog 15 units w/ scale 150-200 +2, etc.   If on pump, change isf to 20, basal to 4.2 u/hr, icr 1:4.5, target 120, iob 3.5 hours.  1.  Follow up with PCP or endocrine?  Endo Dr. Montiel   Needs insulin pen training or BG meter training?  Upon d/c from SNF, may resume pump and f/u with Dr. Montiel.       Type 2 diabetes mellitus with diabetic polyneuropathy, with long-term current use of insulin  See above      * Chest pain  Per primary      History of CVA (cerebrovascular accident)  CVA, discharged from [facility] 4/2017  Residual deficit may limit full insulin pump usage      Severe obesity with body mass index (BMI) of 35.0 to 39.9 with comorbidity  Body mass index is 38.16 kg/(m^2). may increase insulin resistance      Insulin pump status  Home insulin pump currently off. Highly recommend pt d/c on MDI versus pump.       QING Mendosa,ANP-C  Endocrinology  [facility]

## 2017-04-30 NOTE — ASSESSMENT & PLAN NOTE
CVA, discharged from [facility] 4/2017  Residual deficit may limit full insulin pump usage

## 2017-04-30 NOTE — ASSESSMENT & PLAN NOTE
Continue DAPT  Continue asa and bb  Will need follow up with interventional cards  Pending placement for SNF

## 2017-04-30 NOTE — PROGRESS NOTES
[facility] Hospital Medicine  Progress Note    Patient Name: Jorden Shafer  MRN: [id number]  Patient Class: IP- Inpatient   Admission Date: 4/24/2017  Length of Stay: 4 days  Attending Physician: Gera Rios MD  Primary Care Provider: Neha Plasencia MD    [facility]: [facility] MED G Gera Rios MD    Subjective:     Principal Problem:Chest pain    HPI:       Hospital Course:       Interval History:     Sitting in a chair, watching the rain.  No complaints.      Review of Systems   All other systems reviewed and are negative.    Objective:     Vital Signs (Most Recent):  Temp: 96.8 °F (36 °C) (04/30/17 1108)  Pulse: (!) 59 (04/30/17 1108)  Resp: 18 (04/30/17 1108)  BP: 133/63 (04/30/17 1108)  SpO2: 95 % (04/30/17 1108) Vital Signs (24h Range):  Temp:  [96.8 °F (36 °C)-98.3 °F (36.8 °C)] 96.8 °F (36 °C)  Pulse:  [53-62] 59  Resp:  [16-22] 18  SpO2:  [95 %-98 %] 95 %  BP: (128-175)/(62-83) 133/63     Weight: 113.9 kg (251 lb)  Body mass index is 38.16 kg/(m^2).  No intake or output data in the 24 hours ending 04/30/17 1547   Physical Exam   Constitutional: He appears well-nourished.   Cardiovascular: Normal rate.    Pulmonary/Chest: Effort normal.   Abdominal: Soft.   Neurological: He is alert.   Skin: Skin is warm.   Psychiatric: He has a normal mood and affect.   Nursing note and vitals reviewed.      Significant Labs:   BMP:   Recent Labs  Lab 04/30/17  0459   *      K 3.6      CO2 20*   BUN 17   CREATININE 1.0   CALCIUM 8.7     CBC:   Recent Labs  Lab 04/29/17  0429 04/30/17  0459   WBC 9.61 7.73   HGB 15.6 14.8   HCT 45.1 42.9    146*       Significant Imaging: None    Assessment/Plan:      * Chest pain  Continue DAPT  Continue asa and bb  Will need follow up with interventional cards  Pending placement for SNF      Type 2 diabetes mellitus with hyperglycemia, with long-term current use of insulin  Basal Bolus Per endocrine, appreciate  endocrine's assitance      VTE Risk Mitigation         Ordered     heparin (porcine) injection 5,000 Units  Every 8 hours     Route:  Subcutaneous        04/26/17 1833     Medium Risk of VTE  Once      04/25/17 0352     Place sequential compression device  Until discontinued      04/25/17 0352          Gera Rios MD  Department of Hospital Medicine   [facility]

## 2017-04-30 NOTE — SUBJECTIVE & OBJECTIVE
Interval History:     Sitting in a chair, watching the rain.  No complaints.      Review of Systems   All other systems reviewed and are negative.    Objective:     Vital Signs (Most Recent):  Temp: 96.8 °F (36 °C) (04/30/17 1108)  Pulse: (!) 59 (04/30/17 1108)  Resp: 18 (04/30/17 1108)  BP: 133/63 (04/30/17 1108)  SpO2: 95 % (04/30/17 1108) Vital Signs (24h Range):  Temp:  [96.8 °F (36 °C)-98.3 °F (36.8 °C)] 96.8 °F (36 °C)  Pulse:  [53-62] 59  Resp:  [16-22] 18  SpO2:  [95 %-98 %] 95 %  BP: (128-175)/(62-83) 133/63     Weight: 113.9 kg (251 lb)  Body mass index is 38.16 kg/(m^2).  No intake or output data in the 24 hours ending 04/30/17 1547   Physical Exam   Constitutional: He appears well-nourished.   Cardiovascular: Normal rate.    Pulmonary/Chest: Effort normal.   Abdominal: Soft.   Neurological: He is alert.   Skin: Skin is warm.   Psychiatric: He has a normal mood and affect.   Nursing note and vitals reviewed.      Significant Labs:   BMP:   Recent Labs  Lab 04/30/17  0459   *      K 3.6      CO2 20*   BUN 17   CREATININE 1.0   CALCIUM 8.7     CBC:   Recent Labs  Lab 04/29/17  0429 04/30/17  0459   WBC 9.61 7.73   HGB 15.6 14.8   HCT 45.1 42.9    146*       Significant Imaging: None

## 2017-04-30 NOTE — SUBJECTIVE & OBJECTIVE
"Interval HPI:   No acute events overnight. BG uncontrolled. No hypoglycemia or c/o nausea. Afebrile. On a dental soft ADA diet, eating well. Denies between meal snacking. Pending d/c to SNF tomorrow.     BP (!) 175/83 (BP Location: Left arm, Patient Position: Lying, BP Method: Automatic)  Pulse (!) 57  Temp 97.6 °F (36.4 °C) (Oral)   Resp 18  Ht 5' 8" (1.727 m)  Wt 113.9 kg (251 lb)  SpO2 95%  BMI 38.16 kg/m2    Labs Reviewed and Include      Recent Labs  Lab 04/30/17  0459   *   CALCIUM 8.7      K 3.6   CO2 20*      BUN 17   CREATININE 1.0     Lab Results   Component Value Date    WBC 7.73 04/30/2017    HGB 14.8 04/30/2017    HCT 42.9 04/30/2017    MCV 82 04/30/2017     (L) 04/30/2017     No results for input(s): TSH, FREET4 in the last 168 hours.  Lab Results   Component Value Date    HGBA1C 9.3 (H) 04/13/2017       Nutritional status:   Body mass index is 38.16 kg/(m^2).  Lab Results   Component Value Date    ALBUMIN 4.0 04/24/2017    ALBUMIN 3.8 04/13/2017    ALBUMIN 3.2 (L) 04/12/2017     No results found for: PREALBUMIN    Estimated Creatinine Clearance: 81.8 mL/min (based on Cr of 1).    Accu-Checks  Recent Labs      04/28/17   1323  04/28/17   1433  04/28/17   1753  04/28/17   2207  04/29/17   0133  04/29/17   0343  04/29/17   0835  04/29/17   1140  04/29/17   1804  04/29/17   2148   POCTGLUCOSE  325*  370*  286*  197*  182*  179*  202*  302*  244*  223*       Current Medications and/or Treatments Impacting Glycemic Control  Immunotherapy:  Immunosuppressants     None        Steroids:   Hormones     None        Pressors:    Autonomic Drugs     None        Hyperglycemia/Diabetes Medications: Antihyperglycemics     Start     Stop Route Frequency Ordered    04/28/17 1130  insulin aspart pen 8-12 Units      -- SubQ 3 times daily with meals 04/28/17 1107    04/28/17 1130  insulin detemir pen 38 Units      -- SubQ 2 times daily 04/28/17 1124    04/28/17 1224  insulin aspart pen " 1-10 Units      -- SubQ Before meals & nightly PRN 04/28/17 1124

## 2017-04-30 NOTE — PLAN OF CARE
Problem: Physical Therapy Goal  Goal: Physical Therapy Goal  Goals to be met by: 2017     Patient will increase functional independence with mobility by performin. Sit to stand transfer with Modified [place]  2. Gait x 200 feet with Modified [place] using Single-point Cane .   3. Ascend/descend 3 stair with right Handrails Modified [place] using Single-point Cane .   4. Stand for 15 minutes with Modified [place] using Single-point Cane    Outcome: Ongoing (interventions implemented as appropriate)  Pt progressing towards goals.      ERLINDA ROLLINS, PT  2017

## 2017-05-01 ENCOUNTER — PATIENT OUTREACH (OUTPATIENT)
Dept: ADMINISTRATIVE | Facility: CLINIC | Age: 72
End: 2017-05-01
Payer: MEDICARE

## 2017-05-01 VITALS
BODY MASS INDEX: 38.04 KG/M2 | WEIGHT: 251 LBS | HEART RATE: 59 BPM | OXYGEN SATURATION: 93 % | SYSTOLIC BLOOD PRESSURE: 100 MMHG | DIASTOLIC BLOOD PRESSURE: 58 MMHG | RESPIRATION RATE: 18 BRPM | TEMPERATURE: 96 F | HEIGHT: 68 IN

## 2017-05-01 LAB
ANION GAP SERPL CALC-SCNC: 14 MMOL/L
BUN SERPL-MCNC: 13 MG/DL
CALCIUM SERPL-MCNC: 8.7 MG/DL
CHLORIDE SERPL-SCNC: 104 MMOL/L
CO2 SERPL-SCNC: 22 MMOL/L
CREAT SERPL-MCNC: 1.1 MG/DL
EST. GFR  (AFRICAN AMERICAN): >60 ML/MIN/1.73 M^2
EST. GFR  (NON AFRICAN AMERICAN): >60 ML/MIN/1.73 M^2
GLUCOSE SERPL-MCNC: 178 MG/DL
POCT GLUCOSE: 246 MG/DL (ref 70–110)
POCT GLUCOSE: 310 MG/DL (ref 70–110)
POTASSIUM SERPL-SCNC: 3.7 MMOL/L
SODIUM SERPL-SCNC: 140 MMOL/L

## 2017-05-01 PROCEDURE — 25000003 PHARM REV CODE 250: Performed by: INTERNAL MEDICINE

## 2017-05-01 PROCEDURE — 99232 SBSQ HOSP IP/OBS MODERATE 35: CPT | Mod: ,,, | Performed by: NURSE PRACTITIONER

## 2017-05-01 PROCEDURE — 92526 ORAL FUNCTION THERAPY: CPT

## 2017-05-01 PROCEDURE — 80048 BASIC METABOLIC PNL TOTAL CA: CPT

## 2017-05-01 PROCEDURE — 36415 COLL VENOUS BLD VENIPUNCTURE: CPT

## 2017-05-01 PROCEDURE — 99239 HOSP IP/OBS DSCHRG MGMT >30: CPT | Mod: ,,, | Performed by: INTERNAL MEDICINE

## 2017-05-01 RX ORDER — CARVEDILOL 12.5 MG/1
12.5 TABLET ORAL 2 TIMES DAILY WITH MEALS
Status: DISCONTINUED | OUTPATIENT
Start: 2017-05-01 | End: 2017-05-01 | Stop reason: HOSPADM

## 2017-05-01 RX ORDER — INSULIN ASPART 100 [IU]/ML
16 INJECTION, SOLUTION INTRAVENOUS; SUBCUTANEOUS 3 TIMES DAILY
Refills: 0
Start: 2017-05-01 | End: 2018-05-03

## 2017-05-01 RX ORDER — INSULIN ASPART 100 [IU]/ML
16-20 INJECTION, SOLUTION INTRAVENOUS; SUBCUTANEOUS
Status: DISCONTINUED | OUTPATIENT
Start: 2017-05-01 | End: 2017-05-01 | Stop reason: HOSPADM

## 2017-05-01 RX ADMIN — OMEGA-3-ACID ETHYL ESTERS 2 G: 1 CAPSULE, LIQUID FILLED ORAL at 02:05

## 2017-05-01 RX ADMIN — CITALOPRAM HYDROBROMIDE 10 MG: 10 TABLET ORAL at 08:05

## 2017-05-01 RX ADMIN — INSULIN ASPART 20 UNITS: 100 INJECTION, SOLUTION INTRAVENOUS; SUBCUTANEOUS at 08:05

## 2017-05-01 RX ADMIN — HEPARIN SODIUM 5000 UNITS: 5000 INJECTION, SOLUTION INTRAVENOUS; SUBCUTANEOUS at 02:05

## 2017-05-01 RX ADMIN — PANTOPRAZOLE SODIUM 40 MG: 40 TABLET, DELAYED RELEASE ORAL at 08:05

## 2017-05-01 RX ADMIN — ASPIRIN 81 MG CHEWABLE TABLET 81 MG: 81 TABLET CHEWABLE at 08:05

## 2017-05-01 RX ADMIN — LISINOPRIL 10 MG: 10 TABLET ORAL at 08:05

## 2017-05-01 RX ADMIN — INSULIN ASPART 8 UNITS: 100 INJECTION, SOLUTION INTRAVENOUS; SUBCUTANEOUS at 11:05

## 2017-05-01 RX ADMIN — OMEGA-3-ACID ETHYL ESTERS 2 G: 1 CAPSULE, LIQUID FILLED ORAL at 06:05

## 2017-05-01 RX ADMIN — CLOPIDOGREL 75 MG: 75 TABLET, FILM COATED ORAL at 08:05

## 2017-05-01 RX ADMIN — CARVEDILOL 25 MG: 25 TABLET, FILM COATED ORAL at 08:05

## 2017-05-01 RX ADMIN — ROSUVASTATIN CALCIUM 40 MG: 20 TABLET, FILM COATED ORAL at 08:05

## 2017-05-01 RX ADMIN — Medication 3 ML: at 02:05

## 2017-05-01 RX ADMIN — FUROSEMIDE 20 MG: 20 TABLET ORAL at 08:05

## 2017-05-01 RX ADMIN — ISOSORBIDE MONONITRATE 60 MG: 30 TABLET, EXTENDED RELEASE ORAL at 08:05

## 2017-05-01 RX ADMIN — HEPARIN SODIUM 5000 UNITS: 5000 INJECTION, SOLUTION INTRAVENOUS; SUBCUTANEOUS at 06:05

## 2017-05-01 RX ADMIN — INSULIN ASPART 20 UNITS: 100 INJECTION, SOLUTION INTRAVENOUS; SUBCUTANEOUS at 12:05

## 2017-05-01 NOTE — PT/OT/SLP PROGRESS
Speech Language Pathology  Treatment    Jorden Shafer   MRN: [id number]   Admitting Diagnosis: Chest pain    Diet recommendations: Solid Diet Level: Regular  Liquid Diet Level: Nectar Thick No straws, HOB to 90 degrees, Small bites/sips, Alternating bites/sips, 1 bite/sip at a time, Double swallow with each bite/sip and Assistance with thickening liquids   Recs d/w Dr. Rios    SLP Treatment Date: 17  Speech Start Time: 1131     Speech Stop Time: 1144     Speech Total (min): 13 min       TREATMENT BILLABLE MINUTES:  Treatment Swallowing Dysfunction 13    Has the patient been evaluated by SLP for swallowing? : Yes  Keep patient NPO?: No   General Precautions: Standard, aspiration, fall          Subjective:  Awake & alert, pt seated in chair. Wife at bedside.     Pain Ratin/10              Pain Rating Post-Intervention: 0/10    Objective:   SLP answered wife's questions regarding MBSS results from last week, wife v/u. All recs & precautions reviewed. Pt with honey thickened liquids at bedside. Pt tolerated sips of nectar thick liquid via small cup sips x10, self fed, utilizing double swallow with each sip IND'ly with no s/s aspiration. Pt tolerated bites of regular solid (2 victorino crackers) with no s/s aspiration & adequate oral phase, utilizing double swallow, taking small bites, alternating bites/sips IND'ly. Pt's wife located list of dysphagia exercises in room, pt completed each x2 given min A with good effort & ability. Pt's wife reports that they have been practicing exs IND'ly.                                    Assessment:  Jorden Shafer is a 71 y.o. male with a medical diagnosis of Chest pain and presents with oropharyngeal dysphagia, improving    Discharge recommendations: Discharge Facility/Level Of Care Needs:  (continue ST upon d/c)     Goals:   SLP Goals        Problem: SLP Goal    Goal Priority Disciplines Outcome   SLP Goal     SLP Ongoing (interventions implemented as appropriate)    Description:  Speech Language Pathology Goals  Goals expected to be met by 5/4  1. Pt will tolerate soft solids & honey thick liquid with no s/s aspiration  2. Pt will tolerate less restrictive trials to determine when safe for diet upgrades  3. Pt will participate in dysphagia exercises given min A               Plan:   Patient to be seen Therapy Frequency: 5 x/week   Plan of Care expires: 05/25/17  Plan of Care reviewed with: patient, spouse  SLP Follow-up?: Yes              Margaret De La Cruz, CCC-SLP  05/01/2017   [phone]

## 2017-05-01 NOTE — PLAN OF CARE
Vickie contacted [facility] Ph: [phone], LVM for admissions requesting ASAP call back regarding Pt transfer today. Sw informed admit that Pt has been accepted to multiple other facilities, but the auth is specifically for [facility], for today. Sw awaiting call back from admissions. Vickie LVM for their Sw Vanessa Dawson as well. Sw will continue to follow.      UPDATE 3:21 PM   Sw given # to call report to. RN to call report to [facility], Room 607, DIANA Ortiz ph:[phone]. Vickie gave RN # to call report to. RN ready for Pt to be picked up within the hour. Vickie set up w/c transport with [facility] [phone], requested  within the hour. Vickie sent transport packet to 9th floor RN station. No other Sw needs identified at this time.      UPDATE 3:38 PM   Vickie informed by [facility] that system is backed up and  ETA is likely 2 hrs from now. Vickie informed Pt's RN. Vickie informed Meliza at [facility]. No other Sw needs identified at this time.      Kathy Persaud, ELISA [id number]

## 2017-05-01 NOTE — PROGRESS NOTES
"[facility]  Endocrinology  Progress Note    Admit Date: 4/24/2017     Reason for Consult: Management of T2DM, Hyperglycemia , insulin pump management     Surgical Procedure and Date:     Diabetes diagnosis year: ~2002    Home Diabetes Medications:  Medtronic minimed  (settings read via phone per patient's daughter)  Basal: 5 units/hr  ISF 17  I:C 1:4.5  Active insulin time : 4  Target 100-120  Bolus Wizard OFF      How often checking glucose at home? >4 x day   BG readings on regimen: 300-400's for "years"  Hypoglycemia on the regimen?  No  Missed doses on regimen?  Yes - reports does not always count all carbs with meals, does NOT bolus with snacks. Reviewed insulin pump settings on pump via phone and pts daughter reports bolus WIZARD OFF on pump, so concerns if patient is bolusing at all with meals.       Diabetes Complications include:     Hyperglycemia and Diabetic peripheral neuropathy     Complicating diabetes co morbidities:   History of CVA and Residual deficits from CVA       HPI:   Patient is a 71 y.o. male with a diagnosis of T2DM since ~2002. A1c 9.3% upon admission. Followed by Dr. Montiel. On insulin pump therapy for past 5 years, recently changed to Medtronic pump. Per patient report, he does not always bolus with meals, does NOT bolus with snacks. However, post stroke, the patient does have some concerns about managing insulin pump at home.   Chronic medical conditions include CAD, DVT, T2DM with neuropathy, MI, defibrillator placement, CABGx5 in 1989 . He presented to the ED with a complaint of midsternal CP x 5 minutes (tight, squeezing, 6/10), relieved with NTG x1 PTA.     Of note ,pt was discharged from  5 days ago after admission for stroke. + residual left facial droop and left sided weakness.   Endocrine consulted for BG management.       Interval HPI:   BG uncontrolled with prandial excursions observed. No hypoglycemia. FBG improved. On a dental soft, ADA diet, which he is " "tolerating well. Denies nausea. Afebrile. Pending discharge to SNF soon.     BP (!) 151/72 (BP Location: Left arm, Patient Position: Sitting, BP Method: Automatic)  Pulse (!) 56  Temp 98 °F (36.7 °C) (Oral)   Resp 16  Ht 5' 8" (1.727 m)  Wt 113.9 kg (251 lb)  SpO2 97%  BMI 38.16 kg/m2    Labs Reviewed and Include      Recent Labs  Lab 05/01/17  0522   *   CALCIUM 8.7      K 3.7   CO2 22*      BUN 13   CREATININE 1.1     Lab Results   Component Value Date    WBC 7.73 04/30/2017    HGB 14.8 04/30/2017    HCT 42.9 04/30/2017    MCV 82 04/30/2017     (L) 04/30/2017     No results for input(s): TSH, FREET4 in the last 168 hours.  Lab Results   Component Value Date    HGBA1C 9.3 (H) 04/13/2017       Nutritional status:   Body mass index is 38.16 kg/(m^2).  Lab Results   Component Value Date    ALBUMIN 4.0 04/24/2017    ALBUMIN 3.8 04/13/2017    ALBUMIN 3.2 (L) 04/12/2017     No results found for: PREALBUMIN    Estimated Creatinine Clearance: 74.4 mL/min (based on Cr of 1.1).    Accu-Checks  Recent Labs      04/29/17   0133  04/29/17   0343  04/29/17   0835  04/29/17   1140  04/29/17   1804  04/29/17   2148  04/30/17   0901  04/30/17   1210  04/30/17   1817  04/30/17   2202   POCTGLUCOSE  182*  179*  202*  302*  244*  223*  245*  311*  329*  302*       Current Medications and/or Treatments Impacting Glycemic Control  Immunotherapy:  Immunosuppressants     None        Steroids:   Hormones     None        Pressors:    Autonomic Drugs     None        Hyperglycemia/Diabetes Medications: Antihyperglycemics     Start     Stop Route Frequency Ordered    04/28/17 1224  insulin aspart pen 1-10 Units      -- SubQ Before meals & nightly PRN 04/28/17 1124    04/30/17 1130  insulin aspart pen 12-15 Units      -- SubQ 3 times daily with meals 04/30/17 0925 04/30/17 2100  insulin detemir pen 42 Units      -- SubQ 2 times daily 04/30/17 0925          ASSESSMENT and PLAN    Type 2 diabetes mellitus with " hyperglycemia, with long-term current use of insulin  BG goal 140-180. Continue Levemir 42 units bid, increase Novolog 15-18 units with meals and correction scale 150/25. Monitor AC/HS.     DISCHARGE PLANNING:    Tentative plan for d/c to SNF when medically stable, most likely tomorrow.    As of 5/1/17: Levemir 42 units bid, Novolog 18 units w/ scale 150-200 +2, etc.   If on pump, change isf to 20, basal to 4.2 u/hr, icr 1:3, target 120, iob 3.5 hours.  1.  Follow up with PCP or endocrine?  Endo Dr. Montiel   Needs insulin pen training or BG meter training?  Upon d/c from SNF, may resume pump and f/u with Dr. Montiel.       Type 2 diabetes mellitus with diabetic polyneuropathy, with long-term current use of insulin  See above      * Chest pain  Per primary      History of CVA (cerebrovascular accident)  CVA, discharged from [facility] 4/2017  Residual deficit may limit full insulin pump usage      Severe obesity with body mass index (BMI) of 35.0 to 39.9 with comorbidity  Body mass index is 38.16 kg/(m^2). may increase insulin resistance      Insulin pump status  Home insulin pump currently off. Highly recommend pt d/c on MDI versus pump.       QING Mendosa,ANP-C  Endocrinology  [facility]

## 2017-05-01 NOTE — NURSING
Pt /58 and pulse 59 at 1105; manual BP shows same. No other changes noted. Blanca PRAKASH notified.

## 2017-05-01 NOTE — DISCHARGE SUMMARY
[facility] Hospital Medicine  Discharge Summary      Patient Name: Jorden Shafer  MRN: [id number]  Admission Date: 4/24/2017  Hospital Length of Stay: 5 days  Discharge Date and Time: No discharge date for patient encounter.  Attending Physician: Godwin Cruz MD   Discharging Provider: Godwin Cruz MD  Primary Care Provider: Neha Plasencia MD    [facility] Medicine Team: [facility] HOSP MED  Godwin Cruz MD    HPI:     Mr. Shafer is a 71 YOCM with PMH CAD, DVT, DM II, MI, defibrillator placementthat presents to the ED with a complaint of midsternal CP x 5 minutes (tight, squeezing, 6/10), relieved with NTG x1 PTA. He had some SOB, dizziness, lightheadedness at that time. He denies N,V, palpitations, diaphoresis. He reports distant hx of similar and CABGx5 in 1989. No cardiac evaluations in the past 10 years. Pt was discharged from  5 days ago after admission for stroke. Wife reports residual left facial droop and left sided weakness. Previous MIs presented with neck pain but he denies neck pain. Pt took aspirin and plavix doses tonight.     Pt has not have any recurrnet anginal type pain since admission to ER.      VItals stable, EKG without new injury and trop NEGATIVE,     * No surgery found *      Indwelling Lines/Drains at time of discharge:   Lines/Drains/Airways          No matching active lines, drains, or airways        Hospital Course:     The patient underwent stress which showed a large area of ischemia.  Due to recent stroke the patient will follow outpatient with Interventional Cards for Cath and Carotid Artery Stenting.  He had no further chest pain.  Endocrine helped with his blood sugar management in house.  He was discharged to SNF.      Consults:   Consults         Status Ordering Provider     Inpatient consult to Cardiology  Once     Provider:  (Not yet assigned)    Completed GODWIN CRUZ     Inpatient consult to Endocrinology  Once     Provider:  (Not yet  assigned)    Completed GODWIN CRUZ     Inpatient consult to Interventional Cardiology  Once     Provider:  (Not yet assigned)    Completed AKIL OLIVEIRA     Inpatient consult to Vascular (Stroke) Neurology  Once     Provider:  (Not yet assigned)    Completed GODWIN CRUZ            Pending Diagnostic Studies:     None        Final Active Diagnoses:    Diagnosis Date Noted POA    PRINCIPAL PROBLEM:  Chest pain [R07.9] 04/25/2017 Yes    NSTEMI (non-ST elevated myocardial infarction) [I21.4] 04/27/2017 Yes    Mixed hyperlipidemia [E78.2]  Yes     Chronic    Unstable angina [I20.0] 04/26/2017 Yes    Insulin pump status [Z96.41] 04/25/2017 Not Applicable     Chronic    Severe obesity with body mass index (BMI) of 35.0 to 39.9 with comorbidity [E66.01] 04/25/2017 Yes    Type 2 diabetes mellitus with diabetic polyneuropathy, with long-term current use of insulin [E11.42, Z79.4] 04/25/2017 Not Applicable     Chronic    Stenosis of right carotid artery [I65.21] 04/21/2017 Yes     Chronic    History of CVA (cerebrovascular accident) [Z86.73] 04/13/2017 Not Applicable     Chronic    Chronic combined systolic and diastolic heart failure [I50.42] 11/12/2013 Yes     Chronic    Essential hypertension [I10] 11/10/2013 Yes     Chronic    Hyperlipidemia [E78.5]  Yes     Chronic    Coronary artery disease involving native coronary artery of native heart with unstable angina pectoris [I25.110] 09/26/2012 Yes     Chronic    Type 2 diabetes mellitus with hyperglycemia, with long-term current use of insulin [E11.65, Z79.4] 09/26/2012 Not Applicable     Chronic      Problems Resolved During this Admission:    Diagnosis Date Noted Date Resolved POA    Stroke [I63.9] 04/21/2017 04/25/2017 Yes    Hypertensive heart disease without congestive heart failure [I11.9]  04/13/2017 Yes      Discharged Condition: good    Disposition: Home or Self Care    Follow Up:    Patient Instructions:   No discharge procedures  on file.  Medications:  Reconciled Home Medications:   Current Discharge Medication List      START taking these medications    Details   insulin aspart (NOVOLOG) 100 unit/mL InPn pen Inject 16 Units into the skin 3 (three) times daily.  Refills: 0      insulin detemir (LEVEMIR FLEXTOUCH) 100 unit/mL (3 mL) SubQ InPn pen Inject 42 Units into the skin 2 (two) times daily.  Refills: 0      rosuvastatin (CRESTOR) 40 MG Tab Take 1 tablet (40 mg total) by mouth once daily.         CONTINUE these medications which have CHANGED    Details   carvedilol (COREG) 25 MG tablet Take 0.5 tablets (12.5 mg total) by mouth 2 (two) times daily with meals. Hold is SBP <120.  Qty: 90 tablet, Refills: 3    Associated Diagnoses: Essential hypertension      furosemide (LASIX) 20 MG tablet Take 1 tablet (20 mg total) by mouth once daily.    Comments: **Patient requests 90 days supply**  Associated Diagnoses: Essential hypertension         CONTINUE these medications which have NOT CHANGED    Details   aspirin (ECOTRIN) 81 MG EC tablet Take 81 mg by mouth once daily.      blood sugar diagnostic Strp 1 strip by Misc.(Non-Drug; Combo Route) route 2 (two) times daily with meals. TRUE METRIX  Qty: 100 strip, Refills: 11    Associated Diagnoses: Diabetes mellitus without complication      citalopram (CELEXA) 10 MG tablet Take 1 tablet (10 mg total) by mouth once daily.  Qty: 90 tablet, Refills: 1    Comments: **Patient requests 90 days supply**  Associated Diagnoses: Sleep disturbances      clopidogrel (PLAVIX) 75 mg tablet Take 1 tablet (75 mg total) by mouth once daily.  Qty: 90 tablet, Refills: 3      isosorbide mononitrate (IMDUR) 60 MG 24 hr tablet Take 1 tablet (60 mg total) by mouth once daily.  Qty: 90 tablet, Refills: 3    Associated Diagnoses: Essential hypertension      lisinopril 10 MG tablet Take 1 tablet (10 mg total) by mouth once daily.  Qty: 90 tablet, Refills: 3    Comments: **Patient requests 90 day supply**  Associated  Diagnoses: Essential hypertension      nitroGLYCERIN (NITROSTAT) 0.4 MG SL tablet Place 1 tablet (0.4 mg total) under the tongue every 5 (five) minutes as needed. Sublingual  ( under tongue) as needed  Qty: 30 tablet, Refills: prn    Associated Diagnoses: Other chest pain      omega-3 acid ethyl esters (LOVAZA) 1 gram capsule Take 2 g by mouth 3 (three) times daily.        omeprazole (PRILOSEC) 40 MG capsule Take 1 capsule (40 mg total) by mouth 2 (two) times daily.  Qty: 180 capsule, Refills: 3    Comments: **Patient requests 90 day supply**  Associated Diagnoses: GERD (gastroesophageal reflux disease)      amitriptyline (ELAVIL) 50 MG tablet Take 1 tablet (50 mg total) by mouth every evening.  Qty: 30 tablet, Refills: 2    Associated Diagnoses: Right-sided low back pain with right-sided sciatica      blood-glucose meter kit Use as instructed          TRUE METRIX  Qty: 1 each, Refills: 0    Associated Diagnoses: Diabetes mellitus without complication      glucose 4 GM chewable tablet Take 16 g by mouth as needed for Low blood sugar.      lancets Misc 1 lancet by Misc.(Non-Drug; Combo Route) route 2 (two) times daily with meals. TRUE METRIX  Qty: 100 each, Refills: 11    Associated Diagnoses: Diabetes mellitus without complication         STOP taking these medications       atorvastatin (LIPITOR) 40 MG tablet Comments:   Reason for Stopping:         insulin lispro (HUMALOG) 100 unit/mL injection Comments:   Reason for Stopping:         SUBCUTANEOUS INSULIN PUMP (MINIMED 530G INSULIN PUMP MISC) Comments:   Reason for Stopping:             Time spent on the discharge of patient: 35 minutes    Gera Rios MD  Department of Hospital Medicine  [facility]

## 2017-05-01 NOTE — PLAN OF CARE
05/01/17 1525   Final Note   Assessment Type Final Discharge Note   Discharge Disposition SNF   Discharge planning education complete? Yes   Hospital Follow Up  Appt(s) scheduled? No  (patient will schedule after SNF)   Offered [facility] -- Bedside Delivery? n/a   Discharge/Hospital Encounter Summary to (non-[facility]) PCP n/a   Referral to Outpatient Case Management complete? No   Referral to / orders for Home Health Complete? n/a

## 2017-05-01 NOTE — SUBJECTIVE & OBJECTIVE
"Interval HPI:   BG uncontrolled with prandial excursions observed. No hypoglycemia. FBG improved. On a dental soft, ADA diet, which he is tolerating well. Denies nausea. Afebrile. Pending discharge to SNF soon.     BP (!) 151/72 (BP Location: Left arm, Patient Position: Sitting, BP Method: Automatic)  Pulse (!) 56  Temp 98 °F (36.7 °C) (Oral)   Resp 16  Ht 5' 8" (1.727 m)  Wt 113.9 kg (251 lb)  SpO2 97%  BMI 38.16 kg/m2    Labs Reviewed and Include      Recent Labs  Lab 05/01/17  0522   *   CALCIUM 8.7      K 3.7   CO2 22*      BUN 13   CREATININE 1.1     Lab Results   Component Value Date    WBC 7.73 04/30/2017    HGB 14.8 04/30/2017    HCT 42.9 04/30/2017    MCV 82 04/30/2017     (L) 04/30/2017     No results for input(s): TSH, FREET4 in the last 168 hours.  Lab Results   Component Value Date    HGBA1C 9.3 (H) 04/13/2017       Nutritional status:   Body mass index is 38.16 kg/(m^2).  Lab Results   Component Value Date    ALBUMIN 4.0 04/24/2017    ALBUMIN 3.8 04/13/2017    ALBUMIN 3.2 (L) 04/12/2017     No results found for: PREALBUMIN    Estimated Creatinine Clearance: 74.4 mL/min (based on Cr of 1.1).    Accu-Checks  Recent Labs      04/29/17   0133  04/29/17   0343  04/29/17   0835  04/29/17   1140  04/29/17   1804  04/29/17   2148  04/30/17   0901  04/30/17   1210  04/30/17   1817  04/30/17   2202   POCTGLUCOSE  182*  179*  202*  302*  244*  223*  245*  311*  329*  302*       Current Medications and/or Treatments Impacting Glycemic Control  Immunotherapy:  Immunosuppressants     None        Steroids:   Hormones     None        Pressors:    Autonomic Drugs     None        Hyperglycemia/Diabetes Medications: Antihyperglycemics     Start     Stop Route Frequency Ordered    04/28/17 1224  insulin aspart pen 1-10 Units      -- SubQ Before meals & nightly PRN 04/28/17 1124    04/30/17 1130  insulin aspart pen 12-15 Units      -- SubQ 3 times daily with meals 04/30/17 0925    04/30/17 " 2100  insulin detemir pen 42 Units      -- SubQ 2 times daily 04/30/17 0925

## 2017-05-01 NOTE — NURSING
Report called to JOHNSON Osorio at [facility] [phone]. Discharge instructions reviewed with pt and wife. Both verbalize understanding. IV's d/c'd; cath tips intact. NAD noted. Awaiting wheelchair van transport.

## 2017-05-01 NOTE — PLAN OF CARE
Vickie covering IMG today. Vickie informed by CM that Pt has received auth from insurance and was accepted to [facility] last week. Vickie contacted [facility], [facility] for admissions, informing them of insurance auth and Pt readiness for d/c. Vickie also left auth #[id number] and requested ASAP call back so d/c can be facilitated. Vickie sent MD SNF orders and [facility]. Vickie will continue to follow.      Kathy Persaud LMSW [id number]

## 2017-05-01 NOTE — ASSESSMENT & PLAN NOTE
BG goal 140-180. Continue Levemir 42 units bid, increase Novolog 15-18 units with meals and correction scale 150/25. Monitor AC/HS.     DISCHARGE PLANNING:    Tentative plan for d/c to SNF when medically stable, most likely tomorrow.    As of 5/1/17: Levemir 42 units bid, Novolog 18 units w/ scale 150-200 +2, etc.   If on pump, change isf to 20, basal to 4.2 u/hr, icr 1:3, target 120, iob 3.5 hours.  1.  Follow up with PCP or endocrine?  Endo Dr. Montiel   Needs insulin pen training or BG meter training?  Upon d/c from SNF, may resume pump and f/u with Dr. Montiel.

## 2017-05-01 NOTE — PLAN OF CARE
Problem: SLP Goal  Goal: SLP Goal  Speech Language Pathology Goals  Goals expected to be met by 5/4  1. Pt will tolerate soft solids & honey thick liquid with no s/s aspiration  2. Pt will tolerate less restrictive trials to determine when safe for diet upgrades  3. Pt will participate in dysphagia exercises given min A   Outcome: Ongoing (interventions implemented as appropriate)  Pt participated in ST session.      Margaret De La Cruz MS, CCC-SLP  Speech Language Pathologist  Pager: [phone]  5/1/2017

## 2017-05-01 NOTE — PLAN OF CARE
Problem: Patient Care Overview  Goal: Plan of Care Review  Outcome: Ongoing (interventions implemented as appropriate)  Pt A&O x 4. VS stable. Cardiac monitoring 50s, bradycardic, NSR. No complaints of pain. Urinated x 1, no bowel movement. Bedtime . Pt covered with fast-acting insulin. Will continue to monitor pt.

## 2017-05-01 NOTE — ASSESSMENT & PLAN NOTE
CVA, discharged from [facility] 4/2017  Residual deficit may limit full insulin pump usage

## 2017-05-01 NOTE — PLAN OF CARE
CM confirmed with Danie Charles NP with Endocrine that patient is off the insulin pump until discharge home. Left message for Meliza with Sandra notifying her of above.

## 2017-05-01 NOTE — PLAN OF CARE
Rec'd call from [facility]. [facility] has been approved, auth [id number] for 5/1/07 admit.

## 2017-05-04 ENCOUNTER — OUTPATIENT CASE MANAGEMENT (OUTPATIENT)
Dept: ADMINISTRATIVE | Facility: OTHER | Age: 72
End: 2017-05-04

## 2017-05-04 NOTE — PROGRESS NOTES
Chart reviewed. Call placed to pt's wife, Beth. Pt transferred to [facility] for Skilled nursing. She states pt is doing much better. He is participating in therapy. She states he should go home in 1-2 weeks. Will follow up in 1 week.

## 2017-05-08 NOTE — PHYSICIAN QUERY
PT Name: Jorden Shafer  MR #: [id number]  Physician Query Form - CKD Clarification     CDS/: Terrie Chávez               Contact information: [email or web address]  This form is a permanent document in the medical record.     Query Date: May 8, 2017    By submitting this query, we are merely seeking further clarification of documentation. Please utilize your independent clinical judgment when addressing the question(s) below.    The Medical record contains the following:     Indicators   Supporting Clinical Findings   Location in Medical Record   x CKD or Chronic Kidney (Renal) Failure / Disease Chronic kidney disease  H&P    BUN/Creatinine                          GFR Bun=26*   Cr=1.4  GFR=57.6A ->60  nonGFR 49.8A ->60 Lab 4/24-5/1    Dehydration      Nausea / Vomiting      Dialysis / CRRT      Medication      Treatment     x Other Chronic Conditions PMH CAD, DVT, DM II, MI, defibrillator placementthat presents to the ED with a complaint of midsternal CP  H&P    Other       Provider, please further specify the stage of CKD.      [  ] Chronic Kidney Disease (CKD) (please specify stage* below)       National Kidney foundation Definitions  Stage Description  eGFR (mL/min)   [  ]     I Slight kidney damage with normal or increased filtration 90+   [  ]     II Mildly reduced kidney function 60-89   [ x ]     III Moderately reduced kidney function 30-59   [  ]     IV Severely reduced kidney function 15-29   [  ]     V Kidney failure, requiring transplant or dialysis <15     [  ] Other (please specify): ________________________  [  ] Clinically Undetermined    Please document in your progress notes daily for the duration of treatment until resolved and include in your discharge summary.

## 2017-05-11 ENCOUNTER — OUTPATIENT CASE MANAGEMENT (OUTPATIENT)
Dept: ADMINISTRATIVE | Facility: OTHER | Age: 72
End: 2017-05-11

## 2017-05-11 NOTE — PROGRESS NOTES
Attempted to contact pt for follow up on SNF discharge. No answer. Left message requesting call back.

## 2017-05-14 NOTE — PT/OT/SLP DISCHARGE
Occupational Therapy Discharge Summary    Jorden Shafer  MRN: [id number]   Chest pain   Patient Discharged from acute Occupational Therapy on 5/1/17.  Please refer to prior OT note dated on 4/26/17 for functional status.     Assessment:   Patient was discharge unexpectedly.  Information required to complete and accurate discharge summary is unknown.  Refer to therapy initial evaluation and last progress note for initial and most recent functional status and goal achievement.  Recommendations made may be found in medical record.  GOALS:   Occupational Therapy Goals        Problem: Occupational Therapy Goal    Goal Priority Disciplines Outcome Interventions   Occupational Therapy Goal     OT, PT/OT Ongoing (interventions implemented as appropriate)    Description:  Goals to be met by: 5/3/17    Patient will increase functional independence with ADLs by performing:    UE Dressing with Set-up Assistance  (including managing buttons as able).  LE Dressing with Set-up Assistance (including tying shoes as able)  Grooming while standing at sink with Modified [place].  Toileting from toilet with Modified [place] for hygiene and clothing management.   Supine to sit with [place].  Toilet transfer to toilet with Modified [place].  Home exercise program to improve fine motor skills - x10 reps per handout, with independence.              Reasons for Discontinuation of Therapy Services  Transfer to alternate level of care.      Plan:  Patient Discharged to: Skilled Nursing Facility.

## 2017-05-15 ENCOUNTER — OUTPATIENT CASE MANAGEMENT (OUTPATIENT)
Dept: ADMINISTRATIVE | Facility: OTHER | Age: 72
End: 2017-05-15

## 2017-05-15 ENCOUNTER — TELEPHONE (OUTPATIENT)
Dept: FAMILY MEDICINE | Facility: CLINIC | Age: 72
End: 2017-05-15

## 2017-05-15 NOTE — PROGRESS NOTES
"Follow up with pt. Pt has returned home with SNF. He states "I'm feeling good". Pt is ambulating with a cane. He is requesting home health. Noted call placed to Dr. Plasencia from . Will follow up on home health orders.   "

## 2017-05-15 NOTE — TELEPHONE ENCOUNTER
----- Message from Neha Plasencia MD sent at 5/12/2017  1:19 PM CDT -----  Contact: Tammi    [phone]  Please contact     ----- Message -----     From: Elder Pastor MA     Sent: 5/12/2017  11:53 AM       To: Neha Plasencia MD    Wrong Erinn.     Elder, Orthopedics  ----- Message -----     From: Berna Curry     Sent: 5/12/2017  10:24 AM       To: Erinn ESTRADA Staff    Requesting to speak to you regarding Home Health service for this pt. Pls call Tammi [phone]. Thanks........Shima

## 2017-05-17 ENCOUNTER — PATIENT OUTREACH (OUTPATIENT)
Dept: ADMINISTRATIVE | Facility: CLINIC | Age: 72
End: 2017-05-17
Payer: MEDICARE

## 2017-05-17 NOTE — PATIENT INSTRUCTIONS
Stroke (Completed)    You have had a mild stroke, or cerebrovascular accident (CVA). This is caused by a loss of blood flow to part of your brain. This can occur when a blood clot forms inside the carotid artery (main artery from the heart to the brain) or inside the heart. When the clot travels to the brain, it can lodge in a blood vessel and block blood flow. The other common cause of stroke is a gradual narrowing of the arteries in the brain due to buildup of fatty deposits (plaque).  Symptoms  Blocked blood flow in different areas of the brain can cause different symptoms. If you have had a stroke before, a new one may be different. A memory aid for the basic signs of a stroke is F.A.S.T.  F.A.S.T.  · F: Face drooping, or numbness on one side. This may be more noticeable when you ask the affected person to smile.  · A: Arm weakness or numbness. The affected person may have trouble using or lifting one side.  · S: Speech difficulty. Speech may be slurred or hard to understand. The affected person may also use the wrong words.  · T: Time to call 911. Time is critical in treating a stroke. Call 911 as soon as you suspect a stroke has happened--even a small one. The sooner treatment is started the better, even if the symptoms go away.  Other common symptoms of a stroke include:  · Having difficulty getting the right words to come out  · Weakness in one leg  · Numbness on one side  · Difficulty walking  · Trouble with coordination  · Trouble with vision  · Headache  · Confusion  · Dizziness  Treatment  After you have had a stroke, you are at risk of having another. Be sure to follow up with your healthcare provider for further evaluation and treatment. If problems are found, your healthcare provider will recommend treatment with medicines and/or procedures.  To reduce your chance of having another stroke, you may be prescribed medicines. These include medicines to prevent blood clots, such as antiplatelet or  anticoagulant medicines.  Home care  · Rest at home and avoid exertion for the next few days.  · If your healthcare provider has prescribed medicines, take them as directed.  Follow-up care  Follow up with your healthcare provider, or as advised. Additional tests may be needed. If you had an X-ray, CT scan, MRI, or ECG (electrocardiogram), it will be reviewed by a specialist. You will be notified of any new findings that will affect your care.  Call 911  Contact emergency services right away if any of these occur:  · Any of your stroke symptoms worsen  · New problems with speech, confusion, vision, walking, coordination, facial droop, or weakness or numbness on one side of your body  · Severe headache, fainting spell, dizziness, or seizure  · Chest pain or shortness of breath  Remember F.A.S.T. (described above). If you notice warning signs and symptoms of stroke, CALL 911 without delay.  Date Last Reviewed: 9/21/2015  © 7563-0651 [employer]. [address] [place] [address], [place] [address]. All rights reserved. This information is not intended as a substitute for professional medical care. Always follow your healthcare professional's instructions.

## 2017-05-18 ENCOUNTER — LAB VISIT (OUTPATIENT)
Dept: LAB | Facility: HOSPITAL | Age: 72
End: 2017-05-18
Attending: NEUROLOGICAL SURGERY
Payer: MEDICARE

## 2017-05-18 ENCOUNTER — OFFICE VISIT (OUTPATIENT)
Dept: NEUROLOGY | Facility: CLINIC | Age: 72
End: 2017-05-18
Payer: MEDICARE

## 2017-05-18 VITALS
WEIGHT: 239.19 LBS | HEIGHT: 68 IN | BODY MASS INDEX: 36.25 KG/M2 | SYSTOLIC BLOOD PRESSURE: 121 MMHG | HEART RATE: 64 BPM | DIASTOLIC BLOOD PRESSURE: 60 MMHG

## 2017-05-18 DIAGNOSIS — E78.5 HYPERLIPIDEMIA, UNSPECIFIED HYPERLIPIDEMIA TYPE: ICD-10-CM

## 2017-05-18 DIAGNOSIS — E66.01 SEVERE OBESITY WITH BODY MASS INDEX (BMI) OF 35.0 TO 39.9 WITH COMORBIDITY: ICD-10-CM

## 2017-05-18 DIAGNOSIS — R53.1 WEAKNESS: ICD-10-CM

## 2017-05-18 DIAGNOSIS — I10 ESSENTIAL HYPERTENSION: Chronic | ICD-10-CM

## 2017-05-18 DIAGNOSIS — Z86.73 HISTORY OF CVA (CEREBROVASCULAR ACCIDENT): Primary | Chronic | ICD-10-CM

## 2017-05-18 LAB
CHOLEST/HDLC SERPL: 3.3 {RATIO}
HDL/CHOLESTEROL RATIO: 29.9 %
HDLC SERPL-MCNC: 26 MG/DL
HDLC SERPL-MCNC: 87 MG/DL
LDLC SERPL CALC-MCNC: 1 MG/DL
NONHDLC SERPL-MCNC: 61 MG/DL
TRIGL SERPL-MCNC: 300 MG/DL

## 2017-05-18 PROCEDURE — 1126F AMNT PAIN NOTED NONE PRSNT: CPT | Mod: S$GLB,,, | Performed by: NEUROLOGICAL SURGERY

## 2017-05-18 PROCEDURE — 99999 PR PBB SHADOW E&M-EST. PATIENT-LVL II: CPT | Mod: PBBFAC,,, | Performed by: NEUROLOGICAL SURGERY

## 2017-05-18 PROCEDURE — 80061 LIPID PANEL: CPT

## 2017-05-18 PROCEDURE — 1160F RVW MEDS BY RX/DR IN RCRD: CPT | Mod: S$GLB,,, | Performed by: NEUROLOGICAL SURGERY

## 2017-05-18 PROCEDURE — 3078F DIAST BP <80 MM HG: CPT | Mod: S$GLB,,, | Performed by: NEUROLOGICAL SURGERY

## 2017-05-18 PROCEDURE — 36415 COLL VENOUS BLD VENIPUNCTURE: CPT

## 2017-05-18 PROCEDURE — 1159F MED LIST DOCD IN RCRD: CPT | Mod: S$GLB,,, | Performed by: NEUROLOGICAL SURGERY

## 2017-05-18 PROCEDURE — 99215 OFFICE O/P EST HI 40 MIN: CPT | Mod: S$GLB,,, | Performed by: NEUROLOGICAL SURGERY

## 2017-05-18 PROCEDURE — 3074F SYST BP LT 130 MM HG: CPT | Mod: S$GLB,,, | Performed by: NEUROLOGICAL SURGERY

## 2017-05-18 PROCEDURE — 99499 UNLISTED E&M SERVICE: CPT | Mod: S$GLB,,, | Performed by: NEUROLOGICAL SURGERY

## 2017-05-18 NOTE — MR AVS SNAPSHOT
[facility]  [address] [facility] [address]  [place] [address]  Phone: [phone]  Fax: [phone]                  Jorden Shafer   2017 9:20 AM   Office Visit    Description:  Male : 1945   Provider:  Lito Johnson MD   Department:  [facility]- Neurology           Reason for Visit     Stroke           Diagnoses this Visit        Comments    History of CVA (cerebrovascular accident)    -  Primary     Hyperlipidemia, unspecified hyperlipidemia type         Weakness         Severe obesity with body mass index (BMI) of 35.0 to 39.9 with comorbidity         Essential hypertension                To Do List           Future Appointments        Provider Department Dept Phone    2017 2:40 PM Neha Plasencia MD [facility] - Family Medicine [phone]    2017 9:15 AM Brii Donnelly DPM [facility] - Podiatry [phone]    2017 1:20 PM Marcus Blakely MD [facility]-Interventional Card [phone]      Goals (5 Years of Data)              5/15/17    Patient/caregiver will have an action plan in place to manage and prevent complications of  CVA prior to discharge from [facility].   On track    Notes - Note edited  5/15/2017  3:58 PM by Elvia Monteiro RN    Overall Time to Completion  2 months from 2017    Short Term Goals  Patient/caregiver will discuss health care needs with Physician and care team during visits or using Patient Portal.  Interventions   · Empower patient/caregiver to discuss treatment plan with Physician/care team.   Status  · Met    Patient/caregiver will maintain follow-up appointment with Physician within 24 hrs  Interventions   · Encourage compliance with Physician follow-ups.   Status  · Partially met    Patient/caregiver will obtain wheelchair to support disease process within 2 weeks.  Interventions   · Facilitate to needed DME.   Status  · Partially met    Patient/caregiver will put in place 3 keeping room free of clutter, making sure rooms are well  lit and removing throw rugs measures to decrease the risk of patient falls within 2 weeks.  Interventions   · Recognize and provide educational material (ESTEPHANIE).   · Facilitate home PT referral   Status  · Partially met    Patient/caregiver will verbalize 3 red flags of Cerebral Vascular Accident and know when to contact Physician within 1 week.  Interventions   · Recognize and provide educational material (ESTEPHANIE).   · Educate on fall and aspiration precautions.    Status  · Partially met          Patient/caregiver will have knowledge of resources available in order to obtain the services that are needed prior to discharge from [facility].       Notes - Note created  4/27/2017 10:07 AM by Elvia Monteiro RN    Overall Time to Completion  2 months from 04/27/2017    Short Term Goals  Patient/caregiver will verbalize understanding and will follow hospital discharge plan 2 weeks.  Interventions   · Collaborate with Inpatient case management regarding Discharge Plan.   Status  · Partially met            [facility]     [facility] On Call Nurse Care Line - 24/7 Assistance  Unless otherwise directed by your provider, please contact [facility], our nurse care line that is available for 24/7 assistance.     Registered nurses in the [facility] On Call Center provide: appointment scheduling, clinical advisement, health education, and other advisory services.  Call: [phone] (toll free)               Medications           Message regarding Medications     Verify the changes and/or additions to your medication regime listed below are the same as discussed with your clinician today.  If any of these changes or additions are incorrect, please notify your healthcare provider.             Verify that the below list of medications is an accurate representation of the medications you are currently taking.  If none reported, the list may be blank. If incorrect, please contact your healthcare provider. Carry this list with you in  case of emergency.           Current Medications     amitriptyline (ELAVIL) 50 MG tablet Take 1 tablet (50 mg total) by mouth every evening.    aspirin (ECOTRIN) 81 MG EC tablet Take 81 mg by mouth once daily.    blood sugar diagnostic Strp 1 strip by Misc.(Non-Drug; Combo Route) route 2 (two) times daily with meals. TRUE METRIX    blood-glucose meter kit Use as instructed          TRUE METRIX    carvedilol (COREG) 25 MG tablet Take 0.5 tablets (12.5 mg total) by mouth 2 (two) times daily with meals. Hold is SBP <120.    citalopram (CELEXA) 10 MG tablet Take 1 tablet (10 mg total) by mouth once daily.    clopidogrel (PLAVIX) 75 mg tablet Take 1 tablet (75 mg total) by mouth once daily.    furosemide (LASIX) 20 MG tablet Take 1 tablet (20 mg total) by mouth once daily.    glucose 4 GM chewable tablet Take 16 g by mouth as needed for Low blood sugar.    insulin aspart (NOVOLOG) 100 unit/mL InPn pen Inject 16 Units into the skin 3 (three) times daily.    insulin detemir (LEVEMIR FLEXTOUCH) 100 unit/mL (3 mL) SubQ InPn pen Inject 42 Units into the skin 2 (two) times daily.    isosorbide mononitrate (IMDUR) 60 MG 24 hr tablet Take 1 tablet (60 mg total) by mouth once daily.    lancets Misc 1 lancet by Misc.(Non-Drug; Combo Route) route 2 (two) times daily with meals. TRUE METRIX    lisinopril 10 MG tablet Take 1 tablet (10 mg total) by mouth once daily.    nitroGLYCERIN (NITROSTAT) 0.4 MG SL tablet Place 1 tablet (0.4 mg total) under the tongue every 5 (five) minutes as needed. Sublingual  ( under tongue) as needed    omega-3 acid ethyl esters (LOVAZA) 1 gram capsule Take 2 g by mouth 3 (three) times daily.      omeprazole (PRILOSEC) 40 MG capsule Take 1 capsule (40 mg total) by mouth 2 (two) times daily.    rosuvastatin (CRESTOR) 40 MG Tab Take 1 tablet (40 mg total) by mouth once daily.           Clinical Reference Information           Your Vitals Were     BP Pulse Height Weight BMI    121/60 (BP Location: Left arm,  "Patient Position: Sitting, BP Method: Manual) 64 5' 8" (1.727 m) 108.5 kg (239 lb 3.2 oz) 36.37 kg/m2      Blood Pressure          Most Recent Value    BP  121/60      Allergies as of 5/18/2017     No Known Allergies      Immunizations Administered on Date of Encounter - 5/18/2017     None      Orders Placed During Today's Visit     Future Labs/Procedures Expected by Expires    Lipid panel  5/18/2017 7/17/2018      Language Assistance Services     ATTENTION: Language assistance services are available, free of charge. Please call [phone].      ATENCIÓN: Si habla español, tiene a lucio disposición servicios gratuitos de asistencia lingüística. Llame al [phone].     CHÚ Ý: N?u b?n nói Ti?ng Vi?t, có các d?ch v? h? tr? ngôn ng? mi?n phí dành cho b?n. G?i s? [phone].         [facility] complies with applicable Federal civil rights laws and does not discriminate on the basis of race, color, national origin, age, disability, or sex.        "

## 2017-05-18 NOTE — PROGRESS NOTES
Chief Complaint   Patient presents with    Stroke        Jorden Shafer is a 71 y.o. male with a history of multiple medical diagnoses as listed below that presents for follow-up after being hospitalized for stroke. He has a history of stroke in the past that he says was treated with IV tPA. He and his wife say that after the medication was administered he did not have any problems after the stroke. He presented to the hospital about one month ago. He was admitted due to left sided weakness and speech problems. CTA showed right MCA stroke and right carotid stenosis. He was admitted to rehab and has been improved since he was last in the hospital. He has been compliant with all his medications. BP is well controlled.    PAST MEDICAL HISTORY:  Past Medical History:   Diagnosis Date    Back pain     CAD (coronary artery disease) 1989    h/o cabg x 5    Chronic kidney disease     Colon polyp     h/o on colonoscopy    Contact dermatitis 11/12/2013    CVA (cerebral vascular accident)     March 2010    Depression     Diabetes mellitus type II     on  insulin pump    DVT (deep venous thrombosis) 1980's    left leg    GERD (gastroesophageal reflux disease)     Heart failure     [facility]     Hyperlipidemia     Hypertension     Hypertensive heart disease without congestive heart failure     Insulin pump status 4/25/2017    Myocardial infarction     Neuropathy of lower extremity     Obesity (BMI 30-39.9) 4/25/2017    Prostate cancer     Renal manifestation of secondary diabetes mellitus     Sleep apnea     Stroke 4/21/2017    Type 2 diabetes mellitus with diabetic polyneuropathy, with long-term current use of insulin 4/25/2017    Type 2 diabetes mellitus with hyperglycemia, with long-term current use of insulin 9/26/2012    Currently sees Dr. Britton     Unstable angina     Vitamin D deficiency disease        PAST SURGICAL HISTORY:  Past Surgical History:   Procedure Laterality Date    CARDIAC  DEFIBRILLATOR PLACEMENT  2016    CHOLECYSTECTOMY      CORONARY ARTERY BYPASS GRAFT      bypass times  5    defibulater  2016    EYE SURGERY Bilateral     HERNIA REPAIR      groin x2  and umbilical    JOINT REPLACEMENT      knee left    thrombus Left     >2005    VASECTOMY         SOCIAL HISTORY:  Social History     Social History    Marital status:      Spouse name: N/A    Number of children: 3    Years of education: GED     Occupational History    Retired [employer]      Social History Main Topics    Smoking status: Former Smoker     Packs/day: 4.00     Years: 25.00     Types: Cigarettes    Smokeless tobacco: Never Used    Alcohol use 0.0 oz/week     0 Standard drinks or equivalent per week      Comment: occasional    Drug use: No    Sexual activity: Yes     Partners: Female     Other Topics Concern    Not on file     Social History Narrative    SCREENING/HEALTH MAINTENANCE. Updated  14    COLONOSCOPY .  [facility].    TETANUS .    PNEUMOVAX  13    NO PRIOR HISTORY OF ZOSTAVAX    PSA 14    FLU 10/8/13       FAMILY HISTORY:  Family History   Problem Relation Age of Onset    Alzheimer's disease Mother      Living    Heart disease Mother     Dementia Mother     Asbestos Father          Cancer Father     Diabetes type II Maternal Aunt     Stroke Neg Hx        ALLERGIES AND MEDICATIONS: updated and reviewed.  Review of patient's allergies indicates:  No Known Allergies  Current Outpatient Prescriptions   Medication Sig Dispense Refill    amitriptyline (ELAVIL) 50 MG tablet Take 1 tablet (50 mg total) by mouth every evening. 30 tablet 2    aspirin (ECOTRIN) 81 MG EC tablet Take 81 mg by mouth once daily.      blood sugar diagnostic Strp 1 strip by Misc.(Non-Drug; Combo Route) route 2 (two) times daily with meals. TRUE METRIX 100 strip 11    blood-glucose meter kit Use as instructed          TRUE METRIX 1 each 0     carvedilol (COREG) 25 MG tablet Take 0.5 tablets (12.5 mg total) by mouth 2 (two) times daily with meals. Hold is SBP <120. 90 tablet 3    citalopram (CELEXA) 10 MG tablet Take 1 tablet (10 mg total) by mouth once daily. 90 tablet 1    clopidogrel (PLAVIX) 75 mg tablet Take 1 tablet (75 mg total) by mouth once daily. 90 tablet 3    furosemide (LASIX) 20 MG tablet Take 1 tablet (20 mg total) by mouth once daily.      glucose 4 GM chewable tablet Take 16 g by mouth as needed for Low blood sugar.      insulin aspart (NOVOLOG) 100 unit/mL InPn pen Inject 16 Units into the skin 3 (three) times daily.  0    insulin detemir (LEVEMIR FLEXTOUCH) 100 unit/mL (3 mL) SubQ InPn pen Inject 42 Units into the skin 2 (two) times daily.  0    isosorbide mononitrate (IMDUR) 60 MG 24 hr tablet Take 1 tablet (60 mg total) by mouth once daily. 90 tablet 3    lancets Misc 1 lancet by Misc.(Non-Drug; Combo Route) route 2 (two) times daily with meals. TRUE METRIX 100 each 11    lisinopril 10 MG tablet Take 1 tablet (10 mg total) by mouth once daily. 90 tablet 3    nitroGLYCERIN (NITROSTAT) 0.4 MG SL tablet Place 1 tablet (0.4 mg total) under the tongue every 5 (five) minutes as needed. Sublingual  ( under tongue) as needed 30 tablet prn    omega-3 acid ethyl esters (LOVAZA) 1 gram capsule Take 2 g by mouth 3 (three) times daily.        omeprazole (PRILOSEC) 40 MG capsule Take 1 capsule (40 mg total) by mouth 2 (two) times daily. (Patient taking differently: Take 40 mg by mouth 2 (two) times daily as needed. ) 180 capsule 3    rosuvastatin (CRESTOR) 40 MG Tab Take 1 tablet (40 mg total) by mouth once daily.       No current facility-administered medications for this visit.        Review of Systems   Constitutional: Negative for activity change, fatigue and unexpected weight change.   HENT: Negative for trouble swallowing and voice change.    Eyes: Negative for photophobia, pain and visual disturbance.   Respiratory: Positive for  shortness of breath. Negative for apnea.    Cardiovascular: Negative for chest pain and palpitations.   Gastrointestinal: Negative for constipation, nausea and vomiting.   Genitourinary: Negative for difficulty urinating.   Musculoskeletal: Positive for gait problem. Negative for arthralgias, back pain, myalgias and neck pain.   Skin: Negative for color change and rash.   Neurological: Positive for speech difficulty and weakness. Negative for dizziness, seizures, syncope, light-headedness, numbness and headaches.   Psychiatric/Behavioral: Positive for sleep disturbance. Negative for agitation, behavioral problems and confusion.       Neurologic Exam     Mental Status   Oriented to person, place, and time.   Registration: recalls 3 of 3 objects.   Attention: normal. Concentration: normal.   Speech: speech is normal   Level of consciousness: alert  Knowledge: good.     Cranial Nerves     CN II   Visual fields full to confrontation.   Right visual field deficit: none  Left visual field deficit: none     CN III, IV, VI   Pupils are equal, round, and reactive to light.  Extraocular motions are normal.   Right pupil: Size: 3 mm. Shape: regular. Accommodation: intact.   Left pupil: Size: 3 mm. Shape: regular. Accommodation: intact.   CN III: no CN III palsy  CN VI: no CN VI palsy  Nystagmus: none   Diplopia: none  Ophthalmoparesis: none  Upgaze: normal  Downgaze: normal  Conjugate gaze: present    CN V   Facial sensation intact.   Right facial sensation deficit: none  Left facial sensation deficit: none    CN VII   Facial expression full, symmetric.   Right facial weakness: none  Left facial weakness: none    CN VIII   CN VIII normal.     CN IX, X   CN IX normal.   CN X normal.   Palate: symmetric    CN XI   CN XI normal.   Right sternocleidomastoid strength: normal  Left sternocleidomastoid strength: normal  Right trapezius strength: normal  Left trapezius strength: normal    CN XII   CN XII normal.   Tongue deviation:  none    Motor Exam   Muscle bulk: normal  Overall muscle tone: normal  Right arm tone: normal  Left arm tone: normal  Right leg tone: normal  Left leg tone: normal    Strength   Strength 5/5 throughout.     Sensory Exam   Right arm light touch: normal  Left arm light touch: normal  Right leg light touch: normal  Left leg light touch: normal  Right arm vibration: normal  Left arm vibration: normal  Right leg vibration: normal  Left leg vibration: normal  Right arm proprioception: normal  Left arm proprioception: normal  Right leg proprioception: normal  Left leg proprioception: normal  Right arm pinprick: normal  Left arm pinprick: normal  Right leg pinprick: normal  Left leg pinprick: normal    Gait, Coordination, and Reflexes     Gait  Gait: normal    Coordination   Romberg: negative  Finger to nose coordination: normal  Heel to shin coordination: normal  Tandem walking coordination: normal    Tremor   Resting tremor: absent    Reflexes   Right brachioradialis: 2+  Left brachioradialis: 2+  Right biceps: 2+  Left biceps: 2+  Right triceps: 2+  Left triceps: 2+  Right patellar: 2+  Left patellar: 2+  Right achilles: 2+  Left achilles: 2+  Right plantar: normal  Left plantar: normal      Physical Exam   Constitutional: He is oriented to person, place, and time.   Eyes: EOM are normal. Pupils are equal, round, and reactive to light.   Neurological: He is oriented to person, place, and time. He has normal strength. He has a normal Finger-Nose-Finger Test, a normal Heel to Shin Test, a normal Romberg Test and a normal Tandem Gait Test. Gait normal.   Reflex Scores:       Tricep reflexes are 2+ on the right side and 2+ on the left side.       Bicep reflexes are 2+ on the right side and 2+ on the left side.       Brachioradialis reflexes are 2+ on the right side and 2+ on the left side.       Patellar reflexes are 2+ on the right side and 2+ on the left side.       Achilles reflexes are 2+ on the right side and 2+ on the  "left side.  Psychiatric: His speech is normal.       Vitals:    05/18/17 0905   BP: 121/60   BP Location: Left arm   Patient Position: Sitting   BP Method: Manual   Pulse: 64   Weight: 108.5 kg (239 lb 3.2 oz)   Height: 5' 8" (1.727 m)       Assessment & Plan:    Problem List Items Addressed This Visit     Hyperlipidemia (Chronic)    Relevant Orders    Lipid panel    Essential hypertension (Chronic)    History of CVA (cerebrovascular accident) - Primary (Chronic)    Weakness    Severe obesity with body mass index (BMI) of 35.0 to 39.9 with comorbidity          Follow-up: Return in about 3 months (around 8/18/2017).  More than 50% of this 45 minute encounter was spent in counseling and coordinating care.    "

## 2017-05-19 ENCOUNTER — OFFICE VISIT (OUTPATIENT)
Dept: FAMILY MEDICINE | Facility: CLINIC | Age: 72
End: 2017-05-19
Payer: MEDICARE

## 2017-05-19 VITALS
BODY MASS INDEX: 35.59 KG/M2 | TEMPERATURE: 98 F | HEIGHT: 68 IN | SYSTOLIC BLOOD PRESSURE: 110 MMHG | WEIGHT: 234.81 LBS | OXYGEN SATURATION: 94 % | DIASTOLIC BLOOD PRESSURE: 60 MMHG | HEART RATE: 70 BPM

## 2017-05-19 DIAGNOSIS — I10 ESSENTIAL HYPERTENSION: Chronic | ICD-10-CM

## 2017-05-19 DIAGNOSIS — I25.708 CORONARY ARTERY DISEASE OF BYPASS GRAFT OF NATIVE HEART WITH STABLE ANGINA PECTORIS: ICD-10-CM

## 2017-05-19 DIAGNOSIS — Z79.4 TYPE 2 DIABETES MELLITUS WITH DIABETIC POLYNEUROPATHY, WITH LONG-TERM CURRENT USE OF INSULIN: Primary | Chronic | ICD-10-CM

## 2017-05-19 DIAGNOSIS — E66.01 SEVERE OBESITY WITH BODY MASS INDEX (BMI) OF 35.0 TO 39.9 WITH COMORBIDITY: ICD-10-CM

## 2017-05-19 DIAGNOSIS — Z96.41 INSULIN PUMP IN PLACE: Chronic | ICD-10-CM

## 2017-05-19 DIAGNOSIS — Z86.73 HISTORY OF CVA (CEREBROVASCULAR ACCIDENT): Chronic | ICD-10-CM

## 2017-05-19 DIAGNOSIS — I25.2 HISTORY OF NON-ST ELEVATION MYOCARDIAL INFARCTION (NSTEMI): ICD-10-CM

## 2017-05-19 DIAGNOSIS — E11.42 TYPE 2 DIABETES MELLITUS WITH DIABETIC POLYNEUROPATHY, WITH LONG-TERM CURRENT USE OF INSULIN: Primary | Chronic | ICD-10-CM

## 2017-05-19 PROCEDURE — 99999 PR PBB SHADOW E&M-EST. PATIENT-LVL III: CPT | Mod: PBBFAC,,, | Performed by: FAMILY MEDICINE

## 2017-05-19 PROCEDURE — 99499 UNLISTED E&M SERVICE: CPT | Mod: S$GLB,,, | Performed by: FAMILY MEDICINE

## 2017-05-19 PROCEDURE — 1126F AMNT PAIN NOTED NONE PRSNT: CPT | Mod: S$GLB,,, | Performed by: FAMILY MEDICINE

## 2017-05-19 PROCEDURE — 99214 OFFICE O/P EST MOD 30 MIN: CPT | Mod: S$GLB,,, | Performed by: FAMILY MEDICINE

## 2017-05-19 PROCEDURE — 3046F HEMOGLOBIN A1C LEVEL >9.0%: CPT | Mod: S$GLB,,, | Performed by: FAMILY MEDICINE

## 2017-05-19 PROCEDURE — 1159F MED LIST DOCD IN RCRD: CPT | Mod: S$GLB,,, | Performed by: FAMILY MEDICINE

## 2017-05-19 PROCEDURE — 4010F ACE/ARB THERAPY RXD/TAKEN: CPT | Mod: S$GLB,,, | Performed by: FAMILY MEDICINE

## 2017-05-19 NOTE — PROGRESS NOTES
Office Visit    Patient Name: Jorden Shafer    : 1945  MRN: [id number]    Subjective:  Jorden is a 71 y.o. male who presents today for:    Hospital Follow Up      This patient has multiple medical diagnoses as noted below.  This patient is known to me and to this clinic.    Dr Montiel to restart insulin pump He is currently on levemir at 14 units and will increased based on a sliding scale. Increased to 17.   He did exceptionally well at rehab.  He does participate in ADLS, but does require only minor assistance from his wife.  He is motivated to improve his health and lose weight.  He has been working on his diabetic control.  No new chest pain.  No change in gait, mentation.  Some residual issues with speech, but this is improving.     Active Problem List  Patient Active Problem List   Diagnosis    Coronary artery disease of bypass graft of native heart with stable angina pectoris    Type 2 diabetes mellitus with hyperglycemia, with long-term current use of insulin    Vitamin D deficiency disease    Colon polyp    Prostate cancer    ED (erectile dysfunction)    Weakness    Essential hypertension    Chronic combined systolic and diastolic heart failure    Tortuous aorta    History of non-ST elevation myocardial infarction (NSTEMI)    Major depressive disorder, single episode, mild    Insulin pump in place    Mild protein malnutrition    KELVIN on CPAP    History of CVA (cerebrovascular accident)    GERD (gastroesophageal reflux disease)    Stenosis of right carotid artery    Severe obesity with body mass index (BMI) of 35.0 to 39.9 with comorbidity    Type 2 diabetes mellitus with diabetic polyneuropathy, with long-term current use of insulin    Mixed hyperlipidemia       Past Surgical History  Past Surgical History:   Procedure Laterality Date    CARDIAC DEFIBRILLATOR PLACEMENT  2016    CHOLECYSTECTOMY      CORONARY ARTERY BYPASS GRAFT      bypass times  5    defibulater   2016    EYE SURGERY Bilateral     HERNIA REPAIR      groin x2  and umbilical    JOINT REPLACEMENT      knee left    thrombus Left     >2005    VASECTOMY         Family History  Family History   Problem Relation Age of Onset    Alzheimer's disease Mother      Living    Heart disease Mother     Dementia Mother     Asbestos Father          Cancer Father     Diabetes type II Maternal Aunt     Stroke Neg Hx        Social History  Social History     Social History    Marital status:      Spouse name: N/A    Number of children: 3    Years of education: GED     Occupational History    Retired [employer]      Social History Main Topics    Smoking status: Former Smoker     Packs/day: 4.00     Years: 25.00     Types: Cigarettes    Smokeless tobacco: Never Used    Alcohol use 0.0 oz/week      Comment: occasional    Drug use: No    Sexual activity: Yes     Partners: Female     Other Topics Concern    Not on file     Social History Narrative    SCREENING/HEALTH MAINTENANCE. Updated  14    COLONOSCOPY .  [facility]    TETANUS .    PNEUMOVAX  13    NO PRIOR HISTORY OF ZOSTAVAX    PSA 14    FLU 10/8/13       Current Medications  Medications reviewed and updated.     Allergies   Review of patient's allergies indicates:  No Known Allergies    Review of Systems (Pertinent positives)  Review of Systems   Constitutional: Negative for activity change, appetite change, fatigue, fever and unexpected weight change.   HENT: Negative for facial swelling.    Eyes: Negative for visual disturbance.   Respiratory: Negative for chest tightness, shortness of breath, wheezing and stridor.    Cardiovascular: Negative for chest pain, palpitations and leg swelling.   Gastrointestinal: Negative for abdominal distention, abdominal pain, blood in stool, constipation, diarrhea, nausea and vomiting.   Endocrine: Negative for cold intolerance, heat intolerance, polydipsia  "and polyuria.   Genitourinary: Negative.  Negative for testicular pain and urgency.   Skin: Negative.    Allergic/Immunologic: Negative.    Neurological: Negative for dizziness, weakness, light-headedness, numbness and headaches.   Psychiatric/Behavioral: Negative for agitation and decreased concentration.       /60 (BP Location: Left arm, Patient Position: Sitting, BP Method: Manual)   Pulse 70   Temp 98.3 °F (36.8 °C) (Oral)   Ht 5' 8" (1.727 m)   Wt 106.5 kg (234 lb 12.6 oz)   SpO2 (!) 94%   BMI 35.70 kg/m²     Physical Exam   Constitutional: He is oriented to person, place, and time. He appears well-developed and well-nourished.   HENT:   Head: Normocephalic and atraumatic.   Eyes: Conjunctivae and EOM are normal. Pupils are equal, round, and reactive to light.   Neck: Normal range of motion.   Cardiovascular: Normal rate and normal heart sounds.    Pulmonary/Chest: Effort normal and breath sounds normal.   Neurological: He is alert and oriented to person, place, and time.   Skin: Skin is warm and dry.       Health Maintenance  Health Maintenance Topics with due status: Not Due       Topic Last Completion Date    Colonoscopy 10/08/2012    PROSTATE-SPECIFIC ANTIGEN 10/24/2016    Influenza Vaccine 02/21/2017    Foot Exam 02/21/2017    Hemoglobin A1c 04/13/2017    Lipid Panel 05/18/2017       Assessment/Plan:  Jorden Shafer is a 71 y.o. male who presents today for :    Jorden was seen today for hospital follow up.    Diagnoses and all orders for this visit:    Type 2 diabetes mellitus with diabetic polyneuropathy, with long-term current use of insulin  -  Will restart insulin pump  -  New sliding scale provided in office     Essential hypertension  -  All modifiable risk factors have been addressed in this office visit.  Patient was instructed to continue with current medication therapy in order to prevent complications related to this condition.    Coronary artery disease of bypass graft of native " heart with stable angina pectoris  -  The patient is asked to make an attempt to improve diet and exercise patterns to aid in medical management of this problem.  -  Needs to focus on weight loss and diet improvement     History of non-ST elevation myocardial infarction (NSTEMI)  -   Pt is currently stable on medication regimen.  Continue current therapy as scheduled.  Contact office with any questions about adjustments on medications.     Insulin pump in place  -  Restart pump as scheduled     History of CVA (cerebrovascular accident)  -  Continues with speech deficit.  Needs to continue with speech therapy     Severe obesity with body mass index (BMI) of 35.0 to 39.9 with comorbidity  -  As above   -  Patient is motivated to lose weight.       Return in about 3 months (around 8/19/2017).

## 2017-05-22 ENCOUNTER — PATIENT MESSAGE (OUTPATIENT)
Dept: ADMINISTRATIVE | Facility: OTHER | Age: 72
End: 2017-05-22

## 2017-05-22 ENCOUNTER — OUTPATIENT CASE MANAGEMENT (OUTPATIENT)
Dept: ADMINISTRATIVE | Facility: OTHER | Age: 72
End: 2017-05-22

## 2017-05-22 NOTE — PATIENT INSTRUCTIONS
Established High Blood Pressure    High blood pressure (hypertension) is a chronic disease. Often health care providers dont know what causes it. But it can be caused by certain health conditions and medicines.  If you have high blood pressure, you may not have any symptoms. If you do have symptoms, they may include headache, dizziness, changes in your vision, chest pain, and shortness of breath. But even without symptoms, high blood pressure thats not treated raises your risk for heart attack and stroke. High blood pressure is a serious health risk and shouldnt be ignored.  A blood pressure reading is made up of two numbers: a higher number over a lower number. The top number is the systolic pressure. The bottom number is the diastolic pressure. A normal blood pressure is less than 120 over less than 80.  High blood pressure is when either the top number is 140 or higher, or the bottom number is 90 or higher. This must be the result when taking your blood pressure a number of times. The blood pressures between normal and high are called prehypertension.  Home care  If you have high blood pressure, you should do what is listed below to lower your blood pressure. If you are taking medicines for high blood pressure, these methods may reduce or end your need for medicines in the future.  · Begin a weight-loss program if you are overweight.  · Cut back on how much salt you get in your diet. Heres how to do this:  ¨ Dont eat foods that have a lot of salt. These include olives, pickles, smoked meats, and salted potato chips.  ¨ Dont add salt to your food at the table.  ¨ Use only small amounts of salt when cooking.  · Begin an exercise program. Talk with your health care provider about the type of exercise program that would be best for you. It doesn't have to be hard. Even brisk walking for 20 minutes 3 times a week is a good form of exercise.  · Dont take medicines that have heart stimulants. This includes many  cold and sinus decongestant pills and sprays, as well as diet pills. Check the warnings about hypertension on the label. Stimulants such as amphetamine or cocaine could be lethal for someone with high blood pressure. Never take these.  · Limit how much caffeine you get in your diet. Switch to caffeine-free products.  · Stop smoking. If you are a long-time smoker, this can be hard. Enroll in a stop-smoking program to make it more likely that you will quit for good.  · Learn how to handle stress. This is an important part of any program to lower blood pressure. Learn about relaxation methods like meditation, yoga, or biofeedback.  · If your provider prescribed medicines, take them exactly as directed. Missing doses may cause your blood pressure get out of control.  · Consider buying an automatic blood pressure machine. You can get one of these at most pharmacies. Use this to watch your blood pressure at home. Give the results to your provider.  Follow-up care  You will need to make regular visits to your health care provider. This is to check your blood pressure and to make changes to your medicines. Make a follow-up appointment as directed.  When to seek medical advice  Call your health care provider right away if any of these occur:  · Chest pain or shortness of breath  · Severe headache  · Throbbing or rushing sound in the ears  · Nosebleed  · Sudden severe pain in your belly (abdomen)  · Extreme drowsiness, confusion, or fainting  · Dizziness or dizziness with a spinning sensation (vertigo)  · Weakness of an arm or leg or one side of the face  · You have problems speaking or seeing   Date Last Reviewed: 11/25/2014  © 1421-5215 [employer]. [address] [place] [address], [place] [address]. All rights reserved. This information is not intended as a substitute for professional medical care. Always follow your healthcare professional's instructions.        Low-Salt Choices  Eating salt (sodium) can make your  body retain too much water. Excess water makes your heart work harder. Canned, packaged, and frozen foods are easy to prepare, but they are often high in sodium. Here are some ideas for low-salt foods you can easily prepare yourself.    For breakfast  · Fruit or 100% fruit juice  · Whole-wheat bread or an English muffin. Compare sodium content on labels.  · Low-fat milk or yogurt  · Unsalted eggs  · Shredded wheat  · Corn tortillas  · Unsalted steamed rice  · Regular (not instant) hot cereal, made without salt  Stay away from:  · Sausage, camargo, and ham  · Flour tortillas  · Packaged muffins, pancakes, and biscuits  · Instant hot cereals  · Cottage cheese  For lunch and dinner  · Fresh fish, chicken, turkey, or meat--baked, broiled, or roasted without salt  · Dry beans, cooked without salt  · Tofu, stir-fried without salt  · Unsalted fresh fruit and vegetables, or frozen or canned fruit and vegetables with no added salt  Stay away from:  · Lunch or deli meat that is cured or smoked  · Cheese  · Tomato juice and catsup  · Canned vegetables, soups, and fish not labeled as no-salt-added or reduced sodium  · Packaged gravies and sauces  · Olives, pickles, and relish  · Bottled salad dressings  For snacks and desserts  · Yogurt  · Unsalted, air popped popcorn  · Unsalted nuts or seeds  Stay away from:  · Pies and cakes  · Packaged dessert mixes  · Pizza  · Canned and packaged puddings  · Pretzels, chips, crackers, and nuts--unless the label says unsalted  Date Last Reviewed: 6/17/2015  © 6746-5734 [employer]. [address] [place] [address], [place] [address]. All rights reserved. This information is not intended as a substitute for professional medical care. Always follow your healthcare professional's instructions.        Low Blood Pressure (All Causes)  A blood pressure reading is made up of 2 numbers There is a top number over a bottom number. The top number is the systolic pressure. The bottom number is the  diastolic pressure. A normal blood pressure is less than 120 over less than 80. Low blood pressure (hypotension) is a blood pressure that is less than what is normal for you. Low blood pressure can cause dizziness, lightheadedness, or fainting.  Medicines can cause low blood pressure. They include:  · High blood pressure pills  · Water pills (diuretics)  · Some heart medicines  · Some antidepressants  · Pain, anxiety, sedative, and sleeping medicines  Other causes include:  · Dehydration, severe infection, or fever  · Blood loss. This could be bleeding from the stomach or intestines.  · Congestive heart failure  · Change in heart rate or rhythm (arrhythmia)  · Orthostatic hypotension from a sudden change in body position, from lying down to standing  · Alcohol or drug intoxication  · Changed responses of the blood vessels and heart that keep blood pressure normal as you change position  Treatment will depend on what is causing your low blood pressure.  Home care  Follow these guidelines when caring for yourself at home:  · Rest until your symptoms get better.  · Follow the treatment plan described by your health care provider.  Follow-up care  Follow up with your health care provider, or as advised.  When to seek medical advice  Call your health care provider right away if any of these occur:  · Dizziness, lightheadedness, or fainting  · Black or red color in your stools or vomit  · Shortness of breath or difficulty breathing  · Chest, shoulder, arm, neck, or upper back pain  · Abdominal pain  · Diarrhea or vomiting that doesnt go away  · You arent able to eat or drink  · Fever of 100.4°F (38°C) or higher, or as directed by your health care provider  · Burning when you urinate  · Foul-smelling urine  Date Last Reviewed: 11/25/2014 © 2000-2016 [employer]. [address] [place] [address], [place] [address]. All rights reserved. This information is not intended as a substitute for professional medical care.  Always follow your healthcare professional's instructions.

## 2017-05-22 NOTE — PROGRESS NOTES
Spoke with pt. Pt states he has not received the wheelchair and doesn't feel that he needs it now. He states he is walking a lot more. Pt denies any falls since he's been home. PT can name 5+ red flags of CVA. Pt has blood pressure monitor in the home. Instructed pt to monitor and record daily. Will send s/s of hypertension and hypotension via pt portal. Pt placed in episodic status. Will follow up in 2 weeks.

## 2017-05-24 ENCOUNTER — OFFICE VISIT (OUTPATIENT)
Dept: PODIATRY | Facility: CLINIC | Age: 72
End: 2017-05-24
Payer: MEDICARE

## 2017-05-24 VITALS
WEIGHT: 234 LBS | BODY MASS INDEX: 35.46 KG/M2 | DIASTOLIC BLOOD PRESSURE: 70 MMHG | SYSTOLIC BLOOD PRESSURE: 121 MMHG | HEART RATE: 76 BPM | HEIGHT: 68 IN

## 2017-05-24 DIAGNOSIS — M79.672 FOOT PAIN, BILATERAL: ICD-10-CM

## 2017-05-24 DIAGNOSIS — M76.61 ACHILLES TENDINITIS OF BOTH LOWER EXTREMITIES: ICD-10-CM

## 2017-05-24 DIAGNOSIS — N18.30 DIABETES MELLITUS WITH STAGE 3 CHRONIC KIDNEY DISEASE: Primary | ICD-10-CM

## 2017-05-24 DIAGNOSIS — M20.41 HAMMER TOES OF BOTH FEET: ICD-10-CM

## 2017-05-24 DIAGNOSIS — M21.6X2 ACQUIRED EQUINUS DEFORMITY OF BOTH FEET: ICD-10-CM

## 2017-05-24 DIAGNOSIS — M79.671 FOOT PAIN, BILATERAL: ICD-10-CM

## 2017-05-24 DIAGNOSIS — M76.62 ACHILLES TENDINITIS OF BOTH LOWER EXTREMITIES: ICD-10-CM

## 2017-05-24 DIAGNOSIS — E11.49 TYPE II DIABETES MELLITUS WITH NEUROLOGICAL MANIFESTATIONS: ICD-10-CM

## 2017-05-24 DIAGNOSIS — M21.6X1 ACQUIRED EQUINUS DEFORMITY OF BOTH FEET: ICD-10-CM

## 2017-05-24 DIAGNOSIS — M20.42 HAMMER TOES OF BOTH FEET: ICD-10-CM

## 2017-05-24 DIAGNOSIS — M20.5X9 HALLUX LIMITUS, UNSPECIFIED LATERALITY: ICD-10-CM

## 2017-05-24 DIAGNOSIS — E11.22 DIABETES MELLITUS WITH STAGE 3 CHRONIC KIDNEY DISEASE: Primary | ICD-10-CM

## 2017-05-24 PROCEDURE — 99999 PR PBB SHADOW E&M-EST. PATIENT-LVL III: CPT | Mod: PBBFAC,,, | Performed by: PODIATRIST

## 2017-05-24 PROCEDURE — 99213 OFFICE O/P EST LOW 20 MIN: CPT | Mod: S$GLB,,, | Performed by: PODIATRIST

## 2017-05-24 PROCEDURE — 3078F DIAST BP <80 MM HG: CPT | Mod: S$GLB,,, | Performed by: PODIATRIST

## 2017-05-24 PROCEDURE — 1160F RVW MEDS BY RX/DR IN RCRD: CPT | Mod: S$GLB,,, | Performed by: PODIATRIST

## 2017-05-24 PROCEDURE — 1125F AMNT PAIN NOTED PAIN PRSNT: CPT | Mod: S$GLB,,, | Performed by: PODIATRIST

## 2017-05-24 PROCEDURE — 99499 UNLISTED E&M SERVICE: CPT | Mod: S$GLB,,, | Performed by: PODIATRIST

## 2017-05-24 PROCEDURE — 1159F MED LIST DOCD IN RCRD: CPT | Mod: S$GLB,,, | Performed by: PODIATRIST

## 2017-05-24 PROCEDURE — 3074F SYST BP LT 130 MM HG: CPT | Mod: S$GLB,,, | Performed by: PODIATRIST

## 2017-05-24 PROCEDURE — 3046F HEMOGLOBIN A1C LEVEL >9.0%: CPT | Mod: S$GLB,,, | Performed by: PODIATRIST

## 2017-05-24 PROCEDURE — 4010F ACE/ARB THERAPY RXD/TAKEN: CPT | Mod: S$GLB,,, | Performed by: PODIATRIST

## 2017-05-24 NOTE — PATIENT INSTRUCTIONS
Recommend lotions: eucerin, aquaphor, A&D ointment, gold bond for diabetics, sween    Shoe recommendations: (try 6pm.com, zappos.com , nordstromrack.com, or shoes.com for discounted prices) you can visit [employer] shoes in [place] as well    Asics (GT 1000 or gel foundations), new balance, saucony (stabil c3),  Whelan (transcend), vionic, propet (tennis shoe)    soft brand, clarks, crocs, aerosoles, naturalizers, SAS, ecco, ty, genaro nascimento, rockports (dress shoes)    Vionic, volitiles, burkenstocks, fitflops, propet (sandals)    Nike comfort thong sandals, crocs (house shoes)    Nail Home remedy:  Vicks Vapor rub OR Listerine and apple cider vinegar in a spray bottle to nails    Occasional soaks for 15-20 mins in luke warm water with 1 cup of listerine and 1 cup of apple cider vinegar are ok You may add several drops of oil of oregano or tea tree oil as well    Understanding Achilles Tendonitis    Achilles tendonitis is an overuse injury. It results in inflammation of the Achilles tendon. This tendon is found on the back of the ankle. It links the calf muscle to the heel bone. It helps you do pushing-off movements like running or standing on your toes.     How to say it  [id number]   What causes Achilles tendonitis?  Achilles tendonitis can happen if you do an activity like running, walking, or jumping too much. This overuse can strain, or pull, the tendon. It may lead to minor tearing of the tendon. An injury to the lower leg or foot can also cause it.  If you dont warm up before taking part in sports such as basketball, you are more likely to suffer from this condition. You are also more prone to it if you do too much of such an activity too quickly. Proper training and rest can help prevent it.  Symptoms of Achilles tendonitis  The main symptom of Achilles tendonitis is pain. This pain mostly happens when you move the ankle. The tendon may also feel stiff after a period of no activity, such as  sleeping. It may also become swollen. You may hear a crackling sound when you move your ankle.  Treatment for Achilles tendonitis  Symptoms often get better after starting treatment. A full recovery may take several months. Treatments include:  · Rest. You should stop or change the activity that caused the injury. The tendon will then have time to heal.  · Cold or heat pack. These help reduce pain and swelling.  · Prescription or over-the-counter pain medicines. These help reduce pain and swelling.  · Shoe inserts. These devices can reduce strain on the Achilles tendon when you move. You may then feel less pain.  · Stretching and strengthening exercises. Certain exercises can help you regain flexibility and strength in your Achilles tendon.  · Surgery. This option can fix the injured tendon. But you dont often need it unless other treatments dont work.     When to call your healthcare provider   Call your healthcare provider right away if you have any of these:  · Fever of 100.4°F (38°C) or higher, or as directed  · Pain that gets worse  · Symptoms that dont get better, or get worse  · New symptoms    Date Last Reviewed: 3/10/2016  © 2615-5494 [employer]. [address] [place] [address]. All rights reserved. This information is not intended as a substitute for professional medical care. Always follow your healthcare professional's instructions.        Achilles Tendon Rupture, Partial or Complete    The Achilles tendon connects the muscles in the back of your lower leg to your heel bone. It lets you move your foot down--called a step on the gas motion. This movement is important for walking, running, and jumping. A sudden strong contraction of the lower leg can partially tear (rupture) the Achilles tendon. This injury can happen while playing sports. This injury is more likely if you have injured the tendon in the past. It is also more likely if the tendon is inflamed from stress.   When  the injury happens, you may feel a pop or snap, or like you have been kicked. An Achilles tendon tear will cause swelling, bruising, and pain in the ankle area. You will have difficulty walking or pushing off with your foot.   A complete Achilles tear is usually treated with surgery to attach the torn ends of the tendon. This is followed by up to 8 weeks in a walking cast, boot, or splint. The injury can also be treated without surgery, but the tendon will take longer to heal. The risk of rupturing it again is also greater than with surgery. With either type of treatment, you will need physical therapy to strengthen your Achilles tendon. It usually takes up to 6 months to return to your former level of activity and about a year to fully recover.  Home care  Medicine  The doctor may prescribe medicines for pain. Or you may use acetaminophen or Ibuprofen to control the pain.  Talk with your doctor before taking these medicines if you have chronic liver or kidney disease. Also talk with your doctor if you have had an ulcer or GI bleeding.  General care  · Rest. Use crutches as directed. Do not put any weight on the injured leg until your doctor says it is OK.  You will be told to see an orthopedic doctor as soon as possible. This is a doctor with special training to treat foot and ankle problems.  · Use ice. Put a cold pack on the injured area for 20 minutes at a time. Do this at least 4 to 8 times a day for the first 48 hours. After the first 48 hours, use the cold pack to ease pain and swelling, as needed. You can make your own cold pack from a sealed bag of frozen peas or ice. Wrap the bag in a towel. Dont put the cold source directly on your skin. (If you are using ice, be careful to avoid getting the cast wet as the ice melts.) If you have a splint that can't be removed, put the cold pack over the splint.  · Use compression. The doctor may give you an elastic wrap, boot, air cast or splint to help ease swelling.  Use this as the doctor tells you to.  · Elevate your leg. During the first 48 hours, keep your injured leg elevated on a pillow when you are sitting or lying down. The pillow should be at a level above your heart. This is very important to reduce swelling.  · Keep the splint or cast dry. When bathing, protect it with a large plastic bag. Make sure to tape the plastic bag closed. If a fiberglass splint or cast gets wet, you can dry it with a hair dryer.  Follow-up care  You will be referred to an orthopedic doctor for follow-up care. Surgery may be needed to repair this injury, so it is very important to make an appointment with the specialist as soon as you can.  When to seek medical advice  Call your healthcare provider right away if any of these occur:  · A plaster splint or cast gets wet or soft or breaks  · A fiberglass splint or cast gets wet and does not dry in 24 hours  · Pain or swelling gets worse, or redness appears  · Toes or the injured area become cold, blue, numb or tingly  · You cannot put weight on the injured leg  Date Last Reviewed: 9/29/2015  © 2097-2713 [facility]. [address] [place] [address]. All rights reserved. This information is not intended as a substitute for professional medical care. Always follow your healthcare professional's instructions.        Calf Raise (Strength)    1. Stand up straight with both feet flat on the floor, slightly apart. Hold onto a sturdy chair, railing, counter, or table.  2. Raise both heels so youre standing on the balls of your feet. Dont lock your knees or arch your back. Hold for 5 seconds. Then slowly lower your heels back down to the floor.  3. Repeat 10 times, or as instructed.  4. Do this exercise 3 times a day, or as instructed.     Challenge yourself  As you become stronger, do this exercise on one foot at a time.   Date Last Reviewed: 3/10/2016  © 0811-1143 [facility]. [address] [place] [address]  [address]. All rights reserved. This information is not intended as a substitute for professional medical care. Always follow your healthcare professional's instructions.        Plantarflexion (Strength)    These instructions are for your right ankle. Switch sides for your left ankle.  5. Sit on a bed or the floor with your right leg out straight.  6. Loop an elastic exercise band or tubing around your right foot. Hold the ends in your hands.  7. Push on the elastic band with the ball of your foot, pointing your toes. Pull the elastic band toward as you do this. Hold for 5 seconds. Then move your foot back to the starting position.  8. Repeat 10 times, or as instructed.  9. Do this exercise 3 times a day, or as instructed.  Date Last Reviewed: 3/10/2016  © 7187-8634 [employer]. [address] [place] [address], [place] [address]. All rights reserved. This information is not intended as a substitute for professional medical care. Always follow your healthcare professional's instructions.        Wearing Proper Shoes                    You walk on your feet every day, forcing them to support the weight of your body. Repeated stress on your feet can cause damage over time. The right shoes can help protect your feet. The wrong shoes can cause more foot problems. Read the information below to help you find a shoe that fits your foot needs.     A good shoe fit will cover your foot outline.       A shoe that doesnt cover the outline is a bad fit.      Whats your foot shape?  To get a good fit, you need to know the shape of your foot. Do this simple test: While standing, place your foot on a piece of paper and trace around it. Is your foot straight or curved? Do you have a foot problem, such as a bunion, that causes your foot outline to show a bulge on the side of your big toe?  Finding your fit  Bring your foot outline to the shoe store to help you find the right shoe. Place a shoe you like on top of the outline to see  if it matches the shape. The shoe should cover the outline. (If you have a bunion, the shoe may not cover the bulge on the outline. Look for soft leather shoes to stretch over the bunion.) Once youve found a pair of proper shoes, put them on. Walk around. Be sure the shoes dont rub or pinch. If the shoes feel good, youve found your fit!  The right shoe for you  A good shoe has features that provide comfort and support. It must also be the right size and shape for your feet. Look for a shoe made of breathable fabric and lining, such as leather or canvas. Be sure that shoes have enough tread to prevent slipping. Go to a good shoe store for help finding the right shoe.  Good shoe features  An ideal shoe has the following:  · Laces for support. If tying laces is a problem for you, try shoes with Velcro fasteners or heather.  · A front of the shoe (toe box) with ½ inch space in front of your longest toes.  · An arch shape that supports your foot.  · No more than 1½ inches of heel.  · A stiff, snug back of the shoe to keep your foot from sliding around.  · A smooth lining with no rough seams.  Shoe shopping tips  Below are some dos and donts for when you go to the shoe store.  Do:  · Select the shoes that feel right. Wear them around the house. Then bring them to your foot healthcare provider to check for fit. If they dont fit well, return them.  · Shop late in the day, when your feet will be slightly bigger.  · Each time you buy shoes, have both your feet measured while you are standing. Foot size changes with time.  · Pick shoes to suit their purpose. High heels are OK for an occasional night on the town. But for everyday wear, choose a more sensible shoe.  · Try on shoes while wearing any inserts specially made for your feet (orthoses).  · Try on both the right and left shoes. If your feet are different sizes, pick a pair that fits the larger foot.  Dont:  · Dont buy shoes based on shoe size alone. Always try  on shoes, as sizes differ from brand to brand and within brands.  · Dont expect shoes to break in. If they dont fit at the store, dont buy them.  · Dont buy a shoe that doesnt match your foot shape.  What about socks?  Always wear socks with shoes. Socks help absorb sweat and reduce friction and blistering. When shopping for shoes, choose soft, padded socks with seams that dont irritate your feet.  If you have foot problems  Some foot problems cause deformities. This can make it hard to find a good fit. Look for shoes made of soft leather to stretch over the deformity. If you have bunions, buy shoes with a wider toe box. To fit hammertoes, look for shoes with a tall toe box. If you have arch problems, you may need inserts. In some cases, youll need to have custom footwear or orthoses made for your feet.  Suggested footwear  Ask your healthcare provider what kind of footwear you need. He or she may recommend a certain brand or shoe store.  Date Last Reviewed: 8/1/2016  © 3718-2861 [employer]. [address] [place] [address]. All rights reserved. This information is not intended as a substitute for professional medical care. Always follow your healthcare professional's instructions.        Recommend lotions: eucerin, aquaphor, A&D ointment, gold bond for diabetics, sween    Shoe recommendations: (try 6pm[employer], zappos[employer] , nordstromrack[employer], or shoes.[employer] for discounted prices) you can visit [employer] shoes in [place] as well    Asics (GT 1000 or gel foundations), new balance, saucony (stabil c3),  Whelan (transcend), vionic, propet (tennis shoe)    soft brand, clarks, crocs, aerosoles, naturalizers, SAS, ecco, ty, genaro pily, esteban (dress shoes)    Vionic, volitiles, burkenstocks, fitflops, propet (sandals)    Nike comfort thong sandals, crocs (house shoes)    Nail Home remedy:  Vicks Vapor rub OR Listerine and apple cider vinegar in a spray bottle to nails    Occasional soaks for  15-20 mins in luke warm water with 1 cup of listerine and 1 cup of apple cider vinegar are ok You may add several drops of oil of oregano or tea tree oil as well      Diabetes: Inspecting Your Feet  Diabetes increases your chances of developing foot problems. So inspect your feet every day. This helps you find small skin irritations before they become serious infections. If you have trouble seeing the bottoms of your feet, use a mirror or ask a family member or friend to help.     Pressure spots on the bottom of the foot are common areas where problems develop.   How to check your feet  Below are tips to help you look for foot problems. Try to check your feet at the same time each day, such as when you get out of bed in the morning:  · Check the top of each foot. The tops of toes, back of the heel, and outer edge of the foot can get a lot of rubbing from poor-fitting shoes.  · Check the bottom of each foot. Daily wear and tear often leads to problems at pressure spots.  · Check the toes and nails. Fungal infections often occur between toes. Toenail problems can also be a sign of fungal infections or lead to breaks in the skin.  · Check your shoes, too. Loose objects inside a shoe can injure the foot. Use your hand to feel inside your shoes for things like randall, loose stitching, or rough areas that could irritate your skin.  Warning signs  Look for any color changes in the foot. Redness with streaks can signal a severe infection, which needs immediate medical attention. Tell your doctor right away if you have any of these problems:  · Swelling, sometimes with color changes, may be a sign of poor blood flow or infection. Symptoms include tenderness and an increase in the size of your foot.  · Warm or hot areas on your feet may be signs of infection. A foot that is cold may not be getting enough blood.  · Sensations such as burning, tingling, or pins and needles can be signs of a problem. Also check for areas that  may be numb.  · Hot spots are caused by friction or pressure. Look for hot spots in areas that get a lot of rubbing. Hot spots can turn into blisters, calluses, or sores.  · Cracks and sores are caused by dry or irritated skin. They are a sign that the skin is breaking down, which can lead to infection.  · Toenail problems to watch for include nails growing into the skin (ingrown toenail) and causing redness or pain. Thick, yellow, or discolored nails can signal a fungal infection.  · Drainage and odor can develop from untreated sores and ulcers. Call your doctor right away if you notice white or yellow drainage, bleeding, or unpleasant odor.   © 2057-9870 [employer]. [address] [place] [address], [place] [address]. All rights reserved. This information is not intended as a substitute for professional medical care. Always follow your healthcare professional's instructions.

## 2017-05-24 NOTE — PROGRESS NOTES
Subjective:      Patient ID: Jorden Shafer is a 71 y.o. male.    Chief Complaint: Follow-up (left foot pain pcp Dr. Plasencia 5-19-17)    Jorden is a 71 y.o. male who RTC for bilateral foot pain.  Following last encounter patient had a stroke which affected his left side.  He is currently in therapy. Now only has pain to left achilles. All other pain has subsided     PCP: Neha Plasencia MD    Date Last Seen by PCP:   Chief Complaint   Patient presents with    Follow-up     left foot pain pcp Dr. Plasencia 5-19-17       Current shoe gear:  Worn rx shoes; he has not filled shoes rx    Hemoglobin A1C   Date Value Ref Range Status   04/13/2017 9.3 (H) 4.5 - 6.2 % Final     Comment:     According to ADA guidelines, hemoglobin A1C <7.0% represents  optimal control in non-pregnant diabetic patients.  Different  metrics may apply to specific populations.   Standards of Medical Care in Diabetes - 2016.  For the purpose of screening for the presence of diabetes:  <5.7%     Consistent with the absence of diabetes  5.7-6.4%  Consistent with increasing risk for diabetes   (prediabetes)  >or=6.5%  Consistent with diabetes  Currently no consensus exists for use of hemoglobin A1C  for diagnosis of diabetes for children.     06/29/2016 8.5 (H) 4.5 - 6.2 % Final   04/15/2016 8.1 (H) 4.5 - 6.2 % Final         Patient Active Problem List   Diagnosis    Coronary artery disease involving native coronary artery of native heart with unstable angina pectoris    Type 2 diabetes mellitus with hyperglycemia, with long-term current use of insulin    Hyperlipidemia    Vitamin D deficiency disease    Colon polyp    Prostate cancer    ED (erectile dysfunction)    Weakness    Essential hypertension    Chronic combined systolic and diastolic heart failure    Tortuous aorta    Major depressive disorder, single episode, mild    Hypokalemia    Insulin pump in place    Mild protein malnutrition    KELVIN on CPAP    History of CVA  (cerebrovascular accident)    GERD (gastroesophageal reflux disease)    Stenosis of right carotid artery    Chest pain    Insulin pump status    Severe obesity with body mass index (BMI) of 35.0 to 39.9 with comorbidity    Type 2 diabetes mellitus with diabetic polyneuropathy, with long-term current use of insulin    Unstable angina    NSTEMI (non-ST elevated myocardial infarction)    Mixed hyperlipidemia       Current Outpatient Prescriptions on File Prior to Visit   Medication Sig Dispense Refill    aspirin (ECOTRIN) 81 MG EC tablet Take 81 mg by mouth once daily.      blood sugar diagnostic Strp 1 strip by Misc.(Non-Drug; Combo Route) route 2 (two) times daily with meals. TRUE METRIX 100 strip 11    blood-glucose meter kit Use as instructed          TRUE METRIX 1 each 0    carvedilol (COREG) 25 MG tablet Take 0.5 tablets (12.5 mg total) by mouth 2 (two) times daily with meals. Hold is SBP <120. 90 tablet 3    citalopram (CELEXA) 10 MG tablet Take 1 tablet (10 mg total) by mouth once daily. 90 tablet 1    clopidogrel (PLAVIX) 75 mg tablet Take 1 tablet (75 mg total) by mouth once daily. 90 tablet 3    furosemide (LASIX) 20 MG tablet Take 1 tablet (20 mg total) by mouth once daily.      glucose 4 GM chewable tablet Take 16 g by mouth as needed for Low blood sugar.      insulin aspart (NOVOLOG) 100 unit/mL InPn pen Inject 16 Units into the skin 3 (three) times daily.  0    insulin detemir (LEVEMIR FLEXTOUCH) 100 unit/mL (3 mL) SubQ InPn pen Inject 42 Units into the skin 2 (two) times daily.  0    isosorbide mononitrate (IMDUR) 60 MG 24 hr tablet Take 1 tablet (60 mg total) by mouth once daily. 90 tablet 3    lancets Misc 1 lancet by Misc.(Non-Drug; Combo Route) route 2 (two) times daily with meals. TRUE METRIX 100 each 11    lisinopril 10 MG tablet Take 1 tablet (10 mg total) by mouth once daily. 90 tablet 3    nitroGLYCERIN (NITROSTAT) 0.4 MG SL tablet Place 1 tablet (0.4 mg total) under the  tongue every 5 (five) minutes as needed. Sublingual  ( under tongue) as needed 30 tablet prn    omega-3 acid ethyl esters (LOVAZA) 1 gram capsule Take 2 g by mouth 3 (three) times daily.        omeprazole (PRILOSEC) 40 MG capsule Take 1 capsule (40 mg total) by mouth 2 (two) times daily. (Patient taking differently: Take 40 mg by mouth 2 (two) times daily as needed. ) 180 capsule 3    rosuvastatin (CRESTOR) 40 MG Tab Take 1 tablet (40 mg total) by mouth once daily.      amitriptyline (ELAVIL) 50 MG tablet Take 1 tablet (50 mg total) by mouth every evening. 30 tablet 2     No current facility-administered medications on file prior to visit.        Review of patient's allergies indicates:  No Known Allergies    Past Surgical History:   Procedure Laterality Date    CARDIAC DEFIBRILLATOR PLACEMENT  2016    CHOLECYSTECTOMY      CORONARY ARTERY BYPASS GRAFT      bypass times  5    defibulater  2016    EYE SURGERY Bilateral     HERNIA REPAIR      groin x2  and umbilical    JOINT REPLACEMENT      knee left    thrombus Left     >2005    VASECTOMY         Family History   Problem Relation Age of Onset    Alzheimer's disease Mother      Living    Heart disease Mother     Dementia Mother     Asbestos Father          Cancer Father     Diabetes type II Maternal Aunt     Stroke Neg Hx        Social History     Social History    Marital status:      Spouse name: N/A    Number of children: 3    Years of education: GED     Occupational History    Retired [employer]      Social History Main Topics    Smoking status: Former Smoker     Packs/day: 4.00     Years: 25.00     Types: Cigarettes    Smokeless tobacco: Never Used    Alcohol use 0.0 oz/week      Comment: occasional    Drug use: No    Sexual activity: Yes     Partners: Female     Other Topics Concern    Not on file     Social History Narrative    SCREENING/HEALTH MAINTENANCE. Updated  14     "COLONOSCOPY 2012.  [facility].    TETANUS 2005.    PNEUMOVAX  11/13/13    NO PRIOR HISTORY OF ZOSTAVAX    PSA 2/14/14    FLU 10/8/13       Review of Systems   Constitution: Negative for chills, fever and weakness.   Cardiovascular: Positive for leg swelling (hx of bypass, LLE). Negative for claudication.   Respiratory: Negative for cough and shortness of breath.    Skin: Positive for dry skin and nail changes. Negative for itching and rash.   Musculoskeletal: Positive for arthritis, back pain, joint pain and myalgias. Negative for falls, joint swelling and muscle weakness.   Gastrointestinal: Negative for diarrhea, nausea and vomiting.   Neurological: Positive for numbness and paresthesias. Negative for tremors.   Psychiatric/Behavioral: Negative for altered mental status and hallucinations.         Objective:       Vitals:    05/24/17 0919   BP: 121/70   Pulse: 76   Weight: 106.1 kg (234 lb)   Height: 5' 8" (1.727 m)   PainSc:   3   PainLoc: Foot       Physical Exam   Constitutional:  Non-toxic appearance. He does not have a sickly appearance. No distress.   Pt. is well-developed, well-nourished, appears stated age, in no acute distress, alert and oriented x 3. No evidence of depression, anxiety, or agitation. Calm, cooperative, and communicative. Appropriate interactions and affect.   Cardiovascular:   Pulses:       Dorsalis pedis pulses are 1+ on the right side, and 1+ on the left side.        Posterior tibial pulses are 1+ on the right side, and 1+ on the left side.   here is decreased digital hair.    Pulmonary/Chest: No respiratory distress.   Musculoskeletal:        Right ankle: He exhibits swelling. No tenderness. No lateral malleolus, no medial malleolus, no AITFL, no CF ligament and no posterior TFL tenderness found. Achilles tendon exhibits no pain, no defect and normal Huggins's test results.        Left ankle: He exhibits swelling. No tenderness. No lateral malleolus, no medial malleolus, no AITFL, no " CF ligament and no posterior TFL tenderness found. Achilles tendon exhibits pain. Achilles tendon exhibits no defect and normal Huggins's test results.        Right foot: There is no tenderness and no bony tenderness.        Left foot: There is no tenderness and no bony tenderness.   Decreased stride, station of gait.  apropulsive toe off.  Increased angle and base of gait.     Patient has hammertoes of digits 2-5 bilateral partially reducible without symptom today.    Decreased first MPJ range of motion both weightbearing and nonweightbearing, no crepitus observed the first MP joint, + dorsal flag sign.Mild  bunion deformity is observed .    Biomechanical exam: Pain with palpation of posterior 1/3 calcaneus at region of Achilles tendon insertion left. no pain with ankle joint ROM. There is equinus deformity bilateral with decreased dorsiflexion at the ankle joint bilateral. No tenderness with compression of heel. Negative tinels sign. Gait analysis reveals excessive pronation through midstance and propulsion with early heel off. Shoes reveals lateral heel counter wear bilateral      Lymphadenopathy:   No lymphatic streaking    Negative lymphadenopathy bilateral popliteal fossa and tarsal tunnel.     Neurological:    [place] 5.07 monofilamant testing is diminished Alexx feet. Decreased/absent vibratory sensation bilateral feet to 128Hz tuning fork.      Paresthesias, and hyperesthesia bilateral feet with no clearly identified trigger or source.           Skin: Skin is warm, dry and intact. No abrasion, no bruising, no ecchymosis, no lesion, no petechiae and no rash noted. He is not diaphoretic. No cyanosis or erythema. No pallor. Nails show no clubbing.   Psychiatric: His mood appears not anxious. His affect is not inappropriate. His speech is not slurred. He is not combative. He is communicative. He is attentive.   Nursing note reviewed.            Assessment:       Encounter Diagnoses   Name Primary?     Diabetes mellitus with stage 3 chronic kidney disease Yes    Type II diabetes mellitus with neurological manifestations     Foot pain, bilateral     Achilles tendinitis of both lower extremities     Acquired equinus deformity of both feet     Hammer toes of both feet     Hallux limitus, unspecified laterality          Plan:       Jorden was seen today for follow-up.    Diagnoses and all orders for this visit:    Diabetes mellitus with stage 3 chronic kidney disease    Type II diabetes mellitus with neurological manifestations  -     DIABETIC SHOES FOR HOME USE    Foot pain, bilateral    Achilles tendinitis of both lower extremities  -     DIABETIC SHOES FOR HOME USE    Acquired equinus deformity of both feet  -     DIABETIC SHOES FOR HOME USE    Hammer toes of both feet  -     DIABETIC SHOES FOR HOME USE    Hallux limitus, unspecified laterality  -     DIABETIC SHOES FOR HOME USE      I counseled the patient on his conditions, their implications and medical management.    Shoe inspection. Diabetic Foot Education. Patient reminded of the importance of good nutrition and blood sugar control to help prevent podiatric complications of diabetes. Patient instructed on proper foot hygeine. We discussed wearing proper shoe gear, daily foot inspections, never walking without protective shoe gear, never putting sharp instruments to feet.      Rx diabetic shoes for protection and support    Significant difference in pain reaction on physical exam today than on previous encounter.    Encouraged patient to stretch aggressively. Continue to ice as needed.     Instructions on elevation to reduce pain and swelling. When sleeping, place a pillow under the injured leg. When sitting, support the injured leg so it is level with your waist.     RTC in 10-12 weeks, sooner PRN

## 2017-05-25 ENCOUNTER — DOCUMENTATION ONLY (OUTPATIENT)
Dept: CARDIOLOGY | Facility: CLINIC | Age: 72
End: 2017-05-25

## 2017-05-25 ENCOUNTER — OFFICE VISIT (OUTPATIENT)
Dept: CARDIOLOGY | Facility: CLINIC | Age: 72
End: 2017-05-25
Payer: MEDICARE

## 2017-05-25 VITALS
BODY MASS INDEX: 35.95 KG/M2 | WEIGHT: 237.19 LBS | HEART RATE: 66 BPM | HEIGHT: 68 IN | DIASTOLIC BLOOD PRESSURE: 57 MMHG | SYSTOLIC BLOOD PRESSURE: 118 MMHG

## 2017-05-25 DIAGNOSIS — E78.2 MIXED HYPERLIPIDEMIA: Chronic | ICD-10-CM

## 2017-05-25 DIAGNOSIS — I65.21 STENOSIS OF RIGHT CAROTID ARTERY: Chronic | ICD-10-CM

## 2017-05-25 DIAGNOSIS — I21.4 NSTEMI (NON-ST ELEVATED MYOCARDIAL INFARCTION): ICD-10-CM

## 2017-05-25 DIAGNOSIS — E11.65 TYPE 2 DIABETES MELLITUS WITH HYPERGLYCEMIA, WITH LONG-TERM CURRENT USE OF INSULIN: Chronic | ICD-10-CM

## 2017-05-25 DIAGNOSIS — I25.768: ICD-10-CM

## 2017-05-25 DIAGNOSIS — Z79.4 TYPE 2 DIABETES MELLITUS WITH HYPERGLYCEMIA, WITH LONG-TERM CURRENT USE OF INSULIN: Chronic | ICD-10-CM

## 2017-05-25 DIAGNOSIS — G47.33 OSA ON CPAP: Chronic | ICD-10-CM

## 2017-05-25 DIAGNOSIS — I50.42 CHRONIC COMBINED SYSTOLIC AND DIASTOLIC HEART FAILURE: Primary | Chronic | ICD-10-CM

## 2017-05-25 DIAGNOSIS — Z86.73 HISTORY OF CVA (CEREBROVASCULAR ACCIDENT): Chronic | ICD-10-CM

## 2017-05-25 DIAGNOSIS — I10 ESSENTIAL HYPERTENSION: Chronic | ICD-10-CM

## 2017-05-25 DIAGNOSIS — I77.1 TORTUOUS AORTA: ICD-10-CM

## 2017-05-25 PROBLEM — R07.9 CHEST PAIN: Status: RESOLVED | Noted: 2017-04-25 | Resolved: 2017-05-25

## 2017-05-25 PROBLEM — E87.6 HYPOKALEMIA: Status: RESOLVED | Noted: 2017-04-13 | Resolved: 2017-05-25

## 2017-05-25 PROBLEM — Z96.41 INSULIN PUMP STATUS: Chronic | Status: RESOLVED | Noted: 2017-04-25 | Resolved: 2017-05-25

## 2017-05-25 PROBLEM — I20.0 UNSTABLE ANGINA: Status: RESOLVED | Noted: 2017-04-26 | Resolved: 2017-05-25

## 2017-05-25 PROCEDURE — 99214 OFFICE O/P EST MOD 30 MIN: CPT | Mod: S$GLB,,, | Performed by: INTERNAL MEDICINE

## 2017-05-25 PROCEDURE — 1125F AMNT PAIN NOTED PAIN PRSNT: CPT | Mod: S$GLB,,, | Performed by: INTERNAL MEDICINE

## 2017-05-25 PROCEDURE — 1159F MED LIST DOCD IN RCRD: CPT | Mod: S$GLB,,, | Performed by: INTERNAL MEDICINE

## 2017-05-25 PROCEDURE — 99999 PR PBB SHADOW E&M-EST. PATIENT-LVL III: CPT | Mod: PBBFAC,,, | Performed by: INTERNAL MEDICINE

## 2017-05-25 PROCEDURE — 3046F HEMOGLOBIN A1C LEVEL >9.0%: CPT | Mod: S$GLB,,, | Performed by: INTERNAL MEDICINE

## 2017-05-25 PROCEDURE — 99499 UNLISTED E&M SERVICE: CPT | Mod: S$GLB,,, | Performed by: INTERNAL MEDICINE

## 2017-05-25 PROCEDURE — 4010F ACE/ARB THERAPY RXD/TAKEN: CPT | Mod: S$GLB,,, | Performed by: INTERNAL MEDICINE

## 2017-05-25 RX ORDER — SODIUM CHLORIDE 9 MG/ML
INJECTION, SOLUTION INTRAVENOUS CONTINUOUS
Status: CANCELLED | OUTPATIENT
Start: 2017-05-25

## 2017-05-25 NOTE — PROGRESS NOTES
Subjective:    Patient ID:  Jorden Shafer is a 71 y.o. male who presents for follow-up of Coronary Artery Disease      HPI     Mr. Shafer is a 71 YOCM with PMH CAD s/p CABG (1989), DVT, DM II, MI, defibrillator placement who was recently discharged from the hospital following a Right MCA stroke and NSTEMI. He was found to have >90% stenosis of his R-ICA on CTA.      The patient denies having any recurrence of chest pain since he was discharged. He reports BOSE with moderate activities but not with mild activities around his house. No Orthopnea or PND . However he sleeps on one pillow with CPAP mask. He still reports some left sided facial droop, aphonia, and left upper ext weakness that is gradually getting better since he was discharged. He was seen by Neurology on 5/18/2017.   Past Medical History:   Diagnosis Date    Back pain     CAD (coronary artery disease) 1989    h/o cabg x 5    Chronic kidney disease     Colon polyp     h/o on colonoscopy    Contact dermatitis 11/12/2013    CVA (cerebral vascular accident)     March 2010    Depression     Diabetes mellitus type II     on  insulin pump    DVT (deep venous thrombosis) 1980's    left leg    GERD (gastroesophageal reflux disease)     Heart failure     [facility]     Hyperlipidemia     Hypertension     Hypertensive heart disease without congestive heart failure     Insulin pump status 4/25/2017    Myocardial infarction     Neuropathy of lower extremity     Obesity (BMI 30-39.9) 4/25/2017    Prostate cancer     Renal manifestation of secondary diabetes mellitus     Sleep apnea     Stroke 4/21/2017    Type 2 diabetes mellitus with diabetic polyneuropathy, with long-term current use of insulin 4/25/2017    Type 2 diabetes mellitus with hyperglycemia, with long-term current use of insulin 9/26/2012    Currently sees Dr. Britton     Unstable angina     Vitamin D deficiency disease      Past Surgical History:   Procedure Laterality Date    CARDIAC  DEFIBRILLATOR PLACEMENT  06/16/2016    CHOLECYSTECTOMY      CORONARY ARTERY BYPASS GRAFT      bypass times  5    defibulater  06/16/2016    EYE SURGERY Bilateral     HERNIA REPAIR      groin x2  and umbilical    JOINT REPLACEMENT      knee left    thrombus Left     >2005    VASECTOMY       Current Outpatient Prescriptions on File Prior to Visit   Medication Sig Dispense Refill    amitriptyline (ELAVIL) 50 MG tablet Take 1 tablet (50 mg total) by mouth every evening. 30 tablet 2    aspirin (ECOTRIN) 81 MG EC tablet Take 81 mg by mouth once daily.      blood sugar diagnostic Strp 1 strip by Misc.(Non-Drug; Combo Route) route 2 (two) times daily with meals. TRUE METRIX 100 strip 11    blood-glucose meter kit Use as instructed          TRUE METRIX 1 each 0    carvedilol (COREG) 25 MG tablet Take 0.5 tablets (12.5 mg total) by mouth 2 (two) times daily with meals. Hold is SBP <120. 90 tablet 3    citalopram (CELEXA) 10 MG tablet Take 1 tablet (10 mg total) by mouth once daily. 90 tablet 1    clopidogrel (PLAVIX) 75 mg tablet Take 1 tablet (75 mg total) by mouth once daily. 90 tablet 3    furosemide (LASIX) 20 MG tablet Take 1 tablet (20 mg total) by mouth once daily.      glucose 4 GM chewable tablet Take 16 g by mouth as needed for Low blood sugar.      insulin aspart (NOVOLOG) 100 unit/mL InPn pen Inject 16 Units into the skin 3 (three) times daily.  0    insulin detemir (LEVEMIR FLEXTOUCH) 100 unit/mL (3 mL) SubQ InPn pen Inject 42 Units into the skin 2 (two) times daily.  0    isosorbide mononitrate (IMDUR) 60 MG 24 hr tablet Take 1 tablet (60 mg total) by mouth once daily. 90 tablet 3    lancets Misc 1 lancet by Misc.(Non-Drug; Combo Route) route 2 (two) times daily with meals. TRUE METRIX 100 each 11    lisinopril 10 MG tablet Take 1 tablet (10 mg total) by mouth once daily. 90 tablet 3    nitroGLYCERIN (NITROSTAT) 0.4 MG SL tablet Place 1 tablet (0.4 mg total) under the tongue every 5 (five)  minutes as needed. Sublingual  ( under tongue) as needed 30 tablet prn    omega-3 acid ethyl esters (LOVAZA) 1 gram capsule Take 2 g by mouth 3 (three) times daily.        omeprazole (PRILOSEC) 40 MG capsule Take 1 capsule (40 mg total) by mouth 2 (two) times daily. (Patient taking differently: Take 40 mg by mouth 2 (two) times daily as needed. ) 180 capsule 3    rosuvastatin (CRESTOR) 40 MG Tab Take 1 tablet (40 mg total) by mouth once daily.       No current facility-administered medications on file prior to visit.      Review of patient's allergies indicates:  No Known Allergies  Social History   Substance Use Topics    Smoking status: Former Smoker     Packs/day: 4.00     Years: 25.00     Types: Cigarettes    Smokeless tobacco: Never Used    Alcohol use 0.0 oz/week      Comment: occasional     Family History   Problem Relation Age of Onset    Alzheimer's disease Mother      Living    Heart disease Mother     Dementia Mother     Asbestos Father          Cancer Father     Diabetes type II Maternal Aunt     Stroke Neg Hx        Review of Systems   Constitution: Negative for chills and fever.   HENT: Negative for congestion and sore throat.    Eyes: Negative.    Cardiovascular: Positive for dyspnea on exertion (with moderate exertion). Negative for chest pain, leg swelling, near-syncope, orthopnea, paroxysmal nocturnal dyspnea and syncope.   Respiratory: Negative for cough and shortness of breath.         History of KELVIN - uses CPAP   Hematologic/Lymphatic: Negative for bleeding problem. Does not bruise/bleed easily.   Musculoskeletal: Positive for joint pain (left knee joint, Right hip joint pain). Negative for falls.   Gastrointestinal: Negative.    Genitourinary: Negative.    Neurological: Positive for aphonia and focal weakness (Left upper ext since his last stroke). Negative for loss of balance.         Previous TestinD Echo w Doppler 17:  CONCLUSIONS     1 - Moderately depressed  left ventricular systolic function (EF 40%).     2 - Quantitatively measured LV function is 38%.     3 - Biatrial enlargement.     4 - Impaired LV relaxation, normal LAP (grade 1 diastolic dysfunction).     5 - Normal right ventricular systolic function .     6 - The estimated PA systolic pressure is 20 mmHg.     Stress Nuclear Perfusion 04/27/2017:  Impression: ABNORMAL MYOCARDIAL PERFUSION  1. There is evidence for a large sized area of moderate to severe myocardial ischemia that extends from the base to the distal inferior wall and the distal inferoseptal wall.  There is underlying injury present in the apex and inferorapical walls.   2. There is abnormal wall motion at rest showing hypokinesis of the apical and inferior walls of the left ventricle.   3. Visually estimated LV function is low normal.   4. The ventricular volumes are normal at rest and stress.   5. The extracardiac distribution of radioactivity is normal.   6. When compared to the previous study from 04/20/2016, there is improvement of the anterior wall defect (not reported) and to a small extent the apex.  There is still infarct seen in the apex and inferoapical wall which appears unchanged.  There is   ischemia in the base to mid-distal inferior wall which also appears unchanged.      Cardiac catheterization 06/30/2016:  Summary/Post-Operative Diagnosis    1.   Totally occluded native arteries, except for the proximal portion of the LAD/diagonals and septals.   2.   -SVG-to -RCA is occluded.   3.   -SVG-to-OM is patent.   4.   -LIMA-to-LAD is patent but is attached to the proximal LAD before the occlusion, and only feeds this proximal portion.   5.   -Jump graft to diagonal 3+LAD has heavy athrerosclerosis, ostial 85% stenosis, and occlusion of the jump portion that supplies the distal LAD.   6.   We treated the ostium of this SVG-diagonal/LAD using one drug-eluting stent.    Head & Neck CTA 04/13/17:  Impression   Involving right MCA  "distribution infarct as above, without evidence of hemorrhagic conversion or significant mass effect at this time.    Mild chronic microvascular ischemic changes and small remote right cerebellar infarct.    No evidence of new hemorrhage or major vascular distribution infarct.    CTA demonstrates extensive atherosclerotic disease present throughout the anterior and posterior circulation.     There is a critical stenosis (greater than 90%) of the right carotid bifurcation with associated adaptive narrowing and diminished enhancement of the distal right ICA.    Intermittent occlusion of both vertebral arteries with extensive multifocal narrowing throughout the vertebrobasilar system. Distal vertebrobasilar system thought likely primarily supplied via a prominent left posterior communicating artery.    Multifocal areas of narrowing in the intracranial vasculature, with apparent occlusion of a sylvian branch of the right MCA.    Carotid US 04/12/17:  Impression   Occlusion of the mid and proximal segments of the right ICA.  Retrograde flow within the right vertebral artery.    Elevated velocities in the left ICA with peak systolic velocity of 279 cm/s in the left distal ICA with associated ICA to CCA ratio of 2.8, corresponding to greater than 50-69% luminal narrowing.    Abnormal retrograde flow within the right vertebral artery and bidirectional flow within the left vertebral artery.    Heterogeneous plaque throughout the carotid systems, more so on the left.      Objective:  Vitals:    05/25/17 1319 05/25/17 1324   BP: 123/60 (!) 118/57   Pulse: 66    Weight: 107.6 kg (237 lb 3.4 oz)    Height: 5' 8" (1.727 m)          Physical Exam   Constitutional: He is oriented to person, place, and time. He appears well-developed and well-nourished. No distress.   HENT:   Left facial droop, aphonia   Neck: Neck supple. No JVD present.   Cardiovascular: Normal rate, regular rhythm, S1 normal and S2 normal.  Exam reveals gallop " and S3.    No murmur heard.  Pulses:       Carotid pulses are 2+ on the right side, and 2+ on the left side.       Radial pulses are 2+ on the right side, and 2+ on the left side.        Femoral pulses are 2+ on the right side, and 2+ on the left side.       Popliteal pulses are 2+ on the right side, and 2+ on the left side.        Dorsalis pedis pulses are 2+ on the right side, and 2+ on the left side.        Posterior tibial pulses are 2+ on the right side, and 2+ on the left side.   Pulmonary/Chest: Effort normal. No respiratory distress. He has no wheezes.   Abdominal: Soft. Bowel sounds are normal. He exhibits no distension. There is no tenderness.   Musculoskeletal: He exhibits no edema.   Neurological: He is alert and oriented to person, place, and time.   Skin: Skin is warm and dry.   Psychiatric: He has a normal mood and affect. His behavior is normal.         Assessment:       1. Chronic combined systolic and diastolic heart failure    2. Coronary artery disease of bypass graft of transplanted heart with stable angina pectoris    3. Stenosis of right carotid artery    4. Essential hypertension    5. Tortuous aorta    6. NSTEMI (non-ST elevated myocardial infarction)    7. Mixed hyperlipidemia    8. History of CVA (cerebrovascular accident)    9. Type 2 diabetes mellitus with hyperglycemia, with long-term current use of insulin         Plan:             1) CAD:   S/P NSTEMI (4/24/17) peak Troponin of 0.027   Managed conservatively - medical therapy   Abnormal stress test as mentioned above without a change compared to 2016   No recurrent chest pain since he was discharged from the hospital     Coronary Anatomy:    Patent LIMA-LAD with distal LAD    Patent SVG-OM and SVG-Diag (patent prox stent in the same graft)   Occluded SVG to RCA,  of RCA with L-R collateral flow      Plan; given absence of angina and no change on Stress testing compared to last year's, would rec conservative approach with optimal  med Rx for CAD    2) ICM   LVEF 35-40%   NYHA FC II Stage D   Eu-volemic today on exam   Continue current medical therapy     3) H/o recent CVA.    Right MCA stroke and has high grade right carotid stenosis;    -Will consider carotid stenting as an option for revascularization given his high risk for CEA with underlying ICMP.   - Carotid angiogram w possible PTA/Stent 4 weeks following his CVA    The risks (including death, heart attack, limb loss, paralysis, emergency surgery, bleeding, renal failure, and stroke), the potential benefits of the procedure, and the alternatives to the procedure, were discussed in detail and accepted by the patient. All questions were answered. Patient agrees to proceed.      4) HTN   Well controlled     5) DM2. 4/12/17 HgbA1c = 9.3; rec better  glycemic control   PCP for management    Tutu Flaherty MD  Interventional Cardiology Fellow, PGY-9  5/25/2017 2:24 PM     I have personally taken the history and examined this patient and agree with the resident's note as stated above.  Case discussed with Dr. Arenas, stroke neurology.  The patient would benefit from right carotid artery revascularization.  Given his recent NSTEMI, the patient will be at high risk for cardiac complications during CEA.  Therefore carotid artery stenting appears to be the safest option for catorid revascularization in this patient with ghih grade, symptomatic right carotid artery stenosis.  The risks and potential benefit were discussed in detail with the patient.  He accepts these risks and stted he would like to proceed with carotid artery stenting.  CTA of the head and neck from 4/13/17 reviewed  -Will proceed with head and neck vessel angiography and right caaoritd artery stenting using MOMA proximal embolic protection  All of the patient's questions were answered.

## 2017-05-25 NOTE — PROGRESS NOTES
OUTPATIENT CATHETERIZATION INSTRUCTIONS    You have been scheduled for a procedure in the catheterization lab on Friday, June 2, 2017.     Please report to the Cardiology Waiting Area on the Third floor of the hospital and check in at 7 AM.   You will then be taken to the SSCU (Short Stay Cardiac Unit) and prepared for your procedure. Please be aware that this is not the time of your procedure but the time you are to arrive. The procedures are scheduled on an hourly basis; however, emergency cases take precedence over all other cases.       You may not have anything to eat or drink after midnight the night before your test. You may take your regular morning medications with water. If there are any medications that you should not take you will be instructed to hold them that morning. If you are diabetic and on Metformin (Glucophage) do not take it the day before, the day of, and for 2 days after your procedure.      The procedure will take 1-2 hours to perform. After the procedure, you will return to SSCU on the third floor of the hospital. You will need to lie still (or keep your arm still) for the next 4 to 6 hours to minimize bleeding from the puncture site. Your family may remain in the room with you during this time.       You may be able to be discharged home that same afternoon if there is someone to drive you home and there were no complications. If you have one of the balloon, stent, or device procedures you may spend the night in the hospital. Your doctor will determine, based on your progress, the date and time of your discharge. The results of your procedure will be discussed with you before you are discharged. Any further testing or procedures will be scheduled for you either before you leave or you will be called with these appointments.       If you should have any questions, concerns, or need to change the date of your procedure, please call  DIANA Simon @ [phone]    Special Instructions:  Take  1/2 of your regularly scheduled insulin dose the night before your procedure.  Hold your regularly scheduled insulin dose the morning of your procedure.  Drink plenty of water the day before procedure        THE ABOVE INSTRUCTIONS WERE GIVEN TO THE PATIENT VERBALLY AND THEY VERBALIZED UNDERSTANDING.  THEY DO NOT REQUIRE ANY SPECIAL NEEDS AND DO NOT HAVE ANY LEARNING BARRIERS.          Directions for Reporting to Cardiology Waiting Area in the Hospital  If you park in the Parking Garage:  Take elevators to the1st floor of the parking garage.  Continue past the gift shop, coffee shop, and piano.  Take a right and go to the gold elevators. (Elevator B)  Take the elevator to the 3rd floor.  Follow the arrow on the sign on the wall that says Cath Lab Registration/EP Lab Registration.  Follow the long hallway all the way around until you come to a big open area.  This is the registration area.  Check in at Reception Desk.    OR    If family is dropping you off:  Have them drop you off at the front of the Hospital under the green overhang.  Enter through the doors and take a right.  Take the E elevators to the 3rd floor Cardiology Waiting Area.  Check in at the Reception Desk in the waiting room.

## 2017-06-02 ENCOUNTER — HOSPITAL ENCOUNTER (INPATIENT)
Facility: HOSPITAL | Age: 72
LOS: 1 days | Discharge: HOME | DRG: 068 | End: 2017-06-02
Attending: INTERNAL MEDICINE | Admitting: INTERNAL MEDICINE
Payer: MEDICARE

## 2017-06-02 VITALS
OXYGEN SATURATION: 94 % | HEART RATE: 73 BPM | HEIGHT: 68 IN | BODY MASS INDEX: 35.41 KG/M2 | RESPIRATION RATE: 18 BRPM | TEMPERATURE: 97 F | WEIGHT: 233.63 LBS | DIASTOLIC BLOOD PRESSURE: 93 MMHG | SYSTOLIC BLOOD PRESSURE: 154 MMHG

## 2017-06-02 DIAGNOSIS — I65.23 SYMPTOMATIC CAROTID ARTERY STENOSIS, BILATERAL: Primary | ICD-10-CM

## 2017-06-02 DIAGNOSIS — I65.29 OCCLUSION OF CAROTID ARTERY, UNSPECIFIED LATERALITY: ICD-10-CM

## 2017-06-02 DIAGNOSIS — I65.21 STENOSIS OF RIGHT CAROTID ARTERY: Chronic | ICD-10-CM

## 2017-06-02 DIAGNOSIS — Z86.73 HISTORY OF CVA (CEREBROVASCULAR ACCIDENT): Chronic | ICD-10-CM

## 2017-06-02 DIAGNOSIS — I65.23 CAROTID STENOSIS, BILATERAL: Primary | ICD-10-CM

## 2017-06-02 DIAGNOSIS — I25.708 CORONARY ARTERY DISEASE OF BYPASS GRAFT OF NATIVE HEART WITH STABLE ANGINA PECTORIS: Primary | ICD-10-CM

## 2017-06-02 LAB
ABO + RH BLD: NORMAL
ANION GAP SERPL CALC-SCNC: 12 MMOL/L
APTT BLDCRRT: 25.2 SEC
BASOPHILS # BLD AUTO: 0.03 K/UL
BASOPHILS NFR BLD: 0.3 %
BLD GP AB SCN CELLS X3 SERPL QL: NORMAL
BUN SERPL-MCNC: 19 MG/DL
CALCIUM SERPL-MCNC: 8.2 MG/DL
CHLORIDE SERPL-SCNC: 107 MMOL/L
CO2 SERPL-SCNC: 25 MMOL/L
CREAT SERPL-MCNC: 1.2 MG/DL
DIFFERENTIAL METHOD: ABNORMAL
EOSINOPHIL # BLD AUTO: 0.1 K/UL
EOSINOPHIL NFR BLD: 1.1 %
ERYTHROCYTE [DISTWIDTH] IN BLOOD BY AUTOMATED COUNT: 14.3 %
EST. GFR  (AFRICAN AMERICAN): >60 ML/MIN/1.73 M^2
EST. GFR  (NON AFRICAN AMERICAN): 59.6 ML/MIN/1.73 M^2
GLUCOSE SERPL-MCNC: 141 MG/DL
HCT VFR BLD AUTO: 42.8 %
HGB BLD-MCNC: 14.5 G/DL
INR PPP: 1.1
LYMPHOCYTES # BLD AUTO: 1.8 K/UL
LYMPHOCYTES NFR BLD: 20.4 %
MCH RBC QN AUTO: 28.2 PG
MCHC RBC AUTO-ENTMCNC: 33.9 %
MCV RBC AUTO: 83 FL
MONOCYTES # BLD AUTO: 0.5 K/UL
MONOCYTES NFR BLD: 6.1 %
NEUTROPHILS # BLD AUTO: 6.3 K/UL
NEUTROPHILS NFR BLD: 71.8 %
PERIPHERAL STENOSIS: ABNORMAL
PLATELET # BLD AUTO: 143 K/UL
PMV BLD AUTO: 10.8 FL
POCT GLUCOSE: 151 MG/DL (ref 70–110)
POCT GLUCOSE: 156 MG/DL (ref 70–110)
POTASSIUM SERPL-SCNC: 3.6 MMOL/L
PROTHROMBIN TIME: 11.6 SEC
RBC # BLD AUTO: 5.15 M/UL
SODIUM SERPL-SCNC: 144 MMOL/L
WBC # BLD AUTO: 8.84 K/UL

## 2017-06-02 PROCEDURE — 86850 RBC ANTIBODY SCREEN: CPT

## 2017-06-02 PROCEDURE — B3151ZZ FLUOROSCOPY OF BILATERAL COMMON CAROTID ARTERIES USING LOW OSMOLAR CONTRAST: ICD-10-PCS | Performed by: INTERNAL MEDICINE

## 2017-06-02 PROCEDURE — 85730 THROMBOPLASTIN TIME PARTIAL: CPT

## 2017-06-02 PROCEDURE — 82962 GLUCOSE BLOOD TEST: CPT

## 2017-06-02 PROCEDURE — 86900 BLOOD TYPING SEROLOGIC ABO: CPT

## 2017-06-02 PROCEDURE — 25000003 PHARM REV CODE 250: Performed by: INTERNAL MEDICINE

## 2017-06-02 PROCEDURE — 36222 PLACE CATH CAROTID/INOM ART: CPT | Mod: 50,,, | Performed by: INTERNAL MEDICINE

## 2017-06-02 PROCEDURE — 85025 COMPLETE CBC W/AUTO DIFF WBC: CPT

## 2017-06-02 PROCEDURE — 85610 PROTHROMBIN TIME: CPT

## 2017-06-02 PROCEDURE — 93010 ELECTROCARDIOGRAM REPORT: CPT | Mod: ,,, | Performed by: INTERNAL MEDICINE

## 2017-06-02 PROCEDURE — 25000003 PHARM REV CODE 250

## 2017-06-02 PROCEDURE — 80048 BASIC METABOLIC PNL TOTAL CA: CPT

## 2017-06-02 PROCEDURE — 11000001 HC ACUTE MED/SURG PRIVATE ROOM

## 2017-06-02 PROCEDURE — 93005 ELECTROCARDIOGRAM TRACING: CPT

## 2017-06-02 RX ORDER — SODIUM CHLORIDE 9 MG/ML
INJECTION, SOLUTION INTRAVENOUS CONTINUOUS
Status: DISCONTINUED | OUTPATIENT
Start: 2017-06-02 | End: 2017-06-02 | Stop reason: HOSPADM

## 2017-06-02 RX ORDER — SODIUM CHLORIDE 9 MG/ML
3 INJECTION, SOLUTION INTRAVENOUS CONTINUOUS
Status: DISCONTINUED | OUTPATIENT
Start: 2017-06-02 | End: 2017-06-02 | Stop reason: HOSPADM

## 2017-06-02 RX ORDER — DIPHENHYDRAMINE HCL 50 MG
50 CAPSULE ORAL ONCE
Status: COMPLETED | OUTPATIENT
Start: 2017-06-02 | End: 2017-06-02

## 2017-06-02 RX ADMIN — SODIUM CHLORIDE: 0.9 INJECTION, SOLUTION INTRAVENOUS at 07:06

## 2017-06-02 RX ADMIN — DIPHENHYDRAMINE HYDROCHLORIDE 50 MG: 50 CAPSULE ORAL at 07:06

## 2017-06-02 NOTE — PLAN OF CARE
Problem: Patient Care Overview  Goal: Plan of Care Review  Outcome: Ongoing (interventions implemented as appropriate)  Received report from Hector. Patient s/p cerebral, AAOx3. VSS, no c/o pain or discomfort at this time, resp even and unlabored. Gauze/tegaderm dressing to R groin is CDI. No active bleeding. No hematoma noted. Post procedure protocol reviewed with patient and patient's family. Understanding verbalized. Family members at bedside. Nurse call bell within reach. Will continue to monitor per post procedure protocol.

## 2017-06-02 NOTE — DISCHARGE SUMMARY
[facility]  Discharge Summary      Admit Date: 6/2/2017    Discharge Date and Time:  06/02/2017 3:26 PM    Attending Physician: Marcus Blakely MD     Reason for Admission:   Bilateral carotid angiogram    Procedures Performed: Procedure(s) (LRB):  ANGIOPLASTY-CAROTID (Right)    Hospital Course (synopsis of major diagnoses, care, treatment, and services provided during the course of the hospital stay):   Mr Shafer came in for bilateral angiogram of the carotids given recent R MCA stroke. Patient tolerated well the procedure. There were no complications. Results showed significant stenosis of bilateral ICA with unstable plaque/possible thrombus of the R ICA. Please refer to full cath report. Decision was to refer the patient to vascular surgery for CEA.   Patient remained clinically stable. He was discharged on a clinical stable condition.     Consults: none    Significant Diagnostic Studies: Labs:   BMP:   Recent Labs  Lab 06/02/17  0710   *      K 3.6      CO2 25   BUN 19   CREATININE 1.2   CALCIUM 8.2*   , CMP   Recent Labs  Lab 06/02/17  0710      K 3.6      CO2 25   *   BUN 19   CREATININE 1.2   CALCIUM 8.2*   ANIONGAP 12   ESTGFRAFRICA >60.0   EGFRNONAA 59.6*    and CBC   Recent Labs  Lab 06/02/17  0710   WBC 8.84   HGB 14.5   HCT 42.8   *       Final Diagnoses:    Principal Problem: Stenosis of right carotid artery   Secondary Diagnoses:   Active Hospital Problems    Diagnosis  POA    *Stenosis of right carotid artery [I65.21]  Yes     Chronic      Resolved Hospital Problems    Diagnosis Date Resolved POA   No resolved problems to display.       Discharged Condition: good    Disposition: Home or Self Care    Follow Up/Patient Instructions:     Medications:  Reconciled Home Medications:   Current Discharge Medication List      CONTINUE these medications which have NOT CHANGED    Details   aspirin (ECOTRIN) 81 MG EC tablet Take 81 mg by mouth  once daily.      blood sugar diagnostic Strp 1 strip by Misc.(Non-Drug; Combo Route) route 2 (two) times daily with meals. TRUE METRIX  Qty: 100 strip, Refills: 11    Associated Diagnoses: Diabetes mellitus without complication      blood-glucose meter kit Use as instructed          TRUE METRIX  Qty: 1 each, Refills: 0    Associated Diagnoses: Diabetes mellitus without complication      carvedilol (COREG) 25 MG tablet Take 0.5 tablets (12.5 mg total) by mouth 2 (two) times daily with meals. Hold is SBP <120.  Qty: 90 tablet, Refills: 3    Associated Diagnoses: Essential hypertension      citalopram (CELEXA) 10 MG tablet Take 1 tablet (10 mg total) by mouth once daily.  Qty: 90 tablet, Refills: 1    Comments: **Patient requests 90 days supply**  Associated Diagnoses: Sleep disturbances      clopidogrel (PLAVIX) 75 mg tablet Take 1 tablet (75 mg total) by mouth once daily.  Qty: 90 tablet, Refills: 3      furosemide (LASIX) 20 MG tablet Take 1 tablet (20 mg total) by mouth once daily.    Comments: **Patient requests 90 days supply**  Associated Diagnoses: Essential hypertension      insulin aspart (NOVOLOG) 100 unit/mL InPn pen Inject 16 Units into the skin 3 (three) times daily.  Refills: 0      insulin detemir (LEVEMIR FLEXTOUCH) 100 unit/mL (3 mL) SubQ InPn pen Inject 42 Units into the skin 2 (two) times daily.  Refills: 0      isosorbide mononitrate (IMDUR) 60 MG 24 hr tablet Take 1 tablet (60 mg total) by mouth once daily.  Qty: 90 tablet, Refills: 3    Associated Diagnoses: Essential hypertension      lancets Misc 1 lancet by Misc.(Non-Drug; Combo Route) route 2 (two) times daily with meals. TRUE METRIX  Qty: 100 each, Refills: 11    Associated Diagnoses: Diabetes mellitus without complication      lisinopril 10 MG tablet Take 1 tablet (10 mg total) by mouth once daily.  Qty: 90 tablet, Refills: 3    Comments: **Patient requests 90 day supply**  Associated Diagnoses: Essential hypertension      omega-3 acid  ethyl esters (LOVAZA) 1 gram capsule Take 2 g by mouth 3 (three) times daily.        omeprazole (PRILOSEC) 40 MG capsule Take 1 capsule (40 mg total) by mouth 2 (two) times daily.  Qty: 180 capsule, Refills: 3    Comments: **Patient requests 90 day supply**  Associated Diagnoses: GERD (gastroesophageal reflux disease)      rosuvastatin (CRESTOR) 40 MG Tab Take 1 tablet (40 mg total) by mouth once daily.      amitriptyline (ELAVIL) 50 MG tablet Take 1 tablet (50 mg total) by mouth every evening.  Qty: 30 tablet, Refills: 2    Associated Diagnoses: Right-sided low back pain with right-sided sciatica      glucose 4 GM chewable tablet Take 16 g by mouth as needed for Low blood sugar.      nitroGLYCERIN (NITROSTAT) 0.4 MG SL tablet Place 1 tablet (0.4 mg total) under the tongue every 5 (five) minutes as needed. Sublingual  ( under tongue) as needed  Qty: 30 tablet, Refills: prn    Associated Diagnoses: Other chest pain           No discharge procedures on file.  Follow-up Information     Israel Galindo MD In 1 week.    Specialty:  Vascular Surgery  Contact information:  [address] [facility] [id number] [phone]

## 2017-06-02 NOTE — INTERVAL H&P NOTE
The patient has been examined and the H&P has been reviewed:    I concur with the findings and no changes have occurred since H&P was written.    Anesthesia/Surgery risks, benefits and alternative options discussed and understood by patient/family.          Active Hospital Problems    Diagnosis  POA    Stenosis of right carotid artery [I65.21]  Yes     Chronic      Resolved Hospital Problems    Diagnosis Date Resolved POA   No resolved problems to display.

## 2017-06-02 NOTE — DISCHARGE INSTRUCTIONS
1. Do not strain or lift anything greater than 5 lb for 1 week.   2. Do not drive or operate any dangerous machinery for 24 hours.   3. Keep the dressing on, clean, and dry for 24 hours.   4. After 24 hours, the dressing may be removed and a shower is allowed.   5. Clean the area with mild soap and water.   6. Once the skin has healed (1 week), bathing in a tub or swimming is allowed.   7. Inspect the groin site daily and report to the physician any signs of infection at the site: redness, pain, fever >100.4, unusual pain at the   access site or affected extremity, unusual swelling at the access site, or any yellow, white or green drainage.  Call 911 if you have:   Bleeding from the puncture site that you cannot stop by doing the following:   Relax and lie down right away. Keep your leg flat and apply firm pressure to the site using your fingers and a gauze pad. Keep the pressure on for 10 minutes. Continue this until the bleeding stops. This may take awhile. When bleeding stops, cover the site with a sterile bandage and keep your leg still as much as possible.

## 2017-06-02 NOTE — PROGRESS NOTES
Pt DC'd per MD order. Discharge instructions given including activity, wound care, S&S of infections, future appointments, and when to call MD. Medications reviewed including when to take next dose.Telemetry and PIV DC'd, catheter tip intact. Pt left unit via wheelchair with family.

## 2017-06-02 NOTE — PROGRESS NOTES
Pt ambulated around unit with standby assist. R groin site remained CDI, no bleeding or hematoma noted. Will monitor.

## 2017-06-05 ENCOUNTER — HOSPITAL ENCOUNTER (OUTPATIENT)
Dept: VASCULAR SURGERY | Facility: CLINIC | Age: 72
Discharge: HOME | End: 2017-06-05
Payer: MEDICARE

## 2017-06-05 ENCOUNTER — OUTPATIENT CASE MANAGEMENT (OUTPATIENT)
Dept: ADMINISTRATIVE | Facility: OTHER | Age: 72
End: 2017-06-05

## 2017-06-05 ENCOUNTER — INITIAL CONSULT (OUTPATIENT)
Dept: VASCULAR SURGERY | Facility: CLINIC | Age: 72
End: 2017-06-05
Payer: MEDICARE

## 2017-06-05 VITALS
TEMPERATURE: 97 F | HEIGHT: 68 IN | RESPIRATION RATE: 18 BRPM | DIASTOLIC BLOOD PRESSURE: 73 MMHG | WEIGHT: 239.38 LBS | BODY MASS INDEX: 36.28 KG/M2 | SYSTOLIC BLOOD PRESSURE: 132 MMHG | HEART RATE: 73 BPM

## 2017-06-05 DIAGNOSIS — I63.239 CAROTID STENOSIS, SYMPTOMATIC, WITH INFARCTION: ICD-10-CM

## 2017-06-05 DIAGNOSIS — I65.23 SYMPTOMATIC CAROTID ARTERY STENOSIS, BILATERAL: ICD-10-CM

## 2017-06-05 LAB
POC ACTIVATED CLOTTING TIME K: 202 SEC (ref 74–137)
SAMPLE: ABNORMAL

## 2017-06-05 PROCEDURE — 1125F AMNT PAIN NOTED PAIN PRSNT: CPT | Mod: S$GLB,,, | Performed by: SURGERY

## 2017-06-05 PROCEDURE — 99999 PR PBB SHADOW E&M-EST. PATIENT-LVL III: CPT | Mod: PBBFAC,,, | Performed by: SURGERY

## 2017-06-05 PROCEDURE — 93880 EXTRACRANIAL BILAT STUDY: CPT | Mod: S$GLB,,, | Performed by: SURGERY

## 2017-06-05 PROCEDURE — 1159F MED LIST DOCD IN RCRD: CPT | Mod: S$GLB,,, | Performed by: SURGERY

## 2017-06-05 PROCEDURE — 99205 OFFICE O/P NEW HI 60 MIN: CPT | Mod: S$GLB,,, | Performed by: SURGERY

## 2017-06-05 PROCEDURE — 99499 UNLISTED E&M SERVICE: CPT | Mod: S$GLB,,, | Performed by: SURGERY

## 2017-06-05 NOTE — LETTER
June 5, 2017      Marcus Blakely MD  [address]  [facility] [address]           [facility]  [address]  [facility] [address]  Phone: [phone]  Fax: [phone]          Patient: Jorden Shafer   MR Number: [id number]   YOB: 1945   Date of Visit: 6/5/2017       Dear Dr. Marcus Blakely:    Thank you for referring Jorden Shafer to me for evaluation. Attached you will find relevant portions of my assessment and plan of care.    If you have questions, please do not hesitate to call me. I look forward to following Jorden Shafer along with you.    Sincerely,    Israel Galindo MD    Enclosure  CC:  No Recipients    If you would like to receive this communication electronically, please contact [email or web address] or [phone] to request more information on bttn Link access.    For providers and/or their staff who would like to refer a patient to [facility], please contact us through our one-stop-shop provider referral line, [facility], at [phone].    If you feel you have received this communication in error or would no longer like to receive these types of communications, please e-mail [email or web address]

## 2017-06-05 NOTE — PROGRESS NOTES
Jorden Shafer  06/05/2017    HPI:  Patient is a 72 y.o. male with a h/o CAD (CABG x5v in 1989), DM II, HTN, HLD who is here today for evaluation of carotid stenosis.   Defibrillator in place (June 2016 - [employer])  Multiple PCIs (x3)  Comes w wife, daughter    When ambulates from living room to back of the house, becomes dyspneic  Has not tried walking up 1 flight of stairs    + MI 1989; early 2000s; April 2017 ( 1 wk after stroke)  + strokes: in mid-2000s; April 2017 R MCA: speech still affected     Tobacco use: quit in 1990s    Retired   Lives w wife    Asked to see by Dr NATONI Blakely, Cards  Neuro is Dr JOAN Arenas  PMD is Neha Plasencia    Past Medical History:   Diagnosis Date    Back pain     CAD (coronary artery disease) 1989    h/o cabg x 5    Chronic kidney disease     Colon polyp     h/o on colonoscopy    Contact dermatitis 11/12/2013    CVA (cerebral vascular accident)     March 2010    Depression     Diabetes mellitus type II     on  insulin pump    DVT (deep venous thrombosis) 1980's    left leg    GERD (gastroesophageal reflux disease)     Heart failure     [employer]     Hyperlipidemia     Hypertension     Hypertensive heart disease without congestive heart failure     Insulin pump status 4/25/2017    Myocardial infarction     Neuropathy of lower extremity     Obesity (BMI 30-39.9) 4/25/2017    Prostate cancer     Renal manifestation of secondary diabetes mellitus     Sleep apnea     Stroke 4/21/2017    Type 2 diabetes mellitus with diabetic polyneuropathy, with long-term current use of insulin 4/25/2017    Type 2 diabetes mellitus with hyperglycemia, with long-term current use of insulin 9/26/2012    Currently sees Dr. Britton     Unstable angina     Vitamin D deficiency disease      Past Surgical History:   Procedure Laterality Date    CARDIAC DEFIBRILLATOR PLACEMENT  06/16/2016    CHOLECYSTECTOMY      CORONARY ARTERY BYPASS GRAFT      bypass times  5    defibulater   2016    EYE SURGERY Bilateral     HERNIA REPAIR      groin x2  and umbilical    JOINT REPLACEMENT      knee left    thrombus Left     >2005    VASECTOMY       Family History   Problem Relation Age of Onset    Alzheimer's disease Mother      Living    Heart disease Mother     Dementia Mother     Asbestos Father          Cancer Father     Diabetes type II Maternal Aunt     Stroke Neg Hx      Social History     Social History    Marital status:      Spouse name: N/A    Number of children: 3    Years of education: GED     Occupational History    Retired [employer]      Social History Main Topics    Smoking status: Former Smoker     Packs/day: 4.00     Years: 25.00     Types: Cigarettes    Smokeless tobacco: Never Used    Alcohol use 0.0 oz/week      Comment: occasional    Drug use: No    Sexual activity: Yes     Partners: Female     Other Topics Concern    Not on file     Social History Narrative    SCREENING/HEALTH MAINTENANCE. Updated  14    COLONOSCOPY .  [facility].    TETANUS .    PNEUMOVAX  13    NO PRIOR HISTORY OF ZOSTAVAX    PSA 14    FLU 10/8/13       Current Outpatient Prescriptions:     amitriptyline (ELAVIL) 50 MG tablet, Take 1 tablet (50 mg total) by mouth every evening., Disp: 30 tablet, Rfl: 2    aspirin (ECOTRIN) 81 MG EC tablet, Take 81 mg by mouth once daily., Disp: , Rfl:     blood sugar diagnostic Strp, 1 strip by Misc.(Non-Drug; Combo Route) route 2 (two) times daily with meals. TRUE METRIX, Disp: 100 strip, Rfl: 11    blood-glucose meter kit, Use as instructed          TRUE METRIX, Disp: 1 each, Rfl: 0    carvedilol (COREG) 25 MG tablet, Take 0.5 tablets (12.5 mg total) by mouth 2 (two) times daily with meals. Hold is SBP <120., Disp: 90 tablet, Rfl: 3    citalopram (CELEXA) 10 MG tablet, Take 1 tablet (10 mg total) by mouth once daily., Disp: 90 tablet, Rfl: 1    clopidogrel (PLAVIX) 75 mg tablet, Take 1  tablet (75 mg total) by mouth once daily., Disp: 90 tablet, Rfl: 3    furosemide (LASIX) 20 MG tablet, Take 1 tablet (20 mg total) by mouth once daily., Disp: , Rfl:     glucose 4 GM chewable tablet, Take 16 g by mouth as needed for Low blood sugar., Disp: , Rfl:     insulin aspart (NOVOLOG) 100 unit/mL InPn pen, Inject 16 Units into the skin 3 (three) times daily., Disp: , Rfl: 0    insulin detemir (LEVEMIR FLEXTOUCH) 100 unit/mL (3 mL) SubQ InPn pen, Inject 42 Units into the skin 2 (two) times daily., Disp: , Rfl: 0    isosorbide mononitrate (IMDUR) 60 MG 24 hr tablet, Take 1 tablet (60 mg total) by mouth once daily., Disp: 90 tablet, Rfl: 3    lancets Misc, 1 lancet by Misc.(Non-Drug; Combo Route) route 2 (two) times daily with meals. TRUE METRIX, Disp: 100 each, Rfl: 11    lisinopril 10 MG tablet, Take 1 tablet (10 mg total) by mouth once daily., Disp: 90 tablet, Rfl: 3    nitroGLYCERIN (NITROSTAT) 0.4 MG SL tablet, Place 1 tablet (0.4 mg total) under the tongue every 5 (five) minutes as needed. Sublingual  ( under tongue) as needed, Disp: 30 tablet, Rfl: prn    omega-3 acid ethyl esters (LOVAZA) 1 gram capsule, Take 2 g by mouth 3 (three) times daily.  , Disp: , Rfl:     omeprazole (PRILOSEC) 40 MG capsule, Take 1 capsule (40 mg total) by mouth 2 (two) times daily. (Patient taking differently: Take 40 mg by mouth 2 (two) times daily as needed. ), Disp: 180 capsule, Rfl: 3    rosuvastatin (CRESTOR) 40 MG Tab, Take 1 tablet (40 mg total) by mouth once daily., Disp: , Rfl:     REVIEW OF SYSTEMS:  General: negative; ENT: negative; Allergy and Immunology: negative; Hematological and Lymphatic: Negative; Endocrine: negative; Respiratory: no cough, shortness of breath, or wheezing; Cardiovascular: no chest pain or dyspnea on exertion; Gastrointestinal: no abdominal pain/back, change in bowel habits, or bloody stools; Genito-Urinary: no dysuria, trouble voiding, or hematuria; Musculoskeletal:  negative  Neurological: slurred speech ; Psychiatric: no nervousness, anxiety or depression.    PHYSICAL EXAM:       Vitals:    17 1402   BP: 132/73   Pulse: 73   Resp: 18   Temp: 97.2 °F (36.2 °C)     Right Arm BP - Sittin/73 (17 1405)  Left Arm BP - Sittin/74 (17 1405)       General appearance:  Alert, well-appearing, and in no distress.  Oriented to person, place, and time   Neurological: Normal speech, no focal findings noted; CN II - XII grossly intact           Musculoskeletal: Digits/nail without cyanosis/clubbing.  Normal muscle strength/tone.                 Neck: Supple, no significant adenopathy; thyroid is not enlarged                  Bilateral carotid bruits can be auscultated L > R                Chest:  Clear to auscultation, no wheezes, rales or rhonchi, symmetric air entry     No use of accessory muscles             Cardiac: Normal rate and regular rhythm, S1 and S2 normal; PMI non-displaced          Abdomen: Soft, nontender, nondistended, no masses or organomegaly     No rebound tenderness noted; bowel sounds normal     Pulsatile aortic mass is not palpable despite deep palpation     No groin adenopathy      Extremities:   Weak 2+ femoral pulses bilaterally     1+ DPs x2     No ulcerations    LAB RESULTS:  Lab Results   Component Value Date    K 3.6 2017    K 3.7 2017    K 3.6 2017    CREATININE 1.2 2017    CREATININE 1.1 2017    CREATININE 1.0 2017     Lab Results   Component Value Date    WBC 8.84 2017    WBC 7.73 2017    WBC 9.61 2017    HCT 42.8 2017    HCT 42.9 2017    HCT 45.1 2017     (L) 2017     (L) 2017     2017     Lab Results   Component Value Date    HGBA1C 9.3 (H) 2017    HGBA1C 8.5 (H) 2016    HGBA1C 8.1 (H) 04/15/2016     IMAGING:  Carotid U/S:  R ICA:  cm/s  L ICA: 319 cm/s  R vert: not visualized  L vertebral: blunted  flow    CTA neck:    Cath lab/carotid angio: heavily calcified tight R ICA stenosis; complex calcification in L CCA/ICA    Echo April 2017:  Moderately depressed left ventricular systolic function (EF 40%).     2 - Quantitatively measured LV function is 38%.     3 - Biatrial enlargement.     4 - Impaired LV relaxation, normal LAP (grade 1 diastolic dysfunction).     5 - Normal right ventricular systolic function .     6 - The estimated PA systolic pressure is 20 mmHg.     Cardiac Perfusion:  ABNORMAL MYOCARDIAL PERFUSION  1. There is evidence for a large sized area of moderate to severe myocardial ischemia that extends from the base to the distal inferior wall and the distal inferoseptal wall.  There is underlying injury present in the apex and inferorapical walls.   2. There is abnormal wall motion at rest showing hypokinesis of the apical and inferior walls of the left ventricle.   3. Visually estimated LV function is low normal.   4. The ventricular volumes are normal at rest and stress.   5. The extracardiac distribution of radioactivity is normal.   6. When compared to the previous study from 04/20/2016, there is improvement of the anterior wall defect (not reported) and to a small extent the apex.  There is still infarct seen in the apex and inferoapical wall which appears unchanged.  There is   ischemia in the base to mid-distal inferior wall which also appears unchanged.    IMP/PLAN:  72 y.o. male with h/o CAD (CABG x5v in 1989), DM II, HTN, HLD s/p R MC A stroke in April 2017, following week had NSTEMI  Still with short distance BOSE  Long d/w patient, his wife and daughter.  In my opinion, he is too high-risk for even an awake/cervical block R CEA; he is a prohibitive risk for GETA R CEA.  Despite the slightly higher risk due to the heavy calcification in R CCA/ICA, I think R NIMO with [facility] is best way to proceed.  He has seen Dr ANTONI Blakely in the future and I've spoken with him; Mr Shafer understands these  rationale.  Will set up to see again Dr ANTONI Blakely for th R NIMO/MoMa  Cont DAPT, statin rx    Israel Galindo MD FACS  Vascular/Endovascular Surgery

## 2017-06-06 ENCOUNTER — PATIENT OUTREACH (OUTPATIENT)
Dept: ADMINISTRATIVE | Facility: CLINIC | Age: 72
End: 2017-06-06
Payer: MEDICARE

## 2017-06-06 NOTE — PROGRESS NOTES
C3 nurse attempted to contact patient. No answer. The following message was left for the patient to return the call:  Good morning  I am a nurse calling on behalf of [facility] from the [facility].  This is a Transitional Care Call for Jorden. When you have a moment please contact us at [phone] or [phone] Monday through Friday, between the hours of 8 am to 4 pm. We look forward to speaking with you. On behalf of [facility] have a nice day.    The patient has a scheduled  appointment with Marcus Vaughn on 06/08/17 @ 1300. Message sent to Physician staff.

## 2017-06-06 NOTE — PATIENT INSTRUCTIONS
Discharge Instructions for Stroke  You have been diagnosed with a stroke, or with a TIA (transient ischemic attack). Or you have been identified as having a high risk for stroke. During a stroke, blood stops flowing to part of your brain. This can damage areas in the brain that control other parts of the body. Symptoms after a stroke depend on which part of the brain has been affected.  Stroke risk factors  Once youve had a stroke, youre at greater risk for another one. Listed below are some other factors that can increase your risk for a stroke:  · High blood pressure  · High cholesterol  · Cigarette or cigar smoking  · Diabetes  · Carotid or other artery disease  · Atrial fibrillation, atrial flutter, or other heart disease  · Not being physically active  · Obesity  · Certain blood disorders (such as sickle cell anemia)  · Excessive alcohol use  · Abuse of street drugs  · Race  · Gender  · Family history of stroke  · Diet high in salty, fried, or greasy foods  Changes in daily living  Doing your regular tasks may be difficult after youve had a stroke, but you can learn new ways to manage your daily activities. In fact, doing daily activities may help you to regain muscle strength and bring back function to affected limbs. Be patient, give yourself time to adjust, and appreciate the progress you make.  Daily activities  You may be at risk of falling. Make changes to your home to help you walk more easily. A therapist will decide if you need an assistive device to walk safely.  You may need to see an occupational therapist or physical therapist to learn new ways of doing things. For example, you may need to make adjustments when bathing or dressing:  Tips for showering or bathing  · Test the water temperature with a hand or foot that was not affected by the stroke.  · Use grab bars, a shower seat, a hand-held showerhead, and a long-handled brush.  Tips for getting dressed  · Dress while sitting, starting with  the affected side or limb.  · Wear shirts that pull easily over your head. Wear pants or skirts with elastic waistbands.  · Use zippers with loops attached to the pull tabs.  Lifestyle changes  · Take your medicines exactly as directed. Dont skip doses.  · Begin an exercise program. Ask your provider how to get started. Also ask how much activity you should try to get on a daily or weekly basis. You can benefit from simple activities such as walking or gardening.  · Limit alcohol intake. Men should have no more than 2 alcoholic drinks a day. Women should limit themselves to 1 alcoholic drink per day.  · Know your cholesterol level. Follow your providers recommendations about how to keep cholesterol under control.  · If you are a smoker, quit now. Join a stop-smoking program to improve your chances of success. Ask your provider about medicines or other methods to help you quit.  · Learn stress management techniques to help you deal with stress in your home and work life.  Diet  Your healthcare provider will give you information on dietary changes that you may need to make, based on your situation. Your provider may recommend that you see a registered dietitian for help with diet changes. Changes may include:  · Reducing fat and cholesterol intake  · Reducing salt (sodium) intake, especially if you have high blood pressure  · Eating more fresh vegetables and fruits  · Eating more lean proteins, such as fish, poultry, and beans and peas (legumes)  · Eating less red meat and processed meats  · Using low-fat dairy products  · Limiting vegetable oils and nut oils  · Limiting sweets and processed foods such as chips, cookies, and baked goods  Follow-up care  · Keep your medical appointments. Close follow-up is important to stroke rehabilitation and recovery.  · Some medicines require blood tests to check for progress or problems. Keep follow-up appointments for any blood tests ordered by your providers.  When to call  911  Call 911 right away if you have any of the following symptoms of stroke:  · Weakness, tingling, or loss of feeling on one side of your face or body  · Sudden double vision or trouble seeing in one or both eyes  · Sudden trouble talking or slurred speech  · Trouble understanding others  · Sudden, severe headache  · Dizziness, loss of balance, or a sense of falling  · Blackouts or seizures      F.A.S.T. is an easy way to remember the signs of stroke. When you see these signs, you know that you need to call 911 fast.  F.A.S.T. stands for:  · F is for face drooping. One side of the face is drooping or numb. When the person smiles, the smile is uneven.  · A is for arm weakness. One arm is weak or numb. When the person lifts both arms at the same time, one arm may drift downward.  · S is for speech difficulty. You may notice slurred speech or trouble speaking. The person can't repeat a simple sentence correctly when asked.  · T is for time to call 911. If someone shows any of these symptoms, even if they go away, call 911 immediately. Make note of the time the symptoms first appeared.  Date Last Reviewed: 8/26/2015 © 2000-2016 [employer]. [address] [place] [address], [place] [address]. All rights reserved. This information is not intended as a substitute for professional medical care. Always follow your healthcare professional's instructions.

## 2017-06-08 ENCOUNTER — OFFICE VISIT (OUTPATIENT)
Dept: CARDIOLOGY | Facility: CLINIC | Age: 72
End: 2017-06-08
Payer: MEDICARE

## 2017-06-08 VITALS
DIASTOLIC BLOOD PRESSURE: 75 MMHG | SYSTOLIC BLOOD PRESSURE: 145 MMHG | OXYGEN SATURATION: 93 % | WEIGHT: 237.88 LBS | BODY MASS INDEX: 36.05 KG/M2 | HEIGHT: 68 IN | HEART RATE: 72 BPM

## 2017-06-08 DIAGNOSIS — I65.29 SYMPTOMATIC CAROTID ARTERY STENOSIS, UNSPECIFIED LATERALITY: ICD-10-CM

## 2017-06-08 DIAGNOSIS — Z86.73 HISTORY OF CVA (CEREBROVASCULAR ACCIDENT): Chronic | ICD-10-CM

## 2017-06-08 DIAGNOSIS — I25.708 CORONARY ARTERY DISEASE OF BYPASS GRAFT OF NATIVE HEART WITH STABLE ANGINA PECTORIS: ICD-10-CM

## 2017-06-08 DIAGNOSIS — I10 ESSENTIAL HYPERTENSION: Chronic | ICD-10-CM

## 2017-06-08 DIAGNOSIS — I65.23 BILATERAL CAROTID ARTERY STENOSIS: Primary | ICD-10-CM

## 2017-06-08 DIAGNOSIS — E78.2 MIXED HYPERLIPIDEMIA: Chronic | ICD-10-CM

## 2017-06-08 DIAGNOSIS — I63.239 CAROTID STENOSIS, SYMPTOMATIC, WITH INFARCTION: ICD-10-CM

## 2017-06-08 DIAGNOSIS — I65.21 STENOSIS OF RIGHT CAROTID ARTERY: Chronic | ICD-10-CM

## 2017-06-08 DIAGNOSIS — E78.5 DYSLIPIDEMIA: ICD-10-CM

## 2017-06-08 DIAGNOSIS — G47.33 OSA ON CPAP: Chronic | ICD-10-CM

## 2017-06-08 PROCEDURE — 99214 OFFICE O/P EST MOD 30 MIN: CPT | Mod: S$GLB,,, | Performed by: INTERNAL MEDICINE

## 2017-06-08 PROCEDURE — 99499 UNLISTED E&M SERVICE: CPT | Mod: S$GLB,,, | Performed by: INTERNAL MEDICINE

## 2017-06-08 PROCEDURE — 1159F MED LIST DOCD IN RCRD: CPT | Mod: S$GLB,,, | Performed by: INTERNAL MEDICINE

## 2017-06-08 PROCEDURE — 1126F AMNT PAIN NOTED NONE PRSNT: CPT | Mod: S$GLB,,, | Performed by: INTERNAL MEDICINE

## 2017-06-08 PROCEDURE — 99999 PR PBB SHADOW E&M-EST. PATIENT-LVL IV: CPT | Mod: PBBFAC,,, | Performed by: INTERNAL MEDICINE

## 2017-06-08 RX ORDER — ROSUVASTATIN CALCIUM 40 MG/1
40 TABLET, COATED ORAL DAILY
Qty: 30 TABLET | Refills: 6 | Status: SHIPPED | OUTPATIENT
Start: 2017-06-08 | End: 2017-11-26 | Stop reason: SDUPTHER

## 2017-06-08 RX ORDER — SODIUM CHLORIDE 9 MG/ML
3 INJECTION, SOLUTION INTRAVENOUS CONTINUOUS
Status: CANCELLED | OUTPATIENT
Start: 2017-06-08 | End: 2017-06-08

## 2017-06-08 RX ORDER — DIPHENHYDRAMINE HCL 25 MG
50 CAPSULE ORAL ONCE
Status: CANCELLED | OUTPATIENT
Start: 2017-06-08 | End: 2017-06-08

## 2017-06-09 ENCOUNTER — OUTPATIENT CASE MANAGEMENT (OUTPATIENT)
Dept: ADMINISTRATIVE | Facility: OTHER | Age: 72
End: 2017-06-09

## 2017-06-09 ENCOUNTER — DOCUMENTATION ONLY (OUTPATIENT)
Dept: CARDIOLOGY | Facility: CLINIC | Age: 72
End: 2017-06-09

## 2017-06-09 DIAGNOSIS — I65.23 BILATERAL CAROTID ARTERY STENOSIS: Primary | ICD-10-CM

## 2017-06-09 NOTE — PROGRESS NOTES
OUTPATIENT CATHETERIZATION INSTRUCTIONS    You have been scheduled for a procedure in the catheterization lab on Wednesday, July 5, 2017.     Please report to the Cardiology Waiting Area on the Third floor of the hospital and check in at 6 AM.   You will then be taken to the SSCU (Short Stay Cardiac Unit) and prepared for your procedure. Please be aware that this is not the time of your procedure but the time you are to arrive. The procedures are scheduled on an hourly basis; however, emergency cases take precedence over all other cases.       You may not have anything to eat or drink after midnight the night before your test. You may take your regular morning medications with water. If there are any medications that you should not take you will be instructed to hold them that morning. If you are diabetic and on Metformin (Glucophage) do not take it the day before, the day of, and for 2 days after your procedure.      The procedure will take 1-2 hours to perform. After the procedure, you will return to SSCU on the third floor of the hospital. You will need to lie still (or keep your arm still) for the next 4 to 6 hours to minimize bleeding from the puncture site. Your family may remain in the room with you during this time.       You may be able to be discharged home that same afternoon if there is someone to drive you home and there were no complications. If you have one of the balloon, stent, or device procedures you may spend the night in the hospital. Your doctor will determine, based on your progress, the date and time of your discharge. The results of your procedure will be discussed with you before you are discharged. Any further testing or procedures will be scheduled for you either before you leave or you will be called with these appointments.       If you should have any questions, concerns, or need to change the date of your procedure, please call  DIANA Simon @ [phone]    Special  Instructions:  Take 1/2 of your regularly scheduled insulin dose the night before your procedure.  Hold your regularly scheduled insulin dose the morning of your procedure.  Drink plenty of water the day before procedure              THE ABOVE INSTRUCTIONS WERE GIVEN TO THE PATIENT VERBALLY AND THEY VERBALIZED UNDERSTANDING.  THEY DO NOT REQUIRE ANY SPECIAL NEEDS AND DO NOT HAVE ANY LEARNING BARRIERS.          Directions for Reporting to Cardiology Waiting Area in the Hospital  If you park in the Parking Garage:  Take elevators to the1st floor of the parking garage.  Continue past the gift shop, coffee shop, and piano.  Take a right and go to the gold elevators. (Elevator B)  Take the elevator to the 3rd floor.  Follow the arrow on the sign on the wall that says Cath Lab Registration/EP Lab Registration.  Follow the long hallway all the way around until you come to a big open area.  This is the registration area.  Check in at Reception Desk.    OR    If family is dropping you off:  Have them drop you off at the front of the Hospital under the green overhang.  Enter through the doors and take a right.  Take the E elevators to the 3rd floor Cardiology Waiting Area.  Check in at the Reception Desk in the waiting room.

## 2017-06-09 NOTE — PROGRESS NOTES
Telephonic follow up with pt. Pt states he has been discharged from home PT. He continue with ST. Pt's speech is slurred. Pt states he is getting around much better. He does not want to go to outpatient therapy because he states he can't afford the copayment. Pt was given exercises to do at home. Encouraged pt to do the exercises and will follow up in 2 weeks.

## 2017-06-10 NOTE — PROGRESS NOTES
Subjective:    Patient ID:  Jorden Shafer is a 72 y.o. male who presents for follow-up of Carotid Artery Disease      HPI  Mr. Shafer is a 72 y/o gentleman with PMH CAD s/p CABG (1989), DVT, DM II, MI, defibrillator placement who was recently discharged from the hospital following a Right MCA stroke and NSTEMI. He was found to have >90% stenosis of his R-ICA on CTA.  He underwent carotid angiography on 6/2/17 that demonstrate bilateral high grade carotid stenosis with severe calcification.  Eh was referred to Dr. Galindo, vascular surgery, for evaluation for CEA.  The patient was deemed to be to be at high risk for awake/ cervical block CEA and at prohibitive risk for CEA under GETA.  He was therefore referred back to discuss high risk NIMO.  In the interim since his last clinic visit here on 5/27/17 the patient denies angina.  He reports increasing exertional fatigue, but denies BOSE.  HE denies LE edema, orthopnea, or PND.  He denies palpitations, syncope, or near syncope.  He repors good medication compliance and adherence to a heart healthy diet.  He does not have full range of motion or strength in in left hand or arm, but states he is doing his OT/PT prescribed exercises.    Past Medical History:   Diagnosis Date    Back pain     CAD (coronary artery disease) 1989    h/o cabg x 5    Chronic kidney disease     Colon polyp     h/o on colonoscopy    Contact dermatitis 11/12/2013    CVA (cerebral vascular accident)     March 2010    Depression     Diabetes mellitus type II     on  insulin pump    DVT (deep venous thrombosis) 1980's    left leg    GERD (gastroesophageal reflux disease)     Heart failure     [facility]     Hyperlipidemia     Hypertension     Hypertensive heart disease without congestive heart failure     Insulin pump status 4/25/2017    Myocardial infarction     Neuropathy of lower extremity     Obesity (BMI 30-39.9) 4/25/2017    Prostate cancer     Renal manifestation of secondary  diabetes mellitus     Sleep apnea     Stroke 4/21/2017    Type 2 diabetes mellitus with diabetic polyneuropathy, with long-term current use of insulin 4/25/2017    Type 2 diabetes mellitus with hyperglycemia, with long-term current use of insulin 9/26/2012    Currently sees Dr. Britton     Unstable angina     Vitamin D deficiency disease      Past Surgical History:   Procedure Laterality Date    CARDIAC DEFIBRILLATOR PLACEMENT  06/16/2016    CHOLECYSTECTOMY      CORONARY ARTERY BYPASS GRAFT      bypass times  5    defibulater  06/16/2016    EYE SURGERY Bilateral     HERNIA REPAIR      groin x2  and umbilical    JOINT REPLACEMENT      knee left    thrombus Left     >2005    VASECTOMY       Current Outpatient Prescriptions on File Prior to Visit   Medication Sig Dispense Refill    amitriptyline (ELAVIL) 50 MG tablet Take 1 tablet (50 mg total) by mouth every evening. 30 tablet 2    aspirin (ECOTRIN) 81 MG EC tablet Take 81 mg by mouth once daily.      carvedilol (COREG) 25 MG tablet Take 0.5 tablets (12.5 mg total) by mouth 2 (two) times daily with meals. Hold is SBP <120. 90 tablet 3    citalopram (CELEXA) 10 MG tablet Take 1 tablet (10 mg total) by mouth once daily. 90 tablet 1    clopidogrel (PLAVIX) 75 mg tablet Take 1 tablet (75 mg total) by mouth once daily. 90 tablet 3    furosemide (LASIX) 20 MG tablet Take 1 tablet (20 mg total) by mouth once daily.      glucose 4 GM chewable tablet Take 16 g by mouth as needed for Low blood sugar.      insulin aspart (NOVOLOG) 100 unit/mL InPn pen Inject 16 Units into the skin 3 (three) times daily.  0    insulin detemir (LEVEMIR FLEXTOUCH) 100 unit/mL (3 mL) SubQ InPn pen Inject 42 Units into the skin 2 (two) times daily.  0    isosorbide mononitrate (IMDUR) 60 MG 24 hr tablet Take 1 tablet (60 mg total) by mouth once daily. 90 tablet 3    lisinopril 10 MG tablet Take 1 tablet (10 mg total) by mouth once daily. 90 tablet 3    omega-3 acid ethyl  esters (LOVAZA) 1 gram capsule Take 2 g by mouth 3 (three) times daily.        omeprazole (PRILOSEC) 40 MG capsule Take 1 capsule (40 mg total) by mouth 2 (two) times daily. (Patient taking differently: Take 40 mg by mouth 2 (two) times daily as needed. ) 180 capsule 3    blood sugar diagnostic Strp 1 strip by Misc.(Non-Drug; Combo Route) route 2 (two) times daily with meals. TRUE METRIX 100 strip 11    blood-glucose meter kit Use as instructed          TRUE METRIX 1 each 0    lancets Misc 1 lancet by Misc.(Non-Drug; Combo Route) route 2 (two) times daily with meals. TRUE METRIX 100 each 11    nitroGLYCERIN (NITROSTAT) 0.4 MG SL tablet Place 1 tablet (0.4 mg total) under the tongue every 5 (five) minutes as needed. Sublingual  ( under tongue) as needed 30 tablet prn     No current facility-administered medications on file prior to visit.      Review of patient's allergies indicates:  No Known Allergies  Social History   Substance Use Topics    Smoking status: Former Smoker     Packs/day: 4.00     Years: 25.00     Types: Cigarettes     Quit date: 1980    Smokeless tobacco: Never Used    Alcohol use No     .  Family History   Problem Relation Age of Onset    Alzheimer's disease Mother      Living    Heart disease Mother     Dementia Mother     Asbestos Father          Cancer Father     Diabetes type II Maternal Aunt     Stroke Neg Hx           Review of Systems   Constitution: Negative for decreased appetite, diaphoresis, fever, malaise/fatigue, weight gain and weight loss.   HENT: Negative for congestion, nosebleeds and sore throat.    Eyes: Negative for blurred vision, vision loss in left eye, vision loss in right eye and visual disturbance.   Cardiovascular: Negative for chest pain, claudication, dyspnea on exertion, leg swelling, near-syncope, orthopnea, palpitations, paroxysmal nocturnal dyspnea and syncope.   Respiratory: Negative for cough, hemoptysis, shortness of breath and  "wheezing.    Endocrine: Negative for polyuria.   Hematologic/Lymphatic: Does not bruise/bleed easily.   Skin: Negative for nail changes and rash.   Musculoskeletal: Positive for muscle weakness. Negative for back pain, muscle cramps and myalgias.   Gastrointestinal: Negative for abdominal pain, change in bowel habit, diarrhea, heartburn, hematemesis, hematochezia, melena, nausea and vomiting.   Genitourinary: Negative for bladder incontinence, dysuria, frequency and hematuria.   Psychiatric/Behavioral: Negative for depression.   Allergic/Immunologic: Negative for hives.        Objective:  Vitals:    06/08/17 1245 06/08/17 1248   BP: 124/71 (!) 145/75   BP Location: Right arm Left arm   Patient Position: Sitting Sitting   BP Method: Automatic Automatic   Pulse: 76 72   SpO2: (!) 93% (!) 93%   Weight: 107.9 kg (237 lb 14 oz) 107.9 kg (237 lb 14 oz)   Height: 5' 8" (1.727 m) 5' 8" (1.727 m)         Physical Exam   Constitutional: He is oriented to person, place, and time. He appears well-developed and well-nourished.   HENT:   Head: Normocephalic and atraumatic.   Eyes: EOM are normal. Pupils are equal, round, and reactive to light.   Neck: Neck supple. No JVD present. No thyromegaly present.   Cardiovascular: Normal rate and regular rhythm.  PMI is displaced.  Exam reveals no gallop and no friction rub.    No murmur heard.  Pulses:       Carotid pulses are 2+ on the right side, and 2+ on the left side.       Radial pulses are 2+ on the right side, and 2+ on the left side.        Femoral pulses are 2+ on the right side, and 2+ on the left side.       Dorsalis pedis pulses are 2+ on the right side, and 2+ on the left side.        Posterior tibial pulses are 2+ on the right side, and 2+ on the left side.   Pulmonary/Chest: Effort normal. He has no wheezes. He has no rhonchi. He has no rales.   Abdominal: Soft. Normal appearance. He exhibits no distension. There is no hepatosplenomegaly. There is no tenderness. "   Musculoskeletal:   Decreased ROM, strength, and fine motor movement of left fingers and wrist weakness of left arm   Neurological: He is alert and oriented to person, place, and time. Gait normal.   Psychiatric: He has a normal mood and affect.         Assessment:       1. Bilateral carotid artery stenosis    2. Dyslipidemia    3. Carotid stenosis, symptomatic, with infarction    4. Symptomatic carotid artery stenosis, unspecified laterality    5. Essential hypertension    6. Mixed hyperlipidemia    7. Stenosis of right carotid artery    8. Coronary artery disease of bypass graft of native heart with stable angina pectoris    9. KELVIN on CPAP    10. History of CVA (cerebrovascular accident)         Plan:       1) H/o right brain CVA with right carotid stenosis. Case discussed with Dr. Galindo, vascualr surgery, and Dr. Lima, interventional cardiology.  WIll plan for right NIMO with MOMA proximal protection.  -continue EC ASA 81mg po qday  -continue Plavix 75mg po qday  -continue statin  The risks, benefits, and alternatives of peripheral vascular angiography and possible intervention were discussed with the patient. In particular the high risk of stroke was discussed in detail  All questions were answered and informed consent was obtained.     2) Left asymptomatic carotid stenosis. Continue medical management  -continue EC ASA 81mg po qday  -continue Plavix 75mg po qday  -continue statin    3) CAD.  The patient denies angina.  He has a stress test post NSTEMI demonstrating inferior ischemia.  I discussed cardiac cath and coronary revascularization with the patient.  He prefer to proceed with carotid stenting first due to the potential risk of recurrent stroke and because he is not experiencing angina.  -continue EC ASA 81mg po qday  -continue Plavix 75mg po qday  -continue statin  -continue Coreg 25mg po BID  -continue Imdur 60mg po qday    4) ICM. Patient appears euvolemic  n exam and reports NYHA class 2  symptoms  -medical management as above  -rec low sodium, heart healthy det    5) Dyslipidemia. 5/18/17 lipid panel reviewed  -continue high intensity statin    6) DM2. 4/12/17 HgbA1c = 9.3; rec tight glycemic control    7) H/o GERD. Asymptomatic on PPI; continue PPI    All of the patient's questions were answered.

## 2017-06-14 ENCOUNTER — TELEPHONE (OUTPATIENT)
Dept: FAMILY MEDICINE | Facility: CLINIC | Age: 72
End: 2017-06-14

## 2017-06-14 DIAGNOSIS — I63.9 CEREBROVASCULAR ACCIDENT (CVA), UNSPECIFIED MECHANISM: Primary | ICD-10-CM

## 2017-06-14 NOTE — TELEPHONE ENCOUNTER
----- Message from May Wild sent at 6/13/2017 10:23 AM CDT -----  Contact: [facility] called requesting a order for repeat follow-up modified Barium Swallow Study done at [facility]. Please contact her at [phone] with any questions.    Thanks!

## 2017-06-23 ENCOUNTER — TELEPHONE (OUTPATIENT)
Dept: FAMILY MEDICINE | Facility: CLINIC | Age: 72
End: 2017-06-23

## 2017-06-23 NOTE — TELEPHONE ENCOUNTER
----- Message from Chen Herbert sent at 6/23/2017  9:29 AM CDT -----  Contact: [employer]   Calling to report to Dr Plasencia that on 7-5-17 patient will be undergoing stent placement in both carotid  arteries   .       LL

## 2017-06-24 DIAGNOSIS — I10 ESSENTIAL HYPERTENSION: ICD-10-CM

## 2017-06-26 ENCOUNTER — PATIENT MESSAGE (OUTPATIENT)
Dept: RESEARCH | Facility: HOSPITAL | Age: 72
End: 2017-06-26

## 2017-06-26 RX ORDER — CARVEDILOL 25 MG/1
25 TABLET ORAL 2 TIMES DAILY WITH MEALS
Qty: 90 TABLET | Refills: 3 | Status: ON HOLD | OUTPATIENT
Start: 2017-06-26 | End: 2017-07-06 | Stop reason: HOSPADM

## 2017-06-28 ENCOUNTER — OUTPATIENT CASE MANAGEMENT (OUTPATIENT)
Dept: ADMINISTRATIVE | Facility: OTHER | Age: 72
End: 2017-06-28

## 2017-06-28 NOTE — PROGRESS NOTES
Attempted to contact pt for follow up. No answer. Left message requesting call back. Chart reviewed. Pt is scheduled for stent placement bilateral carotids on 7/5. Pt has labs on 6/30 and reapeat swallow study on 7/19. Pt continues to be followed by [facility] SN, PT, OT, ST. Will follow up with pt after surgery.

## 2017-06-30 ENCOUNTER — LAB VISIT (OUTPATIENT)
Dept: LAB | Facility: HOSPITAL | Age: 72
End: 2017-06-30
Attending: INTERNAL MEDICINE
Payer: MEDICARE

## 2017-06-30 DIAGNOSIS — I65.23 BILATERAL CAROTID ARTERY STENOSIS: ICD-10-CM

## 2017-06-30 LAB
ANION GAP SERPL CALC-SCNC: 14 MMOL/L
BASOPHILS # BLD AUTO: 0.04 K/UL
BASOPHILS NFR BLD: 0.4 %
BUN SERPL-MCNC: 24 MG/DL
CALCIUM SERPL-MCNC: 8.9 MG/DL
CHLORIDE SERPL-SCNC: 100 MMOL/L
CO2 SERPL-SCNC: 22 MMOL/L
CREAT SERPL-MCNC: 1.2 MG/DL
DIFFERENTIAL METHOD: NORMAL
EOSINOPHIL # BLD AUTO: 0.1 K/UL
EOSINOPHIL NFR BLD: 1 %
ERYTHROCYTE [DISTWIDTH] IN BLOOD BY AUTOMATED COUNT: 14.1 %
EST. GFR  (AFRICAN AMERICAN): >60 ML/MIN/1.73 M^2
EST. GFR  (NON AFRICAN AMERICAN): 59.6 ML/MIN/1.73 M^2
GLUCOSE SERPL-MCNC: 283 MG/DL
HCT VFR BLD AUTO: 41.4 %
HGB BLD-MCNC: 14.3 G/DL
LYMPHOCYTES # BLD AUTO: 2.4 K/UL
LYMPHOCYTES NFR BLD: 22.3 %
MCH RBC QN AUTO: 28.3 PG
MCHC RBC AUTO-ENTMCNC: 34.5 %
MCV RBC AUTO: 82 FL
MONOCYTES # BLD AUTO: 0.7 K/UL
MONOCYTES NFR BLD: 6.6 %
NEUTROPHILS # BLD AUTO: 7.3 K/UL
NEUTROPHILS NFR BLD: 69.3 %
PLATELET # BLD AUTO: 168 K/UL
PMV BLD AUTO: 10.8 FL
POTASSIUM SERPL-SCNC: 4.6 MMOL/L
RBC # BLD AUTO: 5.06 M/UL
SODIUM SERPL-SCNC: 136 MMOL/L
WBC # BLD AUTO: 10.52 K/UL

## 2017-06-30 PROCEDURE — 80048 BASIC METABOLIC PNL TOTAL CA: CPT

## 2017-06-30 PROCEDURE — 36415 COLL VENOUS BLD VENIPUNCTURE: CPT

## 2017-06-30 PROCEDURE — 85025 COMPLETE CBC W/AUTO DIFF WBC: CPT

## 2017-07-05 ENCOUNTER — HOSPITAL ENCOUNTER (INPATIENT)
Facility: HOSPITAL | Age: 72
LOS: 1 days | Discharge: HOME | DRG: 035 | End: 2017-07-06
Attending: INTERNAL MEDICINE | Admitting: INTERNAL MEDICINE
Payer: MEDICARE

## 2017-07-05 DIAGNOSIS — I10 ESSENTIAL HYPERTENSION: Chronic | ICD-10-CM

## 2017-07-05 DIAGNOSIS — Z95.810 ICD (IMPLANTABLE CARDIOVERTER-DEFIBRILLATOR) IN PLACE: ICD-10-CM

## 2017-07-05 DIAGNOSIS — I65.23 BILATERAL CAROTID ARTERY STENOSIS: ICD-10-CM

## 2017-07-05 DIAGNOSIS — I63.239 CAROTID STENOSIS, SYMPTOMATIC, WITH INFARCTION: Primary | ICD-10-CM

## 2017-07-05 DIAGNOSIS — I25.2 OLD MI (MYOCARDIAL INFARCTION): ICD-10-CM

## 2017-07-05 DIAGNOSIS — I25.708 ATHEROSCLEROSIS OF CORONARY ARTERY BYPASS GRAFT(S), UNSPECIFIED, WITH OTHER FORMS OF ANGINA PECTORIS: ICD-10-CM

## 2017-07-05 DIAGNOSIS — I77.89 OTHER SPECIFIED DISORDERS OF ARTERIES AND ARTERIOLES: ICD-10-CM

## 2017-07-05 DIAGNOSIS — I21.4 NSTEMI (NON-ST ELEVATED MYOCARDIAL INFARCTION): ICD-10-CM

## 2017-07-05 DIAGNOSIS — E08.8 DIABETES MELLITUS DUE TO UNDERLYING CONDITION WITH COMPLICATION, WITHOUT LONG-TERM CURRENT USE OF INSULIN: ICD-10-CM

## 2017-07-05 DIAGNOSIS — I65.29 STENOSIS OF CAROTID ARTERY, UNSPECIFIED LATERALITY: ICD-10-CM

## 2017-07-05 DIAGNOSIS — I63.239 CAROTID STENOSIS, SYMPTOMATIC, WITH INFARCTION: ICD-10-CM

## 2017-07-05 LAB
ABO + RH BLD: NORMAL
ANION GAP SERPL CALC-SCNC: 12 MMOL/L
BASOPHILS # BLD AUTO: 0.04 K/UL
BASOPHILS NFR BLD: 0.4 %
BLD GP AB SCN CELLS X3 SERPL QL: NORMAL
BUN SERPL-MCNC: 23 MG/DL
CALCIUM SERPL-MCNC: 8.3 MG/DL
CHLORIDE SERPL-SCNC: 103 MMOL/L
CO2 SERPL-SCNC: 24 MMOL/L
CREAT SERPL-MCNC: 1 MG/DL
DIFFERENTIAL METHOD: ABNORMAL
EOSINOPHIL # BLD AUTO: 0.1 K/UL
EOSINOPHIL NFR BLD: 0.9 %
ERYTHROCYTE [DISTWIDTH] IN BLOOD BY AUTOMATED COUNT: 13.8 %
EST. GFR  (AFRICAN AMERICAN): >60 ML/MIN/1.73 M^2
EST. GFR  (NON AFRICAN AMERICAN): >60 ML/MIN/1.73 M^2
GLUCOSE SERPL-MCNC: 179 MG/DL
HCT VFR BLD AUTO: 41.2 %
HGB BLD-MCNC: 14.6 G/DL
LYMPHOCYTES # BLD AUTO: 2.3 K/UL
LYMPHOCYTES NFR BLD: 25.4 %
MCH RBC QN AUTO: 28.7 PG
MCHC RBC AUTO-ENTMCNC: 35.4 %
MCV RBC AUTO: 81 FL
MONOCYTES # BLD AUTO: 0.5 K/UL
MONOCYTES NFR BLD: 5.7 %
NEUTROPHILS # BLD AUTO: 6.1 K/UL
NEUTROPHILS NFR BLD: 67.4 %
PERIPHERAL STENOSIS: ABNORMAL
PERIPHERAL STENT: YES
PLATELET # BLD AUTO: 147 K/UL
PMV BLD AUTO: 10.2 FL
POCT GLUCOSE: 102 MG/DL (ref 70–110)
POCT GLUCOSE: 139 MG/DL (ref 70–110)
POCT GLUCOSE: 200 MG/DL (ref 70–110)
POTASSIUM SERPL-SCNC: 3.5 MMOL/L
RBC # BLD AUTO: 5.09 M/UL
SODIUM SERPL-SCNC: 139 MMOL/L
WBC # BLD AUTO: 9.08 K/UL

## 2017-07-05 PROCEDURE — 25000003 PHARM REV CODE 250

## 2017-07-05 PROCEDURE — 80048 BASIC METABOLIC PNL TOTAL CA: CPT

## 2017-07-05 PROCEDURE — 36415 COLL VENOUS BLD VENIPUNCTURE: CPT

## 2017-07-05 PROCEDURE — 25000003 PHARM REV CODE 250: Performed by: INTERNAL MEDICINE

## 2017-07-05 PROCEDURE — 93005 ELECTROCARDIOGRAM TRACING: CPT

## 2017-07-05 PROCEDURE — 037K3DZ DILATION OF RIGHT INTERNAL CAROTID ARTERY WITH INTRALUMINAL DEVICE, PERCUTANEOUS APPROACH: ICD-10-PCS | Performed by: INTERNAL MEDICINE

## 2017-07-05 PROCEDURE — 85025 COMPLETE CBC W/AUTO DIFF WBC: CPT

## 2017-07-05 PROCEDURE — 25000003 PHARM REV CODE 250: Performed by: STUDENT IN AN ORGANIZED HEALTH CARE EDUCATION/TRAINING PROGRAM

## 2017-07-05 PROCEDURE — 20000000 HC ICU ROOM

## 2017-07-05 PROCEDURE — 82962 GLUCOSE BLOOD TEST: CPT

## 2017-07-05 PROCEDURE — 63600175 PHARM REV CODE 636 W HCPCS

## 2017-07-05 PROCEDURE — 86901 BLOOD TYPING SEROLOGIC RH(D): CPT

## 2017-07-05 PROCEDURE — 99223 1ST HOSP IP/OBS HIGH 75: CPT | Mod: ,,, | Performed by: INTERNAL MEDICINE

## 2017-07-05 PROCEDURE — 93010 ELECTROCARDIOGRAM REPORT: CPT | Mod: ,,, | Performed by: INTERNAL MEDICINE

## 2017-07-05 PROCEDURE — 037M3ZZ DILATION OF RIGHT EXTERNAL CAROTID ARTERY, PERCUTANEOUS APPROACH: ICD-10-PCS | Performed by: INTERNAL MEDICINE

## 2017-07-05 PROCEDURE — 86900 BLOOD TYPING SEROLOGIC ABO: CPT

## 2017-07-05 PROCEDURE — B313YZZ FLUOROSCOPY OF RIGHT COMMON CAROTID ARTERY USING OTHER CONTRAST: ICD-10-PCS | Performed by: INTERNAL MEDICINE

## 2017-07-05 PROCEDURE — 37215 TRANSCATH STENT CCA W/EPS: CPT | Mod: ,,, | Performed by: INTERNAL MEDICINE

## 2017-07-05 PROCEDURE — 63600175 PHARM REV CODE 636 W HCPCS: Performed by: HOSPITALIST

## 2017-07-05 RX ORDER — ASPIRIN 81 MG/1
81 TABLET ORAL DAILY
Status: DISCONTINUED | OUTPATIENT
Start: 2017-07-05 | End: 2017-07-06 | Stop reason: HOSPADM

## 2017-07-05 RX ORDER — SODIUM CHLORIDE 9 MG/ML
3 INJECTION, SOLUTION INTRAVENOUS CONTINUOUS
Status: ACTIVE | OUTPATIENT
Start: 2017-07-05 | End: 2017-07-05

## 2017-07-05 RX ORDER — GLUCAGON 1 MG
1 KIT INJECTION
Status: DISCONTINUED | OUTPATIENT
Start: 2017-07-05 | End: 2017-07-06 | Stop reason: HOSPADM

## 2017-07-05 RX ORDER — IBUPROFEN 200 MG
24 TABLET ORAL
Status: DISCONTINUED | OUTPATIENT
Start: 2017-07-05 | End: 2017-07-06 | Stop reason: HOSPADM

## 2017-07-05 RX ORDER — CITALOPRAM 10 MG/1
10 TABLET ORAL DAILY
Status: DISCONTINUED | OUTPATIENT
Start: 2017-07-05 | End: 2017-07-06 | Stop reason: HOSPADM

## 2017-07-05 RX ORDER — DIPHENHYDRAMINE HCL 50 MG
50 CAPSULE ORAL ONCE
Status: COMPLETED | OUTPATIENT
Start: 2017-07-05 | End: 2017-07-05

## 2017-07-05 RX ORDER — INSULIN ASPART 100 [IU]/ML
0-5 INJECTION, SOLUTION INTRAVENOUS; SUBCUTANEOUS
Status: DISCONTINUED | OUTPATIENT
Start: 2017-07-05 | End: 2017-07-06 | Stop reason: HOSPADM

## 2017-07-05 RX ORDER — CLOPIDOGREL BISULFATE 75 MG/1
75 TABLET ORAL DAILY
Status: DISCONTINUED | OUTPATIENT
Start: 2017-07-05 | End: 2017-07-06 | Stop reason: HOSPADM

## 2017-07-05 RX ORDER — NOREPINEPHRINE BITARTRATE/D5W 4MG/250ML
0.02 PLASTIC BAG, INJECTION (ML) INTRAVENOUS CONTINUOUS
Status: DISCONTINUED | OUTPATIENT
Start: 2017-07-05 | End: 2017-07-05

## 2017-07-05 RX ORDER — ROSUVASTATIN CALCIUM 20 MG/1
40 TABLET, COATED ORAL DAILY
Status: DISCONTINUED | OUTPATIENT
Start: 2017-07-05 | End: 2017-07-06 | Stop reason: HOSPADM

## 2017-07-05 RX ORDER — IBUPROFEN 200 MG
16 TABLET ORAL
Status: DISCONTINUED | OUTPATIENT
Start: 2017-07-05 | End: 2017-07-06 | Stop reason: HOSPADM

## 2017-07-05 RX ORDER — PANTOPRAZOLE SODIUM 40 MG/1
40 TABLET, DELAYED RELEASE ORAL DAILY
Status: DISCONTINUED | OUTPATIENT
Start: 2017-07-05 | End: 2017-07-06 | Stop reason: HOSPADM

## 2017-07-05 RX ORDER — MIDODRINE HYDROCHLORIDE 5 MG/1
15 TABLET ORAL ONCE
Status: COMPLETED | OUTPATIENT
Start: 2017-07-05 | End: 2017-07-05

## 2017-07-05 RX ADMIN — SODIUM CHLORIDE 3 ML/KG/HR: 0.9 INJECTION, SOLUTION INTRAVENOUS at 06:07

## 2017-07-05 RX ADMIN — MIDODRINE HYDROCHLORIDE 15 MG: 5 TABLET ORAL at 06:07

## 2017-07-05 RX ADMIN — DIPHENHYDRAMINE HYDROCHLORIDE 50 MG: 50 CAPSULE ORAL at 06:07

## 2017-07-05 RX ADMIN — Medication 0.2 MCG/KG/MIN: at 12:07

## 2017-07-05 RX ADMIN — INSULIN DETEMIR 30 UNITS: 100 INJECTION, SOLUTION SUBCUTANEOUS at 09:07

## 2017-07-05 NOTE — PROGRESS NOTES
Pt admitted to SICU 6092.  Connected to ICU monitor. All VSS.  No new orders @ this time.  Will carry out any new orders. Will continue to monitor pt closely.      Skin note: Clean, dry, & intact. No breakdown noted.

## 2017-07-05 NOTE — PLAN OF CARE
Problem: Patient Care Overview  Goal: Plan of Care Review  Outcome: Ongoing (interventions implemented as appropriate)  Pt admitted to SICU 6092. All VSS. MAP maintained >65 w/ ANA @ 0.3. Pt denies pain.  Pt due to void, MD aware.  Pt 100% paced @ 60.  Pt placed on diabetic diet.  Post 4hrs in ICU, bedrest orders d/c, per MD.  POC reviewed w/ pt.  All questions answered.  See flowsheet for full assessment. Will continue to monitor pt closely.

## 2017-07-05 NOTE — INTERVAL H&P NOTE
The patient has been examined and the H&P has been reviewed:    I concur with the findings and no changes have occurred since H&P was written.    Anesthesia/Surgery risks, benefits and alternative options discussed and understood by patient/family.          Active Hospital Problems    Diagnosis  POA    Carotid stenosis, symptomatic, with infarction [I63.239]  Yes      Resolved Hospital Problems    Diagnosis Date Resolved POA   No resolved problems to display.

## 2017-07-05 NOTE — PROGRESS NOTES
Team @ pt bedside.  Pt oobtc w/ team present, pt complaining of dizziness and lightheaded, became hypotensive.  Pt reclined in chair, Feet elevated. Ian increased to 0.5.  MD @ pt bedside until VS stable.  Will continue to monitor pt closely.

## 2017-07-05 NOTE — H&P
H&P      Networked reference to record PCT       Patient Name: Jorden Shafer MRN: [id number]  Date 1945 Age 72 y.o.  Location: [address] [place] [address] A    Admit Date: 7/5/2017                     LOS: 0    SUBJECTIVE:       HPI  Jorden Shafer is a 72 y.o. male with history of CAD s/p CABG (1989), DVT, DM II, MI, defibrillator placement who was recently discharged from the hospital following a Right MCA stroke and NSTEMI. He was found to have >90% stenosis of his R-ICA on CTA.  He underwent carotid angiography on 6/2/17 that demonstrate bilateral high grade carotid stenosis with severe calcification.  He was referred to Dr. Galindo, vascular surgery, for evaluation for CEA.  The patient was deemed to be to be at high risk for awake/ cervical block CEA and at prohibitive risk for CEA under GETA.  He underwent successful R ICA stent placement with moma protection on 7/5 and was subsequently transferred to CCU for observation due to low blood pressures requiring pressor support.    REVIEW OF SYSTEMS:  Review of Systems   Constitutional: Negative for chills, fever and malaise/fatigue.   HENT: Negative for sore throat.    Eyes: Negative for blurred vision and pain.   Respiratory: Negative for cough and shortness of breath.    Cardiovascular: Negative for chest pain and leg swelling.   Gastrointestinal: Negative for abdominal pain, nausea and vomiting.   Genitourinary: Negative for dysuria and frequency.   Musculoskeletal: Negative for back pain and myalgias.   Skin: Negative for itching and rash.   Neurological: Negative for dizziness, loss of consciousness and headaches.         Past Medical History:   Diagnosis Date    Back pain     CAD (coronary artery disease) 1989    h/o cabg x 5    Chronic kidney disease     Colon polyp     h/o on colonoscopy    Contact dermatitis 11/12/2013    CVA (cerebral vascular accident)     March 2010    Depression     Diabetes mellitus type II     on  insulin pump    DVT (deep venous  thrombosis)     left leg    GERD (gastroesophageal reflux disease)     Heart failure     [facility]     Hyperlipidemia     Hypertension     Hypertensive heart disease without congestive heart failure     Insulin pump status 2017    Myocardial infarction     Neuropathy of lower extremity     Obesity (BMI 30-39.9) 2017    Prostate cancer     Renal manifestation of secondary diabetes mellitus     Sleep apnea     Stroke 2017    Type 2 diabetes mellitus with diabetic polyneuropathy, with long-term current use of insulin 2017    Type 2 diabetes mellitus with hyperglycemia, with long-term current use of insulin 2012    Currently sees Dr. Britton     Unstable angina     Vitamin D deficiency disease        Past Surgical History:   Procedure Laterality Date    CARDIAC DEFIBRILLATOR PLACEMENT  2016    CHOLECYSTECTOMY      CORONARY ARTERY BYPASS GRAFT      bypass times  5    defibulater  2016    EYE SURGERY Bilateral     HERNIA REPAIR      groin x2  and umbilical    JOINT REPLACEMENT      knee left    thrombus Left     >    VASECTOMY         Current Facility-Administered Medications   Medication    aspirin EC tablet 81 mg    citalopram tablet 10 mg    clopidogrel tablet 75 mg    dextrose 50% injection 12.5 g    dextrose 50% injection 25 g    glucagon (human recombinant) injection 1 mg    glucose chewable tablet 16 g    glucose chewable tablet 24 g    insulin aspart pen 0-5 Units    insulin detemir pen 30 Units    pantoprazole EC tablet 40 mg    PHENYLephrine (ANA-SYNEPHRINE) 40 mg in sodium chloride 0.9% 250ml (titrating) (premix)    rosuvastatin tablet 40 mg       Review of patient's allergies indicates:  No Known Allergies    Family History   Problem Relation Age of Onset    Alzheimer's disease Mother      Living    Heart disease Mother     Dementia Mother     Asbestos Father          Cancer Father     Diabetes type II Maternal Aunt   "   Stroke Neg Hx        Social History   Substance Use Topics    Smoking status: Former Smoker     Packs/day: 4.00     Years: 25.00     Types: Cigarettes     Quit date: 6/8/1980    Smokeless tobacco: Never Used    Alcohol use No       OBJECTIVE:     VITALS  Most Recent Range (Last 24H)   /62   Pulse 60   Temp 97.8 °F (36.6 °C) (Oral)   Resp (!) 26   Ht 5' 8" (1.727 m)   Wt 104.6 kg (230 lb 9.6 oz)   SpO2 (!) 94%   BMI 35.06 kg/m²    Temp:  [97.8 °F (36.6 °C)-97.9 °F (36.6 °C)]   Pulse:  [59-61]   Resp:  [13-27]   BP: ()/(50-70)   SpO2:  [94 %-99 %]       PHYSICAL EXAM  Physical Exam   Constitutional: He appears well-developed.   HENT:   Head: Normocephalic and atraumatic.   Eyes: Conjunctivae and EOM are normal. Right eye exhibits no discharge. Left eye exhibits no discharge.   Neck: Normal range of motion.   Cardiovascular: Normal rate.    No murmur heard.  Pulmonary/Chest: Effort normal. No respiratory distress.   Abdominal: Soft. Bowel sounds are normal.   Increased abdominal girth   Musculoskeletal: Normal range of motion.   Neurological: He is alert.   Skin: Skin is warm and dry.         LABS  CBC:   Recent Labs  Lab 07/05/17  0612   WBC 9.08   HGB 14.6   HCT 41.2   *     CMP:   Recent Labs  Lab 07/05/17  0612      K 3.5      CO2 24   *   BUN 23   CREATININE 1.0   CALCIUM 8.3*   ANIONGAP 12   EGFRNONAA >60.0         Imaging Results    None           ASSESSMENT/PLAN:     Current Problems List:  Active Hospital Problems    Diagnosis  POA    Carotid stenosis, symptomatic, with infarction [I63.239]  Yes      Resolved Hospital Problems    Diagnosis Date Resolved POA   No resolved problems to display.       ASSESSMENT/PLAN      Right Carotid Stenosis  --complicated by R MCA stroke during a previous admission  --S/p carotid artery stenting with MOMA proximal protection on 7/5  --continue ASA, statin, plavix  --wean sai, goal MAP 65    Left asymptomatic carotid " stenosis  --medical management with ASA, statin, plavix    DM2  --last A1C (4/13/17) 9.3  --home insulin regimen per patient is 40 long acting bid with 18 short acting TIDWM + SSI  --will resume 30 long acting for now with low dose SSI for now and adjust inpatient insulin regimen as necessary  --hypoglycemic protocol  --diabetic/cardiac diet  --continue home high intensity statin  --holding home ACE-I    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  Case discussed fellow.    Abigail Randhawa, PGY-1     I have personally taken the history and examined the patient and agree with the resident's note as stated above.  Observe closely after carotid stenting.

## 2017-07-05 NOTE — PROGRESS NOTES
"    Post Cath Note  Referring Physician: Marcus Blakely MD  Procedure: STENT-CAROTID (Right)       Access: Right CFA    See full report for further details    Intervention:   Successful right internal carotid stent placement with moma proximal protection    Closure device: Perclosure    See report for full details  Post Cath Exam:   BP (!) 99/55 (BP Location: Right arm, Patient Position: Lying, BP Method: Automatic)   Pulse 61   Temp 97.9 °F (36.6 °C)   Resp 18   Ht 5' 8" (1.727 m)   Wt 104.6 kg (230 lb 9.6 oz)   SpO2 (!) 94%   BMI 35.06 kg/m²   No unusual pain, hematoma, thrill or bruit at vascular access site.  Distal pulse present without signs of ischemia.    Recommendations:   - Routine post-cath care  - Patient paced at 40 at end of procedure so base rate on biotronik icd increased to 60, will test for intrinsic rhythm in PM and back rate down as tolerated  - BP borderline, sai gtt started for BP support  - Transfer to CCU for further observation, CCU fellow notified    Staff addendum to follow    Signed:  Haim Clements DO  Cardiology Fellow  Pager [phone]          "

## 2017-07-05 NOTE — H&P (VIEW-ONLY)
Subjective:    Patient ID:  Jorden Shafer is a 72 y.o. male who presents for follow-up of Carotid Artery Disease      HPI  Mr. Shafer is a 70 y/o gentleman with PMH CAD s/p CABG (1989), DVT, DM II, MI, defibrillator placement who was recently discharged from the hospital following a Right MCA stroke and NSTEMI. He was found to have >90% stenosis of his R-ICA on CTA.  He underwent carotid angiography on 6/2/17 that demonstrate bilateral high grade carotid stenosis with severe calcification.  Eh was referred to Dr. Galindo, vascular surgery, for evaluation for CEA.  The patient was deemed to be to be at high risk for awake/ cervical block CEA and at prohibitive risk for CEA under GETA.  He was therefore referred back to discuss high risk NIMO.  In the interim since his last clinic visit here on 5/27/17 the patient denies angina.  He reports increasing exertional fatigue, but denies BOSE.  HE denies LE edema, orthopnea, or PND.  He denies palpitations, syncope, or near syncope.  He repors good medication compliance and adherence to a heart healthy diet.  He does not have full range of motion or strength in in left hand or arm, but states he is doing his OT/PT prescribed exercises.    Past Medical History:   Diagnosis Date    Back pain     CAD (coronary artery disease) 1989    h/o cabg x 5    Chronic kidney disease     Colon polyp     h/o on colonoscopy    Contact dermatitis 11/12/2013    CVA (cerebral vascular accident)     March 2010    Depression     Diabetes mellitus type II     on  insulin pump    DVT (deep venous thrombosis) 1980's    left leg    GERD (gastroesophageal reflux disease)     Heart failure     [facility]     Hyperlipidemia     Hypertension     Hypertensive heart disease without congestive heart failure     Insulin pump status 4/25/2017    Myocardial infarction     Neuropathy of lower extremity     Obesity (BMI 30-39.9) 4/25/2017    Prostate cancer     Renal manifestation of secondary  diabetes mellitus     Sleep apnea     Stroke 4/21/2017    Type 2 diabetes mellitus with diabetic polyneuropathy, with long-term current use of insulin 4/25/2017    Type 2 diabetes mellitus with hyperglycemia, with long-term current use of insulin 9/26/2012    Currently sees Dr. Britton     Unstable angina     Vitamin D deficiency disease      Past Surgical History:   Procedure Laterality Date    CARDIAC DEFIBRILLATOR PLACEMENT  06/16/2016    CHOLECYSTECTOMY      CORONARY ARTERY BYPASS GRAFT      bypass times  5    defibulater  06/16/2016    EYE SURGERY Bilateral     HERNIA REPAIR      groin x2  and umbilical    JOINT REPLACEMENT      knee left    thrombus Left     >2005    VASECTOMY       Current Outpatient Prescriptions on File Prior to Visit   Medication Sig Dispense Refill    amitriptyline (ELAVIL) 50 MG tablet Take 1 tablet (50 mg total) by mouth every evening. 30 tablet 2    aspirin (ECOTRIN) 81 MG EC tablet Take 81 mg by mouth once daily.      carvedilol (COREG) 25 MG tablet Take 0.5 tablets (12.5 mg total) by mouth 2 (two) times daily with meals. Hold is SBP <120. 90 tablet 3    citalopram (CELEXA) 10 MG tablet Take 1 tablet (10 mg total) by mouth once daily. 90 tablet 1    clopidogrel (PLAVIX) 75 mg tablet Take 1 tablet (75 mg total) by mouth once daily. 90 tablet 3    furosemide (LASIX) 20 MG tablet Take 1 tablet (20 mg total) by mouth once daily.      glucose 4 GM chewable tablet Take 16 g by mouth as needed for Low blood sugar.      insulin aspart (NOVOLOG) 100 unit/mL InPn pen Inject 16 Units into the skin 3 (three) times daily.  0    insulin detemir (LEVEMIR FLEXTOUCH) 100 unit/mL (3 mL) SubQ InPn pen Inject 42 Units into the skin 2 (two) times daily.  0    isosorbide mononitrate (IMDUR) 60 MG 24 hr tablet Take 1 tablet (60 mg total) by mouth once daily. 90 tablet 3    lisinopril 10 MG tablet Take 1 tablet (10 mg total) by mouth once daily. 90 tablet 3    omega-3 acid ethyl  esters (LOVAZA) 1 gram capsule Take 2 g by mouth 3 (three) times daily.        omeprazole (PRILOSEC) 40 MG capsule Take 1 capsule (40 mg total) by mouth 2 (two) times daily. (Patient taking differently: Take 40 mg by mouth 2 (two) times daily as needed. ) 180 capsule 3    blood sugar diagnostic Strp 1 strip by Misc.(Non-Drug; Combo Route) route 2 (two) times daily with meals. TRUE METRIX 100 strip 11    blood-glucose meter kit Use as instructed          TRUE METRIX 1 each 0    lancets Misc 1 lancet by Misc.(Non-Drug; Combo Route) route 2 (two) times daily with meals. TRUE METRIX 100 each 11    nitroGLYCERIN (NITROSTAT) 0.4 MG SL tablet Place 1 tablet (0.4 mg total) under the tongue every 5 (five) minutes as needed. Sublingual  ( under tongue) as needed 30 tablet prn     No current facility-administered medications on file prior to visit.      Review of patient's allergies indicates:  No Known Allergies  Social History   Substance Use Topics    Smoking status: Former Smoker     Packs/day: 4.00     Years: 25.00     Types: Cigarettes     Quit date: 1980    Smokeless tobacco: Never Used    Alcohol use No     .  Family History   Problem Relation Age of Onset    Alzheimer's disease Mother      Living    Heart disease Mother     Dementia Mother     Asbestos Father          Cancer Father     Diabetes type II Maternal Aunt     Stroke Neg Hx           Review of Systems   Constitution: Negative for decreased appetite, diaphoresis, fever, malaise/fatigue, weight gain and weight loss.   HENT: Negative for congestion, nosebleeds and sore throat.    Eyes: Negative for blurred vision, vision loss in left eye, vision loss in right eye and visual disturbance.   Cardiovascular: Negative for chest pain, claudication, dyspnea on exertion, leg swelling, near-syncope, orthopnea, palpitations, paroxysmal nocturnal dyspnea and syncope.   Respiratory: Negative for cough, hemoptysis, shortness of breath and  "wheezing.    Endocrine: Negative for polyuria.   Hematologic/Lymphatic: Does not bruise/bleed easily.   Skin: Negative for nail changes and rash.   Musculoskeletal: Positive for muscle weakness. Negative for back pain, muscle cramps and myalgias.   Gastrointestinal: Negative for abdominal pain, change in bowel habit, diarrhea, heartburn, hematemesis, hematochezia, melena, nausea and vomiting.   Genitourinary: Negative for bladder incontinence, dysuria, frequency and hematuria.   Psychiatric/Behavioral: Negative for depression.   Allergic/Immunologic: Negative for hives.        Objective:  Vitals:    06/08/17 1245 06/08/17 1248   BP: 124/71 (!) 145/75   BP Location: Right arm Left arm   Patient Position: Sitting Sitting   BP Method: Automatic Automatic   Pulse: 76 72   SpO2: (!) 93% (!) 93%   Weight: 107.9 kg (237 lb 14 oz) 107.9 kg (237 lb 14 oz)   Height: 5' 8" (1.727 m) 5' 8" (1.727 m)         Physical Exam   Constitutional: He is oriented to person, place, and time. He appears well-developed and well-nourished.   HENT:   Head: Normocephalic and atraumatic.   Eyes: EOM are normal. Pupils are equal, round, and reactive to light.   Neck: Neck supple. No JVD present. No thyromegaly present.   Cardiovascular: Normal rate and regular rhythm.  PMI is displaced.  Exam reveals no gallop and no friction rub.    No murmur heard.  Pulses:       Carotid pulses are 2+ on the right side, and 2+ on the left side.       Radial pulses are 2+ on the right side, and 2+ on the left side.        Femoral pulses are 2+ on the right side, and 2+ on the left side.       Dorsalis pedis pulses are 2+ on the right side, and 2+ on the left side.        Posterior tibial pulses are 2+ on the right side, and 2+ on the left side.   Pulmonary/Chest: Effort normal. He has no wheezes. He has no rhonchi. He has no rales.   Abdominal: Soft. Normal appearance. He exhibits no distension. There is no hepatosplenomegaly. There is no tenderness. "   Musculoskeletal:   Decreased ROM, strength, and fine motor movement of left fingers and wrist weakness of left arm   Neurological: He is alert and oriented to person, place, and time. Gait normal.   Psychiatric: He has a normal mood and affect.         Assessment:       1. Bilateral carotid artery stenosis    2. Dyslipidemia    3. Carotid stenosis, symptomatic, with infarction    4. Symptomatic carotid artery stenosis, unspecified laterality    5. Essential hypertension    6. Mixed hyperlipidemia    7. Stenosis of right carotid artery    8. Coronary artery disease of bypass graft of native heart with stable angina pectoris    9. KELVIN on CPAP    10. History of CVA (cerebrovascular accident)         Plan:       1) H/o right brain CVA with right carotid stenosis. Case discussed with Dr. Galindo, vascualr surgery, and Dr. Lima, interventional cardiology.  WIll plan for right NIMO with MOMA proximal protection.  -continue EC ASA 81mg po qday  -continue Plavix 75mg po qday  -continue statin  The risks, benefits, and alternatives of peripheral vascular angiography and possible intervention were discussed with the patient. In particular the high risk of stroke was discussed in detail  All questions were answered and informed consent was obtained.     2) Left asymptomatic carotid stenosis. Continue medical management  -continue EC ASA 81mg po qday  -continue Plavix 75mg po qday  -continue statin    3) CAD.  The patient denies angina.  He has a stress test post NSTEMI demonstrating inferior ischemia.  I discussed cardiac cath and coronary revascularization with the patient.  He prefer to proceed with carotid stenting first due to the potential risk of recurrent stroke and because he is not experiencing angina.  -continue EC ASA 81mg po qday  -continue Plavix 75mg po qday  -continue statin  -continue Coreg 25mg po BID  -continue Imdur 60mg po qday    4) ICM. Patient appears euvolemic  n exam and reports NYHA class 2  symptoms  -medical management as above  -rec low sodium, heart healthy det    5) Dyslipidemia. 5/18/17 lipid panel reviewed  -continue high intensity statin    6) DM2. 4/12/17 HgbA1c = 9.3; rec tight glycemic control    7) H/o GERD. Asymptomatic on PPI; continue PPI    All of the patient's questions were answered.

## 2017-07-06 VITALS
BODY MASS INDEX: 34.95 KG/M2 | OXYGEN SATURATION: 92 % | DIASTOLIC BLOOD PRESSURE: 57 MMHG | RESPIRATION RATE: 25 BRPM | TEMPERATURE: 98 F | SYSTOLIC BLOOD PRESSURE: 113 MMHG | HEART RATE: 49 BPM | HEIGHT: 68 IN | WEIGHT: 230.63 LBS

## 2017-07-06 LAB
ANION GAP SERPL CALC-SCNC: 10 MMOL/L
BASOPHILS # BLD AUTO: 0.03 K/UL
BASOPHILS NFR BLD: 0.3 %
BUN SERPL-MCNC: 21 MG/DL
CALCIUM SERPL-MCNC: 7.7 MG/DL
CHLORIDE SERPL-SCNC: 108 MMOL/L
CO2 SERPL-SCNC: 24 MMOL/L
CREAT SERPL-MCNC: 0.9 MG/DL
DIFFERENTIAL METHOD: NORMAL
EOSINOPHIL # BLD AUTO: 0.1 K/UL
EOSINOPHIL NFR BLD: 0.8 %
ERYTHROCYTE [DISTWIDTH] IN BLOOD BY AUTOMATED COUNT: 14.2 %
EST. GFR  (AFRICAN AMERICAN): >60 ML/MIN/1.73 M^2
EST. GFR  (NON AFRICAN AMERICAN): >60 ML/MIN/1.73 M^2
ESTIMATED AVG GLUCOSE: 177 MG/DL
GLUCOSE SERPL-MCNC: 112 MG/DL
HBA1C MFR BLD HPLC: 7.8 %
HCT VFR BLD AUTO: 41.1 %
HGB BLD-MCNC: 14.1 G/DL
LYMPHOCYTES # BLD AUTO: 2 K/UL
LYMPHOCYTES NFR BLD: 20.4 %
MCH RBC QN AUTO: 28.2 PG
MCHC RBC AUTO-ENTMCNC: 34.3 %
MCV RBC AUTO: 82 FL
MONOCYTES # BLD AUTO: 0.6 K/UL
MONOCYTES NFR BLD: 6 %
NEUTROPHILS # BLD AUTO: 7.2 K/UL
NEUTROPHILS NFR BLD: 72.2 %
PLATELET # BLD AUTO: 150 K/UL
PMV BLD AUTO: 11.1 FL
POC ACTIVATED CLOTTING TIME K: 230 SEC (ref 74–137)
POC ACTIVATED CLOTTING TIME K: 235 SEC (ref 74–137)
POC ACTIVATED CLOTTING TIME K: 241 SEC (ref 74–137)
POC ACTIVATED CLOTTING TIME K: 252 SEC (ref 74–137)
POC ACTIVATED CLOTTING TIME K: 290 SEC (ref 74–137)
POCT GLUCOSE: 102 MG/DL (ref 70–110)
POTASSIUM SERPL-SCNC: 3.3 MMOL/L
RBC # BLD AUTO: 5 M/UL
SAMPLE: ABNORMAL
SODIUM SERPL-SCNC: 142 MMOL/L
WBC # BLD AUTO: 9.93 K/UL

## 2017-07-06 PROCEDURE — 63600175 PHARM REV CODE 636 W HCPCS: Performed by: HOSPITALIST

## 2017-07-06 PROCEDURE — 25000003 PHARM REV CODE 250: Performed by: STUDENT IN AN ORGANIZED HEALTH CARE EDUCATION/TRAINING PROGRAM

## 2017-07-06 PROCEDURE — 80048 BASIC METABOLIC PNL TOTAL CA: CPT

## 2017-07-06 PROCEDURE — 85025 COMPLETE CBC W/AUTO DIFF WBC: CPT

## 2017-07-06 PROCEDURE — 83036 HEMOGLOBIN GLYCOSYLATED A1C: CPT

## 2017-07-06 PROCEDURE — 27000221 HC OXYGEN, UP TO 24 HOURS

## 2017-07-06 RX ORDER — POTASSIUM CHLORIDE 750 MG/1
60 CAPSULE, EXTENDED RELEASE ORAL ONCE
Status: COMPLETED | OUTPATIENT
Start: 2017-07-06 | End: 2017-07-06

## 2017-07-06 RX ORDER — CARVEDILOL 3.12 MG/1
3.12 TABLET ORAL 2 TIMES DAILY WITH MEALS
Qty: 60 TABLET | Refills: 11 | Status: SHIPPED | OUTPATIENT
Start: 2017-07-06 | End: 2017-11-09

## 2017-07-06 RX ADMIN — ASPIRIN 81 MG: 81 TABLET, COATED ORAL at 09:07

## 2017-07-06 RX ADMIN — CITALOPRAM HYDROBROMIDE 10 MG: 10 TABLET ORAL at 09:07

## 2017-07-06 RX ADMIN — POTASSIUM CHLORIDE 60 MEQ: 750 CAPSULE, EXTENDED RELEASE ORAL at 07:07

## 2017-07-06 RX ADMIN — INSULIN DETEMIR 30 UNITS: 100 INJECTION, SOLUTION SUBCUTANEOUS at 09:07

## 2017-07-06 RX ADMIN — CLOPIDOGREL 75 MG: 75 TABLET, FILM COATED ORAL at 09:07

## 2017-07-06 RX ADMIN — PANTOPRAZOLE SODIUM 40 MG: 40 TABLET, DELAYED RELEASE ORAL at 09:07

## 2017-07-06 RX ADMIN — ROSUVASTATIN CALCIUM 40 MG: 20 TABLET, FILM COATED ORAL at 09:07

## 2017-07-06 NOTE — PLAN OF CARE
Problem: Patient Care Overview  Goal: Plan of Care Review  DX: Carotid Stents R  HX: CAD s/p CABG x5 '89, DVT, DM, MI , R MCA stroke/ NSTEMI    7/5: admitted to SICU. Ian started. oobtc (hypotensive)  7/6: Ian off    Nursing:  * MAP >65       Outcome: Ongoing (interventions implemented as appropriate)  Patient AAOx4. Ian tritated to maintain MAP> 65. Ian turned off. MAP remained above 65. Patient assisted to bed with 3 assist. No reports of dizziness; pt remained alert. No reports of pain throughout shift. Pt on diabetic diet; tolerated well. Pt voided per urinal. Questions answered. Will continue to monitor.

## 2017-07-06 NOTE — DISCHARGE SUMMARY
Discharge Summary  Interventional Cardiology      Admit Date: 7/5/2017    Discharge Date:  7/6/2017    Attending Physician: Marcus Blakely MD    Discharge Physician: Haim Clements DO    Principal Diagnoses: Carotid stenosis, symptomatic, with infarction    Indication for Admission: Right internal carotid stenting    Discharged Condition: Good    Hospital Course:   Patient presented for right carotid artery stenting. The patient underwent the procedure and tolerated it well. Following the procedure some reflex hypotension was noted and the patient was observed overnight. On day of discharge the patient was feeling well and was discharged. Given blood pressures post procedurally were low, his home blood pressure medications were modified.     Outpatient Plan:  - Follow-up appointment 2-3 weeks with Vinicio Blakely MD  - There were no medication changes, see below for specific changes    Diet: Cardiac diet    Activity: Activity instructions provided, Wound care instructions provided    Disposition: Home or Self Care    Discharge Medications:      Medication List      CHANGE how you take these medications    carvedilol 3.125 MG tablet  Commonly known as:  COREG  Take 1 tablet (3.125 mg total) by mouth 2 (two) times daily with meals. Hold is SBP <120.  What changed:  · medication strength  · how much to take  · Another medication with the same name was removed. Continue taking this medication, and follow the directions you see here.     omeprazole 40 MG capsule  Commonly known as:  PRILOSEC  Take 1 capsule (40 mg total) by mouth 2 (two) times daily.  What changed:  · when to take this  · reasons to take this        CONTINUE taking these medications    aspirin 81 MG EC tablet  Commonly known as:  ECOTRIN     blood sugar diagnostic Strp  1 strip by Misc.(Non-Drug; Combo Route) route 2 (two) times daily with meals. TRUE METRIX     blood-glucose meter kit  Use as instructed          TRUE METRIX     citalopram 10 MG tablet  Commonly  known as:  CELEXA  Take 1 tablet (10 mg total) by mouth once daily.     clopidogrel 75 mg tablet  Commonly known as:  PLAVIX  Take 1 tablet (75 mg total) by mouth once daily.     furosemide 20 MG tablet  Commonly known as:  LASIX  Take 1 tablet (20 mg total) by mouth once daily.     glucose 4 GM chewable tablet     insulin aspart 100 unit/mL Inpn pen  Commonly known as:  NovoLOG  Inject 16 Units into the skin 3 (three) times daily.     insulin detemir 100 unit/mL (3 mL) Inpn pen  Commonly known as:  LEVEMIR FLEXTOUCH  Inject 42 Units into the skin 2 (two) times daily.     lancets Misc  1 lancet by Misc.(Non-Drug; Combo Route) route 2 (two) times daily with meals. TRUE METRIX     nitroGLYCERIN 0.4 MG SL tablet  Commonly known as:  NITROSTAT  Place 1 tablet (0.4 mg total) under the tongue every 5 (five) minutes as needed. Sublingual  ( under tongue) as needed     omega-3 acid ethyl esters 1 gram capsule  Commonly known as:  LOVAZA     rosuvastatin 40 MG Tab  Commonly known as:  CRESTOR  Take 1 tablet (40 mg total) by mouth once daily.        STOP taking these medications    amitriptyline 50 MG tablet  Commonly known as:  ELAVIL     isosorbide mononitrate 60 MG 24 hr tablet  Commonly known as:  IMDUR     lisinopril 10 MG tablet           Where to Get Your Medications      These medications were sent to [facility]/pharmacy #[id number] [address] [address]    Phone:  [phone]   · carvedilol 3.125 MG tablet       Follow Up:  Follow-up Information     Marcus Blakely MD In 2 weeks.    Specialties:  Cardiology, INTERVENTIONAL CARDIOLOGY  Contact information:  [address] [facility] [address] [phone]

## 2017-07-07 ENCOUNTER — OUTPATIENT CASE MANAGEMENT (OUTPATIENT)
Dept: ADMINISTRATIVE | Facility: OTHER | Age: 72
End: 2017-07-07

## 2017-07-07 NOTE — PROGRESS NOTES
"Follow up with pt. Pt had stent placement on 7/3. He states his "doing fine". He denies any complications. All goals met. Provided pt with number for [facility] for any future needs.     Interventions:  Review of education materials  Dis-enrolled from [facility]  "

## 2017-07-12 NOTE — PHYSICIAN QUERY
PT Name: Jorden Shafer  MR #: [id number]  Physician Query Form - CKD Clarification     CDS/: Terrie Chávez               Contact information: [email or web address]    This form is a permanent document in the medical record.     Query Date: July 12, 2017    By submitting this query, we are merely seeking further clarification of documentation. Please utilize your independent clinical judgment when addressing the question(s) below.    The Medical record contains the following:     Indicators   Supporting Clinical Findings   Location in Medical Record   x CKD or Chronic Kidney (Renal) Failure / Disease Chronic kidney disease  H&P pmh    BUN/Creatinine                          GFR · CREAT  1.0  · BUN  23  · GFR >60 Lab    Dehydration      Nausea / Vomiting      Dialysis / CRRT      Medication      Treatment     x Other Chronic Conditions history of CAD s/p CABG (1989), DVT, DM II, MI, defibrillator placement who was recently discharged from the hospital following a Right MCA stroke and NSTEMI. He was found to  have >90% stenosis of his R-ICA on CTA. H&P    Other       Provider, please further specify the stage of CKD.      [  ] Chronic Kidney Disease (CKD) (please specify stage* below)       [employer] Definitions  Stage Description  eGFR (mL/min)   [  ]     I Slight kidney damage with normal or increased filtration 90+   [x  ]     II Mildly reduced kidney function 60-89   [  ]     III Moderately reduced kidney function 30-59   [  ]     IV Severely reduced kidney function 15-29   [  ]     V Kidney failure, requiring transplant or dialysis <15     [  ] CKD on Chronic Hemodialysis (please specify stage*): __________  [  ] End Stage Renal Disease (ESRD)  [  ] Other (please specify): ________________________  [  ] Clinically Undetermined    Please document in your progress notes daily for the duration of treatment until resolved and include in your discharge summary.

## 2017-08-17 ENCOUNTER — CLINICAL SUPPORT (OUTPATIENT)
Dept: CARDIOLOGY | Facility: CLINIC | Age: 72
End: 2017-08-17
Payer: MEDICARE

## 2017-08-17 ENCOUNTER — OFFICE VISIT (OUTPATIENT)
Dept: CARDIOLOGY | Facility: CLINIC | Age: 72
End: 2017-08-17
Payer: MEDICARE

## 2017-08-17 VITALS
HEART RATE: 75 BPM | HEIGHT: 68 IN | WEIGHT: 233.44 LBS | SYSTOLIC BLOOD PRESSURE: 129 MMHG | BODY MASS INDEX: 35.38 KG/M2 | DIASTOLIC BLOOD PRESSURE: 66 MMHG

## 2017-08-17 DIAGNOSIS — I65.29 STENOSIS OF CAROTID ARTERY, UNSPECIFIED LATERALITY: ICD-10-CM

## 2017-08-17 DIAGNOSIS — I50.42 CHRONIC COMBINED SYSTOLIC AND DIASTOLIC HEART FAILURE: Chronic | ICD-10-CM

## 2017-08-17 DIAGNOSIS — I65.22 STENOSIS OF LEFT CAROTID ARTERY: ICD-10-CM

## 2017-08-17 DIAGNOSIS — I10 ESSENTIAL HYPERTENSION: Primary | Chronic | ICD-10-CM

## 2017-08-17 DIAGNOSIS — Z86.73 HISTORY OF CVA (CEREBROVASCULAR ACCIDENT): Chronic | ICD-10-CM

## 2017-08-17 DIAGNOSIS — K21.9 GASTROESOPHAGEAL REFLUX DISEASE WITHOUT ESOPHAGITIS: Chronic | ICD-10-CM

## 2017-08-17 DIAGNOSIS — I63.239 CAROTID STENOSIS, SYMPTOMATIC, WITH INFARCTION: ICD-10-CM

## 2017-08-17 DIAGNOSIS — E66.01 SEVERE OBESITY WITH BODY MASS INDEX (BMI) OF 35.0 TO 39.9 WITH COMORBIDITY: ICD-10-CM

## 2017-08-17 DIAGNOSIS — I77.89 OTHER SPECIFIED DISORDERS OF ARTERIES AND ARTERIOLES: ICD-10-CM

## 2017-08-17 DIAGNOSIS — Z79.4 TYPE 2 DIABETES MELLITUS WITH HYPERGLYCEMIA, WITH LONG-TERM CURRENT USE OF INSULIN: Chronic | ICD-10-CM

## 2017-08-17 DIAGNOSIS — E11.65 TYPE 2 DIABETES MELLITUS WITH HYPERGLYCEMIA, WITH LONG-TERM CURRENT USE OF INSULIN: Chronic | ICD-10-CM

## 2017-08-17 LAB — INTERNAL CAROTID STENOSIS: NORMAL

## 2017-08-17 PROCEDURE — 3008F BODY MASS INDEX DOCD: CPT | Mod: S$GLB,,, | Performed by: INTERNAL MEDICINE

## 2017-08-17 PROCEDURE — 3045F PR MOST RECENT HEMOGLOBIN A1C LEVEL 7.0-9.0%: CPT | Mod: S$GLB,,, | Performed by: INTERNAL MEDICINE

## 2017-08-17 PROCEDURE — 1126F AMNT PAIN NOTED NONE PRSNT: CPT | Mod: S$GLB,,, | Performed by: INTERNAL MEDICINE

## 2017-08-17 PROCEDURE — 99024 POSTOP FOLLOW-UP VISIT: CPT | Mod: S$GLB,,, | Performed by: INTERNAL MEDICINE

## 2017-08-17 PROCEDURE — 3078F DIAST BP <80 MM HG: CPT | Mod: S$GLB,,, | Performed by: INTERNAL MEDICINE

## 2017-08-17 PROCEDURE — 99499 UNLISTED E&M SERVICE: CPT | Mod: S$GLB,,, | Performed by: INTERNAL MEDICINE

## 2017-08-17 PROCEDURE — 3074F SYST BP LT 130 MM HG: CPT | Mod: S$GLB,,, | Performed by: INTERNAL MEDICINE

## 2017-08-17 PROCEDURE — 1159F MED LIST DOCD IN RCRD: CPT | Mod: S$GLB,,, | Performed by: INTERNAL MEDICINE

## 2017-08-17 PROCEDURE — 93880 EXTRACRANIAL BILAT STUDY: CPT | Mod: S$GLB,,, | Performed by: INTERNAL MEDICINE

## 2017-08-17 PROCEDURE — 99999 PR PBB SHADOW E&M-EST. PATIENT-LVL III: CPT | Mod: PBBFAC,,, | Performed by: INTERNAL MEDICINE

## 2017-08-18 ENCOUNTER — DOCUMENTATION ONLY (OUTPATIENT)
Dept: CARDIOLOGY | Facility: CLINIC | Age: 72
End: 2017-08-18

## 2017-08-18 PROBLEM — I65.21 STENOSIS OF RIGHT CAROTID ARTERY: Chronic | Status: RESOLVED | Noted: 2017-04-21 | Resolved: 2017-08-18

## 2017-08-18 PROBLEM — I65.22 STENOSIS OF LEFT CAROTID ARTERY: Status: ACTIVE | Noted: 2017-08-18

## 2017-08-18 NOTE — PROGRESS NOTES
OUTPATIENT CATHETERIZATION INSTRUCTIONS    You have been scheduled for a procedure in the catheterization lab on Friday, August 25, 2017.     Please report to the Cardiology Waiting Area on the Third floor of the hospital and check in at 9 AM.   You will then be taken to the SSCU (Short Stay Cardiac Unit) and prepared for your procedure. Please be aware that this is not the time of your procedure but the time you are to arrive. The procedures are scheduled on an hourly basis; however, emergency cases take precedence over all other cases.       You may not have anything to eat or drink after midnight the night before your test. You may take your regular morning medications with water. If there are any medications that you should not take you will be instructed to hold them that morning. If you are diabetic and on Metformin (Glucophage) do not take it the day before, the day of, and for 2 days after your procedure.      The procedure will take 1-2 hours to perform. After the procedure, you will return to SSCU on the third floor of the hospital. You will need to lie still (or keep your arm still) for the next 4 to 6 hours to minimize bleeding from the puncture site. Your family may remain in the room with you during this time.       You may be able to be discharged home that same afternoon if there is someone to drive you home and there were no complications. If you have one of the balloon, stent, or device procedures you may spend the night in the hospital. Your doctor will determine, based on your progress, the date and time of your discharge. The results of your procedure will be discussed with you before you are discharged. Any further testing or procedures will be scheduled for you either before you leave or you will be called with these appointments.       If you should have any questions, concerns, or need to change the date of your procedure, please call  DIANA Simon @ [phone]    Special  Instructions:  Take 1/2 of your regularly scheduled insulin dose the night before your procedure.  Hold your regularly scheduled insulin dose the morning of your procedure.  Drink plenty of water the day before and after the procedure        THE ABOVE INSTRUCTIONS WERE GIVEN TO THE PATIENT VERBALLY AND THEY VERBALIZED UNDERSTANDING.  THEY DO NOT REQUIRE ANY SPECIAL NEEDS AND DO NOT HAVE ANY LEARNING BARRIERS.          Directions for Reporting to Cardiology Waiting Area in the Hospital  If you park in the Parking Garage:  Take elevators to the1st floor of the parking garage.  Continue past the gift shop, coffee shop, and piano.  Take a right and go to the gold elevators. (Elevator B)  Take the elevator to the 3rd floor.  Follow the arrow on the sign on the wall that says Cath Lab Registration/EP Lab Registration.  Follow the long hallway all the way around until you come to a big open area.  This is the registration area.  Check in at Reception Desk.    OR    If family is dropping you off:  Have them drop you off at the front of the Hospital under the green overhang.  Enter through the doors and take a right.  Take the E elevators to the 3rd floor Cardiology Waiting Area.  Check in at the Reception Desk in the waiting room.

## 2017-08-19 NOTE — PROGRESS NOTES
Subjective:    Patient ID:  Jorden Shafer is a 72 y.o. male who presents for evaluation of Carotid Artery Disease      HPI  Mr. Shafer is a 71 y/o gentleman with PMH CAD s/p CABG (1989), DVT, DM II, MI, defibrillator placement who was experienced a Right MCA stroke followed by an NSTEMI in April 2017 . He was found to have >90% stenosis of his R-ICA on CTA.  He underwent carotid angiography on 6/2/17 that demonstrate bilateral high grade carotid stenosis with severe calcification.  He was referred to Dr. Galindo, vascular surgery, for evaluation for CEA.  The patient was deemed to be to be at high risk for awake/ cervical block CEA and at prohibitive risk for CEA under GETA.  He underwent successful right carotid artery stent placement on 7/5/17. The patient denies any new neurologic symptoms except right sided headaches around 7pm daily. He denies any visual changes or new weakness.  He reports his left hand motor strength and fine movement is slightly improved. In the interim since his hospital discharge from right carotid stenting he reports angina 3-4 times a week resolved with NTG SL X .  He denies BOSE.  He denies LE edema, orthopnea, or PND.  He denies palpitations, syncope, or near syncope. The patient also reports right sided headaches around 7pm daily.   He reports good medication compliance and adherence to a heart healthy diet.  He does not have full range of motion or strength in in left hand or arm, but states he is doing his OT/PT prescribed exercises.    Past Medical History:   Diagnosis Date    Back pain     CAD (coronary artery disease) 1989    h/o cabg x 5    Carotid artery occlusion     Chronic kidney disease     Colon polyp     h/o on colonoscopy    Contact dermatitis 11/12/2013    CVA (cerebral vascular accident)     March 2010    Depression     Diabetes mellitus type II     on  insulin pump    DVT (deep venous thrombosis) 1980's    left leg    GERD (gastroesophageal reflux disease)      Heart failure     [facility]     Hyperlipidemia     Hypertension     Hypertensive heart disease without congestive heart failure     Insulin pump status 4/25/2017    Myocardial infarction     Neuropathy of lower extremity     Obesity (BMI 30-39.9) 4/25/2017    Prostate cancer     Renal manifestation of secondary diabetes mellitus     Sleep apnea     Stroke 4/21/2017    Type 2 diabetes mellitus with diabetic polyneuropathy, with long-term current use of insulin 4/25/2017    Type 2 diabetes mellitus with hyperglycemia, with long-term current use of insulin 9/26/2012    Currently sees Dr. Britton     Unstable angina     Vitamin D deficiency disease      Past Surgical History:   Procedure Laterality Date    CARDIAC DEFIBRILLATOR PLACEMENT  06/16/2016    CHOLECYSTECTOMY      CORONARY ARTERY BYPASS GRAFT      bypass times  5    defibulater  06/16/2016    EYE SURGERY Bilateral     HERNIA REPAIR      groin x2  and umbilical    JOINT REPLACEMENT      knee left    thrombus Left     >2005    VASECTOMY       Current Outpatient Prescriptions on File Prior to Visit   Medication Sig Dispense Refill    aspirin (ECOTRIN) 81 MG EC tablet Take 81 mg by mouth once daily.      blood sugar diagnostic Strp 1 strip by Misc.(Non-Drug; Combo Route) route 2 (two) times daily with meals. TRUE METRIX 100 strip 11    blood-glucose meter kit Use as instructed          TRUE METRIX 1 each 0    carvedilol (COREG) 3.125 MG tablet Take 1 tablet (3.125 mg total) by mouth 2 (two) times daily with meals. Hold is SBP <120. 60 tablet 11    citalopram (CELEXA) 10 MG tablet Take 1 tablet (10 mg total) by mouth once daily. 90 tablet 1    clopidogrel (PLAVIX) 75 mg tablet Take 1 tablet (75 mg total) by mouth once daily. 90 tablet 3    furosemide (LASIX) 20 MG tablet Take 1 tablet (20 mg total) by mouth once daily.      glucose 4 GM chewable tablet Take 16 g by mouth as needed for Low blood sugar.      insulin aspart (NOVOLOG) 100  unit/mL InPn pen Inject 16 Units into the skin 3 (three) times daily.  0    lancets Misc 1 lancet by Misc.(Non-Drug; Combo Route) route 2 (two) times daily with meals. TRUE METRIX 100 each 11    nitroGLYCERIN (NITROSTAT) 0.4 MG SL tablet Place 1 tablet (0.4 mg total) under the tongue every 5 (five) minutes as needed. Sublingual  ( under tongue) as needed 30 tablet prn    omega-3 acid ethyl esters (LOVAZA) 1 gram capsule Take 2 g by mouth 3 (three) times daily.        omeprazole (PRILOSEC) 40 MG capsule Take 1 capsule (40 mg total) by mouth 2 (two) times daily. (Patient taking differently: Take 40 mg by mouth 2 (two) times daily as needed. ) 180 capsule 3    rosuvastatin (CRESTOR) 40 MG Tab Take 1 tablet (40 mg total) by mouth once daily. 30 tablet 6     No current facility-administered medications on file prior to visit.      Review of patient's allergies indicates:  No Known Allergies  Social History   Substance Use Topics    Smoking status: Former Smoker     Packs/day: 4.00     Years: 25.00     Types: Cigarettes     Quit date: 1980    Smokeless tobacco: Never Used    Alcohol use No     Family History   Problem Relation Age of Onset    Alzheimer's disease Mother      Living    Heart disease Mother     Dementia Mother     Asbestos Father          Cancer Father     Diabetes type II Maternal Aunt     Stroke Neg Hx        Review of Systems   Constitution: Negative for decreased appetite, diaphoresis, fever, malaise/fatigue, weight gain and weight loss.   HENT: Negative for congestion, nosebleeds and sore throat.    Eyes: Negative for blurred vision, vision loss in left eye, vision loss in right eye and visual disturbance.   Cardiovascular: Positive for chest pain. Negative for claudication, dyspnea on exertion, leg swelling, near-syncope, orthopnea, palpitations, paroxysmal nocturnal dyspnea and syncope.   Respiratory: Negative for cough, hemoptysis, shortness of breath and wheezing.   "  Endocrine: Negative for polyuria.   Hematologic/Lymphatic: Does not bruise/bleed easily.   Skin: Negative for nail changes and rash.   Musculoskeletal: Positive for muscle weakness and neck pain. Negative for back pain, muscle cramps and myalgias.   Gastrointestinal: Negative for abdominal pain, change in bowel habit, diarrhea, heartburn, hematemesis, hematochezia, melena, nausea and vomiting.   Genitourinary: Negative for bladder incontinence, dysuria, frequency and hematuria.   Psychiatric/Behavioral: Negative for depression.   Allergic/Immunologic: Negative for hives.        Objective:  Vitals:    08/17/17 1355 08/17/17 1358   BP: (!) 140/75 129/66   BP Location: Right arm Left arm   Patient Position: Sitting Sitting   BP Method: Medium (Automatic) Medium (Automatic)   Pulse: 75    Weight: 105.9 kg (233 lb 7.5 oz)    Height: 5' 8" (1.727 m)          Physical Exam   Constitutional: He is oriented to person, place, and time. He appears well-developed and well-nourished.   HENT:   Head: Normocephalic and atraumatic.   Eyes: EOM are normal. Pupils are equal, round, and reactive to light.   Neck: Neck supple. No JVD present. No thyromegaly present.   Cardiovascular: Normal rate and regular rhythm.  PMI is displaced.  Exam reveals no gallop and no friction rub.    No murmur heard.  Pulses:       Carotid pulses are 2+ on the right side with bruit, and 2+ on the left side with bruit.       Radial pulses are 2+ on the right side, and 2+ on the left side.        Femoral pulses are 2+ on the right side, and 2+ on the left side.       Dorsalis pedis pulses are 2+ on the right side, and 2+ on the left side.        Posterior tibial pulses are 2+ on the right side, and 2+ on the left side.   Normal right radial Emmett's test.   Pulmonary/Chest: Effort normal. He has no wheezes. He has no rhonchi. He has no rales.   Abdominal: Soft. Normal appearance. He exhibits no distension. There is no hepatosplenomegaly. There is no " tenderness.   Neurological: He is alert and oriented to person, place, and time. Gait normal.   Psychiatric: He has a normal mood and affect.         Assessment:       1. Essential hypertension    2. Chronic combined systolic and diastolic heart failure    3. Type 2 diabetes mellitus with hyperglycemia, with long-term current use of insulin    4. History of CVA (cerebrovascular accident)    5. Gastroesophageal reflux disease without esophagitis    6. Severe obesity with body mass index (BMI) of 35.0 to 39.9 with comorbidity    7. Stenosis of left carotid artery         Plan:       1) Carotid stenosis.  The patient underwent successful right carotid stent placement for symptomatic right carotid stensosi.  He has high grade asymptomatic left carotid stenosis, but is at high risk for CEA due to cnotnued angina and a high risk stress test. I discussed proceeding for coronary angiography and possible PCI versus left NIMO with the patient.  He stated he would like to proceed with left NIMO as he  Feels his angina is not getting worse at this time.  -will plan on left NIMO with proximal protection  -continue DAPT  The risks, benefits, and alternatives of carotid artery angiography and stenting  were discussed with the patient.  All questions were answered and informed consent was obtained.     2) CAD.  The patient denies angina.  He has a stress test post NSTEMI demonstrating inferior ischemia.  I discussed cardiac cath and coronary revascularization with the patient.  As stated above he stated he wants to proceed with left carotid stenting first.  I reviewed the use of NTG SL with the patient.  If he experiences angina not relieved with NTG SL X3, he will call 911.  If his angina gets worse, he agreed to proceed with coronary angiography prior to possible CAD. Of note, his Imdur was stopped after right carotid stenting due to relative hypotension and his Coreg was decreased from 25mg po BID to 3.125mg po BID  -continue EC  ASA 81mg po qday  -continue Plavix 75mg po qday  -continue statin  -continue Coreg 3.125mg po BID    3) ICM. Patient appears euvolemic on exam and reports NYHA class 2 symptoms  -medical management as above  -rec low sodium, heart healthy det     4) Dyslipidemia. 5/18/17 lipid panel reviewed  -continue high intensity statin     5) DM2. 4/12/17 HgbA1c = 9.3; rec tight glycemic control     6) H/o GERD. Asymptomatic on PPI; continue PPI    7) KELVIN . The patient reports good compliance with his CPAP    8) HTN. Blood pressure elevated in clinic today; suspect headaches may be due to HTN at they occur at 7pm daily; patient was asked to check blood pressure at 7pm daily and keep log; if BP log demonstrated high BP, will titrate up BP medications; patient would benefit from ACE-I/ARB due to h/o MI and DM2 but also restarting Imdur for angina relief     All of the patient's questions were answered.

## 2017-08-21 ENCOUNTER — LAB VISIT (OUTPATIENT)
Dept: LAB | Facility: HOSPITAL | Age: 72
End: 2017-08-21
Attending: INTERNAL MEDICINE
Payer: MEDICARE

## 2017-08-21 DIAGNOSIS — I65.22 STENOSIS OF LEFT CAROTID ARTERY: ICD-10-CM

## 2017-08-21 DIAGNOSIS — I65.22 STENOSIS OF LEFT CAROTID ARTERY: Primary | ICD-10-CM

## 2017-08-21 LAB
ANION GAP SERPL CALC-SCNC: 8 MMOL/L
APTT BLDCRRT: 23.8 SEC
BUN SERPL-MCNC: 14 MG/DL
CALCIUM SERPL-MCNC: 9 MG/DL
CHLORIDE SERPL-SCNC: 99 MMOL/L
CO2 SERPL-SCNC: 30 MMOL/L
CREAT SERPL-MCNC: 1 MG/DL
ERYTHROCYTE [DISTWIDTH] IN BLOOD BY AUTOMATED COUNT: 14.5 %
EST. GFR  (AFRICAN AMERICAN): >60 ML/MIN/1.73 M^2
EST. GFR  (NON AFRICAN AMERICAN): >60 ML/MIN/1.73 M^2
GLUCOSE SERPL-MCNC: 219 MG/DL
HCT VFR BLD AUTO: 42.4 %
HGB BLD-MCNC: 15.1 G/DL
INR PPP: 1
MCH RBC QN AUTO: 28.1 PG
MCHC RBC AUTO-ENTMCNC: 35.6 G/DL
MCV RBC AUTO: 79 FL
PLATELET # BLD AUTO: 166 K/UL
PMV BLD AUTO: 10.4 FL
POTASSIUM SERPL-SCNC: 4.2 MMOL/L
PROTHROMBIN TIME: 10.8 SEC
RBC # BLD AUTO: 5.38 M/UL
SODIUM SERPL-SCNC: 137 MMOL/L
WBC # BLD AUTO: 10.59 K/UL

## 2017-08-21 PROCEDURE — 85027 COMPLETE CBC AUTOMATED: CPT

## 2017-08-21 PROCEDURE — 85730 THROMBOPLASTIN TIME PARTIAL: CPT

## 2017-08-21 PROCEDURE — 80048 BASIC METABOLIC PNL TOTAL CA: CPT

## 2017-08-21 PROCEDURE — 36415 COLL VENOUS BLD VENIPUNCTURE: CPT

## 2017-08-21 PROCEDURE — 85610 PROTHROMBIN TIME: CPT

## 2017-08-25 ENCOUNTER — HOSPITAL ENCOUNTER (INPATIENT)
Facility: HOSPITAL | Age: 72
LOS: 1 days | Discharge: HOME | DRG: 035 | End: 2017-08-26
Attending: INTERNAL MEDICINE | Admitting: INTERNAL MEDICINE
Payer: MEDICARE

## 2017-08-25 DIAGNOSIS — I65.29 CAROTID STENOSIS: ICD-10-CM

## 2017-08-25 DIAGNOSIS — I63.239 CAROTID STENOSIS, SYMPTOMATIC, WITH INFARCTION: Primary | ICD-10-CM

## 2017-08-25 DIAGNOSIS — Z79.4 LONG TERM CURRENT USE OF INSULIN: ICD-10-CM

## 2017-08-25 DIAGNOSIS — I65.22 OCCLUSION AND STENOSIS OF LEFT CAROTID ARTERY: ICD-10-CM

## 2017-08-25 LAB
ABO + RH BLD: NORMAL
ANION GAP SERPL CALC-SCNC: 12 MMOL/L
BASOPHILS # BLD AUTO: 0.04 K/UL
BASOPHILS NFR BLD: 0.5 %
BLD GP AB SCN CELLS X3 SERPL QL: NORMAL
BUN SERPL-MCNC: 17 MG/DL
CALCIUM SERPL-MCNC: 9.3 MG/DL
CHLORIDE SERPL-SCNC: 99 MMOL/L
CO2 SERPL-SCNC: 27 MMOL/L
CREAT SERPL-MCNC: 1.1 MG/DL
DIFFERENTIAL METHOD: ABNORMAL
EOSINOPHIL # BLD AUTO: 0.1 K/UL
EOSINOPHIL NFR BLD: 1.1 %
ERYTHROCYTE [DISTWIDTH] IN BLOOD BY AUTOMATED COUNT: 14.5 %
EST. GFR  (AFRICAN AMERICAN): >60 ML/MIN/1.73 M^2
EST. GFR  (NON AFRICAN AMERICAN): >60 ML/MIN/1.73 M^2
GLUCOSE SERPL-MCNC: 192 MG/DL
HCT VFR BLD AUTO: 42.6 %
HGB BLD-MCNC: 15 G/DL
LYMPHOCYTES # BLD AUTO: 2 K/UL
LYMPHOCYTES NFR BLD: 22.1 %
MCH RBC QN AUTO: 28.5 PG
MCHC RBC AUTO-ENTMCNC: 35.2 G/DL
MCV RBC AUTO: 81 FL
MONOCYTES # BLD AUTO: 0.6 K/UL
MONOCYTES NFR BLD: 6.5 %
NEUTROPHILS # BLD AUTO: 6.1 K/UL
NEUTROPHILS NFR BLD: 69.6 %
PLATELET # BLD AUTO: 168 K/UL
PMV BLD AUTO: 10.7 FL
POCT GLUCOSE: 134 MG/DL (ref 70–110)
POCT GLUCOSE: 209 MG/DL (ref 70–110)
POCT GLUCOSE: 243 MG/DL (ref 70–110)
POTASSIUM SERPL-SCNC: 3.8 MMOL/L
RBC # BLD AUTO: 5.26 M/UL
SODIUM SERPL-SCNC: 138 MMOL/L
WBC # BLD AUTO: 8.81 K/UL

## 2017-08-25 PROCEDURE — B31BYZZ FLUOROSCOPY OF LEFT EXTERNAL CAROTID ARTERY USING OTHER CONTRAST: ICD-10-PCS | Performed by: INTERNAL MEDICINE

## 2017-08-25 PROCEDURE — 80048 BASIC METABOLIC PNL TOTAL CA: CPT

## 2017-08-25 PROCEDURE — 93005 ELECTROCARDIOGRAM TRACING: CPT

## 2017-08-25 PROCEDURE — 85025 COMPLETE CBC W/AUTO DIFF WBC: CPT

## 2017-08-25 PROCEDURE — 86901 BLOOD TYPING SEROLOGIC RH(D): CPT

## 2017-08-25 PROCEDURE — 82962 GLUCOSE BLOOD TEST: CPT

## 2017-08-25 PROCEDURE — 25000003 PHARM REV CODE 250

## 2017-08-25 PROCEDURE — 63600175 PHARM REV CODE 636 W HCPCS

## 2017-08-25 PROCEDURE — 25000003 PHARM REV CODE 250: Performed by: INTERNAL MEDICINE

## 2017-08-25 PROCEDURE — B314YZZ FLUOROSCOPY OF LEFT COMMON CAROTID ARTERY USING OTHER CONTRAST: ICD-10-PCS | Performed by: INTERNAL MEDICINE

## 2017-08-25 PROCEDURE — 93010 ELECTROCARDIOGRAM REPORT: CPT | Mod: ,,, | Performed by: INTERNAL MEDICINE

## 2017-08-25 PROCEDURE — 37215 TRANSCATH STENT CCA W/EPS: CPT | Mod: 79,LT,, | Performed by: INTERNAL MEDICINE

## 2017-08-25 PROCEDURE — 86900 BLOOD TYPING SEROLOGIC ABO: CPT

## 2017-08-25 PROCEDURE — 20600001 HC STEP DOWN PRIVATE ROOM

## 2017-08-25 PROCEDURE — 037L3D6: ICD-10-PCS | Performed by: INTERNAL MEDICINE

## 2017-08-25 PROCEDURE — 36415 COLL VENOUS BLD VENIPUNCTURE: CPT

## 2017-08-25 PROCEDURE — 037N3ZZ DILATION OF LEFT EXTERNAL CAROTID ARTERY, PERCUTANEOUS APPROACH: ICD-10-PCS | Performed by: INTERNAL MEDICINE

## 2017-08-25 PROCEDURE — 63600175 PHARM REV CODE 636 W HCPCS: Performed by: INTERNAL MEDICINE

## 2017-08-25 RX ORDER — INSULIN ASPART 100 [IU]/ML
0-5 INJECTION, SOLUTION INTRAVENOUS; SUBCUTANEOUS
Status: DISCONTINUED | OUTPATIENT
Start: 2017-08-25 | End: 2017-08-26 | Stop reason: HOSPADM

## 2017-08-25 RX ORDER — IBUPROFEN 200 MG
16 TABLET ORAL
Status: DISCONTINUED | OUTPATIENT
Start: 2017-08-25 | End: 2017-08-26 | Stop reason: HOSPADM

## 2017-08-25 RX ORDER — INSULIN ASPART 100 [IU]/ML
16 INJECTION, SOLUTION INTRAVENOUS; SUBCUTANEOUS 3 TIMES DAILY
Status: DISCONTINUED | OUTPATIENT
Start: 2017-08-25 | End: 2017-08-26 | Stop reason: HOSPADM

## 2017-08-25 RX ORDER — ASPIRIN 81 MG/1
81 TABLET ORAL DAILY
Status: DISCONTINUED | OUTPATIENT
Start: 2017-08-26 | End: 2017-08-26 | Stop reason: HOSPADM

## 2017-08-25 RX ORDER — CARVEDILOL 3.12 MG/1
3.12 TABLET ORAL 2 TIMES DAILY WITH MEALS
Status: DISCONTINUED | OUTPATIENT
Start: 2017-08-25 | End: 2017-08-26 | Stop reason: HOSPADM

## 2017-08-25 RX ORDER — PANTOPRAZOLE SODIUM 40 MG/1
40 TABLET, DELAYED RELEASE ORAL DAILY
Status: DISCONTINUED | OUTPATIENT
Start: 2017-08-26 | End: 2017-08-26 | Stop reason: HOSPADM

## 2017-08-25 RX ORDER — GLUCAGON 1 MG
1 KIT INJECTION
Status: DISCONTINUED | OUTPATIENT
Start: 2017-08-25 | End: 2017-08-26 | Stop reason: HOSPADM

## 2017-08-25 RX ORDER — DIPHENHYDRAMINE HCL 50 MG
50 CAPSULE ORAL ONCE
Status: COMPLETED | OUTPATIENT
Start: 2017-08-25 | End: 2017-08-25

## 2017-08-25 RX ORDER — SODIUM CHLORIDE 9 MG/ML
3 INJECTION, SOLUTION INTRAVENOUS CONTINUOUS
Status: ACTIVE | OUTPATIENT
Start: 2017-08-25 | End: 2017-08-25

## 2017-08-25 RX ORDER — CITALOPRAM 10 MG/1
10 TABLET ORAL DAILY
Status: DISCONTINUED | OUTPATIENT
Start: 2017-08-26 | End: 2017-08-26 | Stop reason: HOSPADM

## 2017-08-25 RX ORDER — IBUPROFEN 200 MG
24 TABLET ORAL
Status: DISCONTINUED | OUTPATIENT
Start: 2017-08-25 | End: 2017-08-26 | Stop reason: HOSPADM

## 2017-08-25 RX ORDER — CLOPIDOGREL BISULFATE 75 MG/1
75 TABLET ORAL DAILY
Status: DISCONTINUED | OUTPATIENT
Start: 2017-08-26 | End: 2017-08-26 | Stop reason: HOSPADM

## 2017-08-25 RX ORDER — ROSUVASTATIN CALCIUM 20 MG/1
40 TABLET, COATED ORAL DAILY
Status: DISCONTINUED | OUTPATIENT
Start: 2017-08-26 | End: 2017-08-26 | Stop reason: HOSPADM

## 2017-08-25 RX ORDER — FUROSEMIDE 20 MG/1
20 TABLET ORAL DAILY
Status: DISCONTINUED | OUTPATIENT
Start: 2017-08-26 | End: 2017-08-26 | Stop reason: HOSPADM

## 2017-08-25 RX ORDER — NITROGLYCERIN 0.4 MG/1
0.4 TABLET SUBLINGUAL EVERY 5 MIN PRN
Status: DISCONTINUED | OUTPATIENT
Start: 2017-08-25 | End: 2017-08-26 | Stop reason: HOSPADM

## 2017-08-25 RX ADMIN — SODIUM CHLORIDE 3 ML/KG/HR: 0.9 INJECTION, SOLUTION INTRAVENOUS at 06:08

## 2017-08-25 RX ADMIN — INSULIN ASPART 16 UNITS: 100 INJECTION, SOLUTION INTRAVENOUS; SUBCUTANEOUS at 10:08

## 2017-08-25 RX ADMIN — DIPHENHYDRAMINE HYDROCHLORIDE 50 MG: 50 CAPSULE ORAL at 01:08

## 2017-08-25 RX ADMIN — SODIUM CHLORIDE 3 ML/KG/HR: 0.9 INJECTION, SOLUTION INTRAVENOUS at 01:08

## 2017-08-25 RX ADMIN — CARVEDILOL 3.12 MG: 3.12 TABLET, FILM COATED ORAL at 06:08

## 2017-08-25 NOTE — DISCHARGE SUMMARY
Discharge Summary  Interventional Cardiology      Admit Date: 8/25/2017    Discharge Date:  8/26/2017    Attending Physician: Marcus Blakely MD    Discharge Physician: Marcie Mei MD    Principal Diagnoses: High Grade Left Carotid Stenosis    Indication for Admission: STENT-CAROTID (Left)    Discharged Condition: Good    Hospital Course:     Mr. Shafer is a 72 year old male who was referred for outpatient catheterization for high grade asymptomatic left carotid stenosis in a patient who is at high risk for CEA. He underwent successful PTAS of left carotid artery with 10 X 24 Wallstent without any complication. He remained asymptomatic and hemodynamically stable on day of discharge without any acute events overnight. He remained neurologically unchanged post procedure.    Outpatient Plan:    - Bilateral carotid duplex studies within 1 week and then in 1, 3, 6, and 12 months  - Plavix 75mg po qday X 1 month  - EC ASA 81mg po qday  - Maximize medical secondary prevention of PAD   - Follow-up with Dr. Blakely in 2-3 week in clinic    Diet: Cardiac diet    Activity: Ad kaila, wound care instructions provided    Disposition: Home or Self Care    Discharge Medications:      Medication List      CHANGE how you take these medications    omeprazole 40 MG capsule  Commonly known as:  PRILOSEC  Take 1 capsule (40 mg total) by mouth 2 (two) times daily.  What changed:  · when to take this  · reasons to take this        CONTINUE taking these medications    aspirin 81 MG EC tablet  Commonly known as:  ECOTRIN     blood sugar diagnostic Strp  1 strip by Misc.(Non-Drug; Combo Route) route 2 (two) times daily with meals. TRUE METRIX     blood-glucose meter kit  Use as instructed          TRUE METRIX     carvedilol 3.125 MG tablet  Commonly known as:  COREG  Take 1 tablet (3.125 mg total) by mouth 2 (two) times daily with meals. Hold is SBP <120.     citalopram 10 MG tablet  Commonly known as:  CELEXA  Take 1 tablet (10 mg total)  by mouth once daily.     clopidogrel 75 mg tablet  Commonly known as:  PLAVIX  Take 1 tablet (75 mg total) by mouth once daily.     furosemide 20 MG tablet  Commonly known as:  LASIX  Take 1 tablet (20 mg total) by mouth once daily.     glucose 4 GM chewable tablet     insulin aspart 100 unit/mL Inpn pen  Commonly known as:  NovoLOG  Inject 16 Units into the skin 3 (three) times daily.     lancets Misc  1 lancet by Misc.(Non-Drug; Combo Route) route 2 (two) times daily with meals. TRUE METRIX     nitroGLYCERIN 0.4 MG SL tablet  Commonly known as:  NITROSTAT  Place 1 tablet (0.4 mg total) under the tongue every 5 (five) minutes as needed. Sublingual  ( under tongue) as needed     omega-3 acid ethyl esters 1 gram capsule  Commonly known as:  LOVAZA     rosuvastatin 40 MG Tab  Commonly known as:  CRESTOR  Take 1 tablet (40 mg total) by mouth once daily.          Follow Up:  Follow-up Information     Marcus Blakely MD. Schedule an appointment as soon as possible for a visit in 1 week.    Specialties:  Cardiology, INTERVENTIONAL CARDIOLOGY  Contact information:  [facility]  [facility] [address]  [phone]                     Marcie Mei M.D.  Interventional Cardiology Fellow  Pager: [phone]

## 2017-08-25 NOTE — INTERVAL H&P NOTE
The patient has been examined and the H&P has been reviewed:    I concur with the findings and no changes have occurred since H&P was written.    R CFA access - exchange J wire, 5 Fr Vargas catheter    Anesthesia/Surgery risks, benefits and alternative options discussed and understood by patient/family.          There are no hospital problems to display for this patient.

## 2017-08-25 NOTE — PROGRESS NOTES
"            Interventional Cardiology   Post Cath Note      Referring Physician: Marcus Blakely MD  Procedure: Left Internal Carotid Stent  Indication: High grade Carotid Stenosis    SUBJECTIVE:     Patient tolerated the procedure well, no complications    Intervention:     Access: R CFA 9F     Successful PTAS of left carotid artery with 10X24 Wallstent    Closure device used: Perclose    See full cath report for further details    Post Cath Exam:   /72 (BP Location: Left arm, Patient Position: Lying)   Pulse 73   Temp 97.8 °F (36.6 °C) (Oral)   Resp 20   Ht 5' 8" (1.727 m)   Wt 104.5 kg (230 lb 6.4 oz)   SpO2 (!) 94%   BMI 35.03 kg/m²     No unusual pain, hematoma, thrill or bruit at vascular access site.  Distal pulse present without signs of ischemia.    Vascular:   LEFT RIGHT   RADIAL     ULNAR     BRACHIAL     FEMORAL     DP  2+   TP  2+   MO'S TEST     BARBEAU TEST           Post- Carotid Stent Neuro Exam:     Alert and oriented x 3, conversant     Motor: 5/5 in RUE and 4/5 in LUE             5/5 in RLE and 4/5 in RLE     Sensory: sensation to touch and proprioception intact bilaterally     Cranial Nerves: L sided facial droop present.     Reflexes: Babinski -ve    Assessment / Plan:     72 year old male that was referred for catheterization for high grade asymptomatic left carotid stenosis in a patient who is at high risk for CEA.     - Routine post cath protocol  - IVFs for GEETA prevention  - EC ASA 81mg po qday  - Plavix 75mg poq day X 1 year  - Maximize medical secondary prevention of PAD  - Risk factor reduction  - Bilateral carotid duplex studies within 1 week and then in 1, 3, 6, and 12 months    Please page/call if any questions or concerns.    Marcie Mei M.D.  Interventional Cardiology Fellow  Pager: [phone]      "

## 2017-08-25 NOTE — PLAN OF CARE
Problem: Patient Care Overview  Goal: Plan of Care Review  Outcome: Ongoing (interventions implemented as appropriate)  Patient arrived to room. PIV placed, labs drawn. Admit assessment completed. Plan of care discussed with patient. Will monitor

## 2017-08-25 NOTE — H&P (VIEW-ONLY)
Subjective:    Patient ID:  Jorden Shafer is a 72 y.o. male who presents for evaluation of Carotid Artery Disease      HPI  Mr. Shafer is a 73 y/o gentleman with PMH CAD s/p CABG (1989), DVT, DM II, MI, defibrillator placement who was experienced a Right MCA stroke followed by an NSTEMI in April 2017 . He was found to have >90% stenosis of his R-ICA on CTA.  He underwent carotid angiography on 6/2/17 that demonstrate bilateral high grade carotid stenosis with severe calcification.  He was referred to Dr. Galindo, vascular surgery, for evaluation for CEA.  The patient was deemed to be to be at high risk for awake/ cervical block CEA and at prohibitive risk for CEA under GETA.  He underwent successful right carotid artery stent placement on 7/5/17. The patient denies any new neurologic symptoms except right sided headaches around 7pm daily. He denies any visual changes or new weakness.  He reports his left hand motor strength and fine movement is slightly improved. In the interim since his hospital discharge from right carotid stenting he reports angina 3-4 times a week resolved with NTG SL X .  He denies BOSE.  He denies LE edema, orthopnea, or PND.  He denies palpitations, syncope, or near syncope. The patient also reports right sided headaches around 7pm daily.   He reports good medication compliance and adherence to a heart healthy diet.  He does not have full range of motion or strength in in left hand or arm, but states he is doing his OT/PT prescribed exercises.    Past Medical History:   Diagnosis Date    Back pain     CAD (coronary artery disease) 1989    h/o cabg x 5    Carotid artery occlusion     Chronic kidney disease     Colon polyp     h/o on colonoscopy    Contact dermatitis 11/12/2013    CVA (cerebral vascular accident)     March 2010    Depression     Diabetes mellitus type II     on  insulin pump    DVT (deep venous thrombosis) 1980's    left leg    GERD (gastroesophageal reflux disease)      Heart failure     [facility]     Hyperlipidemia     Hypertension     Hypertensive heart disease without congestive heart failure     Insulin pump status 4/25/2017    Myocardial infarction     Neuropathy of lower extremity     Obesity (BMI 30-39.9) 4/25/2017    Prostate cancer     Renal manifestation of secondary diabetes mellitus     Sleep apnea     Stroke 4/21/2017    Type 2 diabetes mellitus with diabetic polyneuropathy, with long-term current use of insulin 4/25/2017    Type 2 diabetes mellitus with hyperglycemia, with long-term current use of insulin 9/26/2012    Currently sees Dr. Britton     Unstable angina     Vitamin D deficiency disease      Past Surgical History:   Procedure Laterality Date    CARDIAC DEFIBRILLATOR PLACEMENT  06/16/2016    CHOLECYSTECTOMY      CORONARY ARTERY BYPASS GRAFT      bypass times  5    defibulater  06/16/2016    EYE SURGERY Bilateral     HERNIA REPAIR      groin x2  and umbilical    JOINT REPLACEMENT      knee left    thrombus Left     >2005    VASECTOMY       Current Outpatient Prescriptions on File Prior to Visit   Medication Sig Dispense Refill    aspirin (ECOTRIN) 81 MG EC tablet Take 81 mg by mouth once daily.      blood sugar diagnostic Strp 1 strip by Misc.(Non-Drug; Combo Route) route 2 (two) times daily with meals. TRUE METRIX 100 strip 11    blood-glucose meter kit Use as instructed          TRUE METRIX 1 each 0    carvedilol (COREG) 3.125 MG tablet Take 1 tablet (3.125 mg total) by mouth 2 (two) times daily with meals. Hold is SBP <120. 60 tablet 11    citalopram (CELEXA) 10 MG tablet Take 1 tablet (10 mg total) by mouth once daily. 90 tablet 1    clopidogrel (PLAVIX) 75 mg tablet Take 1 tablet (75 mg total) by mouth once daily. 90 tablet 3    furosemide (LASIX) 20 MG tablet Take 1 tablet (20 mg total) by mouth once daily.      glucose 4 GM chewable tablet Take 16 g by mouth as needed for Low blood sugar.      insulin aspart (NOVOLOG) 100  unit/mL InPn pen Inject 16 Units into the skin 3 (three) times daily.  0    lancets Misc 1 lancet by Misc.(Non-Drug; Combo Route) route 2 (two) times daily with meals. TRUE METRIX 100 each 11    nitroGLYCERIN (NITROSTAT) 0.4 MG SL tablet Place 1 tablet (0.4 mg total) under the tongue every 5 (five) minutes as needed. Sublingual  ( under tongue) as needed 30 tablet prn    omega-3 acid ethyl esters (LOVAZA) 1 gram capsule Take 2 g by mouth 3 (three) times daily.        omeprazole (PRILOSEC) 40 MG capsule Take 1 capsule (40 mg total) by mouth 2 (two) times daily. (Patient taking differently: Take 40 mg by mouth 2 (two) times daily as needed. ) 180 capsule 3    rosuvastatin (CRESTOR) 40 MG Tab Take 1 tablet (40 mg total) by mouth once daily. 30 tablet 6     No current facility-administered medications on file prior to visit.      Review of patient's allergies indicates:  No Known Allergies  Social History   Substance Use Topics    Smoking status: Former Smoker     Packs/day: 4.00     Years: 25.00     Types: Cigarettes     Quit date: 1980    Smokeless tobacco: Never Used    Alcohol use No     Family History   Problem Relation Age of Onset    Alzheimer's disease Mother      Living    Heart disease Mother     Dementia Mother     Asbestos Father          Cancer Father     Diabetes type II Maternal Aunt     Stroke Neg Hx        Review of Systems   Constitution: Negative for decreased appetite, diaphoresis, fever, malaise/fatigue, weight gain and weight loss.   HENT: Negative for congestion, nosebleeds and sore throat.    Eyes: Negative for blurred vision, vision loss in left eye, vision loss in right eye and visual disturbance.   Cardiovascular: Positive for chest pain. Negative for claudication, dyspnea on exertion, leg swelling, near-syncope, orthopnea, palpitations, paroxysmal nocturnal dyspnea and syncope.   Respiratory: Negative for cough, hemoptysis, shortness of breath and wheezing.   "  Endocrine: Negative for polyuria.   Hematologic/Lymphatic: Does not bruise/bleed easily.   Skin: Negative for nail changes and rash.   Musculoskeletal: Positive for muscle weakness and neck pain. Negative for back pain, muscle cramps and myalgias.   Gastrointestinal: Negative for abdominal pain, change in bowel habit, diarrhea, heartburn, hematemesis, hematochezia, melena, nausea and vomiting.   Genitourinary: Negative for bladder incontinence, dysuria, frequency and hematuria.   Psychiatric/Behavioral: Negative for depression.   Allergic/Immunologic: Negative for hives.        Objective:  Vitals:    08/17/17 1355 08/17/17 1358   BP: (!) 140/75 129/66   BP Location: Right arm Left arm   Patient Position: Sitting Sitting   BP Method: Medium (Automatic) Medium (Automatic)   Pulse: 75    Weight: 105.9 kg (233 lb 7.5 oz)    Height: 5' 8" (1.727 m)          Physical Exam   Constitutional: He is oriented to person, place, and time. He appears well-developed and well-nourished.   HENT:   Head: Normocephalic and atraumatic.   Eyes: EOM are normal. Pupils are equal, round, and reactive to light.   Neck: Neck supple. No JVD present. No thyromegaly present.   Cardiovascular: Normal rate and regular rhythm.  PMI is displaced.  Exam reveals no gallop and no friction rub.    No murmur heard.  Pulses:       Carotid pulses are 2+ on the right side with bruit, and 2+ on the left side with bruit.       Radial pulses are 2+ on the right side, and 2+ on the left side.        Femoral pulses are 2+ on the right side, and 2+ on the left side.       Dorsalis pedis pulses are 2+ on the right side, and 2+ on the left side.        Posterior tibial pulses are 2+ on the right side, and 2+ on the left side.   Normal right radial Emmett's test.   Pulmonary/Chest: Effort normal. He has no wheezes. He has no rhonchi. He has no rales.   Abdominal: Soft. Normal appearance. He exhibits no distension. There is no hepatosplenomegaly. There is no " tenderness.   Neurological: He is alert and oriented to person, place, and time. Gait normal.   Psychiatric: He has a normal mood and affect.         Assessment:       1. Essential hypertension    2. Chronic combined systolic and diastolic heart failure    3. Type 2 diabetes mellitus with hyperglycemia, with long-term current use of insulin    4. History of CVA (cerebrovascular accident)    5. Gastroesophageal reflux disease without esophagitis    6. Severe obesity with body mass index (BMI) of 35.0 to 39.9 with comorbidity    7. Stenosis of left carotid artery         Plan:       1) Carotid stenosis.  The patient underwent successful right carotid stent placement for symptomatic right carotid stensosi.  He has high grade asymptomatic left carotid stenosis, but is at high risk for CEA due to cnotnued angina and a high risk stress test. I discussed proceeding for coronary angiography and possible PCI versus left NIMO with the patient.  He stated he would like to proceed with left NIMO as he  Feels his angina is not getting worse at this time.  -will plan on left NIMO with proximal protection  -continue DAPT  The risks, benefits, and alternatives of carotid artery angiography and stenting  were discussed with the patient.  All questions were answered and informed consent was obtained.     2) CAD.  The patient denies angina.  He has a stress test post NSTEMI demonstrating inferior ischemia.  I discussed cardiac cath and coronary revascularization with the patient.  As stated above he stated he wants to proceed with left carotid stenting first.  I reviewed the use of NTG SL with the patient.  If he experiences angina not relieved with NTG SL X3, he will call 911.  If his angina gets worse, he agreed to proceed with coronary angiography prior to possible CAD. Of note, his Imdur was stopped after right carotid stenting due to relative hypotension and his Coreg was decreased from 25mg po BID to 3.125mg po BID  -continue EC  ASA 81mg po qday  -continue Plavix 75mg po qday  -continue statin  -continue Coreg 3.125mg po BID    3) ICM. Patient appears euvolemic on exam and reports NYHA class 2 symptoms  -medical management as above  -rec low sodium, heart healthy det     4) Dyslipidemia. 5/18/17 lipid panel reviewed  -continue high intensity statin     5) DM2. 4/12/17 HgbA1c = 9.3; rec tight glycemic control     6) H/o GERD. Asymptomatic on PPI; continue PPI    7) KELVIN . The patient reports good compliance with his CPAP    8) HTN. Blood pressure elevated in clinic today; suspect headaches may be due to HTN at they occur at 7pm daily; patient was asked to check blood pressure at 7pm daily and keep log; if BP log demonstrated high BP, will titrate up BP medications; patient would benefit from ACE-I/ARB due to h/o MI and DM2 but also restarting Imdur for angina relief     All of the patient's questions were answered.

## 2017-08-25 NOTE — PROGRESS NOTES
PT ARRIVED TO UNIT FORM SSCU VIA STRETCHER, PER SSCU STAFF. VSS, NAD NOTED. NO COMPLAINTS AT THIS TIME. PT PLACED ON TELEMETRY. PT AAOX3. PT ASSESSED AT THIS TIME NO CHANGE IN ASSESSMENT FROM REPORT RECEIVED. PT NOTIFIED FAMILY OF TRANSFER. RIGHT GROIN SITE CDI, +2 PULSES TO RIGHT LOWER EXTREMITY. PT LYING IN BED ON BEDREST UNTIL 9PM PER CATH LAB RN, BED IN LOWEST LOCKED POSITION, SR UP X2, CALL SYSTEM IN REACH. WILL CONTINUE TO MONITOR PT.

## 2017-08-26 VITALS
SYSTOLIC BLOOD PRESSURE: 129 MMHG | DIASTOLIC BLOOD PRESSURE: 74 MMHG | OXYGEN SATURATION: 96 % | WEIGHT: 217.88 LBS | RESPIRATION RATE: 19 BRPM | HEIGHT: 68 IN | HEART RATE: 90 BPM | BODY MASS INDEX: 33.02 KG/M2 | TEMPERATURE: 97 F

## 2017-08-26 LAB
ANION GAP SERPL CALC-SCNC: 10 MMOL/L
BUN SERPL-MCNC: 16 MG/DL
CALCIUM SERPL-MCNC: 8.8 MG/DL
CHLORIDE SERPL-SCNC: 102 MMOL/L
CO2 SERPL-SCNC: 26 MMOL/L
CREAT SERPL-MCNC: 1 MG/DL
EST. GFR  (AFRICAN AMERICAN): >60 ML/MIN/1.73 M^2
EST. GFR  (NON AFRICAN AMERICAN): >60 ML/MIN/1.73 M^2
ESTIMATED AVG GLUCOSE: 151 MG/DL
GLUCOSE SERPL-MCNC: 173 MG/DL
HBA1C MFR BLD HPLC: 6.9 %
HCT VFR BLD AUTO: 42.4 %
HGB BLD-MCNC: 14.6 G/DL
POCT GLUCOSE: 192 MG/DL (ref 70–110)
POTASSIUM SERPL-SCNC: 3.9 MMOL/L
SODIUM SERPL-SCNC: 138 MMOL/L

## 2017-08-26 PROCEDURE — 85014 HEMATOCRIT: CPT

## 2017-08-26 PROCEDURE — 80048 BASIC METABOLIC PNL TOTAL CA: CPT

## 2017-08-26 PROCEDURE — 85018 HEMOGLOBIN: CPT

## 2017-08-26 PROCEDURE — 25000003 PHARM REV CODE 250: Performed by: INTERNAL MEDICINE

## 2017-08-26 PROCEDURE — 83036 HEMOGLOBIN GLYCOSYLATED A1C: CPT

## 2017-08-26 PROCEDURE — 36415 COLL VENOUS BLD VENIPUNCTURE: CPT

## 2017-08-26 RX ADMIN — CITALOPRAM HYDROBROMIDE 10 MG: 10 TABLET ORAL at 08:08

## 2017-08-26 RX ADMIN — FUROSEMIDE 20 MG: 20 TABLET ORAL at 08:08

## 2017-08-26 RX ADMIN — CLOPIDOGREL 75 MG: 75 TABLET, FILM COATED ORAL at 08:08

## 2017-08-26 RX ADMIN — CARVEDILOL 3.12 MG: 3.12 TABLET, FILM COATED ORAL at 08:08

## 2017-08-26 RX ADMIN — ROSUVASTATIN CALCIUM 40 MG: 20 TABLET, FILM COATED ORAL at 08:08

## 2017-08-26 RX ADMIN — PANTOPRAZOLE SODIUM 40 MG: 40 TABLET, DELAYED RELEASE ORAL at 08:08

## 2017-08-26 RX ADMIN — ASPIRIN 81 MG: 81 TABLET, COATED ORAL at 08:08

## 2017-08-26 NOTE — PROGRESS NOTES
Pt in bed A called RN to room for pt. Pt found to be eating and coughing. Notified ADEEL PRAKASH on call. MD at bedside now. Will continue to monitor.

## 2017-08-26 NOTE — PROGRESS NOTES
Cardiology Progress Note    Subjective:     No CP or SOB  No focal neuro symptoms    Meds:     Scheduled Meds:   aspirin  81 mg Oral Daily    carvedilol  3.125 mg Oral BID WM    citalopram  10 mg Oral Daily    clopidogrel  75 mg Oral Daily    furosemide  20 mg Oral Daily    insulin aspart  16 Units Subcutaneous TID    pantoprazole  40 mg Oral Daily    rosuvastatin  40 mg Oral Daily     PRN Meds:dextrose 50%, dextrose 50%, glucagon (human recombinant), glucose, glucose, insulin aspart, nitroGLYCERIN  Continuous Infusions:     Physical Exam:     Vitals:  Temp:  [96.6 °F (35.9 °C)-98.3 °F (36.8 °C)]   Pulse:  [61-86]   Resp:  [8-22]   BP: ()/(53-75)   SpO2:  [91 %-96 %]   I/O's:    Intake/Output Summary (Last 24 hours) at 08/26/17 0931  Last data filed at 08/26/17 0600   Gross per 24 hour   Intake              480 ml   Output              850 ml   Net             -370 ml        Constitutional: Lying in bed in NAD  HEENT:  Sclera anicteric, PERRLA, EOI  Neck:   No JVD, no carotid bruits  CV:   RRR, no murmurs / rubs / gallops  Pulm:   CTAB, no wheezes, rales, or ronchi  GI:   Abdomen soft, NT, ND, BS+  Extremities:  No LE edema, warm with palpable pulses, access site without hematoma or bruit  Skin:   No rashes or lesions  Neuro:   AAOX3, cranial nerves symmetric and normal. DTR 2+ b/l, sensory exam intact, babinsky downgoing    Labs:     Recent Results (from the past 336 hour(s))   CBC auto differential    Collection Time: 08/25/17 12:53 PM   Result Value Ref Range    WBC 8.81 3.90 - 12.70 K/uL    Hemoglobin 15.0 14.0 - 18.0 g/dL    Hematocrit 42.6 40.0 - 54.0 %    Platelets 168 150 - 350 K/uL       Recent Results (from the past 336 hour(s))   Basic metabolic panel    Collection Time: 08/26/17  6:42 AM   Result Value Ref Range    Sodium 138 136 - 145 mmol/L    Potassium 3.9 3.5 - 5.1 mmol/L    Chloride 102 95 - 110 mmol/L    CO2 26 23 - 29 mmol/L    BUN, Bld 16 8 - 23 mg/dL    Creatinine 1.0 0.5 - 1.4  mg/dL    Calcium 8.8 8.7 - 10.5 mg/dL    Anion Gap 10 8 - 16 mmol/L   Basic metabolic panel    Collection Time: 08/25/17 12:53 PM   Result Value Ref Range    Sodium 138 136 - 145 mmol/L    Potassium 3.8 3.5 - 5.1 mmol/L    Chloride 99 95 - 110 mmol/L    CO2 27 23 - 29 mmol/L    BUN, Bld 17 8 - 23 mg/dL    Creatinine 1.1 0.5 - 1.4 mg/dL    Calcium 9.3 8.7 - 10.5 mg/dL    Anion Gap 12 8 - 16 mmol/L   Basic metabolic panel    Collection Time: 08/21/17 11:35 AM   Result Value Ref Range    Sodium 137 136 - 145 mmol/L    Potassium 4.2 3.5 - 5.1 mmol/L    Chloride 99 95 - 110 mmol/L    CO2 30 (H) 23 - 29 mmol/L    BUN, Bld 14 8 - 23 mg/dL    Creatinine 1.0 0.5 - 1.4 mg/dL    Calcium 9.0 8.7 - 10.5 mg/dL    Anion Gap 8 8 - 16 mmol/L       No results for input(s): TROPONINI, CPKMB, CPK, MB in the last 168 hours.    Estimated Creatinine Clearance: 75 mL/min (based on Cr of 1).      Assessment & Plan:     72 year old male that was referred for catheterization for high grade asymptomatic left carotid stenosis in a patient who is at high risk for CEA.      - EC ASA 81mg po qday  - Plavix 75mg poq day X 1 year  - Maximize medical secondary prevention of PAD  - Risk factor reduction  - Bilateral carotid duplex studies within 1 week and then in 1, 3, 6, and 12 months      Signed:  Astrid Norwood MD,   Interventional Cardiology Fellow  Pager: [phone]  8/26/2017 9:31 AM

## 2017-08-26 NOTE — PLAN OF CARE
Problem: Patient Care Overview  Goal: Plan of Care Review  Outcome: Ongoing (interventions implemented as appropriate)  Plan of care discussed with pt. S/p stent placement- R femoral site remained CDI-no bleeding or hematoma. Bilateral pulses 2+, neurologically intact. Frequent vitals stable. Scheduled insulin given. Pt denies CP, SOB, or pain/discomfort at this time.Pt free of injuries this shift.  All questions addressed.  Will continue to monitor.

## 2017-08-28 DIAGNOSIS — I65.22 STENOSIS OF LEFT CAROTID ARTERY: Primary | Chronic | ICD-10-CM

## 2017-08-28 DIAGNOSIS — I65.22 STENOSIS OF LEFT CAROTID ARTERY: ICD-10-CM

## 2017-08-28 LAB
POC ACTIVATED CLOTTING TIME K: 213 SEC (ref 74–137)
POC ACTIVATED CLOTTING TIME K: 224 SEC (ref 74–137)
POC ACTIVATED CLOTTING TIME K: 257 SEC (ref 74–137)
SAMPLE: ABNORMAL

## 2017-09-01 ENCOUNTER — CLINICAL SUPPORT (OUTPATIENT)
Dept: CARDIOLOGY | Facility: CLINIC | Age: 72
End: 2017-09-01
Payer: MEDICARE

## 2017-09-01 DIAGNOSIS — I65.22 STENOSIS OF LEFT CAROTID ARTERY: ICD-10-CM

## 2017-09-01 LAB — INTERNAL CAROTID STENOSIS: NORMAL

## 2017-09-01 PROCEDURE — 93880 EXTRACRANIAL BILAT STUDY: CPT | Mod: S$GLB,,, | Performed by: INTERNAL MEDICINE

## 2017-09-05 ENCOUNTER — TELEPHONE (OUTPATIENT)
Dept: CARDIOLOGY | Facility: CLINIC | Age: 72
End: 2017-09-05

## 2017-09-18 LAB
PERIPHERAL STENOSIS: ABNORMAL
PERIPHERAL STENT: YES

## 2017-09-28 ENCOUNTER — CLINICAL SUPPORT (OUTPATIENT)
Dept: CARDIOLOGY | Facility: CLINIC | Age: 72
End: 2017-09-28
Payer: MEDICARE

## 2017-09-28 ENCOUNTER — OFFICE VISIT (OUTPATIENT)
Dept: CARDIOLOGY | Facility: CLINIC | Age: 72
End: 2017-09-28
Payer: MEDICARE

## 2017-09-28 ENCOUNTER — DOCUMENTATION ONLY (OUTPATIENT)
Dept: CARDIOLOGY | Facility: CLINIC | Age: 72
End: 2017-09-28

## 2017-09-28 VITALS
HEART RATE: 68 BPM | WEIGHT: 223.13 LBS | SYSTOLIC BLOOD PRESSURE: 133 MMHG | DIASTOLIC BLOOD PRESSURE: 69 MMHG | HEIGHT: 68 IN | BODY MASS INDEX: 33.82 KG/M2

## 2017-09-28 DIAGNOSIS — I25.10 CORONARY ARTERY DISEASE, ANGINA PRESENCE UNSPECIFIED, UNSPECIFIED VESSEL OR LESION TYPE, UNSPECIFIED WHETHER NATIVE OR TRANSPLANTED HEART: Primary | ICD-10-CM

## 2017-09-28 DIAGNOSIS — Z95.810 ICD (IMPLANTABLE CARDIOVERTER-DEFIBRILLATOR) IN PLACE: ICD-10-CM

## 2017-09-28 DIAGNOSIS — I10 ESSENTIAL HYPERTENSION: Chronic | ICD-10-CM

## 2017-09-28 DIAGNOSIS — I50.42 CHRONIC COMBINED SYSTOLIC AND DIASTOLIC HEART FAILURE: Chronic | ICD-10-CM

## 2017-09-28 DIAGNOSIS — I65.23 BILATERAL CAROTID ARTERY STENOSIS: ICD-10-CM

## 2017-09-28 DIAGNOSIS — E08.8 DIABETES MELLITUS DUE TO UNDERLYING CONDITION WITH COMPLICATION, WITHOUT LONG-TERM CURRENT USE OF INSULIN: ICD-10-CM

## 2017-09-28 DIAGNOSIS — R94.39 ABNORMAL CARDIOVASCULAR STRESS TEST: Primary | ICD-10-CM

## 2017-09-28 DIAGNOSIS — S49.92XA INJURY OF LEFT SHOULDER, INITIAL ENCOUNTER: ICD-10-CM

## 2017-09-28 DIAGNOSIS — I65.22 STENOSIS OF LEFT CAROTID ARTERY: ICD-10-CM

## 2017-09-28 DIAGNOSIS — I20.9 ANGINA, CLASS III: ICD-10-CM

## 2017-09-28 DIAGNOSIS — G47.33 OSA ON CPAP: Chronic | ICD-10-CM

## 2017-09-28 DIAGNOSIS — E66.01 SEVERE OBESITY WITH BODY MASS INDEX (BMI) OF 35.0 TO 39.9 WITH COMORBIDITY: ICD-10-CM

## 2017-09-28 DIAGNOSIS — E78.2 MIXED HYPERLIPIDEMIA: Chronic | ICD-10-CM

## 2017-09-28 DIAGNOSIS — Z86.73 HISTORY OF CVA (CEREBROVASCULAR ACCIDENT): Chronic | ICD-10-CM

## 2017-09-28 PROBLEM — I63.239 CAROTID STENOSIS, SYMPTOMATIC, WITH INFARCTION: Status: RESOLVED | Noted: 2017-06-05 | Resolved: 2017-09-28

## 2017-09-28 LAB — INTERNAL CAROTID STENOSIS: ABNORMAL

## 2017-09-28 PROCEDURE — 99999 PR PBB SHADOW E&M-EST. PATIENT-LVL III: CPT | Mod: PBBFAC,,, | Performed by: INTERNAL MEDICINE

## 2017-09-28 PROCEDURE — 1159F MED LIST DOCD IN RCRD: CPT | Mod: S$GLB,,, | Performed by: INTERNAL MEDICINE

## 2017-09-28 PROCEDURE — 3078F DIAST BP <80 MM HG: CPT | Mod: S$GLB,,, | Performed by: INTERNAL MEDICINE

## 2017-09-28 PROCEDURE — 93880 EXTRACRANIAL BILAT STUDY: CPT | Mod: S$GLB,,, | Performed by: INTERNAL MEDICINE

## 2017-09-28 PROCEDURE — 3075F SYST BP GE 130 - 139MM HG: CPT | Mod: S$GLB,,, | Performed by: INTERNAL MEDICINE

## 2017-09-28 PROCEDURE — 1125F AMNT PAIN NOTED PAIN PRSNT: CPT | Mod: S$GLB,,, | Performed by: INTERNAL MEDICINE

## 2017-09-28 PROCEDURE — 99499 UNLISTED E&M SERVICE: CPT | Mod: S$GLB,,, | Performed by: INTERNAL MEDICINE

## 2017-09-28 PROCEDURE — 3008F BODY MASS INDEX DOCD: CPT | Mod: S$GLB,,, | Performed by: INTERNAL MEDICINE

## 2017-09-28 PROCEDURE — 99024 POSTOP FOLLOW-UP VISIT: CPT | Mod: S$GLB,,, | Performed by: INTERNAL MEDICINE

## 2017-09-28 RX ORDER — TAMSULOSIN HYDROCHLORIDE 0.4 MG/1
0.4 CAPSULE ORAL DAILY
COMMUNITY

## 2017-09-28 RX ORDER — DIPHENHYDRAMINE HCL 25 MG
50 CAPSULE ORAL ONCE
Status: CANCELLED | OUTPATIENT
Start: 2017-09-28 | End: 2017-09-28

## 2017-09-28 RX ORDER — SODIUM CHLORIDE 9 MG/ML
3 INJECTION, SOLUTION INTRAVENOUS CONTINUOUS
Status: CANCELLED | OUTPATIENT
Start: 2017-09-28 | End: 2017-09-28

## 2017-09-28 RX ORDER — GABAPENTIN 300 MG/1
300 CAPSULE ORAL 3 TIMES DAILY
COMMUNITY

## 2017-09-28 NOTE — PROGRESS NOTES
OUTPATIENT CATHETERIZATION INSTRUCTIONS    You have been scheduled for a procedure in the catheterization lab on Friday, October 13, 2017.     Please report to the Cardiology Waiting Area on the Third floor of the hospital and check in at 7:30 AM.   You will then be taken to the SSCU (Short Stay Cardiac Unit) and prepared for your procedure. Please be aware that this is not the time of your procedure but the time you are to arrive. The procedures are scheduled on an hourly basis; however, emergency cases take precedence over all other cases.       You may not have anything to eat or drink after midnight the night before your test. You may take your regular morning medications with water. If there are any medications that you should not take you will be instructed to hold them that morning. If you are diabetic and on Metformin (Glucophage) do not take it the day before, the day of, and for 2 days after your procedure.      The procedure will take 1-2 hours to perform. After the procedure, you will return to SSCU on the third floor of the hospital. You will need to lie still (or keep your arm still) for the next 4 to 6 hours to minimize bleeding from the puncture site. Your family may remain in the room with you during this time.       You may be able to be discharged home that same afternoon if there is someone to drive you home and there were no complications. If you have one of the balloon, stent, or device procedures you may spend the night in the hospital. Your doctor will determine, based on your progress, the date and time of your discharge. The results of your procedure will be discussed with you before you are discharged. Any further testing or procedures will be scheduled for you either before you leave or you will be called with these appointments.       If you should have any questions, concerns, or need to change the date of your procedure, please call  DIANA Ramirez @ [phone]    Special  Instructions:  Take 1/2 of your regularly scheduled insulin dose the night before your procedure.        THE ABOVE INSTRUCTIONS WERE GIVEN TO THE PATIENT VERBALLY AND THEY VERBALIZED UNDERSTANDING.  THEY DO NOT REQUIRE ANY SPECIAL NEEDS AND DO NOT HAVE ANY LEARNING BARRIERS.          Directions for Reporting to Cardiology Waiting Area in the Hospital  If you park in the Parking Garage:  Take elevators to the1st floor of the parking garage.  Continue past the gift shop, coffee shop, and piano.  Take a right and go to the gold elevators. (Elevator B)  Take the elevator to the 3rd floor.  Follow the arrow on the sign on the wall that says Cath Lab Registration/EP Lab Registration.  Follow the long hallway all the way around until you come to a big open area.  This is the registration area.  Check in at Reception Desk.    OR    If family is dropping you off:  Have them drop you off at the front of the Hospital under the green overhang.  Enter through the doors and take a right.  Take the E elevators to the 3rd floor Cardiology Waiting Area.  Check in at the Reception Desk in the waiting room.

## 2017-09-29 NOTE — PROGRESS NOTES
Subjective:    Patient ID:  Jorden Shafer is a 72 y.o. male who presents for follow-up of Carotid Artery Disease and Coronary Artery Disease      HPI  Mr. Shafer is a 73 y/o gentleman with PMH CAD s/p CABG (1989), DVT, DM II, MI, defibrillator placement who was experienced a Right MCA stroke followed by an NSTEMI in April 2017 . He was found to have >90% stenosis of his R-ICA on CTA.  He underwent carotid angiography on 6/2/17 that demonstrate bilateral high grade carotid stenosis with severe calcification.  He was referred to Dr. Galindo, vascular surgery, for evaluation for CEA.  The patient was deemed to be to be at high risk for awake/ cervical block CEA and at prohibitive risk for CEA under GETA.  He underwent successful right carotid artery stent placement on 7/5/17 and left carotid stent placement on 8/25/17. In the interim since then the patient denies any new neurologic symptoms. He denies any visual changes or new weakness.  He reports angina 3-4 times a week resolved with NTG SL.  His angina is described as left sided chest pain under his ribs.  It is brouhgt on by walking 2 blocks and relieved with rest. He denies BOSE.  He reports dependent LE edema. He denies, orthopnea but reports PND every 2-3 days.  He reports good compliance with his CPAP. He denies palpitations, syncope, or near syncope. The patient also reports right sided headaches around 7pm daily.   He reports good medication compliance and adherence to a heart healthy diet.  He does not have full range of motion or strength in in left hand or arm, but states he is doing his OT/PT prescribed exercises. On Sept 1st the patient reported he fell whil ein the shower sustaining a left shoulder injury which has limited his ability to extend his left arm over his head.  He denies any other injury.    Past Medical History:   Diagnosis Date    Back pain     CAD (coronary artery disease) 1989    h/o cabg x 5    Carotid artery occlusion     Chronic  kidney disease     Colon polyp     h/o on colonoscopy    Contact dermatitis 11/12/2013    CVA (cerebral vascular accident)     March 2010    Depression     Diabetes mellitus type II     on  insulin pump    DVT (deep venous thrombosis) 1980's    left leg    GERD (gastroesophageal reflux disease)     Heart failure     [facility]     Hyperlipidemia     Hypertension     Hypertensive heart disease without congestive heart failure     Insulin pump status 4/25/2017    Myocardial infarction     Neuropathy of lower extremity     Obesity (BMI 30-39.9) 4/25/2017    Prostate cancer     Renal manifestation of secondary diabetes mellitus     Sleep apnea     Stroke 4/21/2017    Type 2 diabetes mellitus with diabetic polyneuropathy, with long-term current use of insulin 4/25/2017    Type 2 diabetes mellitus with hyperglycemia, with long-term current use of insulin 9/26/2012    Currently sees Dr. Britton     Unstable angina     Vitamin D deficiency disease      Past Surgical History:   Procedure Laterality Date    CARDIAC DEFIBRILLATOR PLACEMENT  06/16/2016    CHOLECYSTECTOMY      CORONARY ARTERY BYPASS GRAFT      bypass times  5    defibulater  06/16/2016    EYE SURGERY Bilateral     HERNIA REPAIR      groin x2  and umbilical    JOINT REPLACEMENT      knee left    thrombus Left     >2005    VASECTOMY       Current Outpatient Prescriptions on File Prior to Visit   Medication Sig Dispense Refill    aspirin (ECOTRIN) 81 MG EC tablet Take 81 mg by mouth once daily.      blood sugar diagnostic Strp 1 strip by Misc.(Non-Drug; Combo Route) route 2 (two) times daily with meals. TRUE METRIX 100 strip 11    blood-glucose meter kit Use as instructed          TRUE METRIX 1 each 0    carvedilol (COREG) 3.125 MG tablet Take 1 tablet (3.125 mg total) by mouth 2 (two) times daily with meals. Hold is SBP <120. (Patient taking differently: Take 6.25 mg by mouth 2 (two) times daily with meals. Hold is SBP <120. 1/2  Tab   2x  daily) 60 tablet 11    citalopram (CELEXA) 10 MG tablet Take 1 tablet (10 mg total) by mouth once daily. 90 tablet 1    clopidogrel (PLAVIX) 75 mg tablet Take 1 tablet (75 mg total) by mouth once daily. 90 tablet 3    furosemide (LASIX) 20 MG tablet Take 1 tablet (20 mg total) by mouth once daily.      glucose 4 GM chewable tablet Take 16 g by mouth as needed for Low blood sugar.      insulin aspart (NOVOLOG) 100 unit/mL InPn pen Inject 16 Units into the skin 3 (three) times daily.  0    lancets Misc 1 lancet by Misc.(Non-Drug; Combo Route) route 2 (two) times daily with meals. TRUE METRIX 100 each 11    nitroGLYCERIN (NITROSTAT) 0.4 MG SL tablet Place 1 tablet (0.4 mg total) under the tongue every 5 (five) minutes as needed. Sublingual  ( under tongue) as needed 30 tablet prn    omega-3 acid ethyl esters (LOVAZA) 1 gram capsule Take 2 g by mouth 3 (three) times daily.        omeprazole (PRILOSEC) 40 MG capsule Take 1 capsule (40 mg total) by mouth 2 (two) times daily. (Patient taking differently: Take 40 mg by mouth 2 (two) times daily as needed. ) 180 capsule 3    rosuvastatin (CRESTOR) 40 MG Tab Take 1 tablet (40 mg total) by mouth once daily. 30 tablet 6     No current facility-administered medications on file prior to visit.      Review of patient's allergies indicates:  No Known Allergies  Social History   Substance Use Topics    Smoking status: Former Smoker     Packs/day: 4.00     Years: 25.00     Types: Cigarettes     Quit date: 1980    Smokeless tobacco: Never Used    Alcohol use No     Family History   Problem Relation Age of Onset    Alzheimer's disease Mother      Living    Heart disease Mother     Dementia Mother     Asbestos Father          Cancer Father     Diabetes type II Maternal Aunt     Stroke Neg Hx           Review of Systems   Constitution: Negative for decreased appetite, diaphoresis, fever, malaise/fatigue, weight gain and weight loss.   HENT: Negative  "for congestion, nosebleeds and sore throat.    Eyes: Negative for blurred vision, vision loss in left eye, vision loss in right eye and visual disturbance.   Cardiovascular: Positive for chest pain, leg swelling and paroxysmal nocturnal dyspnea. Negative for claudication, dyspnea on exertion, near-syncope, orthopnea, palpitations and syncope.   Respiratory: Negative for cough, hemoptysis, shortness of breath and wheezing.    Endocrine: Negative for polyuria.   Hematologic/Lymphatic: Does not bruise/bleed easily.   Skin: Negative for nail changes and rash.   Musculoskeletal: Negative for back pain, muscle cramps and myalgias.   Gastrointestinal: Negative for abdominal pain, change in bowel habit, diarrhea, heartburn, hematemesis, hematochezia, melena, nausea and vomiting.   Genitourinary: Negative for bladder incontinence, dysuria, frequency and hematuria.   Psychiatric/Behavioral: Negative for depression.   Allergic/Immunologic: Negative for hives.        Objective:  Vitals:    09/28/17 1027 09/28/17 1043   BP: 118/66 133/69   BP Location: Right arm Left arm   Patient Position: Sitting Sitting   BP Method: Medium (Automatic) Medium (Automatic)   Pulse: 68    Weight: 101.2 kg (223 lb 1.7 oz)    Height: 5' 8" (1.727 m)          Physical Exam   Constitutional: He is oriented to person, place, and time. He appears well-developed and well-nourished.   HENT:   Head: Normocephalic and atraumatic.   Eyes: EOM are normal. Pupils are equal, round, and reactive to light.   Neck: Neck supple. No JVD present. No thyromegaly present.   Cardiovascular: Normal rate and regular rhythm.  PMI is displaced.  Exam reveals no gallop and no friction rub.    No murmur heard.  Pulses:       Carotid pulses are 2+ on the right side, and 2+ on the left side.       Radial pulses are 2+ on the right side, and 2+ on the left side.        Femoral pulses are 2+ on the right side, and 2+ on the left side.       Dorsalis pedis pulses are 1+ on the " right side, and 1+ on the left side.        Posterior tibial pulses are 1+ on the right side, and 1+ on the left side.   Pulmonary/Chest: Effort normal. He has no wheezes. He has no rhonchi. He has no rales.   Abdominal: Soft. Normal appearance. He exhibits no distension. There is no hepatosplenomegaly. There is no tenderness.   Neurological: He is alert and oriented to person, place, and time. Gait normal.   Psychiatric: He has a normal mood and affect.         Assessment:       1. Abnormal cardiovascular stress test    2. Injury of left shoulder, initial encounter    3. Angina, class III    4. Essential hypertension    5. Chronic combined systolic and diastolic heart failure    6. Mixed hyperlipidemia    7. Diabetes mellitus due to underlying condition with complication, without long-term current use of insulin    8. Bilateral carotid artery stenosis    9. History of CVA (cerebrovascular accident)    10. Severe obesity with body mass index (BMI) of 35.0 to 39.9 with comorbidity    11. ICD (implantable cardioverter-defibrillator) in place    12. KELVIN on CPAP         Plan:       1) CAD.  The patient reports CCS3 angina.  He had a SPECT stress test on 4/27/17 after his NSTEMI that demonstrated evidence for a large sized area of moderate to severe myocardial ischemia that extends from the base to the distal inferior wall and the distal inferoseptal wall. He has a known h/o CAD and is s/p CABG X 5.  His most recent coronary angiogram was on 6/30/16. At that time he was found to have a patent to a LIMA to LAD (but LAD was occluded distal to LIMA anastomosis), patent SVG to D3 with occluded jump graft to the distal LAD, patent SVG to OM, and an occluded SVG to the RCA.  -continue EC ASA 81mg po qday  -continue Plavix 75mg po qday  -continue statin  -continue COreg 3.125mg po BID  6Fr right CFA access  The risks, benefits, and alternatives of cardiac catheterization and possible intervention were discussed with the  patient.  All questions were answered and informed consent was obtained.    2) s/p Bilateral carotid stenosis. Patient denies new neurologic symptoms. 9/28/17 carotid duplex reviewed  -continue DAPT  -continue statin    3) ICM. Patient appears euvolemic on exam and reports NYHA class 2 symptoms  -medical management as above  -rec low sodium, heart healthy det     4) Dyslipidemia. 5/18/17 lipid panel reviewed  -continue high intensity statin     5) DM2. 4/12/17 HgbA1c = 9.3; rec tight glycemic control     6) H/o GERD. Asymptomatic on PPI; continue PPI     7) KELVIN . The patient reports good compliance with his CPAP; patient was asked to bring his CPAP machine to use during his cardiac cath procedure     8) HTN. Blood pressure adequately controlled in clinic today; continue  current medications    9) left shoulder pain with decreased ROM.  The patient reports he had an initial work-up at [facility].  He states he was not satisfied with the care he received there.[facility]    All of the patient's and his wife's questions were answered.

## 2017-10-02 ENCOUNTER — DOCUMENTATION ONLY (OUTPATIENT)
Dept: CARDIOLOGY | Facility: CLINIC | Age: 72
End: 2017-10-02

## 2017-10-02 DIAGNOSIS — N28.9 RENAL INSUFFICIENCY: Primary | ICD-10-CM

## 2017-10-02 NOTE — PROGRESS NOTES
Called patient with recent Cr 1.4. Spoke with wife to increase fluid intake over next 2 days. Will repeat BMP Wednesday 10/4/17. All questions answered.

## 2017-10-04 ENCOUNTER — LAB VISIT (OUTPATIENT)
Dept: LAB | Facility: HOSPITAL | Age: 72
End: 2017-10-04
Attending: INTERNAL MEDICINE
Payer: MEDICARE

## 2017-10-04 DIAGNOSIS — N28.9 RENAL INSUFFICIENCY: ICD-10-CM

## 2017-10-04 LAB
ANION GAP SERPL CALC-SCNC: 13 MMOL/L
BUN SERPL-MCNC: 14 MG/DL
CALCIUM SERPL-MCNC: 8.4 MG/DL
CHLORIDE SERPL-SCNC: 101 MMOL/L
CO2 SERPL-SCNC: 25 MMOL/L
CREAT SERPL-MCNC: 1.1 MG/DL
EST. GFR  (AFRICAN AMERICAN): >60 ML/MIN/1.73 M^2
EST. GFR  (NON AFRICAN AMERICAN): >60 ML/MIN/1.73 M^2
GLUCOSE SERPL-MCNC: 344 MG/DL
POTASSIUM SERPL-SCNC: 3.7 MMOL/L
SODIUM SERPL-SCNC: 139 MMOL/L

## 2017-10-04 PROCEDURE — 80048 BASIC METABOLIC PNL TOTAL CA: CPT

## 2017-10-04 PROCEDURE — 36415 COLL VENOUS BLD VENIPUNCTURE: CPT

## 2017-10-05 ENCOUNTER — DOCUMENTATION ONLY (OUTPATIENT)
Dept: CARDIOLOGY | Facility: CLINIC | Age: 72
End: 2017-10-05

## 2017-10-13 ENCOUNTER — HOSPITAL ENCOUNTER (OUTPATIENT)
Facility: HOSPITAL | Age: 72
Discharge: HOME | End: 2017-10-14
Attending: INTERNAL MEDICINE | Admitting: INTERNAL MEDICINE
Payer: MEDICARE

## 2017-10-13 DIAGNOSIS — Z95.810 ICD (IMPLANTABLE CARDIOVERTER-DEFIBRILLATOR) IN PLACE: ICD-10-CM

## 2017-10-13 DIAGNOSIS — I20.9 ANGINA, CLASS III: ICD-10-CM

## 2017-10-13 DIAGNOSIS — R94.39 ABNORMAL CARDIOVASCULAR STRESS TEST: ICD-10-CM

## 2017-10-13 DIAGNOSIS — C61 PROSTATE CANCER: ICD-10-CM

## 2017-10-13 DIAGNOSIS — Z98.61 POSTSURGICAL PERCUTANEOUS TRANSLUMINAL CORONARY ANGIOPLASTY STATUS: Primary | ICD-10-CM

## 2017-10-13 DIAGNOSIS — S49.92XA INJURY OF LEFT SHOULDER, INITIAL ENCOUNTER: ICD-10-CM

## 2017-10-13 DIAGNOSIS — I21.4 NSTEMI (NON-ST ELEVATED MYOCARDIAL INFARCTION): ICD-10-CM

## 2017-10-13 DIAGNOSIS — I25.10 CORONARY ARTERY DISEASE, ANGINA PRESENCE UNSPECIFIED, UNSPECIFIED VESSEL OR LESION TYPE, UNSPECIFIED WHETHER NATIVE OR TRANSPLANTED HEART: ICD-10-CM

## 2017-10-13 DIAGNOSIS — E11.65 TYPE 2 DIABETES MELLITUS WITH HYPERGLYCEMIA, WITH LONG-TERM CURRENT USE OF INSULIN: Primary | Chronic | ICD-10-CM

## 2017-10-13 DIAGNOSIS — Z79.4 TYPE 2 DIABETES MELLITUS WITH HYPERGLYCEMIA, WITH LONG-TERM CURRENT USE OF INSULIN: Primary | Chronic | ICD-10-CM

## 2017-10-13 LAB
ABO + RH BLD: NORMAL
ANION GAP SERPL CALC-SCNC: 14 MMOL/L
BASOPHILS # BLD AUTO: 0.04 K/UL
BASOPHILS NFR BLD: 0.5 %
BLD GP AB SCN CELLS X3 SERPL QL: NORMAL
BUN SERPL-MCNC: 13 MG/DL
CALCIUM SERPL-MCNC: 9.1 MG/DL
CHLORIDE SERPL-SCNC: 101 MMOL/L
CO2 SERPL-SCNC: 24 MMOL/L
CREAT SERPL-MCNC: 0.9 MG/DL
DIFFERENTIAL METHOD: ABNORMAL
EOSINOPHIL # BLD AUTO: 0.1 K/UL
EOSINOPHIL NFR BLD: 1.2 %
ERYTHROCYTE [DISTWIDTH] IN BLOOD BY AUTOMATED COUNT: 13.7 %
EST. GFR  (AFRICAN AMERICAN): >60 ML/MIN/1.73 M^2
EST. GFR  (NON AFRICAN AMERICAN): >60 ML/MIN/1.73 M^2
GLUCOSE SERPL-MCNC: 204 MG/DL
HCT VFR BLD AUTO: 44.5 %
HGB BLD-MCNC: 15.6 G/DL
LYMPHOCYTES # BLD AUTO: 2 K/UL
LYMPHOCYTES NFR BLD: 23.3 %
MCH RBC QN AUTO: 28.5 PG
MCHC RBC AUTO-ENTMCNC: 35.1 G/DL
MCV RBC AUTO: 81 FL
MONOCYTES # BLD AUTO: 0.5 K/UL
MONOCYTES NFR BLD: 5.3 %
NEUTROPHILS # BLD AUTO: 5.9 K/UL
NEUTROPHILS NFR BLD: 69.1 %
PLATELET # BLD AUTO: 155 K/UL
PMV BLD AUTO: 10.7 FL
POCT GLUCOSE: 146 MG/DL (ref 70–110)
POCT GLUCOSE: 163 MG/DL (ref 70–110)
POCT GLUCOSE: 169 MG/DL (ref 70–110)
POCT GLUCOSE: 213 MG/DL (ref 70–110)
POTASSIUM SERPL-SCNC: 3.7 MMOL/L
RBC # BLD AUTO: 5.48 M/UL
SODIUM SERPL-SCNC: 139 MMOL/L
WBC # BLD AUTO: 8.53 K/UL

## 2017-10-13 PROCEDURE — 93005 ELECTROCARDIOGRAM TRACING: CPT | Mod: 59

## 2017-10-13 PROCEDURE — 82962 GLUCOSE BLOOD TEST: CPT | Mod: 91

## 2017-10-13 PROCEDURE — 25000003 PHARM REV CODE 250: Performed by: INTERNAL MEDICINE

## 2017-10-13 PROCEDURE — 63600175 PHARM REV CODE 636 W HCPCS

## 2017-10-13 PROCEDURE — 25000003 PHARM REV CODE 250

## 2017-10-13 PROCEDURE — 92937 PRQ TRLUML REVSC CAB GRF 1: CPT | Mod: LC,,, | Performed by: INTERNAL MEDICINE

## 2017-10-13 PROCEDURE — 86901 BLOOD TYPING SEROLOGIC RH(D): CPT

## 2017-10-13 PROCEDURE — C1887 CATHETER, GUIDING: HCPCS

## 2017-10-13 PROCEDURE — 93010 ELECTROCARDIOGRAM REPORT: CPT | Mod: ,,, | Performed by: INTERNAL MEDICINE

## 2017-10-13 PROCEDURE — 85025 COMPLETE CBC W/AUTO DIFF WBC: CPT

## 2017-10-13 PROCEDURE — C1760 CLOSURE DEV, VASC: HCPCS

## 2017-10-13 PROCEDURE — 86900 BLOOD TYPING SEROLOGIC ABO: CPT

## 2017-10-13 PROCEDURE — 99152 MOD SED SAME PHYS/QHP 5/>YRS: CPT | Mod: ,,, | Performed by: INTERNAL MEDICINE

## 2017-10-13 PROCEDURE — 80048 BASIC METABOLIC PNL TOTAL CA: CPT

## 2017-10-13 PROCEDURE — 99152 MOD SED SAME PHYS/QHP 5/>YRS: CPT

## 2017-10-13 PROCEDURE — 93459 L HRT ART/GRFT ANGIO: CPT | Mod: 26,59,, | Performed by: INTERNAL MEDICINE

## 2017-10-13 PROCEDURE — 63600175 PHARM REV CODE 636 W HCPCS: Performed by: INTERNAL MEDICINE

## 2017-10-13 RX ORDER — CARVEDILOL 6.25 MG/1
6.25 TABLET ORAL 2 TIMES DAILY WITH MEALS
Status: DISCONTINUED | OUTPATIENT
Start: 2017-10-13 | End: 2017-10-14 | Stop reason: HOSPADM

## 2017-10-13 RX ORDER — CLOPIDOGREL BISULFATE 75 MG/1
75 TABLET ORAL DAILY
Qty: 90 TABLET | Refills: 3 | Status: SHIPPED | OUTPATIENT
Start: 2017-10-13 | End: 2018-10-13

## 2017-10-13 RX ORDER — CARVEDILOL 6.25 MG/1
6.25 TABLET ORAL 2 TIMES DAILY WITH MEALS
Qty: 60 TABLET | Refills: 11 | Status: SHIPPED | OUTPATIENT
Start: 2017-10-13 | End: 2017-11-09 | Stop reason: SDUPTHER

## 2017-10-13 RX ORDER — TAMSULOSIN HYDROCHLORIDE 0.4 MG/1
0.4 CAPSULE ORAL DAILY
Status: DISCONTINUED | OUTPATIENT
Start: 2017-10-13 | End: 2017-10-14 | Stop reason: HOSPADM

## 2017-10-13 RX ORDER — CLOPIDOGREL BISULFATE 75 MG/1
75 TABLET ORAL DAILY
Status: DISCONTINUED | OUTPATIENT
Start: 2017-10-13 | End: 2017-10-14 | Stop reason: HOSPADM

## 2017-10-13 RX ORDER — GABAPENTIN 300 MG/1
300 CAPSULE ORAL 3 TIMES DAILY
Status: DISCONTINUED | OUTPATIENT
Start: 2017-10-13 | End: 2017-10-14 | Stop reason: HOSPADM

## 2017-10-13 RX ORDER — CITALOPRAM 10 MG/1
10 TABLET ORAL DAILY
Status: DISCONTINUED | OUTPATIENT
Start: 2017-10-13 | End: 2017-10-14 | Stop reason: HOSPADM

## 2017-10-13 RX ORDER — ACETAMINOPHEN 325 MG/1
650 TABLET ORAL EVERY 4 HOURS PRN
Status: DISCONTINUED | OUTPATIENT
Start: 2017-10-13 | End: 2017-10-14 | Stop reason: HOSPADM

## 2017-10-13 RX ORDER — DIPHENHYDRAMINE HCL 50 MG
50 CAPSULE ORAL ONCE
Status: COMPLETED | OUTPATIENT
Start: 2017-10-13 | End: 2017-10-13

## 2017-10-13 RX ORDER — SODIUM CHLORIDE 9 MG/ML
3 INJECTION, SOLUTION INTRAVENOUS CONTINUOUS
Status: ACTIVE | OUTPATIENT
Start: 2017-10-13 | End: 2017-10-13

## 2017-10-13 RX ORDER — GLUCAGON 1 MG
1 KIT INJECTION
Status: DISCONTINUED | OUTPATIENT
Start: 2017-10-13 | End: 2017-10-14 | Stop reason: HOSPADM

## 2017-10-13 RX ORDER — IBUPROFEN 200 MG
24 TABLET ORAL
Status: DISCONTINUED | OUTPATIENT
Start: 2017-10-13 | End: 2017-10-14 | Stop reason: HOSPADM

## 2017-10-13 RX ORDER — IBUPROFEN 200 MG
16 TABLET ORAL
Status: DISCONTINUED | OUTPATIENT
Start: 2017-10-13 | End: 2017-10-14 | Stop reason: HOSPADM

## 2017-10-13 RX ORDER — PANTOPRAZOLE SODIUM 40 MG/1
40 TABLET, DELAYED RELEASE ORAL DAILY
Status: DISCONTINUED | OUTPATIENT
Start: 2017-10-13 | End: 2017-10-14 | Stop reason: HOSPADM

## 2017-10-13 RX ORDER — INSULIN ASPART 100 [IU]/ML
1-10 INJECTION, SOLUTION INTRAVENOUS; SUBCUTANEOUS
Status: DISCONTINUED | OUTPATIENT
Start: 2017-10-13 | End: 2017-10-14 | Stop reason: HOSPADM

## 2017-10-13 RX ORDER — ASPIRIN 81 MG/1
81 TABLET ORAL DAILY
Status: DISCONTINUED | OUTPATIENT
Start: 2017-10-13 | End: 2017-10-14 | Stop reason: HOSPADM

## 2017-10-13 RX ORDER — ROSUVASTATIN CALCIUM 40 MG/1
40 TABLET, COATED ORAL DAILY
Status: DISCONTINUED | OUTPATIENT
Start: 2017-10-13 | End: 2017-10-14 | Stop reason: HOSPADM

## 2017-10-13 RX ADMIN — DIPHENHYDRAMINE HYDROCHLORIDE 50 MG: 50 CAPSULE ORAL at 07:10

## 2017-10-13 RX ADMIN — SODIUM CHLORIDE 3 ML/KG/HR: 0.9 INJECTION, SOLUTION INTRAVENOUS at 07:10

## 2017-10-13 RX ADMIN — INSULIN DETEMIR 10 UNITS: 100 INJECTION, SOLUTION SUBCUTANEOUS at 08:10

## 2017-10-13 RX ADMIN — CARVEDILOL 6.25 MG: 6.25 TABLET, FILM COATED ORAL at 04:10

## 2017-10-13 RX ADMIN — GABAPENTIN 300 MG: 300 CAPSULE ORAL at 04:10

## 2017-10-13 RX ADMIN — GABAPENTIN 300 MG: 300 CAPSULE ORAL at 08:10

## 2017-10-13 NOTE — CONSULTS
"Cardiac Rehab     Jorden Shafer   [id number]   10/13/2017       Activity taught: Yes    Cardiac Rehab Phase Taught: Phase 1 & 2    Risk Factors-Modifiable: diabetes, hyperlipidemia, hypertension, obesity, sedentary lifestyle    Risk Factors-Non modifiable: age, gender    Teaching Method: Verbal, Living with Heart Disease    Understanding: yes    Response: Patient states that he is interested in attending rehab near his home at [facility].  Family members at bedside.  Questions answered, paper order given to patient & encouraged for patient/family to call cardiac rehab team with any additional questions.    Comments: S/P PTCA. Discussed cardiac rehab and risk factor modification. Team to refer patient to [facility] Cardiac Rehab Phase II. Educational materials were used in the process and given to the patient. They included "Your Guide to Living with Heart Disease", Phase Two Cardiac Rehabilitation information along with a sample Mediterranean diet.The patient expressed understanding of the teaching and expressed desire to take a role in modifying the risk factors when they return home.    Gifty Oseguera RN  Cardiac Rehab Nurse      "

## 2017-10-13 NOTE — PROGRESS NOTES
Pt returned to room AAOx4 and denies pain.  R groin site c/d/i and soft.  Pedal pulses palpable bilaterally.  VSS.  Family called to bedside and post procedure protocol reviewed.  Will continue to monitor.

## 2017-10-13 NOTE — DISCHARGE SUMMARY
Discharge Summary  Interventional Cardiology      Admit Date: 10/13/2017    Discharge Date:  10/14/2017    Attending Physician: Marcus Blakely MD    Discharge Physician: Jerrell Motta MD    Principal Diagnoses: CCS III angina, CAD, iCMP    Indication for Admission: HEART CATH WITH GRAFTS-LEFT (N/A)  Patient Active Problem List   Diagnosis    Type 2 diabetes mellitus with hyperglycemia, with long-term current use of insulin    Vitamin D deficiency disease    Colon polyp    Prostate cancer    ED (erectile dysfunction)    Weakness    Essential hypertension    Chronic combined systolic and diastolic heart failure    Tortuous aorta    Old MI (myocardial infarction)    Major depressive disorder, single episode, mild    Insulin pump in place    Mild protein malnutrition    KELVIN on CPAP    History of CVA (cerebrovascular accident)    GERD (gastroesophageal reflux disease)    Severe obesity with body mass index (BMI) of 35.0 to 39.9 with comorbidity    Type 2 diabetes mellitus with diabetic polyneuropathy, with long-term current use of insulin    NSTEMI (non-ST elevated myocardial infarction)    Mixed hyperlipidemia    Symptomatic carotid artery stenosis    ICD (implantable cardioverter-defibrillator) in place    Diabetes mellitus due to underlying condition with complication, without long-term current use of insulin    Carotid stenosis    Injury of left shoulder           Discharged Condition: Good    Hospital Course:   Patient presented for outpatient LHC which went without complication. LHC was notable for > 90% proximal SVG to OM s/p successful PCI with 4 x 20 VENKATA.    Outpatient Plan:  - Continue ASA 81 mg po indefinitely+ plavix 75 mg po daily for at least 1 year  - Continue high intensity statin therapy  - Cardiac rehab referral  - Risk factor modification        Diet: Cardiac diet    Activity: Ad kaila    Disposition: Home or Self Care    Discharge Medications:      Medication List       CHANGE how you take these medications    carvedilol 6.25 MG tablet  Commonly known as:  COREG  by mouth 2 (two) times daily with meals. Hold is SBP <120.     omeprazole 40 MG capsule  Commonly known as:  PRILOSEC  Take 1 capsule (40 mg total) by mouth 2 (two) times daily.        CONTINUE taking these medications    ARTIFICIAL TEAR SOLUTION OPHT     aspirin 81 MG EC tablet  Commonly known as:  ECOTRIN     blood sugar diagnostic Strp  1 strip by Misc.(Non-Drug; Combo Route) route 2 (two) times daily with meals. TRUE METRIX     blood-glucose meter kit  Use as instructed          TRUE METRIX     citalopram 10 MG tablet  Commonly known as:  CELEXA  Take 1 tablet (10 mg total) by mouth once daily.     clopidogrel 75 mg tablet  Commonly known as:  PLAVIX  Take 1 tablet (75 mg total) by mouth once daily.     furosemide 20 MG tablet  Commonly known as:  LASIX  Take 1 tablet (20 mg total) by mouth once daily.     gabapentin 300 MG capsule  Commonly known as:  NEURONTIN     glucose 4 GM chewable tablet     insulin aspart 100 unit/mL Inpn pen  Commonly known as:  NovoLOG  Inject 16 Units into the skin 3 (three) times daily.     lancets Misc  1 lancet by Misc.(Non-Drug; Combo Route) route 2 (two) times daily with meals. TRUE METRIX     LEVEMIR SUBQ     nitroGLYCERIN 0.4 MG SL tablet  Commonly known as:  NITROSTAT  Place 1 tablet (0.4 mg total) under the tongue every 5 (five) minutes as needed. Sublingual  ( under tongue) as needed     omega-3 acid ethyl esters 1 gram capsule  Commonly known as:  LOVAZA     rosuvastatin 40 MG Tab  Commonly known as:  CRESTOR  Take 1 tablet (40 mg total) by mouth once daily.     tamsulosin 0.4 mg Cp24  Commonly known as:  FLOMAX           Where to Get Your Medications      These medications were sent to [facility]/pharmacy #[id number] [place], [place]  [address] [address]    Phone:  [phone]   · clopidogrel 75 mg tablet

## 2017-10-13 NOTE — H&P (VIEW-ONLY)
Subjective:    Patient ID:  Jorden Shafer is a 72 y.o. male who presents for follow-up of Carotid Artery Disease and Coronary Artery Disease      HPI  Mr. Shafer is a 71 y/o gentleman with PMH CAD s/p CABG (1989), DVT, DM II, MI, defibrillator placement who was experienced a Right MCA stroke followed by an NSTEMI in April 2017 . He was found to have >90% stenosis of his R-ICA on CTA.  He underwent carotid angiography on 6/2/17 that demonstrate bilateral high grade carotid stenosis with severe calcification.  He was referred to Dr. Galindo, vascular surgery, for evaluation for CEA.  The patient was deemed to be to be at high risk for awake/ cervical block CEA and at prohibitive risk for CEA under GETA.  He underwent successful right carotid artery stent placement on 7/5/17 and left carotid stent placement on 8/25/17. In the interim since then the patient denies any new neurologic symptoms. He denies any visual changes or new weakness.  He reports angina 3-4 times a week resolved with NTG SL.  His angina is described as left sided chest pain under his ribs.  It is brouhgt on by walking 2 blocks and relieved with rest. He denies BOSE.  He reports dependent LE edema. He denies, orthopnea but reports PND every 2-3 days.  He reports good compliance with his CPAP. He denies palpitations, syncope, or near syncope. The patient also reports right sided headaches around 7pm daily.   He reports good medication compliance and adherence to a heart healthy diet.  He does not have full range of motion or strength in in left hand or arm, but states he is doing his OT/PT prescribed exercises. On Sept 1st the patient reported he fell whil ein the shower sustaining a left shoulder injury which has limited his ability to extend his left arm over his head.  He denies any other injury.    Past Medical History:   Diagnosis Date    Back pain     CAD (coronary artery disease) 1989    h/o cabg x 5    Carotid artery occlusion     Chronic  kidney disease     Colon polyp     h/o on colonoscopy    Contact dermatitis 11/12/2013    CVA (cerebral vascular accident)     March 2010    Depression     Diabetes mellitus type II     on  insulin pump    DVT (deep venous thrombosis) 1980's    left leg    GERD (gastroesophageal reflux disease)     Heart failure     [facility]     Hyperlipidemia     Hypertension     Hypertensive heart disease without congestive heart failure     Insulin pump status 4/25/2017    Myocardial infarction     Neuropathy of lower extremity     Obesity (BMI 30-39.9) 4/25/2017    Prostate cancer     Renal manifestation of secondary diabetes mellitus     Sleep apnea     Stroke 4/21/2017    Type 2 diabetes mellitus with diabetic polyneuropathy, with long-term current use of insulin 4/25/2017    Type 2 diabetes mellitus with hyperglycemia, with long-term current use of insulin 9/26/2012    Currently sees Dr. Britton     Unstable angina     Vitamin D deficiency disease      Past Surgical History:   Procedure Laterality Date    CARDIAC DEFIBRILLATOR PLACEMENT  06/16/2016    CHOLECYSTECTOMY      CORONARY ARTERY BYPASS GRAFT      bypass times  5    defibulater  06/16/2016    EYE SURGERY Bilateral     HERNIA REPAIR      groin x2  and umbilical    JOINT REPLACEMENT      knee left    thrombus Left     >2005    VASECTOMY       Current Outpatient Prescriptions on File Prior to Visit   Medication Sig Dispense Refill    aspirin (ECOTRIN) 81 MG EC tablet Take 81 mg by mouth once daily.      blood sugar diagnostic Strp 1 strip by Misc.(Non-Drug; Combo Route) route 2 (two) times daily with meals. TRUE METRIX 100 strip 11    blood-glucose meter kit Use as instructed          TRUE METRIX 1 each 0    carvedilol (COREG) 3.125 MG tablet Take 1 tablet (3.125 mg total) by mouth 2 (two) times daily with meals. Hold is SBP <120. (Patient taking differently: Take 6.25 mg by mouth 2 (two) times daily with meals. Hold is SBP <120. 1/2  Tab   2x  daily) 60 tablet 11    citalopram (CELEXA) 10 MG tablet Take 1 tablet (10 mg total) by mouth once daily. 90 tablet 1    clopidogrel (PLAVIX) 75 mg tablet Take 1 tablet (75 mg total) by mouth once daily. 90 tablet 3    furosemide (LASIX) 20 MG tablet Take 1 tablet (20 mg total) by mouth once daily.      glucose 4 GM chewable tablet Take 16 g by mouth as needed for Low blood sugar.      insulin aspart (NOVOLOG) 100 unit/mL InPn pen Inject 16 Units into the skin 3 (three) times daily.  0    lancets Misc 1 lancet by Misc.(Non-Drug; Combo Route) route 2 (two) times daily with meals. TRUE METRIX 100 each 11    nitroGLYCERIN (NITROSTAT) 0.4 MG SL tablet Place 1 tablet (0.4 mg total) under the tongue every 5 (five) minutes as needed. Sublingual  ( under tongue) as needed 30 tablet prn    omega-3 acid ethyl esters (LOVAZA) 1 gram capsule Take 2 g by mouth 3 (three) times daily.        omeprazole (PRILOSEC) 40 MG capsule Take 1 capsule (40 mg total) by mouth 2 (two) times daily. (Patient taking differently: Take 40 mg by mouth 2 (two) times daily as needed. ) 180 capsule 3    rosuvastatin (CRESTOR) 40 MG Tab Take 1 tablet (40 mg total) by mouth once daily. 30 tablet 6     No current facility-administered medications on file prior to visit.      Review of patient's allergies indicates:  No Known Allergies  Social History   Substance Use Topics    Smoking status: Former Smoker     Packs/day: 4.00     Years: 25.00     Types: Cigarettes     Quit date: 1980    Smokeless tobacco: Never Used    Alcohol use No     Family History   Problem Relation Age of Onset    Alzheimer's disease Mother      Living    Heart disease Mother     Dementia Mother     Asbestos Father          Cancer Father     Diabetes type II Maternal Aunt     Stroke Neg Hx           Review of Systems   Constitution: Negative for decreased appetite, diaphoresis, fever, malaise/fatigue, weight gain and weight loss.   HENT: Negative  "for congestion, nosebleeds and sore throat.    Eyes: Negative for blurred vision, vision loss in left eye, vision loss in right eye and visual disturbance.   Cardiovascular: Positive for chest pain, leg swelling and paroxysmal nocturnal dyspnea. Negative for claudication, dyspnea on exertion, near-syncope, orthopnea, palpitations and syncope.   Respiratory: Negative for cough, hemoptysis, shortness of breath and wheezing.    Endocrine: Negative for polyuria.   Hematologic/Lymphatic: Does not bruise/bleed easily.   Skin: Negative for nail changes and rash.   Musculoskeletal: Negative for back pain, muscle cramps and myalgias.   Gastrointestinal: Negative for abdominal pain, change in bowel habit, diarrhea, heartburn, hematemesis, hematochezia, melena, nausea and vomiting.   Genitourinary: Negative for bladder incontinence, dysuria, frequency and hematuria.   Psychiatric/Behavioral: Negative for depression.   Allergic/Immunologic: Negative for hives.        Objective:  Vitals:    09/28/17 1027 09/28/17 1043   BP: 118/66 133/69   BP Location: Right arm Left arm   Patient Position: Sitting Sitting   BP Method: Medium (Automatic) Medium (Automatic)   Pulse: 68    Weight: 101.2 kg (223 lb 1.7 oz)    Height: 5' 8" (1.727 m)          Physical Exam   Constitutional: He is oriented to person, place, and time. He appears well-developed and well-nourished.   HENT:   Head: Normocephalic and atraumatic.   Eyes: EOM are normal. Pupils are equal, round, and reactive to light.   Neck: Neck supple. No JVD present. No thyromegaly present.   Cardiovascular: Normal rate and regular rhythm.  PMI is displaced.  Exam reveals no gallop and no friction rub.    No murmur heard.  Pulses:       Carotid pulses are 2+ on the right side, and 2+ on the left side.       Radial pulses are 2+ on the right side, and 2+ on the left side.        Femoral pulses are 2+ on the right side, and 2+ on the left side.       Dorsalis pedis pulses are 1+ on the " right side, and 1+ on the left side.        Posterior tibial pulses are 1+ on the right side, and 1+ on the left side.   Pulmonary/Chest: Effort normal. He has no wheezes. He has no rhonchi. He has no rales.   Abdominal: Soft. Normal appearance. He exhibits no distension. There is no hepatosplenomegaly. There is no tenderness.   Neurological: He is alert and oriented to person, place, and time. Gait normal.   Psychiatric: He has a normal mood and affect.         Assessment:       1. Abnormal cardiovascular stress test    2. Injury of left shoulder, initial encounter    3. Angina, class III    4. Essential hypertension    5. Chronic combined systolic and diastolic heart failure    6. Mixed hyperlipidemia    7. Diabetes mellitus due to underlying condition with complication, without long-term current use of insulin    8. Bilateral carotid artery stenosis    9. History of CVA (cerebrovascular accident)    10. Severe obesity with body mass index (BMI) of 35.0 to 39.9 with comorbidity    11. ICD (implantable cardioverter-defibrillator) in place    12. KELVIN on CPAP         Plan:       1) CAD.  The patient reports CCS3 angina.  He had a SPECT stress test on 4/27/17 after his NSTEMI that demonstrated evidence for a large sized area of moderate to severe myocardial ischemia that extends from the base to the distal inferior wall and the distal inferoseptal wall. He has a known h/o CAD and is s/p CABG X 5.  His most recent coronary angiogram was on 6/30/16. At that time he was found to have a patent to a LIMA to LAD (but LAD was occluded distal to LIMA anastomosis), patent SVG to D3 with occluded jump graft to the distal LAD, patent SVG to OM, and an occluded SVG to the RCA.  -continue EC ASA 81mg po qday  -continue Plavix 75mg po qday  -continue statin  -continue COreg 3.125mg po BID  6Fr right CFA access  The risks, benefits, and alternatives of cardiac catheterization and possible intervention were discussed with the  patient.  All questions were answered and informed consent was obtained.    2) s/p Bilateral carotid stenosis. Patient denies new neurologic symptoms. 9/28/17 carotid duplex reviewed  -continue DAPT  -continue statin    3) ICM. Patient appears euvolemic on exam and reports NYHA class 2 symptoms  -medical management as above  -rec low sodium, heart healthy det     4) Dyslipidemia. 5/18/17 lipid panel reviewed  -continue high intensity statin     5) DM2. 4/12/17 HgbA1c = 9.3; rec tight glycemic control     6) H/o GERD. Asymptomatic on PPI; continue PPI     7) KELVIN . The patient reports good compliance with his CPAP; patient was asked to bring his CPAP machine to use during his cardiac cath procedure     8) HTN. Blood pressure adequately controlled in clinic today; continue  current medications    9) left shoulder pain with decreased ROM.  The patient reports he had an initial work-up at [facility].  He states he was not satisfied with the care he received there.[facility]    All of the patient's and his wife's questions were answered.

## 2017-10-13 NOTE — INTERVAL H&P NOTE
The patient has been examined and the H&P has been reviewed:    I concur with the findings and no changes have occurred since H&P was written.    Anesthesia/Surgery risks, benefits and alternative options discussed and understood by patient/family.          Active Hospital Problems    Diagnosis  POA    Injury of left shoulder [S49.92XA]  Yes      Resolved Hospital Problems    Diagnosis Date Resolved POA   No resolved problems to display.

## 2017-10-14 VITALS
SYSTOLIC BLOOD PRESSURE: 117 MMHG | OXYGEN SATURATION: 94 % | HEIGHT: 68 IN | DIASTOLIC BLOOD PRESSURE: 66 MMHG | WEIGHT: 229.19 LBS | TEMPERATURE: 98 F | RESPIRATION RATE: 18 BRPM | HEART RATE: 82 BPM | BODY MASS INDEX: 34.74 KG/M2

## 2017-10-14 LAB
ANION GAP SERPL CALC-SCNC: 11 MMOL/L
BUN SERPL-MCNC: 14 MG/DL
CALCIUM SERPL-MCNC: 8.6 MG/DL
CHLORIDE SERPL-SCNC: 101 MMOL/L
CO2 SERPL-SCNC: 26 MMOL/L
CREAT SERPL-MCNC: 0.9 MG/DL
EST. GFR  (AFRICAN AMERICAN): >60 ML/MIN/1.73 M^2
EST. GFR  (NON AFRICAN AMERICAN): >60 ML/MIN/1.73 M^2
GLUCOSE SERPL-MCNC: 184 MG/DL
POCT GLUCOSE: 180 MG/DL (ref 70–110)
POTASSIUM SERPL-SCNC: 3.5 MMOL/L
SODIUM SERPL-SCNC: 138 MMOL/L

## 2017-10-14 PROCEDURE — 25000003 PHARM REV CODE 250: Performed by: INTERNAL MEDICINE

## 2017-10-14 PROCEDURE — 36415 COLL VENOUS BLD VENIPUNCTURE: CPT

## 2017-10-14 PROCEDURE — 82962 GLUCOSE BLOOD TEST: CPT | Mod: 91

## 2017-10-14 PROCEDURE — 80048 BASIC METABOLIC PNL TOTAL CA: CPT

## 2017-10-14 RX ADMIN — ASPIRIN 81 MG: 81 TABLET, COATED ORAL at 09:10

## 2017-10-14 RX ADMIN — CARVEDILOL 6.25 MG: 6.25 TABLET, FILM COATED ORAL at 09:10

## 2017-10-14 RX ADMIN — GABAPENTIN 300 MG: 300 CAPSULE ORAL at 05:10

## 2017-10-14 RX ADMIN — CLOPIDOGREL 75 MG: 75 TABLET, FILM COATED ORAL at 09:10

## 2017-10-14 NOTE — PROGRESS NOTES
Pt is AAOx3 and in no apparent distress.  Groin site c/d/i and soft.  Provided a copy of discharge instructions.  Teaching performed.  Pt verbalized understanding and denied any questions.  Provided pt with prescriptions. PIV d/c catheter tip intact.  2x2 applied and no active bleeding noted.  Pt's wife wheeling pt out to garage in wheelchair for transport home.

## 2017-10-16 LAB
POC ACTIVATED CLOTTING TIME K: 186 SEC (ref 74–137)
POC ACTIVATED CLOTTING TIME K: 219 SEC (ref 74–137)
POCT GLUCOSE: 219 MG/DL (ref 70–110)
SAMPLE: ABNORMAL
SAMPLE: ABNORMAL

## 2017-10-20 ENCOUNTER — HOSPITAL ENCOUNTER (OUTPATIENT)
Dept: RADIOLOGY | Facility: HOSPITAL | Age: 72
Discharge: HOME | End: 2017-10-20
Attending: PHYSICAL MEDICINE & REHABILITATION
Payer: MEDICARE

## 2017-10-20 ENCOUNTER — INITIAL CONSULT (OUTPATIENT)
Dept: PHYSICAL MEDICINE AND REHAB | Facility: CLINIC | Age: 72
End: 2017-10-20
Payer: MEDICARE

## 2017-10-20 VITALS
SYSTOLIC BLOOD PRESSURE: 125 MMHG | HEIGHT: 68 IN | BODY MASS INDEX: 34.71 KG/M2 | HEART RATE: 68 BPM | DIASTOLIC BLOOD PRESSURE: 73 MMHG | WEIGHT: 229 LBS

## 2017-10-20 DIAGNOSIS — M75.51 BURSITIS OF RIGHT SHOULDER: ICD-10-CM
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
## 2017-10-20 DIAGNOSIS — S49.92XS INJURY OF LEFT SHOULDER, SEQUELA: ICD-10-CM

## 2017-10-20 DIAGNOSIS — M75.42 IMPINGEMENT SYNDROME OF SHOULDER, LEFT: ICD-10-CM

## 2017-10-20 DIAGNOSIS — G81.94 HEMIPARESIS OF LEFT NONDOMINANT SIDE, UNSPECIFIED HEMIPARESIS ETIOLOGY: ICD-10-CM

## 2017-10-20 DIAGNOSIS — S49.92XS INJURY OF LEFT SHOULDER, SEQUELA: Primary | ICD-10-CM
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
## 2017-10-20 PROCEDURE — 73030 X-RAY EXAM OF SHOULDER: CPT | Mod: 26,LT,, | Performed by: RADIOLOGY

## 2017-10-20 PROCEDURE — 73030 X-RAY EXAM OF SHOULDER: CPT | Mod: TC,LT

## 2017-10-20 PROCEDURE — 99203 OFFICE O/P NEW LOW 30 MIN: CPT | Mod: S$GLB,,, | Performed by: PHYSICAL MEDICINE & REHABILITATION

## 2017-10-20 PROCEDURE — 99499 UNLISTED E&M SERVICE: CPT | Mod: S$GLB,,, | Performed by: PHYSICAL MEDICINE & REHABILITATION

## 2017-10-20 PROCEDURE — 99999 PR PBB SHADOW E&M-EST. PATIENT-LVL III: CPT | Mod: PBBFAC,,, | Performed by: PHYSICAL MEDICINE & REHABILITATION

## 2017-10-20 RX ORDER — DICLOFENAC SODIUM 10 MG/G
GEL TOPICAL
Qty: 300 G | Refills: 1 | Status: SHIPPED | OUTPATIENT
Start: 2017-10-20 | End: 2018-03-09

## 2017-10-20 NOTE — PROGRESS NOTES
Subjective:       Patient ID: Jorden Shafer is a 72 y.o. male.    Chief Complaint: Shoulder Pain    HPI   Mr. Shafer is 73 y/o male with PMH significant for CVA, with mild Lt HP, who is coming first time to clinic for Lt shoulder pain.   Referred by Dr. Blakely.  He fell in shower on 9/1/17 while taking and turn, to get out of shower.   He fell outside of tub, and injured his Lt shoulder.   He states that it was worse before, he could not see any bruise, but he could not lift up the arm.   He is on Plavix for years secondary to CAD, and multiple stents in his heart, and carotid arteries.   Current shoulder pain is 5-6, the worst pain is 9/10.   He is not able to lift his arm, above his head, nor he is able to comb his hair, nor he can easily dress a shirt.   He states that he does not take pain medication, other than extra strength Tylenol 2 at time once a day, at night, because pain is worse at night.   He is here beacuse he wants to know what is going on with his shoulder.   Not necessarily interested in immediate injection.    Past Medical History:   Diagnosis Date    Back pain     CAD (coronary artery disease) 1989    h/o cabg x 5    Carotid artery occlusion     Chronic kidney disease     Colon polyp     h/o on colonoscopy    Contact dermatitis 11/12/2013    CVA (cerebral vascular accident)     March 2010    Depression     Diabetes mellitus type II     on  insulin pump    DVT (deep venous thrombosis) 1980's    left leg    GERD (gastroesophageal reflux disease)     Heart failure     [facility]     Hyperlipidemia     Hypertension     Hypertensive heart disease without congestive heart failure     Insulin pump status 4/25/2017    Myocardial infarction     Neuropathy of lower extremity     Obesity (BMI 30-39.9) 4/25/2017    Prostate cancer     Renal manifestation of secondary diabetes mellitus     Sleep apnea     Stroke 4/21/2017    Type 2 diabetes mellitus with diabetic polyneuropathy, with  long-term current use of insulin 2017    Type 2 diabetes mellitus with hyperglycemia, with long-term current use of insulin 2012    Currently sees Dr. Britton     Unstable angina     Vitamin D deficiency disease        Past Surgical History:   Procedure Laterality Date    CARDIAC DEFIBRILLATOR PLACEMENT  2016    CHOLECYSTECTOMY      CORONARY ARTERY BYPASS GRAFT      bypass times  5    defibulater  2016    EYE SURGERY Bilateral     HERNIA REPAIR      groin x2  and umbilical    JOINT REPLACEMENT      knee left    thrombus Left     >2005    VASECTOMY         Family History   Problem Relation Age of Onset    Alzheimer's disease Mother      Living    Heart disease Mother     Dementia Mother     Asbestos Father          Cancer Father     Diabetes type II Maternal Aunt     Stroke Neg Hx        Social History     Social History    Marital status:      Spouse name: N/A    Number of children: 3    Years of education: [employer]z[place]     Occupational History    Retired [employer]      Social History Main Topics    Smoking status: Former Smoker     Packs/day: 4.00     Years: 25.00     Types: Cigarettes     Quit date: 1980    Smokeless tobacco: Never Used    Alcohol use No    Drug use: No    Sexual activity: Yes     Partners: Female     Other Topics Concern    Not on file     Social History Narrative    SCREENING/HEALTH MAINTENANCE. Updated  13    COLONOSCOPY .  [facility].    TETANUS 2005.    PNEUMOVAX  12    NO PRIOR HISTORY OF ZOSTAVAX    PSA 13    FLU 10/8/13       Current Outpatient Prescriptions   Medication Sig Dispense Refill    aspirin (ECOTRIN) 81 MG EC tablet Take 81 mg by mouth once daily.      blood sugar diagnostic Strp 1 strip by Misc.(Non-Drug; Combo Route) route 2 (two) times daily with meals. TRUE METRIX 100 strip 11    blood-glucose meter kit Use as instructed          TRUE METRIX 1 each 0    carvedilol (COREG)  3.125 MG tablet Take 1 tablet (3.125 mg total) by mouth 2 (two) times daily with meals. Hold is SBP <120. (Patient taking differently: Take 6.25 mg by mouth 2 (two) times daily with meals. Hold is SBP <120. 1/2  Tab  2x  daily) 60 tablet 11    carvedilol (COREG) 6.25 MG tablet Take 1 tablet (6.25 mg total) by mouth 2 (two) times daily with meals. 60 tablet 11    citalopram (CELEXA) 10 MG tablet Take 1 tablet (10 mg total) by mouth once daily. 90 tablet 1    clopidogrel (PLAVIX) 75 mg tablet Take 1 tablet (75 mg total) by mouth once daily. 90 tablet 3    DEXT 70/POLYCARBOPHIL/PEG/NACL (ARTIFICIAL TEAR SOLUTION OPHT) Apply to eye 3 (three) times daily.      diclofenac sodium (VOLTAREN) 1 % Gel Apply  4 gm topical to shoulder 4x/day 300 g 1    furosemide (LASIX) 20 MG tablet Take 1 tablet (20 mg total) by mouth once daily.      gabapentin (NEURONTIN) 300 MG capsule Take 300 mg by mouth 3 (three) times daily.      glucose 4 GM chewable tablet Take 16 g by mouth as needed for Low blood sugar.      insulin aspart (NOVOLOG) 100 unit/mL InPn pen Inject 16 Units into the skin 3 (three) times daily.  0    INSULIN DETEMIR (LEVEMIR SUBQ) Inject into the skin once daily.      lancets Misc 1 lancet by Misc.(Non-Drug; Combo Route) route 2 (two) times daily with meals. TRUE METRIX 100 each 11    nitroGLYCERIN (NITROSTAT) 0.4 MG SL tablet Place 1 tablet (0.4 mg total) under the tongue every 5 (five) minutes as needed. Sublingual  ( under tongue) as needed 30 tablet prn    omega-3 acid ethyl esters (LOVAZA) 1 gram capsule Take 2 g by mouth 3 (three) times daily.        omeprazole (PRILOSEC) 40 MG capsule Take 1 capsule (40 mg total) by mouth 2 (two) times daily. (Patient taking differently: Take 40 mg by mouth 2 (two) times daily as needed. ) 180 capsule 3    rosuvastatin (CRESTOR) 40 MG Tab Take 1 tablet (40 mg total) by mouth once daily. 30 tablet 6    tamsulosin (FLOMAX) 0.4 mg Cp24 Take 0.4 mg by mouth once daily.        No current facility-administered medications for this visit.        Review of patient's allergies indicates:  No Known Allergies      Review of Systems   Constitutional: Negative for appetite change and fatigue.   Eyes: Negative for visual disturbance.   Respiratory: Negative for shortness of breath.    Cardiovascular: Negative for chest pain.   Gastrointestinal: Negative for constipation and diarrhea.   Genitourinary: Negative for dysuria, frequency and urgency.   Musculoskeletal: Negative for back pain, gait problem, joint swelling, myalgias, neck pain and neck stiffness. Arthralgias: Rt shoulder pain after a fall    Neurological: Negative for dizziness, tremors, weakness, numbness and headaches.   Psychiatric/Behavioral: Negative for dysphoric mood.   All other systems reviewed and are negative.        Objective:      Physical Exam    GENERAL: The patient is alert, oriented, pleasant.   HEENT: PERRLA  NECK: supple, no masses, JVP normal.  CV: S1S2, RRR,  + AICD  LUNGS: CTA bilateral.   ABDOMEN: soft, non-tender, bowel sounds nl.  EXTREMITIES: no edema, +2 pulses DP, b/l  SKIN: no skin rash, no skin breakdowns.  MUSCULOSKELETAL:   Gait is normal, just a slow nayeli, uses no AD.  Cervical spine: full AROM in cervical spine,    Thoracic spine, full active ROM in all directions,    Lumbar spine, full range of motion in all planes,.  Full range of motion in all joints x4 extremities, except in Lt shoulder ABD, to 80-85 degrees, IR < 25 degrees, ER 30 degrees.   Muscle strength 5/5 throughout x4 extremities, except in Lt deltoid.   Positive impingement syndrome, negative arm drop,   No  joint laxity throughout x4 extremities, including Lt shoulder.  NEUROLOGIC: Cranial nerves II through XII intact.   Deep tendon reflexes is normal, +2 in the upper and lower extremities bilaterally.   Muscle tone is normal.   Sensory is intact to light touch and pinprick throughout x4 extremities.       Assessment:       1.  Injury of left shoulder, sequela    2. Fall, sequela    3. Bursitis of right shoulder    4. Impingement syndrome of shoulder, left    5. Hemiparesis of left nondominant side, unspecified hemiparesis etiology        Plan:       Injury of left shoulder, sequela  -     X-Ray Shoulder 2 or More Views Left; Future  -     diclofenac sodium (VOLTAREN) 1 % Gel; Apply  4 gm topical to shoulder 4x/day  Dispense: 300 g; Refill: 1    Fall, sequela  -     X-Ray Shoulder 2 or More Views Left; Future  -     diclofenac sodium (VOLTAREN) 1 % Gel; Apply  4 gm topical to shoulder 4x/day  Dispense: 300 g; Refill: 1    Bursitis of right shoulder  -     X-Ray Shoulder 2 or More Views Left; Future  -     diclofenac sodium (VOLTAREN) 1 % Gel; Apply  4 gm topical to shoulder 4x/day  Dispense: 300 g; Refill: 1    Impingement syndrome of shoulder, left  -     X-Ray Shoulder 2 or More Views Left; Future  -     diclofenac sodium (VOLTAREN) 1 % Gel; Apply  4 gm topical to shoulder 4x/day  Dispense: 300 g; Refill: 1    Hemiparesis of left nondominant side, unspecified hemiparesis etiology  -     X-Ray Shoulder 2 or More Views Left; Future  -     diclofenac sodium (VOLTAREN) 1 % Gel; Apply  4 gm topical to shoulder 4x/day  Dispense: 300 g; Refill: 1      Patient with prolong post traumatic shoulder pain, that might be secondary to frozen shoulder/DJD , vs subacromial bursitis, less likely RTC partial tear.  Also minimal suspicion  for humeral fx, although by this time it would probably heal.     - Xray of Lt shoulder.   - Voltaren gel topical.  -- refuses Physical therapy, shown some exercises in clinic, and   Given educational booklet.    RTC in 3-4 weeks.    Total time spent face to face with patient was 30 minutes.   More than 50% of that time was spent in counseling on diagnosis , prognosis and treatment options.   I also caunsel patient  on common and most usual side effect of prescribed medications.   I reviewed Primary care , and other  specialty's notes to better coordinate patient's  care.   All questions were answered, and patient voiced understanding.

## 2017-11-03 LAB
CORONARY STENOSIS: ABNORMAL
CORONARY STENT: YES

## 2017-11-09 ENCOUNTER — TELEPHONE (OUTPATIENT)
Dept: SPEECH THERAPY | Facility: HOSPITAL | Age: 72
End: 2017-11-09

## 2017-11-09 ENCOUNTER — OFFICE VISIT (OUTPATIENT)
Dept: PHYSICAL MEDICINE AND REHAB | Facility: CLINIC | Age: 72
End: 2017-11-09
Payer: MEDICARE

## 2017-11-09 ENCOUNTER — OFFICE VISIT (OUTPATIENT)
Dept: CARDIOLOGY | Facility: CLINIC | Age: 72
End: 2017-11-09
Payer: MEDICARE

## 2017-11-09 VITALS
OXYGEN SATURATION: 98 % | HEIGHT: 68 IN | SYSTOLIC BLOOD PRESSURE: 154 MMHG | DIASTOLIC BLOOD PRESSURE: 73 MMHG | BODY MASS INDEX: 36.19 KG/M2 | WEIGHT: 238.75 LBS | HEART RATE: 63 BPM

## 2017-11-09 VITALS
SYSTOLIC BLOOD PRESSURE: 149 MMHG | BODY MASS INDEX: 35.99 KG/M2 | HEIGHT: 68 IN | HEART RATE: 62 BPM | WEIGHT: 237.44 LBS | DIASTOLIC BLOOD PRESSURE: 77 MMHG

## 2017-11-09 DIAGNOSIS — M25.562 CHRONIC PAIN OF LEFT KNEE: ICD-10-CM

## 2017-11-09 DIAGNOSIS — R13.10 DYSPHAGIA, UNSPECIFIED TYPE: Primary | ICD-10-CM

## 2017-11-09 DIAGNOSIS — Z79.4 TYPE 2 DIABETES MELLITUS WITH HYPERGLYCEMIA, WITH LONG-TERM CURRENT USE OF INSULIN: Chronic | ICD-10-CM

## 2017-11-09 DIAGNOSIS — K21.9 GASTROESOPHAGEAL REFLUX DISEASE WITHOUT ESOPHAGITIS: Chronic | ICD-10-CM

## 2017-11-09 DIAGNOSIS — M75.42 IMPINGEMENT SYNDROME OF SHOULDER, LEFT: Primary | ICD-10-CM

## 2017-11-09 DIAGNOSIS — Z96.652 HISTORY OF TOTAL LEFT KNEE REPLACEMENT: ICD-10-CM

## 2017-11-09 DIAGNOSIS — G89.29 CHRONIC LEFT SHOULDER PAIN: ICD-10-CM

## 2017-11-09 DIAGNOSIS — E66.01 SEVERE OBESITY WITH BODY MASS INDEX (BMI) OF 35.0 TO 39.9 WITH COMORBIDITY: ICD-10-CM

## 2017-11-09 DIAGNOSIS — E78.2 MIXED HYPERLIPIDEMIA: Chronic | ICD-10-CM

## 2017-11-09 DIAGNOSIS — G47.33 OSA ON CPAP: Chronic | ICD-10-CM

## 2017-11-09 DIAGNOSIS — G89.29 CHRONIC PAIN OF LEFT KNEE: ICD-10-CM

## 2017-11-09 DIAGNOSIS — I50.42 CHRONIC COMBINED SYSTOLIC AND DIASTOLIC HEART FAILURE: Chronic | ICD-10-CM

## 2017-11-09 DIAGNOSIS — I25.2 OLD MI (MYOCARDIAL INFARCTION): ICD-10-CM

## 2017-11-09 DIAGNOSIS — M25.512 CHRONIC LEFT SHOULDER PAIN: ICD-10-CM

## 2017-11-09 DIAGNOSIS — E11.65 TYPE 2 DIABETES MELLITUS WITH HYPERGLYCEMIA, WITH LONG-TERM CURRENT USE OF INSULIN: Chronic | ICD-10-CM

## 2017-11-09 DIAGNOSIS — I10 ESSENTIAL HYPERTENSION: Primary | Chronic | ICD-10-CM

## 2017-11-09 PROCEDURE — 99213 OFFICE O/P EST LOW 20 MIN: CPT | Mod: 25,S$GLB,, | Performed by: PHYSICAL MEDICINE & REHABILITATION

## 2017-11-09 PROCEDURE — 99999 PR PBB SHADOW E&M-EST. PATIENT-LVL IV: CPT | Mod: PBBFAC,,, | Performed by: INTERNAL MEDICINE

## 2017-11-09 PROCEDURE — 99999 PR PBB SHADOW E&M-EST. PATIENT-LVL III: CPT | Mod: PBBFAC,,, | Performed by: PHYSICAL MEDICINE & REHABILITATION

## 2017-11-09 PROCEDURE — 99499 UNLISTED E&M SERVICE: CPT | Mod: S$GLB,,, | Performed by: INTERNAL MEDICINE

## 2017-11-09 PROCEDURE — 20610 DRAIN/INJ JOINT/BURSA W/O US: CPT | Mod: LT,S$GLB,, | Performed by: PHYSICAL MEDICINE & REHABILITATION

## 2017-11-09 PROCEDURE — 99214 OFFICE O/P EST MOD 30 MIN: CPT | Mod: 24,S$GLB,, | Performed by: INTERNAL MEDICINE

## 2017-11-09 RX ORDER — ACETAMINOPHEN AND CODEINE PHOSPHATE 300; 30 MG/1; MG/1
1 TABLET ORAL
Qty: 90 TABLET | Refills: 2 | Status: SHIPPED | OUTPATIENT
Start: 2017-11-09 | End: 2017-12-09

## 2017-11-09 RX ORDER — CARVEDILOL 6.25 MG/1
6.25 TABLET ORAL 2 TIMES DAILY WITH MEALS
Qty: 60 TABLET | Refills: 11 | Status: ON HOLD | OUTPATIENT
Start: 2017-11-09 | End: 2017-11-15

## 2017-11-09 RX ORDER — TRIAMCINOLONE ACETONIDE 40 MG/ML
40 INJECTION, SUSPENSION INTRA-ARTICULAR; INTRAMUSCULAR
Status: DISCONTINUED | OUTPATIENT
Start: 2017-11-09 | End: 2017-11-10 | Stop reason: HOSPADM

## 2017-11-09 RX ADMIN — TRIAMCINOLONE ACETONIDE 40 MG: 40 INJECTION, SUSPENSION INTRA-ARTICULAR; INTRAMUSCULAR at 11:11

## 2017-11-09 NOTE — PROCEDURES
Large Joint Aspiration/Injection  Date/Time: 11/9/2017 11:21 AM  Performed by: DAISHA JOSEPH  Authorized by: DAISHA JOSEPH     Consent Done?:  Yes (Verbal)  Indications:  Pain  Timeout: Prior to procedure the correct patient, procedure, and site was verified      Location:  Shoulder  Site:  R glenohumeral  Prep: Patient was prepped and draped in usual sterile fashion    Needle size:  25 G  Approach:  Posterior  Medications:  40 mg triamcinolone acetonide 40 mg/mL  Patient tolerance:  Patient tolerated the procedure well with no immediate complications

## 2017-11-09 NOTE — TELEPHONE ENCOUNTER
Spoke to wife who stated:  1.Patient left hospital on honey-thick liquids per MBSS on 4/27/2017.   2.Remained on honey-thick liquids through rehab at [facility].   3.Home health ST recommended he discontinue use of thickener about three months ago.  4. He is now having difficulty coughing when drinking.    Gave wife number for them to schedule visit ordered by Dr. Blakely today with Outpatient Rehab speech therapist.  Suggested he use thickener until his visit if it helps reduce coughing.

## 2017-11-11 PROBLEM — I21.4 NSTEMI (NON-ST ELEVATED MYOCARDIAL INFARCTION): Status: RESOLVED | Noted: 2017-04-27 | Resolved: 2017-11-11

## 2017-11-12 NOTE — PROGRESS NOTES
Subjective:    Patient ID:  Jorden Shafer is a 72 y.o. male who presents for follow-up of Coronary Artery Disease      HPI  Mr. Shafer is a 71 y/o gentleman with PMH CAD s/p CABG (1989), DVT, DM II, MI, defibrillator placement who was experienced a Right MCA stroke followed by an NSTEMI in April 2017 . He was found to have >90% stenosis of his R-ICA on CTA.  He underwent carotid angiography on 6/2/17 that demonstrate bilateral high grade carotid stenosis with severe calcification.  He was referred to Dr. Galindo, vascular surgery, for evaluation for CEA.  The patient was deemed to be to be at high risk for awake/ cervical block CEA and at prohibitive risk for CEA under GETA.  He underwent successful right carotid artery stent placement on 7/5/17 and left carotid stent placement on 8/25/17. In the interim since then the patient denies any new neurologic symptoms. He denies any visual changes or new weakness. Due to a h/o angina 3-4 times a week and an abnormal stress test he underwent cardiac cath on 10/13/17. He underwent PCI of SVG to OM2 with a 4X20 VENKATA. Since then the only chest pain he reports occurs after coughing.  He reports shortness of breath after walking 1 block   He reports dependent LE edema. He denies, orthopnea but reports PND every 2-3 days.  He reports good compliance with his CPAP. He denies palpitations, syncope, or near syncope. He reports good medication compliance and adherence to a heart healthy diet.  He does not have full range of motion or strength in in left hand or arm, but states he is doing his OT/PT prescribed exercises. He reports he has not started phase 2 cardiac rehab because he cannot afford it. He reports good medication compliance, but has nto taken his medications today yet.      Past Medical History:   Diagnosis Date    Back pain     CAD (coronary artery disease) 1989    h/o cabg x 5    Carotid artery occlusion     Chronic kidney disease     Colon polyp     h/o on  colonoscopy    Contact dermatitis 11/12/2013    CVA (cerebral vascular accident)     March 2010    Depression     Diabetes mellitus type II     on  insulin pump    DVT (deep venous thrombosis) 1980's    left leg    GERD (gastroesophageal reflux disease)     Heart failure     [facility]     Hyperlipidemia     Hypertension     Hypertensive heart disease without congestive heart failure     Insulin pump status 4/25/2017    Myocardial infarction     Neuropathy of lower extremity     Obesity (BMI 30-39.9) 4/25/2017    Prostate cancer     Renal manifestation of secondary diabetes mellitus     Sleep apnea     Stroke 4/21/2017    Type 2 diabetes mellitus with diabetic polyneuropathy, with long-term current use of insulin 4/25/2017    Type 2 diabetes mellitus with hyperglycemia, with long-term current use of insulin 9/26/2012    Currently sees Dr. Britton     Unstable angina     Vitamin D deficiency disease      Past Surgical History:   Procedure Laterality Date    CARDIAC DEFIBRILLATOR PLACEMENT  06/16/2016    CHOLECYSTECTOMY      CORONARY ARTERY BYPASS GRAFT      bypass times  5    defibulater  06/16/2016    EYE SURGERY Bilateral     HERNIA REPAIR      groin x2  and umbilical    JOINT REPLACEMENT      knee left    thrombus Left     >2005    VASECTOMY         Current Outpatient Prescriptions on File Prior to Visit   Medication Sig Dispense Refill    aspirin (ECOTRIN) 81 MG EC tablet Take 81 mg by mouth once daily.      citalopram (CELEXA) 10 MG tablet Take 1 tablet (10 mg total) by mouth once daily. 90 tablet 1    clopidogrel (PLAVIX) 75 mg tablet Take 1 tablet (75 mg total) by mouth once daily. 90 tablet 3    DEXT 70/POLYCARBOPHIL/PEG/NACL (ARTIFICIAL TEAR SOLUTION OPHT) Apply to eye 3 (three) times daily.      diclofenac sodium (VOLTAREN) 1 % Gel Apply  4 gm topical to shoulder 4x/day 300 g 1    furosemide (LASIX) 20 MG tablet Take 1 tablet (20 mg total) by mouth once daily.      gabapentin  (NEURONTIN) 300 MG capsule Take 300 mg by mouth 3 (three) times daily.      glucose 4 GM chewable tablet Take 16 g by mouth as needed for Low blood sugar.      insulin aspart (NOVOLOG) 100 unit/mL InPn pen Inject 16 Units into the skin 3 (three) times daily.  0    INSULIN DETEMIR (LEVEMIR SUBQ) Inject into the skin once daily.      omega-3 acid ethyl esters (LOVAZA) 1 gram capsule Take 2 g by mouth 3 (three) times daily.        omeprazole (PRILOSEC) 40 MG capsule Take 1 capsule (40 mg total) by mouth 2 (two) times daily. (Patient taking differently: Take 40 mg by mouth 2 (two) times daily as needed. ) 180 capsule 3    rosuvastatin (CRESTOR) 40 MG Tab Take 1 tablet (40 mg total) by mouth once daily. 30 tablet 6    tamsulosin (FLOMAX) 0.4 mg Cp24 Take 0.4 mg by mouth once daily.      blood sugar diagnostic Strp 1 strip by Misc.(Non-Drug; Combo Route) route 2 (two) times daily with meals. TRUE METRIX 100 strip 11    blood-glucose meter kit Use as instructed          TRUE METRIX 1 each 0    lancets Misc 1 lancet by Misc.(Non-Drug; Combo Route) route 2 (two) times daily with meals. TRUE METRIX 100 each 11    nitroGLYCERIN (NITROSTAT) 0.4 MG SL tablet Place 1 tablet (0.4 mg total) under the tongue every 5 (five) minutes as needed. Sublingual  ( under tongue) as needed 30 tablet prn     No current facility-administered medications on file prior to visit.      Review of patient's allergies indicates:  No Known Allergies     Social History   Substance Use Topics    Smoking status: Former Smoker     Packs/day: 4.00     Years: 25.00     Types: Cigarettes     Quit date: 1980    Smokeless tobacco: Never Used    Alcohol use No     Family History   Problem Relation Age of Onset    Alzheimer's disease Mother      Living    Heart disease Mother     Dementia Mother     Asbestos Father          Cancer Father     Diabetes type II Maternal Aunt     Stroke Neg Hx        Review of Systems   Constitution:  "Negative for decreased appetite, diaphoresis, fever, malaise/fatigue, weight gain and weight loss.   HENT: Negative for congestion, nosebleeds and sore throat.    Eyes: Negative for blurred vision, vision loss in left eye, vision loss in right eye and visual disturbance.   Cardiovascular: Positive for dyspnea on exertion. Negative for chest pain, claudication, leg swelling, near-syncope, orthopnea, palpitations, paroxysmal nocturnal dyspnea and syncope.   Respiratory: Negative for cough, hemoptysis, shortness of breath and wheezing.    Endocrine: Negative for polyuria.   Hematologic/Lymphatic: Does not bruise/bleed easily.   Skin: Negative for nail changes and rash.   Musculoskeletal: Positive for muscle weakness. Negative for back pain, muscle cramps and myalgias.   Gastrointestinal: Negative for abdominal pain, change in bowel habit, diarrhea, heartburn, hematemesis, hematochezia, melena, nausea and vomiting.   Genitourinary: Negative for bladder incontinence, dysuria, frequency and hematuria.   Psychiatric/Behavioral: Negative for depression.   Allergic/Immunologic: Negative for hives.        Objective:  Vitals:    11/09/17 0813 11/09/17 0816   BP: (!) 155/74 (!) 154/73   BP Location: Left arm Right arm   Patient Position: Sitting Sitting   BP Method: Large (Automatic) Large (Automatic)   Pulse: 63 63   SpO2: 98% 98%   Weight: 108.3 kg (238 lb 12.1 oz) 108.3 kg (238 lb 12.1 oz)   Height: 5' 8" (1.727 m) 5' 8" (1.727 m)         Physical Exam   Constitutional: He is oriented to person, place, and time. He appears well-developed and well-nourished.   HENT:   Head: Normocephalic and atraumatic.   Eyes: EOM are normal. Pupils are equal, round, and reactive to light.   Neck: Neck supple. No JVD present. No thyromegaly present.   Cardiovascular: Normal rate, regular rhythm and normal heart sounds.  PMI is displaced.  Exam reveals no gallop and no friction rub.    No murmur heard.  Pulses:       Carotid pulses are 2+ on " the right side, and 2+ on the left side.       Radial pulses are 2+ on the right side, and 2+ on the left side.        Femoral pulses are 2+ on the right side, and 2+ on the left side.       Dorsalis pedis pulses are 2+ on the right side, and 2+ on the left side.        Posterior tibial pulses are 2+ on the right side, and 2+ on the left side.   Pulmonary/Chest: Effort normal. He has no wheezes. He has no rhonchi. He has no rales.   Abdominal: Soft. Normal appearance. He exhibits no distension. There is no hepatosplenomegaly. There is no tenderness.   Neurological: He is alert and oriented to person, place, and time. Gait normal.   Psychiatric: He has a normal mood and affect.         Assessment:       1. Essential hypertension    2. Mixed hyperlipidemia    3. Chronic combined systolic and diastolic heart failure    4. Old MI (myocardial infarction)    5. Type 2 diabetes mellitus with hyperglycemia, with long-term current use of insulin    6. Gastroesophageal reflux disease without esophagitis    7. KELVIN on CPAP    8. Severe obesity with body mass index (BMI) of 35.0 to 39.9 with comorbidity         Plan:       1) CAD.  The patient no loner has CCS3 angina.  He us free of angina.  The only chest pain he reports is after he coughs vigorously.   -continue EC ASA 81mg po qday  -continue Plavix 75mg po qday  -continue statin  -continue COreg 3.125mg po BID  -as the pateint cannot afford cardiac rehab, rec waling program with eventual goal of walking 60 minutes, 5 daysa week    2) s/p Bilateral carotid stenosis. Patient denies new neurologic symptoms. 9/28/17 carotid duplex reviewed  -continue DAPT  -continue statin     3) ICM. Patient appears euvolemic on exam and reports NYHA class 2 symptoms  -medical management as above  -rec low sodium, heart healthy det     4) Dyslipidemia. 5/18/17 lipid panel reviewed  -continue high intensity statin     5) DM2. 4/12/17 HgbA1c = 9.3; rec tight glycemic control     6) H/o GERD.  Asymptomatic on PPI; continue PPI     7) KELVIN . The patient reports good compliance with his CPAP; patient was asked to bring his CPAP machine to use during his cardiac cath procedure     8) HTN. Blood pressure inadequately controlled in clinic today, but the patient has not yet taken his medication today; continue current medications    9) Difficulty swallowing. The patient reports this symptoms since his CVA; will refer the patient to speech therapy     All of the patient's questions were answered.

## 2017-11-13 ENCOUNTER — HOSPITAL ENCOUNTER (OUTPATIENT)
Facility: HOSPITAL | Age: 72
Discharge: HOME | End: 2017-11-15
Attending: EMERGENCY MEDICINE | Admitting: HOSPITALIST
Payer: MEDICARE

## 2017-11-13 ENCOUNTER — TELEPHONE (OUTPATIENT)
Dept: PAIN MEDICINE | Facility: CLINIC | Age: 72
End: 2017-11-13

## 2017-11-13 DIAGNOSIS — R79.89 ELEVATED TROPONIN: Primary | ICD-10-CM

## 2017-11-13 DIAGNOSIS — K76.0 HEPATIC STEATOSIS: Chronic | ICD-10-CM

## 2017-11-13 DIAGNOSIS — R07.9 CHEST PAIN: ICD-10-CM

## 2017-11-13 LAB
ALBUMIN SERPL BCP-MCNC: 3.8 G/DL
ALP SERPL-CCNC: 88 U/L
ALT SERPL W/O P-5'-P-CCNC: 30 U/L
ANION GAP SERPL CALC-SCNC: 11 MMOL/L
AST SERPL-CCNC: 30 U/L
BASOPHILS # BLD AUTO: 0.06 K/UL
BASOPHILS NFR BLD: 0.5 %
BILIRUB SERPL-MCNC: 1.2 MG/DL
BNP SERPL-MCNC: 210 PG/ML
BUN SERPL-MCNC: 17 MG/DL
CALCIUM SERPL-MCNC: 9 MG/DL
CHLORIDE SERPL-SCNC: 104 MMOL/L
CO2 SERPL-SCNC: 22 MMOL/L
CREAT SERPL-MCNC: 1 MG/DL
DIFFERENTIAL METHOD: ABNORMAL
EOSINOPHIL # BLD AUTO: 0 K/UL
EOSINOPHIL NFR BLD: 0.1 %
ERYTHROCYTE [DISTWIDTH] IN BLOOD BY AUTOMATED COUNT: 14.3 %
EST. GFR  (AFRICAN AMERICAN): >60 ML/MIN/1.73 M^2
EST. GFR  (NON AFRICAN AMERICAN): >60 ML/MIN/1.73 M^2
GLUCOSE SERPL-MCNC: 156 MG/DL
HCT VFR BLD AUTO: 47.3 %
HGB BLD-MCNC: 16.4 G/DL
IMM GRANULOCYTES # BLD AUTO: 0.08 K/UL
IMM GRANULOCYTES NFR BLD AUTO: 0.7 %
LYMPHOCYTES # BLD AUTO: 1.1 K/UL
LYMPHOCYTES NFR BLD: 9.2 %
MCH RBC QN AUTO: 28 PG
MCHC RBC AUTO-ENTMCNC: 34.7 G/DL
MCV RBC AUTO: 81 FL
MONOCYTES # BLD AUTO: 0.6 K/UL
MONOCYTES NFR BLD: 4.9 %
NEUTROPHILS # BLD AUTO: 10 K/UL
NEUTROPHILS NFR BLD: 84.6 %
NRBC BLD-RTO: 0 /100 WBC
PLATELET # BLD AUTO: 171 K/UL
PMV BLD AUTO: 10.6 FL
POTASSIUM SERPL-SCNC: 4.1 MMOL/L
PROT SERPL-MCNC: 7.1 G/DL
RBC # BLD AUTO: 5.86 M/UL
SODIUM SERPL-SCNC: 137 MMOL/L
TROPONIN I SERPL DL<=0.01 NG/ML-MCNC: 0.03 NG/ML
WBC # BLD AUTO: 11.8 K/UL

## 2017-11-13 PROCEDURE — 83880 ASSAY OF NATRIURETIC PEPTIDE: CPT

## 2017-11-13 PROCEDURE — 99284 EMERGENCY DEPT VISIT MOD MDM: CPT | Mod: ,,, | Performed by: EMERGENCY MEDICINE

## 2017-11-13 PROCEDURE — 99285 EMERGENCY DEPT VISIT HI MDM: CPT | Mod: 25

## 2017-11-13 PROCEDURE — 80053 COMPREHEN METABOLIC PANEL: CPT

## 2017-11-13 PROCEDURE — 93010 ELECTROCARDIOGRAM REPORT: CPT | Mod: ,,, | Performed by: INTERNAL MEDICINE

## 2017-11-13 PROCEDURE — 25000003 PHARM REV CODE 250: Performed by: EMERGENCY MEDICINE

## 2017-11-13 PROCEDURE — 93005 ELECTROCARDIOGRAM TRACING: CPT

## 2017-11-13 PROCEDURE — 93010 ELECTROCARDIOGRAM REPORT: CPT | Mod: 76,,, | Performed by: INTERNAL MEDICINE

## 2017-11-13 PROCEDURE — 85025 COMPLETE CBC W/AUTO DIFF WBC: CPT

## 2017-11-13 PROCEDURE — 84484 ASSAY OF TROPONIN QUANT: CPT

## 2017-11-13 RX ORDER — ASPIRIN 325 MG
325 TABLET ORAL
Status: COMPLETED | OUTPATIENT
Start: 2017-11-13 | End: 2017-11-13

## 2017-11-13 RX ADMIN — ASPIRIN 325 MG ORAL TABLET 325 MG: 325 PILL ORAL at 09:11

## 2017-11-13 NOTE — PROVIDER PROGRESS NOTES - EMERGENCY DEPT.
Encounter Date: 11/13/2017    ED Physician Progress Notes         EKG - STEMI Decision  Initial Reading: No STEMI present.    I, Festus Tavera, am scribing for, and in the presence of, Dr. Herndon. I performed the above scribed service and the documentation accurately describes the services I performed. I attest to the accuracy of the note.

## 2017-11-13 NOTE — TELEPHONE ENCOUNTER
Left voice message asking for a return call to schedule for a Left genicular nerve knee block with Dr. Boyd  Referred by Dr. Briones.

## 2017-11-14 PROBLEM — R79.89 ELEVATED TROPONIN: Status: ACTIVE | Noted: 2017-11-14

## 2017-11-14 PROBLEM — I25.10 CAD (CORONARY ARTERY DISEASE): Status: ACTIVE | Noted: 2017-11-14

## 2017-11-14 PROBLEM — R07.9 CHEST PAIN: Status: ACTIVE | Noted: 2017-11-14

## 2017-11-14 LAB
ALBUMIN SERPL BCP-MCNC: 3.5 G/DL
ALP SERPL-CCNC: 90 U/L
ALT SERPL W/O P-5'-P-CCNC: 27 U/L
ANION GAP SERPL CALC-SCNC: 13 MMOL/L
AST SERPL-CCNC: 28 U/L
BASOPHILS # BLD AUTO: 0.05 K/UL
BASOPHILS NFR BLD: 0.5 %
BILIRUB SERPL-MCNC: 1.5 MG/DL
BUN SERPL-MCNC: 19 MG/DL
CALCIUM SERPL-MCNC: 8.5 MG/DL
CHLORIDE SERPL-SCNC: 103 MMOL/L
CO2 SERPL-SCNC: 22 MMOL/L
CREAT SERPL-MCNC: 1 MG/DL
DIASTOLIC DYSFUNCTION: YES
DIFFERENTIAL METHOD: ABNORMAL
EOSINOPHIL # BLD AUTO: 0 K/UL
EOSINOPHIL NFR BLD: 0.1 %
ERYTHROCYTE [DISTWIDTH] IN BLOOD BY AUTOMATED COUNT: 14.1 %
EST. GFR  (AFRICAN AMERICAN): >60 ML/MIN/1.73 M^2
EST. GFR  (NON AFRICAN AMERICAN): >60 ML/MIN/1.73 M^2
ESTIMATED AVG GLUCOSE: 186 MG/DL
ESTIMATED PA SYSTOLIC PRESSURE: 30.25
GLUCOSE SERPL-MCNC: 191 MG/DL
HBA1C MFR BLD HPLC: 8.1 %
HCT VFR BLD AUTO: 45.8 %
HGB BLD-MCNC: 15.7 G/DL
IMM GRANULOCYTES # BLD AUTO: 0.05 K/UL
IMM GRANULOCYTES NFR BLD AUTO: 0.5 %
LYMPHOCYTES # BLD AUTO: 1.4 K/UL
LYMPHOCYTES NFR BLD: 13.8 %
MAGNESIUM SERPL-MCNC: 1.3 MG/DL
MCH RBC QN AUTO: 28 PG
MCHC RBC AUTO-ENTMCNC: 34.3 G/DL
MCV RBC AUTO: 82 FL
MITRAL VALVE MOBILITY: NORMAL
MITRAL VALVE REGURGITATION: ABNORMAL
MONOCYTES # BLD AUTO: 0.6 K/UL
MONOCYTES NFR BLD: 6.3 %
NEUTROPHILS # BLD AUTO: 8 K/UL
NEUTROPHILS NFR BLD: 78.8 %
NRBC BLD-RTO: 0 /100 WBC
PHOSPHATE SERPL-MCNC: 3.8 MG/DL
PLATELET # BLD AUTO: 153 K/UL
PMV BLD AUTO: 10.9 FL
POCT GLUCOSE: 190 MG/DL (ref 70–110)
POCT GLUCOSE: 225 MG/DL (ref 70–110)
POCT GLUCOSE: 251 MG/DL (ref 70–110)
POCT GLUCOSE: 281 MG/DL (ref 70–110)
POTASSIUM SERPL-SCNC: 3.9 MMOL/L
PROT SERPL-MCNC: 6.9 G/DL
RBC # BLD AUTO: 5.6 M/UL
RETIRED EF AND QEF - SEE NOTES: 45 (ref 55–65)
SODIUM SERPL-SCNC: 138 MMOL/L
TRICUSPID VALVE REGURGITATION: ABNORMAL
TROPONIN I SERPL DL<=0.01 NG/ML-MCNC: 0.03 NG/ML
WBC # BLD AUTO: 10.09 K/UL

## 2017-11-14 PROCEDURE — 63600175 PHARM REV CODE 636 W HCPCS: Performed by: HOSPITALIST

## 2017-11-14 PROCEDURE — 93306 TTE W/DOPPLER COMPLETE: CPT

## 2017-11-14 PROCEDURE — 85025 COMPLETE CBC W/AUTO DIFF WBC: CPT

## 2017-11-14 PROCEDURE — 80053 COMPREHEN METABOLIC PANEL: CPT

## 2017-11-14 PROCEDURE — 63600175 PHARM REV CODE 636 W HCPCS: Performed by: NURSE PRACTITIONER

## 2017-11-14 PROCEDURE — 84100 ASSAY OF PHOSPHORUS: CPT

## 2017-11-14 PROCEDURE — 84484 ASSAY OF TROPONIN QUANT: CPT | Mod: 91

## 2017-11-14 PROCEDURE — G0378 HOSPITAL OBSERVATION PER HR: HCPCS

## 2017-11-14 PROCEDURE — 25000003 PHARM REV CODE 250: Performed by: NURSE PRACTITIONER

## 2017-11-14 PROCEDURE — 99214 OFFICE O/P EST MOD 30 MIN: CPT | Mod: GC,,, | Performed by: INTERNAL MEDICINE

## 2017-11-14 PROCEDURE — 83735 ASSAY OF MAGNESIUM: CPT

## 2017-11-14 PROCEDURE — 36415 COLL VENOUS BLD VENIPUNCTURE: CPT

## 2017-11-14 PROCEDURE — 93306 TTE W/DOPPLER COMPLETE: CPT | Mod: 26,,, | Performed by: INTERNAL MEDICINE

## 2017-11-14 PROCEDURE — 83036 HEMOGLOBIN GLYCOSYLATED A1C: CPT

## 2017-11-14 PROCEDURE — 99220 PR INITIAL OBSERVATION CARE,LEVL III: CPT | Mod: ,,, | Performed by: NURSE PRACTITIONER

## 2017-11-14 RX ORDER — ASPIRIN 81 MG/1
81 TABLET ORAL DAILY
Status: DISCONTINUED | OUTPATIENT
Start: 2017-11-14 | End: 2017-11-15 | Stop reason: HOSPADM

## 2017-11-14 RX ORDER — AMOXICILLIN 250 MG
1 CAPSULE ORAL 2 TIMES DAILY PRN
Status: DISCONTINUED | OUTPATIENT
Start: 2017-11-14 | End: 2017-11-15 | Stop reason: HOSPADM

## 2017-11-14 RX ORDER — CARVEDILOL 3.12 MG/1
6.25 TABLET ORAL 2 TIMES DAILY WITH MEALS
Status: DISCONTINUED | OUTPATIENT
Start: 2017-11-14 | End: 2017-11-14

## 2017-11-14 RX ORDER — IPRATROPIUM BROMIDE AND ALBUTEROL SULFATE 2.5; .5 MG/3ML; MG/3ML
3 SOLUTION RESPIRATORY (INHALATION) EVERY 4 HOURS PRN
Status: DISCONTINUED | OUTPATIENT
Start: 2017-11-14 | End: 2017-11-15 | Stop reason: HOSPADM

## 2017-11-14 RX ORDER — ISOSORBIDE MONONITRATE 30 MG/1
30 TABLET, EXTENDED RELEASE ORAL DAILY
Status: DISCONTINUED | OUTPATIENT
Start: 2017-11-15 | End: 2017-11-15 | Stop reason: HOSPADM

## 2017-11-14 RX ORDER — ENOXAPARIN SODIUM 100 MG/ML
40 INJECTION SUBCUTANEOUS EVERY 24 HOURS
Status: DISCONTINUED | OUTPATIENT
Start: 2017-11-14 | End: 2017-11-15 | Stop reason: HOSPADM

## 2017-11-14 RX ORDER — PANTOPRAZOLE SODIUM 40 MG/1
40 TABLET, DELAYED RELEASE ORAL DAILY
Status: DISCONTINUED | OUTPATIENT
Start: 2017-11-14 | End: 2017-11-15 | Stop reason: HOSPADM

## 2017-11-14 RX ORDER — IBUPROFEN 200 MG
24 TABLET ORAL
Status: DISCONTINUED | OUTPATIENT
Start: 2017-11-14 | End: 2017-11-15 | Stop reason: HOSPADM

## 2017-11-14 RX ORDER — HYDRALAZINE HYDROCHLORIDE 25 MG/1
25 TABLET, FILM COATED ORAL EVERY 6 HOURS PRN
Status: DISCONTINUED | OUTPATIENT
Start: 2017-11-14 | End: 2017-11-15 | Stop reason: HOSPADM

## 2017-11-14 RX ORDER — CLOPIDOGREL BISULFATE 75 MG/1
75 TABLET ORAL DAILY
Status: DISCONTINUED | OUTPATIENT
Start: 2017-11-14 | End: 2017-11-15 | Stop reason: HOSPADM

## 2017-11-14 RX ORDER — FUROSEMIDE 20 MG/1
20 TABLET ORAL DAILY
Status: DISCONTINUED | OUTPATIENT
Start: 2017-11-14 | End: 2017-11-15 | Stop reason: HOSPADM

## 2017-11-14 RX ORDER — ACETAMINOPHEN 325 MG/1
650 TABLET ORAL EVERY 6 HOURS PRN
Status: DISCONTINUED | OUTPATIENT
Start: 2017-11-14 | End: 2017-11-15 | Stop reason: HOSPADM

## 2017-11-14 RX ORDER — ONDANSETRON 2 MG/ML
4 INJECTION INTRAMUSCULAR; INTRAVENOUS EVERY 8 HOURS PRN
Status: DISCONTINUED | OUTPATIENT
Start: 2017-11-14 | End: 2017-11-15 | Stop reason: HOSPADM

## 2017-11-14 RX ORDER — IBUPROFEN 200 MG
16 TABLET ORAL
Status: DISCONTINUED | OUTPATIENT
Start: 2017-11-14 | End: 2017-11-15 | Stop reason: HOSPADM

## 2017-11-14 RX ORDER — NITROGLYCERIN 0.4 MG/1
0.4 TABLET SUBLINGUAL EVERY 5 MIN PRN
Status: DISCONTINUED | OUTPATIENT
Start: 2017-11-14 | End: 2017-11-15 | Stop reason: HOSPADM

## 2017-11-14 RX ORDER — TAMSULOSIN HYDROCHLORIDE 0.4 MG/1
0.4 CAPSULE ORAL DAILY
Status: DISCONTINUED | OUTPATIENT
Start: 2017-11-14 | End: 2017-11-15 | Stop reason: HOSPADM

## 2017-11-14 RX ORDER — MAGNESIUM SULFATE HEPTAHYDRATE 40 MG/ML
2 INJECTION, SOLUTION INTRAVENOUS
Status: COMPLETED | OUTPATIENT
Start: 2017-11-14 | End: 2017-11-14

## 2017-11-14 RX ORDER — GLUCAGON 1 MG
1 KIT INJECTION
Status: DISCONTINUED | OUTPATIENT
Start: 2017-11-14 | End: 2017-11-15 | Stop reason: HOSPADM

## 2017-11-14 RX ORDER — HYDROCODONE BITARTRATE AND ACETAMINOPHEN 5; 325 MG/1; MG/1
1 TABLET ORAL EVERY 8 HOURS PRN
Status: DISCONTINUED | OUTPATIENT
Start: 2017-11-14 | End: 2017-11-15 | Stop reason: HOSPADM

## 2017-11-14 RX ORDER — INSULIN ASPART 100 [IU]/ML
0-5 INJECTION, SOLUTION INTRAVENOUS; SUBCUTANEOUS
Status: DISCONTINUED | OUTPATIENT
Start: 2017-11-14 | End: 2017-11-15 | Stop reason: HOSPADM

## 2017-11-14 RX ORDER — CITALOPRAM 10 MG/1
10 TABLET ORAL DAILY
Status: DISCONTINUED | OUTPATIENT
Start: 2017-11-14 | End: 2017-11-15 | Stop reason: HOSPADM

## 2017-11-14 RX ORDER — OMEGA-3-ACID ETHYL ESTERS 1 G/1
2 CAPSULE, LIQUID FILLED ORAL 2 TIMES DAILY
Status: DISCONTINUED | OUTPATIENT
Start: 2017-11-14 | End: 2017-11-15 | Stop reason: HOSPADM

## 2017-11-14 RX ORDER — GABAPENTIN 100 MG/1
300 CAPSULE ORAL 3 TIMES DAILY
Status: DISCONTINUED | OUTPATIENT
Start: 2017-11-14 | End: 2017-11-15 | Stop reason: HOSPADM

## 2017-11-14 RX ORDER — SODIUM CHLORIDE 0.9 % (FLUSH) 0.9 %
5 SYRINGE (ML) INJECTION
Status: DISCONTINUED | OUTPATIENT
Start: 2017-11-14 | End: 2017-11-15 | Stop reason: HOSPADM

## 2017-11-14 RX ORDER — ROSUVASTATIN CALCIUM 20 MG/1
40 TABLET, COATED ORAL DAILY
Status: DISCONTINUED | OUTPATIENT
Start: 2017-11-14 | End: 2017-11-15 | Stop reason: HOSPADM

## 2017-11-14 RX ORDER — CARVEDILOL 12.5 MG/1
12.5 TABLET ORAL 2 TIMES DAILY WITH MEALS
Status: DISCONTINUED | OUTPATIENT
Start: 2017-11-15 | End: 2017-11-15 | Stop reason: HOSPADM

## 2017-11-14 RX ADMIN — ASPIRIN 81 MG: 81 TABLET, COATED ORAL at 08:11

## 2017-11-14 RX ADMIN — CLOPIDOGREL 75 MG: 75 TABLET, FILM COATED ORAL at 08:11

## 2017-11-14 RX ADMIN — CARVEDILOL 6.25 MG: 3.12 TABLET, FILM COATED ORAL at 07:11

## 2017-11-14 RX ADMIN — GABAPENTIN 300 MG: 100 CAPSULE ORAL at 09:11

## 2017-11-14 RX ADMIN — GABAPENTIN 300 MG: 100 CAPSULE ORAL at 05:11

## 2017-11-14 RX ADMIN — ROSUVASTATIN CALCIUM 40 MG: 20 TABLET, FILM COATED ORAL at 08:11

## 2017-11-14 RX ADMIN — CITALOPRAM HYDROBROMIDE 10 MG: 10 TABLET ORAL at 08:11

## 2017-11-14 RX ADMIN — TAMSULOSIN HYDROCHLORIDE 0.4 MG: 0.4 CAPSULE ORAL at 08:11

## 2017-11-14 RX ADMIN — OMEGA-3-ACID ETHYL ESTERS 2 G: 1 CAPSULE, LIQUID FILLED ORAL at 08:11

## 2017-11-14 RX ADMIN — HYDROCODONE BITARTRATE AND ACETAMINOPHEN 1 TABLET: 5; 325 TABLET ORAL at 05:11

## 2017-11-14 RX ADMIN — PANTOPRAZOLE SODIUM 40 MG: 40 TABLET, DELAYED RELEASE ORAL at 08:11

## 2017-11-14 RX ADMIN — INSULIN ASPART 1 UNITS: 100 INJECTION, SOLUTION INTRAVENOUS; SUBCUTANEOUS at 09:11

## 2017-11-14 RX ADMIN — MAGNESIUM SULFATE IN WATER 2 G: 40 INJECTION, SOLUTION INTRAVENOUS at 02:11

## 2017-11-14 RX ADMIN — CARVEDILOL 6.25 MG: 3.12 TABLET, FILM COATED ORAL at 05:11

## 2017-11-14 RX ADMIN — ENOXAPARIN SODIUM 40 MG: 40 INJECTION SUBCUTANEOUS at 05:11

## 2017-11-14 RX ADMIN — GABAPENTIN 300 MG: 100 CAPSULE ORAL at 02:11

## 2017-11-14 RX ADMIN — FUROSEMIDE 20 MG: 20 TABLET ORAL at 08:11

## 2017-11-14 RX ADMIN — MAGNESIUM SULFATE IN WATER 2 G: 40 INJECTION, SOLUTION INTRAVENOUS at 11:11

## 2017-11-14 RX ADMIN — INSULIN ASPART 2 UNITS: 100 INJECTION, SOLUTION INTRAVENOUS; SUBCUTANEOUS at 11:11

## 2017-11-14 RX ADMIN — ONDANSETRON 4 MG: 2 INJECTION INTRAMUSCULAR; INTRAVENOUS at 10:11

## 2017-11-14 RX ADMIN — INSULIN ASPART 3 UNITS: 100 INJECTION, SOLUTION INTRAVENOUS; SUBCUTANEOUS at 07:11

## 2017-11-14 NOTE — NURSING
Patient arrived on floor accompanied by transport and family, vital signs taken, will continue to monitor.

## 2017-11-14 NOTE — ASSESSMENT & PLAN NOTE
- troponin 0.033 (baseline 0.015-0.027)  - trend Q 6 x 3  - if levels trend upward or if symptomatic consult cardiology

## 2017-11-14 NOTE — ED PROVIDER NOTES
Encounter Date: 11/13/2017       History     Chief Complaint   Patient presents with    Chest Pain     Chest pain x2 days, reproducable with inspiration and cough. Pt denies N/V.     HPI     72-year-old male presents for evaluation of 2 days of chest pain.  Pain started yesterday and has progressively worsened.  Pain is sharp and left sided with radiation to the left shoulder.  Symptoms are worsened with cough, breathing, or movement.  Patient reports that he has been coughing a lot over the last several days.  Positive association with shortness of breath.  Patient reports that he took 4 nitroglycerin's today at home with no relief.  Review of patient's allergies indicates:      No Known Allergies  Past Medical History:   Diagnosis Date    Back pain     CAD (coronary artery disease) 1989    h/o cabg x 5    Carotid artery occlusion     Chronic kidney disease     Colon polyp     h/o on colonoscopy    Contact dermatitis 11/12/2013    CVA (cerebral vascular accident)     March 2010    Depression     Diabetes mellitus type II     on  insulin pump    DVT (deep venous thrombosis) 1980's    left leg    GERD (gastroesophageal reflux disease)     Heart failure     [facility]     Hyperlipidemia     Hypertension     Hypertensive heart disease without congestive heart failure     Insulin pump status 4/25/2017    Myocardial infarction     Neuropathy of lower extremity     Obesity (BMI 30-39.9) 4/25/2017    Prostate cancer     Renal manifestation of secondary diabetes mellitus     Sleep apnea     Stroke 4/21/2017    Type 2 diabetes mellitus with diabetic polyneuropathy, with long-term current use of insulin 4/25/2017    Type 2 diabetes mellitus with hyperglycemia, with long-term current use of insulin 9/26/2012    Currently sees Dr. Britton     Unstable angina     Vitamin D deficiency disease      Past Surgical History:   Procedure Laterality Date    CARDIAC DEFIBRILLATOR PLACEMENT  06/16/2016     CHOLECYSTECTOMY      CORONARY ARTERY BYPASS GRAFT      bypass times  5    defibulater  2016    EYE SURGERY Bilateral     HERNIA REPAIR      groin x2  and umbilical    JOINT REPLACEMENT      knee left    thrombus Left     >2005    VASECTOMY       Family History   Problem Relation Age of Onset    Alzheimer's disease Mother      Living    Heart disease Mother     Dementia Mother     Asbestos Father          Cancer Father     Diabetes type II Maternal Aunt     Stroke Neg Hx      Social History   Substance Use Topics    Smoking status: Former Smoker     Packs/day: 4.00     Years: 25.00     Types: Cigarettes     Quit date: 1980    Smokeless tobacco: Never Used    Alcohol use No     Review of Systems   Constitutional: Positive for diaphoresis. Negative for chills and fever.   HENT: Negative for sore throat and voice change.    Eyes: Negative for pain and visual disturbance.   Respiratory: Positive for cough, chest tightness and shortness of breath.    Cardiovascular: Positive for chest pain. Negative for palpitations and leg swelling.   Gastrointestinal: Positive for abdominal pain, nausea and vomiting.   Genitourinary: Negative for dysuria and flank pain.   Musculoskeletal: Negative for back pain and neck pain.   Skin: Negative for rash and wound.   Neurological: Negative for weakness, numbness and headaches.       Physical Exam     Initial Vitals [17 1646]   BP Pulse Resp Temp SpO2   (!) 144/82 88 18 98.7 °F (37.1 °C) 97 %      MAP       102.67         Physical Exam    Constitutional: He is not diaphoretic. He is Obese . No distress.   HENT:   Head: Normocephalic and atraumatic.   Eyes: Conjunctivae are normal. No scleral icterus.   Neck: Normal range of motion. Neck supple.   Cardiovascular: Normal rate, regular rhythm and normal heart sounds.   Pulmonary/Chest: Breath sounds normal. No respiratory distress. He has no wheezes.   Abdominal: Soft. There is no tenderness.    Musculoskeletal: He exhibits no edema or tenderness.   Neurological: He is alert and oriented to person, place, and time. No cranial nerve deficit.   Skin: Skin is warm and dry.         ED Course   Procedures  Labs Reviewed   CBC W/ AUTO DIFFERENTIAL - Abnormal; Notable for the following:        Result Value    MCV 81 (*)     Immature Granulocytes 0.7 (*)     Gran # 10.0 (*)     Immature Grans (Abs) 0.08 (*)     Gran% 84.6 (*)     Lymph% 9.2 (*)     All other components within normal limits   COMPREHENSIVE METABOLIC PANEL - Abnormal; Notable for the following:     CO2 22 (*)     Glucose 156 (*)     Total Bilirubin 1.2 (*)     All other components within normal limits   TROPONIN I - Abnormal; Notable for the following:     Troponin I 0.033 (*)     All other components within normal limits   B-TYPE NATRIURETIC PEPTIDE - Abnormal; Notable for the following:      (*)     All other components within normal limits   TROPONIN I            HOII MDM    DDx includes but is not limited to:ACS, PNA, PTX, pericarditis, aortic disease, MSK  A/P:  Pt is a 88 yo male 73 yo male with CP.  He has significant vascular history and history is concerning for cardiac etiology.  Initial w/u revealed minimally elevated Tn.  Remainder of labs unremarkable from baseline  CXR negative for acute processes  EKG 1 NSR at rate 87, normal intervals and no ST elevations  EKG 2 with no dynamic change to indicate ischemic process  Will admit to medicine for ACS r/o     Case Discussed with Dr. Lisandro LAMA  11/14/2017 10:57 PM                               ED Course      Clinical Impression:   The encounter diagnosis was Chest pain.                           Caryl Kang MD  Resident  11/14/17 0006

## 2017-11-14 NOTE — ED NOTES
Psychosocial: Patient is calm and cooperative. Patients insight and judgement are appropriate to situation. Appears clean, well maintained, with clothing appropriate to environment. No evidence of delusions, hallucinations, or psychosis.    Neuro: Eyes open spontaneously. Awake, alert, oriented x 4. Speech clear and appropriate.  Able to follow commands, demonstrating ability to actively and appropriately communicate within context of current conversation. Symmetrical facial muscles. Moving all extremities with bilateral weakness. Adequate muscle tone present. Movement is purposeful. Impaired sensation to bilateral lower extremities due to reported neuropathy.     Airway: Shortness of breath. Bilateral, even chest rise and fall.    CARDIAC:  Normal rhythm and rate noted.  No peripheral edema noted. Pt. reports chest pain.    Circulatory: Skin warm, dry, and consistent with ethnicity.  Intact; discoloration noted to lower extremities. Peripheral pulses noted.  No swelling noted.     Abdomen: Abdomen soft, tender and non-distended. Positive normo-active bowel sounds noted.     Skin: Discoloration noted to lower extremities.

## 2017-11-14 NOTE — HPI
72-year-old male with DM type 2, HTN, CAD (s/p CABG in 1989 with multiple subsequent PCIs, last in 10/2017), CHF (last EF 40%, s/p ICD in 2016), bilateral carotid stenosis (s/p stenting, right in 7/5/2017 and left in 8/25/2017), right MCA CVA (complicated with NSTEMI in 4/2017), HLD, and GERD who presented to the ED with chest pain for 2 days.  He described it as left-sided, radiating to his left back, squeezing, and is aggravated by cough, deep inspiration, and movement. He reports having productive cough with whitish sputum, with no hemoptysis. He endorses baseline BOSE (when walking 1-2 blocks) and orthopnea. He denies fever, chills, or leg swelling. He took 4 tablets of SL nitroglycerin before presentation with no relief of his chest pain. He reports non-compliance with his medications for the past week because his wife didn't arrange them for him. He is chest pain-free currently.    Cardiac Hx:  - CAD (s/p CABG in 1989 with multiple subsequent PCIs, last in October 2017).  - NSTEMI in April 2017.  - CHF (last EF 40%, s/p ICD in 2016).    In the ED, EKG showed NSR with no ischemic changes and his troponin was 0.033. CXR was negative for any acute process. He was admitted to hospital medicine. Troponin has flattened since admission (0.028, 0.032, 0.025). Cardiology consulted to rule out ACS.

## 2017-11-14 NOTE — HPI
73 y/o gentleman, who presents to the ED with c/o chest pain for the past two days.  He has a PMH of CAD, CHF, s/p defibrillator implantation, DMII, CVA, GERD, HTN, and HLD.  He states that his discomfort is mid sternal and sharp in nature.  He denies radiation of discomfort at this time.  He reports that discomfort is worsened with movement of coughing.  He reports that he has had an intermittent cough for the past few days and acknowledges that he has not been taking his medications.  He endorses associative shortness of breath.  He reports that he took 4 SL NTG today with no relief and decided to pursue medical attention.  He denies fever, chills, recent illness, N/V/D, or  symptoms.

## 2017-11-14 NOTE — SUBJECTIVE & OBJECTIVE
Past Medical History:   Diagnosis Date    Back pain     CAD (coronary artery disease) 1989    h/o cabg x 5    Carotid artery occlusion     Chronic kidney disease     Colon polyp     h/o on colonoscopy    Contact dermatitis 11/12/2013    CVA (cerebral vascular accident)     March 2010    Depression     Diabetes mellitus type II     on  insulin pump    DVT (deep venous thrombosis) 1980's    left leg    GERD (gastroesophageal reflux disease)     Heart failure     [facility]     Hyperlipidemia     Hypertension     Hypertensive heart disease without congestive heart failure     Insulin pump status 4/25/2017    Myocardial infarction     Neuropathy of lower extremity     Obesity (BMI 30-39.9) 4/25/2017    Prostate cancer     Renal manifestation of secondary diabetes mellitus     Sleep apnea     Stroke 4/21/2017    Type 2 diabetes mellitus with diabetic polyneuropathy, with long-term current use of insulin 4/25/2017    Type 2 diabetes mellitus with hyperglycemia, with long-term current use of insulin 9/26/2012    Currently sees Dr. Britton     Unstable angina     Vitamin D deficiency disease        Past Surgical History:   Procedure Laterality Date    CARDIAC DEFIBRILLATOR PLACEMENT  06/16/2016    CHOLECYSTECTOMY      CORONARY ARTERY BYPASS GRAFT      bypass times  5    defibulater  06/16/2016    EYE SURGERY Bilateral     HERNIA REPAIR      groin x2  and umbilical    JOINT REPLACEMENT      knee left    thrombus Left     >2005    VASECTOMY         Review of patient's allergies indicates:  No Known Allergies    No current facility-administered medications on file prior to encounter.      Current Outpatient Prescriptions on File Prior to Encounter   Medication Sig    acetaminophen-codeine 300-30mg (TYLENOL #3) 300-30 mg Tab Take 1 tablet by mouth every meal as needed.    aspirin (ECOTRIN) 81 MG EC tablet Take 81 mg by mouth once daily.    carvedilol (COREG) 6.25 MG tablet Take 1 tablet (6.25 mg  total) by mouth 2 (two) times daily with meals.    citalopram (CELEXA) 10 MG tablet Take 1 tablet (10 mg total) by mouth once daily.    clopidogrel (PLAVIX) 75 mg tablet Take 1 tablet (75 mg total) by mouth once daily.    DEXT 70/POLYCARBOPHIL/PEG/NACL (ARTIFICIAL TEAR SOLUTION OPHT) Apply to eye 3 (three) times daily.    diclofenac sodium (VOLTAREN) 1 % Gel Apply  4 gm topical to shoulder 4x/day    furosemide (LASIX) 20 MG tablet Take 1 tablet (20 mg total) by mouth once daily.    gabapentin (NEURONTIN) 300 MG capsule Take 300 mg by mouth 3 (three) times daily.    insulin aspart (NOVOLOG) 100 unit/mL InPn pen Inject 16 Units into the skin 3 (three) times daily.    INSULIN DETEMIR (LEVEMIR SUBQ) Inject into the skin once daily.    nitroGLYCERIN (NITROSTAT) 0.4 MG SL tablet Place 1 tablet (0.4 mg total) under the tongue every 5 (five) minutes as needed. Sublingual  ( under tongue) as needed    omega-3 acid ethyl esters (LOVAZA) 1 gram capsule Take 2 g by mouth 3 (three) times daily.      omeprazole (PRILOSEC) 40 MG capsule Take 1 capsule (40 mg total) by mouth 2 (two) times daily. (Patient taking differently: Take 40 mg by mouth 2 (two) times daily as needed. )    rosuvastatin (CRESTOR) 40 MG Tab Take 1 tablet (40 mg total) by mouth once daily.    tamsulosin (FLOMAX) 0.4 mg Cp24 Take 0.4 mg by mouth once daily.    blood sugar diagnostic Strp 1 strip by Misc.(Non-Drug; Combo Route) route 2 (two) times daily with meals. TRUE METRIX    blood-glucose meter kit Use as instructed          TRUE METRIX    glucose 4 GM chewable tablet Take 16 g by mouth as needed for Low blood sugar.    lancets Misc 1 lancet by Misc.(Non-Drug; Combo Route) route 2 (two) times daily with meals. TRUE METRIX     Family History     Problem Relation (Age of Onset)    Alzheimer's disease Mother    Asbestos Father    Cancer Father    Dementia Mother    Diabetes type II Maternal Aunt    Heart disease Mother        Social History Main  Topics    Smoking status: Former Smoker     Packs/day: 4.00     Years: 25.00     Types: Cigarettes     Quit date: 6/8/1980    Smokeless tobacco: Never Used    Alcohol use No    Drug use: No    Sexual activity: Yes     Partners: Female     Review of Systems   Constitution: Negative for chills and fever.   Cardiovascular: Positive for chest pain (HPI), dyspnea on exertion (baseline) and orthopnea (baseline). Negative for leg swelling.   Respiratory: Positive for cough and sputum production (whitish). Negative for hemoptysis and shortness of breath.    Musculoskeletal: Negative for falls.   Gastrointestinal: Positive for nausea and vomiting. Negative for abdominal pain.   Neurological: Negative for dizziness and focal weakness.   Psychiatric/Behavioral: Negative for altered mental status and depression.     Objective:     Vital Signs (Most Recent):  Temp: 98.2 °F (36.8 °C) (11/14/17 1223)  Pulse: 70 (11/14/17 1223)  Resp: 16 (11/14/17 1223)  BP: 130/70 (11/14/17 1223)  SpO2: (!) 93 % (11/14/17 1223) Vital Signs (24h Range):  Temp:  [97 °F (36.1 °C)-98.9 °F (37.2 °C)] 98.2 °F (36.8 °C)  Pulse:  [] 70  Resp:  [13-20] 16  SpO2:  [93 %-97 %] 93 %  BP: (125-177)/(60-88) 130/70     Weight: 102.8 kg (226 lb 10.1 oz)  Body mass index is 34.46 kg/m².    SpO2: (!) 93 %  O2 Device (Oxygen Therapy): room air      Intake/Output Summary (Last 24 hours) at 11/14/17 1434  Last data filed at 11/14/17 1341   Gross per 24 hour   Intake              470 ml   Output              460 ml   Net               10 ml       Lines/Drains/Airways     Peripheral Intravenous Line                 Peripheral IV - Single Lumen 11/13/17 0000 Left Antecubital 1 day                Physical Exam   Constitutional: He is oriented to person, place, and time. He appears well-developed and well-nourished. No distress.   HENT:   Head: Normocephalic and atraumatic.   Eyes: Pupils are equal, round, and reactive to light.   Neck: Neck supple. No JVD  present.   Cardiovascular: Normal rate, regular rhythm and normal heart sounds.    No murmur heard.  Pulmonary/Chest: Effort normal and breath sounds normal. No respiratory distress. He has no wheezes. He has no rales.   Abdominal: Soft. Bowel sounds are normal. He exhibits no distension. There is no tenderness.   Musculoskeletal: He exhibits no edema.   Neurological: He is alert and oriented to person, place, and time.   Skin: Skin is warm and dry. He is not diaphoretic.   Psychiatric: He has a normal mood and affect. His behavior is normal.   Nursing note and vitals reviewed.      Significant Labs:   CMP   Recent Labs  Lab 11/13/17 2116 11/14/17  0401    138   K 4.1 3.9    103   CO2 22* 22*   * 191*   BUN 17 19   CREATININE 1.0 1.0   CALCIUM 9.0 8.5*   PROT 7.1 6.9   ALBUMIN 3.8 3.5   BILITOT 1.2* 1.5*   ALKPHOS 88 90   AST 30 28   ALT 30 27   ANIONGAP 11 13   ESTGFRAFRICA >60.0 >60.0   EGFRNONAA >60.0 >60.0   , CBC   Recent Labs  Lab 11/13/17 2116 11/14/17  0401   WBC 11.80 10.09   HGB 16.4 15.7   HCT 47.3 45.8    153   , Troponin   Recent Labs  Lab 11/14/17  0010 11/14/17  0401 11/14/17  1137   TROPONINI 0.028* 0.032* 0.025    and All pertinent lab results from the last 24 hours have been reviewed.    Significant Imaging:   CXR: X-Ray Chest PA and Lateral (CXR):   Results for orders placed or performed during the hospital encounter of 11/13/17   X-Ray Chest PA And Lateral    Narrative    Chest PA and Lateral    Indication:Chest Pain.    Comparison:April 24, 2017.    Findings:     AICD lead appears unchanged.  Mediastinal clips and sternotomy wires appear unchanged.    Cardiomegaly with mild congestive changes, similar to prior.  Heart and lungs unchanged when allowing for differences in technique and positioning.    Impression         Cardiomegaly with mild congestive changes, similar to prior..          Electronically signed by: ONI MONK MD  Date:     11/13/17  Time:    22:53

## 2017-11-14 NOTE — ASSESSMENT & PLAN NOTE
- likely musculoskeletal - worsened with coughing - moving  - trend troponin Q 6 x 3  - maintain on telemetry  - prn pain management

## 2017-11-14 NOTE — ASSESSMENT & PLAN NOTE
- chest pain likely musculoskeletal - worsened with coughing / moving   -continue ASA, clopidogrel, statin, and beta blockade   - may want to consider cardiology consultation

## 2017-11-14 NOTE — PROVIDER PROGRESS NOTES - EMERGENCY DEPT.
Encounter Date: 11/13/2017    ED Physician Progress Notes         EKG - STEMI Decision  Initial Reading: No STEMI present.

## 2017-11-14 NOTE — SUBJECTIVE & OBJECTIVE
Past Medical History:   Diagnosis Date    Back pain     CAD (coronary artery disease) 1989    h/o cabg x 5    Carotid artery occlusion     Chronic kidney disease     Colon polyp     h/o on colonoscopy    Contact dermatitis 11/12/2013    CVA (cerebral vascular accident)     March 2010    Depression     Diabetes mellitus type II     on  insulin pump    DVT (deep venous thrombosis) 1980's    left leg    GERD (gastroesophageal reflux disease)     Heart failure     [facility]     Hyperlipidemia     Hypertension     Hypertensive heart disease without congestive heart failure     Insulin pump status 4/25/2017    Myocardial infarction     Neuropathy of lower extremity     Obesity (BMI 30-39.9) 4/25/2017    Prostate cancer     Renal manifestation of secondary diabetes mellitus     Sleep apnea     Stroke 4/21/2017    Type 2 diabetes mellitus with diabetic polyneuropathy, with long-term current use of insulin 4/25/2017    Type 2 diabetes mellitus with hyperglycemia, with long-term current use of insulin 9/26/2012    Currently sees Dr. Britton     Unstable angina     Vitamin D deficiency disease        Past Surgical History:   Procedure Laterality Date    CARDIAC DEFIBRILLATOR PLACEMENT  06/16/2016    CHOLECYSTECTOMY      CORONARY ARTERY BYPASS GRAFT      bypass times  5    defibulater  06/16/2016    EYE SURGERY Bilateral     HERNIA REPAIR      groin x2  and umbilical    JOINT REPLACEMENT      knee left    thrombus Left     >2005    VASECTOMY         Review of patient's allergies indicates:  No Known Allergies    No current facility-administered medications on file prior to encounter.      Current Outpatient Prescriptions on File Prior to Encounter   Medication Sig    acetaminophen-codeine 300-30mg (TYLENOL #3) 300-30 mg Tab Take 1 tablet by mouth every meal as needed.    aspirin (ECOTRIN) 81 MG EC tablet Take 81 mg by mouth once daily.    carvedilol (COREG) 6.25 MG tablet Take 1 tablet (6.25 mg  total) by mouth 2 (two) times daily with meals.    citalopram (CELEXA) 10 MG tablet Take 1 tablet (10 mg total) by mouth once daily.    clopidogrel (PLAVIX) 75 mg tablet Take 1 tablet (75 mg total) by mouth once daily.    DEXT 70/POLYCARBOPHIL/PEG/NACL (ARTIFICIAL TEAR SOLUTION OPHT) Apply to eye 3 (three) times daily.    diclofenac sodium (VOLTAREN) 1 % Gel Apply  4 gm topical to shoulder 4x/day    furosemide (LASIX) 20 MG tablet Take 1 tablet (20 mg total) by mouth once daily.    gabapentin (NEURONTIN) 300 MG capsule Take 300 mg by mouth 3 (three) times daily.    insulin aspart (NOVOLOG) 100 unit/mL InPn pen Inject 16 Units into the skin 3 (three) times daily.    INSULIN DETEMIR (LEVEMIR SUBQ) Inject into the skin once daily.    nitroGLYCERIN (NITROSTAT) 0.4 MG SL tablet Place 1 tablet (0.4 mg total) under the tongue every 5 (five) minutes as needed. Sublingual  ( under tongue) as needed    omega-3 acid ethyl esters (LOVAZA) 1 gram capsule Take 2 g by mouth 3 (three) times daily.      omeprazole (PRILOSEC) 40 MG capsule Take 1 capsule (40 mg total) by mouth 2 (two) times daily. (Patient taking differently: Take 40 mg by mouth 2 (two) times daily as needed. )    rosuvastatin (CRESTOR) 40 MG Tab Take 1 tablet (40 mg total) by mouth once daily.    tamsulosin (FLOMAX) 0.4 mg Cp24 Take 0.4 mg by mouth once daily.    blood sugar diagnostic Strp 1 strip by Misc.(Non-Drug; Combo Route) route 2 (two) times daily with meals. TRUE METRIX    blood-glucose meter kit Use as instructed          TRUE METRIX    glucose 4 GM chewable tablet Take 16 g by mouth as needed for Low blood sugar.    lancets Misc 1 lancet by Misc.(Non-Drug; Combo Route) route 2 (two) times daily with meals. TRUE METRIX     Family History     Problem Relation (Age of Onset)    Alzheimer's disease Mother    Asbestos Father    Cancer Father    Dementia Mother    Diabetes type II Maternal Aunt    Heart disease Mother        Social History Main  Topics    Smoking status: Former Smoker     Packs/day: 4.00     Years: 25.00     Types: Cigarettes     Quit date: 6/8/1980    Smokeless tobacco: Never Used    Alcohol use No    Drug use: No    Sexual activity: Yes     Partners: Female     Review of Systems   Constitutional: Negative for chills and fever.   HENT: Negative for congestion.    Eyes: Negative for visual disturbance.   Respiratory: Positive for cough and shortness of breath. Negative for chest tightness.    Cardiovascular: Positive for chest pain. Negative for palpitations and leg swelling.   Gastrointestinal: Positive for vomiting. Negative for abdominal distention, abdominal pain, constipation, diarrhea and nausea.   Genitourinary: Negative for dysuria and hematuria.   Musculoskeletal: Positive for arthralgias and myalgias.   Skin: Negative.  Negative for rash.   Neurological: Negative for seizures and syncope.     Objective:     Vital Signs (Most Recent):  Temp: 98.7 °F (37.1 °C) (11/13/17 1646)  Pulse: 77 (11/14/17 0011)  Resp: 16 (11/14/17 0011)  BP: (!) 144/60 (11/14/17 0011)  SpO2: 95 % (11/14/17 0011) Vital Signs (24h Range):  Temp:  [98.7 °F (37.1 °C)] 98.7 °F (37.1 °C)  Pulse:  [] 77  Resp:  [16-20] 16  SpO2:  [94 %-97 %] 95 %  BP: (137-177)/(60-88) 144/60     Weight: 106.6 kg (235 lb)  Body mass index is 35.73 kg/m².    Physical Exam   Constitutional: He is oriented to person, place, and time. He appears well-developed and well-nourished. No distress.   HENT:   Head: Normocephalic and atraumatic.   Eyes: EOM are normal. Pupils are equal, round, and reactive to light.   Neck: Normal range of motion. Neck supple.   Cardiovascular: Normal rate, regular rhythm, normal heart sounds and intact distal pulses.    Pulmonary/Chest: Effort normal and breath sounds normal. No respiratory distress.   Abdominal: Soft. Bowel sounds are normal. He exhibits no distension. There is tenderness.   Musculoskeletal: Normal range of motion. He exhibits no  edema.   Neurological: He is alert and oriented to person, place, and time.   Skin: Skin is warm. He is not diaphoretic.   Psychiatric: He has a normal mood and affect. His behavior is normal.   Nursing note and vitals reviewed.       Significant Labs:   CBC:   Recent Labs  Lab 11/13/17 2116   WBC 11.80   HGB 16.4   HCT 47.3        CMP:   Recent Labs  Lab 11/13/17 2116      K 4.1      CO2 22*   *   BUN 17   CREATININE 1.0   CALCIUM 9.0   PROT 7.1   ALBUMIN 3.8   BILITOT 1.2*   ALKPHOS 88   AST 30   ALT 30   ANIONGAP 11   EGFRNONAA >60.0     Cardiac Markers:   Recent Labs  Lab 11/13/17 2116   *     Troponin:   Recent Labs  Lab 11/13/17 2116 11/14/17  0010   TROPONINI 0.033* 0.028*     All pertinent labs within the past 24 hours have been reviewed.    Significant Imaging: I have reviewed all pertinent imaging results/findings within the past 24 hours.

## 2017-11-14 NOTE — PLAN OF CARE
Problem: Diabetes, Type 2 (Adult)  Goal: Signs and Symptoms of Listed Potential Problems Will be Absent, Minimized or Managed (Diabetes, Type 2)  Signs and symptoms of listed potential problems will be absent, minimized or managed by discharge/transition of care (reference Diabetes, Type 2 (Adult) CPG).   Outcome: Ongoing (interventions implemented as appropriate)  Glucoses have ranged between 225 - 251 mg/dl with patient receiving 5 units novolog insulin per correctional dose this shift.    Problem: Fall Risk (Adult)  Intervention: Patient Rounds  Fall precautions maintained with no injuries noted this shift.      Problem: Patient Care Overview  Goal: Plan of Care Review  Outcome: Ongoing (interventions implemented as appropriate)  No c/o chest pain this shift. BOSE noted. Pulse ox on room air = 97%.Cardiology consult done for eval for cath versus stress test.  Currently in ultrasound to eval elevated biri. Vomited apprx 100cc undigested food this am. Administered Zofran IVP with full relief noted.  Received Mg rider x 2 for MG= 1.3.  Skin intact.  No c/o pain this shift.

## 2017-11-14 NOTE — ASSESSMENT & PLAN NOTE
- most recent ECHO 4/28/17 EF estimated at 35% with diastolic dysfunction present  - repeat ECHO pending  -   - restart home furosemide dosing  - strict I/O with goal of net negative  - daily weight

## 2017-11-14 NOTE — CONSULTS
[facility]  Cardiology  Consult Note    Patient Name: Jorden Shafer  MRN: [id number]  Admission Date: 11/13/2017  Hospital Length of Stay: 0 days  Code Status: Full Code   Attending Provider: Ari Liang MD   Consulting Provider: Shawanda Lara MD  Primary Care Physician: Neha Plasencia MD  Principal Problem:Chest pain    Patient information was obtained from patient, past medical records and ER records.     Inpatient consult to Cardiology  Consult performed by: SHAWANDA LARA  Consult ordered by: ARI LIANG  Reason for consult: Chest pain with slightly elevated troponin, later flattened        Subjective:     Chief Complaint: Chest pain     HPI:   72-year-old male with DM type 2, HTN, CAD (s/p CABG in 1989 with multiple subsequent PCIs, last in 10/2017), CHF (last EF 40%, s/p ICD in 2016), bilateral carotid stenosis (s/p stenting, right in 7/5/2017 and left in 8/25/2017), right MCA CVA (complicated with NSTEMI in 4/2017), HLD, and GERD who presented to the ED with chest pain for 2 days.  He described it as left-sided, radiating to his left back, squeezing, and is aggravated by cough, deep inspiration, and movement. He reports having productive cough with whitish sputum, with no hemoptysis. He endorses baseline BOSE (when walking 1-2 blocks) and orthopnea. He denies fever, chills, or leg swelling. He took 4 tablets of SL nitroglycerin before presentation with no relief of his chest pain. He reports non-compliance with his medications for the past week because his wife didn't arrange them for him. He is chest pain-free currently.    Cardiac Hx:  - CAD (s/p CABG in 1989 with multiple subsequent PCIs, last in October 2017).  - NSTEMI in April 2017.  - CHF (last EF 40%, s/p ICD in 2016).    In the ED, EKG showed NSR with no ischemic changes and his troponin was 0.033. CXR was negative for any acute process. He was admitted to hospital medicine. Troponin has flattened since  admission (0.028, 0.032, 0.025). Cardiology consulted to rule out ACS.    Past Medical History:   Diagnosis Date    Back pain     CAD (coronary artery disease) 1989    h/o cabg x 5    Carotid artery occlusion     Chronic kidney disease     Colon polyp     h/o on colonoscopy    Contact dermatitis 11/12/2013    CVA (cerebral vascular accident)     March 2010    Depression     Diabetes mellitus type II     on  insulin pump    DVT (deep venous thrombosis) 1980's    left leg    GERD (gastroesophageal reflux disease)     Heart failure     [facility]     Hyperlipidemia     Hypertension     Hypertensive heart disease without congestive heart failure     Insulin pump status 4/25/2017    Myocardial infarction     Neuropathy of lower extremity     Obesity (BMI 30-39.9) 4/25/2017    Prostate cancer     Renal manifestation of secondary diabetes mellitus     Sleep apnea     Stroke 4/21/2017    Type 2 diabetes mellitus with diabetic polyneuropathy, with long-term current use of insulin 4/25/2017    Type 2 diabetes mellitus with hyperglycemia, with long-term current use of insulin 9/26/2012    Currently sees Dr. Britton     Unstable angina     Vitamin D deficiency disease        Past Surgical History:   Procedure Laterality Date    CARDIAC DEFIBRILLATOR PLACEMENT  06/16/2016    CHOLECYSTECTOMY      CORONARY ARTERY BYPASS GRAFT      bypass times  5    defibulater  06/16/2016    EYE SURGERY Bilateral     HERNIA REPAIR      groin x2  and umbilical    JOINT REPLACEMENT      knee left    thrombus Left     >2005    VASECTOMY         Review of patient's allergies indicates:  No Known Allergies    No current facility-administered medications on file prior to encounter.      Current Outpatient Prescriptions on File Prior to Encounter   Medication Sig    acetaminophen-codeine 300-30mg (TYLENOL #3) 300-30 mg Tab Take 1 tablet by mouth every meal as needed.    aspirin (ECOTRIN) 81 MG EC tablet Take 81 mg by mouth  once daily.    carvedilol (COREG) 6.25 MG tablet Take 1 tablet (6.25 mg total) by mouth 2 (two) times daily with meals.    citalopram (CELEXA) 10 MG tablet Take 1 tablet (10 mg total) by mouth once daily.    clopidogrel (PLAVIX) 75 mg tablet Take 1 tablet (75 mg total) by mouth once daily.    DEXT 70/POLYCARBOPHIL/PEG/NACL (ARTIFICIAL TEAR SOLUTION OPHT) Apply to eye 3 (three) times daily.    diclofenac sodium (VOLTAREN) 1 % Gel Apply  4 gm topical to shoulder 4x/day    furosemide (LASIX) 20 MG tablet Take 1 tablet (20 mg total) by mouth once daily.    gabapentin (NEURONTIN) 300 MG capsule Take 300 mg by mouth 3 (three) times daily.    insulin aspart (NOVOLOG) 100 unit/mL InPn pen Inject 16 Units into the skin 3 (three) times daily.    INSULIN DETEMIR (LEVEMIR SUBQ) Inject into the skin once daily.    nitroGLYCERIN (NITROSTAT) 0.4 MG SL tablet Place 1 tablet (0.4 mg total) under the tongue every 5 (five) minutes as needed. Sublingual  ( under tongue) as needed    omega-3 acid ethyl esters (LOVAZA) 1 gram capsule Take 2 g by mouth 3 (three) times daily.      omeprazole (PRILOSEC) 40 MG capsule Take 1 capsule (40 mg total) by mouth 2 (two) times daily. (Patient taking differently: Take 40 mg by mouth 2 (two) times daily as needed. )    rosuvastatin (CRESTOR) 40 MG Tab Take 1 tablet (40 mg total) by mouth once daily.    tamsulosin (FLOMAX) 0.4 mg Cp24 Take 0.4 mg by mouth once daily.    blood sugar diagnostic Strp 1 strip by Misc.(Non-Drug; Combo Route) route 2 (two) times daily with meals. TRUE METRIX    blood-glucose meter kit Use as instructed          TRUE METRIX    glucose 4 GM chewable tablet Take 16 g by mouth as needed for Low blood sugar.    lancets Misc 1 lancet by Misc.(Non-Drug; Combo Route) route 2 (two) times daily with meals. TRUE METRIX     Family History     Problem Relation (Age of Onset)    Alzheimer's disease Mother    Asbestos Father    Cancer Father    Dementia Mother    Diabetes  type II Maternal Aunt    Heart disease Mother        Social History Main Topics    Smoking status: Former Smoker     Packs/day: 4.00     Years: 25.00     Types: Cigarettes     Quit date: 6/8/1980    Smokeless tobacco: Never Used    Alcohol use No    Drug use: No    Sexual activity: Yes     Partners: Female     Review of Systems   Constitution: Negative for chills and fever.   Cardiovascular: Positive for chest pain (HPI), dyspnea on exertion (baseline) and orthopnea (baseline). Negative for leg swelling.   Respiratory: Positive for cough and sputum production (whitish). Negative for hemoptysis and shortness of breath.    Musculoskeletal: Negative for falls.   Gastrointestinal: Positive for nausea and vomiting. Negative for abdominal pain.   Neurological: Negative for dizziness and focal weakness.   Psychiatric/Behavioral: Negative for altered mental status and depression.     Objective:     Vital Signs (Most Recent):  Temp: 98.2 °F (36.8 °C) (11/14/17 1223)  Pulse: 70 (11/14/17 1223)  Resp: 16 (11/14/17 1223)  BP: 130/70 (11/14/17 1223)  SpO2: (!) 93 % (11/14/17 1223) Vital Signs (24h Range):  Temp:  [97 °F (36.1 °C)-98.9 °F (37.2 °C)] 98.2 °F (36.8 °C)  Pulse:  [] 70  Resp:  [13-20] 16  SpO2:  [93 %-97 %] 93 %  BP: (125-177)/(60-88) 130/70     Weight: 102.8 kg (226 lb 10.1 oz)  Body mass index is 34.46 kg/m².    SpO2: (!) 93 %  O2 Device (Oxygen Therapy): room air      Intake/Output Summary (Last 24 hours) at 11/14/17 1434  Last data filed at 11/14/17 1341   Gross per 24 hour   Intake              470 ml   Output              460 ml   Net               10 ml       Lines/Drains/Airways     Peripheral Intravenous Line                 Peripheral IV - Single Lumen 11/13/17 0000 Left Antecubital 1 day                Physical Exam   Constitutional: He is oriented to person, place, and time. He appears well-developed and well-nourished. No distress.   HENT:   Head: Normocephalic and atraumatic.   Eyes: Pupils  are equal, round, and reactive to light.   Neck: Neck supple. No JVD present.   Cardiovascular: Normal rate, regular rhythm and normal heart sounds.    No murmur heard.  Pulmonary/Chest: Effort normal and breath sounds normal. No respiratory distress. He has no wheezes. He has no rales.   Abdominal: Soft. Bowel sounds are normal. He exhibits no distension. There is no tenderness.   Musculoskeletal: He exhibits no edema.   Neurological: He is alert and oriented to person, place, and time.   Skin: Skin is warm and dry. He is not diaphoretic.   Psychiatric: He has a normal mood and affect. His behavior is normal.   Nursing note and vitals reviewed.      Significant Labs:   CMP   Recent Labs  Lab 11/13/17 2116 11/14/17  0401    138   K 4.1 3.9    103   CO2 22* 22*   * 191*   BUN 17 19   CREATININE 1.0 1.0   CALCIUM 9.0 8.5*   PROT 7.1 6.9   ALBUMIN 3.8 3.5   BILITOT 1.2* 1.5*   ALKPHOS 88 90   AST 30 28   ALT 30 27   ANIONGAP 11 13   ESTGFRAFRICA >60.0 >60.0   EGFRNONAA >60.0 >60.0   , CBC   Recent Labs  Lab 11/13/17 2116 11/14/17  0401   WBC 11.80 10.09   HGB 16.4 15.7   HCT 47.3 45.8    153   , Troponin   Recent Labs  Lab 11/14/17  0010 11/14/17  0401 11/14/17  1137   TROPONINI 0.028* 0.032* 0.025    and All pertinent lab results from the last 24 hours have been reviewed.    Significant Imaging:   CXR: X-Ray Chest PA and Lateral (CXR):   Results for orders placed or performed during the hospital encounter of 11/13/17   X-Ray Chest PA And Lateral    Narrative    Chest PA and Lateral    Indication:Chest Pain.    Comparison:April 24, 2017.    Findings:     AICD lead appears unchanged.  Mediastinal clips and sternotomy wires appear unchanged.    Cardiomegaly with mild congestive changes, similar to prior.  Heart and lungs unchanged when allowing for differences in technique and positioning.    Impression         Cardiomegaly with mild congestive changes, similar to prior..           Electronically signed by: ONI MONK MD  Date:     11/13/17  Time:    22:53      Assessment and Plan:        * Chest pain  Elevated troponin    - Atypical chest pain with slightly elevated troponin that flattened since admission.  - Last echo (4/28/2017): EF 40% with grade 1 diastolic dysfunction.  - Repeat echo showed EF of 45% with grade 1 diastolic dysfunction, and some wall motion abnormality.  - No indication for inpatient ischemic work up.  - Recommend increasing carvedilol to 12.5 BID.  - Recommend starting isosorbide mononitrate 30mg daily.  - Follow up as outpatient with his cardiologist (Dr. Marcus Blakely).        CAD (coronary artery disease)  Chronic combined systolic and diastolic heart failure  Essential hypertension  Mixed hyperlipidemia    - Continue ASA, clopidogrel, rosuvastatin, and furosemide.  - Recommend increasing carvedilol to 12.5 BID.  - Recommend starting isosorbide mononitrate 30mg daily.            VTE Risk Mitigation         Ordered     enoxaparin injection 40 mg  Daily     Route:  Subcutaneous        11/14/17 0125     Medium Risk of VTE  Once      11/14/17 0125     Place sequential compression device  Until discontinued      11/14/17 0021     Place DUANE hose  Until discontinued      11/14/17 0021        Plan discussed with Dr. Ramos. Staff attestation to follow.    Thank you for your consult. I will sign off. Please contact us if you have any additional questions.        Shawanda Lara MD  Cardiology  [facility]  Pager # [phone]  [employer] # [phone]

## 2017-11-14 NOTE — ASSESSMENT & PLAN NOTE
- Atypical chest pain with slightly elevated troponin that flattened since admission.  - Unlikely ACS.  - Last echo (4/28/2017): EF 40% with grade 1 diastolic dysfunction.  - Repeat echo pending.  - If echo is unchanged, no indication for inpatient ischemic work up. Patient will need to follow up as outpatient with his cardiologist.

## 2017-11-14 NOTE — PLAN OF CARE
Met w pt and his wife.   Pt has used [facility] in past. CHIVO Cam and Dr. SREEDHAR yu at [facility]   Takes plavix  Has ride home     11/14/17 1046   Discharge Assessment   Assessment Type Discharge Planning Assessment   Confirmed/corrected address and phone number on facesheet? Yes   Assessment information obtained from? Patient;Caregiver;Medical Record   Expected Length of Stay (days) 2   Communicated expected length of stay with patient/caregiver yes   Prior to hospitilization cognitive status: Alert/Oriented;No Deficits   Prior to hospitalization functional status: Independent;Assistive Equipment   Current cognitive status: Alert/Oriented;No Deficits   Current Functional Status: Independent;Assistive Equipment   Lives With spouse   Able to Return to Prior Arrangements yes   Is patient able to care for self after discharge? Yes   Who are your caregiver(s) and their phone number(s)? wife mary at  [phone]   Patient's perception of discharge disposition home health   Readmission Within The Last 30 Days no previous admission in last 30 days   Patient currently being followed by outpatient case management? No   Patient currently receives any other outside agency services? No   Equipment Currently Used at Home bedside commode;walker, rolling;bath bench;cane, straight   Do you have any problems affording any of your prescribed medications? No   Is the patient taking medications as prescribed? yes   Does the patient have transportation home? Yes   Transportation Available family or friend will provide   Discharge Plan A Home Health;Home with family   Discharge Plan B Home with family   Patient/Family In Agreement With Plan yes

## 2017-11-14 NOTE — ED NOTES
Pt continues on continuous cardiac and pulse ox monitoring with non-invasive blood pressure to cycle every 30 minutes.  Pt. Placed on 2L of oxygen via nasal canula due to oxygen saturation levels running between 90-95%.  Pt. Now running between 95-96% oxygen on 2L of oxygen via nasal canula. Pt denies needs or complaints at this time.  Bed locked in lowest position; side rails up and locked x 2; call light and personal belongings within reach; will continue to monitor pt. Wife at bedside.

## 2017-11-14 NOTE — H&P
[facility] Hospital Medicine  History & Physical    Patient Name: Jorden Shafer  MRN: [id number]  Admission Date: 11/13/2017  Attending Physician: Chapito Lang MD   Primary Care Provider: Neha Plasencia MD    [facility] Medicine Team: Networked reference to record PCT  Yosef Feliz NP     Patient information was obtained from patient and ER records.     Subjective:     Principal Problem:Chest pain    Chief Complaint:   Chief Complaint   Patient presents with    Chest Pain     Chest pain x2 days, reproducable with inspiration and cough. Pt denies N/V.        HPI: 73 y/o gentleman, who presents to the ED with c/o chest pain for the past two days.  He has a PMH of CAD, CHF, s/p defibrillator implantation, DMII, CVA, GERD, HTN, and HLD.  He states that his discomfort is mid sternal and sharp in nature.  He denies radiation of discomfort at this time.  He reports that discomfort is worsened with movement of coughing.  He reports that he has had an intermittent cough for the past few days and acknowledges that he has not been taking his medications.  He endorses associative shortness of breath.   He reports that he took 4 SL NTG today with no relief and decided to pursue medical attention.  He denies fever, chills, recent illness, N/V/D, or  symptoms.        Past Medical History:   Diagnosis Date    Back pain     CAD (coronary artery disease) 1989    h/o cabg x 5    Carotid artery occlusion     Chronic kidney disease     Colon polyp     h/o on colonoscopy    Contact dermatitis 11/12/2013    CVA (cerebral vascular accident)     March 2010    Depression     Diabetes mellitus type II     on  insulin pump    DVT (deep venous thrombosis) 1980's    left leg    GERD (gastroesophageal reflux disease)     Heart failure     [facility]     Hyperlipidemia     Hypertension     Hypertensive heart disease without congestive heart failure     Insulin pump status 4/25/2017    Myocardial  infarction     Neuropathy of lower extremity     Obesity (BMI 30-39.9) 4/25/2017    Prostate cancer     Renal manifestation of secondary diabetes mellitus     Sleep apnea     Stroke 4/21/2017    Type 2 diabetes mellitus with diabetic polyneuropathy, with long-term current use of insulin 4/25/2017    Type 2 diabetes mellitus with hyperglycemia, with long-term current use of insulin 9/26/2012    Currently sees Dr. Britton     Unstable angina     Vitamin D deficiency disease        Past Surgical History:   Procedure Laterality Date    CARDIAC DEFIBRILLATOR PLACEMENT  06/16/2016    CHOLECYSTECTOMY      CORONARY ARTERY BYPASS GRAFT      bypass times  5    defibulater  06/16/2016    EYE SURGERY Bilateral     HERNIA REPAIR      groin x2  and umbilical    JOINT REPLACEMENT      knee left    thrombus Left     >2005    VASECTOMY         Review of patient's allergies indicates:  No Known Allergies    No current facility-administered medications on file prior to encounter.      Current Outpatient Prescriptions on File Prior to Encounter   Medication Sig    acetaminophen-codeine 300-30mg (TYLENOL #3) 300-30 mg Tab Take 1 tablet by mouth every meal as needed.    aspirin (ECOTRIN) 81 MG EC tablet Take 81 mg by mouth once daily.    carvedilol (COREG) 6.25 MG tablet Take 1 tablet (6.25 mg total) by mouth 2 (two) times daily with meals.    citalopram (CELEXA) 10 MG tablet Take 1 tablet (10 mg total) by mouth once daily.    clopidogrel (PLAVIX) 75 mg tablet Take 1 tablet (75 mg total) by mouth once daily.    DEXT 70/POLYCARBOPHIL/PEG/NACL (ARTIFICIAL TEAR SOLUTION OPHT) Apply to eye 3 (three) times daily.    diclofenac sodium (VOLTAREN) 1 % Gel Apply  4 gm topical to shoulder 4x/day    furosemide (LASIX) 20 MG tablet Take 1 tablet (20 mg total) by mouth once daily.    gabapentin (NEURONTIN) 300 MG capsule Take 300 mg by mouth 3 (three) times daily.    insulin aspart (NOVOLOG) 100 unit/mL InPn pen Inject 16  Units into the skin 3 (three) times daily.    INSULIN DETEMIR (LEVEMIR SUBQ) Inject into the skin once daily.    nitroGLYCERIN (NITROSTAT) 0.4 MG SL tablet Place 1 tablet (0.4 mg total) under the tongue every 5 (five) minutes as needed. Sublingual  ( under tongue) as needed    omega-3 acid ethyl esters (LOVAZA) 1 gram capsule Take 2 g by mouth 3 (three) times daily.      omeprazole (PRILOSEC) 40 MG capsule Take 1 capsule (40 mg total) by mouth 2 (two) times daily. (Patient taking differently: Take 40 mg by mouth 2 (two) times daily as needed. )    rosuvastatin (CRESTOR) 40 MG Tab Take 1 tablet (40 mg total) by mouth once daily.    tamsulosin (FLOMAX) 0.4 mg Cp24 Take 0.4 mg by mouth once daily.    blood sugar diagnostic Strp 1 strip by Misc.(Non-Drug; Combo Route) route 2 (two) times daily with meals. TRUE METRIX    blood-glucose meter kit Use as instructed          TRUE METRIX    glucose 4 GM chewable tablet Take 16 g by mouth as needed for Low blood sugar.    lancets Misc 1 lancet by Misc.(Non-Drug; Combo Route) route 2 (two) times daily with meals. TRUE METRIX     Family History     Problem Relation (Age of Onset)    Alzheimer's disease Mother    Asbestos Father    Cancer Father    Dementia Mother    Diabetes type II Maternal Aunt    Heart disease Mother        Social History Main Topics    Smoking status: Former Smoker     Packs/day: 4.00     Years: 25.00     Types: Cigarettes     Quit date: 6/8/1980    Smokeless tobacco: Never Used    Alcohol use No    Drug use: No    Sexual activity: Yes     Partners: Female     Review of Systems   Constitutional: Negative for chills and fever.   HENT: Negative for congestion.    Eyes: Negative for visual disturbance.   Respiratory: Positive for cough and shortness of breath. Negative for chest tightness.    Cardiovascular: Positive for chest pain. Negative for palpitations and leg swelling.   Gastrointestinal: Positive for vomiting. Negative for abdominal  distention, abdominal pain, constipation, diarrhea and nausea.   Genitourinary: Negative for dysuria and hematuria.   Musculoskeletal: Positive for arthralgias and myalgias.   Skin: Negative.  Negative for rash.   Neurological: Negative for seizures and syncope.     Objective:     Vital Signs (Most Recent):  Temp: 98.7 °F (37.1 °C) (11/13/17 1646)  Pulse: 77 (11/14/17 0011)  Resp: 16 (11/14/17 0011)  BP: (!) 144/60 (11/14/17 0011)  SpO2: 95 % (11/14/17 0011) Vital Signs (24h Range):  Temp:  [98.7 °F (37.1 °C)] 98.7 °F (37.1 °C)  Pulse:  [] 77  Resp:  [16-20] 16  SpO2:  [94 %-97 %] 95 %  BP: (137-177)/(60-88) 144/60     Weight: 106.6 kg (235 lb)  Body mass index is 35.73 kg/m².    Physical Exam   Constitutional: He is oriented to person, place, and time. He appears well-developed and well-nourished. No distress.   HENT:   Head: Normocephalic and atraumatic.   Eyes: EOM are normal. Pupils are equal, round, and reactive to light.   Neck: Normal range of motion. Neck supple.   Cardiovascular: Normal rate, regular rhythm, normal heart sounds and intact distal pulses.    Pulmonary/Chest: Effort normal and breath sounds normal. No respiratory distress.   Abdominal: Soft. Bowel sounds are normal. He exhibits no distension. There is tenderness.   Musculoskeletal: Normal range of motion. He exhibits no edema.   Neurological: He is alert and oriented to person, place, and time.   Skin: Skin is warm. He is not diaphoretic.   Psychiatric: He has a normal mood and affect. His behavior is normal.   Nursing note and vitals reviewed.       Significant Labs:   CBC:   Recent Labs  Lab 11/13/17 2116   WBC 11.80   HGB 16.4   HCT 47.3        CMP:   Recent Labs  Lab 11/13/17 2116      K 4.1      CO2 22*   *   BUN 17   CREATININE 1.0   CALCIUM 9.0   PROT 7.1   ALBUMIN 3.8   BILITOT 1.2*   ALKPHOS 88   AST 30   ALT 30   ANIONGAP 11   EGFRNONAA >60.0     Cardiac Markers:   Recent Labs  Lab 11/13/17  1907    *     Troponin:   Recent Labs  Lab 11/13/17  2116 11/14/17  0010   TROPONINI 0.033* 0.028*     All pertinent labs within the past 24 hours have been reviewed.    Significant Imaging: I have reviewed all pertinent imaging results/findings within the past 24 hours.    Assessment/Plan:     * Chest pain    - likely musculoskeletal - worsened with coughing - moving  - trend troponin Q 6 x 3  - maintain on telemetry  - prn pain management           Chronic combined systolic and diastolic heart failure    - most recent ECHO 4/28/17 EF estimated at 35% with diastolic dysfunction present  - repeat ECHO pending  -   - restart home furosemide dosing  - strict I/O with goal of net negative  - daily weight         Elevated troponin    - troponin 0.033 (baseline 0.015-0.027)  - trend Q 6 x 3  - if levels trend upward or if symptomatic consult cardiology        CAD (coronary artery disease)    - chest pain likely musculoskeletal - worsened with coughing / moving   -continue ASA, clopidogrel, statin, and beta blockade   - may want to consider cardiology consultation         Mixed hyperlipidemia    - continue rosuvastatin as directed          GERD (gastroesophageal reflux disease)    - continue PPI          Essential hypertension    - restart home medications  - add hydralazine PRN          Type 2 diabetes mellitus with hyperglycemia, with long-term current use of insulin    - HgA1c pending  - ADA diet  - ISS AC / HS            VTE Risk Mitigation         Ordered     enoxaparin injection 40 mg  Daily     Route:  Subcutaneous        11/14/17 0125     Medium Risk of VTE  Once      11/14/17 0125     Place sequential compression device  Until discontinued      11/14/17 0021     Place DUANE hose  Until discontinued      11/14/17 0021             Yosef Feliz NP  Department of Hospital Medicine   [facility]

## 2017-11-14 NOTE — ED TRIAGE NOTES
"Pt. Presents via EMS to ER due to reported chest pain.  Chest pain is described as a "pressure" and "severe."  Pt reports being short of breath.    "

## 2017-11-15 VITALS
HEIGHT: 68 IN | RESPIRATION RATE: 18 BRPM | DIASTOLIC BLOOD PRESSURE: 75 MMHG | BODY MASS INDEX: 34.35 KG/M2 | SYSTOLIC BLOOD PRESSURE: 123 MMHG | WEIGHT: 226.63 LBS | OXYGEN SATURATION: 93 % | TEMPERATURE: 98 F | HEART RATE: 80 BPM

## 2017-11-15 PROBLEM — K76.0 HEPATIC STEATOSIS: Chronic | Status: ACTIVE | Noted: 2017-11-15

## 2017-11-15 PROBLEM — R79.89 ELEVATED TROPONIN: Status: RESOLVED | Noted: 2017-11-14 | Resolved: 2017-11-15

## 2017-11-15 PROBLEM — R07.9 CHEST PAIN: Status: RESOLVED | Noted: 2017-11-14 | Resolved: 2017-11-15

## 2017-11-15 LAB
ALBUMIN SERPL BCP-MCNC: 3.4 G/DL
ALP SERPL-CCNC: 99 U/L
ALT SERPL W/O P-5'-P-CCNC: 30 U/L
ANION GAP SERPL CALC-SCNC: 9 MMOL/L
AST SERPL-CCNC: 29 U/L
BASOPHILS # BLD AUTO: 0.04 K/UL
BASOPHILS NFR BLD: 0.6 %
BILIRUB SERPL-MCNC: 1.4 MG/DL
BUN SERPL-MCNC: 25 MG/DL
CALCIUM SERPL-MCNC: 8.4 MG/DL
CHLORIDE SERPL-SCNC: 101 MMOL/L
CO2 SERPL-SCNC: 26 MMOL/L
CREAT SERPL-MCNC: 1.2 MG/DL
DIFFERENTIAL METHOD: ABNORMAL
EOSINOPHIL # BLD AUTO: 0.1 K/UL
EOSINOPHIL NFR BLD: 1.4 %
ERYTHROCYTE [DISTWIDTH] IN BLOOD BY AUTOMATED COUNT: 14.6 %
EST. GFR  (AFRICAN AMERICAN): >60 ML/MIN/1.73 M^2
EST. GFR  (NON AFRICAN AMERICAN): 59.6 ML/MIN/1.73 M^2
GLUCOSE SERPL-MCNC: 230 MG/DL
HCT VFR BLD AUTO: 44 %
HGB BLD-MCNC: 14.8 G/DL
IMM GRANULOCYTES # BLD AUTO: 0.03 K/UL
IMM GRANULOCYTES NFR BLD AUTO: 0.4 %
LYMPHOCYTES # BLD AUTO: 1.4 K/UL
LYMPHOCYTES NFR BLD: 20.6 %
MAGNESIUM SERPL-MCNC: 2.1 MG/DL
MCH RBC QN AUTO: 28 PG
MCHC RBC AUTO-ENTMCNC: 33.6 G/DL
MCV RBC AUTO: 83 FL
MONOCYTES # BLD AUTO: 0.8 K/UL
MONOCYTES NFR BLD: 10.9 %
NEUTROPHILS # BLD AUTO: 4.6 K/UL
NEUTROPHILS NFR BLD: 66.1 %
NRBC BLD-RTO: 0 /100 WBC
PHOSPHATE SERPL-MCNC: 3.9 MG/DL
PLATELET # BLD AUTO: 145 K/UL
PMV BLD AUTO: 10.8 FL
POCT GLUCOSE: 218 MG/DL (ref 70–110)
POCT GLUCOSE: 228 MG/DL (ref 70–110)
POTASSIUM SERPL-SCNC: 4.5 MMOL/L
PROT SERPL-MCNC: 6.4 G/DL
RBC # BLD AUTO: 5.29 M/UL
SODIUM SERPL-SCNC: 136 MMOL/L
WBC # BLD AUTO: 6.99 K/UL

## 2017-11-15 PROCEDURE — 25000003 PHARM REV CODE 250: Performed by: NURSE PRACTITIONER

## 2017-11-15 PROCEDURE — 36415 COLL VENOUS BLD VENIPUNCTURE: CPT

## 2017-11-15 PROCEDURE — 85025 COMPLETE CBC W/AUTO DIFF WBC: CPT

## 2017-11-15 PROCEDURE — 25000003 PHARM REV CODE 250: Performed by: HOSPITALIST

## 2017-11-15 PROCEDURE — 83735 ASSAY OF MAGNESIUM: CPT

## 2017-11-15 PROCEDURE — 99217 PR OBSERVATION CARE DISCHARGE: CPT | Mod: ,,, | Performed by: HOSPITALIST

## 2017-11-15 PROCEDURE — 84100 ASSAY OF PHOSPHORUS: CPT

## 2017-11-15 PROCEDURE — 80053 COMPREHEN METABOLIC PANEL: CPT

## 2017-11-15 PROCEDURE — G0378 HOSPITAL OBSERVATION PER HR: HCPCS

## 2017-11-15 RX ORDER — CARVEDILOL 6.25 MG/1
12.5 TABLET ORAL 2 TIMES DAILY WITH MEALS
Qty: 120 TABLET | Refills: 1 | Status: SHIPPED | OUTPATIENT
Start: 2017-11-15 | End: 2018-05-03

## 2017-11-15 RX ORDER — ISOSORBIDE MONONITRATE 30 MG/1
30 TABLET, EXTENDED RELEASE ORAL DAILY
Qty: 30 TABLET | Refills: 1 | Status: SHIPPED | OUTPATIENT
Start: 2017-11-16 | End: 2018-03-09

## 2017-11-15 RX ADMIN — ASPIRIN 81 MG: 81 TABLET, COATED ORAL at 09:11

## 2017-11-15 RX ADMIN — CLOPIDOGREL 75 MG: 75 TABLET, FILM COATED ORAL at 09:11

## 2017-11-15 RX ADMIN — TAMSULOSIN HYDROCHLORIDE 0.4 MG: 0.4 CAPSULE ORAL at 09:11

## 2017-11-15 RX ADMIN — CITALOPRAM HYDROBROMIDE 10 MG: 10 TABLET ORAL at 09:11

## 2017-11-15 RX ADMIN — OMEGA-3-ACID ETHYL ESTERS 2 G: 1 CAPSULE, LIQUID FILLED ORAL at 09:11

## 2017-11-15 RX ADMIN — GABAPENTIN 300 MG: 100 CAPSULE ORAL at 05:11

## 2017-11-15 RX ADMIN — CARVEDILOL 12.5 MG: 12.5 TABLET, FILM COATED ORAL at 09:11

## 2017-11-15 RX ADMIN — INSULIN ASPART 2 UNITS: 100 INJECTION, SOLUTION INTRAVENOUS; SUBCUTANEOUS at 11:11

## 2017-11-15 RX ADMIN — PANTOPRAZOLE SODIUM 40 MG: 40 TABLET, DELAYED RELEASE ORAL at 09:11

## 2017-11-15 RX ADMIN — ISOSORBIDE MONONITRATE 30 MG: 30 TABLET, EXTENDED RELEASE ORAL at 09:11

## 2017-11-15 RX ADMIN — ROSUVASTATIN CALCIUM 40 MG: 20 TABLET, FILM COATED ORAL at 09:11

## 2017-11-15 RX ADMIN — FUROSEMIDE 20 MG: 20 TABLET ORAL at 09:11

## 2017-11-15 RX ADMIN — INSULIN ASPART 2 UNITS: 100 INJECTION, SOLUTION INTRAVENOUS; SUBCUTANEOUS at 07:11

## 2017-11-15 NOTE — DISCHARGE SUMMARY
[facility] Hospital Medicine  Discharge Summary      Patient Name: Jorden Shafer  MRN: [id number]  Admission Date: 11/13/2017  Hospital Length of Stay: 0 days  Discharge Date and Time:  11/15/2017 10:53 AM  Attending Physician: Rusty Liang MD   Discharging Provider: Rusty Liang MD  Primary Care Provider: Neha Plasencia MD    [facility] Medicine Team: [facility]OSP MED L Rusty Liang MD    HPI: 71 y/o gentleman, who presents to the ED with c/o chest pain for the past two days.  He has a PMH of CAD, CHF, s/p defibrillator implantation, DMII, CVA, GERD, HTN, and HLD.  He states that his discomfort is mid sternal and sharp in nature.  He denies radiation of discomfort at this time.  He reports that discomfort is worsened with movement of coughing.  He reports that he has had an intermittent cough for the past few days and acknowledges that he has not been taking his medications.  He endorses associative shortness of breath.   He reports that he took 4 SL NTG today with no relief and decided to pursue medical attention.  He denies fever, chills, recent illness, N/V/D, or  symptoms.         * No surgery found *      Hospital Course:   For chest pain - patient had ACS ruled out with serial troponin values that were mildly elevated but flat and downtrending.  Cardiology was consulted to assist in determining if inpatient ischemic w/u required, patients symptoms resolved over early AM of admission and no ischemic workup was recommended.  Cardiology recommended uptitration of Coreg to 12.5mg and initiation of nitrate - ismn 30mg er daily.      He was previously on nitrates, and was taken off for relative hypotension, patient may also need ACEi/ARB initiated as an outpatient.     He had a bout of nausea/vomiting after admission and noted to have slightly elevated bilirubin to 1.3-1.5.  An abdominal ultrasound revealed hepatic steatosis and hepatosplenomegaly,  Patient will require  surveillance and outpatient workup for chronic liver disease.  He had normal transaminase values, so statin continued on discharge.     Pt seen and examined day of discharge  Vitals reviewed.   Physical Exam   Constitutional: He is oriented to person, place, and time. He appears well-developed and well-nourished. No distress.   HENT:   Head: Normocephalic and atraumatic.   Eyes: EOM are normal. Pupils are equal, round, and reactive to light.   Neck: Normal range of motion. Neck supple.   Cardiovascular: Normal rate, regular rhythm, normal heart sounds and intact distal pulses, ICD pocket - nontender to palpation. .    Pulmonary/Chest: Effort normal and breath sounds normal. No respiratory distress.   Abdominal: Soft. Bowel sounds are normal. He exhibits no distension. There is tenderness is resolved. .   Musculoskeletal: Normal range of motion. He exhibits no edema.   Neurological: He is alert and oriented to person, place, and time.   Skin: Skin is warm. He is not diaphoretic.   Psychiatric: He has a normal mood and affect. His behavior is normal.   Nursing note and vitals reviewed.    Consults:   Consults         Status Ordering Provider     Inpatient consult to Cardiology  Once     Provider:  (Not yet assigned)    Completed ARI LIANG          Final Active Diagnoses:    Diagnosis Date Noted POA    Hepatic steatosis [K76.0] 11/15/2017 Yes     Chronic    CAD (coronary artery disease) [I25.10] 11/14/2017 Yes    Mixed hyperlipidemia [E78.2]  Yes     Chronic    GERD (gastroesophageal reflux disease) [K21.9] 04/13/2017 Yes     Chronic    Chronic combined systolic and diastolic heart failure [I50.42] 11/12/2013 Yes     Chronic    Essential hypertension [I10] 11/10/2013 Yes     Chronic    Type 2 diabetes mellitus with hyperglycemia, with long-term current use of insulin [E11.65, Z79.4] 09/26/2012 Not Applicable     Chronic      Problems Resolved During this Admission:    Diagnosis Date Noted Date Resolved  POA    PRINCIPAL PROBLEM:  Chest pain [R07.9] 11/14/2017 11/15/2017 Yes    Elevated troponin [R74.8] 11/14/2017 11/15/2017 Yes      Discharged Condition: stable    Disposition: Home or Self Care    Follow Up:  Follow-up Information     Marcus Blakely MD.    Specialties:  Cardiology, INTERVENTIONAL CARDIOLOGY  Why:  A request has been sent to make an appointment with Dr. Blakely, contact the clinic if you are not contacted with appointment information.   Contact information:  [address]  [facility] [address] [phone]                 Patient Instructions:     Ambulatory referral to Outpatient Case Management   Referral Priority: Routine Referral Type: Consultation   Referral Reason: Specialty Services Required    Number of Visits Requested: 1      Ambulatory consult to Family Practice   Referral Priority: Routine Referral Type: Consultation   Referral Reason: Specialty Services Required    Referred to Provider: YAJAIRA NAVARRETE Requested Specialty: Family Medicine   Number of Visits Requested: 1      Diet Cardiac     Activity as tolerated     Call MD for:  temperature >100.4     Call MD for:  persistent nausea and vomiting or diarrhea     Call MD for:  severe uncontrolled pain     Call MD for:  difficulty breathing or increased cough     Call MD for:  severe persistent headache     Call MD for:  persistent dizziness, light-headedness, or visual disturbances     Call MD for:  increased confusion or weakness       Medications:  Reconciled Home Medications:   Current Discharge Medication List      START taking these medications    Details   isosorbide mononitrate (IMDUR) 30 MG 24 hr tablet Take 1 tablet (30 mg total) by mouth once daily.  Qty: 30 tablet, Refills: 1         CONTINUE these medications which have CHANGED    Details   carvedilol (COREG) 6.25 MG tablet Take 2 tablets (12.5 mg total) by mouth 2 (two) times daily with meals.  Qty: 120 tablet, Refills: 1         CONTINUE these medications  which have NOT CHANGED    Details   acetaminophen-codeine 300-30mg (TYLENOL #3) 300-30 mg Tab Take 1 tablet by mouth every meal as needed.  Qty: 90 tablet, Refills: 2    Associated Diagnoses: Impingement syndrome of shoulder, left; Chronic pain of left knee      aspirin (ECOTRIN) 81 MG EC tablet Take 81 mg by mouth once daily.      citalopram (CELEXA) 10 MG tablet Take 1 tablet (10 mg total) by mouth once daily.  Qty: 90 tablet, Refills: 1    Comments: **Patient requests 90 days supply**  Associated Diagnoses: Sleep disturbances      clopidogrel (PLAVIX) 75 mg tablet Take 1 tablet (75 mg total) by mouth once daily.  Qty: 90 tablet, Refills: 3      DEXT 70/POLYCARBOPHIL/PEG/NACL (ARTIFICIAL TEAR SOLUTION OPHT) Apply to eye 3 (three) times daily.      diclofenac sodium (VOLTAREN) 1 % Gel Apply  4 gm topical to shoulder 4x/day  Qty: 300 g, Refills: 1    Associated Diagnoses: Injury of left shoulder, sequela; Fall, sequela; Bursitis of right shoulder; Impingement syndrome of shoulder, left; Hemiparesis of left nondominant side, unspecified hemiparesis etiology      furosemide (LASIX) 20 MG tablet Take 1 tablet (20 mg total) by mouth once daily.    Comments: **Patient requests 90 days supply**  Associated Diagnoses: Essential hypertension      gabapentin (NEURONTIN) 300 MG capsule Take 300 mg by mouth 3 (three) times daily.      insulin aspart (NOVOLOG) 100 unit/mL InPn pen Inject 16 Units into the skin 3 (three) times daily.  Refills: 0      INSULIN DETEMIR (LEVEMIR SUBQ) Inject into the skin once daily.      nitroGLYCERIN (NITROSTAT) 0.4 MG SL tablet Place 1 tablet (0.4 mg total) under the tongue every 5 (five) minutes as needed. Sublingual  ( under tongue) as needed  Qty: 30 tablet, Refills: prn    Associated Diagnoses: Other chest pain      omega-3 acid ethyl esters (LOVAZA) 1 gram capsule Take 2 g by mouth 3 (three) times daily.        omeprazole (PRILOSEC) 40 MG capsule Take 1 capsule (40 mg total) by mouth 2 (two)  times daily.  Qty: 180 capsule, Refills: 3    Comments: **Patient requests 90 day supply**  Associated Diagnoses: GERD (gastroesophageal reflux disease)      rosuvastatin (CRESTOR) 40 MG Tab Take 1 tablet (40 mg total) by mouth once daily.  Qty: 30 tablet, Refills: 6    Associated Diagnoses: Dyslipidemia      tamsulosin (FLOMAX) 0.4 mg Cp24 Take 0.4 mg by mouth once daily.      blood sugar diagnostic Strp 1 strip by Misc.(Non-Drug; Combo Route) route 2 (two) times daily with meals. TRUE METRIX  Qty: 100 strip, Refills: 11    Associated Diagnoses: Diabetes mellitus without complication      blood-glucose meter kit Use as instructed          TRUE METRIX  Qty: 1 each, Refills: 0    Associated Diagnoses: Diabetes mellitus without complication      glucose 4 GM chewable tablet Take 16 g by mouth as needed for Low blood sugar.      lancets Misc 1 lancet by Misc.(Non-Drug; Combo Route) route 2 (two) times daily with meals. TRUE METRIX  Qty: 100 each, Refills: 11    Associated Diagnoses: Diabetes mellitus without complication             Significant Diagnostic Studies: Labs:   CMP   Recent Labs  Lab 11/13/17 2116 11/14/17  0401 11/15/17  0610    138 136   K 4.1 3.9 4.5    103 101   CO2 22* 22* 26   * 191* 230*   BUN 17 19 25*   CREATININE 1.0 1.0 1.2   CALCIUM 9.0 8.5* 8.4*   PROT 7.1 6.9 6.4   ALBUMIN 3.8 3.5 3.4*   BILITOT 1.2* 1.5* 1.4*   ALKPHOS 88 90 99   AST 30 28 29   ALT 30 27 30   ANIONGAP 11 13 9   ESTGFRAFRICA >60.0 >60.0 >60.0   EGFRNONAA >60.0 >60.0 59.6*    and CBC   Recent Labs  Lab 11/13/17 2116 11/14/17  0401 11/15/17  0610   WBC 11.80 10.09 6.99   HGB 16.4 15.7 14.8   HCT 47.3 45.8 44.0    153 145*     Radiology: Ultrasound: RUQ  Findings: The liver is enlarged in size, measuring 20.8cm.  Hepatic parenchyma is echogenic and diffusely attenuating with an elevated hepatorenal and 1.60 dictating greater than 5% hepatic steatosis. There is a 1.3 x 0.9 x 0.8 cm hypoechoic area and  the left hepatic lobe suggestive of focal fatty sparing. The liver is without evidence for masses.  No intra- or extrahepatic biliary ductal dilatation. The common bile duct measures 0.3 cm.  The gallbladder is surgically absent. The visualized portions of the pancreas appear normal. The spleen is enlarged and measures 13.1 x 5.6 cm. No ascites.   Impression         Hepatosplenomegaly.     Hepatic steatosis with focal fatty sparing.    Status post cholecystectomy.           ECHO 11/14/17  CONCLUSIONS     1 - Mildly depressed left ventricular systolic function (EF 45-50%).     2 - Impaired LV relaxation, normal LAP (grade 1 diastolic dysfunction).     3 - Wall motion abnormalities.     4 - Right atrial enlargement.     5 - Normal right ventricular systolic function .     6 - The estimated PA systolic pressure is 30 mmHg.     7 - Trivial mitral regurgitation.     8 - Trivial tricuspid regurgitation.     Pending Diagnostic Studies:     None        Indwelling Lines/Drains at time of discharge:   Lines/Drains/Airways          No matching active lines, drains, or airways          Time spent on the discharge of patient: 35 minutes  Patient was seen and examined on the date of discharge and determined to be suitable for discharge.         Rusty Liang MD  Department of Hospital Medicine  [facility]

## 2017-11-15 NOTE — PROGRESS NOTES
Please see cardiology consult from earlier today, continue to work on medical therapy as mentioned and follow up with cardiology in clinic.    Will sign off, please page cardiology consults with additional questions.    Ramón Joseph MD.

## 2017-11-15 NOTE — PLAN OF CARE
"CM called IR [id number] and msg sent to [facility] for f/u appt requested in notes dated 11/14 and signed by Sara Lara and Richard     "Recommend starting isosorbide mononitrate 30mg daily.  - Follow up as outpatient with his cardiologist (Dr. Marcus Blakely)."  "

## 2017-11-15 NOTE — PLAN OF CARE
Future Appointments  Date Time Provider Department Center   11/29/2017 10:00 AM Chai Maxwell NP [facility]        11/15/17 1135   Final Note   Assessment Type Final Discharge Note   Discharge Disposition Home   Hospital Follow Up  Appt(s) scheduled? Yes  (see previous notes)   Discharge plans and expectations educations in teach back method with documentation complete? Yes   Right Care Referral Info   Post Acute Recommendation No Care

## 2017-11-15 NOTE — PLAN OF CARE
Neha Plasencia MD PCP - General Family Medicine 10/07/2014 End  10/7/14   Phone: [phone];         CM spoke w [facility] with [facility]. She will touch base w Dr. MIRTA Blakely ( see notes written by Dr Lara, cosigned by Dr. Ramos)     PCP f/u made  Future Appointments  Date Time Provider Department Center   11/29/2017 10:00 AM Chai Maxwell NP [facility]

## 2017-11-16 ENCOUNTER — OUTPATIENT CASE MANAGEMENT (OUTPATIENT)
Dept: ADMINISTRATIVE | Facility: OTHER | Age: 72
End: 2017-11-16

## 2017-11-16 NOTE — PROGRESS NOTES
Thank you for the referral. The following patient has been assigned to Elvia Monteiro RN with Outpatient Complex Care Management for high risk screening.    Reason for referral: Chest pain  Elevated troponin    Please contact [facility] at [phone] with any questions.    Thank you,  Esme Guillen

## 2017-11-26 DIAGNOSIS — E78.5 DYSLIPIDEMIA: ICD-10-CM

## 2017-11-28 RX ORDER — ROSUVASTATIN CALCIUM 40 MG/1
40 TABLET, COATED ORAL DAILY
Qty: 30 TABLET | Refills: 11 | Status: SHIPPED | OUTPATIENT
Start: 2017-11-28 | End: 2018-03-09

## 2017-12-06 ENCOUNTER — TELEPHONE (OUTPATIENT)
Dept: PAIN MEDICINE | Facility: CLINIC | Age: 72
End: 2017-12-06

## 2017-12-06 NOTE — TELEPHONE ENCOUNTER
Left voice message asking for a return call regarding the scheduling of a Left genicular nerve block with Dr. Boyd  Referred by Dr. Briones.

## 2018-01-03 ENCOUNTER — PATIENT MESSAGE (OUTPATIENT)
Dept: FAMILY MEDICINE | Facility: CLINIC | Age: 73
End: 2018-01-03

## 2018-01-05 ENCOUNTER — OFFICE VISIT (OUTPATIENT)
Dept: URGENT CARE | Facility: CLINIC | Age: 73
End: 2018-01-05
Payer: MEDICARE

## 2018-01-05 VITALS
BODY MASS INDEX: 35.31 KG/M2 | TEMPERATURE: 98 F | WEIGHT: 233 LBS | HEIGHT: 68 IN | RESPIRATION RATE: 19 BRPM | DIASTOLIC BLOOD PRESSURE: 81 MMHG | SYSTOLIC BLOOD PRESSURE: 136 MMHG | OXYGEN SATURATION: 96 % | HEART RATE: 71 BPM

## 2018-01-05 DIAGNOSIS — J11.1 FLU SYNDROME: Primary | ICD-10-CM

## 2018-01-05 PROCEDURE — 99214 OFFICE O/P EST MOD 30 MIN: CPT | Mod: S$GLB,,, | Performed by: PHYSICIAN ASSISTANT

## 2018-01-05 RX ORDER — PROMETHAZINE HYDROCHLORIDE AND DEXTROMETHORPHAN HYDROBROMIDE 6.25; 15 MG/5ML; MG/5ML
5 SYRUP ORAL
Qty: 118 ML | Refills: 0 | Status: SHIPPED | OUTPATIENT
Start: 2018-01-05 | End: 2018-01-05 | Stop reason: CLARIF

## 2018-01-05 RX ORDER — OSELTAMIVIR PHOSPHATE 75 MG/1
75 CAPSULE ORAL 2 TIMES DAILY
Qty: 10 CAPSULE | Refills: 0 | Status: SHIPPED | OUTPATIENT
Start: 2018-01-05 | End: 2018-01-10

## 2018-01-05 RX ORDER — PROMETHAZINE HYDROCHLORIDE AND DEXTROMETHORPHAN HYDROBROMIDE 6.25; 15 MG/5ML; MG/5ML
5 SYRUP ORAL
Qty: 118 ML | Refills: 0 | Status: SHIPPED | OUTPATIENT
Start: 2018-01-05 | End: 2018-01-12

## 2018-01-05 RX ORDER — OSELTAMIVIR PHOSPHATE 75 MG/1
75 CAPSULE ORAL 2 TIMES DAILY
Qty: 10 CAPSULE | Refills: 0 | Status: SHIPPED | OUTPATIENT
Start: 2018-01-05 | End: 2018-01-05 | Stop reason: CLARIF

## 2018-01-05 NOTE — PATIENT INSTRUCTIONS
Please return here or go to the Emergency Department for any concerns or worsening of condition.  If you were prescribed antibiotics, please take them to completion.  If you were prescribed a narcotic medication, do not drive or operate heavy equipment or machinery while taking these medications.  Please follow up with your primary care doctor or specialist as needed.    If you  smoke, please stop smoking.      Influenza (Adult)    Influenza is also called the flu. It is a viral illness that affects the air passages of your lungs. It is different from the common cold. The flu can easily be passed from one to person to another. It may be spread through the air by coughing and sneezing. Or it can be spread by touching the sick person and then touching your own eyes, nose, or mouth.  The flu starts 1 to 3 days after you are exposed to the flu virus. It may last for 1 to 2 weeks but many people feel tired or fatigued for many weeks afterward. You usually dont need to take antibiotics unless you have a complication. This might be an ear or sinus infection or pneumonia.  Symptoms of the flu may be mild or severe. They can include extreme tiredness (wanting to stay in bed all day), chills, fevers, muscle aches, soreness with eye movement, headache, and a dry, hacking cough.  Home care  Follow these guidelines when caring for yourself at home:  · Avoid being around cigarette smoke, whether yours or other peoples.  · Acetaminophen or ibuprofen will help ease your fever, muscle aches, and headache. Dont give aspirin to anyone younger than 18 who has the flu. Aspirin can harm the liver.  · Nausea and loss of appetite are common with the flu. Eat light meals. Drink 6 to 8 glasses of liquids every day. Good choices are water, sport drinks, soft drinks without caffeine, juices, tea, and soup. Extra fluids will also help loosen secretions in your nose and lungs.  · Over-the-counter cold medicines will not make the flu go away  faster. But the medicines may help with coughing, sore throat, and congestion in your nose and sinuses. Dont use a decongestant if you have high blood pressure.  · Stay home until your fever has been gone for at least 24 hours without using medicine to reduce fever.  Follow-up care  Follow up with your healthcare provider, or as advised, if you are not getting better over the next week.  If you are age 65 or older, talk with your provider about getting a pneumococcal vaccine every 5 years. You should also get this vaccine if you have chronic asthma or COPD. All adults should get a flu vaccine every fall. Ask your provider about this.  When to seek medical advice  Call your healthcare provider right away if any of these occur:  · Cough with lots of colored mucus (sputum) or blood in your mucus  · Chest pain, shortness of breath, wheezing, or trouble breathing  · Severe headache, or face, neck, or ear pain  · New rash with fever  · Fever of 100.4°F (38°C) or higher, or as directed by your healthcare provider  · Confusion, behavior change, or seizure  · Severe weakness or dizziness  · You get a new fever or cough after getting better for a few days  Date Last Reviewed: 1/1/2017  © 6794-9122 [employer]. [address] [place] [address], [place] [address]. All rights reserved. This information is not intended as a substitute for professional medical care. Always follow your healthcare professional's instructions.

## 2018-01-05 NOTE — PROGRESS NOTES
"Subjective:       Patient ID: Jorden Shafer is a 72 y.o. male.    Vitals:  height is 5' 8" (1.727 m) and weight is 105.7 kg (233 lb). His temperature is 98 °F (36.7 °C). His blood pressure is 136/81 and his pulse is 71. His respiration is 19 and oxygen saturation is 96%.     Chief Complaint: Cough    Pt living with 2 people with confirmed flu dx. Symptomatic 2 days ago and worsening.      Cough   This is a new problem. The current episode started in the past 7 days. The problem has been gradually worsening. The problem occurs every few minutes. The cough is productive of sputum. Associated symptoms include headaches and myalgias. Pertinent negatives include no chest pain, chills, ear pain, eye redness, fever, sore throat, shortness of breath or wheezing. Exacerbated by: eating and drinking chokes him. He has tried nothing for the symptoms. The treatment provided no relief.     Review of Systems   Constitution: Positive for malaise/fatigue. Negative for chills and fever.   HENT: Negative for congestion, ear pain, hoarse voice and sore throat.    Eyes: Negative for discharge and redness.   Cardiovascular: Negative for chest pain, dyspnea on exertion and leg swelling.   Respiratory: Positive for cough. Negative for shortness of breath, sputum production and wheezing.    Musculoskeletal: Positive for myalgias.   Gastrointestinal: Negative for abdominal pain and nausea.   Neurological: Positive for headaches.       Objective:      Physical Exam   Constitutional: He is oriented to person, place, and time. He appears well-developed and well-nourished. He appears listless. He is cooperative.  Non-toxic appearance. He does not appear ill. No distress.   HENT:   Head: Normocephalic and atraumatic.   Right Ear: Hearing, external ear and ear canal normal. Tympanic membrane is scarred.   Left Ear: Hearing, external ear and ear canal normal. Tympanic membrane is scarred.   Nose: Mucosal edema and rhinorrhea present. No nasal " deformity. No epistaxis. Right sinus exhibits no maxillary sinus tenderness and no frontal sinus tenderness. Left sinus exhibits no maxillary sinus tenderness and no frontal sinus tenderness.   Mouth/Throat: Uvula is midline and mucous membranes are normal. No trismus in the jaw. Normal dentition. No uvula swelling. Posterior oropharyngeal erythema present. No oropharyngeal exudate or posterior oropharyngeal edema.   Eyes: Conjunctivae and lids are normal. Right eye exhibits no discharge. Left eye exhibits no discharge. No scleral icterus.   Sclera clear bilat   Neck: Trachea normal, normal range of motion, full passive range of motion without pain and phonation normal. Neck supple.   Cardiovascular: Normal rate, regular rhythm, normal heart sounds, intact distal pulses and normal pulses.    Pulmonary/Chest: Effort normal and breath sounds normal. No respiratory distress. He has no decreased breath sounds. He has no wheezes. He has no rhonchi.   Musculoskeletal: Normal range of motion. He exhibits no edema or deformity.   Neurological: He is oriented to person, place, and time. He appears listless. He exhibits normal muscle tone. Coordination normal.   Skin: Skin is warm, dry and intact. He is not diaphoretic. No pallor.   Psychiatric: He has a normal mood and affect. His speech is normal and behavior is normal. Judgment and thought content normal. Cognition and memory are normal.   Nursing note and vitals reviewed.      Assessment:       1. Flu syndrome        Plan:       - High clinical suspicion for flu based on h&p.    Flu syndrome  -     oseltamivir (TAMIFLU) 75 MG capsule; Take 1 capsule (75 mg total) by mouth 2 (two) times daily.  Dispense: 10 capsule; Refill: 0  -     promethazine-dextromethorphan (PROMETHAZINE-DM) 6.25-15 mg/5 mL Syrp; Take 5 mLs by mouth every 4 to 6 hours as needed.  Dispense: 118 mL; Refill: 0      Patient Instructions   Please return here or go to the Emergency Department for any  concerns or worsening of condition.  If you were prescribed antibiotics, please take them to completion.  If you were prescribed a narcotic medication, do not drive or operate heavy equipment or machinery while taking these medications.  Please follow up with your primary care doctor or specialist as needed.    If you  smoke, please stop smoking.      Influenza (Adult)    Influenza is also called the flu. It is a viral illness that affects the air passages of your lungs. It is different from the common cold. The flu can easily be passed from one to person to another. It may be spread through the air by coughing and sneezing. Or it can be spread by touching the sick person and then touching your own eyes, nose, or mouth.  The flu starts 1 to 3 days after you are exposed to the flu virus. It may last for 1 to 2 weeks but many people feel tired or fatigued for many weeks afterward. You usually dont need to take antibiotics unless you have a complication. This might be an ear or sinus infection or pneumonia.  Symptoms of the flu may be mild or severe. They can include extreme tiredness (wanting to stay in bed all day), chills, fevers, muscle aches, soreness with eye movement, headache, and a dry, hacking cough.  Home care  Follow these guidelines when caring for yourself at home:  · Avoid being around cigarette smoke, whether yours or other peoples.  · Acetaminophen or ibuprofen will help ease your fever, muscle aches, and headache. Dont give aspirin to anyone younger than 18 who has the flu. Aspirin can harm the liver.  · Nausea and loss of appetite are common with the flu. Eat light meals. Drink 6 to 8 glasses of liquids every day. Good choices are water, sport drinks, soft drinks without caffeine, juices, tea, and soup. Extra fluids will also help loosen secretions in your nose and lungs.  · Over-the-counter cold medicines will not make the flu go away faster. But the medicines may help with coughing, sore throat,  and congestion in your nose and sinuses. Dont use a decongestant if you have high blood pressure.  · Stay home until your fever has been gone for at least 24 hours without using medicine to reduce fever.  Follow-up care  Follow up with your healthcare provider, or as advised, if you are not getting better over the next week.  If you are age 65 or older, talk with your provider about getting a pneumococcal vaccine every 5 years. You should also get this vaccine if you have chronic asthma or COPD. All adults should get a flu vaccine every fall. Ask your provider about this.  When to seek medical advice  Call your healthcare provider right away if any of these occur:  · Cough with lots of colored mucus (sputum) or blood in your mucus  · Chest pain, shortness of breath, wheezing, or trouble breathing  · Severe headache, or face, neck, or ear pain  · New rash with fever  · Fever of 100.4°F (38°C) or higher, or as directed by your healthcare provider  · Confusion, behavior change, or seizure  · Severe weakness or dizziness  · You get a new fever or cough after getting better for a few days  Date Last Reviewed: 1/1/2017  © 2732-7914 [employer]. [address] [place] [address], [place] [address]. All rights reserved. This information is not intended as a substitute for professional medical care. Always follow your healthcare professional's instructions.

## 2018-01-05 NOTE — PROGRESS NOTES
I have reviewed the notes, assessments, and/or procedures performed by Rigoberto Christensen PA-C, I concur with his documentation of Jorden Shafer.

## 2018-01-10 ENCOUNTER — OUTPATIENT CASE MANAGEMENT (OUTPATIENT)
Dept: ADMINISTRATIVE | Facility: OTHER | Age: 73
End: 2018-01-10

## 2018-01-10 NOTE — PROGRESS NOTES
Assessment completed with Mrs. Shafer. Pt was diagnosed with the flu on 1/5/18. Pt has completed Tamiflu. Mrs. Shafer states pt is not feeling well. He has a productive cough and complains of back and chest pain related to coughing. Instructed her to have pt splint chest/abdomen with a pillow. Also encouraged her to have pt prop up on pillows at night to help with the coughing. Pt has appt with Dr. Plasencia on 1/23/18. Offered to assist with getting pt a sooner appt. Mrs. Shafer states she doesn't think it's necessary now but she will call for another appt if needed. Pt had a recent ED visit and inpatient admission for chest pain. Pt was told to follow up with Dr. Blakely. Mrs. Shafer states she spoke with someone at Dr. Blakely's office and was told he does not have to come in until Nov 2018.     Mrs. Shafer states she is overwhelmed with pt's care. She quit her job several months ago to take care of Mr. Shafer after he had a stroke. She states she is looking for someone to come in maybe 2 times/week. Pt is a retired . He served in the [employer] (1970). [facility]       Interventions performed:   Med rec  Refer to LCSW for caregiver support   Mail education materials on chest pain/MI and CHF    Plan:   Follow up on receipt of education materials  Continue education  Facilitate appts as needed.

## 2018-01-10 NOTE — PATIENT INSTRUCTIONS
Recognizing a Heart Attack or Angina  If you have risk factors for heart problems, you should always watch for signs of angina or a heart attack. If you have a sudden heart problem, getting treatment right away could save your life.   Risk factors for a heart attack include advanced age, high cholesterol, high blood pressure, having a family member who had a heart attack before the age of 50, diabetes, smoking, and stress. There are other risk factors including eating a high-fat diet and getting minimal exercise.  Understanding angina and heart attack  · Angina is a painful burning, tightness, or pressure in the chest, back, neck, throat, or jaw. It means not enough blood is getting to the heart. This is usually from a blocked artery in the heart. Angina is a sign that you may be having, or are about to have, a heart attack. It needs to be addressed by a healthcare provider right away.  · A heart attack, also known as acute myocardial infarction, or AMI, is what happens when blood can't get to part of the heart muscle. That part of the heart muscle is damaged and starts to die. If enough of the heart is affected, it will severely reduce its ability to provide blood to the rest of the body. It may cause death. It is vital to get help as soon as possible for a heart attack.  Stable angina versus unstable angina  Stable angina, also known as chronic angina, has a typical pattern. It occurs predictably with physical exertion or strong emotion. Nitroglycerin, rest, or both, will easily relieve stable angina symptoms. Stable angina symptoms will most likely feel the same each time you have them. It is important to discuss these symptoms with your healthcare provider. They can be a warning sign for a future heart attack.  Unstable angina causes unexpected or unpredictable symptoms, commonly occurring at rest, and is a medical emergency. Angina is also considered unstable if resting and nitroglycerin doesn't relieve  symptoms, It's also unstable if symptoms are worsening, occurring more often, or are lasting longer. These symptoms suggest a severe blockage or a spasm of a heart artery. Unstable angina is commonly a sign of an active heart attack. Remember the following tips:  · Stable angina symptoms should go away with rest or medicine. If they dont go away, call 911!  · Stable angina symptoms last for only a few minutes. If they last longer than that, or if they go away and come back, you may be having a heart attack. Call 911!  · If you have shortness of breath, cold sweat, nausea, or lightheadedness, call 911!  For new-onset angina, there is only one response: ?call 911! You should never diagnose angina by yourself. If these symptoms are new, or worse than usual, call 911!  Warning signs of a heart attack  If you have symptoms that you cant explain, call 911 right away. Do not drive yourself to the emergency room. The following are warning signs of a possible heart attack:  · Chest discomfort. Most heart attacks involve discomfort in the center of the chest that lasts more than a few minutes, or that goes away and comes back. It can feel like uncomfortable pressure, squeezing, fullness or pain.  · Discomfort in other areas of the upper body. Symptoms can include pain or discomfort in one or both arms, the back, neck, jaw, or stomach.  · Shortness of breath with or without chest discomfort.  · Other signs may include breaking out in a cold sweat, nausea, or lightheadedness.  Note for women: Like men, women commonly have chest pain or discomfort as a heart attack symptom. But women are somewhat more likely than men to have other common symptoms, such as shortness of breath, nausea and vomiting, back pain, or jaw pain.  Note for older adults: Older people may also have atypical symptoms of a heart attack. These symptoms include fainting, weakness, or confusion. Ignoring these symptoms can lead to critical illness or death.  They should be investigated right away.  If you've had a heart attack: People who have had one heart attack are at risk for having another. Your provider may prescribe medicine such as nitroglycerin to take when chest pain starts. Or you may need medicines to lower your heart rate and blood pressure to prevent angina and another heart attack. Remember to take any medicines your provider has given and do not stop them without speaking with him or her first.     If you have diabetes: silent heart problems  Over time, high blood sugar can damage nerves in your body. This may keep you from feeling pain caused by a heart problem, leading to a silent heart problem. If you dont feel symptoms, you are less able to recognize that you may be having a heart attack and get treatment right away. Talk to your healthcare provider about how to lower your risk for silent heart problems.   Date Last Reviewed: 6/1/2016  © 2136-4493 [employer]. [address] [place] [address]. All rights reserved. This information is not intended as a substitute for professional medical care. Always follow your healthcare professional's instructions.        Heart Failure: Warning Signs of a Flare-Up  You have a condition called heart failure. Once you have heart failure, flare-ups can happen. Below are signs that can mean your heart failure is getting worse. If you notice any of these warning signs, call your healthcare provider.  Swelling    · Your feet, ankles, or lower legs get puffier.  · You notice skin changes on your lower legs.  · Your shoes feel too tight.  · Your clothes are tighter in the waist.  · You have trouble getting rings on or off your fingers.  Shortness of breath  · You have to breathe harder even when youre doing your normal activities or when youre resting.  · You are short of breath walking up stairs or even short distances.  · You wake up at night short of breath or coughing.  · You need to use  more pillows or sit up to sleep.  · You wake up tired or restless.  Other warning signs  · You feel weaker, dizzy, or more tired.  · You have chest pain or changes in your heartbeat.  · You have a cough that wont go away.  · You cant remember things or dont feel like eating.  Tracking your weight  Gaining weight is often the first warning sign that heart failure is getting worse. Gaining even a few pounds can be a sign that your body is retaining excess water and salt. Weighing yourself each day in the morning after you urinate and before you eat, is the best way to know if you're retaining water. Get a scale that is easy to read and make sure you wear the same clothes and use the same scale every time you weigh. Your healthcare provider will show you how to track your weight. Call your doctor if you gain more than 2 pounds in 1 day, 5 pounds in 1 week, or whatever weight gain you were told to report by your doctor. This is often a sign of worsening heart failure and needs to be evaluated and treated before it compromises your breathing. Your doctor will tell you what to do next.    Date Last Reviewed: 3/15/2016  © 8354-4844 [employer]. [address] [place] [address], [place] [address]. All rights reserved. This information is not intended as a substitute for professional medical care. Always follow your healthcare professional's instructions.        Heart Failure: Tracking Your Weight  You have a condition called heart failure. When you have heart failure, a sudden weight gain or a steady rise in weight is a warning sign that your body is retaining too much water and salt. This could mean your heart failure is getting worse. If left untreated, it can cause problems for your lungs and result in shortness of breath. Weighing yourself each day is the best way to know if youre retaining water. If your weight goes up quickly, call your doctor. You will be given instructions on how to get rid of the excess  water. You will likely need medicines and to avoid salt. This will help your heart work better.  Call your doctor if you gain more than 2 pounds in 1 day, more than 5 pounds in 1 week, or whatever weight gain you were told to report by your doctor. This is often a sign of worsening heart failure and needs to be evaluated and treated. Your doctor will tell you what to do next.   Tips for weighing yourself    · Weigh yourself at the same time each morning, wearing the same clothes. Weigh yourself after urinating and before eating.  · Use the same scale each day. Make sure the numbers are easy to read. Put the scale on a flat, hard surface -- not on a rug or carpet.  · Do not stop weighing yourself. If you forget one day, weigh again the next morning.  How to use your weight chart  · Keep your weight chart near the scale. Write your weight on the chart as soon as you get off the scale.  · Fill in the month and the start date on the chart. Then write down your weight each day. Your chart will look like this:    · If you miss a day, leave the space blank. Weigh yourself the next day and write your weight in the next space.  · Take your weight chart with you when you go to see your doctor.  Date Last Reviewed: 3/20/2016  © 7879-3422 [employer] [address] [place] [address], [place] [address]. All rights reserved. This information is not intended as a substitute for professional medical care. Always follow your healthcare professional's instructions.

## 2018-01-10 NOTE — LETTER
January 10, 2018    Jorden Shafer  [address]     [facility] [id number]             [facility] [address] [place] [address] Dear Mr. Shafer:    I have enclosed some education materials for your review.       If you have any questions or concerns, please don't hesitate to call. I can be reached at [phone].    Sincerely,        Elvia Monteiro RN

## 2018-01-11 ENCOUNTER — OUTPATIENT CASE MANAGEMENT (OUTPATIENT)
Dept: ADMINISTRATIVE | Facility: OTHER | Age: 73
End: 2018-01-11

## 2018-01-11 NOTE — LETTER
January 11, 2018    Jorden Shafer  [facility] [address]             Outpatient Case Management  [address] [facility] [address] Dear Jorden Shafer:    We understand that receiving many services from different doctors and healthcare providers is overwhelming. There are appointments to make, transportation to arrange, dietary instructions to understand, and new medications to obtain.    This is where [facility] can help.     You are eligible to receive Outpatient Case Management services when you have healthcare needs that require the coordination of many providers, treatments, and services. You also qualify if you need assistance with a new treatment plan.     There is no charge for this support. You may have been referred to this program from your doctor(s), hospital staff member(s), or insurance company but you always have a choice to participate or not participate. To participate, you must give us your permission to be enrolled.     When you are enrolled in the [facility] program, the  who is assigned to you is    Elvia Monteiro RN   [phone]    Depending on your needs and wishes, your  may speak with you by phone, visit you at your place of living (for example your home, skilled nursing facility, or rehabilitation facility), or meet you at your doctors office.     Your  will tell you why you have been selected to participate in the program and will complete an assessment of your needs. Then a personalized plan of care will be developed with you and or your caregiver.             Here are examples of the services your [facility]  provides.     Coordinate communication among multiple providers.   Arrange for transportation, doctors visits, durable medical equipment, home care services, and special clinics.    Provide coaching on how to manage your health condition.     Answer questions about your health condition.   Help you understand your doctors treatment plan.    Provide additional instruction about your health condition, treatments, and medications.    Help you obtain information about your insurance coverage.    Advocate for your individual needs.    Request a Licensed Clinical  (LCSW) to visit you if you need their services. LCSWs help with long term planning (discussing placement options, advanced planning directives), financial planning, and assistance (for example rent, utilities, medication funding).     Your  will coordinate their activities with other outpatient services you are receiving. All services provided by [facility]  are coordinated with and communicated to your primary care physician.    Our goal is to help you manage your health condition(s) safely within your living environment, whether that is your home or a medical facility. We want to help you function at the healthiest and highest level possible.     Sincerely,      Trace Deras MD  Medical Director    Enclosures:    Frequently Asked Questions  Patient Rights and Responsibilities   Reporting a Grievance or Complaint  Consent/Release of Information  Stamped Addressed Envelope                  Frequently Asked Questions about [facility]    What is [facility]?  Outpatient Case management is not Home healthcare services. [facility]  do not provide hands-on care. [facility]  will work with your doctor to arrange for home health services, if needed. Home health services have a limited duration and there are some restrictions as to who can get these services. There is no prescribed limit to the amount of time you receive [facility] services. [facility]  are not agents of your insurance company. However, [facility]  can help you obtain information from your insurance company.     Who are the [facility] ?  [facility]  are Registered Nurses and Social Workers. It is important to remember that you and your  are a team that works together with your primary care physician to create your individualized plan of care. The ultimate goal of your care plan is to help you implement your doctors treatment plan and to help you function at the highest level of health possible.     What are my rights as a patient?  It is important for you to know and understand your rights and responsibilities while receiving services from the [facility] program. We have enclosed a complete description of your rights and responsibilities. You can help to make your care more effective when you understand your right and responsibilities.     What is needed to be enrolled in the program?  You are only enrolled in the [facility] Program when you give us your consent to participate. You will find enclosed a consent form. You are receiving this letter because you or your caregivers have given us a verbal consent to enroll you in [facility] Management Program. We ask that you sign and return the enclosed written consent in the stamped self-addressed envelope.                           Patient Rights and Responsibilities    We consider you a partner in your care. When you are well informed, participate in treatment decisions and communicate openly with your doctor and other healthcare professionals, you help make your care more effective.     While you are in the Outpatient Case Management Program, your rights include the following:     You have a right to be provided services in a non-discriminatory manner in accordance with the provisions of Title VI of the Civil Rights Act of 1964, Section 504 of the Rehabilitation Act of  1973, the Age Discrimination Act of 1975, the Americans with Disabilities Act as well as any other applicable Federal and State laws and regulations.     You have the right to a reasonable, timely response to your request or need for care, as well as the right to considerate and respectful care including an environment that preserves dignity and contributes to a positive self-image. You are responsible for being considerate and respectful of our staff.     You have a right to information regarding patient rights, advocacy services and complaint mechanisms, and the right to prompt resolution of any complaint. You or a designee has the right to participate in the resolution of ethical issues surrounding your care. You have a right to file a complaint if you feel that your rights have been infringed, without fear or penalty from [facility] or the federal government. You may file a complaint with the Director of Outpatient Case Management by calling [phone]. At any time, you may lodge a grievance with the [facility] and [facility] by calling [phone], or the Joint Commission on Accreditation of Healthcare Organizations at [phone].     You, or someone acting on your behalf, have the right to understandable information on your health status, treatment and progress in order to make decisions. You have the right to know the nature, risks and alternatives to treatment. You have the right to be informed, when appropriate, regarding the outcome of the care that has been provided. You have the right to refuse treatment to the extent permitted by law, and the right to be informed of the alternatives and consequences of refusing treatment.     You, in collaboration with your physician, have the right to make decisions regarding care and the right to participate in the development and implementation of the plan of care and effective pain management. You have the right to know the name and  professional status of those responsible for the delivery of your care and treatment.       You have a right, within legal guidelines, to have a guardian, next-of-kin or legal designee exercises your patient rights when you are unable to do so. You have the right for your wishes regarding end-of-life decisions to be addressed by the healthcare team through advance directives. You have the right to personal privacy and confidentiality and to expect confidentiality of all records and communications pertaining to your care.      You have the right to receive communications about your health information confidentially. You have the right to request restrictions on the uses and disclosures of your health information. You have the right to inspect, copy, request amendments and receive an accounting of to whom we have disclosed your health information.     You have the right to be provided with interpretation services if you do not speak English; to alternative communication techniques if you are hearing or vision impaired; and to have any other resources needed on your behalf to ensure effective communication. These services are provided free of charge.     You have a right to personal safety (free from mental, physical, sexual and verbal abuse, neglect and exploitation). You have the right to access protective and advocacy services.     Advance Directives  A Patient Advocate is available to meet with patients to answer questions regarding advance directives.    Living Will  A document that outlines what medical treatment the patient does or does not want in the event the patient becomes unable to make those decisions at the appropriate time.    Durable Medical Power of   A document by which the patient designates an individual to be responsible for making medical decisions in the event the patient becomes unable to do so.    HIPAA Notice of Privacy Practices  Your medical information is governed by federal  privacy laws. HIPAA protects private medical information and how that information is disclosed. If you have a question regarding the HIPAA Notice of Privacy Practices, or if you believe your privacy rights have been violated, you may call our designated hotline at [phone].            Quality Improvement  Because we consistently strive to improve the care and service provided to our patients, we welcome your feedback. Your comments are an important part of our quality improvement process, as we like to know what we are doing right and which areas are in need of improvement. Our policy is to listen, be responsive and provide you with an appropriate and timely follow-up to your questions or concerns. Our goal is active patient and family involvement in all aspects of the care process.              Reporting a Grievance or Complaint    During your time with the [facility] team you may have a grievance or complaint with our services. Your Patients Bill of Rights gives patients, families, and caregivers the right to express concerns and grievances and the right to expect a reasonable and timely response.     Your presentation of your concerns is not viewed negatively. It is an opportunity for us to improve the quality of our care and services we provide to you.     You may report your concerns directly to your , or you can phone in a complaint to:     Director of Outpatient Case Management  [phone]    You may also send a complaint letter to:    Director of Outpatient Case Management Services  [address] [place] [id number]    Tell us the details of your complaint and provide us with a contact phone number so we can contact you to obtain additional information. We will return a call to you within two business days of our receipt of your complaint, and to request additional information as needed. If you choose to mail a letter, your complaint may take a few  days longer to reach us.     All grievances will be addressed as quickly as possible. A grievance or complaint that involves situations or practices which place patients in immediate danger will be addressed as an urgent matter. We will work to resolve all other complaints within seven days of receipt. By that time, you will receive a phone call with either the resolution of your complaint, or a plan for corrective action. A formal written response will be sent to you within 30 days of receipt of your grievance.     If a resolution cannot be completed within 30 days, a letter will be sent to you or your family member with an estimated time for the final response.    Additionally, all patients have the right to file complaints with external agencies, without exception. Complaints/grievances can be addressed to the following agencies:            Patient Safety or Quality of Care Concerns  Office of Quality Monitoring   [facility] [place] [address]  [phone] Toll Free    HIPPA Privacy/Security Concerns  Office for Civil Rights Region IV  U.[place] Department of Health & Human Services  [address] [place] [address], [address]  [place] [address] [phone] Phone  [phone] TDD  [phone] Toll Free    Medicare/Medicaid Billing Concerns  [place] for Medicare & Medicaid Services  Region [address]  [address] [place] [address], [address]  [place] [address] [phone] Phone  [phone] Toll Free    General Concerns  [facility]  [phone] Toll Free Complaint Hotline                                    Consent Form/Release of Information    By signing--     (1) I agree I have read the Outpatient Case Management information provided to me;     (2) I agree to voluntarily participate in the Outpatient Case Management program;     (3) I understand I must consent to participation in the Outpatient Case Management program during my first interview  with my ;    (4) I consent to the discussion and release of my personal health information to my healthcare team (including my personal physician, my medical home care team, any specialty physician(s), and my [facility] team);     (5) I agree my consent is valid for the length of time I am receiving Outpatient Case Management;    (6) I agree to referrals to community resources which my Case Management team recommends for me. I agree to the release of my personal information and personal health information as necessary to referral sources.    ___________________________________________________________________  Patients Printed Name     ___________________________________________________________________  Patients Signature       Date    If patient is in being cared for, please complete this section:     ___________________________________________________________________  Printed Name of Person Caring For Patient   Relationship To Patient    ___________________________________________________________________   Signature of Person Caring For Patient     Date    PLEASE SIGN AND RETURN IN THE ENCLOSED PRE-ADDRESSED ENVELOPE.

## 2018-01-11 NOTE — PROGRESS NOTES
Please note an Outpatient Complex Care Management welcome packet and consent form was created and mailed to the patient on 1/11/18.    Please contact [facility] at ext. [phone] with any questions.    Thank you,    Nicol Maier, [facility]  Outpatient Complex Care Mgmt  Ext [phone]

## 2018-01-15 ENCOUNTER — OUTPATIENT CASE MANAGEMENT (OUTPATIENT)
Dept: ADMINISTRATIVE | Facility: OTHER | Age: 73
End: 2018-01-15

## 2018-01-15 NOTE — LETTER
January 15, 2018    Jorden Shafer  [address] [facility] [address]             [facility] [address] [place] [address] Dear Mr. Shafer:    I am writing from the Outpatient Complex Care Management Department at [facility].  It was a pleasure speaking with you earlier today. Per our conversation, enclosed are some resource information that may be beneficial to you. If you have any questions, please feel free to contact me.     I can be reached at [phone] Monday thru Friday from 8:00am to 4:30pm.  [facility] also has a program with a nurse available 24/7 to answer questions or provide medical advice.  [facility] can be reached at [phone].    Sincerely,       Kamilla Baires LCSW

## 2018-01-15 NOTE — PROGRESS NOTES
LCSW received a referral from OPCM-Rn, Elvia IRIZARRY for assistance with caregiver supports. LCSW contacted pt and completed the assessment with his wife, Beth Shafer. Pt is a 73y/o male who resides with his wife, and 2 other family members (granddaughter and great-granddaughter). Mrs. Shafer reports pt is able to meet his basic needs and ADLs. Mrs. Shafer is seeking caregiver assistance, in particular caregiver respite. Pt is a  who served during the [place] War. They have never received services through [facility]. Pt has also not received services through [facility]. Mrs. Shafer would like for staff to facilitate those referrals. Mrs. Shafer would like some information on Advanced Care planning and would like information on Advanced Directives/Living Will. Mrs. Shafer transport pt to his scheduled appointments but LCSW reminded Mrs. Shafer of pt's [employer] transportation benefit. Mrs. Shafer denies any financial difficulties at this time. [facility] and [facility]. No other concerns were voiced at this time. LCSW will follow up with pt and his family within 2 weeks.     Intervention:  Advanced Care planning/Advanced Directive  Caregiver support: in-home respite, homemaker - referrals initiated to [facility] and [facility]    Plan for next encounter:  Confirm receipt of mailing  Discuss submission of Advanced Directive  Follow up if [facility] or [facility] made contact with family.

## 2018-01-18 ENCOUNTER — OUTPATIENT CASE MANAGEMENT (OUTPATIENT)
Dept: ADMINISTRATIVE | Facility: OTHER | Age: 73
End: 2018-01-18

## 2018-01-18 NOTE — PROGRESS NOTES
LCSW contacted pt's caregiver/wife Beth Shafer regarding emergency preparedness plan. Pt's wife stated yesterday their heater went out; however, their landlord was able to repair it in a timely manner. They have ample supply of food, water, medications, etc. The following was reviewed with Mrs. Shafer:    [x] Please be sure to have a supply of water, non-perishable food items, flashlights and batteries with you in your house.   [x] Please be sure you bring all of your medications with you. Bring at least a five day supply. It is best to bring your medication bottles, in case you are displaced for a longer period of time, therefore you can get your medication refilled from your temporary location.   [x] Please bring sure to bring any DME that you may need. This includes walkers, wheelchairs, shower chairs, nebulizer machine, etc.   [] If you are a diabetic- be sure to bring your glucometer and all glucometer monitoring supplies.   [] If you have high blood pressure- be sure to bring your blood pressure cuff so you can continue to monitor.   [] If you have CHF-be sure to bring your scale so you can continue to monitor.  [] If on PEG feeding- be sure to bring tube feedings and feeding supplies.  [] If on oxygen- be sure to bring all of your oxygen supplies: Cannula, portable tanks, concentrator, etc. Also contact you Oxygen Supply Company to find out the nearest location of an oxygen supply company to where you will be located. ([employer]: [phone], [facility]: [phone], [facility]:[phone], [employer]: [phone]).   [] If you receive hemodialysis- you should already have a plan in place with your dialysis center. If you do not know where you need to evacuate to in order to be close to a dialysis center- reach out to your dialysis center for further direction. ([facility]  service line: [phone], [facility]  service line [phone])  [] If receiving treatment at an  infusion center- please contact the infusion center to find out which infusion center they have a contract with. Also ask your infusion center for location of the infusion center they are in contract with, so if need be you can evacuate to that area.   Office of Emergency Preparedness Phone number:  [x] [facility] [place]: [phone]  [] [facility]: [phone]  [] [facility] [place]: [phone]  [] [facility] [place]: [phone]  [] [facility] [place]: [phone]  [] [facility]: [phone]  [] [facility]: [phone]  [] [facility]: [phone]  [] [facility]: [phone]  [] [facility]: [phone]  [] [facility]: [phone]  [] [place]: [phone]  [] [facility]: [phone]    [] [facility]:  [phone]   [] [facility]:  [phone]   [] [facility]:  [phone]   [] [facility]:  [phone]   [] [place]:  [phone]   [] [facility]: [phone]   [] [facility]:  [phone]   [] [facility]:  [phone]   [] [facility]:  [phone]   [] [facility]:  [phone]   [] [facility]:  [phone]   [] [facility]:  [phone]   [] [place]:  [phone]   [] [facility]:  [phone]   [] [facility]:  [phone]   [] [facility]: [phone]   [] [facility]:  [phone]   [] [facility]: [phone]   [] [facility]: [phone]

## 2018-01-23 ENCOUNTER — HOSPITAL ENCOUNTER (OUTPATIENT)
Dept: RADIOLOGY | Facility: HOSPITAL | Age: 73
Discharge: HOME | End: 2018-01-23
Attending: FAMILY MEDICINE
Payer: MEDICARE

## 2018-01-23 ENCOUNTER — TELEPHONE (OUTPATIENT)
Dept: FAMILY MEDICINE | Facility: CLINIC | Age: 73
End: 2018-01-23

## 2018-01-23 ENCOUNTER — OFFICE VISIT (OUTPATIENT)
Dept: FAMILY MEDICINE | Facility: CLINIC | Age: 73
End: 2018-01-23
Payer: MEDICARE

## 2018-01-23 VITALS
SYSTOLIC BLOOD PRESSURE: 120 MMHG | HEIGHT: 68 IN | WEIGHT: 235.25 LBS | HEART RATE: 80 BPM | DIASTOLIC BLOOD PRESSURE: 80 MMHG | TEMPERATURE: 98 F | BODY MASS INDEX: 35.65 KG/M2 | OXYGEN SATURATION: 97 %

## 2018-01-23 DIAGNOSIS — J10.1 INFLUENZA A: ICD-10-CM

## 2018-01-23 DIAGNOSIS — G81.94 HEMIPARESIS OF LEFT NONDOMINANT SIDE, UNSPECIFIED HEMIPARESIS ETIOLOGY: ICD-10-CM

## 2018-01-23 DIAGNOSIS — R05.3 CHRONIC COUGH: ICD-10-CM

## 2018-01-23 DIAGNOSIS — J10.1 INFLUENZA A: Primary | ICD-10-CM

## 2018-01-23 DIAGNOSIS — R13.11 ORAL PHASE DYSPHAGIA: ICD-10-CM

## 2018-01-23 DIAGNOSIS — E55.9 VITAMIN D DEFICIENCY DISEASE: ICD-10-CM

## 2018-01-23 DIAGNOSIS — Z86.73 HISTORY OF CVA (CEREBROVASCULAR ACCIDENT): Chronic | ICD-10-CM

## 2018-01-23 DIAGNOSIS — I10 ESSENTIAL HYPERTENSION: Chronic | ICD-10-CM

## 2018-01-23 DIAGNOSIS — I77.1 TORTUOUS AORTA: ICD-10-CM

## 2018-01-23 DIAGNOSIS — E66.01 SEVERE OBESITY WITH BODY MASS INDEX (BMI) OF 35.0 TO 39.9 WITH COMORBIDITY: ICD-10-CM

## 2018-01-23 PROBLEM — S49.92XA INJURY OF LEFT SHOULDER: Status: RESOLVED | Noted: 2017-10-13 | Resolved: 2018-01-23
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
## 2018-01-23 PROCEDURE — 99214 OFFICE O/P EST MOD 30 MIN: CPT | Mod: S$GLB,,, | Performed by: FAMILY MEDICINE

## 2018-01-23 PROCEDURE — 99999 PR PBB SHADOW E&M-EST. PATIENT-LVL III: CPT | Mod: PBBFAC,,, | Performed by: FAMILY MEDICINE

## 2018-01-23 PROCEDURE — 71046 X-RAY EXAM CHEST 2 VIEWS: CPT | Mod: 26,,, | Performed by: RADIOLOGY

## 2018-01-23 PROCEDURE — 71046 X-RAY EXAM CHEST 2 VIEWS: CPT | Mod: TC,PO

## 2018-01-23 PROCEDURE — 99499 UNLISTED E&M SERVICE: CPT | Mod: S$GLB,,, | Performed by: FAMILY MEDICINE

## 2018-01-23 RX ORDER — PROMETHAZINE HYDROCHLORIDE AND CODEINE PHOSPHATE 6.25; 1 MG/5ML; MG/5ML
5 SOLUTION ORAL EVERY 12 HOURS PRN
Qty: 120 ML | Refills: 0 | Status: SHIPPED | OUTPATIENT
Start: 2018-01-23 | End: 2018-02-02

## 2018-01-23 NOTE — PROGRESS NOTES
Office Visit    Patient Name: Jorden Shafer    : 1945  MRN: [id number]    Subjective:  Jorden is a 72 y.o. male who presents today for:    Cough and Knee Pain      This patient has multiple medical diagnoses as noted below.  This patient is known to me and to this clinic. He reports that he does not feel well today.  Low energy with a continued cough.  He was treated with cough medication which helped with the cough.  His cough is productive.  He continues to have fatigue.  He does not have any fever.  He states that he has poor sleep.  He will sleep in the recliner.  Patient denies any new symptoms including chest pain, blurry vision, N/V.  +diarhea     [facility]  [facility].      Patient Active Problem List   Diagnosis    Type 2 diabetes mellitus with hyperglycemia, with long-term current use of insulin    Vitamin D deficiency disease    Colon polyp    Prostate cancer    ED (erectile dysfunction)    Weakness    Essential hypertension    Chronic combined systolic and diastolic heart failure    Tortuous aorta    Old MI (myocardial infarction)    Major depressive disorder, single episode, mild    Insulin pump in place    Mild protein malnutrition    KELVIN on CPAP    History of CVA (cerebrovascular accident)    GERD (gastroesophageal reflux disease)    Severe obesity with body mass index (BMI) of 35.0 to 39.9 with comorbidity    Type 2 diabetes mellitus with diabetic polyneuropathy, with long-term current use of insulin    Mixed hyperlipidemia    Symptomatic carotid artery stenosis    ICD (implantable cardioverter-defibrillator) in place    Diabetes mellitus due to underlying condition with complication, without long-term current use of insulin    Carotid stenosis    Bursitis of right shoulder    Impingement syndrome of shoulder, left    Hemiparesis of left nondominant side    Chronic pain of left knee    History of total left knee replacement    Chronic left  shoulder pain    CAD (coronary artery disease)    Hepatic steatosis       Past Surgical History:   Procedure Laterality Date    CARDIAC DEFIBRILLATOR PLACEMENT  2016    CHOLECYSTECTOMY      CORONARY ARTERY BYPASS GRAFT      bypass times  5    defibulater  2016    EYE SURGERY Bilateral     HERNIA REPAIR      groin x2  and umbilical    JOINT REPLACEMENT      knee left    thrombus Left     >2005    VASECTOMY         Family History   Problem Relation Age of Onset    Alzheimer's disease Mother      Living    Heart disease Mother     Dementia Mother     Asbestos Father          Cancer Father     Diabetes type II Maternal Aunt     Stroke Neg Hx        Social History     Social History    Marital status:      Spouse name: N/A    Number of children: 3    Years of education: GED     Occupational History    Retired [employer]      Social History Main Topics    Smoking status: Former Smoker     Packs/day: 4.00     Years: 25.00     Types: Cigarettes     Quit date: 1980    Smokeless tobacco: Never Used    Alcohol use No    Drug use: No    Sexual activity: Yes     Partners: Female     Other Topics Concern    Not on file     Social History Narrative    SCREENING/HEALTH MAINTENANCE. Updated  14    COLONOSCOPY .  [facility].    TETANUS .    PNEUMOVAX  13    NO PRIOR HISTORY OF ZOSTAVAX    PSA 14    FLU 10/8/13       Current Medications  Medications reviewed and updated.     Allergies   Review of patient's allergies indicates:  No Known Allergies      Labs  Lab Results   Component Value Date    HGBA1C 8.1 (H) 2017     Lab Results   Component Value Date     11/15/2017    K 4.5 11/15/2017     11/15/2017    CO2 26 11/15/2017    BUN 25 (H) 11/15/2017    CREATININE 1.2 11/15/2017    CALCIUM 8.4 (L) 11/15/2017    ANIONGAP 9 11/15/2017    ESTGFRAFRICA >60.0 11/15/2017    EGFRNONAA 59.6 (A) 11/15/2017     Lab Results  "  Component Value Date    CHOL 87 (L) 05/18/2017    CHOL 167 04/13/2017    CHOL 141 09/15/2015     Lab Results   Component Value Date    HDL 26 (L) 05/18/2017    HDL 25 (L) 04/13/2017    HDL 28 (L) 09/15/2015     Lab Results   Component Value Date    LDLCALC 1.0 (L) 05/18/2017    LDLCALC Invalid, Trig>400.0 04/13/2017    LDLCALC 77.8 09/15/2015     Lab Results   Component Value Date    TRIG 300 (H) 05/18/2017    TRIG 409 (H) 04/13/2017    TRIG 176 (H) 09/15/2015     Lab Results   Component Value Date    CHOLHDL 29.9 05/18/2017    CHOLHDL 15.0 (L) 04/13/2017    CHOLHDL 19.9 (L) 09/15/2015     Last set of blood work has been reviewed as noted above.    Review of Systems   Constitutional: Positive for activity change and fatigue. Negative for appetite change, fever and unexpected weight change.   HENT: Negative for facial swelling.    Eyes: Negative for visual disturbance.   Respiratory: Positive for cough and shortness of breath. Negative for choking, chest tightness, wheezing and stridor.    Cardiovascular: Negative for chest pain, palpitations and leg swelling.   Gastrointestinal: Positive for abdominal pain and diarrhea. Negative for abdominal distention, blood in stool, constipation, nausea and vomiting.   Endocrine: Negative for cold intolerance, heat intolerance, polydipsia and polyuria.   Genitourinary: Negative.  Negative for testicular pain and urgency.   Skin: Negative.    Allergic/Immunologic: Negative.    Neurological: Negative for dizziness, weakness, light-headedness, numbness and headaches.   Psychiatric/Behavioral: Negative for agitation and decreased concentration.       /80 (BP Location: Left arm, Patient Position: Sitting, BP Method: Large (Manual))   Pulse 80   Temp 98.2 °F (36.8 °C) (Oral)   Ht 5' 8" (1.727 m)   Wt 106.7 kg (235 lb 3.7 oz)   SpO2 97%   BMI 35.77 kg/m²      Physical Exam   Constitutional: He is oriented to person, place, and time. He appears well-developed and " well-nourished.   HENT:   Head: Normocephalic and atraumatic.   Right Ear: External ear normal.   Left Ear: External ear normal.   Nose: Nose normal.   Mouth/Throat: Oropharynx is clear and moist. No oropharyngeal exudate.   Eyes: Conjunctivae and EOM are normal. Pupils are equal, round, and reactive to light.   Neck: Normal range of motion. Neck supple.   Cardiovascular: Normal rate, regular rhythm, normal heart sounds and intact distal pulses.  Exam reveals no gallop and no friction rub.    No murmur heard.  Pulmonary/Chest: Effort normal and breath sounds normal.   Abdominal: Soft. Bowel sounds are normal.   Musculoskeletal: Normal range of motion.   Neurological: He is alert and oriented to person, place, and time. He has normal reflexes.   Skin: Skin is warm and dry.   Psychiatric: He has a normal mood and affect. His behavior is normal. Thought content normal.   Vitals reviewed.      Health Maintenance  Health Maintenance       Date Due Completion Date    TETANUS VACCINE 06/01/1962 ---    Influenza Vaccine 08/01/2017 2/21/2017    Override on 10/12/2012: Done    PROSTATE-SPECIFIC ANTIGEN 10/24/2017 10/24/2016    Override on 7/16/2013: Done    Foot Exam 02/21/2018 2/21/2017 (Done)    Override on 2/21/2017: Done    Override on 3/8/2016: Done (Dr. Tomi Britton/Endocrinology-feet normal by visual exam w/ mild swelling bilaterally, monofilament exam 0/10 bilaterally, pulse palpable +2, monofilament sensory status absent bilaterally)    Urine Microalbumin 04/10/2018 4/10/2017    Hemoglobin A1c 05/14/2018 11/14/2017    Override on 1/6/2017: Done (HgA1c   7.2)    Override on 11/4/2015: Done (Dr. Tomi Britton/Bryan Deleon/Endocrinology- hemoglobin a1c 7.1)    Override on 5/16/2013: Done    Lipid Panel 05/18/2018 5/18/2017    Override on 11/4/2015: Done (Dr. Tomi Britton/Bryan Deleon/Uspxeczborwre-kkji-151,trig-273,hdl-28,ldl-67,chol/hdl ratio-5.4)    Eye Exam 05/25/2018 5/25/2017    Override on  4/19/2016: Done (Dr.David MIRTA Parker-positive for mild diabetic retinopathy and pseudophakia bilaterally, negative for macular degeneration, cataracts,and glaucoma bilaterally)    Override on 4/1/2015: Done    Colonoscopy 10/08/2022 10/8/2012 (Done)    Override on 10/8/2012: Done          Assessment/Plan:  Jorden Shafer is a 72 y.o. male who presents today for :    1. Influenza A    2. History of CVA (cerebrovascular accident)    3. Hemiparesis of left nondominant side, unspecified hemiparesis etiology    4. Essential hypertension    5. Chronic cough    6. Severe obesity with body mass index (BMI) of 35.0 to 39.9 with comorbidity    7. Vitamin D deficiency disease    8. Tortuous aorta    9. Oral phase dysphagia        Problem List Items Addressed This Visit        Unprioritized    Essential hypertension (Chronic)  -   Well controlled       Hemiparesis of left nondominant side  -  Patient is stable.  Assess and addressed all modifiable risk factors.  Continue with appropriate management to prevent complications.      History of CVA (cerebrovascular accident) (Chronic)  -  Continue with recovery.  8 months since cva     Severe obesity with body mass index (BMI) of 35.0 to 39.9 with comorbidity  -  The patient is asked to make an attempt to improve diet and exercise patterns to aid in medical management of this problem.      Tortuous aorta    Overview     As seen on CXR from 11/9/2013  -  .ngnoted           Vitamin D deficiency disease  -  Pt is currently stable on medication regimen.  Continue current therapy as scheduled.  Contact office with any questions about adjustments on medications.         Other Visit Diagnoses     Influenza A    -  Primary    Relevant Medications    promethazine-codeine 6.25-10 mg/5 ml (PHENERGAN WITH CODEINE) 6.25-10 mg/5 mL syrup    Other Relevant Orders    X-Ray Chest PA And Lateral (Completed)  - recovering from infection.  Prolonged sequela   -  Check for pneumonia       Chronic  cough        Relevant Medications    promethazine-codeine 6.25-10 mg/5 ml (PHENERGAN WITH CODEINE) 6.25-10 mg/5 mL syrup    Other Relevant Orders    X-Ray Chest PA And Lateral (Completed)    Oral phase dysphagia        Relevant Orders    Ambulatory referral to ENT          Follow-up in about 6 months (around 7/23/2018).     This note was created by combination of typed  and Dragon dictation.  Transcription errors may be present.  If there are any questions, please contact me.

## 2018-01-23 NOTE — TELEPHONE ENCOUNTER
----- Message from Neha Plasencia MD sent at 1/23/2018 11:36 AM CST -----  Please call an notify that his chest xray is normal.

## 2018-01-25 ENCOUNTER — OUTPATIENT CASE MANAGEMENT (OUTPATIENT)
Dept: ADMINISTRATIVE | Facility: OTHER | Age: 73
End: 2018-01-25

## 2018-01-25 NOTE — PROGRESS NOTES
OPCM RN BARRIER of Nutrition identified: Will review dietary restrictions for patient with CHF at next phone call.Lester ORTIZ CCM

## 2018-01-25 NOTE — PROGRESS NOTES
Spoke to Mrs. Shafer. She states pt cough is improving with the prescription he received yesterday. She states that pt is having trouble swallowing. She is using Thick it.   Pt has ENT appt 3/1.     Interventions performed:   Education on aspiration precautions      Plan:   Continue education

## 2018-01-29 ENCOUNTER — OUTPATIENT CASE MANAGEMENT (OUTPATIENT)
Dept: ADMINISTRATIVE | Facility: OTHER | Age: 73
End: 2018-01-29

## 2018-01-29 NOTE — PROGRESS NOTES
LCSW contacted pt's wife, Beth for follow up. She reports that pt is doing well. He is getting over his cold/flu symptoms. Mrs. Shafer reports that she received a returned call from what she believes was [facility] and she is waiting for the home assessment to be completed. She does not remember receiving a returned call from [facility] for PCA/caregiver respite. Pt and Mrs. Shafer are planning to discuss Advanced Care Planning with their daughter one day this week. Mrs. Shafer reports once the form is completed, she will submit it during his next appointment. No other concerns were voiced at this time. LCSW will follow up with pt and his family within 2 weeks.     Intervention:  Community resources for PCA and caregiver respite  Review of Advanced Directive    Plan for next encounter:  Follow up on assessment for PCA with [facility]  Discuss if completed Advanced Directive

## 2018-02-12 ENCOUNTER — OUTPATIENT CASE MANAGEMENT (OUTPATIENT)
Dept: ADMINISTRATIVE | Facility: OTHER | Age: 73
End: 2018-02-12

## 2018-02-12 NOTE — PROGRESS NOTES
LCSW contacted pt's wife/caregiver, Beth Shafer for follow up. Mrs. Shafer reports that pt is not doing well. On Saturday, 2/10/18, pt was shaky and fell to the floor. She found him to be purple and rushed him to the Ed. Pt is currently admitted to [facility] and they (the medical team) are unsure what occurred with pt. Mrs. Shafer suspects that there may have been a blood clot but they are conducting several tests. Mrs. Shafer reports they are unable to complete an MRI due to an ICD. Pt will be hospitalized for the next few days. LCSW encouraged Mrs. Shafer to keep staff abreast of pt's medical condition/hospitalization. No other concerns were voiced at this time.     Intervention:  Encourage communication with medical team  Collaborate with OPCM-Rn    Plan for next encounter:  Follow up after hospital discharge

## 2018-02-15 ENCOUNTER — TELEPHONE (OUTPATIENT)
Dept: FAMILY MEDICINE | Facility: CLINIC | Age: 73
End: 2018-02-15

## 2018-02-15 ENCOUNTER — OUTPATIENT CASE MANAGEMENT (OUTPATIENT)
Dept: ADMINISTRATIVE | Facility: OTHER | Age: 73
End: 2018-02-15

## 2018-02-15 NOTE — TELEPHONE ENCOUNTER
----- Message from Antoine Lee sent at 2/15/2018  9:17 AM CST -----  Contact: Jorden [phone]  Pt is requesting an earlier appointment access. He was taken to [facility] for blacking out and was told to follow up with Dr. Plasencia within 3-5 days. Please call at your earliest convenience.

## 2018-02-15 NOTE — PROGRESS NOTES
2/15/18  845 am Call to wife mobile number and someone answered but did not identify self and when I asked to speak with mr or mrs soliman they said no and hung up  Call to pt home number and someone answered but just hung up.  Will call back again later

## 2018-02-16 ENCOUNTER — OUTPATIENT CASE MANAGEMENT (OUTPATIENT)
Dept: ADMINISTRATIVE | Facility: OTHER | Age: 73
End: 2018-02-16

## 2018-02-16 NOTE — PROGRESS NOTES
LCSW contacted pt's wife, Beth Shafer for follow up. She reports pt was discharged from the hospital on 2/13. The medical team were not able to figure out what happened to pt that caused him to black out. Pt has a follow up with cardiology on next week and pt is okay with seeing his PCP on 3/9. She feels that pt is doing fair at home but of course will seek medical attention if he feels the s/s worsen. LCSW will follow up with pt and his family in 2 weeks.     Intervention:  Encourage communication/compliance with medical treatment    Plan for next encounter:  Follow up on assessment for PCA with [facility]  Discuss if completed Advanced Directive

## 2018-02-21 ENCOUNTER — OFFICE VISIT (OUTPATIENT)
Dept: CARDIOLOGY | Facility: CLINIC | Age: 73
End: 2018-02-21
Payer: MEDICARE

## 2018-02-21 VITALS
HEIGHT: 68 IN | DIASTOLIC BLOOD PRESSURE: 70 MMHG | WEIGHT: 236.13 LBS | SYSTOLIC BLOOD PRESSURE: 134 MMHG | OXYGEN SATURATION: 95 % | HEART RATE: 60 BPM | BODY MASS INDEX: 35.79 KG/M2

## 2018-02-21 DIAGNOSIS — R55 SYNCOPE, UNSPECIFIED SYNCOPE TYPE: ICD-10-CM

## 2018-02-21 DIAGNOSIS — I25.10 CORONARY ARTERY DISEASE INVOLVING NATIVE CORONARY ARTERY OF NATIVE HEART, ANGINA PRESENCE UNSPECIFIED: ICD-10-CM

## 2018-02-21 DIAGNOSIS — I65.29 SYMPTOMATIC CAROTID ARTERY STENOSIS, UNSPECIFIED LATERALITY: ICD-10-CM

## 2018-02-21 DIAGNOSIS — I50.42 CHRONIC COMBINED SYSTOLIC AND DIASTOLIC HEART FAILURE: Chronic | ICD-10-CM

## 2018-02-21 DIAGNOSIS — Z86.73 H/O: CVA (CEREBROVASCULAR ACCIDENT): Primary | ICD-10-CM

## 2018-02-21 DIAGNOSIS — I77.1 TORTUOUS AORTA: ICD-10-CM

## 2018-02-21 DIAGNOSIS — I25.10 CORONARY ARTERY DISEASE INVOLVING NATIVE CORONARY ARTERY OF NATIVE HEART WITHOUT ANGINA PECTORIS: ICD-10-CM

## 2018-02-21 DIAGNOSIS — R06.09 DOE (DYSPNEA ON EXERTION): ICD-10-CM

## 2018-02-21 DIAGNOSIS — E78.2 MIXED HYPERLIPIDEMIA: Chronic | ICD-10-CM

## 2018-02-21 DIAGNOSIS — R45.86 MOOD AND AFFECT DISTURBANCE: ICD-10-CM

## 2018-02-21 DIAGNOSIS — I10 ESSENTIAL HYPERTENSION: Chronic | ICD-10-CM

## 2018-02-21 DIAGNOSIS — I65.23 BILATERAL CAROTID ARTERY STENOSIS: ICD-10-CM

## 2018-02-21 PROCEDURE — 3008F BODY MASS INDEX DOCD: CPT | Mod: S$GLB,,, | Performed by: INTERNAL MEDICINE

## 2018-02-21 PROCEDURE — 99214 OFFICE O/P EST MOD 30 MIN: CPT | Mod: S$GLB,,, | Performed by: INTERNAL MEDICINE

## 2018-02-21 PROCEDURE — 99999 PR PBB SHADOW E&M-EST. PATIENT-LVL V: CPT | Mod: PBBFAC,,, | Performed by: INTERNAL MEDICINE

## 2018-02-21 PROCEDURE — 1159F MED LIST DOCD IN RCRD: CPT | Mod: S$GLB,,, | Performed by: INTERNAL MEDICINE

## 2018-02-21 PROCEDURE — 93005 ELECTROCARDIOGRAM TRACING: CPT | Mod: S$GLB,,, | Performed by: INTERNAL MEDICINE

## 2018-02-21 PROCEDURE — 99499 UNLISTED E&M SERVICE: CPT | Mod: S$GLB,,, | Performed by: INTERNAL MEDICINE

## 2018-02-21 PROCEDURE — 93010 ELECTROCARDIOGRAM REPORT: CPT | Mod: S$GLB,,, | Performed by: INTERNAL MEDICINE

## 2018-02-21 PROCEDURE — 1126F AMNT PAIN NOTED NONE PRSNT: CPT | Mod: S$GLB,,, | Performed by: INTERNAL MEDICINE

## 2018-02-22 DIAGNOSIS — I25.10 CORONARY ARTERY DISEASE INVOLVING NATIVE CORONARY ARTERY OF NATIVE HEART WITHOUT ANGINA PECTORIS: Primary | ICD-10-CM

## 2018-02-22 NOTE — PROGRESS NOTES
Subjective:    Patient ID:  Jorden Shafer is a 72 y.o. male who presents for follow-up of Coronary Artery Disease      HPI  Mr. Shafer is a 73 y/o gentleman with PMH CAD s/p CABG (1989), DVT, DM II, MI, defibrillator placement who was experienced a Right MCA stroke followed by an NSTEMI in April 2017 . He was found to have >90% stenosis of his R-ICA on CTA.  He underwent carotid angiography on 6/2/17 that demonstrated bilateral high grade carotid stenosis with severe calcification.  He was referred to Dr. Galindo, vascular surgery, for evaluation for CEA.  The patient was deemed to be to be at high risk for awake/ cervical block CEA and at prohibitive risk for CEA under GETA.  He underwent successful right carotid artery stent placement on 7/5/17 and left carotid stent placement on 8/25/17.  Due to a h/o angina 3-4 times a week and an abnormal stress test he underwent cardiac cath on 10/13/17. He underwent PCI of SVG to OM2 with a 4X20 VENKATA. After that PCI he was free of angina. However 2 weeks ago the patient experienced a syncopal event after getting up quickly to go to the restroom. He was taken to [facility] where he reports work-up was unremarkable. Since hospital discharge the patient reports chest pain and shortness of breath upon walking 50-60 feet.  It is relieved with NTG SL. He reports dependent LE edema. He denies, orthopnea but reports PND every 2-3 days.  He reports good compliance with his CPAP. He denies palpitations. He reports good medication compliance and adherence to a heart healthy diet.  He does not have full range of motion or strength in in left hand or arm, but states he is doing his OT/PT prescribed exercises. He reports good medication compliance.    Past Medical History:   Diagnosis Date    Back pain     CAD (coronary artery disease) 1989    h/o cabg x 5    Carotid artery occlusion     Chronic kidney disease     Colon polyp     h/o on colonoscopy    Contact dermatitis 11/12/2013    CVA  (cerebral vascular accident)     March 2010    Depression     Diabetes mellitus type II     on  insulin pump    DVT (deep venous thrombosis) 1980's    left leg    GERD (gastroesophageal reflux disease)     Heart failure     [facility]     Hepatic steatosis 11/15/2017    Hyperlipidemia     Hypertension     Hypertensive heart disease without congestive heart failure     Insulin pump status 4/25/2017    Myocardial infarction     Neuropathy of lower extremity     Obesity (BMI 30-39.9) 4/25/2017    Prostate cancer     Renal manifestation of secondary diabetes mellitus     Sleep apnea     Stroke 4/21/2017    Type 2 diabetes mellitus with diabetic polyneuropathy, with long-term current use of insulin 4/25/2017    Type 2 diabetes mellitus with hyperglycemia, with long-term current use of insulin 9/26/2012    Currently sees Dr. Britton     Unstable angina     Vitamin D deficiency disease      Past Surgical History:   Procedure Laterality Date    CARDIAC DEFIBRILLATOR PLACEMENT  06/16/2016    CHOLECYSTECTOMY      CORONARY ARTERY BYPASS GRAFT      bypass times  5    defibulater  06/16/2016    EYE SURGERY Bilateral     HERNIA REPAIR      groin x2  and umbilical    JOINT REPLACEMENT      knee left    thrombus Left     >2005    VASECTOMY       Current Outpatient Prescriptions on File Prior to Visit   Medication Sig Dispense Refill    aspirin (ECOTRIN) 81 MG EC tablet Take 81 mg by mouth once daily.      blood sugar diagnostic Strp 1 strip by Misc.(Non-Drug; Combo Route) route 2 (two) times daily with meals. TRUE METRIX 100 strip 11    citalopram (CELEXA) 10 MG tablet Take 1 tablet (10 mg total) by mouth once daily. 90 tablet 1    clopidogrel (PLAVIX) 75 mg tablet Take 1 tablet (75 mg total) by mouth once daily. 90 tablet 3    DEXT 70/POLYCARBOPHIL/PEG/NACL (ARTIFICIAL TEAR SOLUTION OPHT) Apply to eye 3 (three) times daily.      diclofenac sodium (VOLTAREN) 1 % Gel Apply  4 gm topical to shoulder 4x/day  300 g 1    furosemide (LASIX) 20 MG tablet Take 1 tablet (20 mg total) by mouth once daily.      gabapentin (NEURONTIN) 300 MG capsule Take 300 mg by mouth 3 (three) times daily.      glucose 4 GM chewable tablet Take 16 g by mouth as needed for Low blood sugar.      insulin aspart (NOVOLOG) 100 unit/mL InPn pen Inject 16 Units into the skin 3 (three) times daily.  0    INSULIN DETEMIR (LEVEMIR SUBQ) Inject into the skin once daily.      isosorbide mononitrate (IMDUR) 30 MG 24 hr tablet Take 1 tablet (30 mg total) by mouth once daily. 30 tablet 1    lancets Misc 1 lancet by Misc.(Non-Drug; Combo Route) route 2 (two) times daily with meals. TRUE METRIX 100 each 11    nitroGLYCERIN (NITROSTAT) 0.4 MG SL tablet Place 1 tablet (0.4 mg total) under the tongue every 5 (five) minutes as needed. Sublingual  ( under tongue) as needed 30 tablet prn    omega-3 acid ethyl esters (LOVAZA) 1 gram capsule Take 2 g by mouth 3 (three) times daily.        omeprazole (PRILOSEC) 40 MG capsule Take 1 capsule (40 mg total) by mouth 2 (two) times daily. (Patient taking differently: Take 40 mg by mouth 2 (two) times daily as needed. ) 180 capsule 3    rosuvastatin (CRESTOR) 40 MG Tab TAKE 1 TABLET (40 MG TOTAL) BY MOUTH ONCE DAILY. 30 tablet 11    tamsulosin (FLOMAX) 0.4 mg Cp24 Take 0.4 mg by mouth once daily.      blood-glucose meter kit Use as instructed          TRUE METRIX 1 each 0    carvedilol (COREG) 6.25 MG tablet Take 2 tablets (12.5 mg total) by mouth 2 (two) times daily with meals. 120 tablet 1     No current facility-administered medications on file prior to visit.      Review of patient's allergies indicates:  No Known Allergies  Social History   Substance Use Topics    Smoking status: Former Smoker     Packs/day: 4.00     Years: 25.00     Types: Cigarettes     Quit date: 6/8/1980    Smokeless tobacco: Never Used    Alcohol use No     Family History   Problem Relation Age of Onset    Alzheimer's disease  "Mother      Living    Heart disease Mother     Dementia Mother     Asbestos Father          Cancer Father     Diabetes type II Maternal Aunt     Stroke Neg Hx         Review of Systems   Constitution: Negative for decreased appetite, diaphoresis, fever, malaise/fatigue, weight gain and weight loss.   HENT: Negative for congestion, nosebleeds and sore throat.    Eyes: Negative for blurred vision, vision loss in left eye, vision loss in right eye and visual disturbance.   Cardiovascular: Positive for chest pain and dyspnea on exertion. Negative for claudication, leg swelling, near-syncope, orthopnea, palpitations, paroxysmal nocturnal dyspnea and syncope.   Respiratory: Negative for cough, hemoptysis, shortness of breath and wheezing.    Endocrine: Negative for polyuria.   Hematologic/Lymphatic: Does not bruise/bleed easily.   Skin: Negative for nail changes and rash.   Musculoskeletal: Negative for back pain, muscle cramps and myalgias.   Gastrointestinal: Negative for abdominal pain, change in bowel habit, diarrhea, heartburn, hematemesis, hematochezia, melena, nausea and vomiting.   Genitourinary: Negative for bladder incontinence, dysuria, frequency and hematuria.   Psychiatric/Behavioral: Negative for depression.   Allergic/Immunologic: Negative for hives.        Objective:  Vitals:    18 1011 18 1012   BP: (!) 143/74 134/70   BP Location: Left arm Right arm   Patient Position: Sitting Sitting   BP Method: Large (Automatic) Large (Automatic)   Pulse: 65 60   SpO2: 95%    Weight: 107.1 kg (236 lb 1.8 oz)    Height: 5' 8" (1.727 m)          Physical Exam   Constitutional: He is oriented to person, place, and time. He appears well-developed and well-nourished.   HENT:   Head: Normocephalic and atraumatic.   Eyes: EOM are normal. Pupils are equal, round, and reactive to light.   Neck: Neck supple. No JVD present. No thyromegaly present.   Cardiovascular: Normal rate and regular rhythm.  PMI " is displaced.  Exam reveals no gallop and no friction rub.    No murmur heard.  Pulses:       Carotid pulses are 2+ on the right side, and 2+ on the left side.       Radial pulses are 2+ on the right side, and 2+ on the left side.        Femoral pulses are 2+ on the right side, and 2+ on the left side.       Dorsalis pedis pulses are 2+ on the right side, and 2+ on the left side.        Posterior tibial pulses are 2+ on the right side, and 2+ on the left side.   Pulmonary/Chest: Effort normal. He has no wheezes. He has no rhonchi. He has no rales.   Abdominal: Soft. Normal appearance. He exhibits no distension. There is no hepatosplenomegaly. There is no tenderness.   Neurological: He is alert and oriented to person, place, and time. Gait normal.   Skin: Skin is warm and dry. No erythema.   Psychiatric: He has a normal mood and affect.     18 EK NSR; nl axis; nl intervals; PAC      Assessment:       1. H/O: CVA (cerebrovascular accident)    2. Coronary artery disease involving native coronary artery of native heart, angina presence unspecified    3. Syncope, unspecified syncope type    4. BOSE (dyspnea on exertion)    5. Mood and affect disturbance    6. Tortuous aorta    7. Symptomatic carotid artery stenosis, unspecified laterality    8. Bilateral carotid artery stenosis    9. Essential hypertension    10. Chronic combined systolic and diastolic heart failure    11. Mixed hyperlipidemia         Plan:       1) Syncope.  The differential diagnosis for the patient's syncope is broad; he did not have an AICD discharge; howeverhsi exertional angina dn BOSE since his syncopal event are concerning for myocardial ischemia.  The differential also incudes orthostatic hypotension; given new onset angina, will get PEt stress test    2) CAD.  Will get PET stress test as detailed  -continue EC ASA 81mg po qday  -continue Plavix 75mg po qday  -continue statin  -continue COreg 6.25mg po BID  -if the patient gets angina  not relieved with NTG SL X3, he will call 911     3) H/o CVA.  The patient reports a  Recent ho emotional lability; he states he justs starts crying randomly with no other symptoms; will refer patein tto neurology for further evaluation;     4) s/p Bilateral carotid stenosis.  9/28/17 carotid duplex reviewed  -continue DAPT  -continue statin     5) ICM. Patient appears euvolemic on exam and reports NYHA class 2 symptoms  -medical management as above  -rec low sodium, heart healthy det     6) Dyslipidemia. 5/18/17 lipid panel reviewed  -continue high intensity statin     7) DM2. 4/12/17 HgbA1c = 9.3; rec tight glycemic control     8) H/o GERD. Asymptomatic on PPI; continue PPI     9) KELVIN . The patient reports good compliance with his CPAP until his machine broke; patient was advised to get it serviced so he can use it     10) HTN. Blood pressure inadequately controlled in clinic today, given recent syncope, will not titrate medications today; continue current medications     11) Difficulty swallowing. The patient reports this symptoms since his CVA; patient was referred the patient to speech therapy in the past    All of the patient's and his wife's questions were answered.

## 2018-02-27 ENCOUNTER — TELEPHONE (OUTPATIENT)
Dept: FAMILY MEDICINE | Facility: CLINIC | Age: 73
End: 2018-02-27

## 2018-02-27 DIAGNOSIS — K63.5 POLYP OF COLON, UNSPECIFIED PART OF COLON, UNSPECIFIED TYPE: Primary | ICD-10-CM

## 2018-02-27 NOTE — TELEPHONE ENCOUNTER
Patient need a Amb referral to Gastroenterology (internal) submitted for Hx of colon polyps.  Patient seen Dr Shepherd on 2/26 and need referral for the procedure.    Please advise

## 2018-03-01 ENCOUNTER — OUTPATIENT CASE MANAGEMENT (OUTPATIENT)
Dept: ADMINISTRATIVE | Facility: OTHER | Age: 73
End: 2018-03-01

## 2018-03-01 ENCOUNTER — TELEPHONE (OUTPATIENT)
Dept: FAMILY MEDICINE | Facility: CLINIC | Age: 73
End: 2018-03-01

## 2018-03-01 NOTE — PROGRESS NOTES
"Call placed to Mrs. Shafer. She states pt is still hospitalized at [facility]. She states pt had a seizure on Monday night and was brought to [facility] because it was the closest facility. She states pt is having "mini strokes". She states she was told by the physicians that pt is not stable enough to be transferred. Will follow up in 1 week.       Plan:   Follow up on discharge from [facility]  Review any new orders  Assist with care coordination  Assess for learning needs.   Assess for safety concerns  "

## 2018-03-01 NOTE — TELEPHONE ENCOUNTER
----- Message from May Wild sent at 3/1/2018  1:03 PM CST -----  Contact: Kane Phillips from [facility] stating that patient has been at [facility] since 2/26. Will fax over paperwork.    Please Note!

## 2018-03-02 ENCOUNTER — OUTPATIENT CASE MANAGEMENT (OUTPATIENT)
Dept: ADMINISTRATIVE | Facility: OTHER | Age: 73
End: 2018-03-02

## 2018-03-02 NOTE — PROGRESS NOTES
LCSW contacted pt's wife/caregiver, Beth Shafer for follow up. Mrs. Shafer reports pt has been admitted to [facility] since Monday, 2/26. Pt had a seizure Monday night and since his hospitalization, his medical team has found him to have mini strokes. Mrs. Shafer is grateful that pt is receiving quality care and expressed concerns that pt's insurance company wanted him to transfer care to [facility]. At the time, pt was too unstable to move. She reports pt is in need spirits today and is trying to remain as positive as he can. There's no tentative discharge date. No other concerns were voiced at this time.     Intervention:  Compliance with medical treatment    Plan for next encounter:  Follow up after hospital discharge  Discuss if wanted to complete Advance Directive  Probable case closure.

## 2018-03-05 NOTE — PT/OT/SLP DISCHARGE
Physical Therapy Discharge Summary    Name: Jorden Shafer  MRN: [id number]   Principal Problem: Chest pain     Patient Discharged from acute Physical Therapy on 2017.  Please refer to prior PT noted date on 2017 for functional status.     Assessment:     Patient was discharged unexpectedly.  Information required to complete an accurate discharge summary is unknown.  Refer to therapy initial evaluation and last progress note for initial and most recent functional status and goal achievement.  Recommendations made may be found in medical record.    Objective:     GOALS:    Physical Therapy Goals        Problem: Physical Therapy Goal    Goal Priority Disciplines Outcome Goal Variances Interventions   Physical Therapy Goal     PT/OT, PT Ongoing (interventions implemented as appropriate)     Description:  Goals to be met by: 2017     Patient will increase functional independence with mobility by performin. Sit to stand transfer with Modified [place]  2. Gait  x 200 feet with Modified [place] using Single-point Cane .   3. Ascend/descend 3 stair with right Handrails Modified [place] using Single-point Cane .   4. Stand for 15 minutes with Modified [place] using Single-point Cane                       Reasons for Discontinuation of Therapy Services  Transfer to alternate level of care.      Plan:     Patient Discharged to: Skilled Nursing Facility.    ERLINDA ROLLINS, PT  3/5/2018

## 2018-03-09 ENCOUNTER — OFFICE VISIT (OUTPATIENT)
Dept: FAMILY MEDICINE | Facility: CLINIC | Age: 73
End: 2018-03-09
Payer: MEDICARE

## 2018-03-09 VITALS
HEART RATE: 67 BPM | WEIGHT: 237 LBS | HEIGHT: 68 IN | SYSTOLIC BLOOD PRESSURE: 120 MMHG | OXYGEN SATURATION: 99 % | BODY MASS INDEX: 35.92 KG/M2 | TEMPERATURE: 98 F | DIASTOLIC BLOOD PRESSURE: 80 MMHG

## 2018-03-09 DIAGNOSIS — E66.01 SEVERE OBESITY WITH BODY MASS INDEX (BMI) OF 35.0 TO 39.9 WITH COMORBIDITY: ICD-10-CM

## 2018-03-09 DIAGNOSIS — E44.1 MILD PROTEIN MALNUTRITION: Chronic | ICD-10-CM

## 2018-03-09 DIAGNOSIS — G47.33 OSA ON CPAP: Chronic | ICD-10-CM

## 2018-03-09 DIAGNOSIS — Z86.73 HISTORY OF CVA (CEREBROVASCULAR ACCIDENT): Chronic | ICD-10-CM

## 2018-03-09 DIAGNOSIS — R25.2 MUSCLE CRAMPS: ICD-10-CM

## 2018-03-09 DIAGNOSIS — E55.9 VITAMIN D DEFICIENCY DISEASE: ICD-10-CM

## 2018-03-09 DIAGNOSIS — C61 PROSTATE CANCER: ICD-10-CM

## 2018-03-09 DIAGNOSIS — E11.65 TYPE 2 DIABETES MELLITUS WITH HYPERGLYCEMIA, WITH LONG-TERM CURRENT USE OF INSULIN: Chronic | ICD-10-CM

## 2018-03-09 DIAGNOSIS — E08.8 DIABETES MELLITUS DUE TO UNDERLYING CONDITION WITH COMPLICATION, WITHOUT LONG-TERM CURRENT USE OF INSULIN: ICD-10-CM

## 2018-03-09 DIAGNOSIS — G40.909 SEIZURE DISORDER: Primary | ICD-10-CM

## 2018-03-09 DIAGNOSIS — I25.10 CORONARY ARTERY DISEASE, ANGINA PRESENCE UNSPECIFIED, UNSPECIFIED VESSEL OR LESION TYPE, UNSPECIFIED WHETHER NATIVE OR TRANSPLANTED HEART: ICD-10-CM

## 2018-03-09 DIAGNOSIS — Z79.4 TYPE 2 DIABETES MELLITUS WITH HYPERGLYCEMIA, WITH LONG-TERM CURRENT USE OF INSULIN: Chronic | ICD-10-CM

## 2018-03-09 DIAGNOSIS — K21.9 GASTROESOPHAGEAL REFLUX DISEASE, ESOPHAGITIS PRESENCE NOT SPECIFIED: ICD-10-CM

## 2018-03-09 DIAGNOSIS — I77.1 TORTUOUS AORTA: ICD-10-CM

## 2018-03-09 PROCEDURE — 99215 OFFICE O/P EST HI 40 MIN: CPT | Mod: S$GLB,,, | Performed by: FAMILY MEDICINE

## 2018-03-09 PROCEDURE — 99499 UNLISTED E&M SERVICE: CPT | Mod: S$GLB,,, | Performed by: FAMILY MEDICINE

## 2018-03-09 PROCEDURE — 99999 PR PBB SHADOW E&M-EST. PATIENT-LVL III: CPT | Mod: PBBFAC,,, | Performed by: FAMILY MEDICINE

## 2018-03-09 PROCEDURE — 3079F DIAST BP 80-89 MM HG: CPT | Mod: S$GLB,,, | Performed by: FAMILY MEDICINE

## 2018-03-09 PROCEDURE — 3074F SYST BP LT 130 MM HG: CPT | Mod: S$GLB,,, | Performed by: FAMILY MEDICINE

## 2018-03-09 RX ORDER — OMEPRAZOLE 40 MG/1
40 CAPSULE, DELAYED RELEASE ORAL EVERY MORNING
Qty: 90 CAPSULE | Refills: 3 | Status: SHIPPED | OUTPATIENT
Start: 2018-03-09

## 2018-03-09 RX ORDER — DICLOFENAC SODIUM 10 MG/G
4 GEL TOPICAL DAILY
Qty: 100 G | Refills: 0 | Status: SHIPPED | OUTPATIENT
Start: 2018-03-09

## 2018-03-09 RX ORDER — ATORVASTATIN CALCIUM 80 MG/1
80 TABLET, FILM COATED ORAL DAILY
COMMUNITY

## 2018-03-09 RX ORDER — LEVETIRACETAM 500 MG/1
500 TABLET ORAL 2 TIMES DAILY
COMMUNITY

## 2018-03-09 NOTE — PROGRESS NOTES
Office Visit    Patient Name: Jorden Shafer    : 1945  MRN: [id number]    Subjective:  Jorden is a 72 y.o. male who presents today for:    Seizures; stroke; and Hospital Follow Up      This patient has multiple medical diagnoses as noted below.  This patient is known to me and to this clinic. Patient denies any new symptoms including chest pain, SOB, blurry vision, N/V, diarrhea.     He has had an improvement since his hospitalization at [facility].  Patient did have noted high pole came anemia and hypomagnesemia at the time of his evaluation.  Patient was having seizures which caused syncopal episodes.  He is currently on a new seizure medication.  He is scheduled to follow-up with neurology at [facility].  He will see Dr. Posadas.      Patient does report that he has increased pain in between his shoulder blades.  Patient reports a sudden onset of pain.  He reports that he will have to tenses muscles in order to improve the pain.  Patient denies any recent injuries.    Patient reports she would like to see the sleep specialist again.  He does have some issues with his current machine.  He needs a new mask.  His current mask does not fitappropriately.    Patient Active Problem List   Diagnosis    Type 2 diabetes mellitus with hyperglycemia, with long-term current use of insulin    Vitamin D deficiency disease    Colon polyp    Prostate cancer    ED (erectile dysfunction)    Weakness    Essential hypertension    Chronic combined systolic and diastolic heart failure    Tortuous aorta    Old MI (myocardial infarction)    Major depressive disorder, single episode, mild    Insulin pump in place    Mild protein malnutrition    KELVIN on CPAP    History of CVA (cerebrovascular accident)    GERD (gastroesophageal reflux disease)    Severe obesity with body mass index (BMI) of 35.0 to 39.9 with comorbidity    Type 2 diabetes mellitus with diabetic polyneuropathy, with long-term current use of  insulin    Mixed hyperlipidemia    Symptomatic carotid artery stenosis    ICD (implantable cardioverter-defibrillator) in place    Diabetes mellitus due to underlying condition with complication, without long-term current use of insulin    Carotid stenosis    Bursitis of right shoulder    Impingement syndrome of shoulder, left    Hemiparesis of left nondominant side    Chronic pain of left knee    History of total left knee replacement    Chronic left shoulder pain    CAD (coronary artery disease)    Hepatic steatosis    Seizure disorder       Past Surgical History:   Procedure Laterality Date    CARDIAC DEFIBRILLATOR PLACEMENT  2016    CHOLECYSTECTOMY      CORONARY ARTERY BYPASS GRAFT      bypass times  5    defibulater  2016    EYE SURGERY Bilateral     HERNIA REPAIR      groin x2  and umbilical    JOINT REPLACEMENT      knee left    thrombus Left     >2005    VASECTOMY         Family History   Problem Relation Age of Onset    Alzheimer's disease Mother      Living    Heart disease Mother     Dementia Mother     Asbestos Father          Cancer Father     Diabetes type II Maternal Aunt     Stroke Neg Hx        Social History     Social History    Marital status:      Spouse name: N/A    Number of children: 3    Years of education: GED     Occupational History    Retired [employer]      Social History Main Topics    Smoking status: Former Smoker     Packs/day: 4.00     Years: 25.00     Types: Cigarettes     Quit date: 1980    Smokeless tobacco: Never Used    Alcohol use No    Drug use: No    Sexual activity: Yes     Partners: Female     Other Topics Concern    Not on file     Social History Narrative    SCREENING/HEALTH MAINTENANCE. Updated  13    COLONOSCOPY 2012.  [facility].    TETANUS 2005.    PNEUMOVAX  12    NO PRIOR HISTORY OF ZOSTAVAX    PSA 14    FLU 10/8/13       Current Medications  Medications  reviewed and updated.     Allergies   Review of patient's allergies indicates:  No Known Allergies      Labs  Lab Results   Component Value Date    HGBA1C 8.1 (H) 11/14/2017     Lab Results   Component Value Date     11/15/2017    K 4.5 11/15/2017     11/15/2017    CO2 26 11/15/2017    BUN 25 (H) 11/15/2017    CREATININE 1.2 11/15/2017    CALCIUM 8.4 (L) 11/15/2017    ANIONGAP 9 11/15/2017    ESTGFRAFRICA >60.0 11/15/2017    EGFRNONAA 59.6 (A) 11/15/2017     Lab Results   Component Value Date    CHOL 87 (L) 05/18/2017    CHOL 167 04/13/2017    CHOL 141 09/15/2015     Lab Results   Component Value Date    HDL 26 (L) 05/18/2017    HDL 25 (L) 04/13/2017    HDL 28 (L) 09/15/2015     Lab Results   Component Value Date    LDLCALC 1.0 (L) 05/18/2017    LDLCALC Invalid, Trig>400.0 04/13/2017    LDLCALC 77.8 09/15/2015     Lab Results   Component Value Date    TRIG 300 (H) 05/18/2017    TRIG 409 (H) 04/13/2017    TRIG 176 (H) 09/15/2015     Lab Results   Component Value Date    CHOLHDL 29.9 05/18/2017    CHOLHDL 15.0 (L) 04/13/2017    CHOLHDL 19.9 (L) 09/15/2015     Last set of blood work has been reviewed as noted above.    Review of Systems   Constitutional: Negative for activity change, appetite change, fatigue, fever and unexpected weight change.   HENT: Negative for facial swelling.    Eyes: Negative for visual disturbance.   Respiratory: Negative for chest tightness, shortness of breath, wheezing and stridor.    Cardiovascular: Negative for chest pain, palpitations and leg swelling.   Gastrointestinal: Negative for abdominal distention, abdominal pain, blood in stool, constipation, diarrhea, nausea and vomiting.   Endocrine: Negative for cold intolerance, heat intolerance, polydipsia and polyuria.   Genitourinary: Negative.  Negative for testicular pain and urgency.   Skin: Negative.    Allergic/Immunologic: Negative.    Neurological: Positive for seizures, syncope and weakness. Negative for dizziness,  "light-headedness, numbness and headaches.   Psychiatric/Behavioral: Negative for agitation and decreased concentration.       /80 (BP Location: Left arm, Patient Position: Sitting, BP Method: Large (Manual))   Pulse 67   Temp 97.8 °F (36.6 °C) (Oral)   Ht 5' 8" (1.727 m)   Wt 107.5 kg (236 lb 15.9 oz)   SpO2 99%   BMI 36.03 kg/m²      Physical Exam   Constitutional: He is oriented to person, place, and time. He appears well-developed and well-nourished.   HENT:   Head: Normocephalic and atraumatic.   Eyes: Conjunctivae and EOM are normal. Pupils are equal, round, and reactive to light.   Neck: Normal range of motion.   Cardiovascular: Normal rate and normal heart sounds.    Pulmonary/Chest: Effort normal and breath sounds normal.   Neurological: He is alert and oriented to person, place, and time.   Mild weakness noted on the left side of the body.  4+ strength in comparison to the right side. right side is 5 strength.   Skin: Skin is warm and dry.       Health Maintenance  Health Maintenance       Date Due Completion Date    TETANUS VACCINE 06/01/1962 ---    Influenza Vaccine 08/01/2017 2/21/2017    Override on 10/12/2012: Done    PROSTATE-SPECIFIC ANTIGEN 10/24/2017 10/24/2016    Override on 7/16/2013: Done    Foot Exam 02/21/2018 2/21/2017 (Done)    Override on 2/21/2017: Done    Override on 3/8/2016: Done (Dr. Tomi Britton/Endocrinology-feet normal by visual exam w/ mild swelling bilaterally, monofilament exam 0/10 bilaterally, pulse palpable +2, monofilament sensory status absent bilaterally)    Urine Microalbumin 04/10/2018 4/10/2017    Hemoglobin A1c 05/14/2018 11/14/2017    Override on 1/6/2017: Done (HgA1c   7.2)    Override on 11/4/2015: Done (Dr. Tomi Britton/Bryan Deleon/Endocrinology- hemoglobin a1c 7.1)    Override on 5/16/2013: Done    Lipid Panel 05/18/2018 5/18/2017    Override on 11/4/2015: Done (Dr. Tomi Britton/Bryan " Deleon/Nzlvhfyvdwbdb-[id number],trig-273,hdl-28,ldl-67,chol/hdl ratio-5.4)    Eye Exam 05/25/2018 5/25/2017    Override on 4/19/2016: Done (Dr.David MIRTA Parker-positive for mild diabetic retinopathy and pseudophakia bilaterally, negative for macular degeneration, cataracts,and glaucoma bilaterally)    Override on 4/1/2015: Done    High Dose Statin 03/09/2019 3/9/2018    Colonoscopy 10/08/2022 10/8/2012 (Done)    Override on 10/8/2012: Done          Assessment/Plan:  Jorden Shafer is a 72 y.o. male who presents today for :    1. Seizure disorder    2. Gastroesophageal reflux disease, esophagitis presence not specified    3. Muscle cramps    4. Diabetes mellitus due to underlying condition with complication, without long-term current use of insulin    5. Severe obesity with body mass index (BMI) of 35.0 to 39.9 with comorbidity    6. Vitamin D deficiency disease    7. Mild protein malnutrition    8. Type 2 diabetes mellitus with hyperglycemia, with long-term current use of insulin    9. Prostate cancer    10. Tortuous aorta    11. History of CVA (cerebrovascular accident)    12. Coronary artery disease, angina presence unspecified, unspecified vessel or lesion type, unspecified whether native or transplanted heart        Problem List Items Addressed This Visit        Unprioritized    CAD (coronary artery disease)    Current Assessment & Plan     Patient is stable.  Assess and addressed all modifiable risk factors.  Continue with appropriate management to prevent complications.           Diabetes mellitus due to underlying condition with complication, without long-term current use of insulin  -   Pt is currently stable on medication regimen.  Continue current therapy as scheduled.  Contact office with any questions about adjustments on medications.       GERD (gastroesophageal reflux disease) (Chronic)    Relevant Medications    omeprazole (PRILOSEC) 40 MG capsule    History of CVA (cerebrovascular accident)  (Chronic)  -   Doing well   -  Pt is currently stable on medication regimen.  Continue current therapy as scheduled.  Contact office with any questions about adjustments on medications.       Mild protein malnutrition (Chronic)    Current Assessment & Plan     Increase protein intake            Prostate cancer    Current Assessment & Plan     Patient is stable.  Assess and addressed all modifiable risk factors.  Continue with appropriate management to prevent complications.           Seizure disorder - Primary    Overview     Currently on keppra.  Follows with Dr. Posadas          Current Assessment & Plan     Pt is currently stable on medication regimen.  Continue current therapy as scheduled.  Contact office with any questions about adjustments on medications.   Follow up with Dr. Posadas.  Keppra levels need to be checked          Severe obesity with body mass index (BMI) of 35.0 to 39.9 with comorbidity    Tortuous aorta    Overview     As seen on CXR from 11/9/2013         Current Assessment & Plan     Pt is currently stable on medication regimen.  Continue current therapy as scheduled.  Contact office with any questions about adjustments on medications.            Type 2 diabetes mellitus with hyperglycemia, with long-term current use of insulin (Chronic)    Overview     Currently sees Dr. Britton          Current Assessment & Plan     He has been doing vastly better with his diabetic control.  Patient did not bring his blood glucose logs in the office today.  He has been ranging at home between 120-150.  He rarely has elevations in the 200s.         Vitamin D deficiency disease    Current Assessment & Plan     Pt is currently stable on medication regimen.  Continue current therapy as scheduled.  Contact office with any questions about adjustments on medications.              Other Visit Diagnoses     Muscle cramps        Relevant Medications    diclofenac sodium (VOLTAREN) 1 % Gel          Follow-up in about 2  months (around 5/9/2018).     This note was created by combination of typed  and Dragon dictation.  Transcription errors may be present.  If there are any questions, please contact me.

## 2018-03-09 NOTE — ASSESSMENT & PLAN NOTE
Pt is currently stable on medication regimen.  Continue current therapy as scheduled.  Contact office with any questions about adjustments on medications.   Follow up with Dr. Posadas.  Keppra levels need to be checked

## 2018-03-09 NOTE — ASSESSMENT & PLAN NOTE
He has been doing vastly better with his diabetic control.  Patient did not bring his blood glucose logs in the office today.  He has been ranging at home between 120-150.  He rarely has elevations in the 200s.

## 2018-03-13 ENCOUNTER — CLINICAL SUPPORT (OUTPATIENT)
Dept: CARDIOLOGY | Facility: CLINIC | Age: 73
End: 2018-03-13
Attending: INTERNAL MEDICINE
Payer: MEDICARE

## 2018-03-13 DIAGNOSIS — R55 SYNCOPE, UNSPECIFIED SYNCOPE TYPE: ICD-10-CM

## 2018-03-13 DIAGNOSIS — R06.09 DOE (DYSPNEA ON EXERTION): ICD-10-CM

## 2018-03-13 DIAGNOSIS — I25.10 CORONARY ARTERY DISEASE INVOLVING NATIVE CORONARY ARTERY OF NATIVE HEART, ANGINA PRESENCE UNSPECIFIED: ICD-10-CM

## 2018-03-13 LAB — DIASTOLIC DYSFUNCTION: NO

## 2018-03-13 PROCEDURE — 93015 CV STRESS TEST SUPVJ I&R: CPT | Mod: S$GLB,,, | Performed by: INTERNAL MEDICINE

## 2018-03-13 PROCEDURE — 78492 MYOCRD IMG PET MLT RST&STRS: CPT | Mod: S$GLB,,, | Performed by: INTERNAL MEDICINE

## 2018-03-13 PROCEDURE — A9555 RB82 RUBIDIUM: HCPCS | Mod: S$GLB,,, | Performed by: INTERNAL MEDICINE

## 2018-03-15 ENCOUNTER — OFFICE VISIT (OUTPATIENT)
Dept: NEUROLOGY | Facility: CLINIC | Age: 73
End: 2018-03-15
Payer: MEDICARE

## 2018-03-15 ENCOUNTER — OUTPATIENT CASE MANAGEMENT (OUTPATIENT)
Dept: ADMINISTRATIVE | Facility: OTHER | Age: 73
End: 2018-03-15

## 2018-03-15 VITALS
HEART RATE: 70 BPM | DIASTOLIC BLOOD PRESSURE: 72 MMHG | SYSTOLIC BLOOD PRESSURE: 132 MMHG | HEIGHT: 68 IN | BODY MASS INDEX: 36.32 KG/M2 | WEIGHT: 239.63 LBS

## 2018-03-15 DIAGNOSIS — F33.1 MODERATE EPISODE OF RECURRENT MAJOR DEPRESSIVE DISORDER: Primary | ICD-10-CM

## 2018-03-15 DIAGNOSIS — Z86.73 HISTORY OF STROKE: Chronic | ICD-10-CM

## 2018-03-15 PROCEDURE — 99214 OFFICE O/P EST MOD 30 MIN: CPT | Mod: S$GLB,,, | Performed by: PSYCHIATRY & NEUROLOGY

## 2018-03-15 PROCEDURE — 99499 UNLISTED E&M SERVICE: CPT | Mod: S$GLB,,, | Performed by: PSYCHIATRY & NEUROLOGY

## 2018-03-15 PROCEDURE — 3075F SYST BP GE 130 - 139MM HG: CPT | Mod: CPTII,S$GLB,, | Performed by: PSYCHIATRY & NEUROLOGY

## 2018-03-15 PROCEDURE — 3078F DIAST BP <80 MM HG: CPT | Mod: CPTII,S$GLB,, | Performed by: PSYCHIATRY & NEUROLOGY

## 2018-03-15 PROCEDURE — 99999 PR PBB SHADOW E&M-EST. PATIENT-LVL V: CPT | Mod: PBBFAC,,, | Performed by: PSYCHIATRY & NEUROLOGY

## 2018-03-15 RX ORDER — CITALOPRAM 10 MG/1
10 TABLET ORAL 2 TIMES DAILY
Qty: 90 TABLET | Refills: 1 | Status: SHIPPED | OUTPATIENT
Start: 2018-03-15

## 2018-03-15 NOTE — LETTER
March 15, 2018      Marcus Blakely MD  [address] [address]           [facility]  [address]  [place] [place] [address]  Phone: [phone]          Patient: Jorden Shafer   MR Number: [id number]   YOB: 1945   Date of Visit: 3/15/2018       Dear Dr. Marcus Blakely:    Thank you for referring Jorden Shafer to me for evaluation. Attached you will find relevant portions of my assessment and plan of care.    If you have questions, please do not hesitate to call me. I look forward to following Jorden Shafer along with you.    Sincerely,    Seb Marin MD    Enclosure  CC:  No Recipients    If you would like to receive this communication electronically, please contact [email or web address] or [phone] to request more information on [facility] access.    For providers and/or their staff who would like to refer a patient to [facility], please contact us through our one-stop-shop provider referral line, [facility], at [phone].    If you feel you have received this communication in error or would no longer like to receive these types of communications, please e-mail [email or web address]

## 2018-03-15 NOTE — PROGRESS NOTES
Vascular Neurology  Clinic Note    Reason For Visit (Chief Complaint): history of stroke in 2017, sent from cardiology for emotional lability    HPI: 72 y.o. right handed male with CAD s/p CABG (1989), DVT, DM II, MI, defibrillator placement who was experienced a Right MCA stroke followed by an NSTEMI in April 2017.  He has a history of severe bilateral carotid artery stenosis and received R ICA stent on 7/5/17 and left ICA stent on 8/25/17.    He is having emotional lability that he described as inappropriate crying, particularly when he is watching a war movie or a western movie or if he starts thinking about his father who he was quite close to but passed away 30 years ago. He also has a living mother with alzheimers disease who he had to put in a nursing home and this also triggers his crying. If he is having a converation with other people he can begin to cry mid conversation but often it is because he starts thinking about what is wrong with him since his stroke and then he starts to cry. He denies any suicidal and homicidal ideations. He denies any inappropriate laughter and any other emotional outbursts such as anger. He is still having some mild weakness of the left hand.    Brain Imaging:  CT 4/2017 - R MCA M3 pattern ischemic stroke    Vessel Imaging:  CTA -  4/13/17  Cardiac Evaluation:  TTE 11/2017  CONCLUSIONS     1 - Mildly depressed left ventricular systolic function (EF 45-50%).     2 - Impaired LV relaxation, normal LAP (grade 1 diastolic dysfunction).     3 - Wall motion abnormalities.     4 - Right atrial enlargement.     5 - Normal right ventricular systolic function .     6 - The estimated PA systolic pressure is 30 mmHg.     7 - Trivial mitral regurgitation.     8 - Trivial tricuspid regurgitation.   Other:   None  Relevant Labwork:    Recent Labs  Lab 05/18/17  1021  11/14/17  0401   HEMOGLOBIN A1C  --   < > 8.1 H   LDL CHOLESTEROL 1.0 L  --   --    HDL 26 L  --   --    TRIGLYCERIDES 300 H   --   --    CHOLESTEROL 87 L  --   --    < > = values in this interval not displayed.    I independently viewed the above imaging studies in addition to reviewing the report.  I reviewed the above labwork.    Review of Systems  Msk: negative for muscle pain  Skin: negative for pruritis  Neuro: negative for headache  All others negative    Past Medical History  Past Medical History:   Diagnosis Date    Back pain     CAD (coronary artery disease) 1989    h/o cabg x 5    Carotid artery occlusion     Chronic kidney disease     Colon polyp     h/o on colonoscopy    Contact dermatitis 11/12/2013    CVA (cerebral vascular accident)     March 2010    Depression     Diabetes mellitus type II     on  insulin pump    DVT (deep venous thrombosis) 1980's    left leg    GERD (gastroesophageal reflux disease)     Heart failure     [employer]     Hepatic steatosis 11/15/2017    Hyperlipidemia     Hypertension     Hypertensive heart disease without congestive heart failure     Insulin pump status 4/25/2017    Myocardial infarction     Neuropathy of lower extremity     Obesity (BMI 30-39.9) 4/25/2017    Prostate cancer     Renal manifestation of secondary diabetes mellitus     Seizures     Sleep apnea     Stroke 4/21/2017    Type 2 diabetes mellitus with diabetic polyneuropathy, with long-term current use of insulin 4/25/2017    Type 2 diabetes mellitus with hyperglycemia, with long-term current use of insulin 9/26/2012    Currently sees Dr. Britton     Unstable angina     Vitamin D deficiency disease      Family History  No relevant history   Social History  Former Smoker     Medication List with Changes/Refills   Current Medications    ASPIRIN (ECOTRIN) 81 MG EC TABLET    Take 81 mg by mouth once daily.    ATORVASTATIN (LIPITOR) 80 MG TABLET    Take 80 mg by mouth once daily. Take 1/2 tablet daily    BLOOD SUGAR DIAGNOSTIC STRP    1 strip by Misc.(Non-Drug; Combo Route) route 2 (two) times daily with meals. TRUE  "METRIX    BLOOD-GLUCOSE METER KIT    Use as instructed          TRUE METRIX    CARVEDILOL (COREG) 6.25 MG TABLET    Take 2 tablets (12.5 mg total) by mouth 2 (two) times daily with meals.    CLOPIDOGREL (PLAVIX) 75 MG TABLET    Take 1 tablet (75 mg total) by mouth once daily.    DEXT 70/POLYCARBOPHIL/PEG/NACL (ARTIFICIAL TEAR SOLUTION OPHT)    Apply to eye 3 (three) times daily.    DICLOFENAC SODIUM (VOLTAREN) 1 % GEL    Apply 4 g topically once daily.    FUROSEMIDE (LASIX) 20 MG TABLET    Take 1 tablet (20 mg total) by mouth once daily.    GABAPENTIN (NEURONTIN) 300 MG CAPSULE    Take 300 mg by mouth 3 (three) times daily.    GLUCOSE 4 GM CHEWABLE TABLET    Take 16 g by mouth as needed for Low blood sugar.    INSULIN ASPART (NOVOLOG) 100 UNIT/ML INPN PEN    Inject 16 Units into the skin 3 (three) times daily.    INSULIN DETEMIR (LEVEMIR SUBQ)    Inject into the skin once daily.    LANCETS MISC    1 lancet by Misc.(Non-Drug; Combo Route) route 2 (two) times daily with meals. TRUE METRIX    LEVETIRACETAM (KEPPRA) 500 MG TAB    Take 500 mg by mouth 2 (two) times daily.    NITROGLYCERIN (NITROSTAT) 0.4 MG SL TABLET    Place 1 tablet (0.4 mg total) under the tongue every 5 (five) minutes as needed. Sublingual  ( under tongue) as needed    OMEPRAZOLE (PRILOSEC) 40 MG CAPSULE    Take 1 capsule (40 mg total) by mouth every morning.    TAMSULOSIN (FLOMAX) 0.4 MG CP24    Take 0.4 mg by mouth once daily.   Changed and/or Refilled Medications    Modified Medication Previous Medication    CITALOPRAM (CELEXA) 10 MG TABLET citalopram (CELEXA) 10 MG tablet       Take 1 tablet (10 mg total) by mouth 2 (two) times daily.    Take 1 tablet (10 mg total) by mouth once daily.       EXAM  Vital Signs:Blood pressure 132/72, pulse 70, height 5' 8" (1.727 m), weight 108.7 kg (239 lb 10.2 oz).  General: well appearing without discomfort   Mental Status:alert, oriented to person - place - age - month   Language: able to name, repeat, " comprehend commands   Cranial Nerves: EOMI, PERRL, no facial asymmetry, tongue to midline, palate midline  Motor: 5/5 power in all extremities, normal tone  Sensory: intact light touch bilaterally, intact proprioception bilaterally  Coordination: no ataxia on finger-to-nose or heel-to-shin testing; no truncal ataxia  Gait & Stance: normal    NIH Stroke Scale:    Level of Consciousness: 0 - alert  LOC Questions: 0 - answers both correctly  LOC Commands: 0 - performs both correctly  Best Gaze: 0 - normal  Visual: 0 - no visual loss  Facial Palsy: 0 - normal  Motor Left Arm: 0 - no drift  Motor Right Arm: 0 - no drift  Motor Left Le - no drift  Motor Right Le - no drift  Limb Ataxia: 0 - absent  Sensory: 0 - normal  Best Language: 0 - no aphasia  Dysarthria: 0 - normal articulation  Extinction and Inattention: 0 - no neglect  NIH Stroke Scale Total: 0  Modified Steuben Scale:   Timeline:  Modified Steuben Score: 1 - no significant disability          ___________________  ASSESSMENT & PLAN    Problem List Items Addressed This Visit        Unprioritized    History of stroke (Chronic)    Current Assessment & Plan     Etiology: Large Artery Atherosclerosis Evident  MARIBETH Major: ICA disease, s/p stent -- CE Absent --  Absent -- Other Absent   · Diagnostic Orders: none  · Secondary stroke prevention: Continue aspirin and plavix  · Continue current statin therapy    · Blood pressure goal < 130/80 mmHg   · Stroke Risk Factors Addressed: HTN, HLD, DM, atherosclerosis  · Stroke education administered           Moderate episode of recurrent major depressive disorder - Primary    Current Assessment & Plan     Ambulatory referral to psychiatry  Increased dose of Celexa to 20 mg total per day from 10  He was taking 5 bid, so I changed to 10 bid  Subsequent follow up and dosing per psychiatry         Relevant Orders    Ambulatory Referral to Psychiatry          [facility]  Vascular Neurology  Office [phone]  Fax  [phone]

## 2018-03-16 ENCOUNTER — OUTPATIENT CASE MANAGEMENT (OUTPATIENT)
Dept: ADMINISTRATIVE | Facility: OTHER | Age: 73
End: 2018-03-16

## 2018-03-16 NOTE — ASSESSMENT & PLAN NOTE
Ambulatory referral to psychiatry  Increased dose of Celexa to 20 mg total per day from 10  He was taking 5 bid, so I changed to 10 bid  Subsequent follow up and dosing per psychiatry

## 2018-03-16 NOTE — PROGRESS NOTES
Summary:   Spoke wit Mrs. Shafer. She states pt is doing fine at home. She states he's been sticking to his diet. Mrs. Shafer is able to verbalize 3 s/s MI and CHF. Pt had appt with Neuro today.     Interventions:  Assess for understanding of MI and CHF education - goals met  Assess compliance with diet and meds    Plan:   CVA - continue education  Encourage compliance with meds, diet and appts

## 2018-03-16 NOTE — ASSESSMENT & PLAN NOTE
Etiology: Large Artery Atherosclerosis Evident  MARIBETH Major: ICA disease, s/p stent -- CE Absent --  Absent -- Other Absent   · Diagnostic Orders: none  · Secondary stroke prevention: Continue aspirin and plavix  · Continue current statin therapy    · Blood pressure goal < 130/80 mmHg   · Stroke Risk Factors Addressed: HTN, HLD, DM, atherosclerosis  · Stroke education administered

## 2018-03-16 NOTE — PROGRESS NOTES
Summary:  1st Attempt to complete SW follow-up for Outpatient Care Management; left message requesting return call.    Intervention:  None    Plan:  Discuss hospital discharge  Discuss referral to psych and to schedule appt d/t depression  Re-assess for further needs

## 2018-03-21 ENCOUNTER — OUTPATIENT CASE MANAGEMENT (OUTPATIENT)
Dept: ADMINISTRATIVE | Facility: OTHER | Age: 73
End: 2018-03-21

## 2018-03-21 NOTE — PROGRESS NOTES
Summary:  LCSW contacted pt's wife/caregiver for follow up. She reports that pt is in good spirits and has been doing well. Mrs. soliman stated she was recently in the hospital for allergies and bronchitis. She has an appt on 3/28 with a representative from [facility] and has several medical follow up appts on next week. The referral for outpatient psych. Was discussed. LCSW attempted to provide the contact information in her voicemail as Mrs. Soliman was driving but the mailbox was full. LCSW will forward the information via mail. No other concerns were voiced at this time.     Intervention:  Mental Health resource - [facility] outpatient behavioral health  Encouraged compliance with medical treatment     Plan for next encounter:  Re-assess for further needs  Probable case closure

## 2018-03-27 ENCOUNTER — OFFICE VISIT (OUTPATIENT)
Dept: CARDIOLOGY | Facility: CLINIC | Age: 73
End: 2018-03-27
Payer: MEDICARE

## 2018-03-27 VITALS
HEART RATE: 75 BPM | BODY MASS INDEX: 34.61 KG/M2 | OXYGEN SATURATION: 95 % | HEIGHT: 68 IN | SYSTOLIC BLOOD PRESSURE: 144 MMHG | WEIGHT: 228.38 LBS | DIASTOLIC BLOOD PRESSURE: 77 MMHG

## 2018-03-27 DIAGNOSIS — R13.10 DYSPHAGIA, UNSPECIFIED TYPE: Primary | ICD-10-CM

## 2018-03-27 DIAGNOSIS — Z95.810 ICD (IMPLANTABLE CARDIOVERTER-DEFIBRILLATOR) IN PLACE: ICD-10-CM

## 2018-03-27 DIAGNOSIS — I10 ESSENTIAL HYPERTENSION: Chronic | ICD-10-CM

## 2018-03-27 DIAGNOSIS — E11.42 TYPE 2 DIABETES MELLITUS WITH DIABETIC POLYNEUROPATHY, WITH LONG-TERM CURRENT USE OF INSULIN: Chronic | ICD-10-CM

## 2018-03-27 DIAGNOSIS — I25.118 CORONARY ARTERY DISEASE OF NATIVE HEART WITH STABLE ANGINA PECTORIS, UNSPECIFIED VESSEL OR LESION TYPE: ICD-10-CM

## 2018-03-27 DIAGNOSIS — I50.42 CHRONIC COMBINED SYSTOLIC AND DIASTOLIC HEART FAILURE: Chronic | ICD-10-CM

## 2018-03-27 DIAGNOSIS — E66.01 SEVERE OBESITY WITH BODY MASS INDEX (BMI) OF 35.0 TO 39.9 WITH COMORBIDITY: ICD-10-CM

## 2018-03-27 DIAGNOSIS — G47.33 OSA ON CPAP: Chronic | ICD-10-CM

## 2018-03-27 DIAGNOSIS — E78.2 MIXED HYPERLIPIDEMIA: Chronic | ICD-10-CM

## 2018-03-27 DIAGNOSIS — R06.09 DOE (DYSPNEA ON EXERTION): ICD-10-CM

## 2018-03-27 DIAGNOSIS — I65.23 SYMPTOMATIC STENOSIS OF BOTH CAROTID ARTERIES: ICD-10-CM

## 2018-03-27 DIAGNOSIS — I65.23 BILATERAL CAROTID ARTERY STENOSIS: ICD-10-CM

## 2018-03-27 DIAGNOSIS — Z79.4 TYPE 2 DIABETES MELLITUS WITH DIABETIC POLYNEUROPATHY, WITH LONG-TERM CURRENT USE OF INSULIN: Chronic | ICD-10-CM

## 2018-03-27 PROCEDURE — 3077F SYST BP >= 140 MM HG: CPT | Mod: CPTII,S$GLB,, | Performed by: INTERNAL MEDICINE

## 2018-03-27 PROCEDURE — 3078F DIAST BP <80 MM HG: CPT | Mod: CPTII,S$GLB,, | Performed by: INTERNAL MEDICINE

## 2018-03-27 PROCEDURE — 99213 OFFICE O/P EST LOW 20 MIN: CPT | Mod: S$GLB,,, | Performed by: INTERNAL MEDICINE

## 2018-03-27 PROCEDURE — 99499 UNLISTED E&M SERVICE: CPT | Mod: S$GLB,,, | Performed by: INTERNAL MEDICINE

## 2018-03-27 PROCEDURE — 99999 PR PBB SHADOW E&M-EST. PATIENT-LVL IV: CPT | Mod: PBBFAC,,, | Performed by: INTERNAL MEDICINE

## 2018-03-27 PROCEDURE — 3045F PR MOST RECENT HEMOGLOBIN A1C LEVEL 7.0-9.0%: CPT | Mod: CPTII,S$GLB,, | Performed by: INTERNAL MEDICINE

## 2018-03-27 RX ORDER — ISOSORBIDE MONONITRATE 30 MG/1
30 TABLET, EXTENDED RELEASE ORAL DAILY
Qty: 30 TABLET | Refills: 11 | Status: SHIPPED | OUTPATIENT
Start: 2018-03-27 | End: 2019-03-27

## 2018-03-27 RX ORDER — ISOSORBIDE MONONITRATE 30 MG/1
30 TABLET, EXTENDED RELEASE ORAL DAILY
Qty: 30 TABLET | Refills: 11 | Status: SHIPPED | OUTPATIENT
Start: 2018-03-27 | End: 2018-03-27 | Stop reason: SDUPTHER

## 2018-03-27 RX ORDER — ERGOCALCIFEROL 1.25 MG/1
50000 CAPSULE ORAL
COMMUNITY

## 2018-03-28 NOTE — PROGRESS NOTES
Subjective:    Patient ID:  Jorden Shafer is a 72 y.o. male who presents for follow-up of Coronary Artery Disease      HPI  Mr. Shafer is a 73 y/o gentleman with PMH CAD s/p CABG (1989), DVT, DM II, MI, defibrillator placement who was experienced a Right MCA stroke followed by an NSTEMI in April 2017 . He was found to have >90% stenosis of his R-ICA on CTA.  He underwent carotid angiography on 6/2/17 that demonstrated bilateral high grade carotid stenosis with severe calcification.  He was referred to Dr. Galindo, vascular surgery, for evaluation for CEA.  The patient was deemed to be to be at high risk for awake/ cervical block CEA and at prohibitive risk for CEA under GETA.  He underwent successful right carotid artery stent placement on 7/5/17 and left carotid stent placement on 8/25/17.  Due to a h/o angina 3-4 times a week and an abnormal stress test he underwent cardiac cath on 10/13/17. He underwent PCI of SVG to OM2 with a 4X20 VENKATA. After that PCI he was free of angina. However 2 weeks prior to his clinic visit on 2/21/18  the patient experienced a syncopal event after getting up quickly to go to the restroom. He was taken to [facility] where he reports work-up was unremarkable. Since hospital discharge the patient reports chest pain and shortness of breath upon walking 50-60 feet.  It is relieved with NTG SL. He reports dependent LE edema. He denies, orthopnea but reports PND every 2-3 days.  He reports good compliance with his CPAP. He denies palpitations. He reports good medication compliance and adherence to a heart healthy diet.  He does not have full range of motion or strength in in left hand or arm, but states he is doing his OT/PT prescribed exercises. He reports good medication compliance.          Past Medical History:   Diagnosis Date    Back pain      CAD (coronary artery disease) 1989     h/o cabg x 5    Carotid artery occlusion      Chronic kidney disease      Colon polyp       h/o on  colonoscopy    Contact dermatitis 11/12/2013    CVA (cerebral vascular accident)       March 2010    Depression      Diabetes mellitus type II       on  insulin pump    DVT (deep venous thrombosis) 1980's     left leg    GERD (gastroesophageal reflux disease)      Heart failure       [facility]     Hepatic steatosis 11/15/2017    Hyperlipidemia      Hypertension      Hypertensive heart disease without congestive heart failure      Insulin pump status 4/25/2017    Myocardial infarction      Neuropathy of lower extremity      Obesity (BMI 30-39.9) 4/25/2017    Prostate cancer      Renal manifestation of secondary diabetes mellitus      Sleep apnea      Stroke 4/21/2017    Type 2 diabetes mellitus with diabetic polyneuropathy, with long-term current use of insulin 4/25/2017    Type 2 diabetes mellitus with hyperglycemia, with long-term current use of insulin 9/26/2012     Currently sees Dr. Britton     Unstable angina      Vitamin D deficiency disease        Past Surgical History:   Procedure Laterality Date    CARDIAC DEFIBRILLATOR PLACEMENT   06/16/2016    CHOLECYSTECTOMY        CORONARY ARTERY BYPASS GRAFT         bypass times  5    defibulater   06/16/2016    EYE SURGERY Bilateral      HERNIA REPAIR         groin x2  and umbilical    JOINT REPLACEMENT         knee left    thrombus Left       >2005    VASECTOMY                 Current Outpatient Prescriptions on File Prior to Visit   Medication Sig Dispense Refill    aspirin (ECOTRIN) 81 MG EC tablet Take 81 mg by mouth once daily.        blood sugar diagnostic Strp 1 strip by Misc.(Non-Drug; Combo Route) route 2 (two) times daily with meals. TRUE METRIX 100 strip 11    citalopram (CELEXA) 10 MG tablet Take 1 tablet (10 mg total) by mouth once daily. 90 tablet 1    clopidogrel (PLAVIX) 75 mg tablet Take 1 tablet (75 mg total) by mouth once daily. 90 tablet 3    DEXT 70/POLYCARBOPHIL/PEG/NACL (ARTIFICIAL TEAR SOLUTION OPHT) Apply to eye 3  (three) times daily.        diclofenac sodium (VOLTAREN) 1 % Gel Apply  4 gm topical to shoulder 4x/day 300 g 1    furosemide (LASIX) 20 MG tablet Take 1 tablet (20 mg total) by mouth once daily.        gabapentin (NEURONTIN) 300 MG capsule Take 300 mg by mouth 3 (three) times daily.        glucose 4 GM chewable tablet Take 16 g by mouth as needed for Low blood sugar.        insulin aspart (NOVOLOG) 100 unit/mL InPn pen Inject 16 Units into the skin 3 (three) times daily.   0    INSULIN DETEMIR (LEVEMIR SUBQ) Inject into the skin once daily.        isosorbide mononitrate (IMDUR) 30 MG 24 hr tablet Take 1 tablet (30 mg total) by mouth once daily. 30 tablet 1    lancets Misc 1 lancet by Misc.(Non-Drug; Combo Route) route 2 (two) times daily with meals. TRUE METRIX 100 each 11    nitroGLYCERIN (NITROSTAT) 0.4 MG SL tablet Place 1 tablet (0.4 mg total) under the tongue every 5 (five) minutes as needed. Sublingual  ( under tongue) as needed 30 tablet prn    omega-3 acid ethyl esters (LOVAZA) 1 gram capsule Take 2 g by mouth 3 (three) times daily.          omeprazole (PRILOSEC) 40 MG capsule Take 1 capsule (40 mg total) by mouth 2 (two) times daily. (Patient taking differently: Take 40 mg by mouth 2 (two) times daily as needed. ) 180 capsule 3    rosuvastatin (CRESTOR) 40 MG Tab TAKE 1 TABLET (40 MG TOTAL) BY MOUTH ONCE DAILY. 30 tablet 11    tamsulosin (FLOMAX) 0.4 mg Cp24 Take 0.4 mg by mouth once daily.        blood-glucose meter kit Use as instructed          TRUE METRIX 1 each 0    carvedilol (COREG) 6.25 MG tablet Take 2 tablets (12.5 mg total) by mouth 2 (two) times daily with meals. 120 tablet 1      No current facility-administered medications on file prior to visit.       Review of patient's allergies indicates:  No Known Allergies        Social History   Substance Use Topics    Smoking status: Former Smoker       Packs/day: 4.00       Years: 25.00       Types: Cigarettes       Quit date:  "1980    Smokeless tobacco: Never Used    Alcohol use No             Family History   Problem Relation Age of Onset    Alzheimer's disease Mother         Living    Heart disease Mother      Dementia Mother      Asbestos Father             Cancer Father      Diabetes type II Maternal Aunt      Stroke Neg Hx          Review of Systems   Constitution: Negative for decreased appetite, diaphoresis, fever, malaise/fatigue, weight gain and weight loss.   HENT: Negative for congestion, nosebleeds and sore throat.    Eyes: Negative for blurred vision, vision loss in left eye, vision loss in right eye and visual disturbance.   Cardiovascular: Positive for dyspnea on exertion. Negative for chest pain, claudication, leg swelling, near-syncope, orthopnea, palpitations, paroxysmal nocturnal dyspnea and syncope.   Respiratory: Negative for cough, hemoptysis, shortness of breath and wheezing.    Endocrine: Negative for polyuria.   Hematologic/Lymphatic: Does not bruise/bleed easily.   Skin: Negative for nail changes and rash.   Musculoskeletal: Negative for back pain, muscle cramps and myalgias.   Gastrointestinal: Negative for abdominal pain, change in bowel habit, diarrhea, heartburn, hematemesis, hematochezia, melena, nausea and vomiting.   Genitourinary: Negative for bladder incontinence, dysuria, frequency and hematuria.   Psychiatric/Behavioral: Negative for depression.   Allergic/Immunologic: Negative for hives.        Objective:  Vitals:    18 1247 18 1250   BP: (!) 145/74 (!) 144/77   BP Location: Left arm Right arm   Patient Position: Sitting Sitting   BP Method: Large (Automatic) Large (Automatic)   Pulse: 71 75   SpO2: 95%    Weight: 103.6 kg (228 lb 6.3 oz)    Height: 5' 8" (1.727 m)          Physical Exam   Constitutional: He is oriented to person, place, and time. He appears well-developed and well-nourished.   HENT:   Head: Normocephalic and atraumatic.   Eyes: EOM are normal. Pupils " are equal, round, and reactive to light.   Neck: Neck supple. No JVD present. No thyromegaly present.   Cardiovascular: Normal rate, regular rhythm and normal heart sounds.  PMI is displaced.  Exam reveals no gallop and no friction rub.    No murmur heard.  Pulses:       Carotid pulses are 2+ on the right side with bruit, and 2+ on the left side with bruit.       Radial pulses are 2+ on the right side, and 2+ on the left side.        Femoral pulses are 2+ on the right side, and 2+ on the left side.       Dorsalis pedis pulses are 1+ on the right side, and 1+ on the left side.        Posterior tibial pulses are 1+ on the right side, and 1+ on the left side.   Pulmonary/Chest: Effort normal. He has no wheezes. He has no rhonchi. He has no rales.   Abdominal: Soft. Normal appearance. He exhibits no distension. There is no hepatosplenomegaly. There is no tenderness.   Neurological: He is alert and oriented to person, place, and time. Gait normal.   Skin: Skin is warm and dry. Rash noted. There is erythema.   Psychiatric: He has a normal mood and affect.         Assessment:       1. Dysphagia, unspecified type    2. BOSE (dyspnea on exertion)    3. Type 2 diabetes mellitus with diabetic polyneuropathy, with long-term current use of insulin    4. KELVIN on CPAP    5. Severe obesity with body mass index (BMI) of 35.0 to 39.9 with comorbidity    6. Symptomatic stenosis of both carotid arteries    7. Bilateral carotid artery stenosis    8. ICD (implantable cardioverter-defibrillator) in place    9. Essential hypertension    10. Chronic combined systolic and diastolic heart failure    11. Mixed hyperlipidemia    12. Coronary artery disease of native heart with stable angina pectoris, unspecified vessel or lesion type         Plan:       1) CAD.  Due to a h/o syncope and exertional dyspnea the patient underwent PET stress testing; he was found to have inferior ischemia.  The patient has a h/o RCA  with occluded SVGt o RCA.  I  discussed RCA  PCI with the patient and his wife versus medical therapy; the patient opted for a trail of medical therapy  -continue EC ASA 81mg po qday  -continue Plavix 75mg po qday  -continue statin  -continue COreg 6.25mg po BID  -start Imudr 30mg po qday; potential side effects including hypotension reviewed with patient and his wife  -if the patient gets angina not relieved with NTG SL X3, he will call 911     2) H/o CVA.  The patient reports a  Recent ho emotional lability; he states he justs starts crying randomly with no other symptoms; will was refered to neurology for further evaluation during his previous clinic visit here;     3) s/p Bilateral carotid stenosis.  9/28/17 carotid duplex reviewed  -continue DAPT  -continue statin     4) ICM. Patient appears euvolemic on exam and reports NYHA class 2 symptoms  -medical management as above  -rec low sodium, heart healthy det     5) Dyslipidemia. 5/18/17 lipid panel reviewed  -continue high intensity statin     6) DM2. 4/12/17 HgbA1c = 9.3; rec tight glycemic control     7) H/o GERD. Asymptomatic on PPI; continue PPI     8) KELVIN . The patient reports good compliance with his CPAP      9) HTN. Blood pressure adequately controlled in clinic today; continue current medications     10) Difficulty swallowing. The patient reports this symptoms since his CVA; patient was referred the patient to speech therapy in the past; he missed his appointment because his wife was admitted to [facility]; will refer patient again     All of the patient's and his wife's questions were answered.

## 2018-04-02 ENCOUNTER — OUTPATIENT CASE MANAGEMENT (OUTPATIENT)
Dept: ADMINISTRATIVE | Facility: OTHER | Age: 73
End: 2018-04-02

## 2018-04-02 NOTE — PROGRESS NOTES
Summary: Attempted to contact pt for follow up. No answer. Left message requesting call back.       Interventions:   None     Plan:     kush:   CVA - continue education  Encourage compliance with meds, diet and appts  Follow up on ST for dysphagia  Dis-enroll if no further needs.

## 2018-04-03 ENCOUNTER — OUTPATIENT CASE MANAGEMENT (OUTPATIENT)
Dept: ADMINISTRATIVE | Facility: OTHER | Age: 73
End: 2018-04-03

## 2018-04-03 NOTE — PROGRESS NOTES
Summary:  LCSW contacted pt's wife/caregiver for follow up. She reports pt is doing well and had a great visit with VETATTHUMBERTO on yesterday to complete the intake. Pt should begin to receive services on next week. No new concerns were voiced. Case closure was discussed in which pt's wife was in agreement.     Intervention:  Collaborated with RN regarding case closure  Case Closure    Plan for next encounter:   None

## 2018-04-04 ENCOUNTER — CLINICAL SUPPORT (OUTPATIENT)
Dept: REHABILITATION | Facility: HOSPITAL | Age: 73
End: 2018-04-04
Attending: INTERNAL MEDICINE
Payer: MEDICARE

## 2018-04-04 DIAGNOSIS — R13.13 PHARYNGEAL DYSPHAGIA: ICD-10-CM

## 2018-04-04 PROCEDURE — 92610 EVALUATE SWALLOWING FUNCTION: CPT | Mod: PN

## 2018-04-04 NOTE — PLAN OF CARE
"Outpatient Neurological Rehabilitation  Speech and Language Therapy Evaluation    Date: 4/4/2018   Start Time:  0800  Stop Time:  0835      UNTIMED  Procedure Min.   Dysphagia Evaluation  35         Total Minutes: 35  Total Untimed Units: 1  Charges Billed/# of units: 1    Visit #:  1  Visits Authorized: 20  Visits Cancelled: 0  Visits No Showed:0  Date of Evaluation:  04/04/2018  Authorization Period: 03/27/2018-12/31/2018  Plan of Care Expiration:    05/30/2018  Extended POC:  n/a  G-CODE   1 /10      History   Onset Date:  1 year ago  Primary Diagnosis:  Pharyngeal dysphagia  Treatment Diagnosis:    Encounter Diagnosis   Name Primary?    Pharyngeal dysphagia       Referring Provider:  Dr. Marcus Blakely  Orders: speech therapy eval and treat  Current Medical History:  Jorden Shafer  presents to the [facility] secondary to the diagnosis of pharyngeal dysphagia. Per pt and family interview, pt stated following stroke he demonstrated slurred and slow speech, which has since resolved, and L sided weakness along with dysphagia. Pt reported he experienced a stroke approx a year ago and "2 mini strokes" in February of 2018 followed by a seizure. Per chart review, pt had a MBSS completed in 04/2017 resulting in deep penetration of thin and nectar thick consistencies during and after the swallow and aspiration of nectar thick consistency when utilizing a head turn strategy. He stated he received home health ST for approx 3 months. Per chart review, home health ST d/c thickener. Pt has c/o of returning coughing and choking symptoms when eating and drinking.   Past Medical History:   Past Medical History:   Diagnosis Date    Back pain     CAD (coronary artery disease) 1989    h/o cabg x 5    Carotid artery occlusion     Chronic kidney disease     Colon polyp     h/o on colonoscopy    Contact dermatitis 11/12/2013    CVA (cerebral vascular accident)     March " 2010    Depression     Diabetes mellitus type II     on  insulin pump    DVT (deep venous thrombosis) 1980's    left leg    GERD (gastroesophageal reflux disease)     Heart failure     [facility]     Hepatic steatosis 11/15/2017    Hyperlipidemia     Hypertension     Hypertensive heart disease without congestive heart failure     Insulin pump status 4/25/2017    Myocardial infarction     Neuropathy of lower extremity     Obesity (BMI 30-39.9) 4/25/2017    Prostate cancer     Renal manifestation of secondary diabetes mellitus     Seizures     Sleep apnea     Stroke 4/21/2017    Type 2 diabetes mellitus with diabetic polyneuropathy, with long-term current use of insulin 4/25/2017    Type 2 diabetes mellitus with hyperglycemia, with long-term current use of insulin 9/26/2012    Currently sees Dr. Britton     Unstable angina     Vitamin D deficiency disease      Precautions:  aspiration  Prior Therapy:  Home health ST approx 3 months  Environmental Concerns/Cultural/Spiritual/Developmental/Educational Needs: None    Subjective   Pain:   0/10  Nutrition:  Regular diet, thin liquids (however, pt is currently using honey thick liquids to relieve coughing during meals)  Prior Level of Function: Independent  Social History:  Pt reports he lives with his wife and was a  for 32 years.   Signs of Abuse: No  Functional Deficits Leading to Referral/Nature of Injury:  Swallowing difficulties   Patient Therapy Goals:  To stop coughing    Objective   Auditory Comprehension: WFL for purpose of evaluation. Further assessment to be completed if necessary.     Reading Comprehension: WFL for purpose of evaluation. Further assessment to be completed if necessary.     Verbal Expression: WFL for purpose of evaluation. Further assessment to be completed if necessary.     Written Expression: Pt did not c/o difficulties writing during assessment. Further testing to be completed if necessary.     Cognition: WFL for  purpose of evaluation. Further assessment to be completed if necessary.     Motor Speech/Fluency/Voice:  WFL for purpose of evaluation. Further assessment to be completed if necessary. Pt's vocal quality was clear upon arrival to evaluation and remained clear throughout.     Oral motor exam revealed:    Structure Abnormalities:No structural abnormalities seen  Dentition: uses dentures to eat  Secretion Management: adequate  Mucosal Quality: WFL  Mandibular Strength and Mobility: WFL  Oral Labial Strength and Mobility: mildly impaired. L sided orofacial weakness noted.   Lingual Strength and Mobility: WFL  Velar Elevation: WFL  Buccal Strength and Mobility: mildly impaired. L sided orofacial weakness noted.  Volitional Cough: WFL  Volitional Swallow: WFL  Voice Prior to PO Intake: clear  Oral Musculature Comments: WFL for swallowing at this time.     Diadochokinetic (DDK) rates for rapid repetition of 1 - 3 syllable utterances were WNL.     Swallowing: Pt seen for a bedside swallow study. Pt presents with:     THIN: self regulated small cup sip of water x3, self regulated consecutive sip x2, self regulated with straw x1    NECTAR: self regulated small cup sips x3, self regulated consecutive sips x1    HONEY: self regulated cup sips x2    PUREE:- tsp bites of pudding x3    SOLID: -bite of victorino cracker x1    Oral Phase: no oral residue present, adequate closure around utensils, and adequate oral transit time.     Pharyngeal Phase:  Upon palpation of pt's neck, slightly decreased hyolaryngeal elevation/excursion noted. Throat clear noted with 1/2 small self regulated cup sips of thin liquid and 1 sip via straw of thin liquid. Throat clear also noted with 1 consecutive self regulated cup sip of nectar. Pt's vocal qulaity remained clear throughout evaluation.   Thin liquids: Pt with throat clear 1/3 small sips of water. Spontaneous multiple swallows noted to clear small sips of water. No overt s/s of aspiration noted  with consecutive sips of water. Pt with throat cleat following sip of water via straw.   Nectar liquids: Pt with no overt s/s of aspiration with self regulated small sips of nectar via cup x3. Pt with throat clear noted with self regulated consecutive sip of nectar via cup x1.   Honey liquids: Pt with no overt s/s of aspiration with self regulated cup sips of honey thick liquids.   Pudding: Pt with no overt s/s of aspiration with tsp bites of pudding x3.   Mc Cracker: Pt required liquid wash to clear oral cavity due to cracker being dry. No overt s/s of aspiration noted.     Hearing: Pt with hearing aids present for evaluation today. No difficulties with hearing when utilizing hearing aids.     Assessment     Jorden Shafer presents to [facility] s/p medical diagnosis of  CVA and pharyngeal dyspahgia. Demonstrates impairments including limitations as described in the problem list. Positive prognostic factors include family support, pt's receptive language and cognitive skills. Negative prognostic factors include pt's age and length of time since onset. He presents with mild pharyngeal dysphagia c/b throat clearing after the swallow with consecutive sips of thin and nectar thick consistencies and straw sips of thin, multiple spontaneous swallows to clear thin liquids, and slightly decreased hyolaryngeal elevation/excursion. Barriers to progress include length of time since onset of deficits. Patient will benefit from skilled, outpatient neurological rehabilitation speech therapy following completion of MBSS.     Functional Communication Measure (FCM):   Severity Modifier for Medicare G-Code:  Swallowing  Current status:  FCM:  Level 5  - CJ CJ at least 20% < 40% impaired, limited or restricted  Projected status:  FCM:  Level 6   - CI CI at least 1% but less than 20% impaired, limited or restricted  Discharge status:  FCM:   TBD      Rehab Potential: good    Short Term  Goals (4  weeks):   1. Pt will participate in MBSS to objective r/u aspiration and determine safest and least restrictive diet.     Long Term Goals (8 weeks):   1. Pt will utilize compensatory strategies with optimum safety and efficiency of swallowing function on PO intake without overt s/s of aspiration for the highest diet level.       Education: POC was discussed with pt. Patient and family members expressed understanding. Pt and family educated on recommendation of MBSS. Pt and family verbalized understanding and were in agreement.     Plan  Authorization Period: 03/27/2018 to 12/31/2018  Plan of Care Certification Period: 04/04/2018 to 04/30/2018    Recommended Treatment Plan:  Patient will participate in the [facility] program for speech therapy 1-2 times per week to address his  Swallow deficits, to educate patient and their family, and to participate in a home exercise program. Participate in MBSS. Goals to be added upon results.     Other Recommendations: MBSS to objective rule out aspiration and determine safest/least restrictive diet.       Therapist's Name: ISHMAEL Metcalf, CCC-SLP  Speech-Language Pathologist      Date: 4/4/2018     I certify the need for these services furnished under this plan of treatment and while under my care.    ____________________________________  _________________________  Physician/Referring Practitioner     Date of Signature

## 2018-04-05 ENCOUNTER — OUTPATIENT CASE MANAGEMENT (OUTPATIENT)
Dept: ADMINISTRATIVE | Facility: OTHER | Age: 73
End: 2018-04-05

## 2018-04-05 NOTE — PROGRESS NOTES
Summary:Spoke with Mrs. Shafer. She states pt is being rushed to [facility] via [facility] for seizure.   Interventions:none  Plan:Call to follow up tomorrow.

## 2018-04-06 ENCOUNTER — TELEPHONE (OUTPATIENT)
Dept: FAMILY MEDICINE | Facility: CLINIC | Age: 73
End: 2018-04-06

## 2018-04-06 ENCOUNTER — HOSPITAL ENCOUNTER (OUTPATIENT)
Facility: HOSPITAL | Age: 73
Discharge: HOME | End: 2018-04-06
Attending: INTERNAL MEDICINE | Admitting: INTERNAL MEDICINE
Payer: MEDICARE

## 2018-04-06 VITALS
HEART RATE: 67 BPM | RESPIRATION RATE: 20 BRPM | TEMPERATURE: 98 F | WEIGHT: 227.06 LBS | DIASTOLIC BLOOD PRESSURE: 77 MMHG | HEIGHT: 68 IN | OXYGEN SATURATION: 96 % | BODY MASS INDEX: 34.41 KG/M2 | SYSTOLIC BLOOD PRESSURE: 161 MMHG

## 2018-04-06 DIAGNOSIS — R56.9 SEIZURE: ICD-10-CM

## 2018-04-06 DIAGNOSIS — I50.42 CHRONIC COMBINED SYSTOLIC AND DIASTOLIC HEART FAILURE: Chronic | ICD-10-CM

## 2018-04-06 DIAGNOSIS — R79.89 ELEVATED TROPONIN: ICD-10-CM

## 2018-04-06 DIAGNOSIS — I10 ESSENTIAL HYPERTENSION: Chronic | ICD-10-CM

## 2018-04-06 DIAGNOSIS — R07.9 ACUTE CHEST PAIN: ICD-10-CM

## 2018-04-06 DIAGNOSIS — I21.4 NSTEMI (NON-ST ELEVATED MYOCARDIAL INFARCTION): ICD-10-CM

## 2018-04-06 DIAGNOSIS — Z79.4 UNCONTROLLED TYPE 2 DIABETES MELLITUS WITH HYPERGLYCEMIA, WITH LONG-TERM CURRENT USE OF INSULIN: Primary | Chronic | ICD-10-CM

## 2018-04-06 DIAGNOSIS — E11.65 UNCONTROLLED TYPE 2 DIABETES MELLITUS WITH HYPERGLYCEMIA, WITH LONG-TERM CURRENT USE OF INSULIN: Primary | Chronic | ICD-10-CM

## 2018-04-06 DIAGNOSIS — R13.12 OROPHARYNGEAL DYSPHAGIA: Primary | ICD-10-CM

## 2018-04-06 PROBLEM — G40.909 EPILEPSY: Chronic | Status: ACTIVE | Noted: 2018-04-06

## 2018-04-06 PROBLEM — Z95.810 AICD (AUTOMATIC CARDIOVERTER/DEFIBRILLATOR) PRESENT: Chronic | Status: ACTIVE | Noted: 2018-04-06

## 2018-04-06 PROBLEM — I25.10 CAD (CORONARY ARTERY DISEASE): Chronic | Status: ACTIVE | Noted: 2017-11-14

## 2018-04-06 PROBLEM — E44.1 MILD PROTEIN MALNUTRITION: Chronic | Status: RESOLVED | Noted: 2017-04-13 | Resolved: 2018-04-06

## 2018-04-06 PROBLEM — I73.9 PERIPHERAL ARTERY DISEASE: Chronic | Status: ACTIVE | Noted: 2018-04-06

## 2018-04-06 LAB
ALBUMIN SERPL BCP-MCNC: 4 G/DL
ALP SERPL-CCNC: 85 U/L
ALT SERPL W/O P-5'-P-CCNC: 18 U/L
ANION GAP SERPL CALC-SCNC: 12 MMOL/L
AST SERPL-CCNC: 22 U/L
BASOPHILS # BLD AUTO: 0.03 K/UL
BASOPHILS NFR BLD: 0.3 %
BILIRUB SERPL-MCNC: 1.3 MG/DL
BUN SERPL-MCNC: 8 MG/DL
CALCIUM SERPL-MCNC: 7.5 MG/DL
CHLORIDE SERPL-SCNC: 102 MMOL/L
CHOLEST SERPL-MCNC: 87 MG/DL
CHOLEST/HDLC SERPL: 2.9 {RATIO}
CK MB SERPL-MCNC: 5 NG/ML
CK MB SERPL-MCNC: 5 NG/ML
CK MB SERPL-MCNC: 5.5 NG/ML
CK MB SERPL-MCNC: 5.5 NG/ML
CK MB SERPL-MCNC: 6.3 NG/ML
CK MB SERPL-MCNC: 6.3 NG/ML
CK MB SERPL-RTO: 2 %
CK MB SERPL-RTO: 2 %
CK MB SERPL-RTO: 2.3 %
CK MB SERPL-RTO: 2.3 %
CK MB SERPL-RTO: 2.6 %
CK MB SERPL-RTO: 2.6 %
CK SERPL-CCNC: 237 U/L
CK SERPL-CCNC: 237 U/L
CK SERPL-CCNC: 243 U/L
CK SERPL-CCNC: 243 U/L
CK SERPL-CCNC: 251 U/L
CK SERPL-CCNC: 251 U/L
CO2 SERPL-SCNC: 28 MMOL/L
CREAT SERPL-MCNC: 0.9 MG/DL
DIFFERENTIAL METHOD: ABNORMAL
EOSINOPHIL # BLD AUTO: 0.1 K/UL
EOSINOPHIL NFR BLD: 0.5 %
ERYTHROCYTE [DISTWIDTH] IN BLOOD BY AUTOMATED COUNT: 14.6 %
EST. GFR  (AFRICAN AMERICAN): >60 ML/MIN/1.73 M^2
EST. GFR  (NON AFRICAN AMERICAN): >60 ML/MIN/1.73 M^2
ESTIMATED AVG GLUCOSE: 143 MG/DL
GLUCOSE SERPL-MCNC: 99 MG/DL
HBA1C MFR BLD HPLC: 6.6 %
HCT VFR BLD AUTO: 42.8 %
HDLC SERPL-MCNC: 30 MG/DL
HDLC SERPL: 34.5 %
HGB BLD-MCNC: 14.6 G/DL
LDLC SERPL CALC-MCNC: 35 MG/DL
LYMPHOCYTES # BLD AUTO: 2.6 K/UL
LYMPHOCYTES NFR BLD: 26.7 %
MAGNESIUM SERPL-MCNC: 0.9 MG/DL
MAGNESIUM SERPL-MCNC: 1.6 MG/DL
MCH RBC QN AUTO: 27.8 PG
MCHC RBC AUTO-ENTMCNC: 34.1 G/DL
MCV RBC AUTO: 82 FL
MONOCYTES # BLD AUTO: 0.6 K/UL
MONOCYTES NFR BLD: 6.4 %
NEUTROPHILS # BLD AUTO: 6.5 K/UL
NEUTROPHILS NFR BLD: 66.1 %
NONHDLC SERPL-MCNC: 57 MG/DL
PHOSPHATE SERPL-MCNC: 3.8 MG/DL
PLATELET # BLD AUTO: 160 K/UL
PMV BLD AUTO: 11.1 FL
POCT GLUCOSE: 104 MG/DL (ref 70–110)
POTASSIUM SERPL-SCNC: 3.1 MMOL/L
PROT SERPL-MCNC: 7.1 G/DL
RBC # BLD AUTO: 5.25 M/UL
SODIUM SERPL-SCNC: 142 MMOL/L
TRIGL SERPL-MCNC: 110 MG/DL
TROPONIN I SERPL DL<=0.01 NG/ML-MCNC: 0.28 NG/ML
TROPONIN I SERPL DL<=0.01 NG/ML-MCNC: 0.45 NG/ML
TROPONIN I SERPL DL<=0.01 NG/ML-MCNC: 0.53 NG/ML
WBC # BLD AUTO: 9.82 K/UL

## 2018-04-06 PROCEDURE — 94761 N-INVAS EAR/PLS OXIMETRY MLT: CPT

## 2018-04-06 PROCEDURE — 82550 ASSAY OF CK (CPK): CPT | Mod: 91

## 2018-04-06 PROCEDURE — 25000003 PHARM REV CODE 250: Performed by: INTERNAL MEDICINE

## 2018-04-06 PROCEDURE — 85025 COMPLETE CBC W/AUTO DIFF WBC: CPT

## 2018-04-06 PROCEDURE — 84100 ASSAY OF PHOSPHORUS: CPT

## 2018-04-06 PROCEDURE — 80053 COMPREHEN METABOLIC PANEL: CPT

## 2018-04-06 PROCEDURE — 83735 ASSAY OF MAGNESIUM: CPT

## 2018-04-06 PROCEDURE — 63600175 PHARM REV CODE 636 W HCPCS: Performed by: INTERNAL MEDICINE

## 2018-04-06 PROCEDURE — 99900035 HC TECH TIME PER 15 MIN (STAT)

## 2018-04-06 PROCEDURE — 83036 HEMOGLOBIN GLYCOSYLATED A1C: CPT

## 2018-04-06 PROCEDURE — 27000221 HC OXYGEN, UP TO 24 HOURS

## 2018-04-06 PROCEDURE — 82962 GLUCOSE BLOOD TEST: CPT

## 2018-04-06 PROCEDURE — 83735 ASSAY OF MAGNESIUM: CPT | Mod: 91

## 2018-04-06 PROCEDURE — 93010 ELECTROCARDIOGRAM REPORT: CPT | Mod: 76,,, | Performed by: INTERNAL MEDICINE

## 2018-04-06 PROCEDURE — G0379 DIRECT REFER HOSPITAL OBSERV: HCPCS

## 2018-04-06 PROCEDURE — 93005 ELECTROCARDIOGRAM TRACING: CPT

## 2018-04-06 PROCEDURE — 94660 CPAP INITIATION&MGMT: CPT

## 2018-04-06 PROCEDURE — 25000003 PHARM REV CODE 250: Performed by: NURSE PRACTITIONER

## 2018-04-06 PROCEDURE — 84484 ASSAY OF TROPONIN QUANT: CPT

## 2018-04-06 PROCEDURE — 80061 LIPID PANEL: CPT

## 2018-04-06 PROCEDURE — 82553 CREATINE MB FRACTION: CPT | Mod: 91

## 2018-04-06 PROCEDURE — G0378 HOSPITAL OBSERVATION PER HR: HCPCS

## 2018-04-06 PROCEDURE — 99214 OFFICE O/P EST MOD 30 MIN: CPT | Mod: ,,, | Performed by: INTERNAL MEDICINE

## 2018-04-06 PROCEDURE — 36415 COLL VENOUS BLD VENIPUNCTURE: CPT

## 2018-04-06 RX ORDER — PANTOPRAZOLE SODIUM 40 MG/1
40 TABLET, DELAYED RELEASE ORAL DAILY
Status: DISCONTINUED | OUTPATIENT
Start: 2018-04-06 | End: 2018-04-06 | Stop reason: HOSPADM

## 2018-04-06 RX ORDER — CLONIDINE HYDROCHLORIDE 0.1 MG/1
0.1 TABLET ORAL 3 TIMES DAILY PRN
Status: DISCONTINUED | OUTPATIENT
Start: 2018-04-06 | End: 2018-04-06 | Stop reason: HOSPADM

## 2018-04-06 RX ORDER — ASPIRIN 81 MG/1
81 TABLET ORAL DAILY
Status: DISCONTINUED | OUTPATIENT
Start: 2018-04-06 | End: 2018-04-06 | Stop reason: HOSPADM

## 2018-04-06 RX ORDER — ACETAMINOPHEN 500 MG
500 TABLET ORAL EVERY 6 HOURS PRN
Status: DISCONTINUED | OUTPATIENT
Start: 2018-04-06 | End: 2018-04-06 | Stop reason: HOSPADM

## 2018-04-06 RX ORDER — LEVETIRACETAM 500 MG/1
500 TABLET ORAL 2 TIMES DAILY
Status: DISCONTINUED | OUTPATIENT
Start: 2018-04-06 | End: 2018-04-06 | Stop reason: HOSPADM

## 2018-04-06 RX ORDER — POTASSIUM CHLORIDE 20 MEQ/15ML
40 SOLUTION ORAL ONCE
Status: COMPLETED | OUTPATIENT
Start: 2018-04-06 | End: 2018-04-06

## 2018-04-06 RX ORDER — IBUPROFEN 200 MG
16 TABLET ORAL
Status: DISCONTINUED | OUTPATIENT
Start: 2018-04-06 | End: 2018-04-06 | Stop reason: HOSPADM

## 2018-04-06 RX ORDER — ENOXAPARIN SODIUM 100 MG/ML
40 INJECTION SUBCUTANEOUS EVERY 24 HOURS
Status: DISCONTINUED | OUTPATIENT
Start: 2018-04-06 | End: 2018-04-06 | Stop reason: HOSPADM

## 2018-04-06 RX ORDER — IBUPROFEN 200 MG
24 TABLET ORAL
Status: DISCONTINUED | OUTPATIENT
Start: 2018-04-06 | End: 2018-04-06 | Stop reason: HOSPADM

## 2018-04-06 RX ORDER — CLOPIDOGREL BISULFATE 75 MG/1
75 TABLET ORAL DAILY
Status: DISCONTINUED | OUTPATIENT
Start: 2018-04-06 | End: 2018-04-06 | Stop reason: HOSPADM

## 2018-04-06 RX ORDER — GLUCAGON 1 MG
1 KIT INJECTION
Status: DISCONTINUED | OUTPATIENT
Start: 2018-04-06 | End: 2018-04-06 | Stop reason: HOSPADM

## 2018-04-06 RX ORDER — INSULIN ASPART 100 [IU]/ML
16 INJECTION, SOLUTION INTRAVENOUS; SUBCUTANEOUS
Status: DISCONTINUED | OUTPATIENT
Start: 2018-04-06 | End: 2018-04-06 | Stop reason: HOSPADM

## 2018-04-06 RX ORDER — ISOSORBIDE MONONITRATE 30 MG/1
30 TABLET, EXTENDED RELEASE ORAL DAILY
Status: DISCONTINUED | OUTPATIENT
Start: 2018-04-06 | End: 2018-04-06 | Stop reason: HOSPADM

## 2018-04-06 RX ORDER — TAMSULOSIN HYDROCHLORIDE 0.4 MG/1
0.4 CAPSULE ORAL DAILY
Status: DISCONTINUED | OUTPATIENT
Start: 2018-04-06 | End: 2018-04-06 | Stop reason: HOSPADM

## 2018-04-06 RX ORDER — ATORVASTATIN CALCIUM 40 MG/1
80 TABLET, FILM COATED ORAL DAILY
Status: DISCONTINUED | OUTPATIENT
Start: 2018-04-06 | End: 2018-04-06 | Stop reason: HOSPADM

## 2018-04-06 RX ORDER — FUROSEMIDE 20 MG/1
20 TABLET ORAL DAILY
Status: DISCONTINUED | OUTPATIENT
Start: 2018-04-06 | End: 2018-04-06 | Stop reason: HOSPADM

## 2018-04-06 RX ORDER — NITROGLYCERIN 0.4 MG/1
0.4 TABLET SUBLINGUAL EVERY 5 MIN PRN
Status: DISCONTINUED | OUTPATIENT
Start: 2018-04-06 | End: 2018-04-06 | Stop reason: HOSPADM

## 2018-04-06 RX ORDER — ENOXAPARIN SODIUM 100 MG/ML
40 INJECTION SUBCUTANEOUS
Status: DISCONTINUED | OUTPATIENT
Start: 2018-04-06 | End: 2018-04-06

## 2018-04-06 RX ORDER — GABAPENTIN 300 MG/1
300 CAPSULE ORAL 3 TIMES DAILY
Status: DISCONTINUED | OUTPATIENT
Start: 2018-04-06 | End: 2018-04-06 | Stop reason: HOSPADM

## 2018-04-06 RX ORDER — CITALOPRAM 10 MG/1
10 TABLET ORAL 2 TIMES DAILY
Status: DISCONTINUED | OUTPATIENT
Start: 2018-04-06 | End: 2018-04-06 | Stop reason: HOSPADM

## 2018-04-06 RX ORDER — CARVEDILOL 6.25 MG/1
12.5 TABLET ORAL 2 TIMES DAILY WITH MEALS
Status: DISCONTINUED | OUTPATIENT
Start: 2018-04-06 | End: 2018-04-06 | Stop reason: HOSPADM

## 2018-04-06 RX ORDER — RAMELTEON 8 MG/1
8 TABLET ORAL NIGHTLY PRN
Status: DISCONTINUED | OUTPATIENT
Start: 2018-04-06 | End: 2018-04-06 | Stop reason: HOSPADM

## 2018-04-06 RX ORDER — INSULIN ASPART 100 [IU]/ML
1-10 INJECTION, SOLUTION INTRAVENOUS; SUBCUTANEOUS
Status: DISCONTINUED | OUTPATIENT
Start: 2018-04-06 | End: 2018-04-06 | Stop reason: HOSPADM

## 2018-04-06 RX ORDER — AMOXICILLIN 250 MG
1 CAPSULE ORAL 2 TIMES DAILY PRN
Status: DISCONTINUED | OUTPATIENT
Start: 2018-04-06 | End: 2018-04-06 | Stop reason: HOSPADM

## 2018-04-06 RX ADMIN — MAGNESIUM SULFATE HEPTAHYDRATE 3 G: 500 INJECTION, SOLUTION INTRAMUSCULAR; INTRAVENOUS at 04:04

## 2018-04-06 RX ADMIN — TAMSULOSIN HYDROCHLORIDE 0.4 MG: 0.4 CAPSULE ORAL at 09:04

## 2018-04-06 RX ADMIN — CLOPIDOGREL BISULFATE 75 MG: 75 TABLET ORAL at 09:04

## 2018-04-06 RX ADMIN — FUROSEMIDE 20 MG: 20 TABLET ORAL at 09:04

## 2018-04-06 RX ADMIN — ASPIRIN 81 MG: 81 TABLET, COATED ORAL at 09:04

## 2018-04-06 RX ADMIN — PANTOPRAZOLE SODIUM 40 MG: 40 TABLET, DELAYED RELEASE ORAL at 09:04

## 2018-04-06 RX ADMIN — ATORVASTATIN CALCIUM 80 MG: 40 TABLET, FILM COATED ORAL at 09:04

## 2018-04-06 RX ADMIN — GABAPENTIN 300 MG: 300 CAPSULE ORAL at 09:04

## 2018-04-06 RX ADMIN — CITALOPRAM HYDROBROMIDE 10 MG: 10 TABLET ORAL at 09:04

## 2018-04-06 RX ADMIN — LEVETIRACETAM 500 MG: 500 TABLET, FILM COATED ORAL at 09:04

## 2018-04-06 RX ADMIN — POTASSIUM CHLORIDE 40 MEQ: 20 SOLUTION ORAL at 10:04

## 2018-04-06 RX ADMIN — ISOSORBIDE MONONITRATE 30 MG: 30 TABLET, EXTENDED RELEASE ORAL at 09:04

## 2018-04-06 RX ADMIN — CARVEDILOL 12.5 MG: 6.25 TABLET, FILM COATED ORAL at 09:04

## 2018-04-06 NOTE — ASSESSMENT & PLAN NOTE
Patient never had any chest pains but the physician at the transferring facility was concerned given his extensive history of coronary artery disease.  His troponin was 0.06 (reference 0.00-0.04).  The first EKG at the transferring facility was misplaced there and was not sent over, but a repeat EKG tonight was significant for normal sinus rhythm with inferolateral T-wave flattening and occasional PVCs similar to previous EKGs on record.  His next troponin at that facility trended upwards to 0.36, again, without any chest pain.  Will observe in the Observation Unit for serial cardiac markers and consult Cardiology for further recommendations.

## 2018-04-06 NOTE — SUBJECTIVE & OBJECTIVE
Past Medical History:   Diagnosis Date    Back pain     CAD (coronary artery disease) 1989    h/o cabg x 5    Carotid artery occlusion     Chronic kidney disease     Colon polyp     h/o on colonoscopy    Contact dermatitis 11/12/2013    CVA (cerebral vascular accident)     March 2010    Depression     Diabetes mellitus type II     on  insulin pump    DVT (deep venous thrombosis) 1980's    left leg    GERD (gastroesophageal reflux disease)     Heart failure     [facility]     Hepatic steatosis 11/15/2017    Hyperlipidemia     Hypertension     Hypertensive heart disease without congestive heart failure     Insulin pump status 4/25/2017    Myocardial infarction     Neuropathy of lower extremity     Obesity (BMI 30-39.9) 4/25/2017    Peripheral artery disease 4/6/2018    Prostate cancer     Renal manifestation of secondary diabetes mellitus     Seizures     Sleep apnea     Stroke 4/21/2017    Type 2 diabetes mellitus with diabetic polyneuropathy, with long-term current use of insulin 4/25/2017    Type 2 diabetes mellitus with hyperglycemia, with long-term current use of insulin 9/26/2012    Currently sees Dr. Britton     Unstable angina     Vitamin D deficiency disease        Past Surgical History:   Procedure Laterality Date    CARDIAC DEFIBRILLATOR PLACEMENT  06/16/2016    CHOLECYSTECTOMY      CORONARY ARTERY BYPASS GRAFT      bypass times  5    defibulater  06/16/2016    EYE SURGERY Bilateral     HERNIA REPAIR      groin x2  and umbilical    JOINT REPLACEMENT      knee left    thrombus Left     >2005    VASECTOMY         Review of patient's allergies indicates:  No Known Allergies    No current facility-administered medications on file prior to encounter.      Current Outpatient Prescriptions on File Prior to Encounter   Medication Sig    aspirin (ECOTRIN) 81 MG EC tablet Take 81 mg by mouth once daily.    atorvastatin (LIPITOR) 80 MG tablet Take 80 mg by mouth once daily. Take 1/2  tablet daily    carvedilol (COREG) 6.25 MG tablet Take 2 tablets (12.5 mg total) by mouth 2 (two) times daily with meals.    citalopram (CELEXA) 10 MG tablet Take 1 tablet (10 mg total) by mouth 2 (two) times daily.    clopidogrel (PLAVIX) 75 mg tablet Take 1 tablet (75 mg total) by mouth once daily.    DEXT 70/POLYCARBOPHIL/PEG/NACL (ARTIFICIAL TEAR SOLUTION OPHT) Apply to eye 3 (three) times daily.    diclofenac sodium (VOLTAREN) 1 % Gel Apply 4 g topically once daily.    ergocalciferol (VITAMIN D2) 50,000 unit Cap Take 50,000 Units by mouth every 7 days.    furosemide (LASIX) 20 MG tablet Take 1 tablet (20 mg total) by mouth once daily.    gabapentin (NEURONTIN) 300 MG capsule Take 300 mg by mouth 3 (three) times daily.    insulin aspart (NOVOLOG) 100 unit/mL InPn pen Inject 16 Units into the skin 3 (three) times daily. (Patient taking differently: Inject 15 Units into the skin 3 (three) times daily. )    INSULIN DETEMIR (LEVEMIR SUBQ) Inject 60 Units into the skin 2 (two) times daily.     isosorbide mononitrate (IMDUR) 30 MG 24 hr tablet Take 1 tablet (30 mg total) by mouth once daily.    levETIRAcetam (KEPPRA) 500 MG Tab Take 500 mg by mouth 2 (two) times daily.    nitroGLYCERIN (NITROSTAT) 0.4 MG SL tablet Place 1 tablet (0.4 mg total) under the tongue every 5 (five) minutes as needed. Sublingual  ( under tongue) as needed    omeprazole (PRILOSEC) 40 MG capsule Take 1 capsule (40 mg total) by mouth every morning.    tamsulosin (FLOMAX) 0.4 mg Cp24 Take 0.4 mg by mouth once daily.    blood sugar diagnostic Strp 1 strip by Misc.(Non-Drug; Combo Route) route 2 (two) times daily with meals. TRUE METRIX    blood-glucose meter kit Use as instructed          TRUE METRIX    glucose 4 GM chewable tablet Take 16 g by mouth as needed for Low blood sugar.    lancets Misc 1 lancet by Misc.(Non-Drug; Combo Route) route 2 (two) times daily with meals. TRUE METRIX     Family History     Problem Relation  (Age of Onset)    Alzheimer's disease Mother    Asbestos Father    Cancer Father    Dementia Mother    Diabetes type II Maternal Aunt    Heart disease Mother        Social History Main Topics    Smoking status: Former Smoker     Packs/day: 4.00     Years: 25.00     Types: Cigarettes     Quit date: 6/8/1980    Smokeless tobacco: Never Used    Alcohol use No    Drug use: No    Sexual activity: Yes     Partners: Female     Review of Systems   Constitutional: Negative for activity change, appetite change, chills, diaphoresis, fatigue, fever and unexpected weight change.   HENT: Negative.    Eyes: Negative.    Respiratory: Negative for cough, chest tightness, shortness of breath and wheezing.    Cardiovascular: Negative for chest pain, palpitations and leg swelling.   Gastrointestinal: Negative for abdominal distention, abdominal pain, blood in stool, constipation, diarrhea, nausea and vomiting.   Genitourinary: Negative for dysuria and hematuria.   Musculoskeletal: Negative.    Skin: Negative.    Neurological: Positive for seizures. Negative for dizziness, syncope, weakness and light-headedness.   Psychiatric/Behavioral: Negative.      Objective:     Vital Signs (Most Recent):  Temp: 97.6 °F (36.4 °C) (04/06/18 0141)  Pulse: 79 (04/06/18 0141)  Resp: 18 (04/06/18 0141)  BP: (!) 162/82 (04/06/18 0141)  SpO2: (!) 93 % (04/06/18 0141) Vital Signs (24h Range):  Temp:  [97.6 °F (36.4 °C)] 97.6 °F (36.4 °C)  Pulse:  [79] 79  Resp:  [18] 18  SpO2:  [93 %] 93 %  BP: (162)/(82) 162/82     Weight: 103 kg (227 lb 1.2 oz)  Body mass index is 34.53 kg/m².    Physical Exam   Constitutional: He is oriented to person, place, and time. He appears well-developed and well-nourished. No distress.   HENT:   Head: Normocephalic and atraumatic.   Right Ear: External ear normal.   Left Ear: External ear normal.   Nose: Nose normal.   Eyes: Right eye exhibits no discharge. Left eye exhibits no discharge.   Neck: Normal range of motion.    Cardiovascular: Normal rate, regular rhythm, normal heart sounds and intact distal pulses.  Exam reveals no gallop and no friction rub.    No murmur heard.  Pulmonary/Chest: Effort normal and breath sounds normal. No respiratory distress. He has no wheezes. He has no rales. He exhibits no tenderness.   Abdominal: Soft. Bowel sounds are normal. He exhibits no distension. There is no tenderness. There is no rebound and no guarding.   Musculoskeletal: Normal range of motion. He exhibits no edema.   Neurological: He is alert and oriented to person, place, and time.   Skin: Skin is warm and dry. He is not diaphoretic. No erythema.   Psychiatric: He has a normal mood and affect. His behavior is normal. Judgment and thought content normal.   Nursing note and vitals reviewed.          Significant Labs: All pertinent labs within the past 24 hours have been reviewed.    Significant Imaging: I have reviewed and interpreted all pertinent imaging results/findings within the past 24 hours.

## 2018-04-06 NOTE — H&P
"[facility] Medicine  History & Physical    Patient Name: Jorden Shafer  MRN: [id number]  Admission Date: 4/6/2018  Attending Physician: Nayely Ramirez MD   Primary Care Provider: Neha Plasencia MD         Patient information was obtained from patient.     Subjective:     Principal Problem:Elevated troponin    Chief Complaint: Seizure today.    HPI: Mr. Jorden Shafer is a 72 y.o. male known to me with essential hypertension, hyperlipidemia (LDL 1.0 May 2017), type 2 diabetes mellitus (HbA1c 8.1% Nov 2017) with insulin pump, CAD s/p CABG & PCI, peripheral artery disease, chronic combined systolic and diastolic heart failure (LVEF 45-50% Nov 2017), AICD in place, epilepsy, KELVIN on CPAP, GERD, prostate cancer, and history of CVA who presented initially to [facility] ED with complaints of seizures tonight.  He had just returned from the restroom to sit on his recliner when the episode started.  He was trying to open a bottle of water and started to have generalized tremors, flinging the water all around.  His wife was in the next room and didn't witness this episode, but his grand-daughter was also there and witnessed it when she her the commotion.  He said he was still conscious during the episode but felt very "dazed" afterwards.  He did not have another episode tonight and reports that he is compliant with his anti-epileptic medication, though he does not regularly follow with a neurologist.  Of note, he was planned for discharge home as his symptoms had resolved, but he was instead planned for observation as his troponins were slightly elevated in his setting of multiple cardiac events.  He follows with Dr. Marcus Blakely as his cardiologist at [facility].  He was requested transfer to an [facility] facility due to insurance constraints.      Chart Review:  Previous Hospitalizations  Date Hospital Diagnosis   Nov 2017 [facility] Chest pain - ruled out for ACS   Oct " 2017 [facility] LHC with DEC PCI x 4   Aug 2017 [facility] Left internal carotid artery PTA and stent placement    Jul 2017 [facility] Right internal carotid stent placement    Jun 2017 [facility] Angioplasty of right carotid artery    Apr 24, 2017 [facility] Chest pain with abnormal stress test - medical management    Apr 12,2 017 [facility] Acute CVA   Jan 2015 [facility] NSTEMI - VENKATA PCI to ostium of SVG-diagonal/LAD   Nov 2013 [facility] Acute heart failure    Feb 2011 [facility] Left total knee replacement      Outpatient Follow-Up  Date of Visit Physician Service   Mar 2018 Marcus Blakely MD Cardiology    Mar 2018 Seb Marin MD Neurology    Mar 2018 Neha Plasencia MD Primary Care   Jan 2018 [facility]   Nov 2017 Kay Briones MD PM&R   May 2017 Brii Donnelly DPM Podiatry    Apr 2017 Garima Elizalde NP [facility]   Mar 2017 Marino Koch MD Orthopedic Surgery    Dec 2016 Anthony Apodaca MD Sleep Medicine   Oct 2016 Dianna Christensen NP Urology      Past Medical History:   Diagnosis Date    Back pain     CAD (coronary artery disease) 1989    h/o cabg x 5    Carotid artery occlusion     Chronic kidney disease     Colon polyp     h/o on colonoscopy    Contact dermatitis 11/12/2013    CVA (cerebral vascular accident)     March 2010    Depression     Diabetes mellitus type II     on  insulin pump    DVT (deep venous thrombosis) 1980's    left leg    GERD (gastroesophageal reflux disease)     Heart failure     [facility]     Hepatic steatosis 11/15/2017    Hyperlipidemia     Hypertension     Hypertensive heart disease without congestive heart failure     Insulin pump status 4/25/2017    Myocardial infarction     Neuropathy of lower extremity     Obesity (BMI 30-39.9) 4/25/2017    Peripheral artery disease 4/6/2018    Prostate cancer     Renal manifestation of secondary diabetes mellitus     Seizures     Sleep apnea     Stroke 4/21/2017    Type 2 diabetes mellitus with diabetic polyneuropathy, with  long-term current use of insulin 4/25/2017    Type 2 diabetes mellitus with hyperglycemia, with long-term current use of insulin 9/26/2012    Currently sees Dr. Britton     Unstable angina     Vitamin D deficiency disease        Past Surgical History:   Procedure Laterality Date    CARDIAC DEFIBRILLATOR PLACEMENT  06/16/2016    CHOLECYSTECTOMY      CORONARY ARTERY BYPASS GRAFT      bypass times  5    defibulater  06/16/2016    EYE SURGERY Bilateral     HERNIA REPAIR      groin x2  and umbilical    JOINT REPLACEMENT      knee left    thrombus Left     >2005    VASECTOMY         Review of patient's allergies indicates:  No Known Allergies    No current facility-administered medications on file prior to encounter.      Current Outpatient Prescriptions on File Prior to Encounter   Medication Sig    aspirin (ECOTRIN) 81 MG EC tablet Take 81 mg by mouth once daily.    atorvastatin (LIPITOR) 80 MG tablet Take 80 mg by mouth once daily. Take 1/2 tablet daily    carvedilol (COREG) 6.25 MG tablet Take 2 tablets (12.5 mg total) by mouth 2 (two) times daily with meals.    citalopram (CELEXA) 10 MG tablet Take 1 tablet (10 mg total) by mouth 2 (two) times daily.    clopidogrel (PLAVIX) 75 mg tablet Take 1 tablet (75 mg total) by mouth once daily.    DEXT 70/POLYCARBOPHIL/PEG/NACL (ARTIFICIAL TEAR SOLUTION OPHT) Apply to eye 3 (three) times daily.    diclofenac sodium (VOLTAREN) 1 % Gel Apply 4 g topically once daily.    ergocalciferol (VITAMIN D2) 50,000 unit Cap Take 50,000 Units by mouth every 7 days.    furosemide (LASIX) 20 MG tablet Take 1 tablet (20 mg total) by mouth once daily.    gabapentin (NEURONTIN) 300 MG capsule Take 300 mg by mouth 3 (three) times daily.    insulin aspart (NOVOLOG) 100 unit/mL InPn pen Inject 16 Units into the skin 3 (three) times daily. (Patient taking differently: Inject 15 Units into the skin 3 (three) times daily. )    INSULIN DETEMIR (LEVEMIR SUBQ) Inject 60 Units into  the skin 2 (two) times daily.     isosorbide mononitrate (IMDUR) 30 MG 24 hr tablet Take 1 tablet (30 mg total) by mouth once daily.    levETIRAcetam (KEPPRA) 500 MG Tab Take 500 mg by mouth 2 (two) times daily.    nitroGLYCERIN (NITROSTAT) 0.4 MG SL tablet Place 1 tablet (0.4 mg total) under the tongue every 5 (five) minutes as needed. Sublingual  ( under tongue) as needed    omeprazole (PRILOSEC) 40 MG capsule Take 1 capsule (40 mg total) by mouth every morning.    tamsulosin (FLOMAX) 0.4 mg Cp24 Take 0.4 mg by mouth once daily.    blood sugar diagnostic Strp 1 strip by Misc.(Non-Drug; Combo Route) route 2 (two) times daily with meals. TRUE METRIX    blood-glucose meter kit Use as instructed          TRUE METRIX    glucose 4 GM chewable tablet Take 16 g by mouth as needed for Low blood sugar.    lancets Misc 1 lancet by Misc.(Non-Drug; Combo Route) route 2 (two) times daily with meals. TRUE METRIX     Family History     Problem Relation (Age of Onset)    Alzheimer's disease Mother    Asbestos Father    Cancer Father    Dementia Mother    Diabetes type II Maternal Aunt    Heart disease Mother        Social History Main Topics    Smoking status: Former Smoker     Packs/day: 4.00     Years: 25.00     Types: Cigarettes     Quit date: 6/8/1980    Smokeless tobacco: Never Used    Alcohol use No    Drug use: No    Sexual activity: Yes     Partners: Female     Review of Systems   Constitutional: Negative for activity change, appetite change, chills, diaphoresis, fatigue, fever and unexpected weight change.   HENT: Negative.    Eyes: Negative.    Respiratory: Negative for cough, chest tightness, shortness of breath and wheezing.    Cardiovascular: Negative for chest pain, palpitations and leg swelling.   Gastrointestinal: Negative for abdominal distention, abdominal pain, blood in stool, constipation, diarrhea, nausea and vomiting.   Genitourinary: Negative for dysuria and hematuria.   Musculoskeletal:  Negative.    Skin: Negative.    Neurological: Positive for seizures. Negative for dizziness, syncope, weakness and light-headedness.   Psychiatric/Behavioral: Negative.      Objective:     Vital Signs (Most Recent):  Temp: 97.6 °F (36.4 °C) (04/06/18 0141)  Pulse: 74 (04/06/18 0201)  Resp: 18 (04/06/18 0141)  BP: (!) 162/82 (04/06/18 0141)  SpO2: (!) 93 % (04/06/18 0141) Vital Signs (24h Range):  Temp:  [97.6 °F (36.4 °C)] 97.6 °F (36.4 °C)  Pulse:  [74-79] 74  Resp:  [18] 18  SpO2:  [93 %] 93 %  BP: (162)/(82) 162/82     Weight: 103 kg (227 lb 1.2 oz)  Body mass index is 34.53 kg/m².    Physical Exam   Constitutional: He is oriented to person, place, and time. He appears well-developed and well-nourished. No distress.   HENT:   Head: Normocephalic and atraumatic.   Right Ear: External ear normal.   Left Ear: External ear normal.   Nose: Nose normal.   Eyes: Right eye exhibits no discharge. Left eye exhibits no discharge.   Neck: Normal range of motion.   Cardiovascular: Normal rate, regular rhythm, normal heart sounds and intact distal pulses.  Exam reveals no gallop and no friction rub.    No murmur heard.  Pulmonary/Chest: Effort normal and breath sounds normal. No respiratory distress. He has no wheezes. He has no rales. He exhibits no tenderness.   Abdominal: Soft. Bowel sounds are normal. He exhibits no distension. There is no tenderness. There is no rebound and no guarding.   Musculoskeletal: Normal range of motion. He exhibits no edema.   Neurological: He is alert and oriented to person, place, and time.   Skin: Skin is warm and dry. He is not diaphoretic. No erythema.   Psychiatric: He has a normal mood and affect. His behavior is normal. Judgment and thought content normal.   Nursing note and vitals reviewed.            Significant Labs: All pertinent labs within the past 24 hours have been reviewed.    Significant Imaging: I have reviewed and interpreted all pertinent imaging results/findings within the  past 24 hours.    Assessment/Plan:     * Elevated troponin    Patient never had any chest pains but the physician at the transferring facility was concerned given his extensive history of coronary artery disease.  His troponin was 0.06 (reference 0.00-0.04).  The first EKG at the transferring facility was misplaced there and was not sent over, but a repeat EKG tonight was significant for normal sinus rhythm with inferolateral T-wave flattening and occasional PVCs similar to previous EKGs on record.  His next troponin at that facility trended upwards to 0.36, again, without any chest pain.  Will observe in the Observation Unit for serial cardiac markers and consult Cardiology for further recommendations.        Seizure    It is unclear whether this episode was actually a seizure, but will place in seizure precautions and continue his home regimen of levetiracetam.  CT-head at [facility] was negative for acute intracranial abnormalities.  Will also consider consulting Neurology.        Essential hypertension    Patient's blood pressure is poorly-controlled; will continue home regimen of carvedilol, furosemide, isosorbide, and tamsulosin, and provide as-needed clonidine.        Mixed hyperlipidemia    Will continue his home regimen of atorvastatin.        Type 2 diabetes mellitus, uncontrolled    He does have an insulin pump in place which he will continue.  Will provide insulin sliding scale.        Insulin pump in place    As addressed above.        CAD (coronary artery disease)    As addressed above; will continue his home regimen of aspirin, atorvastatin, carvedilol, and clopidogrel.        Peripheral artery disease    Stable; will continue his home regimen of aspirin and atorvastatin.        Chronic combined systolic and diastolic heart failure    Stable without evidence of acute heart failure; will continue his home regimen of carvedilol and furosemide.        AICD (automatic cardioverter/defibrillator) present     Stable; there are no acute issues.        Epilepsy    As addressed above.        KELVIN on CPAP    Will continue CPAP nightly.        GERD (gastroesophageal reflux disease)    Will substitute his home regimen of omeprazole for pantoprazole while he is inpatient.        Prostate cancer    Stable; will continue his home regimen of tamsulosin and encourage continued follow-up with his urologist.        History of stroke    Stable; will continue his home regimen of aspirin and atorvastatin.          VTE Risk Mitigation         Ordered     enoxaparin injection 40 mg  Every 12 hours (non-standard times)     Route:  Subcutaneous        04/06/18 0142     IP VTE HIGH RISK PATIENT  Once      04/06/18 0142           Total time spent on case: 60 minutes.        Mallory Haynes M.D.  Staff Nocturnist  Department of Hospital Medicine  [facility]  Pager: [phone]

## 2018-04-06 NOTE — SUBJECTIVE & OBJECTIVE
Past Medical History:   Diagnosis Date    Back pain     CAD (coronary artery disease) 1989    h/o cabg x 5    Carotid artery occlusion     Chronic kidney disease     Colon polyp     h/o on colonoscopy    Contact dermatitis 11/12/2013    CVA (cerebral vascular accident)     March 2010    Depression     Diabetes mellitus type II     on  insulin pump    DVT (deep venous thrombosis) 1980's    left leg    GERD (gastroesophageal reflux disease)     Heart failure     [facility]     Hepatic steatosis 11/15/2017    Hyperlipidemia     Hypertension     Hypertensive heart disease without congestive heart failure     Insulin pump status 4/25/2017    Myocardial infarction     Neuropathy of lower extremity     Obesity (BMI 30-39.9) 4/25/2017    Peripheral artery disease 4/6/2018    Prostate cancer     Renal manifestation of secondary diabetes mellitus     Seizures     Sleep apnea     Stroke 4/21/2017    Type 2 diabetes mellitus with diabetic polyneuropathy, with long-term current use of insulin 4/25/2017    Type 2 diabetes mellitus with hyperglycemia, with long-term current use of insulin 9/26/2012    Currently sees Dr. Britton     Unstable angina     Vitamin D deficiency disease        Past Surgical History:   Procedure Laterality Date    CARDIAC DEFIBRILLATOR PLACEMENT  06/16/2016    CHOLECYSTECTOMY      CORONARY ARTERY BYPASS GRAFT      bypass times  5    defibulater  06/16/2016    EYE SURGERY Bilateral     HERNIA REPAIR      groin x2  and umbilical    JOINT REPLACEMENT      knee left    thrombus Left     >2005    VASECTOMY         Review of patient's allergies indicates:  No Known Allergies    No current facility-administered medications on file prior to encounter.      Current Outpatient Prescriptions on File Prior to Encounter   Medication Sig    aspirin (ECOTRIN) 81 MG EC tablet Take 81 mg by mouth once daily.    atorvastatin (LIPITOR) 80 MG tablet Take 80 mg by mouth once daily. Take 1/2  tablet daily    carvedilol (COREG) 6.25 MG tablet Take 2 tablets (12.5 mg total) by mouth 2 (two) times daily with meals.    citalopram (CELEXA) 10 MG tablet Take 1 tablet (10 mg total) by mouth 2 (two) times daily.    clopidogrel (PLAVIX) 75 mg tablet Take 1 tablet (75 mg total) by mouth once daily.    DEXT 70/POLYCARBOPHIL/PEG/NACL (ARTIFICIAL TEAR SOLUTION OPHT) Apply to eye 3 (three) times daily.    diclofenac sodium (VOLTAREN) 1 % Gel Apply 4 g topically once daily.    ergocalciferol (VITAMIN D2) 50,000 unit Cap Take 50,000 Units by mouth every 7 days.    furosemide (LASIX) 20 MG tablet Take 1 tablet (20 mg total) by mouth once daily.    gabapentin (NEURONTIN) 300 MG capsule Take 300 mg by mouth 3 (three) times daily.    insulin aspart (NOVOLOG) 100 unit/mL InPn pen Inject 16 Units into the skin 3 (three) times daily. (Patient taking differently: Inject 15 Units into the skin 3 (three) times daily. )    INSULIN DETEMIR (LEVEMIR SUBQ) Inject 60 Units into the skin 2 (two) times daily.     isosorbide mononitrate (IMDUR) 30 MG 24 hr tablet Take 1 tablet (30 mg total) by mouth once daily.    levETIRAcetam (KEPPRA) 500 MG Tab Take 500 mg by mouth 2 (two) times daily.    nitroGLYCERIN (NITROSTAT) 0.4 MG SL tablet Place 1 tablet (0.4 mg total) under the tongue every 5 (five) minutes as needed. Sublingual  ( under tongue) as needed    omeprazole (PRILOSEC) 40 MG capsule Take 1 capsule (40 mg total) by mouth every morning.    tamsulosin (FLOMAX) 0.4 mg Cp24 Take 0.4 mg by mouth once daily.    blood sugar diagnostic Strp 1 strip by Misc.(Non-Drug; Combo Route) route 2 (two) times daily with meals. TRUE METRIX    blood-glucose meter kit Use as instructed          TRUE METRIX    glucose 4 GM chewable tablet Take 16 g by mouth as needed for Low blood sugar.    lancets Misc 1 lancet by Misc.(Non-Drug; Combo Route) route 2 (two) times daily with meals. TRUE METRIX     Family History     Problem Relation  (Age of Onset)    Alzheimer's disease Mother    Asbestos Father    Cancer Father    Dementia Mother    Diabetes type II Maternal Aunt    Heart disease Mother        Social History Main Topics    Smoking status: Former Smoker     Packs/day: 4.00     Years: 25.00     Types: Cigarettes     Quit date: 6/8/1980    Smokeless tobacco: Never Used    Alcohol use No    Drug use: No    Sexual activity: Yes     Partners: Female     Review of Systems   Constitution: Negative.   HENT: Negative.    Eyes: Negative.    Cardiovascular: Negative for chest pain, dyspnea on exertion, irregular heartbeat, leg swelling, near-syncope, orthopnea, palpitations, paroxysmal nocturnal dyspnea and syncope.   Respiratory: Negative for shortness of breath.    Skin: Negative.    Musculoskeletal: Negative.    Gastrointestinal: Negative for abdominal pain, constipation and diarrhea.   Genitourinary: Negative for dysuria.   Neurological: Positive for brief paralysis, focal weakness, seizures and tremors. Negative for dizziness.   Psychiatric/Behavioral: Negative.      Objective:     Vital Signs (Most Recent):  Temp: 97.9 °F (36.6 °C) (04/06/18 0847)  Pulse: 67 (04/06/18 0847)  Resp: 20 (04/06/18 0847)  BP: (!) 161/77 (04/06/18 0847)  SpO2: 96 % (04/06/18 0847) Vital Signs (24h Range):  Temp:  [97.6 °F (36.4 °C)-98.4 °F (36.9 °C)] 97.9 °F (36.6 °C)  Pulse:  [67-79] 67  Resp:  [18-20] 20  SpO2:  [91 %-96 %] 96 %  BP: (129-162)/(76-82) 161/77     Weight: 103 kg (227 lb 1.2 oz)  Body mass index is 34.53 kg/m².    SpO2: 96 %  O2 Device (Oxygen Therapy): nasal cannula    No intake or output data in the 24 hours ending 04/06/18 1129    Lines/Drains/Airways          No matching active lines, drains, or airways          Physical Exam   Constitutional: He is oriented to person, place, and time. He appears well-developed and well-nourished. No distress.   HENT:   Head: Normocephalic and atraumatic.   Eyes: Conjunctivae and EOM are normal. Pupils are equal,  round, and reactive to light.   Neck: Normal range of motion. Neck supple. No thyromegaly present.   Cardiovascular: Normal rate, regular rhythm and normal heart sounds.    No murmur heard.  Pulmonary/Chest: Effort normal and breath sounds normal. No respiratory distress. He has no wheezes. He has no rales. He exhibits no tenderness.   Abdominal: Soft. Bowel sounds are normal.   Musculoskeletal: He exhibits no edema.   Neurological: He is alert and oriented to person, place, and time.   Skin: Skin is warm and dry.   Psychiatric: He has a normal mood and affect. His behavior is normal.       Significant Labs:   CMP   Recent Labs  Lab 04/06/18  0219      K 3.1*      CO2 28   GLU 99   BUN 8   CREATININE 0.9   CALCIUM 7.5*   PROT 7.1   ALBUMIN 4.0   BILITOT 1.3*   ALKPHOS 85   AST 22   ALT 18   ANIONGAP 12   ESTGFRAFRICA >60   EGFRNONAA >60   , CBC   Recent Labs  Lab 04/06/18 0219   WBC 9.82   HGB 14.6   HCT 42.8      , INR No results for input(s): INR, PROTIME in the last 48 hours., Lipid Panel   Recent Labs  Lab 04/06/18 0219   CHOL 87*   HDL 30*   LDLCALC 35.0*   TRIG 110   CHOLHDL 34.5    and Troponin   Recent Labs  Lab 04/06/18 0219 04/06/18  0541   TROPONINI 0.529* 0.454*       Significant Imaging: Echocardiogram:   2D echo with color flow doppler:   Results for orders placed or performed during the hospital encounter of 11/13/17   2D echo with color flow doppler   Result Value Ref Range    EF 45 55 - 65    Mitral Valve Regurgitation TRIVIAL     Diastolic Dysfunction Yes (A)     Est. PA Systolic Pressure 30.25     Mitral Valve Mobility NORMAL     Tricuspid Valve Regurgitation TRIVIAL      PET stress: 3-18  CONCLUSIONS: ABNORMAL MYOCARDIAL PERFUSION PET STRESS TEST  1. There is a small sized moderate to severe intensity fixed defect consistent with non-transmural infarct in the apical wall in the usual distribution of the Left Anterior Descending Artery. This defect comprises 7 % of the left  ventricular myocardium.   2. There is a large sized mild to moderate ischemia in the base to apical inferior and base to apical inferoseptal walls in the usual distribution of the Right Coronary Artery. This defect comprises 25 % of the left ventricular myocardium.   3. There is abnormal wall motion at rest showing akinesis of the apical wall of the left ventricle. There is abnormal wall motion at stress showing akinesis of the apical wall of the left ventricle.   4. LV function is normal at rest and stress.  (normal is >= 51%)  5. The ventricular volumes are normal at rest and stress.   6. The extracardiac distribution of radioactivity is normal.   7. There was no previous study available to compare.

## 2018-04-06 NOTE — CONSULTS
[facility]  Cardiology  Consult Note    Patient Name: Jorden Shafer  MRN: [id number]  Admission Date: 4/6/2018  Hospital Length of Stay: 0 days  Code Status: Full Code   Attending Provider: Nayely Ramirez MD   Consulting Provider: Barbara Samson MD  Primary Care Physician: Neha Plsaencia MD  Principal Problem:Elevated troponin    Patient information was obtained from patient, past medical records and ER records.     Inpatient consult to Cardiology  Consult performed by: BARBARA SAMSON  Consult ordered by: STUART JARAMILLO  Reason for consult: Elevated troponin        Subjective:     Chief Complaint:  Possible seizure     HPI:   Patient is 72-year-old male with extensive cardiovascular history followed by Dr. Blakely at [facility].  He was recently seen in his clinic at the end of March.  He's had a recent PET scan showing inferior ischemia.  When correlated to his previous cardiac catheterization he has extensive  for which med therapy was recently agreed upon.  He denies any cardiopulmonary complaints.  His main issue on presentation was possible seizure activity.  His troponin is mildly elevated but his artery trended down consistent with possible strain.  He denies any cardiopulmonary complaints his been compliant with his medicines.  He says that he recently followed with neurology at [facility] Dr. Posadas.  Otherwise been in his usual state of health.    Past Medical History:   Diagnosis Date    Back pain     CAD (coronary artery disease) 1989    h/o cabg x 5    Carotid artery occlusion     Chronic kidney disease     Colon polyp     h/o on colonoscopy    Contact dermatitis 11/12/2013    CVA (cerebral vascular accident)     March 2010    Depression     Diabetes mellitus type II     on  insulin pump    DVT (deep venous thrombosis) 1980's    left leg    GERD (gastroesophageal reflux disease)     Heart failure     [facility]     Hepatic steatosis 11/15/2017     Hyperlipidemia     Hypertension     Hypertensive heart disease without congestive heart failure     Insulin pump status 4/25/2017    Myocardial infarction     Neuropathy of lower extremity     Obesity (BMI 30-39.9) 4/25/2017    Peripheral artery disease 4/6/2018    Prostate cancer     Renal manifestation of secondary diabetes mellitus     Seizures     Sleep apnea     Stroke 4/21/2017    Type 2 diabetes mellitus with diabetic polyneuropathy, with long-term current use of insulin 4/25/2017    Type 2 diabetes mellitus with hyperglycemia, with long-term current use of insulin 9/26/2012    Currently sees Dr. Britton     Unstable angina     Vitamin D deficiency disease        Past Surgical History:   Procedure Laterality Date    CARDIAC DEFIBRILLATOR PLACEMENT  06/16/2016    CHOLECYSTECTOMY      CORONARY ARTERY BYPASS GRAFT      bypass times  5    defibulater  06/16/2016    EYE SURGERY Bilateral     HERNIA REPAIR      groin x2  and umbilical    JOINT REPLACEMENT      knee left    thrombus Left     >2005    VASECTOMY         Review of patient's allergies indicates:  No Known Allergies    No current facility-administered medications on file prior to encounter.      Current Outpatient Prescriptions on File Prior to Encounter   Medication Sig    aspirin (ECOTRIN) 81 MG EC tablet Take 81 mg by mouth once daily.    atorvastatin (LIPITOR) 80 MG tablet Take 80 mg by mouth once daily. Take 1/2 tablet daily    carvedilol (COREG) 6.25 MG tablet Take 2 tablets (12.5 mg total) by mouth 2 (two) times daily with meals.    citalopram (CELEXA) 10 MG tablet Take 1 tablet (10 mg total) by mouth 2 (two) times daily.    clopidogrel (PLAVIX) 75 mg tablet Take 1 tablet (75 mg total) by mouth once daily.    DEXT 70/POLYCARBOPHIL/PEG/NACL (ARTIFICIAL TEAR SOLUTION OPHT) Apply to eye 3 (three) times daily.    diclofenac sodium (VOLTAREN) 1 % Gel Apply 4 g topically once daily.    ergocalciferol (VITAMIN D2) 50,000  unit Cap Take 50,000 Units by mouth every 7 days.    furosemide (LASIX) 20 MG tablet Take 1 tablet (20 mg total) by mouth once daily.    gabapentin (NEURONTIN) 300 MG capsule Take 300 mg by mouth 3 (three) times daily.    insulin aspart (NOVOLOG) 100 unit/mL InPn pen Inject 16 Units into the skin 3 (three) times daily. (Patient taking differently: Inject 15 Units into the skin 3 (three) times daily. )    INSULIN DETEMIR (LEVEMIR SUBQ) Inject 60 Units into the skin 2 (two) times daily.     isosorbide mononitrate (IMDUR) 30 MG 24 hr tablet Take 1 tablet (30 mg total) by mouth once daily.    levETIRAcetam (KEPPRA) 500 MG Tab Take 500 mg by mouth 2 (two) times daily.    nitroGLYCERIN (NITROSTAT) 0.4 MG SL tablet Place 1 tablet (0.4 mg total) under the tongue every 5 (five) minutes as needed. Sublingual  ( under tongue) as needed    omeprazole (PRILOSEC) 40 MG capsule Take 1 capsule (40 mg total) by mouth every morning.    tamsulosin (FLOMAX) 0.4 mg Cp24 Take 0.4 mg by mouth once daily.    blood sugar diagnostic Strp 1 strip by Misc.(Non-Drug; Combo Route) route 2 (two) times daily with meals. TRUE METRIX    blood-glucose meter kit Use as instructed          TRUE METRIX    glucose 4 GM chewable tablet Take 16 g by mouth as needed for Low blood sugar.    lancets Misc 1 lancet by Misc.(Non-Drug; Combo Route) route 2 (two) times daily with meals. TRUE METRIX     Family History     Problem Relation (Age of Onset)    Alzheimer's disease Mother    Asbestos Father    Cancer Father    Dementia Mother    Diabetes type II Maternal Aunt    Heart disease Mother        Social History Main Topics    Smoking status: Former Smoker     Packs/day: 4.00     Years: 25.00     Types: Cigarettes     Quit date: 6/8/1980    Smokeless tobacco: Never Used    Alcohol use No    Drug use: No    Sexual activity: Yes     Partners: Female     Review of Systems   Constitution: Negative.   HENT: Negative.    Eyes: Negative.     Cardiovascular: Negative for chest pain, dyspnea on exertion, irregular heartbeat, leg swelling, near-syncope, orthopnea, palpitations, paroxysmal nocturnal dyspnea and syncope.   Respiratory: Negative for shortness of breath.    Skin: Negative.    Musculoskeletal: Negative.    Gastrointestinal: Negative for abdominal pain, constipation and diarrhea.   Genitourinary: Negative for dysuria.   Neurological: Positive for brief paralysis, focal weakness, seizures and tremors. Negative for dizziness.   Psychiatric/Behavioral: Negative.      Objective:     Vital Signs (Most Recent):  Temp: 97.9 °F (36.6 °C) (04/06/18 0847)  Pulse: 67 (04/06/18 0847)  Resp: 20 (04/06/18 0847)  BP: (!) 161/77 (04/06/18 0847)  SpO2: 96 % (04/06/18 0847) Vital Signs (24h Range):  Temp:  [97.6 °F (36.4 °C)-98.4 °F (36.9 °C)] 97.9 °F (36.6 °C)  Pulse:  [67-79] 67  Resp:  [18-20] 20  SpO2:  [91 %-96 %] 96 %  BP: (129-162)/(76-82) 161/77     Weight: 103 kg (227 lb 1.2 oz)  Body mass index is 34.53 kg/m².    SpO2: 96 %  O2 Device (Oxygen Therapy): nasal cannula    No intake or output data in the 24 hours ending 04/06/18 1129    Lines/Drains/Airways          No matching active lines, drains, or airways          Physical Exam   Constitutional: He is oriented to person, place, and time. He appears well-developed and well-nourished. No distress.   HENT:   Head: Normocephalic and atraumatic.   Eyes: Conjunctivae and EOM are normal. Pupils are equal, round, and reactive to light.   Neck: Normal range of motion. Neck supple. No thyromegaly present.   Cardiovascular: Normal rate, regular rhythm and normal heart sounds.    No murmur heard.  Pulmonary/Chest: Effort normal and breath sounds normal. No respiratory distress. He has no wheezes. He has no rales. He exhibits no tenderness.   Abdominal: Soft. Bowel sounds are normal.   Musculoskeletal: He exhibits no edema.   Neurological: He is alert and oriented to person, place, and time.   Skin: Skin is warm  and dry.   Psychiatric: He has a normal mood and affect. His behavior is normal.       Significant Labs:   CMP   Recent Labs  Lab 04/06/18 0219      K 3.1*      CO2 28   GLU 99   BUN 8   CREATININE 0.9   CALCIUM 7.5*   PROT 7.1   ALBUMIN 4.0   BILITOT 1.3*   ALKPHOS 85   AST 22   ALT 18   ANIONGAP 12   ESTGFRAFRICA >60   EGFRNONAA >60   , CBC   Recent Labs  Lab 04/06/18 0219   WBC 9.82   HGB 14.6   HCT 42.8      , INR No results for input(s): INR, PROTIME in the last 48 hours., Lipid Panel   Recent Labs  Lab 04/06/18 0219   CHOL 87*   HDL 30*   LDLCALC 35.0*   TRIG 110   CHOLHDL 34.5    and Troponin   Recent Labs  Lab 04/06/18 0219 04/06/18  0541   TROPONINI 0.529* 0.454*       Significant Imaging: Echocardiogram:   2D echo with color flow doppler:   Results for orders placed or performed during the hospital encounter of 11/13/17   2D echo with color flow doppler   Result Value Ref Range    EF 45 55 - 65    Mitral Valve Regurgitation TRIVIAL     Diastolic Dysfunction Yes (A)     Est. PA Systolic Pressure 30.25     Mitral Valve Mobility NORMAL     Tricuspid Valve Regurgitation TRIVIAL      PET stress: 3-18  CONCLUSIONS: ABNORMAL MYOCARDIAL PERFUSION PET STRESS TEST  1. There is a small sized moderate to severe intensity fixed defect consistent with non-transmural infarct in the apical wall in the usual distribution of the Left Anterior Descending Artery. This defect comprises 7 % of the left ventricular myocardium.   2. There is a large sized mild to moderate ischemia in the base to apical inferior and base to apical inferoseptal walls in the usual distribution of the Right Coronary Artery. This defect comprises 25 % of the left ventricular myocardium.   3. There is abnormal wall motion at rest showing akinesis of the apical wall of the left ventricle. There is abnormal wall motion at stress showing akinesis of the apical wall of the left ventricle.   4. LV function is normal at rest and  stress.  (normal is >= 51%)  5. The ventricular volumes are normal at rest and stress.   6. The extracardiac distribution of radioactivity is normal.   7. There was no previous study available to compare.    Assessment and Plan:     * Elevated troponin    Likely secondary to demand  No current chest pain symptoms and stable cardiac workup as below  No further workup planned from CV standpoint  Follow-up with primary cardiologist at [facility]        Seizure disorder    Compliant with his antiepileptic regimen  Follow-up with neurology        CAD (coronary artery disease)    No new signs of ACS or current symptoms of chest pain  Recent PET stress --  med managed        Chronic combined systolic and diastolic heart failure    No signs of decompensation  Continue GDMT  ICD in place        Type 2 diabetes mellitus with diabetic polyneuropathy, with long-term current use of insulin    Per IM        Mixed hyperlipidemia    On statin            VTE Risk Mitigation         Ordered     enoxaparin injection 40 mg  Daily     Route:  Subcutaneous        04/06/18 2368          Thank you for your consult. I will sign off. Please contact us if you have any additional questions.    Devin Sandoval MD  Cardiology   [facility]

## 2018-04-06 NOTE — HPI
"Mr. Jorden Shafer is a 72 y.o. male known to me with essential hypertension, hyperlipidemia (LDL 1.0 May 2017), type 2 diabetes mellitus (HbA1c 8.1% Nov 2017) with insulin pump, CAD s/p CABG & PCI, peripheral artery disease, chronic combined systolic and diastolic heart failure (LVEF 45-50% Nov 2017), AICD in place, epilepsy, KELVIN on CPAP, GERD, prostate cancer, and history of CVA who presented initially to [facility] ED with complaints of seizures tonight.  He had just returned from the restroom to sit on his recliner when the episode started.  He was trying to open a bottle of water and started to have generalized tremors, flinging the water all around.  His wife was in the next room and didn't witness this episode, but his grand-daughter was also there and witnessed it when she her the commotion.  He said he was still conscious during the episode but felt very "dazed" afterwards.  He did not have another episode tonight and reports that he is compliant with his anti-epileptic medication, though he does not regularly follow with a neurologist.  Of note, he was planned for discharge home as his symptoms had resolved, but he was instead planned for observation as his troponins were slightly elevated in his setting of multiple cardiac events.  He follows with Dr. Marcus Blakely as his cardiologist at [facility].  He was requested transfer to an [facility] facility due to insurance constraints.    "

## 2018-04-06 NOTE — ASSESSMENT & PLAN NOTE
Stable; will continue his home regimen of tamsulosin and encourage continued follow-up with his urologist.

## 2018-04-06 NOTE — ASSESSMENT & PLAN NOTE
Likely secondary to demand  No current chest pain symptoms and stable cardiac workup as below  No further workup planned from CV standpoint  Follow-up with primary cardiologist at main campus

## 2018-04-06 NOTE — ASSESSMENT & PLAN NOTE
It is unclear whether this episode was actually a seizure, but will place in seizure precautions and continue his home regimen of levetiracetam.  CT-head at [facility] was negative for acute intracranial abnormalities.  Will also consider consulting Neurology.

## 2018-04-06 NOTE — ASSESSMENT & PLAN NOTE
Stable without evidence of acute heart failure; will continue his home regimen of carvedilol and furosemide.

## 2018-04-06 NOTE — SUBJECTIVE & OBJECTIVE
Past Medical History:   Diagnosis Date    Back pain     CAD (coronary artery disease) 1989    h/o cabg x 5    Carotid artery occlusion     Chronic kidney disease     Colon polyp     h/o on colonoscopy    Contact dermatitis 11/12/2013    CVA (cerebral vascular accident)     March 2010    Depression     Diabetes mellitus type II     on  insulin pump    DVT (deep venous thrombosis) 1980's    left leg    GERD (gastroesophageal reflux disease)     Heart failure     [facility]     Hepatic steatosis 11/15/2017    Hyperlipidemia     Hypertension     Hypertensive heart disease without congestive heart failure     Insulin pump status 4/25/2017    Myocardial infarction     Neuropathy of lower extremity     Obesity (BMI 30-39.9) 4/25/2017    Peripheral artery disease 4/6/2018    Prostate cancer     Renal manifestation of secondary diabetes mellitus     Seizures     Sleep apnea     Stroke 4/21/2017    Type 2 diabetes mellitus with diabetic polyneuropathy, with long-term current use of insulin 4/25/2017    Type 2 diabetes mellitus with hyperglycemia, with long-term current use of insulin 9/26/2012    Currently sees Dr. Britton     Unstable angina     Vitamin D deficiency disease        Past Surgical History:   Procedure Laterality Date    CARDIAC DEFIBRILLATOR PLACEMENT  06/16/2016    CHOLECYSTECTOMY      CORONARY ARTERY BYPASS GRAFT      bypass times  5    defibulater  06/16/2016    EYE SURGERY Bilateral     HERNIA REPAIR      groin x2  and umbilical    JOINT REPLACEMENT      knee left    thrombus Left     >2005    VASECTOMY         Review of patient's allergies indicates:  No Known Allergies    No current facility-administered medications on file prior to encounter.      Current Outpatient Prescriptions on File Prior to Encounter   Medication Sig    aspirin (ECOTRIN) 81 MG EC tablet Take 81 mg by mouth once daily.    atorvastatin (LIPITOR) 80 MG tablet Take 80 mg by mouth once daily. Take 1/2  tablet daily    carvedilol (COREG) 6.25 MG tablet Take 2 tablets (12.5 mg total) by mouth 2 (two) times daily with meals.    citalopram (CELEXA) 10 MG tablet Take 1 tablet (10 mg total) by mouth 2 (two) times daily.    clopidogrel (PLAVIX) 75 mg tablet Take 1 tablet (75 mg total) by mouth once daily.    DEXT 70/POLYCARBOPHIL/PEG/NACL (ARTIFICIAL TEAR SOLUTION OPHT) Apply to eye 3 (three) times daily.    diclofenac sodium (VOLTAREN) 1 % Gel Apply 4 g topically once daily.    ergocalciferol (VITAMIN D2) 50,000 unit Cap Take 50,000 Units by mouth every 7 days.    furosemide (LASIX) 20 MG tablet Take 1 tablet (20 mg total) by mouth once daily.    gabapentin (NEURONTIN) 300 MG capsule Take 300 mg by mouth 3 (three) times daily.    insulin aspart (NOVOLOG) 100 unit/mL InPn pen Inject 16 Units into the skin 3 (three) times daily. (Patient taking differently: Inject 15 Units into the skin 3 (three) times daily. )    INSULIN DETEMIR (LEVEMIR SUBQ) Inject 60 Units into the skin 2 (two) times daily.     isosorbide mononitrate (IMDUR) 30 MG 24 hr tablet Take 1 tablet (30 mg total) by mouth once daily.    levETIRAcetam (KEPPRA) 500 MG Tab Take 500 mg by mouth 2 (two) times daily.    nitroGLYCERIN (NITROSTAT) 0.4 MG SL tablet Place 1 tablet (0.4 mg total) under the tongue every 5 (five) minutes as needed. Sublingual  ( under tongue) as needed    omeprazole (PRILOSEC) 40 MG capsule Take 1 capsule (40 mg total) by mouth every morning.    tamsulosin (FLOMAX) 0.4 mg Cp24 Take 0.4 mg by mouth once daily.    blood sugar diagnostic Strp 1 strip by Misc.(Non-Drug; Combo Route) route 2 (two) times daily with meals. TRUE METRIX    blood-glucose meter kit Use as instructed          TRUE METRIX    glucose 4 GM chewable tablet Take 16 g by mouth as needed for Low blood sugar.    lancets Misc 1 lancet by Misc.(Non-Drug; Combo Route) route 2 (two) times daily with meals. TRUE METRIX     Family History     Problem Relation  (Age of Onset)    Alzheimer's disease Mother    Asbestos Father    Cancer Father    Dementia Mother    Diabetes type II Maternal Aunt    Heart disease Mother        Social History Main Topics    Smoking status: Former Smoker     Packs/day: 4.00     Years: 25.00     Types: Cigarettes     Quit date: 6/8/1980    Smokeless tobacco: Never Used    Alcohol use No    Drug use: No    Sexual activity: Yes     Partners: Female     Review of Systems   Constitutional: Negative for activity change, appetite change, chills, diaphoresis, fatigue, fever and unexpected weight change.   HENT: Negative.    Eyes: Negative.    Respiratory: Negative for cough, chest tightness, shortness of breath and wheezing.    Cardiovascular: Negative for chest pain, palpitations and leg swelling.   Gastrointestinal: Negative for abdominal distention, abdominal pain, blood in stool, constipation, diarrhea, nausea and vomiting.   Genitourinary: Negative for dysuria and hematuria.   Musculoskeletal: Negative.    Skin: Negative.    Neurological: Positive for seizures. Negative for dizziness, syncope, weakness and light-headedness.   Psychiatric/Behavioral: Negative.      Objective:     Vital Signs (Most Recent):  Temp: 97.6 °F (36.4 °C) (04/06/18 0141)  Pulse: 74 (04/06/18 0201)  Resp: 18 (04/06/18 0141)  BP: (!) 162/82 (04/06/18 0141)  SpO2: (!) 93 % (04/06/18 0141) Vital Signs (24h Range):  Temp:  [97.6 °F (36.4 °C)] 97.6 °F (36.4 °C)  Pulse:  [74-79] 74  Resp:  [18] 18  SpO2:  [93 %] 93 %  BP: (162)/(82) 162/82     Weight: 103 kg (227 lb 1.2 oz)  Body mass index is 34.53 kg/m².    Physical Exam   Constitutional: He is oriented to person, place, and time. He appears well-developed and well-nourished. No distress.   HENT:   Head: Normocephalic and atraumatic.   Right Ear: External ear normal.   Left Ear: External ear normal.   Nose: Nose normal.   Eyes: Right eye exhibits no discharge. Left eye exhibits no discharge.   Neck: Normal range of motion.    Cardiovascular: Normal rate, regular rhythm, normal heart sounds and intact distal pulses.  Exam reveals no gallop and no friction rub.    No murmur heard.  Pulmonary/Chest: Effort normal and breath sounds normal. No respiratory distress. He has no wheezes. He has no rales. He exhibits no tenderness.   Abdominal: Soft. Bowel sounds are normal. He exhibits no distension. There is no tenderness. There is no rebound and no guarding.   Musculoskeletal: Normal range of motion. He exhibits no edema.   Neurological: He is alert and oriented to person, place, and time.   Skin: Skin is warm and dry. He is not diaphoretic. No erythema.   Psychiatric: He has a normal mood and affect. His behavior is normal. Judgment and thought content normal.   Nursing note and vitals reviewed.            Significant Labs: All pertinent labs within the past 24 hours have been reviewed.    Significant Imaging: I have reviewed and interpreted all pertinent imaging results/findings within the past 24 hours.

## 2018-04-06 NOTE — PLAN OF CARE
"TN to patient's room to discuss Helping the patient manage care at home.   TN/SW role explained to pt.     "Discharge planning begins on Admission" pamphlet discussed and placed in "My Health Packet" and placed at bedside.      TN's name and contact info placed on white board.     Preferred Appointment time:  mornings    Preferred pharmacy:   [facility]/pharmacy #[id number] - [place], [place] - [address]  [address] [address]  Phone: [phone] Fax: [phone]       04/06/18 1129   Discharge Assessment   Assessment Type Discharge Planning Assessment   Confirmed/corrected address and phone number on facesheet? Yes   Assessment information obtained from? Patient;Caregiver  (wife)   Expected Length of Stay (days) 1   Communicated expected length of stay with patient/caregiver yes   Prior to hospitilization cognitive status: Alert/Oriented   Prior to hospitalization functional status: Independent   Current cognitive status: Alert/Oriented   Current Functional Status: Independent   Lives With spouse   Able to Return to Prior Arrangements yes   Is patient able to care for self after discharge? Yes   Patient's perception of discharge disposition home or selfcare   Readmission Within The Last 30 Days no previous admission in last 30 days   Patient currently being followed by outpatient case management? No   Patient currently receives any other outside agency services? No   Equipment Currently Used at Home cane, straight   Do you have any problems affording any of your prescribed medications? No   Is the patient taking medications as prescribed? yes   Does the patient have transportation home? Yes   Transportation Available family or friend will provide   Does the patient receive services at the Coumadin Clinic? No   Discharge Plan A Home with family   Patient/Family In Agreement With Plan yes     "

## 2018-04-06 NOTE — TELEPHONE ENCOUNTER
----- Message from ST Kerrie sent at 4/4/2018  8:35 AM CDT -----  Hi Dr. Plasencia,     I saw Mr. Shafer today for a dysphagia evaluation due to c/o coughing again with thin liquids and some foods. During the evaluation, he had some throat clearing with thin liquids and nectar thick liquids, but he stated that he also habitual clears his throat. He also reported that he has begun using thickener again which has decreased the coughing when eating/drinking. Can you put it in a Modified Barium Swallow Study order to objectively rule out aspiration?    Thank you,     ISHMAEL Metcalf, CCC-SLP  Speech-Language Pathologist  04/04/2018

## 2018-04-06 NOTE — ASSESSMENT & PLAN NOTE
As addressed above; will continue his home regimen of aspirin, atorvastatin, carvedilol, and clopidogrel.

## 2018-04-06 NOTE — HOSPITAL COURSE
Seen by cardiology due to elevation in troponin noted. His troponin level flattened out - he denied chest pain. Patient was seen by cardiology who evaluated the patient. He was noted to have had a recent PET scan and has known CAD, found to have inferior ischemia. He is followed by Reddy at [facility] and is noted to have had a recent appointment 3/28/18 (about 1.5 wk ago). He opted for medical therapy at that time. Continue home ASA/Plavix/statin/BB/Imdur --- PRN NTG. He is otherwise chest pain free and anxious for discharge. Of note patient was found to have magnesium of 1.9 at the time of admit - he was given IV mag replacement and oral potassium Ka of 3.1. Home to follow up as usual with Cardiology in clinic. Instructions given if chest pain persist to call 911 or contract MD.

## 2018-04-06 NOTE — PLAN OF CARE
"   04/06/18 1131   Final Note   Assessment Type Final Discharge Note   Discharge Disposition Home   What phone number can be called within the next 1-3 days to see how you are doing after discharge? ([phone])   Hospital Follow Up  Appt(s) scheduled? Yes   Discharge plans and expectations educations in teach back method with documentation complete? Yes   Right Care Referral Info   Post Acute Recommendation No Care   EDUCATION:  Mr & Mrs Shafer provided with educational information on CAD.  Information reviewed and placed in :My Healthcare Packet" to be brought home for them to use as resource after discharge.  Information included:  signs and symptoms to look for and call the doctor if experiencing, and symptoms that may indicate a medical emergency: CALL 911.      All questions answered.  Teach back method used.  Mrs Shafer verbalized understanding of all information by stating, " I will call 911 if he has SOB, CP or squeezing feeling in his chest."    Follow up appointment scheduled for pt with PCP and cardiology.  All information (Date, time, location and contact info) placed in AVS and on DC sheet.  Information given and explained to pt, as well as instructed to call 24-48 hours prior to scheduled time if rescheduling needed.  Patient instructed to bring all meds currently taking in their original bottles along with insurance card and picture ID to all appointments.     NurseKassandra notified that all CM needs are met                   "

## 2018-04-06 NOTE — PROGRESS NOTES
04/06/18 0141   Vital Signs   Temp 97.6 °F (36.4 °C)   Temp src Oral   Pulse 79   Heart Rate Source Monitor   Resp 18   SpO2 (!) 93 %   Pulse Oximetry Type Intermittent   O2 Device (Oxygen Therapy) room air   BP (!) 162/82   BP Location Right arm   BP Method Automatic   Patient Position Lying     Patent arrived to unit. AAO x4. Breathing is unlabored. Patient denies chest pain or any other discomforts at this time. Shift assessment initiated. Tele monitoring initiated.

## 2018-04-06 NOTE — ASSESSMENT & PLAN NOTE
Patient's blood pressure is poorly-controlled; will continue home regimen of carvedilol, furosemide, isosorbide, and tamsulosin, and provide as-needed clonidine.

## 2018-04-06 NOTE — PROGRESS NOTES
04/06/18 0316   Critical Value Communication   Date Result Received 04/06/18   Time Result Received 0316   Resulting Department of Critical Value lab   Who communicated critical value from resulting department? Hector [address]   Critical Test #1 Mg   Critical Test #1 Result 0.9   Date Notified 04/06/18   Time Notified 0316   Read Back Verification Yes   Physician Directive administer 3g magnesium sulfate IV over 4 hours

## 2018-04-06 NOTE — DISCHARGE SUMMARY
"[facility] Hospital Medicine  Discharge Summary      Patient Name: Jorden Shafer  MRN: [id number]  Admission Date: 4/6/2018  Hospital Length of Stay: 0 days  Discharge Date and Time:  04/06/2018 10:57 AM  Attending Physician: Nayely Ramirez MD   Discharging Provider: ZAMZAM Siddiqui  Primary Care Provider: Neha Plasencia MD      HPI:   Mr. Jorden Shafer is a 72 y.o. male known to me with essential hypertension, hyperlipidemia (LDL 1.0 May 2017), type 2 diabetes mellitus (HbA1c 8.1% Nov 2017) with insulin pump, CAD s/p CABG & PCI, peripheral artery disease, chronic combined systolic and diastolic heart failure (LVEF 45-50% Nov 2017), AICD in place, epilepsy, KELVIN on CPAP, GERD, prostate cancer, and history of CVA who presented [place] with complaints of seizures tonight.  He had just returned from the restroom to sit on his recliner when the episode started.  He was trying to open a bottle of water and started to have generalized tremors, flinging the water all around.  His wife was in the next room and didn't witness this episode, but his grand-daughter was also there and witnessed it when she her the commotion.  He said he was still conscious during the episode but felt very "dazed" afterwards.  He did not have another episode tonight and reports that he is compliant with his anti-epileptic medication, though he does not regularly follow with a neurologist.  Of note, he was planned for discharge home as his symptoms had resolved, but he was instead planned for observation as his troponins were slightly elevated in his setting of multiple cardiac events.  He follows with Dr. Marcus Blakely as his cardiologist at [facility].  He was requested transfer to an [facility] facility due to insurance constraints.      * No surgery found *      Hospital Course:   Seen by cardiology due to elevation in troponin noted. His troponin level flattened out - he " denied chest pain. Patient was seen by cardiology who evaluated the patient. He was noted to have had a recent PET scan and has known CAD, found to have inferior ischemia. He is followed by Reddy at [facility] and is noted to have had a recent appointment 3/28/18 (about 1.5 wk ago). He opted for medical therapy at that time. Continue home ASA/Plavix/statin/BB/Imdur --- PRN NTG. He is otherwise chest pain free and anxious for discharge. Of note patient was found to have magnesium of 1.9 at the time of admit - he was given IV mag replacement and oral potassium Ka of 3.1. Home to follow up as usual with Cardiology in clinic. Instructions given if chest pain persist to call 911 or contract MD.     Consults:   Consults         Status Ordering Provider     Inpatient consult to Cardiology  Once     Provider:  Devin Sandoval MD    Acknowledged STUART JARAMILLO          * Elevated troponin    Patient never had any chest pains but the physician at the transferring facility was concerned given his extensive history of coronary artery disease.  His troponin was 0.06 (reference 0.00-0.04).  The first EKG at the transferring facility was misplaced there and was not sent over, but a repeat EKG tonight was significant for normal sinus rhythm with inferolateral T-wave flattening and occasional PVCs similar to previous EKGs on record.  His next troponin at that facility trended upwards to 0.36, again, without any chest pain.  Will observe in the Observation Unit for serial cardiac markers and consult Cardiology for further recommendations.          Final Active Diagnoses:    Diagnosis Date Noted POA    PRINCIPAL PROBLEM:  Elevated troponin [R74.8] 04/06/2018 Yes    Seizure [R56.9] 04/06/2018 Yes    Peripheral artery disease [I73.9] 04/06/2018 Yes     Chronic    AICD (automatic cardioverter/defibrillator) present [Z95.810] 04/06/2018 Yes     Chronic    Epilepsy [G40.909] 04/06/2018 Yes     Chronic    NSTEMI (non-ST elevated  myocardial infarction) [I21.4] 04/06/2018 Yes    CAD (coronary artery disease) [I25.10] 11/14/2017 Yes     Chronic    Mixed hyperlipidemia [E78.2]  Yes     Chronic    Insulin pump in place [Z96.41] 04/13/2017 Not Applicable     Chronic    KELVIN on CPAP [G47.33, Z99.89] 04/13/2017 Not Applicable     Chronic    GERD (gastroesophageal reflux disease) [K21.9] 04/13/2017 Yes     Chronic    History of stroke [Z86.73] 04/13/2017 Not Applicable     Chronic    Chronic combined systolic and diastolic heart failure [I50.42] 11/12/2013 Yes     Chronic    Essential hypertension [I10] 11/10/2013 Yes     Chronic    Prostate cancer [C61] 08/21/2013 Yes     Chronic    Type 2 diabetes mellitus, uncontrolled [E11.65] 09/26/2012 Yes     Chronic      Problems Resolved During this Admission:    Diagnosis Date Noted Date Resolved POA       Discharged Condition: stable    Disposition: Home or Self Care    Follow Up:  Follow-up Information     Neha Plasencia MD.    Specialty:  Family Medicine  Contact information:  [address]  [place] [address] [phone]             Marcus Blakely MD.    Specialties:  Cardiology, INTERVENTIONAL CARDIOLOGY  Contact information:  [address] [address] [phone]                 Patient Instructions:     Diet Cardiac     Activity as tolerated         Significant Diagnostic Studies: Labs:   CMP   Recent Labs  Lab 04/06/18  0219      K 3.1*      CO2 28   GLU 99   BUN 8   CREATININE 0.9   CALCIUM 7.5*   PROT 7.1   ALBUMIN 4.0   BILITOT 1.3*   ALKPHOS 85   AST 22   ALT 18   ANIONGAP 12   ESTGFRAFRICA >60   EGFRNONAA >60   , CBC   Recent Labs  Lab 04/06/18  0219   WBC 9.82   HGB 14.6   HCT 42.8      , INR   Lab Results   Component Value Date    INR 1.0 09/28/2017    INR 1.0 08/21/2017    INR 1.1 06/02/2017   , Lipid Panel   Lab Results   Component Value Date    CHOL 87 (L) 04/06/2018    HDL 30 (L) 04/06/2018    LDLCALC 35.0 (L) 04/06/2018    TRIG  110 04/06/2018    CHOLHDL 34.5 04/06/2018   , Troponin   Recent Labs  Lab 04/06/18  0541   TROPONINI 0.454*    and A1C:   Recent Labs  Lab 11/14/17  0401 04/06/18  0220   HGBA1C 8.1* 6.6*      Medications:  Reconciled Home Medications:      Medication List      CHANGE how you take these medications    insulin aspart U-100 100 unit/mL Inpn pen  Commonly known as:  NovoLOG  Inject 16 Units into the skin 3 (three) times daily.  What changed:  how much to take        CONTINUE taking these medications    ARTIFICIAL TEAR SOLUTION OPHT     aspirin 81 MG EC tablet  Commonly known as:  ECOTRIN     atorvastatin 80 MG tablet  Commonly known as:  LIPITOR     blood sugar diagnostic Strp  1 strip by Misc.(Non-Drug; Combo Route) route 2 (two) times daily with meals. TRUE METRIX     blood-glucose meter kit  Use as instructed          TRUE METRIX     carvedilol 6.25 MG tablet  Commonly known as:  COREG  Take 2 tablets (12.5 mg total) by mouth 2 (two) times daily with meals.     citalopram 10 MG tablet  Commonly known as:  CELEXA  Take 1 tablet (10 mg total) by mouth 2 (two) times daily.     clopidogrel 75 mg tablet  Commonly known as:  PLAVIX  Take 1 tablet (75 mg total) by mouth once daily.     diclofenac sodium 1 % Gel  Commonly known as:  VOLTAREN  Apply 4 g topically once daily.     furosemide 20 MG tablet  Commonly known as:  LASIX  Take 1 tablet (20 mg total) by mouth once daily.     gabapentin 300 MG capsule  Commonly known as:  NEURONTIN     glucose 4 GM chewable tablet     isosorbide mononitrate 30 MG 24 hr tablet  Commonly known as:  IMDUR  Take 1 tablet (30 mg total) by mouth once daily.     lancets Misc  1 lancet by Misc.(Non-Drug; Combo Route) route 2 (two) times daily with meals. TRUE METRIX     LEVEMIR SUBQ     levETIRAcetam 500 MG Tab  Commonly known as:  KEPPRA     nitroGLYCERIN 0.4 MG SL tablet  Commonly known as:  NITROSTAT  Place 1 tablet (0.4 mg total) under the tongue every 5 (five) minutes as needed.  Sublingual  ( under tongue) as needed     omeprazole 40 MG capsule  Commonly known as:  PRILOSEC  Take 1 capsule (40 mg total) by mouth every morning.     tamsulosin 0.4 mg Cp24  Commonly known as:  FLOMAX     VITAMIN D2 50,000 unit Cap  Generic drug:  ergocalciferol            Indwelling Lines/Drains at time of discharge:   Lines/Drains/Airways          No matching active lines, drains, or airways          Time spent on the discharge of patient: 45 minutes  Patient was seen and examined on the date of discharge and determined to be suitable for discharge.         QING Lopez, FNP-C  Hospitalist - Department of Hospital Medicine  [address] [place] [address]  Office [phone]; Pager [phone]

## 2018-04-06 NOTE — PLAN OF CARE
Problem: Patient Care Overview  Goal: Plan of Care Review  Outcome: Outcome(s) achieved Date Met: 04/06/18 04/06/18 1120   Coping/Psychosocial   Plan Of Care Reviewed With patient       Problem: Fall Risk (Adult)  Goal: Absence of Falls  Patient will demonstrate the desired outcomes by discharge/transition of care.   Outcome: Outcome(s) achieved Date Met: 04/06/18 04/06/18 1120   Fall Risk (Adult)   Absence of Falls achieves outcome

## 2018-04-06 NOTE — HPI
Patient is 72-year-old male with extensive cardiovascular history followed by Dr. Blakely at [facility].  He was recently seen in his clinic at the end of March.  He's had a recent PET scan showing inferior ischemia.  When correlated to his previous cardiac catheterization he has extensive  for which med therapy was recently agreed upon.  He denies any cardiopulmonary complaints.  His main issue on presentation was possible seizure activity.  His troponin is mildly elevated but his artery trended down consistent with possible strain.  He denies any cardiopulmonary complaints his been compliant with his medicines.  He says that he recently followed with neurology at [facility] Dr. Posadas.  Otherwise been in his usual state of health.

## 2018-04-06 NOTE — PROGRESS NOTES
[facility]    WRITTEN HEALTHCARE AND DISCHARGE INFORMATION    Follow-up Information     Neha Plasencia MD On 4/16/2018.    Specialty:  Family Medicine  Why:  @9:20am, for Hospital Follow up with Dr Hali Holley  Contact information:  [address] [address]  [phone]             Marcus Blakely MD On 5/3/2018.    Specialties:  Cardiology, INTERVENTIONAL CARDIOLOGY  Why:  @1:40pm, for Cardiology follow up  Contact information:  [address]  [place] [address]  [phone]                         Help at Home           [phone]  After discharge for assistance [facility] On Call Nurse Care Line 24/7  Assistance     Things You are responsible For To Manage Your Care At Home:  1.    Getting your prescriptions filled   2.    Taking your medications as directed, DO NOT MISS ANY DOSES!  3.    Going to your follow-up doctor appointment. This is important because it  allow the doctor to monitor your progress and determine if  any changes need to made to your treatment plan.     Thank you for choosing [facility] for your care.  Please answer any calls you may receive from [facility] we want to continue to support you as you manage your healthcare needs. [facility] is happy to have the opportunity to serve you.      Sincerely,  Your [facility] Team,  DIANA Schmitt, ACM-RN; [facility]  [phone]

## 2018-04-06 NOTE — ASSESSMENT & PLAN NOTE
Patient never had any chest pains but the physician at the transferring facility was concerned given his extensive history of coronary artery disease.  His troponin was 0.06 (reference 0.00-0.04).  The first EKG at the transferring facility was misplaced there and was not sent over, but a repeat EKG tonight was significant for normal sinus rhythm with inferolateral T-wave flattening and occasional PVCs similar to previous EKGs on record.  His next troponin at that facility trended down to normal at 0.03 (as reported by Dr. Castro, internist).  I doubt that the initial troponin bump was of any significance, but will observe in the Observation Unit for serial cardiac markers.

## 2018-04-10 ENCOUNTER — HOSPITAL ENCOUNTER (OUTPATIENT)
Dept: RADIOLOGY | Facility: HOSPITAL | Age: 73
Discharge: HOME | End: 2018-04-10
Attending: FAMILY MEDICINE
Payer: MEDICARE

## 2018-04-10 DIAGNOSIS — R13.12 OROPHARYNGEAL DYSPHAGIA: ICD-10-CM

## 2018-04-10 PROCEDURE — G8998 SWALLOW D/C STATUS: HCPCS | Mod: CI

## 2018-04-10 PROCEDURE — G8997 SWALLOW GOAL STATUS: HCPCS | Mod: CI

## 2018-04-10 PROCEDURE — G8996 SWALLOW CURRENT STATUS: HCPCS | Mod: CI

## 2018-04-10 PROCEDURE — 74230 X-RAY XM SWLNG FUNCJ C+: CPT | Mod: 26,,, | Performed by: RADIOLOGY

## 2018-04-10 PROCEDURE — 74230 X-RAY XM SWLNG FUNCJ C+: CPT | Mod: TC

## 2018-04-10 PROCEDURE — 92611 MOTION FLUOROSCOPY/SWALLOW: CPT

## 2018-04-10 NOTE — PROGRESS NOTES
"Speech Language Pathology  Modified Barium Swallow Study  4/10/2018  Jorden Shafer  MRN: [id number]    ST Recommendations:   Regular consistency diet with thin liquids  · No straws  · Meds one at a time with thin liquids  · Small and alternating bites/sips  · Use double swallow to clear pharyngeal residue  · One bite or sip at a time  · Limit distractions during meal time  · Stop if coughing/choking  · Pt would benefit from dysphagia therapy to improve strength, ROM, and coordination of swallow mechanism (laryngeal elevation, BOT retraction, pharyngeal strengthening).     04/10/18 1300   Speech Time Calculation   Speech Start Time 1100   Speech Stop Time 1140   Speech Total (min) 40 min   General Information   SLP Treatment Date 04/10/18   Referring Physician Dr. Plasencia   General Observations Pt AAOx4, ambulating with use of cane.  Pt pleasant and agreeable for MBSS this date.    Pertinent History of Current Problem Information from [facility] ST Eval: Per pt and family interview, pt stated following stroke he demonstrated slurred and slow speech, which has since resolved, and L sided weakness along with dysphagia. Pt reported he experienced a stroke approx a year ago and "2 mini strokes" in February of 2018 followed by a seizure. Per chart review, pt had a MBSS completed in 04/2017 resulting in deep penetration of thin and nectar thick consistencies during and after the swallow and aspiration of nectar thick consistency when utilizing a head turn strategy. He stated he received home health ST for approx 3 months. Per chart review, home health ST d/c thickener. Pt has c/o of returning coughing and choking symptoms when eating and drinking.    Chest X-ray results CXR 1/23/18: The lungs are well aerated and free of focal consolidations.    CT/MRI results CT Head 4/14/17:  Subacute right MCA distribution infarct.  No hemorrhage.  No significant mass effect.   Current " "Diet   Current Diet Textures Regular  ("normal food" per Pt, despite edentulous nature)   Current Liquid Consistencies (Pt reports using some thickener in liquids until MBSS)   Subjective   Patient states " When I go to swallow it chokes me."  Pt reports this most often occurs when he adds a sip of liquid on top of bite of food he is already chewing/preparing to swallow   Pain/Comfort   Pain Rating no pain   Oral Musculature Evaluation   Oral Musculature left weakness   Dentition edentulous   Mandibular Strength and Mobility WFL   Oral Labial Strength and Mobility WFL;impaired retraction   Lingual Strength and Mobility WFL  (L deviation upon lingual protrusion)   Velar Elevation WFL   Buccal Strength and Mobility WFL   Volitional Cough adequate   Voice Prior to PO Intake clear/dry/adequate intensity       MBS Eval: Thin Liquid Trial   Mode of Presentation, Thin Liquid self fed;fed by clinician   Volume of Thin Liquid Presented tsp bolus fed by clinicianx3; self-fed sup-edge sips x3; rapid consectuive cup-edge sip trial x 1; straw sip trial x 2   Oral Prep/Phase, Thin Liquid premature spillage   Pharyngeal Phase, Thin Liquid decreased base of tongue retraction;delayed coughing/choking;delayed pharyngeal swallow;premature spillage;pyriform sinuses pooling;reduction in laryngeal elevation;vallecular pooling;vallecular stasis  (mild vallec residue cleared with additional cued swallow)   Rosenbeck's 8-Point Penetration-Aspiration Scale, Thin Liquids (1) Material does not enter airway;(6) Material enters the airway, passes below the vocal folds, and is ejected into the larnyx or out of the airway (during one out of two straw sips only).   Penetration/Aspiration, Thin Liquid Pt with tracheal aspiration (delayed cough response) during 1 of 2 straw sip trials.  Otherwise no penetration or aspiration, even with rapid-consecutive self-fed cup-edge sips   Successful Strategies Trialed During Procedure, Thin Liquid decreased " bolus size;multiple swallows   Additional Comments As trials progressed, Pt IND'ly using double swallow to clear vallecular residue- no cueing provided       MBS Eval: Pureed Trial   Mode of Presentation, Puree spoon;fed by clinician   Volume of Puree Presented heaping tsp bolus x 3   Oral Prep/Phase, Puree WFL   Pharyngeal Phase, Puree WFL;decreased base of tongue retraction;delayed pharyngeal swallow;multiple spontaneous swallows;premature spillage;reduction in laryngeal elevation;vallecular pooling;vallecular stasis  (min vallecular residue cleared by additional dry swallow)   Rosenbeck's 8-Point Penetration-Aspiration Scale, Pureed (1) Material does not enter airway.   Penetration/Aspiration, Pureed none observed   Additional Comments Pt using double swallow without cueing       MBS Eval: Soft Solid Trial   Mode of Presentation, Semisolid fed by clinician   Volume of Semisolid Food Presented soft solid-diced fruit cocktail x2 tsp bites   Oral Prep/Phase, Semisolid WFL  (increased oral prep 2/2 edentulous)   Pharyngeal Phase, Semisolid WFL;delayed pharyngeal swallow;multiple spontaneous swallows;premature spillage;reduction in laryngeal elevation;vallecular pooling;vallecular stasis  (Mild valec residue cleared with additional swallow)   Rosenbeck's 8-Point Penetration-Aspiration Scale, Semisolid (1) Material does not enter airway.   Penetration/Aspiration, Semisolid none observed   Additional Comments Pt using double swallow without cueing       MBS Eval: Solid Food Texture Trial   Mode of Presentation, Solid self fed   Volume of Solid Food Presented 1 inch square victorino cracker dredged in barium paste   Oral Prep/Phase, Solid WFL  (increased oral prep 2/2 edentulous)   Pharyngeal Phase, Solid WFL;decreased base of tongue retraction;delayed pharyngeal swallow;multiple spontaneous swallows;premature spillage;reduction in laryngeal elevation;vallecular pooling;vallecular stasis  (Mild valec residue cleared with  additional swallow)   Rosenbeck's 8-Point Penetration-Aspiration Scale, Solid (1) Material does not enter airway.   Penetration/Aspiration none observed   Additional Comments Pt using double-triple swallow without cueing   Recommendations   NPO No   Solid Diet Level Regular   Liquid Diet Level Thin   Additional Recommendations No straws; meds one at a time with thin liquids; small and alternating bite/sips; one bite or sip at a time; Use double swallow to clear pharyngeal residue; limit distractions during meals; stop if coughing/choking   Treatment/Billable Minutes   Eval Swallow and Oral Function 40   Total Time 40     Pt presents with mil oropharyngeal dysphagia.  Pt with aspiration (with delayed cough response) on one of two self-regulated straw sips of thin liquids.  Pt tolerated self-regulated single and rapid-consecutive cup-edge sips of thin liquids without penetration or aspiration observed on flouro.  Oral phase c/b increased oral prep for soft and regular solids, 2/2 Pt's edentulous nature.  Pharyngeal phase c/b premature spillage of bolus to pyriform sinus (with brief pooling) for thin liquid boluses and premature spillage of bolus to valleculae for puree, soft solids, and regular solid boluses.  Pt with reduced BOT retraction, mildly delayed swallow initiation,  and decreased laryngeal elevation across consistencies.  Mild vallecular residue noted across consistencies, however Pt cleared residue with use of additional dry swallow/double swallow. Pt initially required verbal cueing to produce additional swallow, however as trials progressed, Pt noted to perform additional/double swallow without cueing.  SLP provided skilled education to Pt upon conclusion of MBSS, to include results of MBSS, diet recs, swallow safety precautions recommended, s/s aspiration.  Pt verbalized understanding.

## 2018-04-11 ENCOUNTER — OUTPATIENT CASE MANAGEMENT (OUTPATIENT)
Dept: ADMINISTRATIVE | Facility: OTHER | Age: 73
End: 2018-04-11

## 2018-04-11 NOTE — PROGRESS NOTES
Summary:Attempted to contact pt for follow up. No answer. Left message requesting call back.   Interventions:none  Plan:    CVA - continue education  Encourage compliance with meds, diet and appts  Follow up on ST for dysphagia - educate on aspiration precautions and dysphagia

## 2018-04-16 ENCOUNTER — OUTPATIENT CASE MANAGEMENT (OUTPATIENT)
Dept: ADMINISTRATIVE | Facility: OTHER | Age: 73
End: 2018-04-16

## 2018-04-16 ENCOUNTER — OFFICE VISIT (OUTPATIENT)
Dept: FAMILY MEDICINE | Facility: CLINIC | Age: 73
End: 2018-04-16
Payer: MEDICARE

## 2018-04-16 VITALS
HEIGHT: 68 IN | TEMPERATURE: 98 F | SYSTOLIC BLOOD PRESSURE: 130 MMHG | DIASTOLIC BLOOD PRESSURE: 82 MMHG | HEART RATE: 74 BPM | BODY MASS INDEX: 34.75 KG/M2 | WEIGHT: 229.25 LBS | OXYGEN SATURATION: 96 %

## 2018-04-16 DIAGNOSIS — Z79.4 TYPE 2 DIABETES MELLITUS WITH DIABETIC POLYNEUROPATHY, WITH LONG-TERM CURRENT USE OF INSULIN: Chronic | ICD-10-CM

## 2018-04-16 DIAGNOSIS — G40.909 SEIZURE DISORDER: ICD-10-CM

## 2018-04-16 DIAGNOSIS — E11.42 TYPE 2 DIABETES MELLITUS WITH DIABETIC POLYNEUROPATHY, WITH LONG-TERM CURRENT USE OF INSULIN: Chronic | ICD-10-CM

## 2018-04-16 DIAGNOSIS — K21.9 GASTROESOPHAGEAL REFLUX DISEASE WITHOUT ESOPHAGITIS: Chronic | ICD-10-CM

## 2018-04-16 DIAGNOSIS — E11.65 UNCONTROLLED TYPE 2 DIABETES MELLITUS WITH HYPERGLYCEMIA, WITH LONG-TERM CURRENT USE OF INSULIN: Chronic | ICD-10-CM

## 2018-04-16 DIAGNOSIS — G47.33 OSA ON CPAP: Chronic | ICD-10-CM

## 2018-04-16 DIAGNOSIS — Z79.4 UNCONTROLLED TYPE 2 DIABETES MELLITUS WITH HYPERGLYCEMIA, WITH LONG-TERM CURRENT USE OF INSULIN: Chronic | ICD-10-CM

## 2018-04-16 DIAGNOSIS — R13.10 DYSPHAGIA, UNSPECIFIED TYPE: Primary | ICD-10-CM

## 2018-04-16 DIAGNOSIS — E66.01 SEVERE OBESITY WITH BODY MASS INDEX (BMI) OF 35.0 TO 39.9 WITH COMORBIDITY: ICD-10-CM

## 2018-04-16 PROCEDURE — 3079F DIAST BP 80-89 MM HG: CPT | Mod: CPTII,S$GLB,, | Performed by: FAMILY MEDICINE

## 2018-04-16 PROCEDURE — 99214 OFFICE O/P EST MOD 30 MIN: CPT | Mod: S$GLB,,, | Performed by: FAMILY MEDICINE

## 2018-04-16 PROCEDURE — 99999 PR PBB SHADOW E&M-EST. PATIENT-LVL III: CPT | Mod: PBBFAC,,, | Performed by: FAMILY MEDICINE

## 2018-04-16 PROCEDURE — 3075F SYST BP GE 130 - 139MM HG: CPT | Mod: CPTII,S$GLB,, | Performed by: FAMILY MEDICINE

## 2018-04-16 PROCEDURE — 3044F HG A1C LEVEL LT 7.0%: CPT | Mod: CPTII,S$GLB,, | Performed by: FAMILY MEDICINE

## 2018-04-16 PROCEDURE — 99499 UNLISTED E&M SERVICE: CPT | Mod: S$GLB,,, | Performed by: FAMILY MEDICINE

## 2018-04-16 NOTE — PROGRESS NOTES
Summary: Spoke with Mrs. Shafer. She states pt is doing well. Pt had appt with Dr. Holley this morning. Pt had swallow study last Friday. Pt is unable to drink through a straw and must take small sips of liquids. All goals met at this time. Instructed Mrs. Shafer to call for any future needs.   Interventions:Reviewed upcoming appts  Encourage compliance with meds and appts  Reinforce education on aspiration precautions  Plan:none

## 2018-04-18 NOTE — PROGRESS NOTES
Routine Office Visit    Patient Name: Jorden Shafer    : 1945  MRN: [id number]    Subjective:  Jorden is a 72 y.o. male who presents today for     1. ER follow-up for seizure / elevated troponins - pt had one episode of seizure at home before coming to the doctor. He was noted to have elevated troponins by ER and kept in observation. He was discharged after work up and cardiology consult  2. Dysphagia - chronic condition for patient - started after he had his stroke. He currently complains of difficulty swallowing. He is not able to eat regular food and has to use more liquid foods with thickener. He states he has had this problem in the past and was evaluated by GI. No weight loss. He had recent barium study which shows that he has trace aspiration with straw. He has avoided drinking with straw.      Review of Systems   Constitutional: Negative for chills and fever.   HENT: Negative for congestion.    Eyes: Negative for blurred vision.   Respiratory: Negative for cough.    Cardiovascular: Negative for chest pain.   Gastrointestinal: Negative for abdominal pain, constipation, diarrhea, heartburn, nausea and vomiting.   Genitourinary: Negative for dysuria.   Musculoskeletal: Negative for myalgias.   Skin: Negative for itching and rash.   Neurological: Negative for dizziness and headaches.   Psychiatric/Behavioral: Negative for depression.       Active Problem List  Patient Active Problem List   Diagnosis    Type 2 diabetes mellitus, uncontrolled    Vitamin D deficiency disease    Colon polyp    Prostate cancer    ED (erectile dysfunction)    Weakness    Essential hypertension    Chronic combined systolic and diastolic heart failure    Tortuous aorta    Old MI (myocardial infarction)    Moderate episode of recurrent major depressive disorder    Insulin pump in place    KELVIN on CPAP    History of stroke    GERD (gastroesophageal reflux disease)    Severe obesity with body mass index (BMI) of 35.0  to 39.9 with comorbidity    Type 2 diabetes mellitus with diabetic polyneuropathy, with long-term current use of insulin    Mixed hyperlipidemia    Symptomatic carotid artery stenosis    ICD (implantable cardioverter-defibrillator) in place    Diabetes mellitus due to underlying condition with complication, without long-term current use of insulin    Carotid stenosis    Bursitis of right shoulder    Impingement syndrome of shoulder, left    Hemiparesis of left nondominant side    Chronic pain of left knee    History of total left knee replacement    Chronic left shoulder pain    CAD (coronary artery disease)    Hepatic steatosis    Seizure disorder    Pharyngeal dysphagia    Elevated troponin    Seizure    Peripheral artery disease    AICD (automatic cardioverter/defibrillator) present    Epilepsy    NSTEMI (non-ST elevated myocardial infarction)       Past Surgical History  Past Surgical History:   Procedure Laterality Date    CARDIAC DEFIBRILLATOR PLACEMENT  2016    CHOLECYSTECTOMY      CORONARY ARTERY BYPASS GRAFT      bypass times  5    defibulater  2016    EYE SURGERY Bilateral     HERNIA REPAIR      groin x2  and umbilical    JOINT REPLACEMENT      knee left    thrombus Left     >2005    VASECTOMY         Family History  Family History   Problem Relation Age of Onset    Alzheimer's disease Mother      Living    Heart disease Mother     Dementia Mother     Asbestos Father          Cancer Father     Diabetes type II Maternal Aunt     Stroke Neg Hx        Social History  Social History     Social History    Marital status:      Spouse name: N/A    Number of children: 3    Years of education: GED     Occupational History    Retired [employer]      Social History Main Topics    Smoking status: Former Smoker     Packs/day: 4.00     Years: 25.00     Types: Cigarettes     Quit date: 1980    Smokeless tobacco: Never Used     Alcohol use No    Drug use: No    Sexual activity: Yes     Partners: Female     Other Topics Concern    Not on file     Social History Narrative    SCREENING/HEALTH MAINTENANCE. Updated  6/4/14    COLONOSCOPY 2012.  [facility].    TETANUS 2005.    PNEUMOVAX  11/13/13    NO PRIOR HISTORY OF ZOSTAVAX    PSA 2/14/14    FLU 10/8/13       Medications and Allergies  Reviewed and updated.   Current Outpatient Prescriptions   Medication Sig    aspirin (ECOTRIN) 81 MG EC tablet Take 81 mg by mouth once daily.    atorvastatin (LIPITOR) 80 MG tablet Take 80 mg by mouth once daily. Take 1/2 tablet daily    blood sugar diagnostic Strp 1 strip by Misc.(Non-Drug; Combo Route) route 2 (two) times daily with meals. TRUE METRIX    citalopram (CELEXA) 10 MG tablet Take 1 tablet (10 mg total) by mouth 2 (two) times daily.    clopidogrel (PLAVIX) 75 mg tablet Take 1 tablet (75 mg total) by mouth once daily.    DEXT 70/POLYCARBOPHIL/PEG/NACL (ARTIFICIAL TEAR SOLUTION OPHT) Apply to eye 3 (three) times daily.    diclofenac sodium (VOLTAREN) 1 % Gel Apply 4 g topically once daily.    ergocalciferol (VITAMIN D2) 50,000 unit Cap Take 50,000 Units by mouth every 7 days.    furosemide (LASIX) 20 MG tablet Take 1 tablet (20 mg total) by mouth once daily.    gabapentin (NEURONTIN) 300 MG capsule Take 300 mg by mouth 3 (three) times daily.    glucose 4 GM chewable tablet Take 16 g by mouth as needed for Low blood sugar.    insulin aspart (NOVOLOG) 100 unit/mL InPn pen Inject 16 Units into the skin 3 (three) times daily. (Patient taking differently: Inject 15 Units into the skin 3 (three) times daily. )    INSULIN DETEMIR (LEVEMIR SUBQ) Inject 60 Units into the skin 2 (two) times daily.     isosorbide mononitrate (IMDUR) 30 MG 24 hr tablet Take 1 tablet (30 mg total) by mouth once daily.    lancets Misc 1 lancet by Misc.(Non-Drug; Combo Route) route 2 (two) times daily with meals. TRUE METRIX    levETIRAcetam (KEPPRA) 500 MG Tab  "Take 500 mg by mouth 2 (two) times daily.    nitroGLYCERIN (NITROSTAT) 0.4 MG SL tablet Place 1 tablet (0.4 mg total) under the tongue every 5 (five) minutes as needed. Sublingual  ( under tongue) as needed    omeprazole (PRILOSEC) 40 MG capsule Take 1 capsule (40 mg total) by mouth every morning.    tamsulosin (FLOMAX) 0.4 mg Cp24 Take 0.4 mg by mouth once daily.    blood-glucose meter kit Use as instructed          TRUE METRIX    carvedilol (COREG) 6.25 MG tablet Take 2 tablets (12.5 mg total) by mouth 2 (two) times daily with meals.     No current facility-administered medications for this visit.        Physical Exam  /82 (BP Location: Left arm, Patient Position: Sitting, BP Method: Large (Manual))   Pulse 74   Temp 98 °F (36.7 °C) (Oral)   Ht 5' 8" (1.727 m)   Wt 104 kg (229 lb 4.5 oz)   SpO2 96%   BMI 34.86 kg/m²   Physical Exam   Constitutional: He is oriented to person, place, and time. He appears well-developed and well-nourished.   HENT:   Head: Normocephalic and atraumatic.   Eyes: Conjunctivae and EOM are normal. Pupils are equal, round, and reactive to light.   Neck: Normal range of motion. Neck supple. No JVD present. No thyromegaly present.   Cardiovascular: Normal rate, regular rhythm and normal heart sounds.    Pulmonary/Chest: Effort normal and breath sounds normal. He has no wheezes. He exhibits no tenderness.   Abdominal: Soft. Bowel sounds are normal. He exhibits no distension. There is no tenderness. There is no guarding.   Musculoskeletal: Normal range of motion.   Lymphadenopathy:     He has no cervical adenopathy.   Neurological: He is alert and oriented to person, place, and time.   Skin: Skin is warm and dry.   Psychiatric: He has a normal mood and affect. His behavior is normal.       Assessment/Plan:  Jorden Shafer is a 72 y.o. male who presents today for :    Problem List Items Addressed This Visit        Endocrine    Severe obesity with body mass index (BMI) of 35.0 " to 39.9 with comorbidity  BMI noted  Noted      Type 2 diabetes mellitus with diabetic polyneuropathy, with long-term current use of insulin (Chronic)    Type 2 diabetes mellitus, uncontrolled (Chronic)    Overview     Currently sees Dr. Britton   The current medical regimen is effective;  continue present plan and medications.              GI    GERD (gastroesophageal reflux disease) (Chronic) / Dysphagia, unspecified type    -  Primary    Relevant Orders    Ambulatory consult to Gastroenterology  Recommend to continue using thickener   Recommend taking supplements such as ensure          Other    KELVIN on CPAP (Chronic)  Noted in chart    Seizure disorder  The current medical regimen is effective;  continue present plan and medications.  Continue f/u with neurology                  Follow-up if symptoms worsen or fail to improve.

## 2018-04-24 ENCOUNTER — OFFICE VISIT (OUTPATIENT)
Dept: SLEEP MEDICINE | Facility: CLINIC | Age: 73
End: 2018-04-24
Payer: MEDICARE

## 2018-04-24 VITALS
HEART RATE: 79 BPM | BODY MASS INDEX: 34.48 KG/M2 | OXYGEN SATURATION: 93 % | SYSTOLIC BLOOD PRESSURE: 102 MMHG | WEIGHT: 227.5 LBS | HEIGHT: 68 IN | DIASTOLIC BLOOD PRESSURE: 70 MMHG

## 2018-04-24 DIAGNOSIS — G47.33 OSA (OBSTRUCTIVE SLEEP APNEA): Primary | ICD-10-CM

## 2018-04-24 DIAGNOSIS — G47.10 HYPERSOMNIA: ICD-10-CM

## 2018-04-24 DIAGNOSIS — R09.81 NASAL CONGESTION: ICD-10-CM

## 2018-04-24 PROCEDURE — 99999 PR PBB SHADOW E&M-EST. PATIENT-LVL III: CPT | Mod: PBBFAC,,, | Performed by: INTERNAL MEDICINE

## 2018-04-24 PROCEDURE — 99214 OFFICE O/P EST MOD 30 MIN: CPT | Mod: S$GLB,,, | Performed by: INTERNAL MEDICINE

## 2018-04-24 NOTE — PROGRESS NOTES
Jorden Shafer  was seen as a follow up.    CHIEF COMPLAINT:    Chief Complaint   Patient presents with    Sleep Apnea       HISTORY OF PRESENT ILLNESS: Jorden Shafer is a 72 y.o. male is here for sleep evaluation.   Our first encounter was 12/11/14.  At that time, patient with excessive daytime somnolence x 5 years.  Patient has loud snoring and witnessed apnea during sleep.  +fatigue upon awake 3-4 times per week.  No sleepiness a/w driving.  No parasomnia.  No cataplexy.      Chronic knee pain s/p replacement    [place] Sleepiness Scale score during initial sleep evaluation was 20.  ESS with cpap is 15.      Patient underwent sleep study 1/20/15 with severe ahi 38.5.  Patient was set up with cpap of 12.  Patient was doing well with cpap initially.  For the past year, patient with discomfort with nasal mask.  Patient often take mask of in the middle of the night due headache.      S/p cva 4/12/17.  +?seizure and was started on levetiracetam.      SLEEP ROUTINE:  Activity the hour prior to sleep: watch tv in living and often fall asleep when watching tv  Bed partner:  wife  Time to bed:  9-10 pm   Lights off:  yes  Sleep onset latency:  20 minutes        Disruptions or awakenings:    0 times    Wakeup time:      3-4 am   Perceived sleep quality:  rested with cpap  Daytime naps:      1-2 hours in afternoon (rested)  Weekend sleep routine:      same  Caffeine use: 1 cup of coffee in am  exercise habit:   Lots of walking with remote airplane      PAST MEDICAL HISTORY:    Active Ambulatory Problems     Diagnosis Date Noted    Type 2 diabetes mellitus, uncontrolled 09/26/2012    Vitamin D deficiency disease     Colon polyp     Prostate cancer 08/21/2013    ED (erectile dysfunction) 08/21/2013    Weakness 10/08/2013    Essential hypertension 11/10/2013    Chronic combined systolic and diastolic heart failure 11/12/2013    Tortuous aorta 04/15/2016    Old MI (myocardial infarction) 06/30/2016    Moderate  episode of recurrent major depressive disorder 12/07/2016    Insulin pump in place 04/13/2017    KELVIN on CPAP 04/13/2017    History of stroke 04/13/2017    GERD (gastroesophageal reflux disease) 04/13/2017    Severe obesity with body mass index (BMI) of 35.0 to 39.9 with comorbidity 04/25/2017    Type 2 diabetes mellitus with diabetic polyneuropathy, with long-term current use of insulin 04/25/2017    Mixed hyperlipidemia     Symptomatic carotid artery stenosis     ICD (implantable cardioverter-defibrillator) in place 07/05/2017    Diabetes mellitus due to underlying condition with complication, without long-term current use of insulin 07/05/2017    Carotid stenosis 08/25/2017    Bursitis of right shoulder 10/20/2017    Impingement syndrome of shoulder, left 10/20/2017    Hemiparesis of left nondominant side 10/20/2017    Chronic pain of left knee 11/09/2017    History of total left knee replacement 11/09/2017    Chronic left shoulder pain 11/09/2017    CAD (coronary artery disease) 11/14/2017    Hepatic steatosis 11/15/2017    Seizure disorder 03/09/2018    Pharyngeal dysphagia 04/04/2018    Elevated troponin 04/06/2018    Seizure 04/06/2018    Peripheral artery disease 04/06/2018    AICD (automatic cardioverter/defibrillator) present 04/06/2018    Epilepsy 04/06/2018    NSTEMI (non-ST elevated myocardial infarction) 04/06/2018     Resolved Ambulatory Problems     Diagnosis Date Noted    Atherosclerosis of coronary artery bypass graft(s), unspecified, with other forms of angina pectoris 09/26/2012    Nausea and vomiting in adult patient 09/26/2012    Dizziness 09/26/2012    Type 2 diabetes mellitus with complication, with long-term current use of insulin     Hyperlipidemia     Hypertensive heart disease without congestive heart failure     GERD (gastroesophageal reflux disease) 10/08/2013    Sleep disturbances 10/08/2013    Chest pain 11/10/2013    Severe obesity (BMI 35.0-39.9)  with comorbidity 11/10/2013    Contact dermatitis 11/12/2013    KELVIN (obstructive sleep apnea) 04/15/2016    Acute chest pain 06/29/2016    Pain 03/13/2017    Acute CVA (cerebrovascular accident) 04/12/2017    Hypokalemia 04/13/2017    Mild protein malnutrition 04/13/2017    Stroke 04/21/2017    Stenosis of right carotid artery 04/21/2017    Chest pain 04/25/2017    Insulin pump status 04/25/2017    Unstable angina 04/26/2017    NSTEMI (non-ST elevated myocardial infarction) 04/27/2017    Carotid stenosis, symptomatic, with infarction 06/05/2017    Stenosis of left carotid artery 08/18/2017    Injury of left shoulder 10/13/2017    Fall 10/20/2017    Chest pain 11/14/2017    Elevated troponin 11/14/2017     Past Medical History:   Diagnosis Date    Back pain     CAD (coronary artery disease) 1989    Carotid artery occlusion     Chronic kidney disease     Colon polyp     Contact dermatitis 11/12/2013    CVA (cerebral vascular accident)     Depression     Diabetes mellitus type II     DVT (deep venous thrombosis) 1980's    GERD (gastroesophageal reflux disease)     Heart failure     Hepatic steatosis 11/15/2017    Hyperlipidemia     Hypertension     Hypertensive heart disease without congestive heart failure     Insulin pump status 4/25/2017    Myocardial infarction     Neuropathy of lower extremity     Obesity (BMI 30-39.9) 4/25/2017    Peripheral artery disease 4/6/2018    Prostate cancer     Renal manifestation of secondary diabetes mellitus     Seizures     Sleep apnea     Stroke 4/21/2017    Type 2 diabetes mellitus with diabetic polyneuropathy, with long-term current use of insulin 4/25/2017    Type 2 diabetes mellitus with hyperglycemia, with long-term current use of insulin 9/26/2012    Unstable angina     Vitamin D deficiency disease                 PAST SURGICAL HISTORY:    Past Surgical History:   Procedure Laterality Date    CARDIAC DEFIBRILLATOR PLACEMENT   2016    CHOLECYSTECTOMY      CORONARY ARTERY BYPASS GRAFT      bypass times  5    defibulater  2016    EYE SURGERY Bilateral     HERNIA REPAIR      groin x2  and umbilical    JOINT REPLACEMENT      knee left    thrombus Left     >2005    VASECTOMY           FAMILY HISTORY:                Family History   Problem Relation Age of Onset    Alzheimer's disease Mother      Living    Heart disease Mother     Dementia Mother     Asbestos Father          Cancer Father     Diabetes type II Maternal Aunt     Stroke Neg Hx        SOCIAL HISTORY:          Tobacco:   History   Smoking Status    Former Smoker    Packs/day: 4.00    Years: 25.00    Types: Cigarettes    Quit date: 1980   Smokeless Tobacco    Never Used       alcohol use:    History   Alcohol Use No                 Occupation:  [employer]    ALLERGIES:  Review of patient's allergies indicates:  No Known Allergies    CURRENT MEDICATIONS:    Current Outpatient Prescriptions   Medication Sig Dispense Refill    aspirin (ECOTRIN) 81 MG EC tablet Take 81 mg by mouth once daily.      atorvastatin (LIPITOR) 80 MG tablet Take 80 mg by mouth once daily. Take 1/2 tablet daily      blood sugar diagnostic Strp 1 strip by Misc.(Non-Drug; Combo Route) route 2 (two) times daily with meals. TRUE METRIX 100 strip 11    citalopram (CELEXA) 10 MG tablet Take 1 tablet (10 mg total) by mouth 2 (two) times daily. 90 tablet 1    clopidogrel (PLAVIX) 75 mg tablet Take 1 tablet (75 mg total) by mouth once daily. 90 tablet 3    DEXT 70/POLYCARBOPHIL/PEG/NACL (ARTIFICIAL TEAR SOLUTION OPHT) Apply to eye 3 (three) times daily.      diclofenac sodium (VOLTAREN) 1 % Gel Apply 4 g topically once daily. 100 g 0    ergocalciferol (VITAMIN D2) 50,000 unit Cap Take 50,000 Units by mouth every 7 days.      furosemide (LASIX) 20 MG tablet Take 1 tablet (20 mg total) by mouth once daily.      gabapentin (NEURONTIN) 300 MG capsule Take 300 mg by  "mouth 3 (three) times daily.      glucose 4 GM chewable tablet Take 16 g by mouth as needed for Low blood sugar.      insulin aspart (NOVOLOG) 100 unit/mL InPn pen Inject 16 Units into the skin 3 (three) times daily. (Patient taking differently: Inject 15 Units into the skin 3 (three) times daily. )  0    INSULIN DETEMIR (LEVEMIR SUBQ) Inject 60 Units into the skin 2 (two) times daily.       isosorbide mononitrate (IMDUR) 30 MG 24 hr tablet Take 1 tablet (30 mg total) by mouth once daily. 30 tablet 11    lancets Misc 1 lancet by Misc.(Non-Drug; Combo Route) route 2 (two) times daily with meals. TRUE METRIX 100 each 11    levETIRAcetam (KEPPRA) 500 MG Tab Take 500 mg by mouth 2 (two) times daily.      nitroGLYCERIN (NITROSTAT) 0.4 MG SL tablet Place 1 tablet (0.4 mg total) under the tongue every 5 (five) minutes as needed. Sublingual  ( under tongue) as needed 30 tablet prn    omeprazole (PRILOSEC) 40 MG capsule Take 1 capsule (40 mg total) by mouth every morning. 90 capsule 3    tamsulosin (FLOMAX) 0.4 mg Cp24 Take 0.4 mg by mouth once daily.      blood-glucose meter kit Use as instructed          TRUE METRIX 1 each 0    carvedilol (COREG) 6.25 MG tablet Take 2 tablets (12.5 mg total) by mouth 2 (two) times daily with meals. 120 tablet 1     No current facility-administered medications for this visit.                   REVIEW OF SYSTEMS:     Sleep related symptoms as per HPI.  CONST:Denies weight gain    HEENT: + sinus congestion  PULM: Denies dyspnea  CARD:  Denies palpitations   GI:  Denies acid reflux  : Denies polyuria  NEURO: per hpi  PSYCH: Denies mood disturbance  HEME: Denies anemia   Otherwise, a balance of systems reviewed is negative.               PHYSICAL EXAM:  Vitals:    04/24/18 1107   BP: 102/70   Pulse: 79   SpO2: (!) 93%   Weight: 103.2 kg (227 lb 8.2 oz)   Height: 5' 8" (1.727 m)   PainSc: 0-No pain     Body mass index is 34.59 kg/m².     GENERAL: Normal development, well " groomed  HEENT:  Conjunctivae are non-erythematous; Pupils equal, round, and reactive to light; Nose is symmetrical; Nasal mucosa is pink and moist; Septum is midline; Inferior turbinates are normal; Nasal airflow is normal; Posterior pharynx is pink; Modified Mallampati: 4; Posterior palate is normal; Tonsils +1; Uvula is normal and pink;Tongue is normal; Dentition is fair; No TMJ tenderness; Jaw opening and protrusion without click and without discomfort.  NECK: Supple. Neck circumference is 18.25 inches. No thyromegaly. No palpable nodes.     SKIN: On face and neck: No abrasions, no rashes, no lesions.  No subcutaneous nodules are palpable.  RESPIRATORY: Chest is clear to auscultation.  Normal chest expansion and non-labored breathing at rest.  CARDIOVASCULAR: Normal S1, S2.  No murmurs, gallops or rubs. No carotid bruits bilaterally.  EXTREMITIES: + edema. No clubbing. No cyanosis. Station normal. Gait normal.        NEURO/PSYCH: Oriented to time, place and person. Normal attention span and concentration. Affect is full. Mood is normal.                                              DATA    1/20/15 ahi 38.5 with min sat of 85%; optimal cpap in nonsupine position of 12 cm H20  NM stress test 10/4/14  1. The perfusion scan is free of evidence for myocardial ischemia.   2. There is mild to moderate intensity fixed defects in the anteroseptal and inferolateral walls of the left ventricle, consistent with myocardial injury.   3. There is abnormal wall motion at rest showing mild hypokinesis of the anteroseptal wall of the left ventricle.   4. There is resting LV dysfunction with a reduced ejection fraction of 46 %.   5. The ventricular volumes are normal at rest and stress.   6. The extracardiac distribution of radioactivity is normal.   bnp 11/11/13 189   cxr 11/9/13 cm with perihilar congestion  Lab Results   Component Value Date    TSH 6.394 (H) 04/13/2017     ASSESSMENT    ICD-10-CM ICD-9-CM    1. KELVIN (obstructive  sleep apnea) G47.33 327.23 CPAP/BIPAP SUPPLIES   2. Nasal congestion R09.81 478.19    3. Hypersomnia G47.10 780.54        PLAN:    Sleep Apnea NEC. -  Currently with eson.  Lots of leakage with mask.  Proper mask placement d/w patient.  Per patient mask feel better with adjustment.  Will attempt new mask for comfort    Obesity - aware of need for drastic weight loss.      Nasal congestion - improve with sinus irrigation and nasal steroid.    Hypersomnia - insufficient sleep at night.  Per patient, sleep is better when he does not nap during the day.  No sleepiness, when able to sleep during the day.  Encourage daytime exercise.  Patient is considering WJ.      Education: During our discussion today, we talked about the etiology of obstructive sleep apnea as well as the potential ramifications of untreated sleep apnea, which could include daytime sleepiness, hypertension, heart disease and/or stroke.        Precautions: The patient was advised to abstain from driving should they feel sleepy or drowsy.     Patient will Follow-up in about 4 months (around 8/24/2018).  This is 25 minutes visit, over 50% of time spent in direct consultation with patient.

## 2018-04-24 NOTE — LETTER
April 24, 2018      Neha Plasencia MD  [address] [facility]  [place] [address]           [facility]  [address] [place] [address]  Phone: [phone]  Fax: [phone]          Patient: Jorden Shafer   MR Number: [id number]   YOB: 1945   Date of Visit: 4/24/2018       Dear Dr. Neha Plasencia:    Thank you for referring Jorden Shafer to me for evaluation. Attached you will find relevant portions of my assessment and plan of care.    If you have questions, please do not hesitate to call me. I look forward to following Jorden Shafer along with you.    Sincerely,    Anthony Apodaca MD    Enclosure  CC:  No Recipients    If you would like to receive this communication electronically, please contact [email or web address] or [phone] to request more information on [employer] access.    For providers and/or their staff who would like to refer a patient to [facility], please contact us through our one-stop-shop provider referral line, [facility], at [phone].    If you feel you have received this communication in error or would no longer like to receive these types of communications, please e-mail [email or web address]

## 2018-04-27 NOTE — ASSESSMENT & PLAN NOTE
Well-controlled; will continue patient's home regimen of atorvastatin.   Date Scheduled For Excision (Optional): 5/7/18 X Size Of Lesion In Cm (Optional): 0 Detail Level: Detailed

## 2018-05-02 DIAGNOSIS — I25.10 CORONARY ARTERY DISEASE, ANGINA PRESENCE UNSPECIFIED, UNSPECIFIED VESSEL OR LESION TYPE, UNSPECIFIED WHETHER NATIVE OR TRANSPLANTED HEART: Primary | ICD-10-CM

## 2018-05-03 ENCOUNTER — OFFICE VISIT (OUTPATIENT)
Dept: CARDIOLOGY | Facility: CLINIC | Age: 73
End: 2018-05-03
Payer: MEDICARE

## 2018-05-03 VITALS
BODY MASS INDEX: 35.28 KG/M2 | SYSTOLIC BLOOD PRESSURE: 135 MMHG | OXYGEN SATURATION: 95 % | HEART RATE: 64 BPM | DIASTOLIC BLOOD PRESSURE: 72 MMHG | WEIGHT: 232.81 LBS | HEIGHT: 68 IN

## 2018-05-03 DIAGNOSIS — K21.9 GASTROESOPHAGEAL REFLUX DISEASE WITHOUT ESOPHAGITIS: Chronic | ICD-10-CM

## 2018-05-03 DIAGNOSIS — Z79.4 TYPE 2 DIABETES MELLITUS WITH DIABETIC POLYNEUROPATHY, WITH LONG-TERM CURRENT USE OF INSULIN: Chronic | ICD-10-CM

## 2018-05-03 DIAGNOSIS — E11.42 TYPE 2 DIABETES MELLITUS WITH DIABETIC POLYNEUROPATHY, WITH LONG-TERM CURRENT USE OF INSULIN: Chronic | ICD-10-CM

## 2018-05-03 DIAGNOSIS — E78.2 MIXED HYPERLIPIDEMIA: Chronic | ICD-10-CM

## 2018-05-03 DIAGNOSIS — I10 ESSENTIAL HYPERTENSION: Chronic | ICD-10-CM

## 2018-05-03 DIAGNOSIS — Z95.810 AICD (AUTOMATIC CARDIOVERTER/DEFIBRILLATOR) PRESENT: Chronic | ICD-10-CM

## 2018-05-03 DIAGNOSIS — I50.42 CHRONIC COMBINED SYSTOLIC AND DIASTOLIC HEART FAILURE: Chronic | ICD-10-CM

## 2018-05-03 DIAGNOSIS — I25.10 CORONARY ARTERY DISEASE INVOLVING NATIVE HEART WITHOUT ANGINA PECTORIS, UNSPECIFIED VESSEL OR LESION TYPE: Chronic | ICD-10-CM

## 2018-05-03 DIAGNOSIS — G47.33 OSA ON CPAP: Chronic | ICD-10-CM

## 2018-05-03 DIAGNOSIS — E08.8 DIABETES MELLITUS DUE TO UNDERLYING CONDITION WITH COMPLICATION, WITHOUT LONG-TERM CURRENT USE OF INSULIN: ICD-10-CM

## 2018-05-03 DIAGNOSIS — I73.9 PERIPHERAL ARTERY DISEASE: Chronic | ICD-10-CM

## 2018-05-03 DIAGNOSIS — R26.9 GAIT DISORDER: Primary | ICD-10-CM

## 2018-05-03 PROCEDURE — 99213 OFFICE O/P EST LOW 20 MIN: CPT | Mod: S$GLB,,, | Performed by: INTERNAL MEDICINE

## 2018-05-03 PROCEDURE — 3075F SYST BP GE 130 - 139MM HG: CPT | Mod: CPTII,S$GLB,, | Performed by: INTERNAL MEDICINE

## 2018-05-03 PROCEDURE — 99999 PR PBB SHADOW E&M-EST. PATIENT-LVL V: CPT | Mod: PBBFAC,,, | Performed by: INTERNAL MEDICINE

## 2018-05-03 PROCEDURE — 99499 UNLISTED E&M SERVICE: CPT | Mod: S$GLB,,, | Performed by: INTERNAL MEDICINE

## 2018-05-03 PROCEDURE — 3044F HG A1C LEVEL LT 7.0%: CPT | Mod: CPTII,S$GLB,, | Performed by: INTERNAL MEDICINE

## 2018-05-03 PROCEDURE — 3078F DIAST BP <80 MM HG: CPT | Mod: CPTII,S$GLB,, | Performed by: INTERNAL MEDICINE

## 2018-05-06 PROBLEM — I21.4 NSTEMI (NON-ST ELEVATED MYOCARDIAL INFARCTION): Status: RESOLVED | Noted: 2018-04-06 | Resolved: 2018-05-06

## 2018-05-06 PROBLEM — R79.89 ELEVATED TROPONIN: Status: RESOLVED | Noted: 2018-04-06 | Resolved: 2018-05-06

## 2018-05-06 NOTE — PROGRESS NOTES
Subjective:    Patient ID:  Jorden Shafer is a 72 y.o. male who presents for follow-up of Coronary Artery Disease and Peripheral Arterial Disease      HPI  Mr. Shafer is a 71 y/o gentleman with PMH CAD s/p CABG (1989), DVT, DM II, MI, defibrillator placement who was experienced a Right MCA stroke followed by an NSTEMI in April 2017 . He was found to have >90% stenosis of his R-ICA on CTA.  He underwent carotid angiography on 6/2/17 that demonstrated bilateral high grade carotid stenosis with severe calcification.  He was referred to Dr. Galindo, vascular surgery, for evaluation for CEA.  The patient was deemed to be to be at high risk for awake/ cervical block CEA and at prohibitive risk for CEA under GETA.  He underwent successful right carotid artery stent placement on 7/5/17 and left carotid stent placement on 8/25/17.  Due to a h/o angina 3-4 times a week and an abnormal stress test he underwent cardiac cath on 10/13/17. He underwent PCI of SVG to OM2 with a 4X20 VENKATA. After that PCI he was free of angina.The patient was last seen in this clinic on 3/27/18. In the interim since his last clinic visit he denies angina. He reports dependent LE edema. He denies orthopnea but reports PND every 2-3 days.  He reports good compliance with his CPAP. He denies palpitations. He reports good medication compliance and adherence to a heart healthy diet.  He does not have full range of motion or strength in in left hand or arm, but states he is doing his OT/PT prescribed exercises. He has been using a cane to walk for the last 2 months. He reports good medication compliance.     Past Medical History:   Diagnosis Date    Back pain     CAD (coronary artery disease) 1989    h/o cabg x 5    Carotid artery occlusion     Chronic kidney disease     Colon polyp     h/o on colonoscopy    Contact dermatitis 11/12/2013    CVA (cerebral vascular accident)     March 2010    Depression     Diabetes mellitus type II     on  insulin  pump    DVT (deep venous thrombosis) 1980's    left leg    GERD (gastroesophageal reflux disease)     Heart failure     [facility]     Hepatic steatosis 11/15/2017    Hyperlipidemia     Hypertension     Hypertensive heart disease without congestive heart failure     Insulin pump status 4/25/2017    Myocardial infarction     Neuropathy of lower extremity     Obesity (BMI 30-39.9) 4/25/2017    Peripheral artery disease 4/6/2018    Prostate cancer     Renal manifestation of secondary diabetes mellitus     Seizures     Sleep apnea     Stroke 4/21/2017    Type 2 diabetes mellitus with diabetic polyneuropathy, with long-term current use of insulin 4/25/2017    Type 2 diabetes mellitus with hyperglycemia, with long-term current use of insulin 9/26/2012    Currently sees Dr. Britton     Unstable angina     Vitamin D deficiency disease      Past Surgical History:   Procedure Laterality Date    CARDIAC DEFIBRILLATOR PLACEMENT  06/16/2016    CHOLECYSTECTOMY      CORONARY ARTERY BYPASS GRAFT      bypass times  5    defibulater  06/16/2016    EYE SURGERY Bilateral     HERNIA REPAIR      groin x2  and umbilical    JOINT REPLACEMENT      knee left    thrombus Left     >2005    VASECTOMY       Current Outpatient Prescriptions on File Prior to Visit   Medication Sig Dispense Refill    aspirin (ECOTRIN) 81 MG EC tablet Take 81 mg by mouth once daily.      atorvastatin (LIPITOR) 80 MG tablet Take 80 mg by mouth once daily. Take 1/2 tablet daily      blood sugar diagnostic Strp 1 strip by Misc.(Non-Drug; Combo Route) route 2 (two) times daily with meals. TRUE METRIX 100 strip 11    blood-glucose meter kit Use as instructed          TRUE METRIX 1 each 0    carvedilol (COREG) 6.25 MG tablet Take 2 tablets (12.5 mg total) by mouth 2 (two) times daily with meals. 120 tablet 1    citalopram (CELEXA) 10 MG tablet Take 1 tablet (10 mg total) by mouth 2 (two) times daily. 90 tablet 1    clopidogrel (PLAVIX) 75 mg  tablet Take 1 tablet (75 mg total) by mouth once daily. 90 tablet 3    DEXT 70/POLYCARBOPHIL/PEG/NACL (ARTIFICIAL TEAR SOLUTION OPHT) Apply to eye 3 (three) times daily.      diclofenac sodium (VOLTAREN) 1 % Gel Apply 4 g topically once daily. 100 g 0    ergocalciferol (VITAMIN D2) 50,000 unit Cap Take 50,000 Units by mouth every 7 days.      furosemide (LASIX) 20 MG tablet Take 1 tablet (20 mg total) by mouth once daily.      gabapentin (NEURONTIN) 300 MG capsule Take 300 mg by mouth 3 (three) times daily.      glucose 4 GM chewable tablet Take 16 g by mouth as needed for Low blood sugar.      insulin aspart (NOVOLOG) 100 unit/mL InPn pen Inject 16 Units into the skin 3 (three) times daily. (Patient taking differently: Inject 15 Units into the skin 3 (three) times daily. )  0    INSULIN DETEMIR (LEVEMIR SUBQ) Inject 60 Units into the skin 2 (two) times daily.       isosorbide mononitrate (IMDUR) 30 MG 24 hr tablet Take 1 tablet (30 mg total) by mouth once daily. 30 tablet 11    lancets Misc 1 lancet by Misc.(Non-Drug; Combo Route) route 2 (two) times daily with meals. TRUE METRIX 100 each 11    levETIRAcetam (KEPPRA) 500 MG Tab Take 500 mg by mouth 2 (two) times daily.      nitroGLYCERIN (NITROSTAT) 0.4 MG SL tablet Place 1 tablet (0.4 mg total) under the tongue every 5 (five) minutes as needed. Sublingual  ( under tongue) as needed 30 tablet prn    omeprazole (PRILOSEC) 40 MG capsule Take 1 capsule (40 mg total) by mouth every morning. 90 capsule 3    tamsulosin (FLOMAX) 0.4 mg Cp24 Take 0.4 mg by mouth once daily.       No current facility-administered medications on file prior to visit.      Review of patient's allergies indicates:  No Known Allergies  Social History   Substance Use Topics    Smoking status: Former Smoker     Packs/day: 4.00     Years: 25.00     Types: Cigarettes     Quit date: 6/8/1980    Smokeless tobacco: Never Used    Alcohol use No     Family History   Problem Relation Age  "of Onset    Alzheimer's disease Mother         Living    Heart disease Mother     Dementia Mother     Asbestos Father             Cancer Father     Diabetes type II Maternal Aunt     Stroke Neg Hx        Review of Systems   Constitution: Negative for decreased appetite, diaphoresis, fever, malaise/fatigue, weight gain and weight loss.   HENT: Negative for congestion, nosebleeds and sore throat.    Eyes: Negative for blurred vision, vision loss in left eye, vision loss in right eye and visual disturbance.   Cardiovascular: Negative for chest pain, claudication, dyspnea on exertion, leg swelling, near-syncope, orthopnea, palpitations, paroxysmal nocturnal dyspnea and syncope.   Respiratory: Negative for cough, hemoptysis, shortness of breath and wheezing.    Endocrine: Negative for polyuria.   Hematologic/Lymphatic: Does not bruise/bleed easily.   Skin: Negative for nail changes and rash.   Musculoskeletal: Negative for back pain, muscle cramps and myalgias.   Gastrointestinal: Negative for abdominal pain, change in bowel habit, diarrhea, heartburn, hematemesis, hematochezia, melena, nausea and vomiting.   Genitourinary: Negative for bladder incontinence, dysuria, frequency and hematuria.   Psychiatric/Behavioral: Negative for depression.   Allergic/Immunologic: Negative for hives.        Objective:  Vitals:    18 1304 18 1307   BP: (!) 146/78 135/72   BP Location: Left arm Right arm   Patient Position: Sitting Sitting   BP Method: Large (Automatic) Large (Automatic)   Pulse: 64    SpO2: 95%    Weight: 105.6 kg (232 lb 12.9 oz)    Height: 5' 8" (1.727 m)          Physical Exam   Constitutional: He is oriented to person, place, and time. He appears well-developed and well-nourished.   HENT:   Head: Normocephalic and atraumatic.   Eyes: EOM are normal. Pupils are equal, round, and reactive to light.   Neck: Neck supple. No JVD present. No thyromegaly present.   Cardiovascular: Normal rate, " regular rhythm and normal heart sounds.  PMI is displaced.  Exam reveals no gallop and no friction rub.    No murmur heard.  Pulses:       Carotid pulses are 2+ on the right side, and 2+ on the left side.       Radial pulses are 2+ on the right side, and 2+ on the left side.        Femoral pulses are 2+ on the right side, and 2+ on the left side.       Dorsalis pedis pulses are 2+ on the right side, and 2+ on the left side.        Posterior tibial pulses are 2+ on the right side, and 2+ on the left side.   Pulmonary/Chest: Effort normal and breath sounds normal. He has no wheezes. He has no rhonchi. He has no rales.   Abdominal: Soft. Normal appearance and bowel sounds are normal. He exhibits no distension. There is no hepatosplenomegaly. There is no tenderness.   Musculoskeletal: He exhibits no edema.   Neurological: He is alert and oriented to person, place, and time. Gait normal.   Skin: Skin is warm and dry. No erythema.   Psychiatric: He has a normal mood and affect.         Assessment:       1. Gait disorder    2. Diabetes mellitus due to underlying condition with complication, without long-term current use of insulin    3. KELVIN on CPAP    4. Chronic combined systolic and diastolic heart failure    5. Essential hypertension    6. Gastroesophageal reflux disease without esophagitis    7. Type 2 diabetes mellitus with diabetic polyneuropathy, with long-term current use of insulin    8. Mixed hyperlipidemia    9. Coronary artery disease involving native heart without angina pectoris, unspecified vessel or lesion type    10. Peripheral artery disease    11. AICD (automatic cardioverter/defibrillator) present         Plan:       1) CAD.  The patient is free of angina.  -continue EC ASA 81mg po qday  -continue Plavix 75mg po qday  -continue statin  -continue COreg 6.25mg po BID  -continue Imdurr 30mg po qday     2) H/o CVA.  The patient reports a recent h/o emotional lability; he states he justs starts crying  randomly with no other symptoms; will was refered to neurology for further evaluation during a previous clinic visit;     3) s/p Bilateral carotid stenosis.  9/28/17 carotid duplex reviewed  -continue DAPT  -continue statin     4) ICM. Patient appears euvolemic on exam and reports NYHA class 2 symptoms  -medical management as above  -rec low sodium, heart healthy det     5) Dyslipidemia. 5/18/17 lipid panel reviewed  -continue high intensity statin     6) DM2. 4/12/17 HgbA1c = 9.3; rec tight glycemic control     7) H/o GERD. Asymptomatic on PPI; continue PPI     8) KELVIN . The patient reports good compliance with his CPAP      9) HTN. Blood pressure adequately controlled in clinic today; continue current medications     10) Difficulty swallowing. The patient reports this symptoms since his CVA; patient was referred the patient to speech therapy     11) Gait disorder. This symptom appears to be new and preset for 2 months; will refer patient to PMR for further evaluation and treatment     All of the patient's and his wife's questions were answered.

## 2018-05-29 ENCOUNTER — OFFICE VISIT (OUTPATIENT)
Dept: URGENT CARE | Facility: CLINIC | Age: 73
End: 2018-05-29
Payer: MEDICARE

## 2018-05-29 VITALS
DIASTOLIC BLOOD PRESSURE: 84 MMHG | HEART RATE: 88 BPM | HEIGHT: 68 IN | TEMPERATURE: 100 F | BODY MASS INDEX: 35.16 KG/M2 | SYSTOLIC BLOOD PRESSURE: 162 MMHG | WEIGHT: 232 LBS | OXYGEN SATURATION: 97 %

## 2018-05-29 DIAGNOSIS — M43.6 TORTICOLLIS: Primary | ICD-10-CM

## 2018-05-29 PROCEDURE — 3079F DIAST BP 80-89 MM HG: CPT | Mod: CPTII,S$GLB,, | Performed by: NURSE PRACTITIONER

## 2018-05-29 PROCEDURE — 3077F SYST BP >= 140 MM HG: CPT | Mod: CPTII,S$GLB,, | Performed by: NURSE PRACTITIONER

## 2018-05-29 PROCEDURE — 99214 OFFICE O/P EST MOD 30 MIN: CPT | Mod: S$GLB,,, | Performed by: NURSE PRACTITIONER

## 2018-05-29 RX ORDER — ACETAMINOPHEN AND CODEINE PHOSPHATE 300; 30 MG/1; MG/1
1 TABLET ORAL EVERY 8 HOURS PRN
Qty: 9 TABLET | Refills: 0 | Status: SHIPPED | OUTPATIENT
Start: 2018-05-29 | End: 2018-06-08

## 2018-05-29 RX ORDER — TIZANIDINE 4 MG/1
4 TABLET ORAL NIGHTLY PRN
Qty: 7 TABLET | Refills: 0 | Status: SHIPPED | OUTPATIENT
Start: 2018-05-29 | End: 2018-06-08

## 2018-05-29 NOTE — PROGRESS NOTES
"Subjective:       Patient ID: Jorden Shafer is a 72 y.o. male.    Vitals:  height is 5' 8" (1.727 m) and weight is 105.2 kg (232 lb). His temperature is 99.7 °F (37.6 °C). His blood pressure is 162/84 (abnormal) and his pulse is 88. His oxygen saturation is 97%.     Chief Complaint: Shoulder Pain    Shoulder Pain    The pain is present in the neck and right shoulder. This is a new problem. The current episode started today. There has been no history of extremity trauma. The problem occurs constantly. The problem has been unchanged. The quality of the pain is described as sharp and aching. The pain is at a severity of 10/10. The pain is severe. Associated symptoms include stiffness. Pertinent negatives include no numbness. The symptoms are aggravated by activity. He has tried acetaminophen for the symptoms. The treatment provided no relief.     Review of Systems   Constitution: Negative for weakness and malaise/fatigue.   HENT: Negative for nosebleeds.    Cardiovascular: Negative for chest pain and syncope.   Respiratory: Negative for shortness of breath.    Musculoskeletal: Positive for joint pain and stiffness. Negative for back pain and neck pain.   Gastrointestinal: Negative for abdominal pain.   Genitourinary: Negative for hematuria.   Neurological: Negative for dizziness and numbness.   All other systems reviewed and are negative.      Objective:      Physical Exam   Constitutional: He is oriented to person, place, and time. He appears well-developed and well-nourished. He is cooperative.  Non-toxic appearance. He does not appear ill. No distress.   HENT:   Head: Normocephalic and atraumatic.   Right Ear: Hearing, tympanic membrane, external ear and ear canal normal.   Left Ear: Hearing, tympanic membrane, external ear and ear canal normal.   Nose: Nose normal. No mucosal edema, rhinorrhea or nasal deformity. No epistaxis. Right sinus exhibits no maxillary sinus tenderness and no frontal sinus tenderness. " Left sinus exhibits no maxillary sinus tenderness and no frontal sinus tenderness.   Mouth/Throat: Uvula is midline, oropharynx is clear and moist and mucous membranes are normal. No trismus in the jaw. Normal dentition. No uvula swelling. No posterior oropharyngeal erythema.   Eyes: Conjunctivae and lids are normal. Right eye exhibits no discharge. Left eye exhibits no discharge. No scleral icterus.   Sclera clear bilat   Neck: Trachea normal, normal range of motion, full passive range of motion without pain and phonation normal. Neck supple.   Cardiovascular: Normal rate, regular rhythm, normal heart sounds, intact distal pulses and normal pulses.    Pulmonary/Chest: Effort normal and breath sounds normal. No respiratory distress.   Abdominal: Soft. Normal appearance and bowel sounds are normal. He exhibits no distension, no pulsatile midline mass and no mass. There is no tenderness.   Musculoskeletal: Normal range of motion. He exhibits no edema or deformity.        Cervical back: He exhibits pain and spasm.        Back:    Neurological: He is alert and oriented to person, place, and time. He exhibits normal muscle tone. Coordination normal.   Skin: Skin is warm, dry and intact. He is not diaphoretic. No pallor.   Psychiatric: He has a normal mood and affect. His speech is normal and behavior is normal. Judgment and thought content normal. Cognition and memory are normal.   Nursing note and vitals reviewed.      Assessment:       1. Torticollis        Plan:         Torticollis    Other orders  -     acetaminophen-codeine 300-30mg (TYLENOL #3) 300-30 mg Tab; Take 1 tablet by mouth every 8 (eight) hours as needed.  Dispense: 9 tablet; Refill: 0  -     tiZANidine (ZANAFLEX) 4 MG tablet; Take 1 tablet (4 mg total) by mouth nightly as needed.  Dispense: 7 tablet; Refill: 0        Torticollis (Wry Neck)  Torticollis happens when muscles on one side of the neck contract (tighten). This causes the neck to twist or tilt  to the side. The muscles may also be quite sore. It affects mainly children and young adults, often appearing overnight. It can also affect infants who develop or are born with tight neck muscles on one side.  What causes torticollis?  Causes of torticollis include:  · Congenital (present at birth). Injury to the neck muscles from an accident or other trauma, or even just sleeping in an unusual position  · Side effect of certain medicines or drugs  · Problems with the bones of the neck (which can happen after an infection or injury)  · Spasm of the muscles due to an infection, such as an abscess in the neck  When to go to the emergency room (ER)  All neck problems should be checked by a healthcare provider within 24 hours. Seek emergency care if you can't reach your healthcare provider or these symptoms are present:  · Trouble breathing or swallowing or in smaller children, continuous drooling  · Numbness or weakness in the arms and legs  · Trouble walking or speaking  · Fever  What to expect in the ER  The neck will be examined, and questions about any current or former medical problems will be asked. X-rays of the neck may be taken to check for broken bones.  Treatment  The goal in treating torticollis is to relax the neck muscles. The best approach will depend on the cause of the problem. In most cases, one or more of the following may be given:  · Medicines to help relax the muscles and reduce swelling  · Hot and cold compresses to help ease muscle tightness  · Botulinum toxin injections to prevent further muscle spasms  · Physical therapy to help stretch and relax the muscles  · Treatment of any infection, which may need intravenous antibiotics or surgery  Follow-up  Depending on the cause, torticollis often goes away on its own. Follow up with your healthcare provider as instructed. If symptoms become worse, call your healthcare provider or return to the ER.  Date Last Reviewed: 9/30/2015  © 0243-9530 [employer]. [address] [place] [address], [place] [address]. All rights reserved. This information is not intended as a substitute for professional medical care. Always follow your healthcare professional's instructions.      Please drink plenty of fluids.  Please get plenty of rest.  Please return here or go to the Emergency Department for any concerns or worsening of condition.  If you were prescribed a narcotic medication, do not drive or operate heavy equipment or machinery while taking these medications.  If you were not prescribed an anti-inflammatory medication, and if you do not have any history of stomach/intestinal ulcers, or kidney disease, or are not taking a blood thinner such as Coumadin, Plavix, Pradaxa, Eloquis, or Xaralta for example, it is OK to take over the counter Ibuprofen or Advil or Motrin or Aleve as directed.  Do not take these medications on an empty stomach.  Rest, ice, compression and elevation to the affected joint or limb as needed.  Please follow up with your primary care doctor or specialist as needed.    If you  smoke, please stop smoking.

## 2018-05-29 NOTE — PATIENT INSTRUCTIONS
Torticollis (Wry Neck)  Torticollis happens when muscles on one side of the neck contract (tighten). This causes the neck to twist or tilt to the side. The muscles may also be quite sore. It affects mainly children and young adults, often appearing overnight. It can also affect infants who develop or are born with tight neck muscles on one side.  What causes torticollis?  Causes of torticollis include:  · Congenital (present at birth). Injury to the neck muscles from an accident or other trauma, or even just sleeping in an unusual position  · Side effect of certain medicines or drugs  · Problems with the bones of the neck (which can happen after an infection or injury)  · Spasm of the muscles due to an infection, such as an abscess in the neck  When to go to the emergency room (ER)  All neck problems should be checked by a healthcare provider within 24 hours. Seek emergency care if you can't reach your healthcare provider or these symptoms are present:  · Trouble breathing or swallowing or in smaller children, continuous drooling  · Numbness or weakness in the arms and legs  · Trouble walking or speaking  · Fever  What to expect in the ER  The neck will be examined, and questions about any current or former medical problems will be asked. X-rays of the neck may be taken to check for broken bones.  Treatment  The goal in treating torticollis is to relax the neck muscles. The best approach will depend on the cause of the problem. In most cases, one or more of the following may be given:  · Medicines to help relax the muscles and reduce swelling  · Hot and cold compresses to help ease muscle tightness  · Botulinum toxin injections to prevent further muscle spasms  · Physical therapy to help stretch and relax the muscles  · Treatment of any infection, which may need intravenous antibiotics or surgery  Follow-up  Depending on the cause, torticollis often goes away on its own. Follow up with your healthcare provider as  instructed. If symptoms become worse, call your healthcare provider or return to the ER.  Date Last Reviewed: 9/30/2015 © 2000-2017 [employer]. [address] [place] [address], [place] [address]. All rights reserved. This information is not intended as a substitute for professional medical care. Always follow your healthcare professional's instructions.      Please drink plenty of fluids.  Please get plenty of rest.  Please return here or go to the Emergency Department for any concerns or worsening of condition.  If you were prescribed a narcotic medication, do not drive or operate heavy equipment or machinery while taking these medications.  If you were not prescribed an anti-inflammatory medication, and if you do not have any history of stomach/intestinal ulcers, or kidney disease, or are not taking a blood thinner such as Coumadin, Plavix, Pradaxa, Eloquis, or Xaralta for example, it is OK to take over the counter Ibuprofen or Advil or Motrin or Aleve as directed.  Do not take these medications on an empty stomach.  Rest, ice, compression and elevation to the affected joint or limb as needed.  Please follow up with your primary care doctor or specialist as needed.    If you  smoke, please stop smoking.

## 2018-06-14 DIAGNOSIS — E11.9 TYPE 2 DIABETES MELLITUS WITHOUT COMPLICATION: ICD-10-CM

## 2018-06-26 ENCOUNTER — PES CALL (OUTPATIENT)
Dept: ADMINISTRATIVE | Facility: CLINIC | Age: 73
End: 2018-06-26

## 2018-07-12 ENCOUNTER — TELEPHONE (OUTPATIENT)
Dept: PHYSICAL MEDICINE AND REHAB | Facility: CLINIC | Age: 73
End: 2018-07-12

## 2018-07-12 NOTE — TELEPHONE ENCOUNTER
Called today to inform patient of his appointment@ [phone].  I was informed that this was the wrong number.  Called and spoke to patient threw his wife number @ [phone].  Patient informed me that he no longer have [facility] as his hospital, patient appointments are now at the [facility].

## 2018-09-28 ENCOUNTER — PES CALL (OUTPATIENT)
Dept: ADMINISTRATIVE | Facility: CLINIC | Age: 73
End: 2018-09-28

## 2018-10-31 ENCOUNTER — HOSPITAL ENCOUNTER (EMERGENCY)
Facility: HOSPITAL | Age: 73
Discharge: HOME | End: 2018-10-31
Attending: EMERGENCY MEDICINE
Payer: MEDICARE

## 2018-10-31 VITALS
SYSTOLIC BLOOD PRESSURE: 134 MMHG | HEART RATE: 84 BPM | TEMPERATURE: 98 F | WEIGHT: 220 LBS | OXYGEN SATURATION: 94 % | BODY MASS INDEX: 33.34 KG/M2 | HEIGHT: 68 IN | RESPIRATION RATE: 18 BRPM | DIASTOLIC BLOOD PRESSURE: 63 MMHG

## 2018-10-31 DIAGNOSIS — I50.22 HEART FAILURE, CHRONIC SYSTOLIC: ICD-10-CM

## 2018-10-31 DIAGNOSIS — R05.9 COUGH: ICD-10-CM

## 2018-10-31 DIAGNOSIS — G40.909 NONINTRACTABLE EPILEPSY WITHOUT STATUS EPILEPTICUS, UNSPECIFIED EPILEPSY TYPE: Primary | ICD-10-CM

## 2018-10-31 LAB
ANION GAP SERPL CALC-SCNC: 21 MMOL/L
BASOPHILS # BLD AUTO: 0.04 K/UL
BASOPHILS NFR BLD: 0.4 %
BUN SERPL-MCNC: 11 MG/DL
CALCIUM SERPL-MCNC: 7.6 MG/DL
CHLORIDE SERPL-SCNC: 102 MMOL/L
CO2 SERPL-SCNC: 19 MMOL/L
CREAT SERPL-MCNC: 1.1 MG/DL
DIFFERENTIAL METHOD: ABNORMAL
EOSINOPHIL # BLD AUTO: 0.2 K/UL
EOSINOPHIL NFR BLD: 1.8 %
ERYTHROCYTE [DISTWIDTH] IN BLOOD BY AUTOMATED COUNT: 14.4 %
EST. GFR  (AFRICAN AMERICAN): >60 ML/MIN/1.73 M^2
EST. GFR  (NON AFRICAN AMERICAN): >60 ML/MIN/1.73 M^2
GLUCOSE SERPL-MCNC: 233 MG/DL
HCT VFR BLD AUTO: 40.4 %
HGB BLD-MCNC: 14.1 G/DL
LYMPHOCYTES # BLD AUTO: 2.1 K/UL
LYMPHOCYTES NFR BLD: 19.1 %
MCH RBC QN AUTO: 28 PG
MCHC RBC AUTO-ENTMCNC: 34.9 G/DL
MCV RBC AUTO: 80 FL
MONOCYTES # BLD AUTO: 0.8 K/UL
MONOCYTES NFR BLD: 6.8 %
NEUTROPHILS # BLD AUTO: 8 K/UL
NEUTROPHILS NFR BLD: 71.9 %
PLATELET # BLD AUTO: 148 K/UL
PMV BLD AUTO: 11.5 FL
POTASSIUM SERPL-SCNC: 3.4 MMOL/L
RBC # BLD AUTO: 5.04 M/UL
SODIUM SERPL-SCNC: 142 MMOL/L
WBC # BLD AUTO: 11.15 K/UL

## 2018-10-31 PROCEDURE — 85025 COMPLETE CBC W/AUTO DIFF WBC: CPT

## 2018-10-31 PROCEDURE — 96375 TX/PRO/DX INJ NEW DRUG ADDON: CPT

## 2018-10-31 PROCEDURE — 25000003 PHARM REV CODE 250: Performed by: EMERGENCY MEDICINE

## 2018-10-31 PROCEDURE — 63600175 PHARM REV CODE 636 W HCPCS: Performed by: EMERGENCY MEDICINE

## 2018-10-31 PROCEDURE — 80048 BASIC METABOLIC PNL TOTAL CA: CPT

## 2018-10-31 PROCEDURE — 96374 THER/PROPH/DIAG INJ IV PUSH: CPT

## 2018-10-31 PROCEDURE — 99284 EMERGENCY DEPT VISIT MOD MDM: CPT | Mod: 25

## 2018-10-31 RX ORDER — FUROSEMIDE 20 MG/1
20 TABLET ORAL 2 TIMES DAILY
Qty: 6 TABLET | Refills: 0 | Status: SHIPPED | OUTPATIENT
Start: 2018-10-31 | End: 2019-10-31

## 2018-10-31 RX ORDER — LORAZEPAM 2 MG/ML
1 INJECTION INTRAMUSCULAR
Status: COMPLETED | OUTPATIENT
Start: 2018-10-31 | End: 2018-10-31

## 2018-10-31 RX ORDER — FUROSEMIDE 10 MG/ML
40 INJECTION INTRAMUSCULAR; INTRAVENOUS
Status: COMPLETED | OUTPATIENT
Start: 2018-10-31 | End: 2018-10-31

## 2018-10-31 RX ORDER — POTASSIUM CHLORIDE 20 MEQ/1
40 TABLET, EXTENDED RELEASE ORAL
Status: COMPLETED | OUTPATIENT
Start: 2018-10-31 | End: 2018-10-31

## 2018-10-31 RX ORDER — POTASSIUM CHLORIDE 750 MG/1
10 TABLET, EXTENDED RELEASE ORAL DAILY
Qty: 30 TABLET | Refills: 0 | Status: SHIPPED | OUTPATIENT
Start: 2018-10-31

## 2018-10-31 RX ADMIN — LORAZEPAM 1 MG: 2 INJECTION INTRAMUSCULAR; INTRAVENOUS at 09:10

## 2018-10-31 RX ADMIN — FUROSEMIDE 40 MG: 10 INJECTION, SOLUTION INTRAMUSCULAR; INTRAVENOUS at 11:10

## 2018-10-31 RX ADMIN — POTASSIUM CHLORIDE 40 MEQ: 1500 TABLET, EXTENDED RELEASE ORAL at 11:10

## 2018-11-01 NOTE — ED TRIAGE NOTES
On arrival pt is alert & oriented. Wife states pt was sitting in recliner at home and had a seizure. Pt has hx of seizures, both state last seizure was around March of this year. Pt attended   Of his mother today.

## 2018-11-01 NOTE — ED PROVIDER NOTES
"Encounter Date: 10/31/2018    SCRIBE #1 NOTE: I, Sundeep Ryder, am scribing for, and in the presence of,  Slava Lanza MD. I have scribed the following portions of the note - Other sections scribed: HPI, ROS, PE, MDM .       History     Chief Complaint   Patient presents with    Seizures     Pt to ED by way of EMS after a witnessed seizure episode per pts wife. Per EMS report seizure activity lasted <1minute and pt was post ictal upon EMS arrival to home. Pt was placed on 4L n/c per EMS for room air O2 sats in the lower 90's. Last seizure episode was a few months ago (March 2018).     CC: Seizure     This 73 y.o male with pmhx of stroke, chronic left sided weakness, seizures, kidney disease, DM, DVT, and HTN presents to the ED via EMS for emergent evaluation of a grand mal seizure that occurred tonight. Pt's wife states that he was sitting in a recliner, tensed up and shook for about 5 minutes. Pt denies related injury and biting his tongue. Pt notes that he is stressed due to his mother passing away. Pt reports that he first started having seizures 2 yr ago after having a stroked and that his last seizure took place in march. Pt is now awake with no complaints other than feeling a "bit nauseated". Pt also reports a cough x2 months and notes that he quit smoking. Pt reports compliance with all of his medications and denies alcohol consumption. Pt also denies fever, headache, CP, SOB, and abdominal pain. No other symptoms to report.       The history is provided by the patient. No  was used.     Review of patient's allergies indicates:  No Known Allergies  Past Medical History:   Diagnosis Date    Back pain     CAD (coronary artery disease) 1989    h/o cabg x 5    Carotid artery occlusion     Chronic kidney disease     Colon polyp     h/o on colonoscopy    Contact dermatitis 11/12/2013    CVA (cerebral vascular accident)     March 2010    Depression     Diabetes mellitus type II     on  " insulin pump    DVT (deep venous thrombosis)     left leg    GERD (gastroesophageal reflux disease)     Heart failure     [employer]     Hepatic steatosis 11/15/2017    Hyperlipidemia     Hypertension     Hypertensive heart disease without congestive heart failure     Insulin pump status 2017    Myocardial infarction     Neuropathy of lower extremity     Obesity (BMI 30-39.9) 2017    Peripheral artery disease 2018    Prostate cancer     Renal manifestation of secondary diabetes mellitus     Seizures     Sleep apnea     Stroke 2017    Type 2 diabetes mellitus with diabetic polyneuropathy, with long-term current use of insulin 2017    Type 2 diabetes mellitus with hyperglycemia, with long-term current use of insulin 2012    Currently sees Dr. Britton     Unstable angina     Vitamin D deficiency disease      Past Surgical History:   Procedure Laterality Date    BLOCK-NERVE-PUDENDAL Left 3/13/2017    [facility] PAIN MGT    CARDIAC DEFIBRILLATOR PLACEMENT  2016    CHOLECYSTECTOMY      CORONARY ARTERY BYPASS GRAFT      bypass times  5    defibulater  2016    ESOPHAGOGASTRODUODENOSCOPY (EGD) N/A 2015    [facility] ENDO    EYE SURGERY Bilateral     HERNIA REPAIR      groin x2  and umbilical    JOINT REPLACEMENT      knee left    STENT-CAROTID Left 2017    [facility] CATH LAB    STENT-CAROTID Right 2017    [facility] at [facility] CATH LAB    thrombus Left     >2005    VASECTOMY       Family History   Problem Relation Age of Onset    Alzheimer's disease Mother         Living    Heart disease Mother     Dementia Mother     Asbestos Father             Cancer Father     Diabetes type II Maternal Aunt     Stroke Neg Hx      Social History     Tobacco Use    Smoking status: Former Smoker     Packs/day: 4.00     Years:  25.00     Pack years: 100.00     Types: Cigarettes     Last attempt to quit: 1980     Years since quittin.4    Smokeless tobacco: Never Used   Substance Use Topics    Alcohol use: No     Alcohol/week: 0.0 oz    Drug use: No     Review of Systems   Constitutional: Negative for fever.   HENT: Negative for sore throat.    Eyes: Negative for redness.   Respiratory: Positive for cough. Negative for shortness of breath.    Cardiovascular: Negative for chest pain.   Gastrointestinal: Positive for nausea. Negative for abdominal pain, diarrhea and vomiting.   Genitourinary: Negative for difficulty urinating and dysuria.   Musculoskeletal: Negative for back pain.   Skin: Negative for rash.   Neurological: Positive for seizures. Negative for headaches.       Physical Exam     Initial Vitals [10/31/18 2128]   BP Pulse Resp Temp SpO2   (!) 140/78 96 18 97.8 °F (36.6 °C) (!) 92 %      MAP       --         Physical Exam    Nursing note and vitals reviewed.  Constitutional: He is not diaphoretic. No distress.   HENT:   Head: Normocephalic and atraumatic.   Mouth/Throat: Oropharynx is clear and moist.   Eyes: Conjunctivae and EOM are normal. Pupils are equal, round, and reactive to light. No scleral icterus.   Neck: Normal range of motion. Neck supple. No JVD present.   Cardiovascular: Normal rate, regular rhythm and intact distal pulses.   Pulmonary/Chest: Breath sounds normal. No stridor. No respiratory distress.   Abdominal: Soft. Bowel sounds are normal. He exhibits no distension. There is no tenderness.   Musculoskeletal: Normal range of motion. He exhibits no edema or tenderness.   Neurological: He is alert and oriented to person, place, and time. No cranial nerve deficit.   Skin: Skin is warm and dry. No rash noted.   Psychiatric: He has a normal mood and affect.     mild left sided hemiparesis     ED Course   Procedures  Labs Reviewed   BASIC METABOLIC PANEL - Abnormal; Notable for the following components:        Result Value    Potassium 3.4 (*)     CO2 19 (*)     Glucose 233 (*)     Calcium 7.6 (*)     Anion Gap 21 (*)     All other components within normal limits   CBC W/ AUTO DIFFERENTIAL - Abnormal; Notable for the following components:    MCV 80 (*)     Platelets 148 (*)     Gran # (ANC) 8.0 (*)     All other components within normal limits          Imaging Results          X-Ray Chest 1 View (Final result)  Result time 10/31/18 22:28:16    Final result by Paola Esteves MD (10/31/18 22:28:16)                 Impression:      Cardiomegaly and findings suggestive of mild congestive change/pulmonary edema, similar to prior examinations.      Electronically signed by: Paola Esteves MD  Date:    10/31/2018  Time:    22:28             Narrative:    EXAMINATION:  XR CHEST 1 VIEW    CLINICAL HISTORY:  Cough    TECHNIQUE:  Single frontal view of the chest was performed.    COMPARISON:  01/23/2018    FINDINGS:  Monitoring leads overlie the chest.  There is postoperative change of the thorax with sternotomy wires and multiple mediastinal clips in place.  There is a left-sided cardiac pacing device.  Cardiomediastinal silhouette is prominent, similar to prior study.  There is atherosclerosis of the thoracic aorta.  The lungs are symmetrically expanded with increased interstitial and parenchymal attenuation the which can be seen with pulmonary edema.  No significant pleural fluid or pneumothorax.  Visualized osseous structures are intact.                                 Medical Decision Making:   Differential Diagnosis:   Breakthrough seizure, electrolyte imbalance   Clinical Tests:   Lab Tests: Reviewed  The following lab test(s) were unremarkable: CBC and CMP  Radiological Study: Reviewed  ED Management:  Symptoms c/w breakthrough seizure likely due to sleep deprivation and emotional stress. Neuro exam remains normal. CXR c/w chronic CHF. Will given IV lasix in ED as his cough may be due to this. Will increase lasix to BID for 3  days. Will start on potassium. Recommend f/u with neurologist and cardiologist in next week. WIll re turn to ED if symptoms recur or new symptoms develop.              Scribe Attestation:   Scribe #1: I performed the above scribed service and the documentation accurately describes the services I performed. I attest to the accuracy of the note.    Attending Attestation:           Physician Attestation for Scribe:  Physician Attestation Statement for Scribe #1: I, Slava Lanza MD, reviewed documentation, as scribed by Sundeep Ryder in my presence, and it is both accurate and complete.                    Clinical Impression:   The primary encounter diagnosis was Nonintractable epilepsy without status epilepticus, unspecified epilepsy type. Diagnoses of Cough and Heart failure, chronic systolic were also pertinent to this visit.      Disposition:   Disposition: Discharged  Condition: Stable                        Slava Lanza MD  10/31/18 1535

## 2018-12-27 ENCOUNTER — DOCUMENTATION ONLY (OUTPATIENT)
Dept: CARDIOLOGY | Facility: CLINIC | Age: 73
End: 2018-12-27

## 2019-01-14 ENCOUNTER — PES CALL (OUTPATIENT)
Dept: ADMINISTRATIVE | Facility: CLINIC | Age: 74
End: 2019-01-14

## 2020-02-03 ENCOUNTER — PES CALL (OUTPATIENT)
Dept: ADMINISTRATIVE | Facility: CLINIC | Age: 75
End: 2020-02-03

## 2020-07-27 NOTE — PT/OT/SLP DISCHARGE
Physical Therapy Discharge Summary    Jorden Shafer  MRN: [id number]   Acute CVA (cerebrovascular accident)   Patient Discharged from acute Physical Therapy on 2019.  Please refer to prior PT noted date on 2017 for functional status.     Assessment:   Patient appropriate for care in another setting.  GOALS:   Physical Therapy Goals     Not on file      Multidisciplinary Problems (Resolved)        Problem: Physical Therapy Goal    Goal Priority Disciplines Outcome Goal Variances Interventions   Physical Therapy Goal   (Resolved)     PT/OT, PT Outcome(s) achieved     Description:  Goals to be met by: 17     Patient will increase functional independence with mobility by performin. Supine to sit with Modified [place]  2. Rolling to Left and Right with Modified [place]  3. Sit to stand transfer with Modified [place] using Single-point Cane  4. Bed to chair transfer with Modified [place] using Single-point Cane   5. Gait  x 250 feet with Modified [place] using Single-point Cane   6. Ascend/descend 3 steps with right Handrail Modified [place]  7. Lower extremity exercise program x 30 reps per handout, with independence              Reasons for Discontinuation of Therapy Services  Transfer to alternate level of care.      Plan:  Patient Discharged to: Home with Home Health Service.    Lai Morales,PT  2017  
- - -

## 2020-11-05 NOTE — PLAN OF CARE
REFERRAL SENT TO     [facility]   Returned call to patient and conveyed below from Dr. Ace Izaguirre.   Encouraged patient to reach out to her PCP in the meantime. She verbalized understanding and agreement.

## 2022-03-29 NOTE — ASSESSMENT & PLAN NOTE
[facility]  Discharge Final Note    Primary Care Provider: Alex Babcock MD    Expected Discharge Date: 3/29/2022    Final Discharge Note (most recent)     Final Note - 03/29/22 1101        Final Note    Assessment Type Final Discharge Note     Anticipated Discharge Disposition Home or Self Care     What phone number can be called within the next 1-3 days to see how you are doing after discharge? [phone]        Post-Acute Status    Post-Acute Authorization HME     HME Status Set-up Complete/Auth obtained     Patient choice form signed by patient/caregiver List with quality metrics by geographic area provided;List from CMS Compare     Discharge Delays None known at this time                 Important Message from Medicare              Follow-up providers     CHRISTY Lindsey   Specialty: Surgery, Emergency Medicine    [address]  [facility]  [facility] [address]   Phone: [phone]       Next Steps: Schedule an appointment as soon as possible for a visit in 2 week(s)    Instructions: post op f/u on April 14 at 1:30          After-discharge care            Durable Medical Equipment     [facility]   Service: Durable Medical Equipment    [address] [place] [id number]   Phone: [phone]                       Pt to dc today. Pt will go to [facility] after pt is discharged to be fitted for shoe. No other needs.    Stable; there are no acute issues.

## 2022-09-20 NOTE — TELEPHONE ENCOUNTER
Impression: Age-related nuclear cataract, bilateral: H25.13. Plan: Advised patient of condition. Risks, benefits, and alternatives of cataract surgery discussed. Patient elects to proceed with surgery OD first then OS to follow. Toby, pentacam, argos, and OCT were completed today. Patient elects ALL STD procedure with STD IOL, target distance. Patient is aware he will need glasses for reading. Patient has a small pupil size, patient is not a candidate for advanced technology. eRx: Ocuflox and pred sent to pharmacy. Marisol @ [facility] contacted and notified of authorization information provided by Rosamariaa.  Verbalized understanding and states that they will get the pump to the patient.

## 2023-01-26 NOTE — ASSESSMENT & PLAN NOTE
As addressed above.   Unable to advance catheter up arm without pt discomfort. Switching to groin access at this time.

## 2023-03-21 NOTE — PATIENT INSTRUCTIONS
Recommend lotions: eucerin, aquaphor, A&D ointment, gold bond for diabetics, sween    Shoe recommendations: (try 6pm.com, zappos[employer] , nordstromrack[employer], or shoes[employer] for discounted prices) you can visit [employer] shoes in [place] as well    Asics (GT 1000 or gel foundations), new balance, saucony (stabil c3),  Whelan (transcend), vionic, propet (tennis shoe)    soft brand, clarks, crocs, aerosoles, naturalizers, SAS, ecco, ty, genaro nascimento, rockports (dress shoes)    Vionic, volitiles, burkenstocks, fitflops, propet (sandals)    Nike comfort thong sandals, crocs (house shoes)    Nail Home remedy:  Vicks Vapor rub OR Listerine and apple cider vinegar in a spray bottle to nails    Occasional soaks for 15-20 mins in luke warm water with 1 cup of listerine and 1 cup of apple cider vinegar are ok You may add several drops of oil of oregano or tea tree oil as well      Understanding Plantar Fasciitis    Plantar fasciitis is a condition that causes foot and heel pain. The plantar fascia is a tough band of tissue that runs across the bottom of the foot from the heel to the toes. This tissue pulls on the heel bone. It supports the arch of the foot as it pushes off the ground. If the tissue becomes irritated or red and swollen (inflamed), it is called plantar fasciitis.  How to say it  PLAN-tuhr fa-see-IY-tis   What causes plantar fasciitis?  Plantar fasciitis most often occurs from overusing the plantar fascia. The tissue may become damaged from activities that put repeated stress on the heel and foot. Or it may wear down over time with age and ankle stiffness. You are more likely to have plantar fasciitis if you:  · Do activities that require a lot of running, jumping, or dancing  · Have a job that requires being on your feet for long periods  · Are overweight or obese  · Have certain foot problems, such as a tight Achilles tendon, flat feet, or high arches  · Often wear poorly fitting shoes  Symptoms of plantar fasciitis  The  condition most often causes pain in the heel and the bottom of the foot. The pain may occur when you take your first steps in the morning. It may get better as you walk throughout the day. But as you continue to put weight on the foot, the pain often returns. Pain may also occur after standing or sitting for long periods.  Treating plantar fasciitis  Treatments for plantar fasciitis include:  · Resting the foot. This involves limiting movements that make your foot hurt. You may also need to avoid certain sports and types of work for a time.  · Using cold packs. Put an ice pack on the heel and foot to help reduce pain and swelling.  · Taking pain medicines. Prescription and over-the-counter pain medicines can help relieve pain and swelling.  · Using heel cups or foot inserts (orthotics). These are placed in the shoes to help support the heel or arch and cushion the heel. You may also be told to buy proper-fitting shoes with good arch support and cushioned soles.  · Taping the foot. This supports the arch and limits the movement of the plantar fascia to help relieve symptoms.  · Wearing a night splint. This stretches the plantar fascia and leg muscles while you sleep. This may help relieve pain.  · Doing exercises and physical therapy. These stretch and strengthen the plantar fascia and the muscles in the leg that support the heel and foot.  · Getting shots of medicine into the foot. These may help relieve symptoms for a time.  · Having surgery. This may be needed if other treatments fail to relieve symptoms. During surgery, the surgeon may partially cut the plantar fascia to release tension.  Possible complications of plantar fasciitis  Without proper care and treatment, healing may take longer than normal. Also, symptoms may continue or get worse. Over time, the plantar fascia may be damaged. This can make it hard to walk or even stand without pain.  When to call your healthcare provider  Call your healthcare  provider right away if you have any of these:  · Fever of 100.4°F (38°C) or higher, or as directed  · Symptoms that dont get better with treatment, or get worse  · New symptoms, such as numbness, tingling, or weakness in the foot   © 3421-7617 [employer]. [address] [place] [address], [place] [address]. All rights reserved. This information is not intended as a substitute for professional medical care. Always follow your healthcare professional's instructions.        Treating Plantar Fasciitis  First, your healthcare provider tries to determine the cause of your problem in order to suggest ways to relieve pain. If your pain is due to poor foot mechanics, custom-made shoe inserts (orthoses) may help.    Reduce symptoms  · To relieve mild symptoms, try aspirin, ibuprofen, or other medicines as directed. Rubbing ice on the affected area may also help.  · To reduce severe pain and swelling, your healthcare provider may prescribe pills or injections or a walking cast in some instances. Physical therapy, such as ultrasound or a daily stretching program, may also be recommended. Surgery is rarely required.  · To reduce symptoms caused by poor foot mechanics, your foot may be taped. This supports the arch and temporarily controls movement. Night splints may also help by stretching the fascia.  Control movement  If taping helps, your healthcare provider may prescribe orthoses. Built from plaster casts of your feet, these inserts control the way your foot moves. As a result, your symptoms should go away.  Reduce overuse  Every time your foot strikes the ground, the plantar fascia is stretched. You can reduce the strain on the plantar fascia and the possibility of overuse by following these suggestions:  · Lose any excess weight.  · Avoid running on hard or uneven ground.  · Use orthoses at all times in your shoes and house slippers.  If surgery is needed  Your healthcare provider may consider surgery if other types  of treatment don't control your pain. During surgery, the plantar fascia is partially cut to release tension. As you heal, fibrous tissue fills the space between the heel bone and the plantar fascia.   © 3362-4388 [employer]. [address] [place] [address], [place] [address]. All rights reserved. This information is not intended as a substitute for professional medical care. Always follow your healthcare professional's instructions.        Wearing Proper Shoes                    You walk on your feet every day, forcing them to support the weight of your body. Repeated stress on your feet can cause damage over time. The right shoes can help protect your feet. The wrong shoes can cause more foot problems. Read the information below to help you find a shoe that fits your foot needs.     A good shoe fit will cover your foot outline.       A shoe that doesnt cover the outline is a bad fit.      Whats Your Foot Shape?  To get a good fit, you need to know the shape of your foot. Do this simple test: While standing, place your foot on a piece of paper and trace around it. Is your foot straight or curved? Do you have a foot problem, such as a bunion, that causes your foot outline to show a bulge on the side of your big toe?  Finding Your Fit  Bring your foot outline to the [employer] store to help you find the right shoe. Place a shoe you like on top of the outline to see if it matches the shape. The shoe should cover the outline. (If you have a bunion, the shoe may not cover the bulge on the outline. Look for soft leather shoes to stretch over the bunion.) Once youve found a pair of proper shoes, put them on. Walk around. Be sure the shoes dont rub or pinch. If the shoes feel good, youve found your fit!  The Right Shoe for You  A good shoe has features that provide comfort and support. It must also be the right size and shape for your feet. Look for a shoe made of breathable fabric and lining, such as leather or  canvas. Be sure that shoes have enough tread to prevent slipping. Go to a good shoe store for help finding the right shoe.  Good Shoe Features  An ideal shoe has the following:  · Laces for support. If tying laces is a problem for you, try shoes with Velcro fasteners or heather.  · A front of the shoe (toe box) with ½ inch space in front of your longest toes.  · An arch shape that supports your foot.  · No more than 1½ inches of heel.  · A stiff, snug back of the shoe to keep your foot from sliding around.  · A smooth lining with no rough seams.  Shoe Shopping Tips  Below are some dos and donts for when you go to the shoe store.  Do:  · Select the shoes that feel right. Wear them around the house. Then bring them to your foot doctor to check for fit. If they dont fit well, return them.  · Shop late in the day, when your feet will be slightly bigger.  · Each time you buy shoes, have both your feet measured while you are standing. Foot size changes with time.  · Pick shoes to suit their purpose. High heels are okay for an occasional night on the town. But for everyday wear, choose a more sensible shoe.  · Try on shoes while wearing any inserts specially made for your feet (orthoses).  · Try on both the right and left shoes. If your feet are different sizes, pick a pair that fits the larger foot.  Dont:  · Dont buy shoes based on shoe size alone. Always try on shoes, as sizes differ from brand to brand and within brands.  · Dont expect shoes to break in. If they dont fit at the store, dont buy them.  · Dont buy a shoe that doesnt match your foot shape.  What About Socks?  Always wear socks with shoes. Socks help absorb sweat and reduce friction and blistering. When shopping for shoes, choose soft, padded socks with seams that dont irritate your feet.  If You Have Foot Problems  Some foot problems cause deformities. This can make it hard to find a good fit. Look for shoes made of soft leather to stretch  over the deformity. If you have bunions, buy shoes with a wider toe box. To fit hammertoes, look for shoes with a tall toe box. If you have arch problems, you may need inserts. In some cases, youll need to have custom footwear or orthoses made for your feet.  Suggested Footwear  Ask your healthcare provider what kind of footwear you need. He or she may recommend a certain brand or shoe store.  © 7523-4906 [employer]. [address] [place] [address]. All rights reserved. This information is not intended as a substitute for professional medical care. Always follow your healthcare professional's instructions.        Toe Extension (Flexibility)    These instructions are for your right foot. Switch sides for your left foot.  1. Sit in a chair. Rest your right ankle on your left knee.  2. Hold your toes with your right hand. Gently bend the toes backward. Feel a stretch in the undersides of the toes and ball of the foot. Hold for 30 to 60 seconds.  3. Then gently bend the toes in the other direction. Gently press on them until your foot is pointed. Hold for 30 to 60 seconds.  4. Repeat 5 times, or as instructed.  © 7402-7388 [employer]. [address] [place] [address]. All rights reserved. This information is not intended as a substitute for professional medical care. Always follow your healthcare professional's instructions.      Diabetes: Inspecting Your Feet  Diabetes increases your chances of developing foot problems. So inspect your feet every day. This helps you find small skin irritations before they become serious infections. If you have trouble seeing the bottoms of your feet, use a mirror or ask a family member or friend to help.     Pressure spots on the bottom of the foot are common areas where problems develop.   How to check your feet  Below are tips to help you look for foot problems. Try to check your feet at the same time each day, such as when you get  out of bed in the morning:  · Check the top of each foot. The tops of toes, back of the heel, and outer edge of the foot can get a lot of rubbing from poor-fitting shoes.  · Check the bottom of each foot. Daily wear and tear often leads to problems at pressure spots.  · Check the toes and nails. Fungal infections often occur between toes. Toenail problems can also be a sign of fungal infections or lead to breaks in the skin.  · Check your shoes, too. Loose objects inside a shoe can injure the foot. Use your hand to feel inside your shoes for things like randall, loose stitching, or rough areas that could irritate your skin.  Warning signs  Look for any color changes in the foot. Redness with streaks can signal a severe infection, which needs immediate medical attention. Tell your doctor right away if you have any of these problems:  · Swelling, sometimes with color changes, may be a sign of poor blood flow or infection. Symptoms include tenderness and an increase in the size of your foot.  · Warm or hot areas on your feet may be signs of infection. A foot that is cold may not be getting enough blood.  · Sensations such as burning, tingling, or pins and needles can be signs of a problem. Also check for areas that may be numb.  · Hot spots are caused by friction or pressure. Look for hot spots in areas that get a lot of rubbing. Hot spots can turn into blisters, calluses, or sores.  · Cracks and sores are caused by dry or irritated skin. They are a sign that the skin is breaking down, which can lead to infection.  · Toenail problems to watch for include nails growing into the skin (ingrown toenail) and causing redness or pain. Thick, yellow, or discolored nails can signal a fungal infection.  · Drainage and odor can develop from untreated sores and ulcers. Call your doctor right away if you notice white or yellow drainage, bleeding, or unpleasant odor.   © 9561-5995 [employer]. [address] [place] [address],  [place] [address]. All rights reserved. This information is not intended as a substitute for professional medical care. Always follow your healthcare professional's instructions.

## 2023-08-23 NOTE — NURSING
0802- assessment completed and plan of care reviewed with patient. No skin breakdown . No injuries noted. Bed alarm is activated . No changes on telemetry . Family in room visiting at this time. Bed changed wife informed patient will receive a wash off today soon as possible , wife agreeable. Patient ate all of his lunch , covered with moderate sliding scale. He wants to sit up in the chair at his bedside for a little bit pulled back curtain in room patient right across from nursing station i can clearly see him sitting in the chair call light on the bed.neurological assessment completed only change i did notice while he's sitting up in chair his smile droppy to right side even without upper dentures in his mouth . Denies headache pain or other pain when i ask him.       1057- patient sitting up in chair he is awake and family remains in room family has been instructed to call myself or staff if i'm not readily available to assist them let us know when they are leaving. Family agreed. No changes on telemetry no acute distress.         Please call. Sertraline 90 days sent to pharmacy.  Unable to send 90 days of methylphenidate, only 30 days for controlled substances.

## 2024-02-24 NOTE — ASSESSMENT & PLAN NOTE
CVA, discharged from [facility] 4/2017  Residual deficit may limit full insulin pump usage     1.85

## 2024-06-07 NOTE — PROGRESS NOTES
Follow-up Information     Follow up with Marino Koehler MD On 4/27/2017.    Specialty:  General Surgery    Why:  Outpatient Services, follow-up appointment. Patient should arrive by 10:00AM. Patient will see Dr. Koehler at [facility], [address],    Contact information:    [address]  [address]  [facility] [address]  [phone]          Follow up with Lito Johnson MD On 5/18/2017.    Specialty:  Neurology    Why:  Outpatient Services, PCP follow-up appointment. Patient should arrive by 9:20AM.     Contact information:    [address]  [address]  [place] [address]  [phone]          Follow up with Neha Plasencia MD On 4/21/2017.    Specialty:  Family Medicine    Why:  Outpatient Services, PCP follow-up appointment. Patient should arrive by 10:00AM.     Contact information:    [address] [place] [address]  [phone]          Follow up with [facility].    Specialty:  Home Health Services    Why:  Home Health    Contact information:    [address]  [address]  [facility] [address]  [phone]        PLEASE BRING TO ALL FOLLOW UP APPOINTMENTS:   A COPY YOUR DISCHARGE INSTRUCTIONS, Any new MEDICINES YOU ARE CURRENTLY TAKING IN THEIR ORIGINAL BOTTLES  And IDENTIFICATION AND INSURANCE CARD     **PLEASE ARRIVE 15 MINUTES AHEAD OF SCHEDULED APPOINTMENT TIME   ++PLEASE CALL 24 HOURS IN ADVANCE IF YOU MUST RESCHEDULE YOUR APPOINTMENT DAY AND/OR TIME     Help at Home [phone]  After discharge for assistance [facility] On Call Nurse Care Line 24/7 Assistance     Things You are responsible For To Manage Your Care At Home:  1.    Getting your prescriptions filled   2.    Taking your medications as directed, DO NOT MISS ANY DOSES!  3.    Going to your follow-up doctor appointment. This is important because it  allow the doctor to monitor your progress and determine if  any changes need to made to your treatment plan.     Thank you for choosing [facility]  for your care.  Please answer any calls you may receive from [facility] we want to continue to support you as you manage your healthcare needs. [facility] is happy to have the opportunity to serve you.    Thank you for choosing [facility] for your care. Within 48-72 hours after leaving the hospital you will receive a call from [facility] Nurses following up to see how you are doing. The team will ask you a few questions and the call will last approximately 20 minutes.     Please answer any calls you may receive from [facility] we want to continue to support you as you manage your healthcare needs. [facility] is happy to have the opportunity to serve you.      Jazmine Caal, HAILEE, MSW, CSW  [phone]  Care Management     none

## 2025-01-06 NOTE — LETTER
May 18, 2017      Soren Schulz MD  [address]  [place] [address]           [facility] [address] [facility] [address]  [facility] [address]  Phone: [phone]  Fax: [phone]          Patient: Jorden Shafer   MR Number: [id number]   YOB: 1945   Date of Visit: 5/18/2017       Dear Dr. Soren Schulz:    Thank you for referring Jorden Shafer to me for evaluation. Attached you will find relevant portions of my assessment and plan of care.    If you have questions, please do not hesitate to call me. I look forward to following Jorden Shafer along with you.    Sincerely,    Lito Johnson MD    Enclosure  CC:  No Recipients    If you would like to receive this communication electronically, please contact [email or web address] or [phone] to request more information on [employer] access.    For providers and/or their staff who would like to refer a patient to [facility], please contact us through our one-stop-shop provider referral line, [facility], at [phone].    If you feel you have received this communication in error or would no longer like to receive these types of communications, please e-mail [email or web address]          previous_biopsy_has_been_previously_biopsied Body Location Override (Optional): Left forehead

## (undated) DEVICE — BANDAGE ADHESIVE